# Patient Record
Sex: MALE | Race: WHITE | NOT HISPANIC OR LATINO | Employment: OTHER | URBAN - METROPOLITAN AREA
[De-identification: names, ages, dates, MRNs, and addresses within clinical notes are randomized per-mention and may not be internally consistent; named-entity substitution may affect disease eponyms.]

---

## 2006-06-27 LAB
EXTERNAL HIV CONFIRMATION: NORMAL
EXTERNAL HIV SCREEN: NORMAL

## 2017-01-06 ENCOUNTER — APPOINTMENT (OUTPATIENT)
Dept: OCCUPATIONAL THERAPY | Facility: CLINIC | Age: 57
End: 2017-01-06
Payer: OTHER MISCELLANEOUS

## 2017-01-06 ENCOUNTER — APPOINTMENT (OUTPATIENT)
Dept: OCCUPATIONAL THERAPY | Facility: CLINIC | Age: 57
End: 2017-01-06
Payer: MEDICARE

## 2017-01-06 PROCEDURE — 97110 THERAPEUTIC EXERCISES: CPT

## 2017-01-06 PROCEDURE — 97014 ELECTRIC STIMULATION THERAPY: CPT

## 2017-01-06 PROCEDURE — 97140 MANUAL THERAPY 1/> REGIONS: CPT

## 2017-01-09 ENCOUNTER — APPOINTMENT (OUTPATIENT)
Dept: OCCUPATIONAL THERAPY | Facility: CLINIC | Age: 57
End: 2017-01-09
Payer: OTHER MISCELLANEOUS

## 2017-01-09 PROCEDURE — 97110 THERAPEUTIC EXERCISES: CPT

## 2017-01-09 PROCEDURE — 97014 ELECTRIC STIMULATION THERAPY: CPT

## 2017-01-09 PROCEDURE — 97140 MANUAL THERAPY 1/> REGIONS: CPT

## 2017-01-11 ENCOUNTER — HOSPITAL ENCOUNTER (OUTPATIENT)
Facility: HOSPITAL | Age: 57
Setting detail: OUTPATIENT SURGERY
Discharge: HOME | End: 2017-01-11
Attending: INTERNAL MEDICINE | Admitting: INTERNAL MEDICINE
Payer: COMMERCIAL

## 2017-01-11 ENCOUNTER — ANESTHESIA EVENT (OUTPATIENT)
Dept: GASTROENTEROLOGY | Facility: HOSPITAL | Age: 57
End: 2017-01-11
Payer: COMMERCIAL

## 2017-01-11 ENCOUNTER — ANESTHESIA (OUTPATIENT)
Dept: GASTROENTEROLOGY | Facility: HOSPITAL | Age: 57
End: 2017-01-11
Payer: COMMERCIAL

## 2017-01-11 ENCOUNTER — GENERIC CONVERSION - ENCOUNTER (OUTPATIENT)
Dept: OTHER | Facility: OTHER | Age: 57
End: 2017-01-11

## 2017-01-11 VITALS
SYSTOLIC BLOOD PRESSURE: 124 MMHG | DIASTOLIC BLOOD PRESSURE: 76 MMHG | TEMPERATURE: 98.6 F | HEART RATE: 82 BPM | RESPIRATION RATE: 18 BRPM | OXYGEN SATURATION: 96 %

## 2017-01-11 DIAGNOSIS — K21.9 GASTRO-ESOPHAGEAL REFLUX DISEASE WITHOUT ESOPHAGITIS: ICD-10-CM

## 2017-01-11 PROCEDURE — 88305 TISSUE EXAM BY PATHOLOGIST: CPT | Performed by: INTERNAL MEDICINE

## 2017-01-11 RX ORDER — ALBUTEROL SULFATE 90 UG/1
2 AEROSOL, METERED RESPIRATORY (INHALATION) EVERY 6 HOURS PRN
COMMUNITY
End: 2019-09-18 | Stop reason: SDUPTHER

## 2017-01-11 RX ORDER — LIDOCAINE HYDROCHLORIDE 10 MG/ML
INJECTION, SOLUTION INFILTRATION; PERINEURAL AS NEEDED
Status: DISCONTINUED | OUTPATIENT
Start: 2017-01-11 | End: 2017-01-11 | Stop reason: SURG

## 2017-01-11 RX ORDER — PANTOPRAZOLE SODIUM 40 MG/1
40 TABLET, DELAYED RELEASE ORAL 2 TIMES DAILY
Status: ON HOLD | COMMUNITY
End: 2017-07-13

## 2017-01-11 RX ORDER — PROPOFOL 10 MG/ML
INJECTION, EMULSION INTRAVENOUS AS NEEDED
Status: DISCONTINUED | OUTPATIENT
Start: 2017-01-11 | End: 2017-01-11 | Stop reason: SURG

## 2017-01-11 RX ORDER — SODIUM CHLORIDE 9 MG/ML
50 INJECTION, SOLUTION INTRAVENOUS CONTINUOUS
Status: DISCONTINUED | OUTPATIENT
Start: 2017-01-11 | End: 2017-01-11 | Stop reason: HOSPADM

## 2017-01-11 RX ORDER — TOPIRAMATE 100 MG/1
100 TABLET, FILM COATED ORAL DAILY
Status: ON HOLD | COMMUNITY
End: 2018-11-27 | Stop reason: ALTCHOICE

## 2017-01-11 RX ADMIN — LIDOCAINE HYDROCHLORIDE 100 MG: 10 INJECTION, SOLUTION INFILTRATION; PERINEURAL at 14:36

## 2017-01-11 RX ADMIN — PROPOFOL 25 MG: 10 INJECTION, EMULSION INTRAVENOUS at 14:40

## 2017-01-11 RX ADMIN — SODIUM CHLORIDE 50 ML/HR: 0.9 INJECTION, SOLUTION INTRAVENOUS at 14:03

## 2017-01-11 RX ADMIN — PROPOFOL 25 MG: 10 INJECTION, EMULSION INTRAVENOUS at 14:41

## 2017-01-11 RX ADMIN — PROPOFOL 100 MG: 10 INJECTION, EMULSION INTRAVENOUS at 14:37

## 2017-01-11 RX ADMIN — PROPOFOL 100 MG: 10 INJECTION, EMULSION INTRAVENOUS at 14:36

## 2017-01-11 RX ADMIN — PROPOFOL 25 MG: 10 INJECTION, EMULSION INTRAVENOUS at 14:42

## 2017-01-11 RX ADMIN — PROPOFOL 25 MG: 10 INJECTION, EMULSION INTRAVENOUS at 14:39

## 2017-01-12 ENCOUNTER — APPOINTMENT (OUTPATIENT)
Dept: OCCUPATIONAL THERAPY | Facility: CLINIC | Age: 57
End: 2017-01-12
Payer: OTHER MISCELLANEOUS

## 2017-01-12 PROCEDURE — 97018 PARAFFIN BATH THERAPY: CPT

## 2017-01-12 PROCEDURE — 97110 THERAPEUTIC EXERCISES: CPT

## 2017-01-12 PROCEDURE — 97140 MANUAL THERAPY 1/> REGIONS: CPT

## 2017-01-12 PROCEDURE — 97014 ELECTRIC STIMULATION THERAPY: CPT

## 2017-01-13 ENCOUNTER — APPOINTMENT (OUTPATIENT)
Dept: OCCUPATIONAL THERAPY | Facility: CLINIC | Age: 57
End: 2017-01-13
Payer: OTHER MISCELLANEOUS

## 2017-01-15 ENCOUNTER — GENERIC CONVERSION - ENCOUNTER (OUTPATIENT)
Dept: OTHER | Facility: OTHER | Age: 57
End: 2017-01-15

## 2017-01-16 ENCOUNTER — APPOINTMENT (OUTPATIENT)
Dept: OCCUPATIONAL THERAPY | Facility: CLINIC | Age: 57
End: 2017-01-16
Payer: OTHER MISCELLANEOUS

## 2017-01-16 ENCOUNTER — GENERIC CONVERSION - ENCOUNTER (OUTPATIENT)
Dept: OTHER | Facility: OTHER | Age: 57
End: 2017-01-16

## 2017-01-16 PROCEDURE — 97014 ELECTRIC STIMULATION THERAPY: CPT

## 2017-01-16 PROCEDURE — 97110 THERAPEUTIC EXERCISES: CPT

## 2017-01-16 PROCEDURE — 97140 MANUAL THERAPY 1/> REGIONS: CPT

## 2017-01-16 PROCEDURE — 97018 PARAFFIN BATH THERAPY: CPT

## 2017-01-18 ENCOUNTER — APPOINTMENT (OUTPATIENT)
Dept: OCCUPATIONAL THERAPY | Facility: CLINIC | Age: 57
End: 2017-01-18
Payer: OTHER MISCELLANEOUS

## 2017-01-23 ENCOUNTER — APPOINTMENT (OUTPATIENT)
Dept: OCCUPATIONAL THERAPY | Facility: CLINIC | Age: 57
End: 2017-01-23
Payer: OTHER MISCELLANEOUS

## 2017-01-23 ENCOUNTER — GENERIC CONVERSION - ENCOUNTER (OUTPATIENT)
Dept: OTHER | Facility: OTHER | Age: 57
End: 2017-01-23

## 2017-01-23 PROCEDURE — 97110 THERAPEUTIC EXERCISES: CPT

## 2017-01-23 PROCEDURE — 97140 MANUAL THERAPY 1/> REGIONS: CPT

## 2017-01-23 PROCEDURE — 97035 APP MDLTY 1+ULTRASOUND EA 15: CPT

## 2017-01-23 PROCEDURE — 97014 ELECTRIC STIMULATION THERAPY: CPT

## 2017-01-25 ENCOUNTER — TRANSCRIBE ORDERS (OUTPATIENT)
Dept: LAB | Facility: CLINIC | Age: 57
End: 2017-01-25

## 2017-01-25 ENCOUNTER — APPOINTMENT (OUTPATIENT)
Dept: LAB | Facility: CLINIC | Age: 57
End: 2017-01-25
Payer: COMMERCIAL

## 2017-01-25 ENCOUNTER — APPOINTMENT (OUTPATIENT)
Dept: OCCUPATIONAL THERAPY | Facility: CLINIC | Age: 57
End: 2017-01-25
Payer: OTHER MISCELLANEOUS

## 2017-01-25 DIAGNOSIS — E78.00 PURE HYPERCHOLESTEROLEMIA: ICD-10-CM

## 2017-01-25 LAB
ALBUMIN SERPL BCP-MCNC: 3.8 G/DL (ref 3.5–5)
ALP SERPL-CCNC: 73 U/L (ref 46–116)
ALT SERPL W P-5'-P-CCNC: 28 U/L (ref 12–78)
ANION GAP SERPL CALCULATED.3IONS-SCNC: 6 MMOL/L (ref 4–13)
AST SERPL W P-5'-P-CCNC: 11 U/L (ref 5–45)
BILIRUB SERPL-MCNC: 0.31 MG/DL (ref 0.2–1)
BUN SERPL-MCNC: 17 MG/DL (ref 5–25)
CALCIUM SERPL-MCNC: 8.8 MG/DL (ref 8.3–10.1)
CHLORIDE SERPL-SCNC: 102 MMOL/L (ref 100–108)
CHOLEST SERPL-MCNC: 226 MG/DL (ref 50–200)
CO2 SERPL-SCNC: 29 MMOL/L (ref 21–32)
CREAT SERPL-MCNC: 1 MG/DL (ref 0.6–1.3)
GFR SERPL CREATININE-BSD FRML MDRD: >60 ML/MIN/1.73SQ M
GLUCOSE SERPL-MCNC: 79 MG/DL (ref 65–140)
HDLC SERPL-MCNC: 45 MG/DL (ref 40–60)
LDLC SERPL CALC-MCNC: 165 MG/DL (ref 0–100)
POTASSIUM SERPL-SCNC: 3.8 MMOL/L (ref 3.5–5.3)
PROT SERPL-MCNC: 7.6 G/DL (ref 6.4–8.2)
SODIUM SERPL-SCNC: 137 MMOL/L (ref 136–145)
TRIGL SERPL-MCNC: 80 MG/DL

## 2017-01-25 PROCEDURE — 97110 THERAPEUTIC EXERCISES: CPT

## 2017-01-25 PROCEDURE — 80061 LIPID PANEL: CPT

## 2017-01-25 PROCEDURE — 80053 COMPREHEN METABOLIC PANEL: CPT

## 2017-01-25 PROCEDURE — 97140 MANUAL THERAPY 1/> REGIONS: CPT

## 2017-01-25 PROCEDURE — 97018 PARAFFIN BATH THERAPY: CPT

## 2017-01-25 PROCEDURE — 97014 ELECTRIC STIMULATION THERAPY: CPT

## 2017-01-25 PROCEDURE — 97035 APP MDLTY 1+ULTRASOUND EA 15: CPT

## 2017-01-25 PROCEDURE — 36415 COLL VENOUS BLD VENIPUNCTURE: CPT

## 2017-01-26 ENCOUNTER — ALLSCRIPTS OFFICE VISIT (OUTPATIENT)
Dept: OTHER | Facility: OTHER | Age: 57
End: 2017-01-26

## 2017-02-01 ENCOUNTER — APPOINTMENT (OUTPATIENT)
Dept: OCCUPATIONAL THERAPY | Facility: CLINIC | Age: 57
End: 2017-02-01
Payer: OTHER MISCELLANEOUS

## 2017-02-01 PROCEDURE — G8985 CARRY GOAL STATUS: HCPCS

## 2017-02-01 PROCEDURE — 97014 ELECTRIC STIMULATION THERAPY: CPT

## 2017-02-01 PROCEDURE — G8984 CARRY CURRENT STATUS: HCPCS

## 2017-02-01 PROCEDURE — G8990 OTHER PT/OT CURRENT STATUS: HCPCS

## 2017-02-01 PROCEDURE — 97168 OT RE-EVAL EST PLAN CARE: CPT

## 2017-02-01 PROCEDURE — G8991 OTHER PT/OT GOAL STATUS: HCPCS

## 2017-02-01 PROCEDURE — 97018 PARAFFIN BATH THERAPY: CPT

## 2017-02-01 PROCEDURE — 97140 MANUAL THERAPY 1/> REGIONS: CPT

## 2017-02-01 PROCEDURE — 97110 THERAPEUTIC EXERCISES: CPT

## 2017-02-03 ENCOUNTER — APPOINTMENT (OUTPATIENT)
Dept: OCCUPATIONAL THERAPY | Facility: CLINIC | Age: 57
End: 2017-02-03
Payer: OTHER MISCELLANEOUS

## 2017-02-03 PROCEDURE — 97014 ELECTRIC STIMULATION THERAPY: CPT

## 2017-02-03 PROCEDURE — 97110 THERAPEUTIC EXERCISES: CPT

## 2017-02-03 PROCEDURE — 97018 PARAFFIN BATH THERAPY: CPT

## 2017-02-03 PROCEDURE — 97140 MANUAL THERAPY 1/> REGIONS: CPT

## 2017-02-06 ENCOUNTER — APPOINTMENT (OUTPATIENT)
Dept: OCCUPATIONAL THERAPY | Facility: CLINIC | Age: 57
End: 2017-02-06
Payer: OTHER MISCELLANEOUS

## 2017-02-06 PROCEDURE — 97014 ELECTRIC STIMULATION THERAPY: CPT

## 2017-02-06 PROCEDURE — 97110 THERAPEUTIC EXERCISES: CPT

## 2017-02-06 PROCEDURE — 97140 MANUAL THERAPY 1/> REGIONS: CPT

## 2017-02-06 PROCEDURE — 97018 PARAFFIN BATH THERAPY: CPT

## 2017-02-07 ENCOUNTER — GENERIC CONVERSION - ENCOUNTER (OUTPATIENT)
Dept: OTHER | Facility: OTHER | Age: 57
End: 2017-02-07

## 2017-02-08 ENCOUNTER — ALLSCRIPTS OFFICE VISIT (OUTPATIENT)
Dept: OTHER | Facility: OTHER | Age: 57
End: 2017-02-08

## 2017-02-08 DIAGNOSIS — Z47.89 ENCOUNTER FOR OTHER ORTHOPEDIC AFTERCARE: ICD-10-CM

## 2017-02-08 DIAGNOSIS — K21.9 GASTRO-ESOPHAGEAL REFLUX DISEASE WITHOUT ESOPHAGITIS: ICD-10-CM

## 2017-02-10 ENCOUNTER — APPOINTMENT (OUTPATIENT)
Dept: OCCUPATIONAL THERAPY | Facility: CLINIC | Age: 57
End: 2017-02-10
Payer: OTHER MISCELLANEOUS

## 2017-02-10 PROCEDURE — 97140 MANUAL THERAPY 1/> REGIONS: CPT

## 2017-02-10 PROCEDURE — 97018 PARAFFIN BATH THERAPY: CPT

## 2017-02-10 PROCEDURE — 97014 ELECTRIC STIMULATION THERAPY: CPT

## 2017-02-10 PROCEDURE — 97110 THERAPEUTIC EXERCISES: CPT

## 2017-02-13 ENCOUNTER — GENERIC CONVERSION - ENCOUNTER (OUTPATIENT)
Dept: OTHER | Facility: OTHER | Age: 57
End: 2017-02-13

## 2017-02-13 ENCOUNTER — APPOINTMENT (OUTPATIENT)
Dept: OCCUPATIONAL THERAPY | Facility: CLINIC | Age: 57
End: 2017-02-13
Payer: OTHER MISCELLANEOUS

## 2017-02-13 PROCEDURE — 97014 ELECTRIC STIMULATION THERAPY: CPT

## 2017-02-13 PROCEDURE — 97110 THERAPEUTIC EXERCISES: CPT

## 2017-02-13 PROCEDURE — 97140 MANUAL THERAPY 1/> REGIONS: CPT

## 2017-02-13 PROCEDURE — 97018 PARAFFIN BATH THERAPY: CPT

## 2017-02-15 ENCOUNTER — APPOINTMENT (OUTPATIENT)
Dept: OCCUPATIONAL THERAPY | Facility: CLINIC | Age: 57
End: 2017-02-15
Payer: OTHER MISCELLANEOUS

## 2017-02-15 ENCOUNTER — HOSPITAL ENCOUNTER (OUTPATIENT)
Dept: RADIOLOGY | Facility: HOSPITAL | Age: 57
Discharge: HOME | End: 2017-02-15
Attending: INTERNAL MEDICINE
Payer: COMMERCIAL

## 2017-02-15 DIAGNOSIS — K21.9 GASTRO-ESOPHAGEAL REFLUX DISEASE WITHOUT ESOPHAGITIS: ICD-10-CM

## 2017-02-15 PROCEDURE — 74220 X-RAY XM ESOPHAGUS 1CNTRST: CPT

## 2017-02-22 ENCOUNTER — APPOINTMENT (OUTPATIENT)
Dept: OCCUPATIONAL THERAPY | Facility: CLINIC | Age: 57
End: 2017-02-22
Payer: OTHER MISCELLANEOUS

## 2017-02-22 PROCEDURE — 97014 ELECTRIC STIMULATION THERAPY: CPT

## 2017-02-22 PROCEDURE — 97018 PARAFFIN BATH THERAPY: CPT

## 2017-02-22 PROCEDURE — 97110 THERAPEUTIC EXERCISES: CPT

## 2017-02-22 PROCEDURE — 97140 MANUAL THERAPY 1/> REGIONS: CPT

## 2017-02-24 ENCOUNTER — APPOINTMENT (OUTPATIENT)
Dept: OCCUPATIONAL THERAPY | Facility: CLINIC | Age: 57
End: 2017-02-24
Payer: OTHER MISCELLANEOUS

## 2017-02-24 PROCEDURE — 97110 THERAPEUTIC EXERCISES: CPT

## 2017-02-24 PROCEDURE — 97018 PARAFFIN BATH THERAPY: CPT

## 2017-02-24 PROCEDURE — 97140 MANUAL THERAPY 1/> REGIONS: CPT

## 2017-02-24 PROCEDURE — 97014 ELECTRIC STIMULATION THERAPY: CPT

## 2017-03-01 ENCOUNTER — GENERIC CONVERSION - ENCOUNTER (OUTPATIENT)
Dept: OTHER | Facility: OTHER | Age: 57
End: 2017-03-01

## 2017-03-01 ENCOUNTER — APPOINTMENT (OUTPATIENT)
Dept: OCCUPATIONAL THERAPY | Facility: CLINIC | Age: 57
End: 2017-03-01
Payer: OTHER MISCELLANEOUS

## 2017-03-01 PROCEDURE — 97168 OT RE-EVAL EST PLAN CARE: CPT

## 2017-03-01 PROCEDURE — 97140 MANUAL THERAPY 1/> REGIONS: CPT

## 2017-03-01 PROCEDURE — 97014 ELECTRIC STIMULATION THERAPY: CPT

## 2017-03-01 PROCEDURE — G8984 CARRY CURRENT STATUS: HCPCS

## 2017-03-01 PROCEDURE — 97018 PARAFFIN BATH THERAPY: CPT

## 2017-03-01 PROCEDURE — G8985 CARRY GOAL STATUS: HCPCS

## 2017-03-01 PROCEDURE — 97110 THERAPEUTIC EXERCISES: CPT

## 2017-03-03 ENCOUNTER — ALLSCRIPTS OFFICE VISIT (OUTPATIENT)
Dept: OTHER | Facility: OTHER | Age: 57
End: 2017-03-03

## 2017-03-08 ENCOUNTER — HOSPITAL ENCOUNTER (OUTPATIENT)
Dept: GASTROENTEROLOGY | Facility: HOSPITAL | Age: 57
Discharge: HOME | End: 2017-03-08
Payer: COMMERCIAL

## 2017-03-08 VITALS
DIASTOLIC BLOOD PRESSURE: 83 MMHG | HEIGHT: 69 IN | HEART RATE: 91 BPM | TEMPERATURE: 98.3 F | BODY MASS INDEX: 30.66 KG/M2 | WEIGHT: 207 LBS | SYSTOLIC BLOOD PRESSURE: 181 MMHG | OXYGEN SATURATION: 98 % | RESPIRATION RATE: 18 BRPM

## 2017-03-08 PROCEDURE — 91020 GASTRIC MOTILITY STUDIES: CPT

## 2017-03-08 PROCEDURE — 91038 ESOPH IMPED FUNCT TEST > 1HR: CPT

## 2017-03-15 ENCOUNTER — APPOINTMENT (OUTPATIENT)
Dept: OCCUPATIONAL THERAPY | Facility: CLINIC | Age: 57
End: 2017-03-15
Payer: OTHER MISCELLANEOUS

## 2017-03-17 ENCOUNTER — APPOINTMENT (OUTPATIENT)
Dept: OCCUPATIONAL THERAPY | Facility: CLINIC | Age: 57
End: 2017-03-17
Payer: OTHER MISCELLANEOUS

## 2017-03-17 PROCEDURE — 97140 MANUAL THERAPY 1/> REGIONS: CPT

## 2017-03-17 PROCEDURE — 97014 ELECTRIC STIMULATION THERAPY: CPT

## 2017-03-17 PROCEDURE — 97018 PARAFFIN BATH THERAPY: CPT

## 2017-03-17 PROCEDURE — 97110 THERAPEUTIC EXERCISES: CPT

## 2017-03-20 ENCOUNTER — GENERIC CONVERSION - ENCOUNTER (OUTPATIENT)
Dept: OTHER | Facility: OTHER | Age: 57
End: 2017-03-20

## 2017-03-20 ENCOUNTER — ALLSCRIPTS OFFICE VISIT (OUTPATIENT)
Dept: OTHER | Facility: OTHER | Age: 57
End: 2017-03-20

## 2017-03-22 ENCOUNTER — APPOINTMENT (OUTPATIENT)
Dept: OCCUPATIONAL THERAPY | Facility: CLINIC | Age: 57
End: 2017-03-22
Payer: OTHER MISCELLANEOUS

## 2017-03-22 PROCEDURE — 97014 ELECTRIC STIMULATION THERAPY: CPT

## 2017-03-22 PROCEDURE — 97140 MANUAL THERAPY 1/> REGIONS: CPT

## 2017-03-22 PROCEDURE — 97018 PARAFFIN BATH THERAPY: CPT

## 2017-03-22 PROCEDURE — 97110 THERAPEUTIC EXERCISES: CPT

## 2017-03-23 ENCOUNTER — ALLSCRIPTS OFFICE VISIT (OUTPATIENT)
Dept: OTHER | Facility: OTHER | Age: 57
End: 2017-03-23

## 2017-03-23 LAB
BILIRUB UR QL STRIP: NORMAL
CLARITY UR: NORMAL
COLOR UR: YELLOW
GLUCOSE (HISTORICAL): NORMAL
HGB UR QL STRIP.AUTO: NORMAL
KETONES UR STRIP-MCNC: NORMAL MG/DL
LEUKOCYTE ESTERASE UR QL STRIP: NORMAL
NITRITE UR QL STRIP: NORMAL
PH UR STRIP.AUTO: 6 [PH]
PROT UR STRIP-MCNC: NORMAL MG/DL
SP GR UR STRIP.AUTO: 1.01
UROBILINOGEN UR QL STRIP.AUTO: 0.2

## 2017-03-24 ENCOUNTER — APPOINTMENT (OUTPATIENT)
Dept: OCCUPATIONAL THERAPY | Facility: CLINIC | Age: 57
End: 2017-03-24
Payer: OTHER MISCELLANEOUS

## 2017-03-24 PROCEDURE — 97018 PARAFFIN BATH THERAPY: CPT

## 2017-03-24 PROCEDURE — 97110 THERAPEUTIC EXERCISES: CPT

## 2017-03-24 PROCEDURE — 97014 ELECTRIC STIMULATION THERAPY: CPT

## 2017-03-24 PROCEDURE — 97140 MANUAL THERAPY 1/> REGIONS: CPT

## 2017-03-27 ENCOUNTER — APPOINTMENT (OUTPATIENT)
Dept: OCCUPATIONAL THERAPY | Facility: CLINIC | Age: 57
End: 2017-03-27
Payer: OTHER MISCELLANEOUS

## 2017-03-27 PROCEDURE — 97140 MANUAL THERAPY 1/> REGIONS: CPT

## 2017-03-27 PROCEDURE — 97018 PARAFFIN BATH THERAPY: CPT

## 2017-03-27 PROCEDURE — 97014 ELECTRIC STIMULATION THERAPY: CPT

## 2017-03-27 PROCEDURE — 97110 THERAPEUTIC EXERCISES: CPT

## 2017-03-31 ENCOUNTER — APPOINTMENT (OUTPATIENT)
Dept: OCCUPATIONAL THERAPY | Facility: CLINIC | Age: 57
End: 2017-03-31
Payer: OTHER MISCELLANEOUS

## 2017-03-31 PROCEDURE — 97018 PARAFFIN BATH THERAPY: CPT

## 2017-03-31 PROCEDURE — 97140 MANUAL THERAPY 1/> REGIONS: CPT

## 2017-03-31 PROCEDURE — 97014 ELECTRIC STIMULATION THERAPY: CPT

## 2017-03-31 PROCEDURE — 97110 THERAPEUTIC EXERCISES: CPT

## 2017-04-05 ENCOUNTER — ALLSCRIPTS OFFICE VISIT (OUTPATIENT)
Dept: OTHER | Facility: OTHER | Age: 57
End: 2017-04-05

## 2017-04-05 ENCOUNTER — APPOINTMENT (OUTPATIENT)
Dept: OCCUPATIONAL THERAPY | Facility: CLINIC | Age: 57
End: 2017-04-05
Payer: OTHER MISCELLANEOUS

## 2017-04-05 PROCEDURE — 97140 MANUAL THERAPY 1/> REGIONS: CPT

## 2017-04-05 PROCEDURE — 97018 PARAFFIN BATH THERAPY: CPT

## 2017-04-05 PROCEDURE — 97014 ELECTRIC STIMULATION THERAPY: CPT

## 2017-04-05 PROCEDURE — 97110 THERAPEUTIC EXERCISES: CPT

## 2017-04-07 ENCOUNTER — APPOINTMENT (OUTPATIENT)
Dept: OCCUPATIONAL THERAPY | Facility: CLINIC | Age: 57
End: 2017-04-07
Payer: OTHER MISCELLANEOUS

## 2017-04-12 ENCOUNTER — GENERIC CONVERSION - ENCOUNTER (OUTPATIENT)
Dept: OTHER | Facility: OTHER | Age: 57
End: 2017-04-12

## 2017-04-12 ENCOUNTER — APPOINTMENT (OUTPATIENT)
Dept: OCCUPATIONAL THERAPY | Facility: CLINIC | Age: 57
End: 2017-04-12
Payer: OTHER MISCELLANEOUS

## 2017-04-12 PROCEDURE — 97140 MANUAL THERAPY 1/> REGIONS: CPT

## 2017-04-12 PROCEDURE — 97014 ELECTRIC STIMULATION THERAPY: CPT

## 2017-04-12 PROCEDURE — 97110 THERAPEUTIC EXERCISES: CPT

## 2017-04-12 PROCEDURE — 97018 PARAFFIN BATH THERAPY: CPT

## 2017-04-12 RX ORDER — RIZATRIPTAN BENZOATE 5 MG/1
10 TABLET ORAL ONCE AS NEEDED
COMMUNITY
End: 2018-05-09 | Stop reason: ALTCHOICE

## 2017-04-12 RX ORDER — TIZANIDINE 4 MG/1
4 TABLET ORAL EVERY 6 HOURS PRN
COMMUNITY
End: 2018-02-15 | Stop reason: ALTCHOICE

## 2017-04-12 RX ORDER — NORTRIPTYLINE HYDROCHLORIDE 10 MG/1
10 CAPSULE ORAL
Status: ON HOLD | COMMUNITY
End: 2018-11-27 | Stop reason: ALTCHOICE

## 2017-04-13 ENCOUNTER — ANESTHESIA EVENT (OUTPATIENT)
Dept: PERIOP | Facility: HOSPITAL | Age: 57
End: 2017-04-13
Payer: COMMERCIAL

## 2017-04-14 ENCOUNTER — GENERIC CONVERSION - ENCOUNTER (OUTPATIENT)
Dept: OTHER | Facility: OTHER | Age: 57
End: 2017-04-14

## 2017-04-14 ENCOUNTER — HOSPITAL ENCOUNTER (OUTPATIENT)
Facility: HOSPITAL | Age: 57
Setting detail: OUTPATIENT SURGERY
Discharge: HOME | End: 2017-04-14
Attending: INTERNAL MEDICINE | Admitting: INTERNAL MEDICINE
Payer: COMMERCIAL

## 2017-04-14 ENCOUNTER — ANESTHESIA (OUTPATIENT)
Dept: PERIOP | Facility: HOSPITAL | Age: 57
End: 2017-04-14
Payer: COMMERCIAL

## 2017-04-14 VITALS
OXYGEN SATURATION: 99 % | BODY MASS INDEX: 32.29 KG/M2 | DIASTOLIC BLOOD PRESSURE: 60 MMHG | HEIGHT: 69 IN | SYSTOLIC BLOOD PRESSURE: 142 MMHG | WEIGHT: 218 LBS | TEMPERATURE: 99 F | RESPIRATION RATE: 16 BRPM | HEART RATE: 75 BPM

## 2017-04-14 DIAGNOSIS — Z86.010 HISTORY OF COLONIC POLYPS: ICD-10-CM

## 2017-04-14 PROCEDURE — 88305 TISSUE EXAM BY PATHOLOGIST: CPT | Performed by: INTERNAL MEDICINE

## 2017-04-14 RX ORDER — PROPOFOL 10 MG/ML
INJECTION, EMULSION INTRAVENOUS AS NEEDED
Status: DISCONTINUED | OUTPATIENT
Start: 2017-04-14 | End: 2017-04-14 | Stop reason: SURG

## 2017-04-14 RX ORDER — SODIUM CHLORIDE, SODIUM LACTATE, POTASSIUM CHLORIDE, CALCIUM CHLORIDE 600; 310; 30; 20 MG/100ML; MG/100ML; MG/100ML; MG/100ML
125 INJECTION, SOLUTION INTRAVENOUS CONTINUOUS
Status: DISCONTINUED | OUTPATIENT
Start: 2017-04-14 | End: 2017-04-14 | Stop reason: HOSPADM

## 2017-04-14 RX ORDER — ONDANSETRON 2 MG/ML
4 INJECTION INTRAMUSCULAR; INTRAVENOUS ONCE AS NEEDED
Status: DISCONTINUED | OUTPATIENT
Start: 2017-04-14 | End: 2017-04-14 | Stop reason: HOSPADM

## 2017-04-14 RX ADMIN — PROPOFOL 50 MG: 10 INJECTION, EMULSION INTRAVENOUS at 08:33

## 2017-04-14 RX ADMIN — SODIUM CHLORIDE, SODIUM LACTATE, POTASSIUM CHLORIDE, AND CALCIUM CHLORIDE 125 ML/HR: .6; .31; .03; .02 INJECTION, SOLUTION INTRAVENOUS at 07:28

## 2017-04-14 RX ADMIN — PROPOFOL 50 MG: 10 INJECTION, EMULSION INTRAVENOUS at 08:44

## 2017-04-14 RX ADMIN — PROPOFOL 50 MG: 10 INJECTION, EMULSION INTRAVENOUS at 08:36

## 2017-04-14 RX ADMIN — PROPOFOL 50 MG: 10 INJECTION, EMULSION INTRAVENOUS at 08:42

## 2017-04-14 RX ADMIN — PROPOFOL 50 MG: 10 INJECTION, EMULSION INTRAVENOUS at 08:34

## 2017-04-14 RX ADMIN — PROPOFOL 50 MG: 10 INJECTION, EMULSION INTRAVENOUS at 08:39

## 2017-04-19 ENCOUNTER — APPOINTMENT (OUTPATIENT)
Dept: OCCUPATIONAL THERAPY | Facility: CLINIC | Age: 57
End: 2017-04-19
Payer: OTHER MISCELLANEOUS

## 2017-04-19 PROCEDURE — 97014 ELECTRIC STIMULATION THERAPY: CPT

## 2017-04-19 PROCEDURE — 97168 OT RE-EVAL EST PLAN CARE: CPT

## 2017-04-19 PROCEDURE — G8985 CARRY GOAL STATUS: HCPCS

## 2017-04-19 PROCEDURE — 97110 THERAPEUTIC EXERCISES: CPT

## 2017-04-19 PROCEDURE — 97140 MANUAL THERAPY 1/> REGIONS: CPT

## 2017-04-19 PROCEDURE — G8984 CARRY CURRENT STATUS: HCPCS

## 2017-04-20 ENCOUNTER — ALLSCRIPTS OFFICE VISIT (OUTPATIENT)
Dept: OTHER | Facility: OTHER | Age: 57
End: 2017-04-20

## 2017-04-21 ENCOUNTER — ALLSCRIPTS OFFICE VISIT (OUTPATIENT)
Dept: OTHER | Facility: OTHER | Age: 57
End: 2017-04-21

## 2017-05-01 ENCOUNTER — GENERIC CONVERSION - ENCOUNTER (OUTPATIENT)
Dept: OTHER | Facility: OTHER | Age: 57
End: 2017-05-01

## 2017-05-04 ENCOUNTER — OFFICE VISIT (OUTPATIENT)
Dept: URGENT CARE | Facility: CLINIC | Age: 57
End: 2017-05-04
Payer: COMMERCIAL

## 2017-05-04 PROCEDURE — 99213 OFFICE O/P EST LOW 20 MIN: CPT

## 2017-05-04 PROCEDURE — S9088 SERVICES PROVIDED IN URGENT: HCPCS

## 2017-05-07 ENCOUNTER — GENERIC CONVERSION - ENCOUNTER (OUTPATIENT)
Dept: OTHER | Facility: OTHER | Age: 57
End: 2017-05-07

## 2017-05-11 ENCOUNTER — ALLSCRIPTS OFFICE VISIT (OUTPATIENT)
Dept: OTHER | Facility: OTHER | Age: 57
End: 2017-05-11

## 2017-05-15 ENCOUNTER — TRANSCRIBE ORDERS (OUTPATIENT)
Dept: ADMINISTRATIVE | Facility: HOSPITAL | Age: 57
End: 2017-05-15

## 2017-05-15 ENCOUNTER — GENERIC CONVERSION - ENCOUNTER (OUTPATIENT)
Dept: OTHER | Facility: OTHER | Age: 57
End: 2017-05-15

## 2017-05-15 ENCOUNTER — ALLSCRIPTS OFFICE VISIT (OUTPATIENT)
Dept: OTHER | Facility: OTHER | Age: 57
End: 2017-05-15

## 2017-05-15 DIAGNOSIS — Z01.810 PRE-OPERATIVE CARDIOVASCULAR EXAMINATION: Primary | ICD-10-CM

## 2017-05-15 DIAGNOSIS — Z01.810 ENCOUNTER FOR PREPROCEDURAL CARDIOVASCULAR EXAMINATION: ICD-10-CM

## 2017-05-15 DIAGNOSIS — I45.10 RIGHT BUNDLE BRANCH BLOCK: ICD-10-CM

## 2017-05-15 DIAGNOSIS — I45.10 RIGHT BUNDLE-BRANCH BLOCK: ICD-10-CM

## 2017-05-15 DIAGNOSIS — R91.1 SOLITARY PULMONARY NODULE: ICD-10-CM

## 2017-05-17 ENCOUNTER — GENERIC CONVERSION - ENCOUNTER (OUTPATIENT)
Dept: OTHER | Facility: OTHER | Age: 57
End: 2017-05-17

## 2017-05-18 ENCOUNTER — GENERIC CONVERSION - ENCOUNTER (OUTPATIENT)
Dept: OTHER | Facility: OTHER | Age: 57
End: 2017-05-18

## 2017-05-25 ENCOUNTER — HOSPITAL ENCOUNTER (OUTPATIENT)
Dept: NUCLEAR MEDICINE | Facility: HOSPITAL | Age: 57
Discharge: HOME | End: 2017-05-25
Attending: INTERNAL MEDICINE
Payer: COMMERCIAL

## 2017-05-25 ENCOUNTER — GENERIC CONVERSION - ENCOUNTER (OUTPATIENT)
Dept: OTHER | Facility: OTHER | Age: 57
End: 2017-05-25

## 2017-05-25 ENCOUNTER — HOSPITAL ENCOUNTER (OUTPATIENT)
Dept: NON INVASIVE DIAGNOSTICS | Facility: HOSPITAL | Age: 57
Discharge: HOME | End: 2017-05-25
Attending: INTERNAL MEDICINE
Payer: COMMERCIAL

## 2017-05-25 DIAGNOSIS — I45.10 RIGHT BUNDLE-BRANCH BLOCK: ICD-10-CM

## 2017-05-25 DIAGNOSIS — Z01.810 ENCOUNTER FOR PREPROCEDURAL CARDIOVASCULAR EXAMINATION: ICD-10-CM

## 2017-05-25 DIAGNOSIS — I45.10 RIGHT BUNDLE BRANCH BLOCK: ICD-10-CM

## 2017-05-25 DIAGNOSIS — Z01.810 PRE-OPERATIVE CARDIOVASCULAR EXAMINATION: ICD-10-CM

## 2017-05-25 PROCEDURE — A9502 TC99M TETROFOSMIN: HCPCS

## 2017-05-25 PROCEDURE — 78452 HT MUSCLE IMAGE SPECT MULT: CPT

## 2017-05-25 PROCEDURE — 93306 TTE W/DOPPLER COMPLETE: CPT

## 2017-05-25 PROCEDURE — 93017 CV STRESS TEST TRACING ONLY: CPT

## 2017-05-25 RX ADMIN — REGADENOSON 0.4 MG: 0.08 INJECTION, SOLUTION INTRAVENOUS at 09:39

## 2017-06-06 ENCOUNTER — HOSPITAL ENCOUNTER (OUTPATIENT)
Dept: CT IMAGING | Facility: HOSPITAL | Age: 57
Discharge: HOME | End: 2017-06-06
Attending: THORACIC SURGERY (CARDIOTHORACIC VASCULAR SURGERY)
Payer: COMMERCIAL

## 2017-06-06 DIAGNOSIS — R91.1 SOLITARY PULMONARY NODULE: ICD-10-CM

## 2017-06-06 PROCEDURE — 71250 CT THORAX DX C-: CPT

## 2017-06-12 ENCOUNTER — ALLSCRIPTS OFFICE VISIT (OUTPATIENT)
Dept: OTHER | Facility: OTHER | Age: 57
End: 2017-06-12

## 2017-06-13 ENCOUNTER — GENERIC CONVERSION - ENCOUNTER (OUTPATIENT)
Dept: OTHER | Facility: OTHER | Age: 57
End: 2017-06-13

## 2017-06-16 ENCOUNTER — TRANSCRIBE ORDERS (OUTPATIENT)
Dept: URGENT CARE | Facility: CLINIC | Age: 57
End: 2017-06-16

## 2017-06-16 ENCOUNTER — APPOINTMENT (OUTPATIENT)
Dept: LAB | Facility: CLINIC | Age: 57
End: 2017-06-16
Payer: COMMERCIAL

## 2017-06-16 DIAGNOSIS — K44.9 DIAPHRAGMATIC HERNIA WITHOUT MENTION OF OBSTRUCTION OR GANGRENE: ICD-10-CM

## 2017-06-16 DIAGNOSIS — K21.9 GASTROESOPHAGEAL REFLUX DISEASE WITHOUT ESOPHAGITIS: ICD-10-CM

## 2017-06-16 DIAGNOSIS — K21.9 GASTROESOPHAGEAL REFLUX DISEASE WITHOUT ESOPHAGITIS: Primary | ICD-10-CM

## 2017-06-16 PROCEDURE — 80323 ALKALOIDS NOS: CPT

## 2017-06-19 ENCOUNTER — ALLSCRIPTS OFFICE VISIT (OUTPATIENT)
Dept: OTHER | Facility: OTHER | Age: 57
End: 2017-06-19

## 2017-06-22 LAB
COTININE SERPL-MCNC: NORMAL NG/ML
NICOTINE SERPL-MCNC: NORMAL NG/ML

## 2017-06-23 ENCOUNTER — ALLSCRIPTS OFFICE VISIT (OUTPATIENT)
Dept: OTHER | Facility: OTHER | Age: 57
End: 2017-06-23

## 2017-07-03 ENCOUNTER — ALLSCRIPTS OFFICE VISIT (OUTPATIENT)
Dept: OTHER | Facility: OTHER | Age: 57
End: 2017-07-03

## 2017-07-05 ENCOUNTER — ALLSCRIPTS OFFICE VISIT (OUTPATIENT)
Dept: OTHER | Facility: OTHER | Age: 57
End: 2017-07-05

## 2017-07-12 ENCOUNTER — HOSPITAL ENCOUNTER (INPATIENT)
Facility: HOSPITAL | Age: 57
LOS: 1 days | Discharge: HOME | DRG: 328 | End: 2017-07-13
Attending: SURGERY | Admitting: SURGERY
Payer: COMMERCIAL

## 2017-07-12 ENCOUNTER — ANESTHESIA (OUTPATIENT)
Dept: PERIOP | Facility: HOSPITAL | Age: 57
DRG: 328 | End: 2017-07-12
Payer: COMMERCIAL

## 2017-07-12 ENCOUNTER — ANESTHESIA EVENT (OUTPATIENT)
Dept: PERIOP | Facility: HOSPITAL | Age: 57
DRG: 328 | End: 2017-07-12
Payer: COMMERCIAL

## 2017-07-12 DIAGNOSIS — I10 ESSENTIAL HYPERTENSION: ICD-10-CM

## 2017-07-12 DIAGNOSIS — Z86.711 HISTORY OF PULMONARY EMBOLISM: Primary | ICD-10-CM

## 2017-07-12 DIAGNOSIS — K44.9 DIAPHRAGMATIC HERNIA WITHOUT OBSTRUCTION OR GANGRENE: ICD-10-CM

## 2017-07-12 DIAGNOSIS — K21.9 GASTRO-ESOPHAGEAL REFLUX DISEASE WITHOUT ESOPHAGITIS: ICD-10-CM

## 2017-07-12 DIAGNOSIS — E78.5 HYPERLIPIDEMIA, UNSPECIFIED HYPERLIPIDEMIA TYPE: ICD-10-CM

## 2017-07-12 PROBLEM — E66.9 OBESITY: Status: ACTIVE | Noted: 2017-07-12

## 2017-07-12 PROBLEM — K22.70 BARRETT ESOPHAGUS: Status: ACTIVE | Noted: 2017-07-12

## 2017-07-12 PROBLEM — K21.00 REFLUX ESOPHAGITIS: Status: ACTIVE | Noted: 2017-07-12

## 2017-07-12 LAB — GLUCOSE SERPL-MCNC: 113 MG/DL (ref 65–140)

## 2017-07-12 PROCEDURE — 0BUR4JZ SUPPLEMENT R DIAPHRAGM WITH SYNTH SUB, PERC ENDO APPROACH: ICD-10-PCS | Performed by: SURGERY

## 2017-07-12 PROCEDURE — 88302 TISSUE EXAM BY PATHOLOGIST: CPT | Performed by: SURGERY

## 2017-07-12 PROCEDURE — 82948 REAGENT STRIP/BLOOD GLUCOSE: CPT

## 2017-07-12 PROCEDURE — 0D164ZA BYPASS STOMACH TO JEJUNUM, PERCUTANEOUS ENDOSCOPIC APPROACH: ICD-10-PCS | Performed by: SURGERY

## 2017-07-12 PROCEDURE — C1781 MESH (IMPLANTABLE): HCPCS | Performed by: SURGERY

## 2017-07-12 PROCEDURE — 0BUS4JZ SUPPLEMENT LEFT DIAPHRAGM WITH SYNTH SUB, PERC ENDO APPROACH: ICD-10-PCS | Performed by: SURGERY

## 2017-07-12 DEVICE — MESH BIO-A MESH GORE: Type: IMPLANTABLE DEVICE | Site: ABDOMEN | Status: FUNCTIONAL

## 2017-07-12 RX ORDER — HYDROMORPHONE HCL 110MG/55ML
PATIENT CONTROLLED ANALGESIA SYRINGE INTRAVENOUS
Status: CANCELLED | OUTPATIENT
Start: 2017-07-12

## 2017-07-12 RX ORDER — ACETAMINOPHEN 160 MG/5ML
325 SUSPENSION, ORAL (FINAL DOSE FORM) ORAL EVERY 4 HOURS PRN
Status: DISCONTINUED | OUTPATIENT
Start: 2017-07-12 | End: 2017-07-13 | Stop reason: HOSPADM

## 2017-07-12 RX ORDER — FENTANYL CITRATE/PF 50 MCG/ML
50 SYRINGE (ML) INJECTION
Status: DISCONTINUED | OUTPATIENT
Start: 2017-07-12 | End: 2017-07-12 | Stop reason: HOSPADM

## 2017-07-12 RX ORDER — LABETALOL HYDROCHLORIDE 5 MG/ML
10 INJECTION, SOLUTION INTRAVENOUS ONCE
Status: COMPLETED | OUTPATIENT
Start: 2017-07-12 | End: 2017-07-12

## 2017-07-12 RX ORDER — EPHEDRINE SULFATE 50 MG/ML
INJECTION, SOLUTION INTRAVENOUS AS NEEDED
Status: DISCONTINUED | OUTPATIENT
Start: 2017-07-12 | End: 2017-07-12 | Stop reason: SURG

## 2017-07-12 RX ORDER — METOPROLOL TARTRATE 5 MG/5ML
5 INJECTION INTRAVENOUS EVERY 6 HOURS
Status: DISCONTINUED | OUTPATIENT
Start: 2017-07-12 | End: 2017-07-13 | Stop reason: HOSPADM

## 2017-07-12 RX ORDER — MORPHINE SULFATE 4 MG/ML
4 INJECTION, SOLUTION INTRAMUSCULAR; INTRAVENOUS EVERY 2 HOUR PRN
Status: DISCONTINUED | OUTPATIENT
Start: 2017-07-12 | End: 2017-07-13 | Stop reason: HOSPADM

## 2017-07-12 RX ORDER — SODIUM CHLORIDE, SODIUM LACTATE, POTASSIUM CHLORIDE, CALCIUM CHLORIDE 600; 310; 30; 20 MG/100ML; MG/100ML; MG/100ML; MG/100ML
90 INJECTION, SOLUTION INTRAVENOUS CONTINUOUS
Status: DISCONTINUED | OUTPATIENT
Start: 2017-07-12 | End: 2017-07-12 | Stop reason: HOSPADM

## 2017-07-12 RX ORDER — GLYCOPYRROLATE 0.2 MG/ML
INJECTION INTRAMUSCULAR; INTRAVENOUS AS NEEDED
Status: DISCONTINUED | OUTPATIENT
Start: 2017-07-12 | End: 2017-07-12 | Stop reason: SURG

## 2017-07-12 RX ORDER — OXYCODONE HCL 5 MG/5 ML
10 SOLUTION, ORAL ORAL EVERY 4 HOURS PRN
Status: DISCONTINUED | OUTPATIENT
Start: 2017-07-12 | End: 2017-07-13 | Stop reason: HOSPADM

## 2017-07-12 RX ORDER — LEVOFLOXACIN 25 MG/ML
750 INJECTION INTRAVENOUS ONCE
Status: DISCONTINUED | OUTPATIENT
Start: 2017-07-13 | End: 2017-07-12 | Stop reason: SDUPTHER

## 2017-07-12 RX ORDER — MORPHINE SULFATE 2 MG/ML
2 INJECTION, SOLUTION INTRAMUSCULAR; INTRAVENOUS EVERY 2 HOUR PRN
Status: DISCONTINUED | OUTPATIENT
Start: 2017-07-12 | End: 2017-07-13 | Stop reason: HOSPADM

## 2017-07-12 RX ORDER — ONDANSETRON 2 MG/ML
4 INJECTION INTRAMUSCULAR; INTRAVENOUS EVERY 4 HOURS PRN
Status: DISCONTINUED | OUTPATIENT
Start: 2017-07-12 | End: 2017-07-13 | Stop reason: HOSPADM

## 2017-07-12 RX ORDER — FENTANYL CITRATE 50 UG/ML
INJECTION, SOLUTION INTRAMUSCULAR; INTRAVENOUS AS NEEDED
Status: DISCONTINUED | OUTPATIENT
Start: 2017-07-12 | End: 2017-07-12 | Stop reason: SURG

## 2017-07-12 RX ORDER — CLINDAMYCIN PHOSPHATE 900 MG/50ML
900 INJECTION INTRAVENOUS
Status: DISCONTINUED | OUTPATIENT
Start: 2017-07-12 | End: 2017-07-12 | Stop reason: HOSPADM

## 2017-07-12 RX ORDER — ONDANSETRON 2 MG/ML
INJECTION INTRAMUSCULAR; INTRAVENOUS AS NEEDED
Status: DISCONTINUED | OUTPATIENT
Start: 2017-07-12 | End: 2017-07-12 | Stop reason: SURG

## 2017-07-12 RX ORDER — ACETAMINOPHEN 160 MG/5ML
320 SUSPENSION, ORAL (FINAL DOSE FORM) ORAL EVERY 4 HOURS PRN
Status: DISCONTINUED | OUTPATIENT
Start: 2017-07-12 | End: 2017-07-13 | Stop reason: HOSPADM

## 2017-07-12 RX ORDER — HEPARIN SODIUM 5000 [USP'U]/ML
5000 INJECTION, SOLUTION INTRAVENOUS; SUBCUTANEOUS
Status: DISCONTINUED | OUTPATIENT
Start: 2017-07-12 | End: 2017-07-12 | Stop reason: HOSPADM

## 2017-07-12 RX ORDER — PROPOFOL 10 MG/ML
INJECTION, EMULSION INTRAVENOUS AS NEEDED
Status: DISCONTINUED | OUTPATIENT
Start: 2017-07-12 | End: 2017-07-12 | Stop reason: SURG

## 2017-07-12 RX ORDER — METOCLOPRAMIDE HYDROCHLORIDE 5 MG/ML
10 INJECTION INTRAMUSCULAR; INTRAVENOUS EVERY 6 HOURS SCHEDULED
Status: DISCONTINUED | OUTPATIENT
Start: 2017-07-12 | End: 2017-07-13 | Stop reason: HOSPADM

## 2017-07-12 RX ORDER — SODIUM CHLORIDE, SODIUM LACTATE, POTASSIUM CHLORIDE, CALCIUM CHLORIDE 600; 310; 30; 20 MG/100ML; MG/100ML; MG/100ML; MG/100ML
125 INJECTION, SOLUTION INTRAVENOUS CONTINUOUS
Status: DISCONTINUED | OUTPATIENT
Start: 2017-07-12 | End: 2017-07-13 | Stop reason: HOSPADM

## 2017-07-12 RX ORDER — MIDAZOLAM HYDROCHLORIDE 1 MG/ML
INJECTION INTRAMUSCULAR; INTRAVENOUS AS NEEDED
Status: DISCONTINUED | OUTPATIENT
Start: 2017-07-12 | End: 2017-07-12 | Stop reason: SURG

## 2017-07-12 RX ORDER — BUPIVACAINE HYDROCHLORIDE AND EPINEPHRINE 5; 5 MG/ML; UG/ML
INJECTION, SOLUTION PERINEURAL AS NEEDED
Status: DISCONTINUED | OUTPATIENT
Start: 2017-07-12 | End: 2017-07-12 | Stop reason: HOSPADM

## 2017-07-12 RX ORDER — ROCURONIUM BROMIDE 10 MG/ML
INJECTION, SOLUTION INTRAVENOUS AS NEEDED
Status: DISCONTINUED | OUTPATIENT
Start: 2017-07-12 | End: 2017-07-12 | Stop reason: SURG

## 2017-07-12 RX ORDER — ONDANSETRON 2 MG/ML
4 INJECTION INTRAMUSCULAR; INTRAVENOUS ONCE
Status: DISCONTINUED | OUTPATIENT
Start: 2017-07-12 | End: 2017-07-12 | Stop reason: HOSPADM

## 2017-07-12 RX ORDER — LEVOFLOXACIN 5 MG/ML
750 INJECTION, SOLUTION INTRAVENOUS ONCE
Status: COMPLETED | OUTPATIENT
Start: 2017-07-13 | End: 2017-07-13

## 2017-07-12 RX ORDER — PANTOPRAZOLE SODIUM 40 MG/1
40 INJECTION, POWDER, FOR SOLUTION INTRAVENOUS
Status: DISCONTINUED | OUTPATIENT
Start: 2017-07-13 | End: 2017-07-13 | Stop reason: HOSPADM

## 2017-07-12 RX ORDER — PROMETHAZINE HYDROCHLORIDE 25 MG/ML
25 INJECTION, SOLUTION INTRAMUSCULAR; INTRAVENOUS EVERY 6 HOURS PRN
Status: DISCONTINUED | OUTPATIENT
Start: 2017-07-12 | End: 2017-07-13 | Stop reason: HOSPADM

## 2017-07-12 RX ORDER — OXYCODONE HCL 5 MG/5 ML
5 SOLUTION, ORAL ORAL EVERY 4 HOURS PRN
Status: DISCONTINUED | OUTPATIENT
Start: 2017-07-12 | End: 2017-07-13 | Stop reason: HOSPADM

## 2017-07-12 RX ORDER — SODIUM CHLORIDE 9 MG/ML
125 INJECTION, SOLUTION INTRAVENOUS CONTINUOUS
Status: DISCONTINUED | OUTPATIENT
Start: 2017-07-12 | End: 2017-07-12 | Stop reason: HOSPADM

## 2017-07-12 RX ORDER — DEXAMETHASONE SODIUM PHOSPHATE 4 MG/ML
INJECTION, SOLUTION INTRA-ARTICULAR; INTRALESIONAL; INTRAMUSCULAR; INTRAVENOUS; SOFT TISSUE AS NEEDED
Status: DISCONTINUED | OUTPATIENT
Start: 2017-07-12 | End: 2017-07-12 | Stop reason: SURG

## 2017-07-12 RX ADMIN — METRONIDAZOLE 500 MG: 500 INJECTION, SOLUTION INTRAVENOUS at 15:30

## 2017-07-12 RX ADMIN — GLYCOPYRROLATE 0.6 MG: 0.2 INJECTION, SOLUTION INTRAMUSCULAR; INTRAVENOUS at 17:39

## 2017-07-12 RX ADMIN — FENTANYL CITRATE 100 MCG: 50 INJECTION, SOLUTION INTRAMUSCULAR; INTRAVENOUS at 16:07

## 2017-07-12 RX ADMIN — MIDAZOLAM HYDROCHLORIDE 3 MG: 1 INJECTION, SOLUTION INTRAMUSCULAR; INTRAVENOUS at 15:23

## 2017-07-12 RX ADMIN — FENTANYL CITRATE 100 MCG: 50 INJECTION, SOLUTION INTRAMUSCULAR; INTRAVENOUS at 15:26

## 2017-07-12 RX ADMIN — SODIUM CHLORIDE: 0.9 INJECTION, SOLUTION INTRAVENOUS at 16:00

## 2017-07-12 RX ADMIN — ONDANSETRON HYDROCHLORIDE 4 MG: 2 INJECTION, SOLUTION INTRAVENOUS at 17:27

## 2017-07-12 RX ADMIN — FENTANYL CITRATE 100 MCG: 50 INJECTION, SOLUTION INTRAMUSCULAR; INTRAVENOUS at 15:52

## 2017-07-12 RX ADMIN — SODIUM CHLORIDE, SODIUM LACTATE, POTASSIUM CHLORIDE, AND CALCIUM CHLORIDE 125 ML/HR: .6; .31; .03; .02 INJECTION, SOLUTION INTRAVENOUS at 19:12

## 2017-07-12 RX ADMIN — EPHEDRINE SULFATE 5 MG: 50 INJECTION, SOLUTION INTRAMUSCULAR; INTRAVENOUS; SUBCUTANEOUS at 15:48

## 2017-07-12 RX ADMIN — HEPARIN SODIUM 5000 UNITS: 5000 INJECTION, SOLUTION INTRAVENOUS; SUBCUTANEOUS at 14:00

## 2017-07-12 RX ADMIN — SODIUM CHLORIDE 125 ML/HR: 0.9 INJECTION, SOLUTION INTRAVENOUS at 13:40

## 2017-07-12 RX ADMIN — MIDAZOLAM HYDROCHLORIDE 1 MG: 1 INJECTION, SOLUTION INTRAMUSCULAR; INTRAVENOUS at 15:26

## 2017-07-12 RX ADMIN — FENTANYL CITRATE 100 MCG: 50 INJECTION, SOLUTION INTRAMUSCULAR; INTRAVENOUS at 16:51

## 2017-07-12 RX ADMIN — DEXAMETHASONE SODIUM PHOSPHATE 8 MG: 4 INJECTION, SOLUTION INTRAMUSCULAR; INTRAVENOUS at 16:08

## 2017-07-12 RX ADMIN — METRONIDAZOLE 500 MG: 500 INJECTION, SOLUTION INTRAVENOUS at 23:44

## 2017-07-12 RX ADMIN — ROCURONIUM BROMIDE 5 MG: 10 INJECTION, SOLUTION INTRAVENOUS at 17:06

## 2017-07-12 RX ADMIN — METOPROLOL TARTRATE 5 MG: 5 INJECTION INTRAVENOUS at 20:42

## 2017-07-12 RX ADMIN — METOCLOPRAMIDE 10 MG: 5 INJECTION, SOLUTION INTRAMUSCULAR; INTRAVENOUS at 20:42

## 2017-07-12 RX ADMIN — CLINDAMYCIN PHOSPHATE 900 MG: 18 INJECTION, SOLUTION INTRAMUSCULAR; INTRAVENOUS at 15:21

## 2017-07-12 RX ADMIN — ROCURONIUM BROMIDE 50 MG: 10 INJECTION, SOLUTION INTRAVENOUS at 15:26

## 2017-07-12 RX ADMIN — LIDOCAINE HYDROCHLORIDE 100 MG: 20 INJECTION, SOLUTION INTRAVENOUS at 15:26

## 2017-07-12 RX ADMIN — NEOSTIGMINE METHYLSULFATE 4 MG: 1 INJECTION, SOLUTION INTRAMUSCULAR; INTRAVENOUS; SUBCUTANEOUS at 17:39

## 2017-07-12 RX ADMIN — LABETALOL HYDROCHLORIDE 10 MG: 5 INJECTION, SOLUTION INTRAVENOUS at 18:39

## 2017-07-12 RX ADMIN — SODIUM CHLORIDE: 0.9 INJECTION, SOLUTION INTRAVENOUS at 17:40

## 2017-07-12 RX ADMIN — FENTANYL CITRATE 50 MCG: 50 INJECTION INTRAMUSCULAR; INTRAVENOUS at 19:00

## 2017-07-12 RX ADMIN — EPHEDRINE SULFATE 10 MG: 50 INJECTION, SOLUTION INTRAMUSCULAR; INTRAVENOUS; SUBCUTANEOUS at 15:45

## 2017-07-12 RX ADMIN — ROCURONIUM BROMIDE 15 MG: 10 INJECTION, SOLUTION INTRAVENOUS at 16:12

## 2017-07-12 RX ADMIN — FENTANYL CITRATE 50 MCG: 50 INJECTION INTRAMUSCULAR; INTRAVENOUS at 18:16

## 2017-07-12 RX ADMIN — PROPOFOL 200 MG: 10 INJECTION, EMULSION INTRAVENOUS at 15:26

## 2017-07-13 VITALS
TEMPERATURE: 97.7 F | WEIGHT: 220.1 LBS | BODY MASS INDEX: 33.36 KG/M2 | OXYGEN SATURATION: 93 % | HEART RATE: 86 BPM | RESPIRATION RATE: 20 BRPM | DIASTOLIC BLOOD PRESSURE: 77 MMHG | HEIGHT: 68 IN | SYSTOLIC BLOOD PRESSURE: 152 MMHG

## 2017-07-13 LAB
ANION GAP SERPL CALCULATED.3IONS-SCNC: 7 MMOL/L (ref 4–13)
BUN SERPL-MCNC: 13 MG/DL (ref 5–25)
CALCIUM SERPL-MCNC: 8.1 MG/DL (ref 8.3–10.1)
CHLORIDE SERPL-SCNC: 103 MMOL/L (ref 100–108)
CO2 SERPL-SCNC: 28 MMOL/L (ref 21–32)
CREAT SERPL-MCNC: 1.03 MG/DL (ref 0.6–1.3)
ERYTHROCYTE [DISTWIDTH] IN BLOOD BY AUTOMATED COUNT: 13.5 % (ref 11.6–15.1)
GFR SERPL CREATININE-BSD FRML MDRD: >60 ML/MIN/1.73SQ M
GLUCOSE SERPL-MCNC: 121 MG/DL (ref 65–140)
HCT VFR BLD AUTO: 40 % (ref 36.5–49.3)
HGB BLD-MCNC: 13.6 G/DL (ref 12–17)
MCH RBC QN AUTO: 30.1 PG (ref 26.8–34.3)
MCHC RBC AUTO-ENTMCNC: 34 G/DL (ref 31.4–37.4)
MCV RBC AUTO: 89 FL (ref 82–98)
PLATELET # BLD AUTO: 251 THOUSANDS/UL (ref 149–390)
PMV BLD AUTO: 9.9 FL (ref 8.9–12.7)
POTASSIUM SERPL-SCNC: 4.3 MMOL/L (ref 3.5–5.3)
RBC # BLD AUTO: 4.52 MILLION/UL (ref 3.88–5.62)
SODIUM SERPL-SCNC: 138 MMOL/L (ref 136–145)
WBC # BLD AUTO: 13.25 THOUSAND/UL (ref 4.31–10.16)

## 2017-07-13 PROCEDURE — C9113 INJ PANTOPRAZOLE SODIUM, VIA: HCPCS | Performed by: SURGERY

## 2017-07-13 PROCEDURE — 85027 COMPLETE CBC AUTOMATED: CPT | Performed by: SURGERY

## 2017-07-13 PROCEDURE — 80048 BASIC METABOLIC PNL TOTAL CA: CPT | Performed by: SURGERY

## 2017-07-13 RX ORDER — PANTOPRAZOLE SODIUM 40 MG/1
40 TABLET, DELAYED RELEASE ORAL DAILY
Refills: 0 | Status: ON HOLD
Start: 2017-07-13 | End: 2018-11-27 | Stop reason: ALTCHOICE

## 2017-07-13 RX ORDER — OXYCODONE HYDROCHLORIDE AND ACETAMINOPHEN 5; 325 MG/1; MG/1
1 TABLET ORAL EVERY 4 HOURS PRN
Qty: 30 TABLET | Refills: 0
Start: 2017-07-13 | End: 2018-02-15 | Stop reason: ALTCHOICE

## 2017-07-13 RX ADMIN — METOPROLOL TARTRATE 5 MG: 5 INJECTION INTRAVENOUS at 14:30

## 2017-07-13 RX ADMIN — MORPHINE SULFATE 2 MG: 2 INJECTION, SOLUTION INTRAMUSCULAR; INTRAVENOUS at 03:47

## 2017-07-13 RX ADMIN — METOPROLOL TARTRATE 5 MG: 5 INJECTION INTRAVENOUS at 08:54

## 2017-07-13 RX ADMIN — OXYCODONE HYDROCHLORIDE 10 MG: 5 SOLUTION ORAL at 12:22

## 2017-07-13 RX ADMIN — METOCLOPRAMIDE 10 MG: 5 INJECTION, SOLUTION INTRAMUSCULAR; INTRAVENOUS at 14:30

## 2017-07-13 RX ADMIN — ENOXAPARIN SODIUM 40 MG: 40 INJECTION SUBCUTANEOUS at 08:57

## 2017-07-13 RX ADMIN — MORPHINE SULFATE 2 MG: 2 INJECTION, SOLUTION INTRAMUSCULAR; INTRAVENOUS at 08:53

## 2017-07-13 RX ADMIN — ACETAMINOPHEN 325 MG: 160 SUSPENSION ORAL at 12:22

## 2017-07-13 RX ADMIN — METOCLOPRAMIDE 10 MG: 5 INJECTION, SOLUTION INTRAMUSCULAR; INTRAVENOUS at 08:56

## 2017-07-13 RX ADMIN — METOPROLOL TARTRATE 5 MG: 5 INJECTION INTRAVENOUS at 03:46

## 2017-07-13 RX ADMIN — SODIUM CHLORIDE, SODIUM LACTATE, POTASSIUM CHLORIDE, AND CALCIUM CHLORIDE 125 ML/HR: .6; .31; .03; .02 INJECTION, SOLUTION INTRAVENOUS at 03:49

## 2017-07-13 RX ADMIN — PANTOPRAZOLE SODIUM 40 MG: 40 INJECTION, POWDER, FOR SOLUTION INTRAVENOUS at 08:56

## 2017-07-13 RX ADMIN — METRONIDAZOLE 500 MG: 500 INJECTION, SOLUTION INTRAVENOUS at 07:47

## 2017-07-13 RX ADMIN — LEVOFLOXACIN 750 MG: 5 INJECTION, SOLUTION INTRAVENOUS at 00:56

## 2017-07-13 RX ADMIN — MORPHINE SULFATE 2 MG: 2 INJECTION, SOLUTION INTRAMUSCULAR; INTRAVENOUS at 00:57

## 2017-07-13 RX ADMIN — METOCLOPRAMIDE 10 MG: 5 INJECTION, SOLUTION INTRAMUSCULAR; INTRAVENOUS at 03:47

## 2017-07-14 ENCOUNTER — GENERIC CONVERSION - ENCOUNTER (OUTPATIENT)
Dept: OTHER | Facility: OTHER | Age: 57
End: 2017-07-14

## 2017-07-18 ENCOUNTER — GENERIC CONVERSION - ENCOUNTER (OUTPATIENT)
Dept: OTHER | Facility: OTHER | Age: 57
End: 2017-07-18

## 2017-07-20 ENCOUNTER — ALLSCRIPTS OFFICE VISIT (OUTPATIENT)
Dept: OTHER | Facility: OTHER | Age: 57
End: 2017-07-20

## 2017-08-08 ENCOUNTER — GENERIC CONVERSION - ENCOUNTER (OUTPATIENT)
Dept: OTHER | Facility: OTHER | Age: 57
End: 2017-08-08

## 2017-08-14 ENCOUNTER — TRANSCRIBE ORDERS (OUTPATIENT)
Dept: URGENT CARE | Facility: CLINIC | Age: 57
End: 2017-08-14

## 2017-08-14 ENCOUNTER — APPOINTMENT (OUTPATIENT)
Dept: LAB | Facility: CLINIC | Age: 57
End: 2017-08-14
Payer: COMMERCIAL

## 2017-08-14 DIAGNOSIS — Z00.8 HEALTH EXAMINATION IN POPULATION SURVEY: ICD-10-CM

## 2017-08-14 DIAGNOSIS — Z00.8 HEALTH EXAMINATION IN POPULATION SURVEY: Primary | ICD-10-CM

## 2017-08-14 LAB
CHOLEST SERPL-MCNC: 188 MG/DL (ref 50–200)
EST. AVERAGE GLUCOSE BLD GHB EST-MCNC: 105 MG/DL
HBA1C MFR BLD: 5.3 % (ref 4.2–6.3)
HDLC SERPL-MCNC: 29 MG/DL (ref 40–60)
LDLC SERPL CALC-MCNC: 136 MG/DL (ref 0–100)
TRIGL SERPL-MCNC: 114 MG/DL

## 2017-08-14 PROCEDURE — 83036 HEMOGLOBIN GLYCOSYLATED A1C: CPT

## 2017-08-14 PROCEDURE — 80061 LIPID PANEL: CPT

## 2017-08-14 PROCEDURE — 36415 COLL VENOUS BLD VENIPUNCTURE: CPT

## 2017-08-22 ENCOUNTER — GENERIC CONVERSION - ENCOUNTER (OUTPATIENT)
Dept: OTHER | Facility: OTHER | Age: 57
End: 2017-08-22

## 2017-09-05 ENCOUNTER — ALLSCRIPTS OFFICE VISIT (OUTPATIENT)
Dept: RADIOLOGY | Facility: CLINIC | Age: 57
End: 2017-09-05
Payer: COMMERCIAL

## 2017-09-14 ENCOUNTER — ALLSCRIPTS OFFICE VISIT (OUTPATIENT)
Dept: OTHER | Facility: OTHER | Age: 57
End: 2017-09-14

## 2017-09-14 ENCOUNTER — TRANSCRIBE ORDERS (OUTPATIENT)
Dept: ADMINISTRATIVE | Facility: HOSPITAL | Age: 57
End: 2017-09-14

## 2017-09-14 DIAGNOSIS — M50.10 CERVICAL DISC SYNDROME: Primary | ICD-10-CM

## 2017-09-14 DIAGNOSIS — M48.02 SPINAL STENOSIS IN CERVICAL REGION: ICD-10-CM

## 2017-09-14 DIAGNOSIS — M50.30 DEGENERATION OF CERVICAL INTERVERTEBRAL DISC: ICD-10-CM

## 2017-09-19 ENCOUNTER — GENERIC CONVERSION - ENCOUNTER (OUTPATIENT)
Dept: OTHER | Facility: OTHER | Age: 57
End: 2017-09-19

## 2017-09-20 ENCOUNTER — HOSPITAL ENCOUNTER (OUTPATIENT)
Dept: MRI IMAGING | Facility: HOSPITAL | Age: 57
Discharge: HOME | End: 2017-09-20
Payer: COMMERCIAL

## 2017-09-20 DIAGNOSIS — M50.10 CERVICAL DISC SYNDROME: ICD-10-CM

## 2017-09-20 DIAGNOSIS — M48.02 SPINAL STENOSIS IN CERVICAL REGION: ICD-10-CM

## 2017-09-20 DIAGNOSIS — M50.30 DEGENERATION OF CERVICAL INTERVERTEBRAL DISC: ICD-10-CM

## 2017-09-20 PROCEDURE — 72141 MRI NECK SPINE W/O DYE: CPT

## 2017-09-25 ENCOUNTER — GENERIC CONVERSION - ENCOUNTER (OUTPATIENT)
Dept: OTHER | Facility: OTHER | Age: 57
End: 2017-09-25

## 2017-09-25 DIAGNOSIS — K22.70 BARRETT'S ESOPHAGUS WITHOUT DYSPLASIA: ICD-10-CM

## 2017-09-25 DIAGNOSIS — M50.10 CERVICAL DISC DISORDER WITH RADICULOPATHY OF CERVICAL REGION: ICD-10-CM

## 2017-09-25 DIAGNOSIS — Z98.890 OTHER SPECIFIED POSTPROCEDURAL STATES: ICD-10-CM

## 2017-09-25 DIAGNOSIS — K21.9 GASTRO-ESOPHAGEAL REFLUX DISEASE WITHOUT ESOPHAGITIS: ICD-10-CM

## 2017-09-25 DIAGNOSIS — K91.2 POSTSURGICAL MALABSORPTION, NOT ELSEWHERE CLASSIFIED (CODE): ICD-10-CM

## 2017-10-05 ENCOUNTER — LAB (OUTPATIENT)
Dept: LAB | Facility: HOSPITAL | Age: 57
End: 2017-10-05
Attending: ORTHOPAEDIC SURGERY
Payer: OTHER MISCELLANEOUS

## 2017-10-05 ENCOUNTER — TRANSCRIBE ORDERS (OUTPATIENT)
Dept: RADIOLOGY | Facility: HOSPITAL | Age: 57
End: 2017-10-05

## 2017-10-05 ENCOUNTER — GENERIC CONVERSION - ENCOUNTER (OUTPATIENT)
Dept: OTHER | Facility: OTHER | Age: 57
End: 2017-10-05

## 2017-10-05 ENCOUNTER — HOSPITAL ENCOUNTER (OUTPATIENT)
Dept: RADIOLOGY | Facility: HOSPITAL | Age: 57
Discharge: HOME | End: 2017-10-05
Attending: ORTHOPAEDIC SURGERY
Payer: OTHER MISCELLANEOUS

## 2017-10-05 DIAGNOSIS — M50.10 CERVICAL DISC DISORDER WITH RADICULOPATHY OF CERVICAL REGION: ICD-10-CM

## 2017-10-05 DIAGNOSIS — M50.10 CERVICAL DISC SYNDROME: ICD-10-CM

## 2017-10-05 LAB
ABO GROUP BLD: NORMAL
ALBUMIN SERPL BCP-MCNC: 3.7 G/DL (ref 3.5–5)
ALP SERPL-CCNC: 70 U/L (ref 46–116)
ALT SERPL W P-5'-P-CCNC: 23 U/L (ref 12–78)
ANION GAP SERPL CALCULATED.3IONS-SCNC: 7 MMOL/L (ref 4–13)
APTT PPP: 26 SECONDS (ref 23–35)
AST SERPL W P-5'-P-CCNC: 13 U/L (ref 5–45)
ATRIAL RATE: 78 BPM
BASOPHILS # BLD AUTO: 0.04 THOUSANDS/ΜL (ref 0–0.1)
BASOPHILS NFR BLD AUTO: 1 % (ref 0–1)
BILIRUB SERPL-MCNC: 0.44 MG/DL (ref 0.2–1)
BILIRUB UR QL STRIP: ABNORMAL
BLD GP AB SCN SERPL QL: NEGATIVE
BUN SERPL-MCNC: 14 MG/DL (ref 5–25)
CALCIUM SERPL-MCNC: 8.9 MG/DL (ref 8.3–10.1)
CHLORIDE SERPL-SCNC: 106 MMOL/L (ref 100–108)
CLARITY UR: ABNORMAL
CO2 SERPL-SCNC: 27 MMOL/L (ref 21–32)
COLOR UR: ABNORMAL
CREAT SERPL-MCNC: 0.85 MG/DL (ref 0.6–1.3)
EOSINOPHIL # BLD AUTO: 0.06 THOUSAND/ΜL (ref 0–0.61)
EOSINOPHIL NFR BLD AUTO: 1 % (ref 0–6)
ERYTHROCYTE [DISTWIDTH] IN BLOOD BY AUTOMATED COUNT: 14.6 % (ref 11.6–15.1)
GFR SERPL CREATININE-BSD FRML MDRD: 98 ML/MIN/1.73SQ M
GLUCOSE P FAST SERPL-MCNC: 88 MG/DL (ref 65–99)
GLUCOSE UR STRIP-MCNC: NEGATIVE MG/DL
HCT VFR BLD AUTO: 43.4 % (ref 36.5–49.3)
HGB BLD-MCNC: 14.6 G/DL (ref 12–17)
HGB UR QL STRIP.AUTO: NEGATIVE
INR PPP: 1.05 (ref 0.86–1.16)
KETONES UR STRIP-MCNC: ABNORMAL MG/DL
LEUKOCYTE ESTERASE UR QL STRIP: NEGATIVE
LYMPHOCYTES # BLD AUTO: 3.09 THOUSANDS/ΜL (ref 0.6–4.47)
LYMPHOCYTES NFR BLD AUTO: 35 % (ref 14–44)
MCH RBC QN AUTO: 29.3 PG (ref 26.8–34.3)
MCHC RBC AUTO-ENTMCNC: 33.6 G/DL (ref 31.4–37.4)
MCV RBC AUTO: 87 FL (ref 82–98)
MONOCYTES # BLD AUTO: 0.68 THOUSAND/ΜL (ref 0.17–1.22)
MONOCYTES NFR BLD AUTO: 8 % (ref 4–12)
NEUTROPHILS # BLD AUTO: 4.91 THOUSANDS/ΜL (ref 1.85–7.62)
NEUTS SEG NFR BLD AUTO: 55 % (ref 43–75)
NITRITE UR QL STRIP: NEGATIVE
NRBC BLD AUTO-RTO: 0 /100 WBCS
P AXIS: 78 DEGREES
PH UR STRIP.AUTO: 6 [PH] (ref 4.5–8)
PLATELET # BLD AUTO: 323 THOUSANDS/UL (ref 149–390)
PMV BLD AUTO: 10.2 FL (ref 8.9–12.7)
POTASSIUM SERPL-SCNC: 3.9 MMOL/L (ref 3.5–5.3)
PR INTERVAL: 120 MS
PROT SERPL-MCNC: 7.2 G/DL (ref 6.4–8.2)
PROT UR STRIP-MCNC: NEGATIVE MG/DL
PROTHROMBIN TIME: 13.7 SECONDS (ref 12.1–14.4)
QRS AXIS: 69 DEGREES
QRSD INTERVAL: 116 MS
QT INTERVAL: 412 MS
QTC INTERVAL: 469 MS
RBC # BLD AUTO: 4.98 MILLION/UL (ref 3.88–5.62)
RH BLD: POSITIVE
SODIUM SERPL-SCNC: 140 MMOL/L (ref 136–145)
SP GR UR STRIP.AUTO: 1.03 (ref 1–1.03)
SPECIMEN EXPIRATION DATE: NORMAL
T WAVE AXIS: 37 DEGREES
UROBILINOGEN UR QL STRIP.AUTO: 1 E.U./DL
VENTRICULAR RATE: 78 BPM
WBC # BLD AUTO: 8.8 THOUSAND/UL (ref 4.31–10.16)

## 2017-10-05 PROCEDURE — 36415 COLL VENOUS BLD VENIPUNCTURE: CPT

## 2017-10-05 PROCEDURE — 85025 COMPLETE CBC W/AUTO DIFF WBC: CPT

## 2017-10-05 PROCEDURE — 93005 ELECTROCARDIOGRAM TRACING: CPT

## 2017-10-05 PROCEDURE — 86850 RBC ANTIBODY SCREEN: CPT

## 2017-10-05 PROCEDURE — 81003 URINALYSIS AUTO W/O SCOPE: CPT

## 2017-10-05 PROCEDURE — 86901 BLOOD TYPING SEROLOGIC RH(D): CPT

## 2017-10-05 PROCEDURE — 86900 BLOOD TYPING SEROLOGIC ABO: CPT

## 2017-10-05 PROCEDURE — 71020 HB CHEST X-RAY 2VW FRONTAL&LATL: CPT

## 2017-10-05 PROCEDURE — 80053 COMPREHEN METABOLIC PANEL: CPT

## 2017-10-05 PROCEDURE — 85610 PROTHROMBIN TIME: CPT

## 2017-10-05 PROCEDURE — 85730 THROMBOPLASTIN TIME PARTIAL: CPT

## 2017-10-06 ENCOUNTER — ALLSCRIPTS OFFICE VISIT (OUTPATIENT)
Dept: OTHER | Facility: OTHER | Age: 57
End: 2017-10-06

## 2017-10-16 ENCOUNTER — ANESTHESIA EVENT (OUTPATIENT)
Dept: PERIOP | Facility: HOSPITAL | Age: 57
DRG: 214 | End: 2017-10-16
Payer: OTHER MISCELLANEOUS

## 2017-10-16 RX ORDER — GABAPENTIN 100 MG/1
100 CAPSULE ORAL 3 TIMES DAILY
COMMUNITY
End: 2018-02-15 | Stop reason: DRUGHIGH

## 2017-10-16 NOTE — PRE-PROCEDURE INSTRUCTIONS
Pre-Surgery Instructions:   Medication Instructions    albuterol (PROVENTIL HFA,VENTOLIN HFA) 90 mcg/act inhaler Patient was instructed per "E-Preop"    atorvastatin (LIPITOR) 40 mg tablet Patient was instructed per "E-Preop"    gabapentin (NEURONTIN) 100 mg capsule Patient was instructed per "E-Preop"    magnesium oxide (MAG-OX) 400 mg Patient was instructed per "E-Preop"    nortriptyline (PAMELOR) 10 mg capsule Patient was instructed per "E-Preop"    oxyCODONE-acetaminophen (PERCOCET) 5-325 mg per tablet Patient was instructed per "E-Preop"    pantoprazole (PROTONIX) 40 mg tablet Patient was instructed per "E-Preop"    rizatriptan (MAXALT) 5 MG tablet Patient was instructed per "E-Preop"    tiZANidine (ZANAFLEX) 4 mg tablet Patient was instructed per "E-Preop"    topiramate (TOPAMAX) 100 mg tablet Patient was instructed per "E-Preop"     Medication Instruction (Bronchodilator)    Please continue the following medications up to and including the day of surgery  Please bring this medication with you on the day of surgery  Ventolin  (90 Base) MCG/ACT Inhalation Aerosol Solution           Medication Instruction (Antacids)    Please continue to take this medication on your normal schedule  If this is an oral medication and you take in the morning, you may do so with a sip of water  Pantoprazole Sodium 40 MG Oral Tablet Delayed Release         Medication Instruction (Neurological Medication)    Please continue to take this medication on your normal schedule  If this is an oral medication and you take in the morning, you may do so with a sip of water  topiramate (Topamax), Gabapentin 300 MG TABS         NPO Instructions    Please do not eat or drink anything prior to your surgery as follows: For AM Surgery times = stop at midnight the night before  For PM Surgery times = Midnight to 8AM clear liquids only (Jello, broth, 7up, Sprite, apple juice, black coffee, black tea, Gatorade)   If you are supposed to take any of your medications, do so with a sip of water  Failure to follow these instructions can lead to an increased risk of lung complications and may result in a delay or cancellation of your procedure  If you have any questions, contact your institution for further instructions  Medical Procedure Risk       Medication Instruction (Opioids - Pain Medication)    Please continue the following medications, if needed, up to and including the day of surgery (with a sip of water)  TraMADol HCl - 50 MG Oral Tablet         Smoking Cessation    Smoking before and after surgery can lead to complications  Patients who quit smoking at least eight weeks before surgery have complication rates almost as low as non-smokers  Smokers who can stop smoking 24 or 48 hours before surgery may also benefit from decreased amounts of nicotine and carbon monoxide in the body  For help quitting smoking, speak with your physician or contact the [facility] Keshav Sterling or [employer]  Please visit the following web address for assistance with quitting  [employer] be  [email or web address]-[email or web address]  aspx  Nicotine dependence, Tobacco use       Medication Instruction (Cholesterol Medication)    Please continue to take this medication on your normal schedule  If this is an oral medication and you take in the morning, you may do so with a sip of water  Atorvastatin Calcium 80 MG Oral Tablet       Accept All Complete All   Last signed: Never    Request Signature   Add New Task Show Removed Tasks  Pre procedure instructions given,verbalizes understanding

## 2017-10-16 NOTE — ANESTHESIA PREPROCEDURE EVALUATION
Review of Systems/Medical History  Patient summary reviewed  Chart reviewed  No history of anesthetic complications     Cardiovascular  EKG reviewed, Hyperlipidemia, DVT (2012)  Comment: EKG--SR, inc RBBB, PE (2012-post lobectomy),  Pulmonary  Smoker ex-smoker , COPD , Asthma: , , No pleural effusion: ,   Comment: Left Lung cancer--s/p lobectomy     GI/Hepatic    GERD ,  Hiatal hernia (Paraesophageal hernia),   Comment: Saucedo's esophagus          Endo/Other  Diabetes (bORDERLINE) ,   Comment: Hx mandibular fx   GYN  Negative gynecology ROS          Hematology  Negative hematology ROS      Musculoskeletal  Obesity (BMI 33) ,   Comment: Cervical stenosis/radiculopatparesthesia bilat UE, R>L      Neurology  Negative neurology ROS      Psychology   Negative psychology ROS            Physical Exam    Airway  Comment: nECK EXTENSION BRINGS ON PARESTHESIA  Mallampati score: II  TM Distance: >3 FB  Neck ROM: limited     Dental   Comment: Upper and lower partials,     Cardiovascular  Rhythm: regular, Rate: normal,     Pulmonary  Breath sounds clear to auscultation,     Other Findings        Anesthesia Plan  ASA Score- 3       Anesthesia Type- general with ASA Monitors  Additional Monitors: arterial line  Airway Plan: ETT  Induction- intravenous  Informed Consent- Anesthetic plan and risks discussed with patient  I personally reviewed this patient with the CRNA  Discussed and agreed on the Anesthesia Plan with the CRNA  Stephy Earl

## 2017-10-17 ENCOUNTER — HOSPITAL ENCOUNTER (INPATIENT)
Facility: HOSPITAL | Age: 57
LOS: 1 days | Discharge: HOME | DRG: 214 | End: 2017-10-18
Attending: ORTHOPAEDIC SURGERY | Admitting: ORTHOPAEDIC SURGERY
Payer: OTHER MISCELLANEOUS

## 2017-10-17 ENCOUNTER — APPOINTMENT (OUTPATIENT)
Dept: RADIOLOGY | Facility: HOSPITAL | Age: 57
DRG: 214 | End: 2017-10-17
Payer: OTHER MISCELLANEOUS

## 2017-10-17 ENCOUNTER — ANESTHESIA (OUTPATIENT)
Dept: PERIOP | Facility: HOSPITAL | Age: 57
DRG: 214 | End: 2017-10-17
Payer: OTHER MISCELLANEOUS

## 2017-10-17 DIAGNOSIS — M50.13 CERVICAL DISC DISORDER WITH RADICULOPATHY OF CERVICOTHORACIC REGION: Primary | ICD-10-CM

## 2017-10-17 LAB — GLUCOSE SERPL-MCNC: 92 MG/DL (ref 65–140)

## 2017-10-17 PROCEDURE — 95940 IONM IN OPERATNG ROOM 15 MIN: CPT | Performed by: ORTHOPAEDIC SURGERY

## 2017-10-17 PROCEDURE — 82948 REAGENT STRIP/BLOOD GLUCOSE: CPT

## 2017-10-17 PROCEDURE — 0RG20K0 FUSION OF 2 OR MORE CERVICAL VERTEBRAL JOINTS WITH NONAUTOLOGOUS TISSUE SUBSTITUTE, ANTERIOR APPROACH, ANTERIOR COLUMN, OPEN APPROACH: ICD-10-PCS | Performed by: ORTHOPAEDIC SURGERY

## 2017-10-17 PROCEDURE — C1713 ANCHOR/SCREW BN/BN,TIS/BN: HCPCS | Performed by: ORTHOPAEDIC SURGERY

## 2017-10-17 PROCEDURE — 0RB30ZZ EXCISION OF CERVICAL VERTEBRAL DISC, OPEN APPROACH: ICD-10-PCS | Performed by: ORTHOPAEDIC SURGERY

## 2017-10-17 PROCEDURE — 72040 X-RAY EXAM NECK SPINE 2-3 VW: CPT

## 2017-10-17 DEVICE — GRAFT BIO AVS W/PLUG 8MM X 12MM X 14MM X 4DEG: Type: IMPLANTABLE DEVICE | Site: SPINE CERVICAL | Status: FUNCTIONAL

## 2017-10-17 DEVICE — VARIABLE, SELF-DRILLING SCREW
Type: IMPLANTABLE DEVICE | Site: SPINE CERVICAL | Status: FUNCTIONAL
Brand: REFLEX HYBRID

## 2017-10-17 DEVICE — TWO-LEVEL PLATE
Type: IMPLANTABLE DEVICE | Site: SPINE CERVICAL | Status: FUNCTIONAL
Brand: REFLEX HYBRID

## 2017-10-17 RX ORDER — METOCLOPRAMIDE HYDROCHLORIDE 5 MG/ML
10 INJECTION INTRAMUSCULAR; INTRAVENOUS ONCE AS NEEDED
Status: DISCONTINUED | OUTPATIENT
Start: 2017-10-17 | End: 2017-10-17 | Stop reason: HOSPADM

## 2017-10-17 RX ORDER — ACETAMINOPHEN 325 MG/1
975 TABLET ORAL ONCE
Status: COMPLETED | OUTPATIENT
Start: 2017-10-17 | End: 2017-10-17

## 2017-10-17 RX ORDER — PREGABALIN 75 MG/1
150 CAPSULE ORAL ONCE
Status: COMPLETED | OUTPATIENT
Start: 2017-10-17 | End: 2017-10-17

## 2017-10-17 RX ORDER — PANTOPRAZOLE SODIUM 40 MG/1
40 TABLET, DELAYED RELEASE ORAL DAILY
Status: DISCONTINUED | OUTPATIENT
Start: 2017-10-17 | End: 2017-10-18 | Stop reason: HOSPADM

## 2017-10-17 RX ORDER — ONDANSETRON 2 MG/ML
4 INJECTION INTRAMUSCULAR; INTRAVENOUS EVERY 6 HOURS PRN
Status: DISCONTINUED | OUTPATIENT
Start: 2017-10-17 | End: 2017-10-18 | Stop reason: HOSPADM

## 2017-10-17 RX ORDER — SODIUM CHLORIDE, SODIUM LACTATE, POTASSIUM CHLORIDE, CALCIUM CHLORIDE 600; 310; 30; 20 MG/100ML; MG/100ML; MG/100ML; MG/100ML
125 INJECTION, SOLUTION INTRAVENOUS CONTINUOUS
Status: DISCONTINUED | OUTPATIENT
Start: 2017-10-17 | End: 2017-10-17

## 2017-10-17 RX ORDER — MIDAZOLAM HYDROCHLORIDE 1 MG/ML
INJECTION INTRAMUSCULAR; INTRAVENOUS AS NEEDED
Status: DISCONTINUED | OUTPATIENT
Start: 2017-10-17 | End: 2017-10-17 | Stop reason: SURG

## 2017-10-17 RX ORDER — PROPOFOL 10 MG/ML
INJECTION, EMULSION INTRAVENOUS CONTINUOUS PRN
Status: DISCONTINUED | OUTPATIENT
Start: 2017-10-17 | End: 2017-10-17 | Stop reason: SURG

## 2017-10-17 RX ORDER — TIZANIDINE 4 MG/1
4 TABLET ORAL EVERY 6 HOURS PRN
Status: DISCONTINUED | OUTPATIENT
Start: 2017-10-17 | End: 2017-10-18 | Stop reason: HOSPADM

## 2017-10-17 RX ORDER — TOPIRAMATE 100 MG/1
100 TABLET, FILM COATED ORAL DAILY
Status: DISCONTINUED | OUTPATIENT
Start: 2017-10-17 | End: 2017-10-18 | Stop reason: HOSPADM

## 2017-10-17 RX ORDER — RIZATRIPTAN BENZOATE 5 MG/1
5 TABLET ORAL ONCE AS NEEDED
Status: DISCONTINUED | OUTPATIENT
Start: 2017-10-17 | End: 2017-10-18 | Stop reason: HOSPADM

## 2017-10-17 RX ORDER — GABAPENTIN 100 MG/1
100 CAPSULE ORAL 3 TIMES DAILY
Status: DISCONTINUED | OUTPATIENT
Start: 2017-10-17 | End: 2017-10-18 | Stop reason: HOSPADM

## 2017-10-17 RX ORDER — ONDANSETRON 2 MG/ML
INJECTION INTRAMUSCULAR; INTRAVENOUS AS NEEDED
Status: DISCONTINUED | OUTPATIENT
Start: 2017-10-17 | End: 2017-10-17 | Stop reason: SURG

## 2017-10-17 RX ORDER — ATORVASTATIN CALCIUM 80 MG/1
80 TABLET, FILM COATED ORAL DAILY
Status: DISCONTINUED | OUTPATIENT
Start: 2017-10-17 | End: 2017-10-18 | Stop reason: HOSPADM

## 2017-10-17 RX ORDER — SUCCINYLCHOLINE/SOD CL,ISO/PF 100 MG/5ML
SYRINGE (ML) INTRAVENOUS AS NEEDED
Status: DISCONTINUED | OUTPATIENT
Start: 2017-10-17 | End: 2017-10-17 | Stop reason: SURG

## 2017-10-17 RX ORDER — FENTANYL CITRATE/PF 50 MCG/ML
50 SYRINGE (ML) INJECTION
Status: DISCONTINUED | OUTPATIENT
Start: 2017-10-17 | End: 2017-10-17 | Stop reason: HOSPADM

## 2017-10-17 RX ORDER — MEPERIDINE HYDROCHLORIDE 25 MG/ML
12.5 INJECTION INTRAMUSCULAR; INTRAVENOUS; SUBCUTANEOUS ONCE AS NEEDED
Status: DISCONTINUED | OUTPATIENT
Start: 2017-10-17 | End: 2017-10-17 | Stop reason: HOSPADM

## 2017-10-17 RX ORDER — FENTANYL CITRATE 50 UG/ML
INJECTION, SOLUTION INTRAMUSCULAR; INTRAVENOUS AS NEEDED
Status: DISCONTINUED | OUTPATIENT
Start: 2017-10-17 | End: 2017-10-17 | Stop reason: SURG

## 2017-10-17 RX ORDER — SODIUM CHLORIDE 9 MG/ML
125 INJECTION, SOLUTION INTRAVENOUS CONTINUOUS
Status: DISCONTINUED | OUTPATIENT
Start: 2017-10-17 | End: 2017-10-18

## 2017-10-17 RX ORDER — OXYCODONE HYDROCHLORIDE 5 MG/1
5 TABLET ORAL EVERY 4 HOURS PRN
Qty: 30 TABLET | Refills: 0 | Status: SHIPPED | OUTPATIENT
Start: 2017-10-17 | End: 2017-10-27

## 2017-10-17 RX ORDER — SODIUM CHLORIDE 9 MG/ML
INJECTION, SOLUTION INTRAVENOUS CONTINUOUS PRN
Status: DISCONTINUED | OUTPATIENT
Start: 2017-10-17 | End: 2017-10-17 | Stop reason: SURG

## 2017-10-17 RX ORDER — ALBUTEROL SULFATE 90 UG/1
AEROSOL, METERED RESPIRATORY (INHALATION) AS NEEDED
Status: DISCONTINUED | OUTPATIENT
Start: 2017-10-17 | End: 2017-10-17 | Stop reason: SURG

## 2017-10-17 RX ORDER — PROPOFOL 10 MG/ML
INJECTION, EMULSION INTRAVENOUS AS NEEDED
Status: DISCONTINUED | OUTPATIENT
Start: 2017-10-17 | End: 2017-10-17 | Stop reason: SURG

## 2017-10-17 RX ORDER — DOCUSATE SODIUM 100 MG/1
100 CAPSULE, LIQUID FILLED ORAL 2 TIMES DAILY
Status: DISCONTINUED | OUTPATIENT
Start: 2017-10-17 | End: 2017-10-18 | Stop reason: HOSPADM

## 2017-10-17 RX ORDER — SULFAMETHOXAZOLE AND TRIMETHOPRIM 800; 160 MG/1; MG/1
1 TABLET ORAL 2 TIMES DAILY
Qty: 10 TABLET | Refills: 0 | Status: SHIPPED | OUTPATIENT
Start: 2017-10-17 | End: 2017-10-22

## 2017-10-17 RX ORDER — NORTRIPTYLINE HYDROCHLORIDE 10 MG/1
10 CAPSULE ORAL
Status: DISCONTINUED | OUTPATIENT
Start: 2017-10-17 | End: 2017-10-18 | Stop reason: HOSPADM

## 2017-10-17 RX ORDER — ONDANSETRON 2 MG/ML
4 INJECTION INTRAMUSCULAR; INTRAVENOUS ONCE AS NEEDED
Status: DISCONTINUED | OUTPATIENT
Start: 2017-10-17 | End: 2017-10-17 | Stop reason: HOSPADM

## 2017-10-17 RX ORDER — SODIUM CHLORIDE, SODIUM LACTATE, POTASSIUM CHLORIDE, CALCIUM CHLORIDE 600; 310; 30; 20 MG/100ML; MG/100ML; MG/100ML; MG/100ML
50 INJECTION, SOLUTION INTRAVENOUS CONTINUOUS
Status: DISCONTINUED | OUTPATIENT
Start: 2017-10-17 | End: 2017-10-18

## 2017-10-17 RX ORDER — CHLORHEXIDINE GLUCONATE 0.12 MG/ML
15 RINSE ORAL ONCE
Status: COMPLETED | OUTPATIENT
Start: 2017-10-17 | End: 2017-10-17

## 2017-10-17 RX ORDER — DEXAMETHASONE SODIUM PHOSPHATE 10 MG/ML
10 INJECTION, SOLUTION INTRAMUSCULAR; INTRAVENOUS EVERY 8 HOURS SCHEDULED
Status: COMPLETED | OUTPATIENT
Start: 2017-10-17 | End: 2017-10-18

## 2017-10-17 RX ORDER — ALBUTEROL SULFATE 90 UG/1
2 AEROSOL, METERED RESPIRATORY (INHALATION) EVERY 6 HOURS PRN
Status: DISCONTINUED | OUTPATIENT
Start: 2017-10-17 | End: 2017-10-18 | Stop reason: HOSPADM

## 2017-10-17 RX ADMIN — MIDAZOLAM HYDROCHLORIDE 2 MG: 1 INJECTION, SOLUTION INTRAMUSCULAR; INTRAVENOUS at 07:14

## 2017-10-17 RX ADMIN — FENTANYL CITRATE 50 MCG: 50 INJECTION, SOLUTION INTRAMUSCULAR; INTRAVENOUS at 07:26

## 2017-10-17 RX ADMIN — ALBUTEROL SULFATE 10 PUFF: 90 AEROSOL, METERED RESPIRATORY (INHALATION) at 07:26

## 2017-10-17 RX ADMIN — GABAPENTIN 100 MG: 100 CAPSULE ORAL at 15:18

## 2017-10-17 RX ADMIN — FENTANYL CITRATE 50 MCG: 50 INJECTION, SOLUTION INTRAMUSCULAR; INTRAVENOUS at 07:22

## 2017-10-17 RX ADMIN — DEXAMETHASONE SODIUM PHOSPHATE 10 MG: 10 INJECTION, SOLUTION INTRAMUSCULAR; INTRAVENOUS at 17:46

## 2017-10-17 RX ADMIN — ACETAMINOPHEN 975 MG: 325 TABLET, FILM COATED ORAL at 06:25

## 2017-10-17 RX ADMIN — DEXAMETHASONE SODIUM PHOSPHATE 10 MG: 10 INJECTION INTRAMUSCULAR; INTRAVENOUS at 07:22

## 2017-10-17 RX ADMIN — DOCUSATE SODIUM 100 MG: 100 CAPSULE, LIQUID FILLED ORAL at 17:51

## 2017-10-17 RX ADMIN — SODIUM CHLORIDE: 0.9 INJECTION, SOLUTION INTRAVENOUS at 07:35

## 2017-10-17 RX ADMIN — GABAPENTIN 100 MG: 100 CAPSULE ORAL at 21:14

## 2017-10-17 RX ADMIN — REMIFENTANIL HYDROCHLORIDE 0.4 MCG/KG/MIN: 1 INJECTION, POWDER, LYOPHILIZED, FOR SOLUTION INTRAVENOUS at 07:27

## 2017-10-17 RX ADMIN — SODIUM CHLORIDE, SODIUM LACTATE, POTASSIUM CHLORIDE, AND CALCIUM CHLORIDE: .6; .31; .03; .02 INJECTION, SOLUTION INTRAVENOUS at 10:43

## 2017-10-17 RX ADMIN — VANCOMYCIN HYDROCHLORIDE 1500 MG: 1 INJECTION, POWDER, LYOPHILIZED, FOR SOLUTION INTRAVENOUS at 07:30

## 2017-10-17 RX ADMIN — PROPOFOL 200 MG: 10 INJECTION, EMULSION INTRAVENOUS at 07:22

## 2017-10-17 RX ADMIN — VANCOMYCIN HYDROCHLORIDE 1500 MG: 10 INJECTION, POWDER, LYOPHILIZED, FOR SOLUTION INTRAVENOUS at 17:57

## 2017-10-17 RX ADMIN — DEXMEDETOMIDINE HYDROCHLORIDE 0.2 MCG/KG/HR: 100 INJECTION, SOLUTION INTRAVENOUS at 07:26

## 2017-10-17 RX ADMIN — SODIUM CHLORIDE 125 ML/HR: 0.9 INJECTION, SOLUTION INTRAVENOUS at 13:30

## 2017-10-17 RX ADMIN — ALBUTEROL SULFATE 10 PUFF: 90 AEROSOL, METERED RESPIRATORY (INHALATION) at 10:36

## 2017-10-17 RX ADMIN — CHLORHEXIDINE GLUCONATE 15 ML: 1.2 RINSE ORAL at 06:45

## 2017-10-17 RX ADMIN — SODIUM CHLORIDE, SODIUM LACTATE, POTASSIUM CHLORIDE, AND CALCIUM CHLORIDE 125 ML/HR: .6; .31; .03; .02 INJECTION, SOLUTION INTRAVENOUS at 06:27

## 2017-10-17 RX ADMIN — PHENYLEPHRINE HYDROCHLORIDE 20 MCG/MIN: 10 INJECTION INTRAVENOUS at 08:07

## 2017-10-17 RX ADMIN — PANTOPRAZOLE SODIUM 40 MG: 40 TABLET, DELAYED RELEASE ORAL at 15:18

## 2017-10-17 RX ADMIN — KETAMINE HYDROCHLORIDE 0.2 MG/KG/HR: 50 INJECTION, SOLUTION INTRAMUSCULAR; INTRAVENOUS at 07:26

## 2017-10-17 RX ADMIN — PREGABALIN 150 MG: 75 CAPSULE ORAL at 06:25

## 2017-10-17 RX ADMIN — ONDANSETRON 4 MG: 2 INJECTION INTRAMUSCULAR; INTRAVENOUS at 10:18

## 2017-10-17 RX ADMIN — HYDROMORPHONE HYDROCHLORIDE 1 MG: 1 INJECTION, SOLUTION INTRAMUSCULAR; INTRAVENOUS; SUBCUTANEOUS at 10:23

## 2017-10-17 RX ADMIN — Medication 400 MG: at 15:18

## 2017-10-17 RX ADMIN — PROPOFOL 140 MCG/KG/MIN: 10 INJECTION, EMULSION INTRAVENOUS at 07:26

## 2017-10-17 RX ADMIN — SODIUM CHLORIDE 125 ML/HR: 0.9 INJECTION, SOLUTION INTRAVENOUS at 22:39

## 2017-10-17 RX ADMIN — Medication 100 MG: at 07:22

## 2017-10-17 RX ADMIN — ATORVASTATIN CALCIUM 80 MG: 80 TABLET, FILM COATED ORAL at 15:18

## 2017-10-17 RX ADMIN — TOPIRAMATE 100 MG: 100 TABLET, FILM COATED ORAL at 17:53

## 2017-10-17 NOTE — ANESTHESIA POSTPROCEDURE EVALUATION
Post-Op Assessment Note      CV Status:  Stable    Mental Status:  Awake and lethargic    Hydration Status:  Stable    PONV Controlled:  None    Airway Patency:  Patent    Post Op Vitals Reviewed: Yes          Staff: Anesthesiologist, CRNA           BP      Temp (P) 97 8 °F (36 6 °C) (10/17/17 1052)    Pulse     Resp (P) 20 (10/17/17 1052)    SpO2

## 2017-10-17 NOTE — ORTHOTIC NOTE
Orthotic Note            Date: 10/17/2017      Patient Name: Rashid Johnson        Time: 13:18pm-13:44pm 25mins    Reason for Consult:  Patient Active Problem List   Diagnosis    Right scapholunate ligament tear    History of pulmonary embolism    Reflux esophagitis    Paraesophageal hernia    Saucedo esophagus    Obesity    Cervical disc disorder with radiculopathy of cervicothoracic region    Cervical radiculopathy   [employer]/Gisela Shower Collar   Per Orthopedics    I was in to follow up with patient regarding Vista collar  Vista collar not fitting correctly  I re-fit collar and adjusted chin plated to level 4  Patient tolerated well and instructions/adjustments review at this time  I will continue daily follow up  Instructions, Vista replacement pads, my contact information, and gisela shower collar at bedside  Recommendations:  Please call Mobility Coordinator at ext  [phone] in regards to bracing instruction and/or adjustment  Yovana Innocent Mobility Coordinator LCFo, LCOF, ASOP R  O T, O B T

## 2017-10-17 NOTE — OP NOTE
OPERATIVE REPORT  PATIENT NAME: Cleve Puentes    :  1960  MRN: [id number]  Pt Location: [facility] OR ROOM 18    SURGERY DATE: 10/17/2017    Surgeon(s) and Role:     * Shree Acosta MD - Primary     * Sharmila Gaston MD - [address], FLORIAN - Assisting    Preop Diagnosis:  Cervical spinal stenosis [M48 02]  DDD (degenerative disc disease), cervical [M50 30]  Cervical radiculopathy [M54 12]    Post-Op Diagnosis Codes:     * Cervical spinal stenosis [M48 02]     * DDD (degenerative disc disease), cervical [M50 30]     * Cervical radiculopathy [M54 12]    Procedure(s) (LRB):  C5-6, C6-7 ACDF WITH ALLOGRAFT (NEURO MONITORING) (N/A)    Specimen(s):  * No specimens in log *    Estimated Blood Loss:   50 mL    Drains:       Anesthesia Type:   General    Operative Indications:  Cervical spinal stenosis [M48 02]  DDD (degenerative disc disease), cervical [M50 30]  Cervical radiculopathy [M54 12]      Operative Findings:  Cervical DDD and stenosis S8-5, K5-1    Complications:   None    Procedure and Technique:  Anterior cervical diskectomy and fusion C5-6, C6-7 with allograft and neural monitoring  Patient was correctly identified by both the anesthesia department and myself  His neck was marked  The indication was for foraminal stenosis at C5-6 and C6-7 on the right causing right upper extremity pain numbness and tingling with subjective weakness  He had failed conservative management  He was taken to the operating room with general anesthesia was induced in the supine position  Nunez catheter was placed  The neural monitoring leads were placed  SCDs were attached was legs  1 5 g of vancomycin was administered for preoperative antibiotics  He was positioned supine with his neck in gentle extension  His shoulders were taped down to facilitate exposure  It was made sure that all bony prominences and neurovascular structures were adequately padded and protected    AP and lateral imaging confirmed with the incision would be made  The neck was then prepped and draped in the usual sterile fashion  A time-out was performed  A left anterior Mirza-Iqbal approach was utilized  A 15 blade was used to create a 1 5 cm curvilinear incision  Dissection was carried past the level of the platysma with the Bovie electric cautery  Flaps were raised  The anterior border of the sternocleidomastoid muscle was identified  The fascia between this and the medial strap muscles was developed and divided  The omohyoid muscle was identified and divided within its mid belly  While protecting the carotid sheath laterally and the esophagus medially blunt dissection was used to expose the anterior bony spine  Shin Annette retractors were used to tease the fascia off the anterior bony spine  Disc space was delineated a marker was placed lateral image confirmed the appropriate level of C5-6  With the [address] retractors in place protecting the neurovascular structures the longus colli muscles were elevated in a subperiosteal fashion with the Bovie electric cautery exposing the vertebral bodies at C5-C6 and C7 respectively  The disc spaces were clearly delineated  Self-retaining [place] retractors were placed underneath the longus colli muscles to facilitate exposure  A 15 blade was used to create an annulotomy at the C6-7 level  Pituitary rongeur was used to remove the disc  A high-speed bur was used to prepare the endplates and to remove the anterior bony osteophytes  Morton pin retractors used to facilitate posterior annulus exposure  The posterior longitudinal ligament identified  A micro hook was used to divide this  Central decompression and bilateral foraminotomies were performed with 2  And 3  [place] rongeur was out to the uncinates  The same sequence of steps was taken for the C5-6 level    Following adequate decompression of the thecal sac and bilateral foramina copious irrigation was performed to the entire wound  Trial sizing ensued  The trial size up to an 8  [place] corticocancellous bone plugs measuring 8 were selected and malleted into place under direct visualization at C6-7 and C5-6  No significant neuro monitoring events were noted  A [place] reflex hybrid plate measuring 34 mm was selected  This was secured in place with six 14 mm screws  AP and lateral imaging confirmed appropriate instrumentation  The construct was final tightened  The wound was copiously irrigated and found to be dry with minimal bleeding  The decision was made to not put a deep drain  Final AP and lateral imaging confirmed appropriate instrumentation and alignment  The sub cutaneous fascial layers were reapproximated using 3 O Vicryl suture  The superficial subcu layer with the platysma were reapproximated using 2 Vicryl suture  The skin was closed using 3 O Monocryl suture in a running subcuticular technique  Sterile dressings were applied to the wound  A rigid cervical collar was applied  Anesthesia was reversed and patient was transferred out of the operating room in stable condition          I was present for the entire procedure    Patient Disposition:  PACU      Implants: Alice 34mm Reflex hybrid Plate, Corticocancellous bone plugs 8mm X2    SIGNATURE: Pat Calzada MD  DATE: October 17, 2017  TIME: 10:27 AM

## 2017-10-17 NOTE — PERIOPERATIVE NURSING NOTE
Patient responds appropriately to commands and is alert and oriented x4  Pt  Drifts off to sleep mid-conversation  Will continue to monitor

## 2017-10-17 NOTE — PROGRESS NOTES
Dr Sylvia Bruce at bedside patient arouseable, moves all 4 extremities  Hand  weak     Patient falls quickly back to sleep

## 2017-10-18 VITALS
BODY MASS INDEX: 25.79 KG/M2 | SYSTOLIC BLOOD PRESSURE: 164 MMHG | HEIGHT: 68 IN | OXYGEN SATURATION: 98 % | HEART RATE: 116 BPM | WEIGHT: 170.19 LBS | DIASTOLIC BLOOD PRESSURE: 77 MMHG | RESPIRATION RATE: 20 BRPM | TEMPERATURE: 97.9 F

## 2017-10-18 LAB
ANION GAP SERPL CALCULATED.3IONS-SCNC: 7 MMOL/L (ref 4–13)
BASOPHILS # BLD MANUAL: 0 THOUSAND/UL (ref 0–0.1)
BASOPHILS NFR MAR MANUAL: 0 % (ref 0–1)
BUN SERPL-MCNC: 6 MG/DL (ref 5–25)
CALCIUM SERPL-MCNC: 8.2 MG/DL (ref 8.3–10.1)
CHLORIDE SERPL-SCNC: 112 MMOL/L (ref 100–108)
CO2 SERPL-SCNC: 22 MMOL/L (ref 21–32)
CREAT SERPL-MCNC: 0.61 MG/DL (ref 0.6–1.3)
EOSINOPHIL # BLD MANUAL: 0 THOUSAND/UL (ref 0–0.4)
EOSINOPHIL NFR BLD MANUAL: 0 % (ref 0–6)
ERYTHROCYTE [DISTWIDTH] IN BLOOD BY AUTOMATED COUNT: 14.5 % (ref 11.6–15.1)
GFR SERPL CREATININE-BSD FRML MDRD: 112 ML/MIN/1.73SQ M
GLUCOSE SERPL-MCNC: 134 MG/DL (ref 65–140)
HCT VFR BLD AUTO: 39.8 % (ref 36.5–49.3)
HGB BLD-MCNC: 13.2 G/DL (ref 12–17)
LYMPHOCYTES # BLD AUTO: 0.5 THOUSAND/UL (ref 0.6–4.47)
LYMPHOCYTES # BLD AUTO: 4 % (ref 14–44)
MCH RBC QN AUTO: 29.1 PG (ref 26.8–34.3)
MCHC RBC AUTO-ENTMCNC: 33.2 G/DL (ref 31.4–37.4)
MCV RBC AUTO: 88 FL (ref 82–98)
MONOCYTES # BLD AUTO: 0 THOUSAND/UL (ref 0–1.22)
MONOCYTES NFR BLD: 0 % (ref 4–12)
NEUTROPHILS # BLD MANUAL: 12.07 THOUSAND/UL (ref 1.85–7.62)
NEUTS SEG NFR BLD AUTO: 96 % (ref 43–75)
NRBC BLD AUTO-RTO: 0 /100 WBCS
PLATELET # BLD AUTO: 230 THOUSANDS/UL (ref 149–390)
PLATELET BLD QL SMEAR: ADEQUATE
PMV BLD AUTO: 10.4 FL (ref 8.9–12.7)
POIKILOCYTOSIS BLD QL SMEAR: PRESENT
POTASSIUM SERPL-SCNC: 3.8 MMOL/L (ref 3.5–5.3)
RBC # BLD AUTO: 4.54 MILLION/UL (ref 3.88–5.62)
RBC MORPH BLD: PRESENT
SODIUM SERPL-SCNC: 141 MMOL/L (ref 136–145)
WBC # BLD AUTO: 12.57 THOUSAND/UL (ref 4.31–10.16)

## 2017-10-18 PROCEDURE — 90686 IIV4 VACC NO PRSV 0.5 ML IM: CPT | Performed by: ORTHOPAEDIC SURGERY

## 2017-10-18 PROCEDURE — G8979 MOBILITY GOAL STATUS: HCPCS

## 2017-10-18 PROCEDURE — G8980 MOBILITY D/C STATUS: HCPCS

## 2017-10-18 PROCEDURE — 97162 PT EVAL MOD COMPLEX 30 MIN: CPT

## 2017-10-18 PROCEDURE — G8978 MOBILITY CURRENT STATUS: HCPCS

## 2017-10-18 PROCEDURE — 85007 BL SMEAR W/DIFF WBC COUNT: CPT | Performed by: PHYSICIAN ASSISTANT

## 2017-10-18 PROCEDURE — 85027 COMPLETE CBC AUTOMATED: CPT | Performed by: PHYSICIAN ASSISTANT

## 2017-10-18 PROCEDURE — 80048 BASIC METABOLIC PNL TOTAL CA: CPT | Performed by: PHYSICIAN ASSISTANT

## 2017-10-18 RX ORDER — DOCUSATE SODIUM 100 MG/1
100 CAPSULE, LIQUID FILLED ORAL 2 TIMES DAILY
Qty: 10 CAPSULE | Refills: 0 | Status: SHIPPED | OUTPATIENT
Start: 2017-10-18 | End: 2018-02-15 | Stop reason: ALTCHOICE

## 2017-10-18 RX ORDER — OXYCODONE HYDROCHLORIDE 10 MG/1
10 TABLET ORAL EVERY 4 HOURS PRN
Status: DISCONTINUED | OUTPATIENT
Start: 2017-10-18 | End: 2017-10-18 | Stop reason: HOSPADM

## 2017-10-18 RX ORDER — MORPHINE SULFATE 2 MG/ML
2 INJECTION, SOLUTION INTRAMUSCULAR; INTRAVENOUS EVERY 2 HOUR PRN
Status: DISCONTINUED | OUTPATIENT
Start: 2017-10-18 | End: 2017-10-18 | Stop reason: HOSPADM

## 2017-10-18 RX ORDER — OXYCODONE HYDROCHLORIDE 5 MG/1
5 TABLET ORAL EVERY 4 HOURS PRN
Status: DISCONTINUED | OUTPATIENT
Start: 2017-10-18 | End: 2017-10-18 | Stop reason: HOSPADM

## 2017-10-18 RX ADMIN — DEXAMETHASONE SODIUM PHOSPHATE 10 MG: 10 INJECTION, SOLUTION INTRAMUSCULAR; INTRAVENOUS at 09:37

## 2017-10-18 RX ADMIN — GABAPENTIN 100 MG: 100 CAPSULE ORAL at 08:12

## 2017-10-18 RX ADMIN — PANTOPRAZOLE SODIUM 40 MG: 40 TABLET, DELAYED RELEASE ORAL at 08:12

## 2017-10-18 RX ADMIN — Medication 400 MG: at 08:12

## 2017-10-18 RX ADMIN — DEXAMETHASONE SODIUM PHOSPHATE 10 MG: 10 INJECTION, SOLUTION INTRAMUSCULAR; INTRAVENOUS at 00:20

## 2017-10-18 RX ADMIN — NORTRIPTYLINE HYDROCHLORIDE 10 MG: 10 CAPSULE ORAL at 00:20

## 2017-10-18 RX ADMIN — DOCUSATE SODIUM 100 MG: 100 CAPSULE, LIQUID FILLED ORAL at 08:12

## 2017-10-18 RX ADMIN — OXYCODONE HYDROCHLORIDE 10 MG: 10 TABLET ORAL at 12:47

## 2017-10-18 RX ADMIN — TOPIRAMATE 100 MG: 100 TABLET, FILM COATED ORAL at 09:37

## 2017-10-18 RX ADMIN — ATORVASTATIN CALCIUM 80 MG: 80 TABLET, FILM COATED ORAL at 08:12

## 2017-10-18 RX ADMIN — SODIUM CHLORIDE, SODIUM LACTATE, POTASSIUM CHLORIDE, AND CALCIUM CHLORIDE 50 ML/HR: .6; .31; .03; .02 INJECTION, SOLUTION INTRAVENOUS at 07:10

## 2017-10-18 RX ADMIN — INFLUENZA VIRUS VACCINE 0.5 ML: 15; 15; 15; 15 SUSPENSION INTRAMUSCULAR at 12:40

## 2017-10-18 NOTE — DISCHARGE SUMMARY
ORTHOPEDICS DISCHARGE SUMMARY  Jazmin Kaiser 55 y o  male MRN: [id number]  Unit/Bed#: -01    Attending Physician: Marylene Jacks    Admitting diagnosis: Cervical spinal stenosis [M48 02]  DDD (degenerative disc disease), cervical [M50 30]  Cervical radiculopathy [M54 12]    Discharge diagnosis: Cervical spinal stenosis [M48 02]  DDD (degenerative disc disease), cervical [M50 30]  Cervical radiculopathy [M54 12]    Date of admission: 10/17/2017    Date of discharge: 10/18/17    Procedure: ACDF C5-C7    HPI:  This is a 55y o  year old male that presented to the office with signs and symptoms of radiculopathy   They tried and failed conservative treatment measures and wished to proceed with surgical intervention  The risks, benefits, and complications of the procedure were discussed with the patient and informed consent was obtained  Hospital Course: The patient was admitted to the hospital on 10/17/2017 and underwent an uncomplicated ACDF C4-O2  They were transferred to the floor after a brief stay in the post-anesthesia care unit  Their pain was well managed with IV and oral pain medications  They began therapy on post operative day #1  Daily discussion was had with the patient, nursing staff, orthopaedic team, and family members if present  All questions were answered to the patients satisifaction  0  Lab Value Date/Time   HGB 13 2 10/18/2017 0505   HGB 14 6 10/05/2017 0856   HGB 13 6 07/13/2017 0447   HGB 15 4 04/13/2016 0947   HGB 15 7 01/04/2016 0824   HGB 14 8 01/23/2014 0850        Discharge Instructions: The patient was discharged weight bearing as tolerated to all extremities  Take pain medications as instructed  Patient must keep Cervical collar brace on when out of bed  Discharge Medications: For the complete list of discharge medications, please refer to the patient's medication reconciliation

## 2017-10-18 NOTE — PHYSICAL THERAPY NOTE
Physical Therapy Eval    Patient Name: Juan Antonio Craig    RXDED'Z Date: 10/18/2017     Problem List  Patient Active Problem List   Diagnosis    Right scapholunate ligament tear    History of pulmonary embolism    Reflux esophagitis    Paraesophageal hernia    Saucedo esophagus    Obesity    Cervical disc disorder with radiculopathy of cervicothoracic region    Cervical radiculopathy        Past Medical History  Past Medical History:   Diagnosis Date    Anesthesia     Pt wakes up during "almost every surgery"    Arthritis     Asthma     Cervical radiculopathy     Chemotherapy follow-up examination     Chronic pain     [facility]ch[facility]     Diabetes mellitus [facility]     borderline "years ago"    Food allergy     clams    GERD (gastroesophageal reflux disease)     Heart murmur     Hiatal hernia     History of pulmonary embolism     Hyperlipidemia     Lung cancer [facility]     left lung    Migraine     Right scapholunate ligament tear     Skipped heart beats     Wears partial dentures     upper and lower        Past Surgical History  Past Surgical History:   Procedure Laterality Date    BRONCHOSCOPY      COLONOSCOPY      FRACTURE SURGERY      femur right 1985    GASTRIC BYPASS LAPAROSCOPIC N/A 7/12/2017    Procedure: GASTRIC DIVERSION WITH [place]-EN-Y RECONSTRUCTION WITH  SHORT 60 cm LIMB;  Surgeon: Raman Ling MD;  [facility];  Service: Bariatrics    KNEE ARTHROSCOPY Right     right shoulder    LUNG LOBECTOMY Left     LYMPHADENECTOMY      MANDIBLE FRACTURE SURGERY      MEDIASTINOSCOPY      IL COLONOSCOPY FLX DX W/COLLJ SPEC WHEN PFRMD N/A 4/14/2017    Procedure: COLONOSCOPY;  Surgeon: Mitali King MD;  [facility]: [facility];  Service: Gastroenterology    IL ESOPHAGOGASTRODUODENOSCOPY TRANSORAL DIAGNOSTIC N/A 1/11/2017    Procedure: ESOPHAGOGASTRODUODENOSCOPY (EGD);   Surgeon: Mitali King MD; Location: [facility];   Service: Gastroenterology    CA LAP, REPAIR PARAESOPHAGEAL HERNIA, INCL FUNDOPLASTY W/ MESH N/A 7/12/2017    Procedure: REPAIR HERNIA PARAESOPHAGEAL  LAPAROSCOPIC;  Surgeon: Daksha Zarate MD;  [facility];  Service: Felisa Connell CA WRIST Daniela Credit LIG Right 6/9/2016    Procedure: RELEASE CARPAL TUNNEL ENDOSCOPIC;  Surgeon: Chema Allan MD;  [facility];  Service: Orthopedics    RECONSTRUCTION DISTAL RADIUS/ULNA JOINT (DRUJ) Right 9/6/2016    Procedure: WRIST SCAPHOLUNATE LIGAMENT RECONSTRUCTION WITH ECRB TENDON AUTOGRAPH , SPLINT APPLICATION ;  Surgeon: Chema Allan MD;  [facility];  Service:     SHOULDER SURGERY      TONSILLECTOMY             10/18/17 0905   Note Type   Note type Eval only   Pain Assessment   Pain Assessment No/denies pain   Pain Score No Pain   Home Living   Type of [address] Multi-level;Stairs to enter with rails   Additional Comments Pt primarily stays on top floor during the day    Prior Function   Level of [place] Independent with ADLs and functional mobility   Lives With Spouse   ADL Assistance Independent   IADLs Independent   Falls in the last 6 months 0   Restrictions/Precautions   Braces or Orthoses C/S Collar   General   Family/Caregiver Present No   Cognition   Overall Cognitive Status WFL   Orientation Level Oriented X4   RUE Assessment   RUE Assessment WFL   RUE Strength   RUE Overall Strength Within Functional Limits - able to perform ADL tasks with strength   LUE Assessment   LUE Assessment WFL   RLE Assessment   RLE Assessment WFL   LLE Assessment   LLE Assessment WFL   Coordination   Movements are Fluid and Coordinated 1   Light Touch   RLE Light Touch Grossly intact   LLE Light Touch Grossly intact   Proprioception   RLE Proprioception Grossly intact   LLE Proprioception Grossly Intact   Bed Mobility   Rolling R 7  Independent   Rolling L 7  Independent   Supine to Sit 7 Independent   Sit to Supine 7  Independent   Transfers   Sit to Stand 7  Independent   Stand to Sit 7  Independent   Stand pivot 7  Independent   Toilet transfer 7  Independent   Ambulation/Elevation   Gait pattern Wide CHANCE   Gait Assistance 7  Independent   Assistive Device None   Distance 600   Stair Management Assistance 6  Modified independent   Stair Management Technique One rail R;Alternating pattern   Number of Stairs 16   Balance   Static Sitting Good   Dynamic Sitting Good   Static Standing Good   Dynamic Standing Good   Ambulatory Good   Activity Tolerance   Activity Tolerance Patient tolerated treatment well   Assessment   Prognosis Excellent   Problem List Orthopedic restrictions   Assessment CS collar   Barriers to Discharge None   Goals   Patient Goals Pt DC from PT services- recommend DC home when medically cleared   Recommendation   Recommendation Outpatient PT;D/C PT   PT - OK to Discharge Yes   Additional Comments Pt eval complete today  Pt presents with great  functional mobiltiy being jacob to complete all transfers, ambulation and stairs at independent level  Pt with no loss of balance noted and demonstrated good self awareness with observed mobility  Recommend that pt be DC home with support from his wife when medically stable and follow up with outpatient PT services  Pt denied having concerns about his DC home and states that he will have his wife present througout the week to assist with his transition      Barthel Index   Feeding 10   Bathing 5   Grooming Score 5   Dressing Score 5  (assist with collar)   Bladder Score 10   Bowels Score 10   Toilet Use Score 10   Transfers (Bed/Chair) Score 15   Mobility (Level Surface) Score 15   Stairs Score 10   Barthel Index Score 95

## 2017-10-18 NOTE — DISCHARGE INSTRUCTIONS
Discharge Instructions - 18 Min Pandya 55 y o  male MRN: [id number]  Unit/Bed#: Operating Room    Weight Bearing Status:                                           Full weight bearing to all extremities    DVT prophylaxis:  DO NOT take blood thinners until cleared by surgeon    Pain:  Continue analgesics as directed    Showering Instructions:   Do not shower until 5 days after the procedure    Dressing Instructions:   Keep surgical incision clean and dry at all times  No soaking or scrubbing of wound at any time  May change dressing in 5 days, apply dry bandage  Driving Instructions:  No driving until cleared by Orthopaedic Surgery  PT/OT:  No PT/OT required until directed by surgeon at followup appointment    Appt Instructions: If you do not have your appointment, please call the clinic at [phone]  Otherwise followup as scheduled below: Follow-up with Dr Lauri Banerjee in 2 weeks     Contact the office sooner if you experience any increased numbness/tingling in the extremities  Miscellaneous:  No lifting greater than 5 lbs  No strenuous exercise    Maintain a soft diet (i e  Pudding, jello, soup, etc )

## 2017-10-18 NOTE — SOCIAL WORK
CM met with the Pt at bedside, Pt is independently ambulating in the room  Pt's family member at bedside able to provide transportation home  Pt requesting CM send prescriptions to [facility] to have filled and delivered to bedside prior to D/C as [facility] is Pt's primary pharmacy  Pt's family member questioning why D/C paperwork/order is not ready at this time as they "were told he could go home today at 6:30 this morning " CM requesting medications as soon as possible from [facility]  CM provided Pt with OutPt PT prescription  No further needs identified at this time by Patient

## 2017-10-18 NOTE — PROGRESS NOTES
Orthopedics  Eduardo Anand 55 y o  male MRN: [id number]  Unit/Bed#: -01    Subjective:   55 y o  male post operative day 1 Status Post ACDF of C5 to C7 ACDF Pt doing well  Pain controlled      Labs:    0  Lab Value Date/Time   HCT 43 4 10/05/2017 0856   HCT 40 0 07/13/2017 0447   HCT 43 5 04/13/2016 0947   HCT 46 0 01/04/2016 0824   HCT 43 4 01/23/2014 0850   HGB 14 6 10/05/2017 0856   HGB 13 6 07/13/2017 0447   HGB 15 4 04/13/2016 0947   HGB 15 7 01/04/2016 0824   HGB 14 8 01/23/2014 0850   INR 1 05 10/05/2017 0856   INR 1 55 (H) 02/21/2014 0806   WBC 8 80 10/05/2017 0856   WBC 13 25 (H) 07/13/2017 0447   WBC 8 25 04/13/2016 0947   WBC 6 48 01/04/2016 0824   WBC 8 23 01/23/2014 0850       Meds:    Current Facility-Administered Medications:     albuterol (PROVENTIL HFA,VENTOLIN HFA) inhaler 2 puff, 2 puff, Inhalation, Q6H PRN, Benoit Pardo PA-C    atorvastatin (LIPITOR) tablet 80 mg, 80 mg, Oral, Daily, Benoit Pardo PA-C, 80 mg at 10/17/17 1518    dexamethasone (PF) (DECADRON) injection 10 mg, 10 mg, Intravenous, Q8H [facility], ROSAURA Cruz-C, 10 mg at 10/18/17 0020    docusate sodium (COLACE) capsule 100 mg, 100 mg, Oral, BID, Benoit Pardo PA-C, 100 mg at 10/17/17 1751    gabapentin (NEURONTIN) capsule 100 mg, 100 mg, Oral, TID, Benoit Pardo PA-C, 100 mg at 10/17/17 2114    lactated ringers infusion, 50 mL/hr, Intravenous, Continuous, Andry Wilson MD    magnesium oxide (MAG-OX) tablet 400 mg, 400 mg, Oral, Daily, ROSAURA Cruz-C, 400 mg at 10/17/17 1518    Menthol lozenge 5 4 mg, 1 lozenge, Mouth/Throat, Q2H PRN, Benoit Pardo PA-C    nortriptyline (PAMELOR) capsule 10 mg, 10 mg, Oral, HS, ROSAURA Cruz-MARIA ELENA, 10 mg at 10/18/17 0020    ondansetron [facility]) injection 4 mg, 4 mg, Intravenous, Q6H PRN, Benoit Pardo PA-C    pantoprazole (PROTONIX) EC tablet 40 mg, 40 mg, Oral, Daily, Benoit Pardo PA-C, 40 mg at 10/17/17 1518    rizatriptan (MAXALT) tablet 5 mg, 5 mg, Oral, Once PRN, Matthew Segovia PA-C    sodium chloride 0 9 % infusion, 125 mL/hr, Intravenous, Continuous, Segun Langley, Last Rate: 125 mL/hr at 10/17/17 2239, 125 mL/hr at 10/17/17 2239    tiZANidine (ZANAFLEX) tablet 4 mg, 4 mg, Oral, Q6H PRN, Matthew Segovia PA-C    topiramate (TOPAMAX) tablet 100 mg, 100 mg, Oral, Daily, Matthew Segovia PA-C, 100 mg at 10/17/17 1753    Blood Culture:   No results found for: BLOODCX    Wound Culture:   No results found for: WOUNDCULT    Ins and Outs:  I/O last 24 hours: In: 2539 6 [I V :2539 6]  Out: 3617 [Urine:2725; Blood:50]          Physical Exam:  Vitals:    10/18/17 0406   BP: 107/67   Pulse: 71   Resp: 20   Temp: 98 4 °F (36 9 °C)   SpO2: 100%     Cervical spine:  · Normal voice quality  · C colar in place  · No swallowing issues  · Dressings C/D/I  · Neurovascularly intact C2-T1  · + motor to Musculocutaneous/Axillary/Radial/Ulna/Median nerves    _*_*_*_*_*_*_*_*_*_*_*_*_*_*_*_*_*_*_*_*_*_*_*_*_*_*_*_*_*_*_*_*_*_*_*_*_*_*_*_*_*    Assessment/:  POD 1 S/P ACDF C5 to  C7   Doing well    Plan:  · Weight bearing as tolerated all extremities  · Up and out of bed  · DVT pprophylaxis- mechanical  · pain control  · PT/OT  · Discharge planning in progress      Beatriz Barton MD

## 2017-10-20 ENCOUNTER — GENERIC CONVERSION - ENCOUNTER (OUTPATIENT)
Dept: OTHER | Facility: OTHER | Age: 57
End: 2017-10-20

## 2017-10-24 ENCOUNTER — ALLSCRIPTS OFFICE VISIT (OUTPATIENT)
Dept: OTHER | Facility: OTHER | Age: 57
End: 2017-10-24

## 2017-10-25 NOTE — PROGRESS NOTES
Assessment  1  Status post gastric surgery (V45 89) (Z98 890)   2  Postgastrectomy malabsorption (579 3) (K91 2,Z90 3)   3  Chronic GERD (530 81) (K21 9)   4  Saucedo's esophagus without dysplasia (530 85) (K22 70)   5  Paraesophageal hernia (553 3) (K44 9)   6  Dyslipidemia (272 4) (E78 5)    Plan  Saucedo's esophagus without dysplasia, Chronic GERD, Postgastrectomy malabsorption,  Status post gastric surgery    · (1) CBC/ PLT (NO DIFF); Status:Active; Requested NPA:[id number];    Perform:[facility]; UBJ:[id number]; Ordered; For:Saucedo's esophagus without dysplasia, Chronic GERD, Postgastrectomy malabsorption, Status post gastric surgery; Ordered By:Nicolasa Adame;   · (1) COMPREHENSIVE METABOLIC PANEL; Status:Active; Requested JTW:[id number];    Perform:[facility]; ZLV:[id number]; Ordered; For:Saucedo's esophagus without dysplasia, Chronic GERD, Postgastrectomy malabsorption, Status post gastric surgery; Ordered By:Nicolasa Adame;   · (1) FERRITIN; Status:Active; Requested DESTIN:[id number];    Perform:[facility]; RSI:[id number]; Ordered; For:Saucedo's esophagus without dysplasia, Chronic GERD, Postgastrectomy malabsorption, Status post gastric surgery; Ordered By:Nicolasa Adame;   · (1) FOLATE; Status:Active; Requested PTR:[id number];    Perform:[facility]; JAR:[id number]; Ordered; For:Saucedo's esophagus without dysplasia, Chronic GERD, Postgastrectomy malabsorption, Status post gastric surgery; Ordered By:Shanta Adame;   · (1) PTH N-TERMINAL (INTACT); Status:Active; Requested MICHEL:[id number];    Perform:[facility]; DEJUAN:[id number]; Ordered; For:Saucedo's esophagus without dysplasia, Chronic GERD, Postgastrectomy malabsorption, Status post gastric surgery; Ordered By:Nicolasa Adame;   · (1) VITAMIN A; Status:Active; Requested AFL:[id number];    Perform:[facility]; EYX:[id number]; Ordered; For:Saucedo's esophagus without dysplasia, Chronic GERD, Postgastrectomy malabsorption, Status post gastric surgery; Ordered By:Shanta Adame;   · (1) VITAMIN B1, WHOLE BLOOD; Status:Active; Requested JCX:[id number];    Perform:[facility]; SHERRI:[id number]; Ordered; For:Saucedo's esophagus without dysplasia, Chronic GERD, Postgastrectomy malabsorption, Status post gastric surgery; Ordered By:Alessandra Adame;   · (1) VITAMIN B12; Status:Active; Requested SHIMA:[id number];    Perform:[facility]; FTO:[id number]; Ordered; For:Saucedo's esophagus without dysplasia, Chronic GERD, Postgastrectomy malabsorption, Status post gastric surgery; Ordered By:Alessandra Adame;   · (1) VITAMIN D 25-HYDROXY; Status:Active; Requested LOO:[id number];    Perform:[facility]; XWN:[id number]; Ordered; For:Saucedo's esophagus without dysplasia, Chronic GERD, Postgastrectomy malabsorption, Status post gastric surgery; Ordered By:Alessandra Adame;    Discussion/Summary    Follow up in 3 months  Follow diet as discussed  Get lab work done prior to your next office visit  Follow vitamin/mineral recommendations as reviewed with you  Exercise as tolerated  our office if you have any problems with abdominal pain especially if associated with fever, chills, nausea, vomiting, or any other concerns  soft to puree diet as tolerated  Chew foods thoroughly and put utensil down between bites  pantoprazole but take this twice a day for now  paraesophageal hernia repair with gastric diversion with julio-reconstruction with short 61cm limb  year old male status post laparoscopic paraesophageal hernia repair with gastric diversion with julio reconstruction with 60 cm (short limb)  just had cervical neck surgery last week-discectomy C5-C6 and C6-C7 with allograft and neural monitoring by Dr Lindsey Bailey (10/27/17)  is here for routine follow-up  He notes occasional vomiting when he takes too big a bite and then feels stomach is irritated and he goes back to a liquid diet for a couple of days   He is currently taking his ppi once a day  denies food intolerance if he watches his volume and chews well  recent neck surgery- i have advised him to stay on puree to soft diet and to focus on chewing each bite thoroughly  I reviewed symptoms of fullness and to avoid pushing the volume   him to take his ppi twice a day for at least one month and if he is tolerating diet well then to decrease back to once a day-unless GI advises otherwise  recommended he let us know between visits if he continues to have oral diet intolerances  This does appear related to dietary indiscretion at this point  he did not want to make an earlier follow-up so I will see him back in 3 months but he can call with any concerns between visits  has been eating a regular dieteating at least 70 grams of protein per day30/60 minute rule with liquidsat least 64 ounces of fluid per day  advised him if he can tolerate he can try 30/30 minute rule with liquids to allow for an additional healthy snack at night time to avoid weight loss  Malabsorption- pateint is at risk for malabsorption of vitamins/minerals secondary to malabsorption from her procedure and restriction of intakescurrent supplements and advised on sameis taking 4 bariatric fusion and one calcium per day-appropriate supplements  Chronic GERD and Saucedo's -followed by gastroenterolgist-likely to improve  Patient denies heart burn symptoms  dyslpidemia- lipids checked by PCP -low HDL and mildly high LDL/on statin/followed by PCP-will not recheck now since it was just checked in August    The patient has the current Goals: Maintain +/- few pound loss  with good nutrition intakesvitamin/mineral levels  The patent has the current Barriers: None identified  Patient is able to Self-Care  Educational resources provided: N/a  Possible side effects of new medications were reviewed with the patient/guardian today  The treatment plan was reviewed with the patient/guardian   The patient/guardian understands and agrees with the treatment plan   He was advised to follow up due to malabsorption risks  Self Referrals: No      Chief Complaint  Patient said he tolerates diet well, exercises regularly, and takes vitamins daily  Post-Op  Post-Op Bariatric Surgery:   Luis Singh is status post performed on 7/12/2017--   by Dr Jose Myrick- see discussion  HPI: today's weight is 169 lb pounds,-- today's BMI is 25 7-- and-- his total weight loss is 92% excess body weight tloss pounds  The patient reports vomiting, but-- no nausea,-- no constipation,-- no diarrhea,-- no chest pain-- and-- no abdominal pain  Diet and Exercise: Diet history reviewed and discussed with the patient  Weight loss/gains to date discussed with the patient  PE:   Assessment:   Plan:   Instructions / Recommendations:      Review of Systems    Constitutional: + weight loss, but-- not feeling poorly  Cardiovascular: no chest pain-- and-- no palpitations  Respiratory: no shortness of breath-- and-- no wheezing  Gastrointestinal: vomiting-- and-- see discussion, but-- no abdominal pain,-- no nausea,-- no constipation-- and-- no diarrhea  Psychiatric: no anxiety-- and-- no depression  Active Problems  1  Acute bronchitis (466 0) (J20 9)   2  Acute upper respiratory infection (465 9) (J06 9)   3  Adenocarcinoma of lung, unspecified laterality (162 9) (C34 90)   4  Aftercare following surgery of the musculoskeletal system (V58 78) (Z47 89)   5  Saucedo's esophagus without dysplasia (530 85) (K22 70)   6  Bicipital tendinitis of right shoulder (726 12) (M75 21)   7  Carpal tunnel syndrome on right (354 0) (G56 01)   8  Cervical disc disorder with radiculopathy (723 4) (M50 10)   9  Cervical spinal stenosis (723 0) (M48 02)   10  Chest congestion (786 9) (R09 89)   11  Chronic GERD (530 81) (K21 9)   12  Chronic neck pain (723 1,338 29) (M54 2,G89 29)   13   Complete tear of rotator cuff, unspecified laterality (727 61) (M75 120)   14  Cough (786 2) (R05)   15  DDD (degenerative disc disease), cervical (722 4) (M50 30)   16  Depression screening (V79 0) (Z13 89)   17  Dyslipidemia (272 4) (E78 5)   18  Encounter for prostate cancer screening (V76 44) (Z12 5)   19  Glenoid labrum tear, right, initial encounter (840 8) (S43 431A)   20  Hernia, hiatal (553 3) (K44 9)   21  History of asthma (V12 69) (Z87 09)   22  Hypercholesterolemia (272 0) (E78 00)   23  Hyperglycemia (790 29) (R73 9)   24  Joint pain (719 40) (M25 50)   25  Lymphadenopathy (785 6) (R59 1)   26  Microscopic hematuria (599 72) (R31 29)   27  Morbid obesity (278 01) (E66 01)   28  Need for influenza vaccination (V04 81) (Z23)   29  Nicotine dependence (305 1) (F17 200)   30  Pain, wrist (719 43) (M25 539)   31  Paraesophageal hernia (553 3) (K44 9)   32  Paresthesias (782 0) (R20 2)   33  Patellofemoral arthralgia of right knee (719 46) (M25 561)   34  Personal history of colonic polyps (V12 72) (Z86 010)   35  Pleural Effusion (511 9)   36  Post Pulmonary Resection (V45 89)   37  Pre-op examination (V72 84) (Z01 818)   38  Preoperative cardiovascular examination (V72 81) (Z01 810)   39  Primary localized osteoarthritis of right knee (715 16) (M17 11)   40  Pulmonary embolism (415 19) (I26 99)   41  Pulmonary nodule (793 11) (R91 1)   42  Right bundle-branch block (426 4) (I45 10)   43  Rupture Of The Distal Bicipital Tendon Of The Left Arm (727 62)   44  Screening for colon cancer (V76 51) (Z12 11)   45  Sinus congestion (478 19) (R09 81)   46  Sprained Left Elbow (841 9)   47  Tear of right scapholunate ligament, subsequent encounter (V58 89,842 19) (S63 8X1D)   48  Tendinitis of right rotator cuff (726 10) (M75 81)   49   History of Tobacco use (305 1) (Z72 0)    Social History   · Being A Social Drinker   · Denied: History of Current some day smoker   · 3 cigarettes a day Started 2014   · Denied: History of Drug Use   · Former smoker (V15 82) (P38 103)   · History of Tobacco use (305 1) (Z72 0)    Current Meds   1  Atorvastatin Calcium 80 MG Oral Tablet; TAKE 1 TABLET DAILY  Requested for:   88TNK0775; Last Rx:[id number] Ordered   2  Calcium CHEW;   Therapy: (Recorded:70Euh7115) to Recorded   3  Gabapentin 300 MG TABS; TAKE 1 TABLET 3 TIMES DAILY; Therapy: (Recorded:11Qda3069) to Recorded   4  Magnesium 400 MG Oral Tablet; Take 1 tablet daily; Therapy: 61MFS6586 to Recorded   5  Multivitamins CHEW;   Therapy: (EBFUCDVS:97DWV0264) to Recorded   6  Nortriptyline HCl - 10 MG Oral Capsule; Therapy: (Courtney Caprice) to Recorded   7  Pantoprazole Sodium 40 MG Oral Tablet Delayed Release; TAKE 1 TABLET TWICE DAILY   30 MINUTES BEFORE BREAKFAST AND DINNER; Therapy: 78Hol6313 to (Evaluate:98Lsh0014)  Requested for: 71IHZ6413; Last   Rx:29Koh0702 Ordered   8  Protein POWD;   Therapy: (SCFGOSES:23UFB6806) to Recorded   9  Rizatriptan Benzoate 5 MG Oral Tablet; Therapy: (Courtney Caprice) to Recorded   10  TraMADol HCl - 50 MG Oral Tablet; Therapy: 52EYF2854 to Recorded   11  Ventolin  (90 Base) MCG/ACT Inhalation Aerosol Solution; INHALE 1 PUFF    EVERY 4 HOURS AS NEEDED; Therapy: 33TQH0618 to (Last CZ:72PML7536)  Requested for: 73XCK7609 Ordered   12  Voltaren 1 % Transdermal Gel; APPLY TO UPPER EXTREMITIES, 2 GM OF GEL TO    AFFECTED AREA 4 TIMES DAILY  DO NOT APPLY MORE THAN 8 GM DAILY TO ANY    ONE AFFECTED JOINT; Therapy: 36SMF0588 to (Last Rx:12Bgn3721) Ordered    Allergies  1  Penicillins   2  Codeine Derivatives   3  Hydrocodone-Acetaminophen TABS    Vitals   Recorded: 24Oct2017 11:06AM   Temperature 98 7 F   Heart Rate 111   Respiration 14   Systolic 998   Diastolic 86   Height 5 ft 8 in   Weight 169 lb    BMI Calculated 25 7   BSA Calculated 1 9     Physical Exam    Constitutional   General appearance: No acute distress, well appearing and well nourished  -- has cervical collar in place     Ears, Nose, Mouth, and Throat mucous membranes moist  Pulmonary   Respiratory effort: No increased work of breathing or signs of respiratory distress  Auscultation of lungs: Clear to auscultation, equal breath sounds bilaterally, no wheezes, no rales, no rhonci  Cardiovascular   Auscultation of heart: Normal rate and rhythm, normal S1 and S2, without murmurs  Abdomen soft, no incisional hernias appreciated  Musculoskeletal   Gait and station: Normal     Psychiatric   Orientation to person, place and time: Normal     Mood and affect: Normal          Future Appointments    Date/Time Provider Specialty Site   01/26/2018 09:00 AM Joanie Josep, [address] [place] [address]   01/24/2018 09:30 AM [facility]   10/30/2017 11:10 AM [facility]   Orthopedic [address] [place] [address]     Signatures   Electronically signed by : Parisa Roberts, [facility]; Oct 24 2017 12:03PM EST                       (Author)    Electronically signed by : SAM Lombardo ; Oct 24 2017  4:04PM EST                       (Co-author)

## 2017-10-30 ENCOUNTER — HOSPITAL ENCOUNTER (OUTPATIENT)
Dept: RADIOLOGY | Facility: HOSPITAL | Age: 57
Discharge: HOME | End: 2017-10-30
Attending: ORTHOPAEDIC SURGERY
Payer: COMMERCIAL

## 2017-10-30 ENCOUNTER — ALLSCRIPTS OFFICE VISIT (OUTPATIENT)
Dept: OTHER | Facility: OTHER | Age: 57
End: 2017-10-30

## 2017-10-30 DIAGNOSIS — Z47.89 ENCOUNTER FOR OTHER ORTHOPEDIC AFTERCARE (CODE): ICD-10-CM

## 2017-10-30 DIAGNOSIS — M50.10 CERVICAL DISC DISORDER WITH RADICULOPATHY OF CERVICAL REGION: ICD-10-CM

## 2017-10-30 DIAGNOSIS — M48.02 SPINAL STENOSIS OF CERVICAL REGION: ICD-10-CM

## 2017-10-30 PROCEDURE — 72040 X-RAY EXAM NECK SPINE 2-3 VW: CPT

## 2017-10-31 NOTE — PROGRESS NOTES
Assessment  1  Cervical spinal stenosis (723 0) (M48 02)   2  Cervical disc disorder with radiculopathy (723 4) (M50 10)   3  Aftercare following surgery (V58 89) (Z48 89)    Plan  Aftercare following surgery    · Follow-up Visit in 4 Weeks Evaluation and Treatment  Follow-up  Status: Hold For -  Scheduling  Requested for: 62VQZ6251   Ordered; For: Aftercare following surgery; Ordered By: Dustin Manning Performed:  Due: 81MJO2240  Cervical disc disorder with radiculopathy    · * XR SPINE CERVICAL 2 OR 3 VW INJURY; Status:Active - Retrospective By Protocol  Authorization; Requested HNN:[id number];    Perform:[facility] Radiology; Order Comments:rm 3; XBY:34INS7620; Last Updated By:Oleg Murray; 10/30/2017 11:09:17 AM;Ordered; For:Cervical disc disorder with radiculopathy; Ordered By:Lorena Singh; Discussion/Summary    Patient is about 2 weeks s/p ACDF C5-7  X-rays in office appear stable  He reports about 90% improvement in pre-op symptoms  Incision is c/d/i, no signs of infection  Continue cervical collar  Follow-up in 4 weeks for re-evaluation and new x-rays  Chief Complaint  1  Neck Pain  Post-op visit      Post-Op  HPI: Patient is a 55year old male who presents for a post-op visit  He is about 2 weeks s/p ACDF C5-7 for DDD and stenosis  He states he feels about 90% improvement in symptoms  Numbness and tingling in the UE is basically gone except for the right thumb  No weakness  No fever/chills, or drainage from the anterior incision  Minimal neck pain     Neck Pain: Robet Gowers presents with complaints of neck pain  Associated symptoms include neck stiffness-- and-- upper extremity paresthesias, but-- no upper extremity weakness  Review of Systems    Constitutional: as noted in HPI,-- no fever-- and-- no chills  Cardiovascular: No complaints of chest pain, no palpitations, no leg claudication or lower extremity edema     Respiratory: No complaints of shortness of breath, no wheezing, no cough  Gastrointestinal: No complaints of abdominal pain, no constipation, no nausea or vomiting, no diarrhea or bloody stools  Genitourinary: no incontinence  Musculoskeletal: arthralgias, but-- as noted in HPI-- and-- no limb pain  Integumentary: No complaints of skin rash or lesion, no itching or dry skin, no skin wounds  Neurological: numbness-- and-- tingling, but-- as noted in HPI  ROS reviewed  Active Problems  1  Acute bronchitis (466 0) (J20 9)   2  Acute upper respiratory infection (465 9) (J06 9)   3  Adenocarcinoma of lung, unspecified laterality (162 9) (C34 90)   4  Aftercare following surgery of the musculoskeletal system (V58 78) (Z47 89)   5  Saucedo's esophagus without dysplasia (530 85) (K22 70)   6  Bicipital tendinitis of right shoulder (726 12) (M75 21)   7  Carpal tunnel syndrome on right (354 0) (G56 01)   8  Cervical disc disorder with radiculopathy (723 4) (M50 10)   9  Cervical spinal stenosis (723 0) (M48 02)   10  Chest congestion (786 9) (R09 89)   11  Chronic GERD (530 81) (K21 9)   12  Chronic neck pain (723 1,338 29) (M54 2,G89 29)   13  Complete tear of rotator cuff, unspecified laterality (727 61) (M75 120)   14  Cough (786 2) (R05)   15  DDD (degenerative disc disease), cervical (722 4) (M50 30)   16  Depression screening (V79 0) (Z13 89)   17  Dyslipidemia (272 4) (E78 5)   18  Encounter for prostate cancer screening (V76 44) (Z12 5)   19  Glenoid labrum tear, right, initial encounter (840 8) (S43 431A)   20  Hernia, hiatal (553 3) (K44 9)   21  History of asthma (V12 69) (Z87 09)   22  Hypercholesterolemia (272 0) (E78 00)   23  Hyperglycemia (790 29) (R73 9)   24  Joint pain (719 40) (M25 50)   25  Lymphadenopathy (785 6) (R59 1)   26  Microscopic hematuria (599 72) (R31 29)   27  Morbid obesity (278 01) (E66 01)   28  Need for influenza vaccination (V04 81) (Z23)   29  Nicotine dependence (305 1) (F17 200)   30  Pain, wrist (719 43) (L04 959)   31  Paraesophageal hernia (553 3) (K44 9)   32  Paresthesias (782 0) (R20 2)   33  Patellofemoral arthralgia of right knee (719 46) (M25 561)   34  Personal history of colonic polyps (V12 72) (Z86 010)   35  Pleural Effusion (511 9)   36  Post Pulmonary Resection (V45 89)   37  Postgastrectomy malabsorption (579 3) (K91 2,Z90 3)   38  Pre-op examination (V72 84) (Z01 818)   39  Preoperative cardiovascular examination (V72 81) (Z01 810)   40  Primary localized osteoarthritis of right knee (715 16) (M17 11)   41  Pulmonary embolism (415 19) (I26 99)   42  Pulmonary nodule (793 11) (R91 1)   43  Right bundle-branch block (426 4) (I45 10)   44  Rupture Of The Distal Bicipital Tendon Of The Left Arm (727 62)   45  Screening for colon cancer (V76 51) (Z12 11)   46  Sinus congestion (478 19) (R09 81)   47  Sprained Left Elbow (841 9)   48  Status post gastric surgery (V45 89) (Z98 890)   49  Tear of right scapholunate ligament, subsequent encounter (V58 89,842 19) (S63 8X1D)   50  Tendinitis of right rotator cuff (726 10) (M75 81)   51  History of Tobacco use (305 1) (Z72 0)    Social History  The social history was reviewed and updated today  Current Meds   1  Atorvastatin Calcium 80 MG Oral Tablet; TAKE 1 TABLET DAILY  Requested for:   59HYY8415; Last Rx:20Oct2016 Ordered   2  Calcium CHEW;   Therapy: (Recorded:24Oct2017) to Recorded   3  Gabapentin 300 MG TABS; TAKE 1 TABLET 3 TIMES DAILY; Therapy: (Recorded:88Yic1626) to Recorded   4  Magnesium 400 MG Oral Tablet; Take 1 tablet daily; Therapy: 50MHL9897 to Recorded   5  Multivitamins CHEW;   Therapy: (SSLMGHFF:30UFD1236) to Recorded   6  Nortriptyline HCl - 10 MG Oral Capsule; Therapy: (Daniel Juares) to Recorded   7  Pantoprazole Sodium 40 MG Oral Tablet Delayed Release; TAKE 1 TABLET TWICE   DAILY 30 MINUTES BEFORE BREAKFAST AND DINNER; Therapy: 00Phi8039 to (Evaluate:55Pup4657)  Requested for: 24YUC8273; Last   Rx:15Ljw0339 Ordered   8   Protein POWD; Therapy: (SJINZBXM:24ZQC2893) to Recorded   9  Rizatriptan Benzoate 5 MG Oral Tablet; Therapy: (77 385 106) to Recorded   10  TraMADol HCl - 50 MG Oral Tablet; Therapy: 03PON9425 to Recorded   11  Ventolin  (90 Base) MCG/ACT Inhalation Aerosol Solution; INHALE 1 PUFF    EVERY 4 HOURS AS NEEDED; Therapy: 75GDK1243 to (Last UN:69KQM3598)  Requested for: 72YBH2509 Ordered   12  Voltaren 1 % Transdermal Gel; APPLY TO UPPER EXTREMITIES, 2 GM OF GEL TO    AFFECTED AREA 4 TIMES DAILY  DO NOT APPLY MORE THAN 8 GM DAILY TO ANY    ONE AFFECTED JOINT; Therapy: 10XZC3957 to (Last Rx:21Oct2016) Ordered    The medication list was reviewed and updated today  Allergies  1  Penicillins   2  Codeine Derivatives   3  Hydrocodone-Acetaminophen TABS    Vitals  Signs   Heart Rate: 97  Systolic: 838  Diastolic: 71  Weight: 596 lb   BMI Calculated: 25 7  BSA Calculated: 1 9    Physical Exam    Constitutional - General appearance: Normal    Musculoskeletal - Gait and station: Normal -- Inspection/palpation of joints, bones, and muscles: Normal -- Muscle strength/tone: Normal    Cardiovascular - Pulses: Normal    Respiratory - Lungs - Clear to auscultation bilaterally, no rales, no rhonci, no wheezes  Abdomen - Abdomen - Soft, nontender, nondistended  Skin - Skin and subcutaneous tissue: Normal    Neurologic - Upper extremity peripheral neuro exam: Abnormal    Free Text - Overall NAD  Gait normal  He is wearing a cervical collar  Incision is c/d/i, no erythema or drainage  No signs of infection  Negative Coleman's sign bilaterally  Strength is 5/5 BL UE, C5-T1  Sensation diminished right thumb, otherwise equal and intact  Attending Note  Attending Note: Level of Participation: I was present in clinic and examined the patient  Patient's History: History of a significant improvement in radicular symptoms status post ACDF He is happy with his progress thus far        Future Appointments    Date/Time Provider Specialty Site   01/26/2018 09:00 AM Ileana Allen, [address] [place] [address]   01/24/2018 09:30 AM Penny Turk [facility] General Surgery [facility]   11/27/2017 12:20 PM Byron Kawasaki, M D   Orthopedic Surgery [address]     Signatures   Electronically signed by : Demetrio Roy [facility]; Oct 30 2017 11:32AM EST                       (Author)    Electronically signed by : SAM Paul ; Oct 30 2017 11:46AM EST                       (Author)

## 2017-11-27 ENCOUNTER — GENERIC CONVERSION - ENCOUNTER (OUTPATIENT)
Dept: OTHER | Facility: OTHER | Age: 57
End: 2017-11-27

## 2017-11-27 ENCOUNTER — HOSPITAL ENCOUNTER (OUTPATIENT)
Dept: RADIOLOGY | Facility: HOSPITAL | Age: 57
Discharge: HOME | End: 2017-11-27
Attending: ORTHOPAEDIC SURGERY
Payer: COMMERCIAL

## 2017-11-27 DIAGNOSIS — M48.02 SPINAL STENOSIS OF CERVICAL REGION: ICD-10-CM

## 2017-11-27 PROCEDURE — 72040 X-RAY EXAM NECK SPINE 2-3 VW: CPT

## 2017-11-29 ENCOUNTER — APPOINTMENT (OUTPATIENT)
Dept: PHYSICAL THERAPY | Facility: CLINIC | Age: 57
End: 2017-11-29
Payer: OTHER MISCELLANEOUS

## 2017-11-29 ENCOUNTER — GENERIC CONVERSION - ENCOUNTER (OUTPATIENT)
Dept: OTHER | Facility: OTHER | Age: 57
End: 2017-11-29

## 2017-11-29 ENCOUNTER — GENERIC CONVERSION - ENCOUNTER (OUTPATIENT)
Dept: OBGYN CLINIC | Facility: OTHER | Age: 57
End: 2017-11-29

## 2017-11-29 PROCEDURE — G8984 CARRY CURRENT STATUS: HCPCS

## 2017-11-29 PROCEDURE — 97162 PT EVAL MOD COMPLEX 30 MIN: CPT

## 2017-11-29 PROCEDURE — 97110 THERAPEUTIC EXERCISES: CPT

## 2017-11-29 PROCEDURE — G8985 CARRY GOAL STATUS: HCPCS

## 2017-11-30 ENCOUNTER — APPOINTMENT (OUTPATIENT)
Dept: PHYSICAL THERAPY | Facility: CLINIC | Age: 57
End: 2017-11-30
Payer: OTHER MISCELLANEOUS

## 2017-11-30 PROCEDURE — 97110 THERAPEUTIC EXERCISES: CPT

## 2017-11-30 PROCEDURE — 97140 MANUAL THERAPY 1/> REGIONS: CPT

## 2017-12-04 ENCOUNTER — APPOINTMENT (OUTPATIENT)
Dept: PHYSICAL THERAPY | Facility: CLINIC | Age: 57
End: 2017-12-04
Payer: OTHER MISCELLANEOUS

## 2017-12-04 PROCEDURE — 97140 MANUAL THERAPY 1/> REGIONS: CPT

## 2017-12-04 PROCEDURE — 97110 THERAPEUTIC EXERCISES: CPT

## 2017-12-05 ENCOUNTER — APPOINTMENT (OUTPATIENT)
Dept: PHYSICAL THERAPY | Facility: CLINIC | Age: 57
End: 2017-12-05
Payer: OTHER MISCELLANEOUS

## 2017-12-05 PROCEDURE — 97140 MANUAL THERAPY 1/> REGIONS: CPT

## 2017-12-05 PROCEDURE — 97110 THERAPEUTIC EXERCISES: CPT

## 2017-12-07 ENCOUNTER — APPOINTMENT (OUTPATIENT)
Dept: PHYSICAL THERAPY | Facility: CLINIC | Age: 57
End: 2017-12-07
Payer: OTHER MISCELLANEOUS

## 2017-12-07 PROCEDURE — 97140 MANUAL THERAPY 1/> REGIONS: CPT

## 2017-12-07 PROCEDURE — 97110 THERAPEUTIC EXERCISES: CPT

## 2017-12-11 ENCOUNTER — APPOINTMENT (OUTPATIENT)
Dept: PHYSICAL THERAPY | Facility: CLINIC | Age: 57
End: 2017-12-11
Payer: OTHER MISCELLANEOUS

## 2017-12-11 PROCEDURE — 97140 MANUAL THERAPY 1/> REGIONS: CPT

## 2017-12-11 PROCEDURE — 97110 THERAPEUTIC EXERCISES: CPT

## 2017-12-12 ENCOUNTER — APPOINTMENT (OUTPATIENT)
Dept: PHYSICAL THERAPY | Facility: CLINIC | Age: 57
End: 2017-12-12
Payer: OTHER MISCELLANEOUS

## 2017-12-12 PROCEDURE — 97110 THERAPEUTIC EXERCISES: CPT

## 2017-12-12 PROCEDURE — 97140 MANUAL THERAPY 1/> REGIONS: CPT

## 2017-12-14 ENCOUNTER — APPOINTMENT (OUTPATIENT)
Dept: PHYSICAL THERAPY | Facility: CLINIC | Age: 57
End: 2017-12-14
Payer: OTHER MISCELLANEOUS

## 2017-12-14 ENCOUNTER — GENERIC CONVERSION - ENCOUNTER (OUTPATIENT)
Dept: OTHER | Facility: OTHER | Age: 57
End: 2017-12-14

## 2017-12-14 PROCEDURE — 97110 THERAPEUTIC EXERCISES: CPT

## 2017-12-14 PROCEDURE — 97140 MANUAL THERAPY 1/> REGIONS: CPT

## 2017-12-18 ENCOUNTER — APPOINTMENT (OUTPATIENT)
Dept: PHYSICAL THERAPY | Facility: CLINIC | Age: 57
End: 2017-12-18
Payer: OTHER MISCELLANEOUS

## 2017-12-21 ENCOUNTER — APPOINTMENT (OUTPATIENT)
Dept: PHYSICAL THERAPY | Facility: CLINIC | Age: 57
End: 2017-12-21
Payer: OTHER MISCELLANEOUS

## 2017-12-21 PROCEDURE — 97110 THERAPEUTIC EXERCISES: CPT

## 2017-12-21 PROCEDURE — 97140 MANUAL THERAPY 1/> REGIONS: CPT

## 2017-12-26 ENCOUNTER — APPOINTMENT (OUTPATIENT)
Dept: PHYSICAL THERAPY | Facility: CLINIC | Age: 57
End: 2017-12-26
Payer: OTHER MISCELLANEOUS

## 2017-12-26 PROCEDURE — 97140 MANUAL THERAPY 1/> REGIONS: CPT

## 2017-12-26 PROCEDURE — G8991 OTHER PT/OT GOAL STATUS: HCPCS

## 2017-12-26 PROCEDURE — 97110 THERAPEUTIC EXERCISES: CPT

## 2017-12-26 PROCEDURE — 97164 PT RE-EVAL EST PLAN CARE: CPT

## 2017-12-26 PROCEDURE — G8990 OTHER PT/OT CURRENT STATUS: HCPCS

## 2017-12-27 ENCOUNTER — APPOINTMENT (OUTPATIENT)
Dept: PHYSICAL THERAPY | Facility: CLINIC | Age: 57
End: 2017-12-27
Payer: OTHER MISCELLANEOUS

## 2017-12-27 PROCEDURE — 97110 THERAPEUTIC EXERCISES: CPT

## 2017-12-27 PROCEDURE — 97140 MANUAL THERAPY 1/> REGIONS: CPT

## 2017-12-29 ENCOUNTER — APPOINTMENT (OUTPATIENT)
Dept: PHYSICAL THERAPY | Facility: CLINIC | Age: 57
End: 2017-12-29
Payer: OTHER MISCELLANEOUS

## 2017-12-29 PROCEDURE — 97110 THERAPEUTIC EXERCISES: CPT

## 2017-12-29 PROCEDURE — 97140 MANUAL THERAPY 1/> REGIONS: CPT

## 2018-01-03 ENCOUNTER — APPOINTMENT (OUTPATIENT)
Dept: PHYSICAL THERAPY | Facility: CLINIC | Age: 58
End: 2018-01-03
Payer: OTHER MISCELLANEOUS

## 2018-01-03 PROCEDURE — 97110 THERAPEUTIC EXERCISES: CPT

## 2018-01-03 PROCEDURE — 97140 MANUAL THERAPY 1/> REGIONS: CPT

## 2018-01-08 ENCOUNTER — HOSPITAL ENCOUNTER (OUTPATIENT)
Dept: RADIOLOGY | Facility: HOSPITAL | Age: 58
Discharge: HOME | End: 2018-01-08
Attending: ORTHOPAEDIC SURGERY
Payer: COMMERCIAL

## 2018-01-08 ENCOUNTER — ALLSCRIPTS OFFICE VISIT (OUTPATIENT)
Dept: OTHER | Facility: OTHER | Age: 58
End: 2018-01-08

## 2018-01-08 DIAGNOSIS — M48.02 SPINAL STENOSIS OF CERVICAL REGION: ICD-10-CM

## 2018-01-08 PROCEDURE — 72040 X-RAY EXAM NECK SPINE 2-3 VW: CPT

## 2018-01-09 ENCOUNTER — APPOINTMENT (OUTPATIENT)
Dept: PHYSICAL THERAPY | Facility: CLINIC | Age: 58
End: 2018-01-09
Payer: OTHER MISCELLANEOUS

## 2018-01-09 NOTE — PROGRESS NOTES
Assessment   1  Aftercare following surgery (V58 89) (Z48 89)   2  Cervical spinal stenosis (723 0) (M48 02)      Overall doing well status post ACDF C5-7  Patient is happy with his progress thus far  Plan   Cervical spinal stenosis    · * XR SPINE CERVICAL 2 OR 3 VW INJURY; Status:Active - Retrospective By Protocol    Authorization; Requested WKZ:[id number]; Perform:[facility] Radiology; Order Comments:rm 2; QFH:76IOP9831; Last Updated By:Ari Murray; 1/8/2018 11:13:24 AM;Ordered; For:Cervical spinal stenosis; Ordered By:Lorena Singh;   · Follow-up visit in 3 months Evaluation and Treatment  Follow-up  Status: Hold For -    Scheduling  Requested for: 21BNE8876   Ordered; For: Cervical spinal stenosis; Ordered By: Lendel Carrel Performed:  Due: 23BKI2122      Continue with therapy home exercise program   Follow-up in 3 months for re-evaluation and new x-rays       Discussion/Summary   Doing well status post ACDF C5-7  Chief Complaint   Postop visit      Post-Op   HPI: Patient is roughly 2-1/2 months status post ACDF C5-7  He reports minimal discomfort to his neck  Overall he no significant improvement in bilateral upper extremity symptoms with mild residual numbness over the right thumb  He is happy with his progress thus far  Review of Systems        Constitutional: No fever or chills, feels well, no tiredness, no recent weight loss or weight gain  Eyes: No complaints of red eyes, no eyesight problems  ENT: no complaints of loss of hearing, no nosebleeds, no sore throat  Cardiovascular: No complaints of chest pain, no palpitations, no leg claudication or lower extremity edema  Respiratory: No complaints of shortness of breath, no wheezing, no cough  Gastrointestinal: No complaints of abdominal pain, no constipation, no nausea or vomiting, no diarrhea or bloody stools  Genitourinary: No complaints of dysuria or incontinence, no hesitancy, no nocturia  Musculoskeletal: No complaints of arthralgia, no myalgia, no joint swelling or stiffness, no limb pain or swelling  Neurological: numbness, but-- as noted in HPI  Psychiatric: No suicidal thoughts, no anxiety, no depression  Endocrine: No muscle weakness, no frequent urination, no excessive thirst, no feelings of weakness  Active Problems   1  Acute bronchitis (466 0) (J20 9)   2  Acute upper respiratory infection (465 9) (J06 9)   3  Adenocarcinoma of lung, unspecified laterality (162 9) (C34 90)   4  Aftercare following surgery (V58 89) (Z48 89)   5  Aftercare following surgery of the musculoskeletal system (V58 78) (Z47 89)   6  Saucedo's esophagus without dysplasia (530 85) (K22 70)   7  Bicipital tendinitis of right shoulder (726 12) (M75 21)   8  Carpal tunnel syndrome on right (354 0) (G56 01)   9  Cervical disc disorder with radiculopathy (723 4) (M50 10)   10  Cervical spinal stenosis (723 0) (M48 02)   11  Chest congestion (786 9) (R09 89)   12  Chronic GERD (530 81) (K21 9)   13  Chronic neck pain (723 1,338 29) (M54 2,G89 29)   14  Complete tear of rotator cuff, unspecified laterality (727 61) (M75 120)   15  Cough (786 2) (R05)   16  DDD (degenerative disc disease), cervical (722 4) (M50 30)   17  Depression screening (V79 0) (Z13 89)   18  Dyslipidemia (272 4) (E78 5)   19  Encounter for prostate cancer screening (V76 44) (Z12 5)   20  Glenoid labrum tear, right, initial encounter (840 8) (S43 431A)   21  Hernia, hiatal (553 3) (K44 9)   22  History of asthma (V12 69) (Z87 09)   23  Hypercholesterolemia (272 0) (E78 00)   24  Hyperglycemia (790 29) (R73 9)   25  Joint pain (719 40) (M25 50)   26  Lymphadenopathy (785 6) (R59 1)   27  Microscopic hematuria (599 72) (R31 29)   28  Morbid obesity (278 01) (E66 01)   29  Need for influenza vaccination (V04 81) (Z23)   30  Nicotine dependence (305 1) (F17 200)   31  Pain, wrist (719 43) (M25 539)   32   Paraesophageal hernia (553 3) (K44 9)   33  Paresthesias (782 0) (R20 2)   34  Patellofemoral arthralgia of right knee (719 46) (M25 561)   35  Personal history of colonic polyps (V12 72) (Z86 010)   36  Pleural Effusion (511 9)   37  Post Pulmonary Resection (V45 89)   38  Postgastrectomy malabsorption (579 3) (K91 2,Z90 3)   39  Pre-op examination (V72 84) (Z01 818)   40  Preoperative cardiovascular examination (V72 81) (Z01 810)   41  Primary localized osteoarthritis of right knee (715 16) (M17 11)   42  Pulmonary embolism (415 19) (I26 99)   43  Pulmonary nodule (793 11) (R91 1)   44  Right bundle-branch block (426 4) (I45 10)   45  Rupture Of The Distal Bicipital Tendon Of The Left Arm (727 62)   46  Screening for colon cancer (V76 51) (Z12 11)   47  Sinus congestion (478 19) (R09 81)   48  Sprained Left Elbow (841 9)   49  Status post gastric surgery (V45 89) (Z98 890)   50  Tear of right scapholunate ligament, subsequent encounter (V58 89,842 19) (S63 8X1D)   51  Tendinitis of right rotator cuff (726 10) (M75 81)   52  History of Tobacco use (305 1) (Z72 0)    Current Meds    1  Atorvastatin Calcium 80 MG Oral Tablet; TAKE 1 TABLET DAILY  Requested for:     17PMA9651; Last Rx:11Zbc4053 Ordered   2  Calcium CHEW;     Therapy: (Recorded:41Dss3654) to Recorded   3  Gabapentin 300 MG TABS; TAKE 1 TABLET 3 TIMES DAILY; Therapy: (Recorded:90Htf9196) to Recorded   4  Magnesium 400 MG Oral Tablet; Take 1 tablet daily; Therapy: 93PRD3915 to Recorded   5  Multivitamins CHEW;     Therapy: (ZXSEEEBX:66RIR3649) to Recorded   6  Nortriptyline HCl - 10 MG Oral Capsule; Therapy: (77 385 106) to Recorded   7  Pantoprazole Sodium 40 MG Oral Tablet Delayed Release (Protonix); TAKE 1 TABLET     TWICE DAILY 30 MINUTES BEFORE BREAKFAST AND DINNER; Therapy: 09Own5078 to (Evaluate:35Xbh5763)  Requested for: 72NFB1531; Last     Rx:04Jct4712 Ordered   8  Protein POWD;     Therapy: ([facility]65ROF9628) to Recorded   9   Rizatriptan Benzoate 5 MG Oral Tablet; Therapy: (Crystal Young) to Recorded   10  TraMADol HCl - 50 MG Oral Tablet; Therapy: 98LFN8881 to Recorded   11  Ventolin  (90 Base) MCG/ACT Inhalation Aerosol Solution; INHALE 1 PUFF      EVERY 4 HOURS AS NEEDED; Therapy: 04EAV1038 to (Last OB:71NHC6450)  Requested for: 55PGT2487 Ordered   12  Voltaren 1 % Transdermal Gel (Diclofenac Sodium); APPLY TO UPPER EXTREMITIES, 2      GM OF GEL TO AFFECTED AREA 4 TIMES DAILY  DO NOT APPLY MORE      THAN 8 GM DAILY TO ANY ONE AFFECTED JOINT; Therapy: 64EGZ0806 to (Last Rx:21Oct2016) Ordered    Allergies   1  Penicillins   2  Codeine Derivatives   3  Hydrocodone-Acetaminophen TABS    Vitals   Signs   Heart Rate: 83  Systolic: 570  Diastolic: 79  Weight: 799 lb   BMI Calculated: 25 7  BSA Calculated: 1 9    Physical Exam   Anterior cervical incision is well healed  Gait is normal  5/5 strength C5 through T1 bilaterally  Results/Data   I personally reviewed the films/images/results in the office today  My interpretation follows  X-ray Review Cervical spine x-ray demonstrates stable instrumentation C5-7        Future Appointments      Date/Time Provider Specialty Site   01/26/2018 01:00 PM Michel Roth, [address] [place] [address]   02/08/2018 11:00 AM Poncho Adame, [facility] General Surgery [place] [address] [facility]     Signatures    Electronically signed by : SAM Enamorado ; Jan 8 2018 11:42AM EST                       (Author)

## 2018-01-10 NOTE — MISCELLANEOUS
Chief Complaint  Chief Complaint Free Text Note Form: Pt was admitted due to scheduled surgery  Will be following up with Surgeon      History of Present Illness  TCM Communication [facility] [place]: He was hospitalized at [facility]  The date of admission: 10/17/2017, date of discharge: 10/18/2017  Diagnosis: cervical spinal stenosis, DDD, cervical radiculopathy, ACDF C5-C7  He was discharged to home  He did not schedule a follow up appointment  Communication performed and completed by      Active Problems     1  Acute bronchitis (466 0) (J20 9)   2  Acute upper respiratory infection (465 9) (J06 9)   3  Adenocarcinoma of lung, unspecified laterality (162 9) (C34 90)   4  Aftercare following surgery of the musculoskeletal system (V58 78) (Z47 89)   5  Bicipital tendinitis of right shoulder (726 12) (M75 21)   6  Carpal tunnel syndrome on right (354 0) (G56 01)   7  Chest congestion (786 9) (R09 89)   8  Chronic neck pain (723 1,338 29) (M54 2,G89 29)   9  Complete tear of rotator cuff, unspecified laterality (727 61) (M75 120)   10  Cough (786 2) (R05)   11  DDD (degenerative disc disease), cervical (722 4) (M50 30)   12  Depression screening (V79 0) (Z13 89)   13  Encounter for prostate cancer screening (V76 44) (Z12 5)   14  Glenoid labrum tear, right, initial encounter (840 8) (S43 431A)   15  Hernia, hiatal (553 3) (K44 9)   16  History of asthma (V12 69) (Z87 09)   17  Hypercholesterolemia (272 0) (E78 00)   18  Hyperglycemia (790 29) (R73 9)   19  Joint pain (719 40) (M25 50)   20  Lymphadenopathy (785 6) (R59 1)   21  Microscopic hematuria (599 72) (R31 29)   22  Morbid obesity (278 01) (E66 01)   23  Need for influenza vaccination (V04 81) (Z23)   24  Nicotine dependence (305 1) (F17 200)   25  Pain, wrist (719 43) (M25 539)   26  Paresthesias (782 0) (R20 2)   27  Patellofemoral arthralgia of right knee (719 46) (M25 561)   28  Personal history of colonic polyps (V12 72) (H33 330)   29   Pleural Effusion (511 9)   30  Post Pulmonary Resection (V45 89)   31  Pre-op examination (V72 84) (Z01 818)   32  Preoperative cardiovascular examination (V72 81) (Z01 810)   33  Primary localized osteoarthritis of right knee (715 16) (M17 11)   34  Pulmonary embolism (415 19) (I26 99)   35  Pulmonary nodule (793 11) (R91 1)   36  Right bundle-branch block (426 4) (I45 10)   37  Rupture Of The Distal Bicipital Tendon Of The Left Arm (727 62)   38  Screening for colon cancer (V76 51) (Z12 11)   39  Sinus congestion (478 19) (R09 81)   40  Sprained Left Elbow (841 9)   41  Tear of right scapholunate ligament, subsequent encounter (V58 89,842 19) (S63 8X1D)   42  Tendinitis of right rotator cuff (726 10) (M75 81)    Dyslipidemia (272 4) (E78 4)       Tobacco use (305 1) (Z72 0)       Saucedo's esophagus without dysplasia (530 85) (K22 70)       Paraesophageal hernia (553 3) (K44 9)       Cervical disc disorder with radiculopathy (723 4) (M50 10)       Cervical spinal stenosis (723 0) (M48 02)       Chronic GERD (530 81) (K21 9)          Past Medical History    1  History of Borderline glaucoma (365 00) (H40 009)   2  History of asthma (V12 69) (Z87 09)   3  History of gastroesophageal reflux (GERD) (V12 79) (Z87 19)   4  History of hyperlipidemia (V12 29) (Z86 39)   5  History of malignant neoplasm (V10 90) (Z85 9)   6  History of ulceration (V13 89) (Z87 898)   7  Pneumonia (486) (J18 9)   8  History of Pulmonary Embolism (V12 55)    Surgical History    1  History of Femur Repair   2  History of Flexible Bronchoscopy (Diagnostic)   3  History of Hernia Repair   4  History of Jaw Surgery   5  History of Knee Arthroscopy (Therapeutic)   6  History of Lymphadenectomy   7  History of Mediastinoscopy   8  History of Shoulder Repair   9  History of Thoracoscopy (Therapeutic) W/ Lobectomy   10  History of Tonsillectomy   11  History of Wrist Surgery    Family History  Mother    1  Family history of Back disorder   2   Family history of Diabetes Mellitus (V18 0)   3  Family history of hypertension (V17 49) (Z82 49)  Father    4  Family history of cardiac disorder (V17 49) (Z82 49)   5  Family history of hypertension (V17 49) (Z82 49)  Sister    10  Family history of Breast Cancer (V16 3)   7  Family history of Skin Cancer (V16 8)  Paternal Grandmother    6  Family history of Breast Cancer (V16 3)   9  Family history of Skin Cancer (V16 8)    Social History     · Being A Social Drinker   · Denied: History of Current some day smoker   · Denied: History of Drug Use   · Former smoker (V15 82) (P42 788)    Tobacco use (305 1) (Z72 0)          Current Meds   1  Aspirin Low Dose 81 MG TABS; TAKE 1 TABLET DAILY; Therapy: (Recorded:01Mim4777) to Recorded   2  Atorvastatin Calcium 80 MG Oral Tablet; TAKE 1 TABLET DAILY  Requested for:   36LAO2028; Last Rx:74Lkl1555 Ordered   3  Gabapentin 300 MG TABS; TAKE 1 TABLET 3 TIMES DAILY; Therapy: (Recorded:19Zny3237) to Recorded   4  Magnesium 400 MG Oral Tablet; Take 1 tablet daily; Therapy: 30ETQ2217 to Recorded   5  Nortriptyline HCl - 10 MG Oral Capsule; Therapy: (Shelbie Glow) to Recorded   6  Pantoprazole Sodium 40 MG Oral Tablet Delayed Release; TAKE 1 TABLET TWICE DAILY   30 MINUTES BEFORE BREAKFAST AND DINNER; Therapy: 82Nbz0457 to (Evaluate:29Zuk6806)  Requested for: 31SOP3702; Last   Rx:46Rxs9898 Ordered   7  [address] [facility]; Therapy: (Recorded:12Jun2017) to Recorded   8  RaNITidine HCl - 150 MG Oral Tablet; TAKE 1 TABLET AT BEDTIME; Therapy: 80FJZ0710 to (Last Rx:19Oxl2234)  Requested for: 10IDA8689 Ordered   9  Rizatriptan Benzoate 5 MG Oral Tablet; Therapy: (Shelbie Glow) to Recorded   10  TraMADol HCl - 50 MG Oral Tablet; Therapy: 42LCA6108 to Recorded   11  Ventolin  (90 Base) MCG/ACT Inhalation Aerosol Solution; INHALE 1 PUFF    EVERY 4 HOURS AS NEEDED; Therapy: 39YAT5125 to (Last EX:66HYX9565)  Requested for: 30LLO5644 Ordered   12   Voltaren 1 % Transdermal Gel; APPLY TO UPPER EXTREMITIES, 2 GM OF GEL TO    AFFECTED AREA 4 TIMES DAILY  DO NOT APPLY MORE THAN 8 GM DAILY TO ANY    ONE AFFECTED JOINT; Therapy: 56WSZ8490 to (Last Rx:21Oct2016) Ordered    Allergies    1  Penicillins   2  Codeine Derivatives   3  Hydrocodone-Acetaminophen TABS    Health Management  History of gastroesophageal reflux (GERD)   EGD; every 3 years; Last 43UIW5044; Next Due: 19ZDG1893; Active  Screening for colon cancer   COLONOSCOPY; every 5 years; Last 58Cxo2892; Next Due: 79Pud0647; Active    Message   Recorded as Task   Date: 10/18/2017 01:12 PM, Created By: System   Task Name: [facility]   Assigned To: Johan Dixon   Regarding Patient: Chip Hampton, Status: In Progress   Comment:    System - 18 Oct 2017 1:12 PM     Patient discharged from hospital   Patient Name: Julio Puentes  Patient YOB: 1960  Discharge Date: 10/18/2017  Facility: [facility] 20 Oct 2017 9:20 AM     TASK IN PROGRESS     Future Appointments    Date/Time Provider Specialty Site   01/26/2018 09:00 AM Timoteo Llanes, [facility]   01/24/2018 09:30 AM Calin Mclean, [facility] Surgery [facility]   11/27/2017 12:20 PM SAM Rice   Orthopedic Surgery [address]     Signatures   Electronically signed by : Dudley Neil, ; Oct 20 2017  9:29AM EST                       (Author)    Electronically signed by : Buffy Freitas DO; Nov 14 2017  1:02PM EST                       (Author)

## 2018-01-11 ENCOUNTER — APPOINTMENT (OUTPATIENT)
Dept: PHYSICAL THERAPY | Facility: CLINIC | Age: 58
End: 2018-01-11
Payer: OTHER MISCELLANEOUS

## 2018-01-11 NOTE — RESULT NOTES
Verified Results  (1) TISSUE EXAM 14Apr2017 08:44AM Nirmala Willingham     Test Name Result Flag Reference   LAB AP CASE REPORT (Report)     Surgical Pathology Report             Case: [id number]                   Authorizing Provider: Linda England MD      Collected:      04/14/2017 0844        Ordering Location:   [facility] Received:      04/14/2017 1100                    Operating Room                                 Pathologist:      Caleb Block MD                                 Specimens:  A) - Large Intestine, Sigmoid Colon, Sigmoid polyp, retrieved with cold snare             B) - Rectum, Rectal polyp, retrieved with cold snare   LAB AP FINAL DIAGNOSIS (Report)     A  Sigmoid colon polyp (polypectomy):  - Tubular adenoma  - No high grade dysplasia    B  Rectal polyp (polypectomy):  - Hyperplastic polyp  - Negative for dysplasia     Interpretation performed at , [address]      Electronically signed by Caleb Block MD on 4/17/2017 at 1:58 PM   LAB AP SURGICAL ADDITIONAL INFORMATION (Report)     These tests were developed and their performance characteristics   determined by [facility] or [facility]  They may not be cleared or approved by the [place]  Food and   Drug Administration  The FDA has determined that such clearance or   approval is not necessary  These tests are used for clinical purposes  They should not be regarded as investigational or for research  This   laboratory has been approved by CLIA 88, designated as a high-complexity   laboratory and is qualified to perform these tests  LAB AP GROSS DESCRIPTION (Report)     A  The specimen is received in formalin, labeled with the patient's name   and medical record number, and is designated sigmoid polyp  The   specimen consists of a tan soft tissue fragment measuring 0 5 cm in   greatest dimension  Entirely submitted  One cassette  B   The specimen is received in formalin, labelAed with the patient's name   and medical record number, and is designated Rectal polyp  The specimen   consists of a tan soft tissue fragment measuring 0 3 cm in greatest   dimension  Entirely submitted  One cassette  Note: The estimated total formalin fixation time based upon information   provided by the submitting clinician and the standard processing schedule   is 17 5 hours      [address]

## 2018-01-12 ENCOUNTER — APPOINTMENT (OUTPATIENT)
Dept: PHYSICAL THERAPY | Facility: CLINIC | Age: 58
End: 2018-01-12
Payer: OTHER MISCELLANEOUS

## 2018-01-12 VITALS
WEIGHT: 213 LBS | RESPIRATION RATE: 18 BRPM | HEIGHT: 68 IN | HEART RATE: 84 BPM | BODY MASS INDEX: 32.28 KG/M2 | TEMPERATURE: 98.8 F | SYSTOLIC BLOOD PRESSURE: 142 MMHG | DIASTOLIC BLOOD PRESSURE: 80 MMHG

## 2018-01-12 VITALS
BODY MASS INDEX: 25.61 KG/M2 | DIASTOLIC BLOOD PRESSURE: 86 MMHG | WEIGHT: 169 LBS | HEIGHT: 68 IN | SYSTOLIC BLOOD PRESSURE: 110 MMHG | TEMPERATURE: 98.7 F | HEART RATE: 111 BPM | RESPIRATION RATE: 14 BRPM

## 2018-01-12 VITALS
SYSTOLIC BLOOD PRESSURE: 137 MMHG | BODY MASS INDEX: 27.67 KG/M2 | DIASTOLIC BLOOD PRESSURE: 83 MMHG | HEART RATE: 92 BPM | WEIGHT: 182 LBS

## 2018-01-12 VITALS
SYSTOLIC BLOOD PRESSURE: 130 MMHG | HEART RATE: 88 BPM | DIASTOLIC BLOOD PRESSURE: 72 MMHG | TEMPERATURE: 98.3 F | HEIGHT: 68 IN | BODY MASS INDEX: 34.1 KG/M2 | WEIGHT: 225 LBS

## 2018-01-12 VITALS
HEIGHT: 69 IN | HEART RATE: 94 BPM | DIASTOLIC BLOOD PRESSURE: 87 MMHG | WEIGHT: 218 LBS | BODY MASS INDEX: 32.29 KG/M2 | SYSTOLIC BLOOD PRESSURE: 132 MMHG

## 2018-01-12 VITALS
HEIGHT: 69 IN | DIASTOLIC BLOOD PRESSURE: 89 MMHG | OXYGEN SATURATION: 96 % | HEART RATE: 85 BPM | BODY MASS INDEX: 33.1 KG/M2 | WEIGHT: 223.5 LBS | TEMPERATURE: 97.8 F | SYSTOLIC BLOOD PRESSURE: 151 MMHG

## 2018-01-12 VITALS
DIASTOLIC BLOOD PRESSURE: 85 MMHG | WEIGHT: 218 LBS | BODY MASS INDEX: 32.29 KG/M2 | HEART RATE: 88 BPM | TEMPERATURE: 98 F | SYSTOLIC BLOOD PRESSURE: 158 MMHG | OXYGEN SATURATION: 94 % | HEIGHT: 69 IN

## 2018-01-12 VITALS — HEIGHT: 68 IN | WEIGHT: 194 LBS | BODY MASS INDEX: 29.4 KG/M2

## 2018-01-13 VITALS
HEIGHT: 68 IN | RESPIRATION RATE: 20 BRPM | SYSTOLIC BLOOD PRESSURE: 142 MMHG | TEMPERATURE: 98.4 F | DIASTOLIC BLOOD PRESSURE: 84 MMHG | WEIGHT: 217.38 LBS | BODY MASS INDEX: 32.81 KG/M2 | SYSTOLIC BLOOD PRESSURE: 156 MMHG | HEIGHT: 69 IN | WEIGHT: 216.5 LBS | DIASTOLIC BLOOD PRESSURE: 88 MMHG | HEART RATE: 86 BPM | BODY MASS INDEX: 32.2 KG/M2 | TEMPERATURE: 97.6 F

## 2018-01-13 VITALS
RESPIRATION RATE: 22 BRPM | SYSTOLIC BLOOD PRESSURE: 132 MMHG | DIASTOLIC BLOOD PRESSURE: 80 MMHG | BODY MASS INDEX: 34.4 KG/M2 | HEART RATE: 88 BPM | WEIGHT: 227 LBS | HEIGHT: 68 IN | TEMPERATURE: 97.2 F

## 2018-01-13 VITALS
OXYGEN SATURATION: 95 % | WEIGHT: 227 LBS | TEMPERATURE: 97.2 F | SYSTOLIC BLOOD PRESSURE: 151 MMHG | BODY MASS INDEX: 34.51 KG/M2 | HEART RATE: 86 BPM | DIASTOLIC BLOOD PRESSURE: 78 MMHG

## 2018-01-13 VITALS
HEART RATE: 80 BPM | WEIGHT: 218 LBS | BODY MASS INDEX: 32.19 KG/M2 | OXYGEN SATURATION: 95 % | TEMPERATURE: 98.3 F | DIASTOLIC BLOOD PRESSURE: 84 MMHG | SYSTOLIC BLOOD PRESSURE: 149 MMHG

## 2018-01-13 VITALS
WEIGHT: 220 LBS | SYSTOLIC BLOOD PRESSURE: 122 MMHG | BODY MASS INDEX: 33.34 KG/M2 | DIASTOLIC BLOOD PRESSURE: 88 MMHG | HEIGHT: 68 IN | HEART RATE: 99 BPM

## 2018-01-13 NOTE — RESULT NOTES
Verified Results  (1) TISSUE EXAM [id number] 02:43PM Paitto Mccray     Test Name Result Flag Reference   LAB AP CASE REPORT (Report)     Surgical Pathology Report             Case: [id number]                   Authorizing Provider: Fátima Faye MD      Collected:      01/11/2017 1443        Ordering Location:   [address] [place] [address]   Received:      01/11/2017 [address] [facility]                               Pathologist:      Christy Pearson MD                                Specimen:  Esophagus, R/O Saucedo's esophagus   LAB AP FINAL DIAGNOSIS (Report)     A  Esophagus, R/O Saucedo's esophagus biopsy:  -Benign squamous and cardiac type mucosa with many Goblet cell metaplasia   consistent with Saucedo???s esophagus identified   -Mild chronic inactive gastritis with reactive epithelial changes   -No evidence of glandular dysplasia or malignancy  Electronically signed by Christy Pearson MD on 1/13/2017 at 11:53 AM   LAB AP NOTE      Interpretation performed at [facility], [address] [place] [address], [facility]   LAB AP SURGICAL ADDITIONAL INFORMATION (Report)     These tests were developed and their performance characteristics   determined by [facility] or [facility]  They may not be cleared or approved by the [place]  Food and   Drug Administration  The FDA has determined that such clearance or   approval is not necessary  These tests are used for clinical purposes  They should not be regarded as investigational or for research  This   laboratory has been approved by CLIA 88, designated as a high-complexity   laboratory and is qualified to perform these tests  LAB AP GROSS DESCRIPTION (Report)     A  The specimen is received in formalin, labeled with the patient's name   and hospital number, and is designated esophagus rule out Saucedo's   esophagus   The specimen consists of multiple tan-pink red soft tissue   fragments measuring in aggregate 0 5 x 0 3 x 0 1 cm  Entirely submitted  One cassette  Note: The estimated total formalin fixation time based upon information   provided by the submitting clinician and the standard processing schedule   is 13 75 hours      [facility]

## 2018-01-13 NOTE — MISCELLANEOUS
Chief Complaint  Chief Complaint Free Text Note Form: PT HAD BY-PASS SURGERY, PLANNED      History of Present Illness  TCM Communication [facility]: The patient is being contacted for follow-up after hospitalization  Hospital records were not available  He was hospitalized at [facility]  The date of discharge: 7/14/2017  He was discharged to home  Medications were not reviewed today  He did not schedule a follow up appointment  The patient is currently asymptomatic  Counseling was provided to the patient  Communication performed and completed by      Active Problems    1  Acute bronchitis (466 0) (J20 9)   2  Acute upper respiratory infection (465 9) (J06 9)   3  Adenocarcinoma of lung, unspecified laterality (162 9) (C34 90)   4  Aftercare following surgery of the musculoskeletal system (V58 78) (Z47 89)   5  Saucedo's esophagus without dysplasia (530 85) (K22 70)   6  Bicipital tendinitis of right shoulder (726 12) (M75 21)   7  Carpal tunnel syndrome on right (354 0) (G56 01)   8  Cervical disc disorder with radiculopathy (723 4) (M50 10)   9  Cervical spinal stenosis (723 0) (M48 02)   10  Chest congestion (786 9) (R09 89)   11  Chronic GERD (530 81) (K21 9)   12  Complete tear of rotator cuff, unspecified laterality (727 61) (M75 120)   13  Cough (786 2) (R05)   14  DDD (degenerative disc disease), cervical (722 4) (M50 30)   15  Depression screening (V79 0) (Z13 89)   16  Dyslipidemia (272 4) (E78 5)   17  Encounter for prostate cancer screening (V76 44) (Z12 5)   18  Glenoid labrum tear, right, initial encounter (840 8) (S43 431A)   19  Hernia, hiatal (553 3) (K44 9)   20  History of asthma (V12 69) (Z87 09)   21  Hypercholesterolemia (272 0) (E78 00)   22  Hyperglycemia (790 29) (R73 9)   23  Joint pain (719 40) (M25 50)   24  Lymphadenopathy (785 6) (R59 1)   25  Microscopic hematuria (599 72) (R31 29)   26  Morbid obesity (278 01) (E66 01)   27  Need for influenza vaccination (V04 81) (Z23)   28   Nicotine dependence (305 1) (F17 200)   29  Pain, wrist (719 43) (M25 539)   30  Paraesophageal hernia (553 3) (K44 9)   31  Paresthesias (782 0) (R20 2)   32  Patellofemoral arthralgia of right knee (719 46) (M25 561)   33  Personal history of colonic polyps (V12 72) (Z86 010)   34  Pleural Effusion (511 9)   35  Post Pulmonary Resection (V45 89)   36  Pre-op examination (V72 84) (Z01 818)   37  Preoperative cardiovascular examination (V72 81) (Z01 810)   38  Primary localized osteoarthritis of right knee (715 16) (M17 11)   39  Pulmonary embolism (415 19) (I26 99)   40  Pulmonary nodule (793 11) (R91 1)   41  Right bundle-branch block (426 4) (I45 10)   42  Rupture Of The Distal Bicipital Tendon Of The Left Arm (727 62)   43  Screening for colon cancer (V76 51) (Z12 11)   44  Sinus congestion (478 19) (R09 81)   45  Sprained Left Elbow (841 9)   46  Tear of right scapholunate ligament, subsequent encounter (V58 89,842 19) (S63 8X1D)   47  Tendinitis of right rotator cuff (726 10) (M75 81)   48  Tobacco use (305 1) (Z72 0)    Past Medical History    1  History of Borderline glaucoma (365 00) (H40 009)   2  History of asthma (V12 69) (Z87 09)   3  History of gastroesophageal reflux (GERD) (V12 79) (Z87 19)   4  History of hyperlipidemia (V12 29) (Z86 39)   5  History of malignant neoplasm (V10 90) (Z85 9)   6  History of ulceration (V13 89) (Z87 898)   7  Pneumonia (486) (J18 9)   8  History of Pulmonary Embolism (V12 55)    Surgical History    1  History of Femur Repair   2  History of Flexible Bronchoscopy (Diagnostic)   3  History of Hernia Repair   4  History of Jaw Surgery   5  History of Knee Arthroscopy (Therapeutic)   6  History of Lymphadenectomy   7  History of Mediastinoscopy   8  History of Shoulder Repair   9  History of Thoracoscopy (Therapeutic) W/ Lobectomy   10  History of Tonsillectomy   11  History of Wrist Surgery    Family History  Mother    1  Family history of Back disorder   2   Family history of Diabetes Mellitus (V18 0)   3  Family history of hypertension (V17 49) (Z82 49)  Father    4  Family history of cardiac disorder (V17 49) (Z82 49)   5  Family history of hypertension (V17 49) (Z82 49)  Sister    10  Family history of Breast Cancer (V16 3)   7  Family history of Skin Cancer (V16 8)  Paternal Grandmother    6  Family history of Breast Cancer (V16 3)   9  Family history of Skin Cancer (V16 8)    Social History    · Being A Social Drinker   · History of Current some day smoker (305 1) (F17 200)   · Denied: History of Drug Use   · Tobacco use (305 1) (Z72 0)    Current Meds   1  Aspirin Low Dose 81 MG TABS; TAKE 1 TABLET DAILY; Therapy: (Recorded:65Rwv7007) to Recorded   2  Atorvastatin Calcium 80 MG Oral Tablet; TAKE 1 TABLET DAILY  Requested for:   26PGF9824; Last Rx:20Jbi5230 Ordered   3  Gabapentin 300 MG TABS; TAKE 1 TABLET 3 TIMES DAILY; Therapy: (Recorded:51Mki6364) to Recorded   4  Ibuprofen 600 MG Oral Tablet Recorded   5  Magnesium 400 MG Oral Tablet; Take 1 tablet daily; Therapy: 42PKU3404 to Recorded   6  Nortriptyline HCl - 10 MG Oral Capsule; Therapy: (Lorri Earl) to Recorded   7  Pantoprazole Sodium 40 MG Oral Tablet Delayed Release (Protonix); TAKE 1 TABLET   TWICE DAILY 30 MINUTES BEFORE BREAKFAST AND DINNER; Therapy: 33Qhp9054 to (Evaluate:12Oin3181)  Requested for: 69VYO2431; Last   Rx:22Txl7961 Ordered   8  PriLOSEC CPDR (Omeprazole); Therapy: (Recorded:12Jun2017) to Recorded   9  RaNITidine HCl - 150 MG Oral Tablet (Zantac); TAKE 1 TABLET AT BEDTIME; Therapy: 91LQC4672 to (Last Rx:38Nhv7026)  Requested for: 18SNW2525 Ordered   10  Rizatriptan Benzoate 5 MG Oral Tablet; Therapy: (Lorri Earl) to Recorded   11  TiZANidine HCl - 4 MG Oral Capsule; Therapy: (Lorri Earl) to Recorded   12  Topiramate 25 MG Oral Capsule Sprinkle; Therapy: (Lorri Earl) to Recorded   13  TraMADol HCl - 50 MG Oral Tablet;     Therapy: 46XIK1778 to Recorded 14  Ventolin  (90 Base) MCG/ACT Inhalation Aerosol Solution; INHALE 1 PUFF    EVERY 4 HOURS AS NEEDED; Therapy: 55ERE1722 to (Last EU:82ZDV1846)  Requested for: 42KWQ6268 Ordered   15  Voltaren 1 % Transdermal Gel (Diclofenac Sodium); APPLY TO UPPER EXTREMITIES, 2    GM OF GEL TO AFFECTED AREA 4 TIMES DAILY  DO NOT APPLY MORE    THAN 8 GM DAILY TO ANY ONE AFFECTED JOINT; Therapy: 91ATU9031 to (Last Rx:21Oct2016) Ordered    Allergies    1  Penicillins   2  Codeine Derivatives   3  Hydrocodone-Acetaminophen TABS    Health Management  History of gastroesophageal reflux (GERD)   EGD; every 3 years; Last 24BPC2165; Next Due: 19UGP2547; Active  Screening for colon cancer   COLONOSCOPY; every 5 years; Last 04Gxm7170; Next Due: 01Vsd9969; Active    Message   Recorded as Task   Date: 07/13/2017 05:16 PM, Created By: System   Task Name: [facility]   Assigned To: Kristy Benjamin   Regarding Patient: Eugenia Le, Status: Active   Comment:    System - 13 Jul 2017 5:16 PM     Patient discharged from hospital   Patient Name: Aristides Kidney  Patient YOB: 1960  Discharge Date: 7/13/2017  [facility] - 14 Jul 2017 2:54 PM     TASK EDITED  L/M TO Darryle Mako TO SCHEDULE   Cecy Neff - 17 Jul 2017 2:11 PM     TASK EDITED  l/m to call to schedule     Future Appointments    Date/Time Provider Specialty Site   07/20/2017 10:15 AM SAM Paul  General Surgery [facility]   01/26/2018 09:00 AM Kristy Benjamin, [address] [place] [address]   08/07/2017 03:10 PM SAM Ferraro   Orthopedic Surgery [address]     Signatures   Electronically signed by : Michael Cool, ; Jul 18 2017  2:02PM EST                       (Author)    Electronically signed by : Alejandra Weems DO; Jul 24 2017  2:43PM EST

## 2018-01-13 NOTE — MISCELLANEOUS
Message  7/14/2017 @ 1130 - post op follow up phone call completed  Pt is sipping liquids, using IS as instructed, reinforced importance of using IS to help prevent pneumonia  Ambulating about home without difficulty  Pain controlled with analgesia  Reaffirmed examples of liquid diet over the next week  Pt stated understanding about discharge instructions and medication adjustments  Tolerating self administration of Lovenox injections without difficulty  [place] educated on [address]  Follow up appt with surgeon scheduled for next week  Instructed to call with any additional questions or concerns  Active Problems    1  Acute bronchitis (466 0) (J20 9)   2  Acute upper respiratory infection (465 9) (J06 9)   3  Adenocarcinoma of lung, unspecified laterality (162 9) (C34 90)   4  Aftercare following surgery of the musculoskeletal system (V58 78) (Z47 89)   5  Saucedo's esophagus without dysplasia (530 85) (K22 70)   6  Bicipital tendinitis of right shoulder (726 12) (M75 21)   7  Carpal tunnel syndrome on right (354 0) (G56 01)   8  Cervical disc disorder with radiculopathy (723 4) (M50 10)   9  Cervical spinal stenosis (723 0) (M48 02)   10  Chest congestion (786 9) (R09 89)   11  Chronic GERD (530 81) (K21 9)   12  Complete tear of rotator cuff, unspecified laterality (727 61) (M75 120)   13  Cough (786 2) (R05)   14  DDD (degenerative disc disease), cervical (722 4) (M50 30)   15  Depression screening (V79 0) (Z13 89)   16  Dyslipidemia (272 4) (E78 5)   17  Encounter for prostate cancer screening (V76 44) (Z12 5)   18  Glenoid labrum tear, right, initial encounter (840 8) (S43 431A)   19  Hernia, hiatal (553 3) (K44 9)   20  History of asthma (V12 69) (Z87 09)   21  Hypercholesterolemia (272 0) (E78 00)   22  Hyperglycemia (790 29) (R73 9)   23  Joint pain (719 40) (M25 50)   24  Lymphadenopathy (785 6) (R59 1)   25  Microscopic hematuria (599 72) (R31 29)   26   Morbid obesity (278 01) (E66 01) 27  Need for influenza vaccination (V04 81) (Z23)   28  Nicotine dependence (305 1) (F17 200)   29  Pain, wrist (719 43) (M25 539)   30  Paraesophageal hernia (553 3) (K44 9)   31  Paresthesias (782 0) (R20 2)   32  Patellofemoral arthralgia of right knee (719 46) (M25 561)   33  Personal history of colonic polyps (V12 72) (Z86 010)   34  Pleural Effusion (511 9)   35  Post Pulmonary Resection (V45 89)   36  Pre-op examination (V72 84) (Z01 818)   37  Preoperative cardiovascular examination (V72 81) (Z01 810)   38  Primary localized osteoarthritis of right knee (715 16) (M17 11)   39  Pulmonary embolism (415 19) (I26 99)   40  Pulmonary nodule (793 11) (R91 1)   41  Right bundle-branch block (426 4) (I45 10)   42  Rupture Of The Distal Bicipital Tendon Of The Left Arm (727 62)   43  Screening for colon cancer (V76 51) (Z12 11)   44  Sinus congestion (478 19) (R09 81)   45  Sprained Left Elbow (841 9)   46  Tear of right scapholunate ligament, subsequent encounter (V58 89,842 19) (S63 8X1D)   47  Tendinitis of right rotator cuff (726 10) (M75 81)   48  Tobacco use (305 1) (Z72 0)    Current Meds   1  Aspirin Low Dose 81 MG TABS; TAKE 1 TABLET DAILY; Therapy: (Recorded:69Xip6319) to Recorded   2  Atorvastatin Calcium 80 MG Oral Tablet; TAKE 1 TABLET DAILY  Requested for:   67DPZ4462; Last Rx:20Oct2016 Ordered   3  Gabapentin 300 MG TABS; TAKE 1 TABLET 3 TIMES DAILY; Therapy: (Recorded:80Hgw7008) to Recorded   4  Ibuprofen 600 MG Oral Tablet Recorded   5  Magnesium 400 MG Oral Tablet; Take 1 tablet daily; Therapy: 52VYK9021 to Recorded   6  Nortriptyline HCl - 10 MG Oral Capsule; Therapy: (Nitin Ying) to Recorded   7  Pantoprazole Sodium 40 MG Oral Tablet Delayed Release (Protonix); TAKE 1 TABLET   TWICE DAILY 30 MINUTES BEFORE BREAKFAST AND DINNER; Therapy: 32Woc5800 to (Evaluate:68Kqs5865)  Requested for: 54WBK9275; Last   Rx:21Mah7199 Ordered   8  PriLOSEC CPDR (Omeprazole);    Therapy: (Recorded:12Jun2017) to Recorded   9  RaNITidine HCl - 150 MG Oral Tablet (Zantac); TAKE 1 TABLET AT BEDTIME; Therapy: 22QBW9543 to (Last Rx:82Hqc0398)  Requested for: 52INV4939 Ordered   10  Rizatriptan Benzoate 5 MG Oral Tablet; Therapy: (Fayrene Merritts) to Recorded   11  TiZANidine HCl - 4 MG Oral Capsule; Therapy: (Fayrene Merritts) to Recorded   12  Topiramate 25 MG Oral Capsule Sprinkle; Therapy: (Fayrene Merritts) to Recorded   13  TraMADol HCl - 50 MG Oral Tablet; Therapy: 25RJL6894 to Recorded   14  Ventolin  (90 Base) MCG/ACT Inhalation Aerosol Solution; INHALE 1 PUFF    EVERY 4 HOURS AS NEEDED; Therapy: 74DRI5931 to (Last HQ:47NHW1012)  Requested for: 06VHE6387 Ordered   15  Voltaren 1 % Transdermal Gel (Diclofenac Sodium); APPLY TO UPPER EXTREMITIES, 2    GM OF GEL TO AFFECTED AREA 4 TIMES DAILY  DO NOT APPLY MORE    THAN 8 GM DAILY TO ANY ONE AFFECTED JOINT; Therapy: 94ITS7462 to (Last Rx:21Oct2016) Ordered    Allergies    1  Penicillins   2  Codeine Derivatives   3   Hydrocodone-Acetaminophen TABS    Signatures   Electronically signed by : Ruthann Syed, ; Jul 14 2017 11:39AM EST                       (Author)

## 2018-01-13 NOTE — RESULT NOTES
Verified Results  [facility] SWALLOW [id number] 07:36AM Vin Mcgrath Order Number: [id number]    - Patient Instructions: To schedule this appointment, please contact Central Scheduling at [phone]  Test Name Result Flag Reference   FL ESOPHAGRAM COMPLETE (Report)     BARIUM SWALLOW-ESOPHAGRAM     INDICATION: Dysphagia, GERD     COMPARISON: None     IMAGES: 26     FLUOROSCOPY TIME:  0 5 minutes      TECHNIQUE:   The patient was given liquid barium and a barium tablet with water by mouth and images of the esophagus were obtained  FINDINGS:     The esophagus is normal in caliber  Thickened mucosal folds in the distal esophagus suggested without ulceration  Transient arrest of the barium tablet is noted in the distal esophagus  Gastroesophageal reflux was not observed  There is no hiatal hernia  IMPRESSION:     Distal esophageal mucosal pattern reflecting esophagitis  Negative for ulceration or stricture  Negative for hiatal hernia or gastroesophageal reflux during the study  Workstation performed: [id number]     Signed by:   Stefanie Gordon MD   2/15/17

## 2018-01-14 VITALS
BODY MASS INDEX: 32.97 KG/M2 | WEIGHT: 222.6 LBS | HEIGHT: 69 IN | SYSTOLIC BLOOD PRESSURE: 126 MMHG | DIASTOLIC BLOOD PRESSURE: 80 MMHG

## 2018-01-14 VITALS
SYSTOLIC BLOOD PRESSURE: 146 MMHG | HEART RATE: 96 BPM | WEIGHT: 225 LBS | BODY MASS INDEX: 34.21 KG/M2 | DIASTOLIC BLOOD PRESSURE: 85 MMHG

## 2018-01-14 VITALS
WEIGHT: 169 LBS | SYSTOLIC BLOOD PRESSURE: 112 MMHG | BODY MASS INDEX: 25.7 KG/M2 | DIASTOLIC BLOOD PRESSURE: 71 MMHG | HEART RATE: 97 BPM

## 2018-01-14 VITALS
TEMPERATURE: 98 F | HEART RATE: 74 BPM | BODY MASS INDEX: 33.19 KG/M2 | DIASTOLIC BLOOD PRESSURE: 84 MMHG | RESPIRATION RATE: 22 BRPM | HEIGHT: 68 IN | WEIGHT: 219 LBS | SYSTOLIC BLOOD PRESSURE: 140 MMHG

## 2018-01-14 VITALS
BODY MASS INDEX: 33.65 KG/M2 | HEIGHT: 68 IN | WEIGHT: 222 LBS | SYSTOLIC BLOOD PRESSURE: 164 MMHG | DIASTOLIC BLOOD PRESSURE: 85 MMHG | HEART RATE: 81 BPM

## 2018-01-14 VITALS
HEIGHT: 68 IN | HEART RATE: 85 BPM | BODY MASS INDEX: 34.25 KG/M2 | DIASTOLIC BLOOD PRESSURE: 82 MMHG | SYSTOLIC BLOOD PRESSURE: 124 MMHG | WEIGHT: 226 LBS | OXYGEN SATURATION: 97 %

## 2018-01-14 VITALS
HEIGHT: 68 IN | HEART RATE: 99 BPM | WEIGHT: 216.5 LBS | SYSTOLIC BLOOD PRESSURE: 130 MMHG | BODY MASS INDEX: 32.81 KG/M2 | DIASTOLIC BLOOD PRESSURE: 83 MMHG

## 2018-01-14 NOTE — MISCELLANEOUS
Message  GI Reminder Recall [address] [place] [address] [address]:   Date: 08/08/2017   Dear Renée Cornelius:     Review of our records shows you are due for the following: Follow Up Visit  Please call the following office to schedule your appointment:   [facility], [address] [phone]  We look forward to hearing from you! Sincerely,   [address] [facility]         Signatures   Electronically signed by :  Son Sheehan, ; Aug  8 2017  9:46AM EST                       (Author)

## 2018-01-15 NOTE — MISCELLANEOUS
Message  Esophageal Manometry: 1 9 cm HH, Esophageal dysmotility with a low DCI     Impedance: Poor esophageal clearance   24 pH study: Positive study as more than 78 episodes of acid and non- acid reflux (98 total with 42 acid)  Good correlation with nausea     Rec: Optimize reflux symptoms to see esophageal motility improves        Signatures   Electronically signed by : Jasper Mack MD; Mar 20 2017  9:59AM EST                       (Author)

## 2018-01-15 NOTE — MISCELLANEOUS
Message  lm per Dr Antonio Espitia to schedule appt  Active Problems    1  Acute upper respiratory infection (465 9) (J06 9)   2  Adenocarcinoma of lung, unspecified laterality (162 9) (C34 90)   3  Aftercare following surgery of the musculoskeletal system (V58 78) (Z47 89)   4  Asthma (493 90) (J45 909)   5  Saucedo's esophagus without dysplasia (530 85) (K22 70)   6  Bicipital tendinitis of right shoulder (726 12) (M75 21)   7  Carpal tunnel syndrome on right (354 0) (G56 01)   8  Chest congestion (786 9) (R09 89)   9  Complete tear of rotator cuff, unspecified laterality (727 61) (M75 120)   10  Cough (786 2) (R05)   11  Depression screening (V79 0) (Z13 89)   12  Dyslipidemia (272 4) (E78 5)   13  Encounter for prostate cancer screening (V76 44) (Z12 5)   14  GERD (gastroesophageal reflux disease) (530 81) (K21 9)   15  Glenoid labrum tear, right, initial encounter (840 8) (S43 431A)   16  Hypercholesterolemia (272 0) (E78 00)   17  Hyperglycemia (790 29) (R73 9)   18  Joint pain (719 40) (M25 50)   19  Lymphadenopathy (785 6) (R59 1)   20  Microscopic hematuria (599 72) (R31 29)   21  Morbid obesity (278 01) (E66 01)   22  Need for influenza vaccination (V04 81) (Z23)   23  Paresthesias (782 0) (R20 2)   24  Patellofemoral arthralgia of right knee (719 46) (M25 561)   25  Personal history of colonic polyps (V12 72) (Z86 010)   26  Pleural Effusion (511 9)   27  Post Pulmonary Resection (V45 89)   28  Primary localized osteoarthritis of right knee (715 16) (M17 11)   29  Pulmonary embolism (415 19) (I26 99)   30  Pulmonary nodule (793 11) (R91 1)   31  Rupture Of The Distal Bicipital Tendon Of The Left Arm (727 62)   32  Screening for colon cancer (V76 51) (Z12 11)   33  Sinus congestion (478 19) (R09 81)   34  Sprained Left Elbow (841 9)   35  Tear of right scapholunate ligament, subsequent encounter (V58 89,842 19) (S63 8X1D)   36  Tendinitis of right rotator cuff (726 10) (M75 81)   37   Tobacco use (305 1) (Z72 0)   38  Wrist pain (719 43) (M29 539)    Current Meds   1  Aspirin Low Dose 81 MG TABS; TAKE 1 TABLET DAILY; Therapy: (Recorded:72Lmi4365) to Recorded   2  Atorvastatin Calcium 80 MG Oral Tablet; TAKE 1 TABLET DAILY  Requested for:   90FHF7154; Last Rx:55Olx9076 Ordered   3  Gabapentin 300 MG Oral Capsule; TAKE 1 CAPSULE 3 times daily; Therapy: (Recorded:62Hcx5683) to Recorded   4  Ibuprofen 600 MG Oral Tablet Recorded   5  Magnesium 400 MG Oral Tablet; Take 1 tablet daily; Therapy: 59ETT1561 to Recorded   6  Nortriptyline HCl - 10 MG Oral Capsule; Therapy: (Anila Hippo) to Recorded   7  OxyCODONE HCl - 5 MG Oral Tablet; TAKE 1 TO 2 TABLETS EVERY 4 TO 6 HOURS AS   NEEDED FOR PAIN Recorded   8  Pantoprazole Sodium 40 MG Oral Tablet Delayed Release (Protonix); TAKE 1 TABLET   TWICE DAILY 30 MINUTES BEFORE BREAKFAST AND DINNER; Therapy: 08Rqy3605 to (Evaluate:69Ckj2837)  Requested for: 70SJB5702; Last   Rx:25Azq0342; Status: ACTIVE - Transmit to Pharmacy - Awaiting Verification Ordered   9  PriLOSEC OTC 20 MG Oral Tablet Delayed Release Recorded   10  ProAir  (90 Base) MCG/ACT Inhalation Aerosol Solution; Inhale two puffs every    4-6 hours as needed; Therapy: 08Yeg1724 to (Last Rx:94Qxo0419)  Requested for: 16Fml0143 Ordered   11  RaNITidine HCl - 150 MG Oral Tablet (Zantac); TAKE 1 TABLET AT BEDTIME; Therapy: 39HCX6735 to (Evaluate:01Rtv3761)  Requested for: 27YEK9887; Last    Rx:19Qxf8001; Status: ACTIVE - Transmit to Pharmacy - Awaiting Verification    Ordered   12  Rizatriptan Benzoate 5 MG Oral Tablet; Therapy: (Anila Hippo) to Recorded   13  TiZANidine HCl - 4 MG Oral Capsule; Therapy: (Anila Hippo) to Recorded   14  Topiramate 25 MG Oral Capsule Sprinkle; Therapy: (Anila Hippo) to Recorded   15  TraMADol HCl - 50 MG Oral Tablet; Therapy: 54IRK5154 to Recorded   16   Voltaren 1 % Transdermal Gel (Diclofenac Sodium); APPLY TO UPPER EXTREMITIES, 2    GM OF GEL TO AFFECTED AREA 4 TIMES DAILY  DO NOT APPLY MORE    THAN 8 GM DAILY TO ANY ONE AFFECTED JOINT; Therapy: 57VEO3697 to (Last Rx:21Oct2016) Ordered    Allergies    1  Penicillins   2  Codeine Derivatives   3   Hydrocodone-Acetaminophen TABS    Signatures   Electronically signed by : Zander Caceres, ; Apr 12 2017 11:37AM EST                       (Author)

## 2018-01-15 NOTE — MISCELLANEOUS
Provider Comments  Provider Comments:   Patient was a no show for his appointment today        Signatures   Electronically signed by : Colin Hunt DO; Oct  6 2017  5:20PM EST

## 2018-01-15 NOTE — CONSULTS
Chief Complaint  pre op clearance  Surgery Schedule Form [facility] Standard:   Location: [place]   Confirmation Number:   PROCEDURE DETAILS   Procedure Date:   Requested Time:   Surgeon: Marizol Chacon   Co-Surgeon:   [facility] Required:   Procedure: ACDF C5-6, C6-7 with allograf and monitroing   Bed:   Laterality/Level:   Case Length: 3hr2 5 hrs  Anticipated frozen section:   Anesthesia: general     Procedure Codes: [id number], [id number], 04756, 03207, 80671, 70685   Pre-op diagnosis: Cervical spinal stenosis, cervical DDD, cervical radiculopathy   Diagnosis Code(s): M50 10, M48 02   Equipment: C-Arm/Xray and Microscope   Equipment Needs: Neuro monitoring   Implants: Wren      History of Present Illness  Pre-Op Visit (Brief): The patient is being seen for a preoperative visit and preop eval for anterior cervical fusion C5-C6, C6-C7 to be performed by Dr Marizol Chacon  Surgical Risk Assessment:   Prior Anesthesia: He had prior anesthesia, no prior adverse reaction to edidural anesthesia, no prior adverse reaction to spinal anesthesia and no prior adverse reaction to general anesthesia  Exercise Capacity: able to walk four blocks without symptoms and able to walk two flights of stairs without symptoms  Lifestyle Factors: denies tobacco use and denies illegal drug use  Symptoms: no easy bleeding, no easy bruising, no frequent nosebleeds, no chest pain, no cough, no dyspnea, no edema, no palpitations and no wheezing  Review of Systems    Constitutional: no fever and no chills  Eyes: no eyesight problems  ENT: no hearing loss  Cardiovascular: no chest pain  Respiratory: no shortness of breath and no wheezing  Gastrointestinal: no abdominal pain, no nausea, no vomiting, no constipation, no diarrhea and no blood in stools  Genitourinary: no dysuria  Musculoskeletal: no arthralgias and no myalgias  Integumentary: no rashes and no skin lesions  Neurological: negative for seizures, but no fainting  Psychiatric: no anxiety and no depression  Hematologic/Lymphatic: no swollen glands and no swollen glands in the neck  Active Problems    1  Acute bronchitis (466 0) (J20 9)   2  Acute upper respiratory infection (465 9) (J06 9)   3  Adenocarcinoma of lung, unspecified laterality (162 9) (C34 90)   4  Aftercare following surgery of the musculoskeletal system (V58 78) (Z47 89)   5  Saucedo's esophagus without dysplasia (530 85) (K22 70)   6  Bicipital tendinitis of right shoulder (726 12) (M75 21)   7  Carpal tunnel syndrome on right (354 0) (G56 01)   8  Cervical disc disorder with radiculopathy (723 4) (M50 10)   9  Cervical spinal stenosis (723 0) (M48 02)   10  Chest congestion (786 9) (R09 89)   11  Chronic GERD (530 81) (K21 9)   12  Complete tear of rotator cuff, unspecified laterality (727 61) (M75 120)   13  Cough (786 2) (R05)   14  DDD (degenerative disc disease), cervical (722 4) (M50 30)   15  Depression screening (V79 0) (Z13 89)   16  Dyslipidemia (272 4) (E78 5)   17  Encounter for prostate cancer screening (V76 44) (Z12 5)   18  Glenoid labrum tear, right, initial encounter (840 8) (S43 431A)   19  Hernia, hiatal (553 3) (K44 9)   20  History of asthma (V12 69) (Z87 09)   21  Hypercholesterolemia (272 0) (E78 00)   22  Hyperglycemia (790 29) (R73 9)   23  Joint pain (719 40) (M25 50)   24  Lymphadenopathy (785 6) (R59 1)   25  Microscopic hematuria (599 72) (R31 29)   26  Morbid obesity (278 01) (E66 01)   27  Need for influenza vaccination (V04 81) (Z23)   28  Nicotine dependence (305 1) (F17 200)   29  Pain, wrist (719 43) (M25 539)   30  Paraesophageal hernia (553 3) (K44 9)   31  Paresthesias (782 0) (R20 2)   32  Patellofemoral arthralgia of right knee (719 46) (M25 561)   33  Personal history of colonic polyps (V12 72) (Z86 010)   34  Pleural Effusion (511 9)   35  Post Pulmonary Resection (V45 89)   36  Pre-op examination (V72 84) (Z01 818)   37   Preoperative cardiovascular examination (V72 81) (Z01 810)   38  Primary localized osteoarthritis of right knee (715 16) (M17 11)   39  Pulmonary embolism (415 19) (I26 99)   40  Pulmonary nodule (793 11) (R91 1)   41  Right bundle-branch block (426 4) (I45 10)   42  Rupture Of The Distal Bicipital Tendon Of The Left Arm (727 62)   43  Screening for colon cancer (V76 51) (Z12 11)   44  Sinus congestion (478 19) (R09 81)   45  Sprained Left Elbow (841 9)   46  Tear of right scapholunate ligament, subsequent encounter (V58 89,842 19) (S63 8X1D)   47  Tendinitis of right rotator cuff (726 10) (M75 81)   48  Tobacco use (305 1) (Z72 0)    Past Medical History    · History of Borderline glaucoma (365 00) (H40 009)   · History of asthma (V12 69) (Z87 09)   · History of gastroesophageal reflux (GERD) (V12 79) (Z87 19)   · History of hyperlipidemia (V12 29) (Z86 39)   · History of malignant neoplasm (V10 90) (Z85 9)   · History of ulceration (V13 89) (Z87 898)   · Pneumonia (486) (J18 9)   · History of Pulmonary Embolism (V12 55)    The active problems and past medical history were reviewed and updated today  Surgical History    · History of Femur Repair   · History of Flexible Bronchoscopy (Diagnostic)   · History of Hernia Repair   · History of Jaw Surgery   · History of Knee Arthroscopy (Therapeutic)   · History of Lymphadenectomy   · History of Mediastinoscopy   · History of Shoulder Repair   · History of Thoracoscopy (Therapeutic) W/ Lobectomy   · History of Tonsillectomy   · History of Wrist Surgery    The surgical history was reviewed and updated today         Family History    · Family history of Back disorder   · Family history of Diabetes Mellitus (V18 0)   · Family history of hypertension (V17 49) (Z82 49)    · Family history of cardiac disorder (V17 49) (Z82 49)   · Family history of hypertension (V17 49) (Z82 49)    · Family history of Breast Cancer (V16 3)   · Family history of Skin Cancer (V16 8)    · Family history of Breast Cancer (V16 3)   · Family history of Skin Cancer (V16 8)    The family history was reviewed and updated today  Social History    · Being A Social Drinker   · Denied: History of Current some day smoker   · 3 cigarettes a day Started 2014   · Denied: History of Drug Use   · Former smoker (V15 82) (C43 871)   · Tobacco use (305 1) (Z72 0)  The social history was reviewed and is unchanged  Current Meds   1  Aspirin Low Dose 81 MG TABS; TAKE 1 TABLET DAILY; Therapy: (Recorded:74Wtg9394) to Recorded   2  Atorvastatin Calcium 80 MG Oral Tablet; TAKE 1 TABLET DAILY  Requested for:   17YVA5596; Last Rx:20Oct2016 Ordered   3  Gabapentin 300 MG TABS; TAKE 1 TABLET 3 TIMES DAILY; Therapy: (Recorded:26Gsi7916) to Recorded   4  Magnesium 400 MG Oral Tablet; Take 1 tablet daily; Therapy: 28FUT5549 to Recorded   5  Nortriptyline HCl - 10 MG Oral Capsule; Therapy: ([place]) to Recorded   6  Pantoprazole Sodium 40 MG Oral Tablet Delayed Release; TAKE 1 TABLET TWICE DAILY   30 MINUTES BEFORE BREAKFAST AND DINNER; Therapy: 64Lca0698 to (Evaluate:89Tte8369)  Requested for: 01NTZ8128; Last   Rx:96Ich2982 Ordered   7  [address] [facility]; Therapy: (Recorded:12Jun2017) to Recorded   8  RaNITidine HCl - 150 MG Oral Tablet; TAKE 1 TABLET AT BEDTIME; Therapy: 74NVR7413 to (Last Rx:01Pgo5837)  Requested for: 27FHH0340 Ordered   9  Rizatriptan Benzoate 5 MG Oral Tablet; Therapy: (Delmy Males) to Recorded   10  TraMADol HCl - 50 MG Oral Tablet; Therapy: 38MHB9392 to Recorded   11  Ventolin  (90 Base) MCG/ACT Inhalation Aerosol Solution; INHALE 1 PUFF    EVERY 4 HOURS AS NEEDED; Therapy: 18ORD7938 to (Last RJ:79MWR8471)  Requested for: 86XCO8961 Ordered   12  Voltaren 1 % Transdermal Gel; APPLY TO UPPER EXTREMITIES, 2 GM OF GEL TO    AFFECTED AREA 4 TIMES DAILY  DO NOT APPLY MORE THAN 8 GM DAILY TO ANY    ONE AFFECTED JOINT;     Therapy: 71ZZZ7522 to (Last Rx:21Oct2016) Ordered    The medication list was reviewed and updated today  Allergies    1  Penicillins   2  Codeine Derivatives   3  Hydrocodone-Acetaminophen TABS    Vitals  Signs    Temperature: 98 1 F  Heart Rate: 87  Systolic: 533  Diastolic: 88  Height: 5 ft 8 in  Weight: 178 lb   BMI Calculated: 27 07  BSA Calculated: 1 95    Physical Exam    Constitutional   General appearance: No acute distress, well appearing and well nourished  well developed, appears healthy, well nourished and well hydrated  Eyes   Conjunctiva and lids: No erythema, swelling or discharge  Pupils and irises: Equal, round, reactive to light  Ears, Nose, Mouth, and Throat   External inspection of ears and nose: Normal     Otoscopic examination: Tympanic membranes translucent with normal light reflex  Canals patent without erythema  Nasal mucosa, septum, and turbinates: Normal without edema or erythema  Lips, teeth, and gums: Normal, good dentition  Oropharynx: Normal with no erythema, edema, exudate or lesions  Neck   Neck: Supple, symmetric, trachea midline, no masses  Pulmonary   Respiratory effort: No increased work of breathing or signs of respiratory distress  Respiratory rate: normal  Assessment of respiratory effort revealed normal rhythm and effort  Auscultation of lungs: Clear to auscultation  no rales or crackles were heard bilaterally  no rhonchi  no friction rub  no wheezing  Cardiovascular   Auscultation of heart: Normal rate and rhythm, normal S1 and S2, no murmurs  The heart rate was normal  The rhythm was regular  Heart sounds: normal S1 and normal S2  no murmurs were heard  Examination of extremities for edema and/or varicosities: Normal     Abdomen   Abdomen: Non-tender, no masses  Liver and spleen: No hepatomegaly or splenomegaly  Lymphatic   Palpation of lymph nodes in neck: No lymphadenopathy      Musculoskeletal   Gait and station: Normal     Inspection/palpation of digits and nails: Normal without clubbing or cyanosis  Skin   Skin and subcutaneous tissue: Normal without rashes or lesions  Psychiatric   Mood and affect: Normal        Assessment    1  Pre-op examination (V72 84) (Z01 818)   2  Chronic neck pain (723 1,338 29) (M54 2,G89 29)   3  Cervical disc disorder with radiculopathy (723 4) (M50 10)    Discussion/Summary  Surgical Clearance: He is at a LOW risk from a cardiovascular standpoint at this time without any additional cardiac testing  Reevaluation needed, if he should present with symptoms prior to surgery/procedure  The patient was counseled regarding diagnostic results, instructions for management, risk factor reductions, prognosis, patient and family education, impressions, risks and benefits of treatment options, importance of compliance with treatment  The treatment plan was reviewed with the patient/guardian  The patient/guardian understands and agrees with the treatment plan      End of Encounter Meds    1  Ventolin  (90 Base) MCG/ACT Inhalation Aerosol Solution; INHALE 1 PUFF   EVERY 4 HOURS AS NEEDED; Therapy: 14ZUV0252 to (Last XV:75JNJ2782)  Requested for: 75XMR1367 Ordered    2  Voltaren 1 % Transdermal Gel (Diclofenac Sodium); APPLY TO UPPER EXTREMITIES, 2   GM OF GEL TO AFFECTED AREA 4 TIMES DAILY  DO NOT APPLY MORE   THAN 8 GM DAILY TO ANY ONE AFFECTED JOINT; Therapy: 78XAJ9377 to (Last Rx:21Oct2016) Ordered    3  Atorvastatin Calcium 80 MG Oral Tablet; TAKE 1 TABLET DAILY  Requested for:   60KJP8432; Last Rx:20Oct2016 Ordered    4  Aspirin Low Dose 81 MG TABS; TAKE 1 TABLET DAILY; Therapy: (Recorded:14Xxx0846) to Recorded    5  Pantoprazole Sodium 40 MG Oral Tablet Delayed Release (Protonix); TAKE 1 TABLET   TWICE DAILY 30 MINUTES BEFORE BREAKFAST AND DINNER; Therapy: 10Fuf7067 to (Evaluate:85Yhq9581)  Requested for: 34OEW2643; Last   Rx:88Wyq2809 Ordered   6  RaNITidine HCl - 150 MG Oral Tablet (Zantac); TAKE 1 TABLET AT BEDTIME;    Therapy: 91ASB7890 to (Last OH:10DCM7166)  Requested for: 29FMA5990 Ordered    7  Gabapentin 300 MG TABS; TAKE 1 TABLET 3 TIMES DAILY; Therapy: (Recorded:83Nli1158) to Recorded   8  Magnesium 400 MG Oral Tablet; Take 1 tablet daily; Therapy: 10YKP9996 to Recorded   9  Nortriptyline HCl - 10 MG Oral Capsule; Therapy: ([facility]) to Recorded   10  PriLOSEC CPDR (Omeprazole); Therapy: (Recorded:50Agl7088) to Recorded   11  Rizatriptan Benzoate 5 MG Oral Tablet; Therapy: ([place] Cook) to Recorded   12  TraMADol HCl - 50 MG Oral Tablet;     Therapy: 20MOC1665 to Recorded    Signatures   Electronically signed by : Christin Pierre DO; Oct  6 2017  5:03PM EST                       (Author)

## 2018-01-15 NOTE — RESULT NOTES
Message   Recorded as Task   Date: 09/13/2017 11:14 AM, Created By: Madiha Odonnell   Task Name: Follow Up   Assigned To: [facility] es procedure,Team   Regarding Patient: Britni Sterling, Status: Active   CommentMatttanna Mcdonough - 13 Sep 2017 11:14 AM     TASK CREATED  s/p ELLEN 9/5  States pain improved and reports no complications post procedure  No f/u  Schedule 8 week f/u?    Madiha Odonnell - 13 Sep 2017 11:14 AM     TASK REASSIGNED: Previously Assigned To SPA es Mirella Prakash - 13 Sep 2017 12:56 PM     TASK REPLIED TO: Previously Assigned To Ray Nicholas - 13 Sep 2017 2:03 PM     TASK REASSIGNED: Previously Assigned To [facility] procedure,Team  Schedule f/u 8 week   Taylor Luz - 19 Sep 2017 10:01 AM     TASK REPLIED TO: Previously Assigned To Dakota Hyde f-up scheduled for 10/18/17        Signatures   Electronically signed by : Jorge Luis Bruner, ; Sep 19 2017 11:45AM EST                       (Author)
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
## 2018-01-15 NOTE — MISCELLANEOUS
Message  GI Reminder Recall [facility]:   Date: 05/18/2017   Dear Cate Azevedo:     Review of our records shows you are due for the following: Follow Up Visit  Please call the following office to schedule your appointment:   [address], [place] [address] [phone]  We look forward to hearing from you!      Sincerely,     Nelle Fleischer [facility]      Signatures   Electronically signed by : Randall Jarvis, ; May 18 2017 10:40AM EST                       (Author)

## 2018-01-15 NOTE — MISCELLANEOUS
Message   Recorded as Task   Date: 01/17/2017 02:08 PM, Created By: Rodri Hanley   Task Name: Care Coordination   Assigned To: Navi Recinos   Regarding Patient: Raciel Smith, Status: Active   CommentRoetta Vilmas - 17 Jan 2017 2:08 PM     TASK CREATED  After giving him results of EGD  He stated that protonix 40 mg daily was still not relieving his symptoms  Please advise   I called him and left a voice mail to call me back to discuss further  He has follow-up appointment on February 8  We'll discuss further management at that time        Signatures   Electronically signed by : Whitney Sierra MD; Jan 23 2017  9:42AM EST                       (Author)

## 2018-01-16 ENCOUNTER — GENERIC CONVERSION - ENCOUNTER (OUTPATIENT)
Dept: OBGYN CLINIC | Facility: HOSPITAL | Age: 58
End: 2018-01-16

## 2018-01-16 NOTE — PROGRESS NOTES
Assessment    1  Bicipital tendinitis of right shoulder (726 12) (M75 21)   2  Glenoid labrum tear, right, initial encounter (840 8) (S43 431A)   3  Tendinitis of right rotator cuff (726 10) (M75 81)     While a posterior labral tear was noted on the MRI report the majority of his symptoms are related to impingement of the rotator cuff and do not localize to the posterior labrum     Plan  Bicipital tendinitis of right shoulder, Glenoid labrum tear, right, initial encounter, Tendinitis  of right rotator cuff    · Follow-up as previously scheduled Evaluation and Treatment  Follow-up  Status:  Complete  Done: 21PXL1802 11:58AM      A lengthy discussion was had with Bhaskar Valverde today about his MRI findings, symptoms and treatment plan  His symptoms are more consistent with impingement rather than posterior labral pathology (anterolateral shoulder pain that is worse with lifting)  Additionally he had a glenohumeral corticosteroid injection at the previous visit 3 weeks ago that did not help with his symptoms  I again discussed treatment recommendations  I recommend initial conservative management  He has already had a corticosteroid injection recently and would not recommend a repeat one at this time  I again discussed physical therapy, however he is limited in his ability to perform an appropriate therapy program until he has recovered from his wrist surgery  Additionally I would not recommend surgery at this time point for the same reasons - inability to participate appropriately in a postoperative rehab protocol until he is completely recovered from wrist surgery with no restrictions on that wrist/hand  This was also discussed at his last visit 3 weeks prior  I attempted to call his workers comp  that is listed on file to discuss this as well  A message was left requesting a call back  I have also encouraged him to bring his workers comp  to all subsequent appointments    He is scheduled to follow up with hand surgery in 1 week and I recommend he follow up here at the previously scheduled appointment in 3 weeks  If he is able to perform some activities with that hand then we can consider starting a gentle PT program for his shoulder within the postop restrictions in place per Dr Barrera Arrington  Chief Complaint    1  Shoulder Pain    History of Present Illness  X-rays here for early followup of right shoulder pain  At his last visit 3 weeks ago he received an intra-articular corticosteroid injection which he states did not provide any improvement in his symptoms whatsoever  Today he continues to complain of pain in the anterolateral aspect of the shoulder  The pain is worse with raising the arm  He is still in a cast after his right wrist surgery with restrictions on the right upper extremity  He is scheduled to followup with hand surgery in 1 week  He also notes that an outside physician ordered a MRI of his shoulder and per the patient this physician told him he needs a surgery and he is here to discus this  Review of Systems    Constitutional: No fever or chills, feels well, no tiredness, no recent weight loss or weight gain  Eyes: No complaints of red eyes, no eyesight problems  ENT: no complaints of loss of hearing, no nosebleeds, no sore throat  Cardiovascular: No complaints of chest pain, no palpitations, no leg claudication or lower extremity edema  Respiratory: No complaints of shortness of breath, no wheezing, no cough  Gastrointestinal: No complaints of abdominal pain, no constipation, no nausea or vomiting, no diarrhea or bloody stools  Genitourinary: No complaints of dysuria or incontinence, no hesitancy, no nocturia  Musculoskeletal: as noted in HPI  Integumentary: No complaints of skin rash or lesion, no itching or dry skin, no skin wounds  Neurological: No complaints of headache, no confusion, no numbness or tingling, no dizziness     Psychiatric: No suicidal thoughts, no anxiety, no depression  Endocrine: No muscle weakness, no frequent urination, no excessive thirst, no feelings of weakness  Active Problems    1  Bicipital tendinitis of right shoulder (726 12) (M75 21)   2  Glenoid labrum tear, right, initial encounter (840 8) (S43 431A)   3  Tendinitis of right rotator cuff (726 10) (M75 81)    Past Medical History    · History of Borderline glaucoma (365 00) (H40 009)   · History of hyperlipidemia (V12 29) (Z86 39)   · History of nicotine dependence (V15 82) (P41 577)   · History of Pulmonary Embolism (V12 55)    The active problems and past medical history were reviewed and updated today  Surgical History    · History of Femur Repair   · History of Flexible Bronchoscopy (Diagnostic)   · History of Jaw Surgery   · History of Knee Arthroscopy   · History of Lymphadenectomy   · History of Mediastinoscopy   · History of Thoracoscopy (Therapeutic) W/ Lobectomy   · History of Tonsillectomy    The surgical history was reviewed and updated today  Family History    · Family history of Diabetes Mellitus (V18 0)    · Family history of Heart Disease (V17 49)    · Family history of Breast Cancer (V16 3)   · Family history of Skin Cancer (V16 8)    · Family history of Breast Cancer (V16 3)   · Family history of Skin Cancer (V16 8)    The family history was reviewed and updated today  Social History    · Being A Social Drinker   · Current some day smoker (305 1) (F17 210)   · Denied: History of Drug Use   · Tobacco use (305 1) (Z72 0)  The social history was reviewed and updated today  The social history was reviewed and is unchanged  Current Meds   1  Aspirin Low Dose 81 MG Oral Tablet; TAKE 1 TABLET DAILY; Therapy: (Recorded:99Mes5891) to Recorded   2  Ibuprofen 600 MG Oral Tablet Recorded   3  Lipitor 20 MG Oral Tablet; Take 1 tablet daily; Therapy: (Recorded:98Heq1300) to Recorded   4   Meloxicam 7 5 MG Oral Tablet; TAKE 1 TABLET DAILY WITH FOOD;   Therapy: 17IWK1662 to (Evaluate:27Jan2016)  Requested for: 29Oct2015; Last   Rx:29Oct2015 Ordered    The medication list was reviewed and updated today  Allergies    1  Penicillins   2  Codeine Derivatives   3  Hydrocodone-Acetaminophen TABS    Vitals   Recorded: 31Gvi4732 09:43AM   Heart Rate 89   Systolic 255   Diastolic 856   Height 5 ft 9 in   Weight 205 lb 6 08 oz   BMI Calculated 30 33   BSA Calculated 2 09     Physical Exam    Constitutional - General appearance: Normal      Right upper extremity: There is a short arm cast in place  There is mild tenderness over the anterior shoulder, acromioclavicular joint, greater tuberosity, and posterior shoulder  The pain seems to be slightly more prominent at the anterior shoulder  Active forward elevation is 150 degrees , passive 165 degrees , external rotation 65 degrees , internal rotation to T12 with mild pain  Strength is 5 out of 5 in elevation, supraspinatus, external rotation  There are mildly positive Neer impingement and speeds tests  There is a negative active compression test  There is a negative cross body abduction  There is anterior shoulder pain with posterior load and shift  Sensation is intact to light touch in all nerve distributions  Radial pulse is not palpable do to the presence of the cast       Results/Data  I personally reviewed the films/images/results in the office today  My interpretation follows  MRI Review MRI of the right shoulder from 1/21/16 was available for review - there is a subacromial spur present, there is irregularity of the posterior labrum with a tiny paralabral cyst present  Future Appointments    Date/Time Provider Specialty Site   02/29/2016 02:00 PM Waymond Simmonds, M D  Orthopedic Surgery [facility]   02/10/2016 02:40 PM SAM Montalvo   Orthopedic Surgery [facility]   06/07/2016 10:00 AM Tess Loredo MD Thoracic Surgery CT SURGERY [facility] OFFICE Signatures   Electronically signed by : SAM Powell ; Feb 2 2016 11:59AM EST                       (Author)

## 2018-01-16 NOTE — PROGRESS NOTES
Active Problems    1  Acute bronchitis (466 0) (J20 9)   2  Acute upper respiratory infection (465 9) (J06 9)   3  Adenocarcinoma of lung, unspecified laterality (162 9) (C34 90)   4  Aftercare following surgery of the musculoskeletal system (V58 78) (Z47 89)   5  Saucedo's esophagus without dysplasia (530 85) (K22 70)   6  Bicipital tendinitis of right shoulder (726 12) (M75 21)   7  Carpal tunnel syndrome on right (354 0) (G56 01)   8  Cervical disc disorder with radiculopathy (723 4) (M50 10)   9  Cervical spinal stenosis (723 0) (M48 02)   10  Chest congestion (786 9) (R09 89)   11  Chronic GERD (530 81) (K21 9)   12  Complete tear of rotator cuff, unspecified laterality (727 61) (M75 120)   13  Cough (786 2) (R05)   14  DDD (degenerative disc disease), cervical (722 4) (M50 30)   15  Depression screening (V79 0) (Z13 89)   16  Dyslipidemia (272 4) (E78 5)   17  Encounter for prostate cancer screening (V76 44) (Z12 5)   18  Glenoid labrum tear, right, initial encounter (840 8) (S43 431A)   19  Hernia, hiatal (553 3) (K44 9)   20  History of asthma (V12 69) (Z87 09)   21  Hypercholesterolemia (272 0) (E78 00)   22  Hyperglycemia (790 29) (R73 9)   23  Joint pain (719 40) (M25 50)   24  Lymphadenopathy (785 6) (R59 1)   25  Microscopic hematuria (599 72) (R31 29)   26  Morbid obesity (278 01) (E66 01)   27  Need for influenza vaccination (V04 81) (Z23)   28  Nicotine dependence (305 1) (F17 200)   29  Pain, wrist (719 43) (M25 539)   30  Paraesophageal hernia (553 3) (K44 9)   31  Paresthesias (782 0) (R20 2)   32  Patellofemoral arthralgia of right knee (719 46) (M25 561)   33  Personal history of colonic polyps (V12 72) (Z86 010)   34  Pleural Effusion (511 9)   35  Post Pulmonary Resection (V45 89)   36  Pre-op examination (V72 84) (Z01 818)   37  Preoperative cardiovascular examination (V72 81) (Z01 810)   38  Primary localized osteoarthritis of right knee (715 16) (M17 11)   39   Pulmonary embolism (415 19) (I26 99)   40  Pulmonary nodule (793 11) (R91 1)   41  Right bundle-branch block (426 4) (I45 10)   42  Rupture Of The Distal Bicipital Tendon Of The Left Arm (727 62)   43  Screening for colon cancer (V76 51) (Z12 11)   44  Sinus congestion (478 19) (R09 81)   45  Sprained Left Elbow (841 9)   46  Tear of right scapholunate ligament, subsequent encounter (V58 89,842 19) (S63 8X1D)   47  Tendinitis of right rotator cuff (726 10) (M75 81)   48  Tobacco use (305 1) (Z72 0)    Current Meds   1  Aspirin Low Dose 81 MG TABS; TAKE 1 TABLET DAILY; Therapy: (Recorded:11Dqr1550) to Recorded   2  Atorvastatin Calcium 80 MG Oral Tablet; TAKE 1 TABLET DAILY  Requested for:   44WKU9696; Last Rx:56Snc8178 Ordered   3  Gabapentin 300 MG TABS; TAKE 1 TABLET 3 TIMES DAILY; Therapy: (Recorded:35Ahx3924) to Recorded   4  Magnesium 400 MG Oral Tablet; Take 1 tablet daily; Therapy: 98XIE5026 to Recorded   5  Nortriptyline HCl - 10 MG Oral Capsule; Therapy: (Adrian Rm) to Recorded   6  Pantoprazole Sodium 40 MG Oral Tablet Delayed Release (Protonix); TAKE 1 TABLET   TWICE DAILY 30 MINUTES BEFORE BREAKFAST AND DINNER; Therapy: 93Amm9044 to (Evaluate:13Oqe3842)  Requested for: 67BBW5523; Last   Rx:54Oyz4078 Ordered   7  PriLOSEC CPDR (Omeprazole); Therapy: (Recorded:12Jun2017) to Recorded   8  RaNITidine HCl - 150 MG Oral Tablet (Zantac); TAKE 1 TABLET AT BEDTIME; Therapy: 27OOM7143 to (Last Rx:17Vwg6040)  Requested for: 64WEV9561 Ordered   9  Rizatriptan Benzoate 5 MG Oral Tablet; Therapy: (Adrian Rm) to Recorded   10  TraMADol HCl - 50 MG Oral Tablet; Therapy: 74LYZ1567 to Recorded   11  Ventolin  (90 Base) MCG/ACT Inhalation Aerosol Solution; INHALE 1 PUFF    EVERY 4 HOURS AS NEEDED; Therapy: 39TGK0679 to (Last MR:98RMR1728)  Requested for: 30ISC0483 Ordered   12  Voltaren 1 % Transdermal Gel (Diclofenac Sodium); APPLY TO UPPER EXTREMITIES, 2    GM OF GEL TO AFFECTED AREA 4 TIMES DAILY    DO NOT APPLY MORE    THAN 8 GM DAILY TO ANY ONE AFFECTED JOINT; Therapy: 56HTJ1290 to (Last Rx:21Oct2016) Ordered    Allergies    1  Penicillins   2  Codeine Derivatives   3  Hydrocodone-Acetaminophen TABS    Discussion/Summary    Patient attended 5 week post op class  Reviewed diet progression, vitamin and mineral recommendations, 30/60 rules, volume of foods, protein supplements, hydration needs, antacid guidelines, recommendation to f/u with pcp concerning med adjustments, exercise guidelines, hair shedding, contraception guidelines, constipation prevention and treatment, alcohol avoidance and recommendations of when to call the office  Patient was also weighed and filled out form for program   Time was left for discussion and to answer questions        Signatures   Electronically signed by : MIA Cuellar; Aug 22 2017  5:16PM EST                       (Author)

## 2018-01-17 NOTE — MISCELLANEOUS
Message  Return to work or school:   Surekha Randhawa is under my professional care  He was seen in my office on May 13, 2016       Continue current restrictions with right arm  Patient is undergoing additional imaging  We'll re-advise her next appointment  SAM Almanzar        Signatures   Electronically signed by : SAM Perez ; May 13 2016 12:01PM EST                       (Author)

## 2018-01-17 NOTE — MISCELLANEOUS
Message  Return to work or school:   Irma Washburn is under my professional care  He was seen in my office on 4/21/17     He can perform work without limitations  Activities as tolerated regarding the right hand  JOSHUA Page, PA-C        Signatures   Electronically signed by : Prisca Marquez, [facility]; May 17 2017  3:06PM EST                       (Author)

## 2018-01-19 ENCOUNTER — GENERIC CONVERSION - ENCOUNTER (OUTPATIENT)
Dept: OBGYN CLINIC | Facility: HOSPITAL | Age: 58
End: 2018-01-19

## 2018-01-22 VITALS
SYSTOLIC BLOOD PRESSURE: 112 MMHG | DIASTOLIC BLOOD PRESSURE: 76 MMHG | WEIGHT: 169 LBS | HEART RATE: 96 BPM | BODY MASS INDEX: 25.7 KG/M2

## 2018-01-22 VITALS
HEART RATE: 87 BPM | HEIGHT: 68 IN | WEIGHT: 178 LBS | SYSTOLIC BLOOD PRESSURE: 135 MMHG | BODY MASS INDEX: 26.98 KG/M2 | DIASTOLIC BLOOD PRESSURE: 88 MMHG | TEMPERATURE: 98.1 F

## 2018-01-22 VITALS
SYSTOLIC BLOOD PRESSURE: 148 MMHG | WEIGHT: 182 LBS | BODY MASS INDEX: 27.67 KG/M2 | HEART RATE: 99 BPM | DIASTOLIC BLOOD PRESSURE: 90 MMHG

## 2018-01-23 VITALS
WEIGHT: 169 LBS | DIASTOLIC BLOOD PRESSURE: 79 MMHG | SYSTOLIC BLOOD PRESSURE: 118 MMHG | HEART RATE: 83 BPM | BODY MASS INDEX: 25.7 KG/M2

## 2018-01-23 NOTE — MISCELLANEOUS
Message  GI Reminder Recall Lamont Rodriguez:   Date: 12/15/2017   Dear Dianne Jiménez:     Review of our records shows you are due for the following: Follow Up Visit  Please call the following office to schedule your appointment:   [address] [place] [address], Matthew Trujillo 35 [phone]  We look forward to hearing from you!      Sincerely,     [facility]      Signatures   Electronically signed by : Korina Alarcon, ; Dec 15 2017  9:39AM EST                       (Author)

## 2018-01-26 DIAGNOSIS — E78.5 HYPERLIPIDEMIA: ICD-10-CM

## 2018-01-26 DIAGNOSIS — Z12.5 ENCOUNTER FOR SCREENING FOR MALIGNANT NEOPLASM OF PROSTATE: ICD-10-CM

## 2018-01-26 DIAGNOSIS — E78.00 PURE HYPERCHOLESTEROLEMIA: ICD-10-CM

## 2018-02-05 ENCOUNTER — TELEPHONE (OUTPATIENT)
Dept: OBGYN CLINIC | Facility: HOSPITAL | Age: 58
End: 2018-02-05

## 2018-02-05 DIAGNOSIS — Z48.89 AFTERCARE FOLLOWING SURGERY: Primary | ICD-10-CM

## 2018-02-07 PROBLEM — Z87.19 S/P REPAIR OF PARAESOPHAGEAL HERNIA: Status: ACTIVE | Noted: 2018-02-07

## 2018-02-07 PROBLEM — K91.2 POSTSURGICAL MALABSORPTION: Status: ACTIVE | Noted: 2018-02-07

## 2018-02-07 PROBLEM — Z98.84 BARIATRIC SURGERY STATUS: Status: ACTIVE | Noted: 2018-02-07

## 2018-02-07 PROBLEM — K21.9 CHRONIC GERD: Status: ACTIVE | Noted: 2017-07-11

## 2018-02-07 PROBLEM — E66.9 OBESITY: Status: RESOLVED | Noted: 2017-07-12 | Resolved: 2018-02-07

## 2018-02-07 PROBLEM — Z98.890 S/P REPAIR OF PARAESOPHAGEAL HERNIA: Status: ACTIVE | Noted: 2018-02-07

## 2018-02-07 RX ORDER — DICLOFENAC POTASSIUM 50 MG/1
50 TABLET, FILM COATED ORAL 2 TIMES DAILY
Qty: 42 TABLET | Refills: 0 | Status: SHIPPED | OUTPATIENT
Start: 2018-02-07 | End: 2018-02-15 | Stop reason: ALTCHOICE

## 2018-02-13 ENCOUNTER — TRANSCRIBE ORDERS (OUTPATIENT)
Dept: URGENT CARE | Facility: CLINIC | Age: 58
End: 2018-02-13

## 2018-02-13 ENCOUNTER — APPOINTMENT (OUTPATIENT)
Dept: LAB | Facility: CLINIC | Age: 58
End: 2018-02-13
Payer: COMMERCIAL

## 2018-02-13 DIAGNOSIS — Z98.890 OTHER SPECIFIED POSTPROCEDURAL STATES: ICD-10-CM

## 2018-02-13 DIAGNOSIS — K22.70 BARRETT'S ESOPHAGUS WITHOUT DYSPLASIA: ICD-10-CM

## 2018-02-13 DIAGNOSIS — K21.9 GASTRO-ESOPHAGEAL REFLUX DISEASE WITHOUT ESOPHAGITIS: ICD-10-CM

## 2018-02-13 DIAGNOSIS — K91.2 POSTSURGICAL MALABSORPTION, NOT ELSEWHERE CLASSIFIED (CODE): ICD-10-CM

## 2018-02-13 LAB
25(OH)D3 SERPL-MCNC: 14.3 NG/ML (ref 30–100)
ALBUMIN SERPL BCP-MCNC: 4 G/DL (ref 3.5–5)
ALP SERPL-CCNC: 86 U/L (ref 46–116)
ALT SERPL W P-5'-P-CCNC: 19 U/L (ref 12–78)
ANION GAP SERPL CALCULATED.3IONS-SCNC: 7 MMOL/L (ref 4–13)
AST SERPL W P-5'-P-CCNC: 12 U/L (ref 5–45)
BILIRUB SERPL-MCNC: 0.52 MG/DL (ref 0.2–1)
BUN SERPL-MCNC: 11 MG/DL (ref 5–25)
CALCIUM SERPL-MCNC: 9 MG/DL (ref 8.3–10.1)
CHLORIDE SERPL-SCNC: 107 MMOL/L (ref 100–108)
CO2 SERPL-SCNC: 28 MMOL/L (ref 21–32)
CREAT SERPL-MCNC: 0.85 MG/DL (ref 0.6–1.3)
ERYTHROCYTE [DISTWIDTH] IN BLOOD BY AUTOMATED COUNT: 13.6 % (ref 11.6–15.1)
FERRITIN SERPL-MCNC: 26 NG/ML (ref 8–388)
FOLATE SERPL-MCNC: 11.6 NG/ML (ref 3.1–17.5)
GFR SERPL CREATININE-BSD FRML MDRD: 97 ML/MIN/1.73SQ M
GLUCOSE P FAST SERPL-MCNC: 86 MG/DL (ref 65–99)
HCT VFR BLD AUTO: 44.8 % (ref 36.5–49.3)
HGB BLD-MCNC: 15.3 G/DL (ref 12–17)
MCH RBC QN AUTO: 29.7 PG (ref 26.8–34.3)
MCHC RBC AUTO-ENTMCNC: 34.2 G/DL (ref 31.4–37.4)
MCV RBC AUTO: 87 FL (ref 82–98)
PLATELET # BLD AUTO: 308 THOUSANDS/UL (ref 149–390)
PMV BLD AUTO: 10.6 FL (ref 8.9–12.7)
POTASSIUM SERPL-SCNC: 4.4 MMOL/L (ref 3.5–5.3)
PROT SERPL-MCNC: 7.5 G/DL (ref 6.4–8.2)
PTH-INTACT SERPL-MCNC: 50.4 PG/ML (ref 14–72)
RBC # BLD AUTO: 5.15 MILLION/UL (ref 3.88–5.62)
SODIUM SERPL-SCNC: 142 MMOL/L (ref 136–145)
VIT B12 SERPL-MCNC: 390 PG/ML (ref 100–900)
WBC # BLD AUTO: 6.46 THOUSAND/UL (ref 4.31–10.16)

## 2018-02-13 PROCEDURE — 83970 ASSAY OF PARATHORMONE: CPT

## 2018-02-13 PROCEDURE — 82746 ASSAY OF FOLIC ACID SERUM: CPT

## 2018-02-13 PROCEDURE — 85027 COMPLETE CBC AUTOMATED: CPT

## 2018-02-13 PROCEDURE — 82728 ASSAY OF FERRITIN: CPT

## 2018-02-13 PROCEDURE — 82306 VITAMIN D 25 HYDROXY: CPT

## 2018-02-13 PROCEDURE — 82607 VITAMIN B-12: CPT

## 2018-02-13 PROCEDURE — 84590 ASSAY OF VITAMIN A: CPT

## 2018-02-13 PROCEDURE — 80053 COMPREHEN METABOLIC PANEL: CPT

## 2018-02-13 PROCEDURE — 36415 COLL VENOUS BLD VENIPUNCTURE: CPT

## 2018-02-15 ENCOUNTER — OFFICE VISIT (OUTPATIENT)
Dept: FAMILY MEDICINE CLINIC | Facility: CLINIC | Age: 58
End: 2018-02-15
Payer: COMMERCIAL

## 2018-02-15 VITALS
SYSTOLIC BLOOD PRESSURE: 120 MMHG | OXYGEN SATURATION: 98 % | BODY MASS INDEX: 23.08 KG/M2 | TEMPERATURE: 98 F | WEIGHT: 155.8 LBS | HEIGHT: 69 IN | HEART RATE: 82 BPM | DIASTOLIC BLOOD PRESSURE: 70 MMHG

## 2018-02-15 DIAGNOSIS — K21.9 GASTROESOPHAGEAL REFLUX DISEASE WITHOUT ESOPHAGITIS: Primary | ICD-10-CM

## 2018-02-15 DIAGNOSIS — E78.00 HYPERCHOLESTEROLEMIA: ICD-10-CM

## 2018-02-15 DIAGNOSIS — Z12.5 SCREENING FOR PROSTATE CANCER: ICD-10-CM

## 2018-02-15 PROCEDURE — 99214 OFFICE O/P EST MOD 30 MIN: CPT | Performed by: FAMILY MEDICINE

## 2018-02-15 RX ORDER — LISINOPRIL 5 MG/1
5 TABLET ORAL DAILY
COMMUNITY
Start: 2009-09-28 | End: 2018-02-15 | Stop reason: ALTCHOICE

## 2018-02-15 NOTE — PROGRESS NOTES
Assessment/Plan:    No problem-specific Assessment & Plan notes found for this encounter  Diagnoses and all orders for this visit:    Gastroesophageal reflux disease without esophagitis  -     CBC and differential; Future    Hypercholesterolemia  -     Comprehensive metabolic panel; Future  -     Lipid panel; Future  -     TSH, 3rd generation with T4 reflex; Future    Screening for prostate cancer  -     PSA; Future    Other orders  -     Discontinue: lisinopril (ZESTRIL) 5 mg tablet; Take 5 mg by mouth daily          Subjective:      Patient ID: Gallo Gamez is a 65 y o  male  Follow up for GERD pt had stomach surgery and now has no GERD, pt had a portion of his stomach removed, he also had hernia repair, pt has had significant weight loss following this surgery, pt is doing well and feels well today, pt no longer has headaches since his neck surgery, pt does get hypoglycemic episodes since the bariatric surgery        The following portions of the patient's history were reviewed and updated as appropriate: allergies, current medications, past family history, past medical history, past social history, past surgical history and problem list     Review of Systems   Constitutional: Negative for chills and fever  Respiratory: Negative for shortness of breath and wheezing  Gastrointestinal: Negative for abdominal pain, nausea and vomiting  Objective:    Vitals:    02/15/18 1253   BP: 120/70   Pulse: 82   Temp: 98 °F (36 7 °C)   SpO2: 98%        Physical Exam   Constitutional: He is oriented to person, place, and time  He appears well-developed and well-nourished  No distress  HENT:   Head: Normocephalic and atraumatic  Neck: Normal range of motion  Neck supple  Cardiovascular: Normal rate, regular rhythm and normal heart sounds  No murmur heard  Pulmonary/Chest: Effort normal and breath sounds normal  No stridor  No respiratory distress  He has no wheezes  He has no rales     Abdominal: Soft  Bowel sounds are normal  He exhibits no distension and no mass  There is no tenderness  There is no rebound and no guarding  Musculoskeletal: Normal range of motion  Lymphadenopathy:     He has no cervical adenopathy  Neurological: He is alert and oriented to person, place, and time  Skin: Skin is warm and dry  No rash noted  He is not diaphoretic  No erythema  No pallor  Psychiatric: He has a normal mood and affect  His behavior is normal  Judgment and thought content normal    Nursing note and vitals reviewed

## 2018-02-16 LAB — VIT A SERPL-MCNC: 41 UG/DL (ref 24–85)

## 2018-03-01 ENCOUNTER — OFFICE VISIT (OUTPATIENT)
Dept: BARIATRICS | Facility: CLINIC | Age: 58
End: 2018-03-01

## 2018-03-01 ENCOUNTER — OFFICE VISIT (OUTPATIENT)
Dept: BARIATRICS | Facility: CLINIC | Age: 58
End: 2018-03-01
Payer: COMMERCIAL

## 2018-03-01 VITALS
TEMPERATURE: 98.3 F | HEIGHT: 68 IN | WEIGHT: 155.5 LBS | HEART RATE: 80 BPM | DIASTOLIC BLOOD PRESSURE: 64 MMHG | BODY MASS INDEX: 23.57 KG/M2 | RESPIRATION RATE: 18 BRPM | SYSTOLIC BLOOD PRESSURE: 102 MMHG

## 2018-03-01 DIAGNOSIS — E53.8 LOW VITAMIN B12 LEVEL: Primary | ICD-10-CM

## 2018-03-01 DIAGNOSIS — E55.9 VITAMIN D DEFICIENCY: ICD-10-CM

## 2018-03-01 DIAGNOSIS — Z98.84 BARIATRIC SURGERY STATUS: Primary | ICD-10-CM

## 2018-03-01 DIAGNOSIS — E16.1 REACTIVE HYPOGLYCEMIA: ICD-10-CM

## 2018-03-01 DIAGNOSIS — K91.2 POSTSURGICAL MALABSORPTION: ICD-10-CM

## 2018-03-01 DIAGNOSIS — Z98.84 BARIATRIC SURGERY STATUS: ICD-10-CM

## 2018-03-01 PROBLEM — R79.89 LOW VITAMIN B12 LEVEL: Status: ACTIVE | Noted: 2018-03-01

## 2018-03-01 PROCEDURE — 99214 OFFICE O/P EST MOD 30 MIN: CPT | Performed by: PHYSICIAN ASSISTANT

## 2018-03-01 PROCEDURE — RECHECK: Performed by: DIETITIAN, REGISTERED

## 2018-03-01 RX ORDER — DULOXETIN HYDROCHLORIDE 60 MG/1
CAPSULE, DELAYED RELEASE ORAL
Status: ON HOLD | COMMUNITY
Start: 2018-02-20 | End: 2019-01-09 | Stop reason: ALTCHOICE

## 2018-03-01 NOTE — ASSESSMENT & PLAN NOTE
Notes he has had intermittent issues of symptoms consistent with low blood sugars-up to 2-3 times per week for the past 3-4 months  He notes he feels shaky and sweaty and this is improved with eating  Greater than 50% of the 25 minute visit was spent on monitoring, diet and treatment of low blood sugars  Advised him to invest in glucometer, strips and to carry glucose tablets with him  Advised on eating 6 small meals and snacks with protein and complex carbohydrate to avoid low blood sugars-written instructions provided      I am referring him to our RD for further diet education and advised if symptoms are not controlled with diet changes he needs to see an endocrinologist  I can refer him if he needs this referral

## 2018-03-01 NOTE — ASSESSMENT & PLAN NOTE
He had vitamin D deficiency on most recent labs-will prescribe high dose 50,000 IU vitamin D2 weekly  Per protocol  Advised that once he completes the high dose he needs to add an EXTRA 2000 IU vitamin D3 daily which is found over-the-counter-this will be rechecked with his routine labs

## 2018-03-01 NOTE — ASSESSMENT & PLAN NOTE
Followed by PCP so I will not repeat labs for his next routine lab panel in a few months-he is on statin

## 2018-03-01 NOTE — PROGRESS NOTES
Assessment/Plan:    Bariatric surgery status  Status post laparoscopic repair of paraesophageal hernia with gastric diversion with Britney-en-y gastric bypass surgery reconstruction with short 60 cm limb by Dr Crystal Cha  Overall doing Terris Meline weight loss but he is having some sweating and shaking between meals and eats a candy bar and felt better  Initial: 216 5 lb  Current: 155 5 lb  EWL: 118%   Sohan: Current  Current BMI is Body mass index is 23 64 kg/m²  Tolerating a regular diet-yes-with exception of symptoms of low blood sugar-see assessment on reactive hypoglcyemia  Eating at least 70 grams of protein per day-yes  Following 30/60 minute rule with liquids-yes  Drinking at least 64 ounces of fluid per day-yes  Drinking carbonated beverages-yes  Sufficient exercise-no  Using NSAIDs regularly-yes  Using nicotine-no  Using alcohol- had one beer since surgery-advised on importance of avoidance and effect after surgery and dangerous with low blood sugars  Advised on avoidance of carbonation and advised he can try 30/30 with liquids to allow for between meal snacks  Reactive hypoglycemia  Notes he has had intermittent issues of symptoms consistent with low blood sugars-up to 2-3 times per week for the past 3-4 months  He notes he feels shaky and sweaty and this is improved with eating  Greater than 50% of the 25 minute visit was spent on monitoring, diet and treatment of low blood sugars  Advised him to invest in glucometer, strips and to carry glucose tablets with him  Advised on eating 6 small meals and snacks with protein and complex carbohydrate to avoid low blood sugars-written instructions provided      I am referring him to our RD for further diet education and advised if symptoms are not controlled with diet changes he needs to see an endocrinologist  I can refer him if he needs this referral     Postsurgical malabsorption  -At risk for malabsorption of vitamins/minerals secondary to malabsorption and restriction of intake from their procedure  -Currently taking adequate postop bariatric surgery vitamin supplementation-yes  -Last set of bariatric labs completed on February 2018 and are within acceptable limits   -Next set of bariatric labs ordered for approximately 6 months     Will also check copper and zinc levels with his routine annual labs  Chronic GERD  Controlled heart burn symptoms "since surgery"  He has cook's esophagus and is being followed by GI-he notes he needs to schedule follow-up with Dr Johanna Shirley now and I encouraged him to do so for regular surveillance  Advised to continue ppi unless otherwise advised by him  Dyslipidemia  Followed by PCP so I will not repeat labs for his next routine lab panel in a few months-he is on statin  Vitamin D deficiency  He had vitamin D deficiency on most recent labs-will prescribe high dose 50,000 IU vitamin D2 weekly  Per protocol  Advised that once he completes the high dose he needs to add an EXTRA 2000 IU vitamin D3 daily which is found over-the-counter-this will be rechecked with his routine labs  Low vitamin B12 level  Vitamin B12 level is low for post-op bariatric surgery patient  Advised to add an EXTRA 500 mcg sublingual vitamin B12 daily which is found over-the-counter  Want level 400 for greater after gastric bypass surgery  Diagnoses and all orders for this visit:    Low vitamin B12 level    Reactive hypoglycemia  -     Comprehensive metabolic panel; Future  -     CBC and differential; Future  -     HEMOGLOBIN A1C W/ EAG ESTIMATION; Future  -     Vitamin B12; Future  -     Vitamin A; Future  -     PTH, intact; Future  -     Vitamin B1, whole blood; Future  -     Vitamin D 25 hydroxy; Future  -     Folate; Future  -     Ferritin; Future  -     Iron Saturation %; Future  -     Copper Level; Future  -     Zinc; Future    Postsurgical malabsorption  -     Comprehensive metabolic panel;  Future  -     CBC and differential; Future  -     HEMOGLOBIN A1C W/ EAG ESTIMATION; Future  -     Vitamin B12; Future  -     Vitamin A; Future  -     PTH, intact; Future  -     Vitamin B1, whole blood; Future  -     Vitamin D 25 hydroxy; Future  -     Folate; Future  -     Ferritin; Future  -     Iron Saturation %; Future  -     Copper Level; Future  -     Zinc; Future    Bariatric surgery status  -     Comprehensive metabolic panel; Future  -     CBC and differential; Future  -     HEMOGLOBIN A1C W/ EAG ESTIMATION; Future  -     Vitamin B12; Future  -     Vitamin A; Future  -     PTH, intact; Future  -     Vitamin B1, whole blood; Future  -     Vitamin D 25 hydroxy; Future  -     Folate; Future  -     Ferritin; Future  -     Iron Saturation %; Future  -     Copper Level; Future  -     Zinc; Future    Vitamin D deficiency    Other orders  -     DULoxetine (CYMBALTA) 60 mg delayed release capsule;           Subjective:      Patient ID: Arabella Briscoe is a 65 y o  male  He is here in routine follow-up  C/o intermittently feeling shaky and sweaty which is improved with eating  He is taking bariatric supplements and regularly exercising  The following portions of the patient's history were reviewed and updated as appropriate: allergies, current medications, past family history, past medical history, past social history, past surgical history and problem list     Review of Systems   Constitutional: Negative for chills and fever  Unexpected weight change: planned weight loss  Respiratory: Negative for shortness of breath and wheezing  Cardiovascular: Negative for chest pain and palpitations  Gastrointestinal: Negative for abdominal pain, constipation, diarrhea, nausea and vomiting  Psychiatric/Behavioral: Suicidal ideas: no complait of anxiety or depression           Objective:      /64   Pulse 80   Temp 98 3 °F (36 8 °C)   Resp 18   Ht 5' 8" (1 727 m)   Wt 70 5 kg (155 lb 8 oz)   BMI 23 64 kg/m²          Physical Exam   Constitutional: He is oriented to person, place, and time  He appears well-developed and well-nourished  HENT:   Mouth/Throat: Oropharynx is clear and moist    Eyes: Conjunctivae are normal  No scleral icterus  Cardiovascular: Normal rate, regular rhythm and normal heart sounds  Abdominal: Soft  Bowel sounds are normal  There is no tenderness  Neurological: He is alert and oriented to person, place, and time  Psychiatric: He has a normal mood and affect  Nursing note and vitals reviewed  GOALS: Maintain  weight loss with good nutrition intakes  Normal vitamin and mineral levels  Exercise as tolerated  Control blood sugars      BARRIERS: none identified

## 2018-03-01 NOTE — ASSESSMENT & PLAN NOTE
Controlled heart burn symptoms "since surgery"  He has cook's esophagus and is being followed by GI-he notes he needs to schedule follow-up with Dr Johnie Monsivais now and I encouraged him to do so for regular surveillance  Advised to continue ppi unless otherwise advised by him

## 2018-03-01 NOTE — ASSESSMENT & PLAN NOTE
Vitamin B12 level is low for post-op bariatric surgery patient  Advised to add an EXTRA 500 mcg sublingual vitamin B12 daily which is found over-the-counter  Want level 400 for greater after gastric bypass surgery

## 2018-03-01 NOTE — ASSESSMENT & PLAN NOTE
Status post laparoscopic repair of paraesophageal hernia with gastric diversion with Britney-en-y gastric bypass surgery reconstruction with short 60 cm limb by Dr Fabrizio Ellis  Overall doing Fern Moellers weight loss but he is having some sweating and shaking between meals and eats a candy bar and felt better  Initial: 216 5 lb  Current: 155 5 lb  EWL: 118%   Sohan: Current  Current BMI is Body mass index is 23 64 kg/m²  Tolerating a regular diet-yes-with exception of symptoms of low blood sugar-see assessment on reactive hypoglcyemia  Eating at least 70 grams of protein per day-yes  Following 30/60 minute rule with liquids-yes  Drinking at least 64 ounces of fluid per day-yes  Drinking carbonated beverages-yes  Sufficient exercise-no  Using NSAIDs regularly-yes  Using nicotine-no  Using alcohol- had one beer since surgery-advised on importance of avoidance and effect after surgery and dangerous with low blood sugars  Advised on avoidance of carbonation and advised he can try 30/30 with liquids to allow for between meal snacks

## 2018-03-01 NOTE — ASSESSMENT & PLAN NOTE
-At risk for malabsorption of vitamins/minerals secondary to malabsorption and restriction of intake from their procedure  -Currently taking adequate postop bariatric surgery vitamin supplementation-yes  -Last set of bariatric labs completed on February 2018 and are within acceptable limits   -Next set of bariatric labs ordered for approximately 6 months     Will also check copper and zinc levels with his routine annual labs

## 2018-03-01 NOTE — PATIENT INSTRUCTIONS
Meet with dietitian to help with diet for hypoglycemia-low blood sugars  If diet does not help, you need to see an endocrinologist  If you need a referral for this, please let me know  Take prescription vitamin D-sent to your pharmacy  IMPORTANT-after completing the prescription then add an EXTRA 2000 IU vitamin D3 daily to your current supplements  Vitamin D is important for bone health  Take an EXTRA vitamin b12-500 mcg sublingual= under the tongue vitamin [id number] is important to prevent anemia and fatigue and is also important for bone health  Follow-up in 6 months  Follow diet as discussed  Get lab work done prior to next office visit  It is recommended to check with your insurance BEFORE getting labs done to make sure they are covered by your policy  Make sure to HOLD any multivitamins that may contain biotin and any biotin supplements FOR 5 DAYS before any labs since it can affect the results  Follow vitamin  and mineral recommendations as reviewed with you  Exercise as tolerated    Call our office if you have any problems with abdominal pain especially associated with fever, chills, nausea, vomiting or any other concerns  All  Post-bariatric surgery patients should be aware that very small quantities of any alcohol  can cause impairment and it is very possible not to feel the effect  The effect can be in the system for several hours  It is also a stomach irritant  It is advised to AVOID alcohol, Nonsteroidal antiinflammatory drugs (NSAIDS) and nicotine of all forms   Any of these can cause stomach irritation/pain

## 2018-03-01 NOTE — PROGRESS NOTES
Bariatric Follow Up Nutrition Note  Type of surgery  Gastric bypass: laparoscopic  Surgery Date: 7/12/2018  6 months  post-op  Surgeon: Dr Donna Silver  65 y o   male  There were no vitals taken for this visit  Weight on Day of Weight Loss Surgery: 220 lbs   Weight in (lb) to have BMI = 25: 164 9  Pre-Op Excess Wt:   Post-Op Wt Loss: 61 1#/ 118% EBWL in 6 month(s)    Review of History and Medications   Past Medical History:   Diagnosis Date    Anesthesia     Pt wakes up during "almost every surgery"    Arthritis     Asthma     Cervical radiculopathy     Chemotherapy follow-up examination     Chronic pain     [facility]ch[facility] [address] [facility]     Diabetes mellitus ([facility] [address] )     borderline "years ago"    Food allergy     clams    GERD (gastroesophageal reflux disease)     Heart murmur     Hiatal hernia     History of malignant neoplasm     History of pneumonia 04/12/2016    History of pulmonary embolism     History of ulceration     Hyperlipidemia     Lung cancer (HCC)     left lung    Migraine     Right scapholunate ligament tear     Skipped heart beats     Wears partial dentures     upper and lower     Past Surgical History:   Procedure Laterality Date    BRONCHOSCOPY      COLONOSCOPY      FRACTURE SURGERY      femur right 1985    GASTRIC BYPASS LAPAROSCOPIC N/A 7/12/2017    Procedure: GASTRIC DIVERSION WITH [place]OOKLY[place]-EN-Y RECONSTRUCTION WITH  SHORT 60 cm LIMB;  Surgeon: Linda Amador MD;  Location: [facility];  Service: Bariatrics    KNEE ARTHROSCOPY Right     right shoulder    LUNG LOBECTOMY Left     LYMPHADENECTOMY  05/22/2012    Dr Evelio Wolfe ANT INTERBODY MIN DISCECTOMY, CERVICAL BELOW C2 N/A 10/17/2017    Procedure: C5-6, C6-7 ACDF WITH ALLOGRAFT (NEURO MONITORING);   Surgeon: Tori Bruce MD;  [facility];  Service: Orthopedics    IL COLONOSCOPY FLX DX W/COLLJ SPEC WHEN PFRMD N/A 4/14/2017    Procedure: COLONOSCOPY;  Surgeon: Luciano Yanez MD;  Location: [facility];  Service: Gastroenterology    IL ESOPHAGOGASTRODUODENOSCOPY TRANSORAL DIAGNOSTIC N/A 1/11/2017    Procedure: ESOPHAGOGASTRODUODENOSCOPY (EGD); Surgeon: Luciano Yanez MD;  [facility]: [facility];   Service: Gastroenterology    IL LAP, REPAIR PARAESOPHAGEAL HERNIA, INCL FUNDOPLASTY W/ MESH N/A 7/12/2017    Procedure: REPAIR HERNIA PARAESOPHAGEAL  LAPAROSCOPIC;  Surgeon: Graham Stevenson MD;  Location: [facility];  Service: Connye Cuna IL WRIST Gita Brownie LIG Right 6/9/2016    Procedure: RELEASE CARPAL TUNNEL ENDOSCOPIC;  Surgeon: Fidel Wesley MD;  [facility];  Service: Orthopedics    RECONSTRUCTION DISTAL RADIUS/ULNA JOINT (DRUJ) Right 9/6/2016    Procedure: WRIST SCAPHOLUNATE LIGAMENT RECONSTRUCTION WITH ECRB TENDON AUTOGRAPH , SPLINT APPLICATION ;  Surgeon: Fidel Wesley MD;  [facility];  Service:    Vidal Carrington SHOULDER SURGERY      TONSILLECTOMY       Social History     Social History    Marital status: /Civil Union     Spouse name: N/A    Number of children: N/A    Years of education: N/A     Social History Main Topics    Smoking status: Former Smoker     Packs/day: 0 50     Years: 48 00     Quit date: 5/1/2017    Smokeless tobacco: Former User      Comment:  quit 5/2107    Alcohol use No      Comment: occasionally    Drug use: No    Sexual activity: Not on file     Other Topics Concern    Not on file     Social History Narrative    No narrative on file       Current Outpatient Prescriptions:     albuterol (PROVENTIL HFA,VENTOLIN HFA) 90 mcg/act inhaler, Inhale 2 puffs every 6 (six) hours as needed for wheezing, Disp: , Rfl:     atorvastatin (LIPITOR) 40 mg tablet, Take 80 mg by mouth daily  , Disp: , Rfl:     diclofenac sodium (VOLTAREN) 1 %, Place on the skin, Disp: , Rfl:     DULoxetine (CYMBALTA) 60 mg delayed release capsule, , Disp: , Rfl:     gabapentin, once-daily, (GRALISE) 300 MG tablet, Take 1 tablet by mouth 3 (three) times a day, Disp: , Rfl:     Magnesium 400 MG CAPS, Take 1 tablet by mouth daily, Disp: , Rfl:     nortriptyline (PAMELOR) 10 mg capsule, Take 10 mg by mouth daily at bedtime 3 tabs=30mg at bedtime , Disp: , Rfl:     pantoprazole (PROTONIX) 40 mg tablet, Take 1 tablet by mouth daily, Disp: , Rfl: 0    Protein POWD, Take by mouth, Disp: , Rfl:     rizatriptan (MAXALT) 5 MG tablet, Take 10 mg by mouth once as needed for migraine May repeat in 2 hours if needed  , Disp: , Rfl:     topiramate (TOPAMAX) 100 mg tablet, Take 100 mg by mouth daily Pt reports currently takes 75mg AM and 75mg PM , Disp: , Rfl:     traMADol (ULTRAM) 50 mg tablet, Take by mouth, Disp: , Rfl:     Food Intake and Lifestyle Assessment   Food Intake Assessment completed via 24 hour recall  Coffee (2-4 am)  7-9a Breakfast: bowl of cereal (fruit carlo or cherrios- 3/4 cup cereal, with 2% milk) or eggs (2) with toast (2 slices, dry)  Snack: none  12p Lunch: leftover pasta (1/3 cup) with veal latrice (2 oz), diet iced tea with lunch  Snack: usually does not eat anything bc he is full  6p Dinner: tacos (meat, lettuce, sour cream  Cheese, small tortilla shell) and refined beans (1/3 cup)  Drank 30 minutes after meal- diet iced tea  9pSnack: 3 nights per week waffles (1 eggo) and iced cream (1 scoop)  Beverage intake: water (48 oz), juice (cranberry juice 40 oz) and coffee/tea (80 oz diet iced tea), diet soda   - states if he uses a straw he tolerates diet soda  Diet texture/stage: regular  Protein supplement: 3 times a week before dinner ~5:30p (JBN- mixed with 2% milk)  Estimated protein intake per day: at least 60 grams  Estimated fluid intake per day: at elast 64 oz    Meals eaten away from home: n/a  Typical meal pattern: 3 meals per day and 1 snacks per day  Eating Behaviors: consumption of foods high in sugar  - getting heat flashes, gets the "shakes several hours after eating  Food allergies or intolerances: n/a  Cultural or Cheondoism considerations: n/a    Physical Assessment  Nutrition Related Findings  Headache, Dizziness and shakiness    Physical Activity  Types of exercise: Not assessed this visit  Current physical limitations: n/a    Psychosocial Assessment   Support systems: not assessed  Socioeconomic factors: n/a    Nutrition Diagnosis  Diagnosis: Undesirable food choices (NB-1 7)  Related to: Altered GI function  As Evidenced by: reports symptoms consistent with reactive hypoglycemia following gastric bypass     Interventions and Teaching   Patient educated on post-op nutrition guidelines  Patient educated and handouts provided    Appropriate carbohydrate, protein, and fat intake, and food/fluid choices to maximize safe weight loss, nutrient intake, and tolerance   Dietary and lifestyle changes  Possible problems with poor eating habits    Education provided to: patient    Barriers to learning: No barriers identified    Readiness to change: action    Comprehension: verbalizes understanding     Expected Compliance: good    Goals  Eliminate sugar sweetened beverages and consume 5-6 small snack per day     Time Spent:   15 Minutes

## 2018-03-09 ENCOUNTER — OFFICE VISIT (OUTPATIENT)
Dept: OBGYN CLINIC | Facility: HOSPITAL | Age: 58
End: 2018-03-09
Payer: OTHER MISCELLANEOUS

## 2018-03-09 VITALS
BODY MASS INDEX: 23.02 KG/M2 | WEIGHT: 155.4 LBS | SYSTOLIC BLOOD PRESSURE: 105 MMHG | HEART RATE: 73 BPM | DIASTOLIC BLOOD PRESSURE: 69 MMHG | HEIGHT: 69 IN

## 2018-03-09 DIAGNOSIS — M65.4 DE QUERVAIN'S TENOSYNOVITIS, RIGHT: Primary | ICD-10-CM

## 2018-03-09 PROCEDURE — 99214 OFFICE O/P EST MOD 30 MIN: CPT | Performed by: ORTHOPAEDIC SURGERY

## 2018-03-09 PROCEDURE — 20550 NJX 1 TENDON SHEATH/LIGAMENT: CPT | Performed by: ORTHOPAEDIC SURGERY

## 2018-03-09 RX ORDER — BETAMETHASONE SODIUM PHOSPHATE AND BETAMETHASONE ACETATE 3; 3 MG/ML; MG/ML
6 INJECTION, SUSPENSION INTRA-ARTICULAR; INTRALESIONAL; INTRAMUSCULAR; SOFT TISSUE
Status: COMPLETED | OUTPATIENT
Start: 2018-03-09 | End: 2018-03-09

## 2018-03-09 RX ORDER — LIDOCAINE HYDROCHLORIDE 10 MG/ML
0.5 INJECTION, SOLUTION INFILTRATION; PERINEURAL
Status: COMPLETED | OUTPATIENT
Start: 2018-03-09 | End: 2018-03-09

## 2018-03-09 RX ADMIN — LIDOCAINE HYDROCHLORIDE 0.5 ML: 10 INJECTION, SOLUTION INFILTRATION; PERINEURAL at 10:10

## 2018-03-09 RX ADMIN — BETAMETHASONE SODIUM PHOSPHATE AND BETAMETHASONE ACETATE 6 MG: 3; 3 INJECTION, SUSPENSION INTRA-ARTICULAR; INTRALESIONAL; INTRAMUSCULAR; SOFT TISSUE at 10:10

## 2018-03-09 NOTE — PROGRESS NOTES
ASSESSMENT/PLAN:    Diagnoses and all orders for this visit:    De Quervain's tenosynovitis, right    Other orders  -     Hand/upper extremity injection        Assessment:   de Quervain stenosis tenosynovitis  right    Plan:   steroid injections for his Enriqueta Nabeel right  It was discussed with the patient that his residual numbness is from his ACDF C5-7    Follow Up:  2  month(s)    To Do Next Visit:       General Discussions:     Enriqueta Ernestinaas Tenosynovitis: The anatomy and physiology of de Quervain's tenosynovitis was discussed with the patient today in the office  Edema and increased contact pressure within the first dorsal extensor compartment at the radial styloid can cause pain, crepitation, and limitation of function  Treatment options include resting thumb spica splints to decrease edema, oral anti-inflammatory medications, home or formal therapy exercises, up to 2 steroid injections within the first dorsal extensor compartment, or surgical release  While majority of patients do respond to conservative treatment, up to 20% may require surgical release  Operative Discussions:         _____________________________________________________  CHIEF COMPLAINT:  Chief Complaint   Patient presents with    Right Wrist - Follow-up         SUBJECTIVE:  King Conley is a 65y o  year old male who presents for follow up regarding SL ligament revision reconstruction on 9/6/16 and ECTR in 2016   Since last visit, King Conley has tried Resume activities as tolerated without relief  Today there is Pain  Severe  Constant  Sharp, Burning and Aching to the right wrist     Radiation: Yes to the  index finger, long finger and thumb  Associated symptoms: patient describes the pain in his hand and wrist to being hit with a hammer  Pt also has an inability to flex his R index finger  Pt also has been seeing spine and has a history of a ACDF C5-7 at the end of 2017      PAST MEDICAL HISTORY:  Past Medical History:   Diagnosis Date    Anesthesia     Pt wakes up during "almost every surgery"    Arthritis     Asthma     Cervical radiculopathy     Chemotherapy follow-up examination     Chronic pain     [facility] [address] [facility]     Diabetes mellitus ([facility] [address] )     borderline "years ago"    Food allergy     clams    GERD (gastroesophageal reflux disease)     Heart murmur     Hiatal hernia     History of malignant neoplasm     History of pneumonia 04/12/2016    History of pulmonary embolism     History of ulceration     Hyperlipidemia     Lung cancer (HCC)     left lung    Migraine     Right scapholunate ligament tear     Skipped heart beats     Wears partial dentures     upper and lower       PAST SURGICAL HISTORY:  Past Surgical History:   Procedure Laterality Date    BRONCHOSCOPY      COLONOSCOPY      FRACTURE SURGERY      femur right 1985    GASTRIC BYPASS LAPAROSCOPIC N/A 7/12/2017    Procedure: GASTRIC DIVERSION WITH [place]-EN-Y RECONSTRUCTION WITH  SHORT 60 cm LIMB;  Surgeon: Diony Clark MD;  [facility];  Service: Bariatrics    KNEE ARTHROSCOPY Right     right shoulder    LUNG LOBECTOMY Left     LYMPHADENECTOMY  05/22/2012    Dr Priscilla Barnard ANT INTERBODY MIN DISCECTOMY, CERVICAL BELOW C2 N/A 10/17/2017    Procedure: C5-6, C6-7 ACDF WITH ALLOGRAFT (NEURO MONITORING); Surgeon: Maria Lipscomb MD;  [facility];  Service: Orthopedics    RI COLONOSCOPY FLX DX W/COLLJ SPEC WHEN PFRMD N/A 4/14/2017    Procedure: COLONOSCOPY;  Surgeon: Sb Loving MD;  [facility];  Service: Gastroenterology    RI ESOPHAGOGASTRODUODENOSCOPY TRANSORAL DIAGNOSTIC N/A 1/11/2017    Procedure: ESOPHAGOGASTRODUODENOSCOPY (EGD); Surgeon: Sb Loving MD;  [facility];   Service: Gastroenterology    RI LAP, REPAIR PARAESOPHAGEAL HERNIA, INCL FUNDOPLASTY W/ MESH N/A 7/12/2017    Procedure: REPAIR HERNIA PARAESOPHAGEAL  LAPAROSCOPIC;  Surgeon: Chrissy Jiménez MD;  [facility];  Service: Haroldo [place] LIG Right 6/9/2016    Procedure: RELEASE CARPAL TUNNEL ENDOSCOPIC;  Surgeon: Amara Renee MD;  [facility];  Service: Orthopedics    RECONSTRUCTION DISTAL RADIUS/ULNA JOINT (DRUJ) Right 9/6/2016    Procedure: WRIST SCAPHOLUNATE LIGAMENT RECONSTRUCTION WITH ECRB TENDON AUTOGRAPH , SPLINT APPLICATION ;  Surgeon: Amara Renee MD;  [facility];  Service:    Mali Cochran SHOULDER SURGERY      TONSILLECTOMY         FAMILY HISTORY:  Family History   Problem Relation Age of Onset    Other Mother      Back disorder    Diabetes Mother      Diabetes Mellitus    Hypertension Mother     Heart disease Father     Hypertension Father     Breast cancer Sister     Skin cancer Sister     Breast cancer Paternal Grandmother     Skin cancer Paternal Grandmother        SOCIAL HISTORY:  Social History   Substance Use Topics    Smoking status: Former Smoker     Packs/day: 0 50     Years: 48 00     Quit date: 5/1/2017    Smokeless tobacco: Former User      Comment:  quit 5/2107    Alcohol use 1 8 oz/week     3 Cans of beer per week      Comment: occasionally       MEDICATIONS:    Current Outpatient Prescriptions:     albuterol (PROVENTIL HFA,VENTOLIN HFA) 90 mcg/act inhaler, Inhale 2 puffs every 6 (six) hours as needed for wheezing, Disp: , Rfl:     atorvastatin (LIPITOR) 40 mg tablet, Take 80 mg by mouth daily  , Disp: , Rfl:     diclofenac sodium (VOLTAREN) 1 %, Place on the skin, Disp: , Rfl:     DULoxetine (CYMBALTA) 60 mg delayed release capsule, , Disp: , Rfl:     gabapentin, once-daily, (GRALISE) 300 MG tablet, Take 1 tablet by mouth 3 (three) times a day, Disp: , Rfl:     Magnesium 400 MG CAPS, Take 1 tablet by mouth daily, Disp: , Rfl:     nortriptyline (PAMELOR) 10 mg capsule, Take 10 mg by mouth daily at bedtime 3 tabs=30mg at bedtime , Disp: , Rfl:   pantoprazole (PROTONIX) 40 mg tablet, Take 1 tablet by mouth daily, Disp: , Rfl: 0    Protein POWD, Take by mouth, Disp: , Rfl:     rizatriptan (MAXALT) 5 MG tablet, Take 10 mg by mouth once as needed for migraine May repeat in 2 hours if needed  , Disp: , Rfl:     topiramate (TOPAMAX) 100 mg tablet, Take 100 mg by mouth daily Pt reports currently takes 75mg AM and 75mg PM , Disp: , Rfl:     traMADol (ULTRAM) 50 mg tablet, Take by mouth, Disp: , Rfl:     ALLERGIES:  Allergies   Allergen Reactions    Codeine Hives, GI Intolerance and Itching     Other reaction(s): Nausea and/or vomiting    Hydrocodone Hives and GI Intolerance    Penicillins Anaphylaxis and Throat Swelling    Shellfish-Derived Products      Other reaction(s): Nausea and/or vomiting  Clams only - nausea & vomiting       REVIEW OF SYSTEMS:  Pertinent items are noted in HPI  LABS:  HgA1c:   Lab Results   Component Value Date    HGBA1C 5 3 08/14/2017     BMP:   Lab Results   Component Value Date    GLUCOSE 134 10/18/2017    CALCIUM 9 0 02/13/2018     02/13/2018    K 4 4 02/13/2018    CO2 28 02/13/2018     02/13/2018    BUN 11 02/13/2018    CREATININE 0 85 02/13/2018           _____________________________________________________  PHYSICAL EXAMINATION:  General: well developed and well nourished, alert, oriented times 3 and appears uncomfortable  Psychiatric: Normal  HEENT: Trachea Midline, No torticollis  Cardiovascular: No discernable arrhythmia  Pulmonary: No wheezing or stridor  Skin: No masses, erthema, lacerations, fluctation, ulcerations  Neurovascular: Decreased Sensation to  the Median Nerve, Motor Intact to the Median, Ulnar, Radial Nerve and Pulses Intact    MUSCULOSKELETAL EXAMINATION:  RIGHT SIDE:  Litzy Mock:  Tenderness Radial Styloid, Positive Finkelstein's Test and Positive Crepitation Radial Styloid and Wrist:  inability to flex index finger  no instability  _____________________________________________________  STUDIES REVIEWED:  No Studies to review      PROCEDURES PERFORMED:  Hand/upper extremity injection  Date/Time: 3/9/2018 10:10 AM  Consent given by: patient  Site marked: site marked  Supporting Documentation  Indications: tendon swelling   Procedure Details  Condition:de Quervain's tenosynovitis Site: R extensor compartment 1   Ultrasound guidance: no  Medications administered: 0 5 mL lidocaine 1 %; 6 mg betamethasone acetate-betamethasone sodium phosphate 6 (3-3) mg/mL  Patient tolerance: patient tolerated the procedure well with no immediate complications  Dressing:  Sterile dressing applied

## 2018-03-09 NOTE — PATIENT INSTRUCTIONS
[facility] [place] [facility]   WHAT YOU NEED TO KNOW:   What is de Quervain's disease? De Quervain's disease is inflammation of the tendons on the thumb side of your wrist  Tendons are thick strands of tissue that connect muscles to bones  What causes de Quervain's disease? [place] disease is usually caused by frequent, repeated movements of your thumb or wrist  For example, lifting a small child, sewing, typing, or playing the piano can cause inflammation  A direct blow to the thumb may also damage the tendon and form scar tissue  This scar tissue can keep the tendon from working properly  What are the signs and symptoms of de Quervain's disease? · Pain and swelling near the base of your thumb are the most common symptoms  This usually occurs when you move your wrist up and down, grasp an object, or make a fist     · You will hear a grating noise when you move or rub your thumb or wrist     · Your thumb and wrist may be weak and you may have limited movement  How is de Quervain's disease diagnosed? Your healthcare provider will ask about your medical history and examine you  He will ask you to make a fist with your thumb touching the palm of your hand  Then he will ask you to move your hand and wrist in certain directions  He will check to see if you have pain, weakness, or problems with movement  Both hands may need to be checked  You may also need any of the following:  · X-rays: You may need pictures of your wrist and hand to check for a fracture  X-rays of both hands and wrists may be done  · MRI:  This scan uses powerful magnets and a computer to take pictures of your wrist and hand  An MRI may show if you have de Quervain's disease  You may be given dye to help the pictures show up better  Tell healthcare providers if you are allergic to iodine or seafood  You may also be allergic to the dye  Do not enter the MRI room with anything metal  Metal can cause serious injury   Tell healthcare providers if you have any metal in or on your body  How is de Quervain's disease treated? · Medicines:      ¨ NSAIDs:  These medicines decrease swelling, pain, and fever  They are available without a doctor's order  Ask which medicine is right for you, and how much to take  Take as directed  NSAIDs can cause stomach bleeding or kidney problems if not taken correctly  ¨ Steroid injection: This decreases long-term pain and swelling  It is usually injected into your wrist or hand  · Surgery: This may be done if other treatments do not work or your pain interferes with your daily activities  During surgery, an incision is made in the tissue that covers the tendon  This helps release pressure and reduce pain so your tendon can move freely  How can I manage my symptoms? · Splint or brace: These devices will help decrease pain, limit movement, and protect your wrist so that it can heal  Make sure your device is comfortable  If it is too tight, your fingers may feel numb or tingly  Do not push or lean on your device because it can break  · Physical or occupational therapy:  You may need to see a physical or occupational therapist to teach you special exercises  These exercises help improve movement and decrease pain  They also help improve strength and decrease your risk for loss of function  Therapists will help you make changes to your daily activities to decrease stress and pressure on the tendons  · Rest:  Rest your injured thumb or wrist  Avoid twisting, grasping, or gripping movements  Ask when you can return to your normal activities  What are the risks of de Quervain's disease? · Your thumb or wrist may not move the way it did before, even after treatment  It may take time and commitment to therapy to regain strength and normal movement of your thumb and wrist      · Without treatment, you may have increased pain and swelling  Over time, your movement and function will decrease   Eventually, you may not be able to move or use your thumb or wrist   When should I contact my healthcare provider? · Your splint or brace is too tight and you cannot loosen it  · You have a fever  · Your pain and swelling get worse or do not go away  · Your cannot grasp objects because of the pain and swelling  · You have questions or concerns about your condition or care  When should I seek immediate care? · You cannot move your thumb or wrist     · Your fingers feel numb, tingly, cool to the touch, or look blue or pale  CARE AGREEMENT:   You have the right to help plan your care  Learn about your health condition and how it may be treated  Discuss treatment options with your caregivers to decide what care you want to receive  You always have the right to refuse treatment  The above information is an  only  It is not intended as medical advice for individual conditions or treatments  Talk to your doctor, nurse or pharmacist before following any medical regimen to see if it is safe and effective for you  © 2017 [address] [employer] Information is for End User's use only and may not be sold, redistributed or otherwise used for commercial purposes  All illustrations and images included in CareNotes® are the copyrighted property of [employer] M [employer]  or Nicolás Peters

## 2018-04-09 ENCOUNTER — HOSPITAL ENCOUNTER (OUTPATIENT)
Dept: RADIOLOGY | Facility: HOSPITAL | Age: 58
Discharge: HOME | End: 2018-04-09
Attending: ORTHOPAEDIC SURGERY
Payer: COMMERCIAL

## 2018-04-09 ENCOUNTER — OFFICE VISIT (OUTPATIENT)
Dept: OBGYN CLINIC | Facility: HOSPITAL | Age: 58
End: 2018-04-09
Payer: OTHER MISCELLANEOUS

## 2018-04-09 VITALS
HEART RATE: 83 BPM | WEIGHT: 148 LBS | HEIGHT: 68 IN | SYSTOLIC BLOOD PRESSURE: 114 MMHG | DIASTOLIC BLOOD PRESSURE: 72 MMHG | BODY MASS INDEX: 22.43 KG/M2

## 2018-04-09 DIAGNOSIS — M54.2 NECK PAIN: Primary | ICD-10-CM

## 2018-04-09 DIAGNOSIS — M54.2 NECK PAIN: ICD-10-CM

## 2018-04-09 PROCEDURE — 99213 OFFICE O/P EST LOW 20 MIN: CPT | Performed by: ORTHOPAEDIC SURGERY

## 2018-04-09 PROCEDURE — 72040 X-RAY EXAM NECK SPINE 2-3 VW: CPT

## 2018-04-09 NOTE — PROGRESS NOTES
65 y o male status post C5-7 ACDF on 10/17/2017 for cervical myelopathy and cervical spinal stenosis  He has a previous past surgical history of a right carpal tunnel release, wrist surgery and shoulder surgery  He has improvement is preop persistent but still continues to complain of posterior neck pain and forearm numbness  He says this is improved from his preoperative state but continues to be bothersome  He does report resolution of his left-sided symptoms      Review of Systems  Review of systems negative unless otherwise specified in HPI    Past Medical History  Past Medical History:   Diagnosis Date    Anesthesia     Pt wakes up during "almost every surgery"    Arthritis     Asthma     Cervical radiculopathy     Chemotherapy follow-up examination     Chronic pain     COPD (chronic obstructive pulmonary disease) ([address] )     Diabetes mellitus ([facility] [address] )     borderline "years ago"    Food allergy     clams    GERD (gastroesophageal reflux disease)     Heart murmur     Hiatal hernia     History of malignant neoplasm     History of pneumonia 04/12/2016    History of pulmonary embolism     History of ulceration     Hyperlipidemia     Lung cancer (HCC)     left lung    Migraine     Right scapholunate ligament tear     Skipped heart beats     Wears partial dentures     upper and lower       Past Surgical History  Past Surgical History:   Procedure Laterality Date    BRONCHOSCOPY      COLONOSCOPY      FRACTURE SURGERY      femur right 1985    GASTRIC BYPASS LAPAROSCOPIC N/A 7/12/2017    Procedure: GASTRIC DIVERSION WITH [place]-EN-Y RECONSTRUCTION WITH  SHORT 60 cm LIMB;  Surgeon: Luis Ward MD;  [facility];  Service: Bariatrics    KNEE ARTHROSCOPY Right     right shoulder    LUNG LOBECTOMY Left     LYMPHADENECTOMY  05/22/2012    Dr Khai Hester ANT INTERBODY MIN DISCECTOMY, CERVICAL BELOW C2 N/A 10/17/2017 Procedure: C5-6, C6-7 ACDF WITH ALLOGRAFT (NEURO MONITORING); Surgeon: Nydia Smith MD;  [facility];  Service: Orthopedics    IN COLONOSCOPY FLX DX W/COLLJ SPEC WHEN PFRMD N/A 4/14/2017    Procedure: COLONOSCOPY;  Surgeon: Nader Ashraf MD;  [facility];  Service: Gastroenterology    IN ESOPHAGOGASTRODUODENOSCOPY TRANSORAL DIAGNOSTIC N/A 1/11/2017    Procedure: ESOPHAGOGASTRODUODENOSCOPY (EGD); Surgeon: Nader Ashraf MD;  [facility];   Service: Gastroenterology    IN LAP, REPAIR PARAESOPHAGEAL HERNIA, INCL FUNDOPLASTY W/ MESH N/A 7/12/2017    Procedure: REPAIR HERNIA PARAESOPHAGEAL  LAPAROSCOPIC;  Surgeon: Stephanie Mena MD;  [facility];  Service: John Saliva IN WRIST Ladean Rosalind LIG Right 6/9/2016    Procedure: RELEASE CARPAL TUNNEL ENDOSCOPIC;  Surgeon: Daina Quiñonez MD;  [facility];  Service: Orthopedics    RECONSTRUCTION DISTAL RADIUS/ULNA JOINT (DRUJ) Right 9/6/2016    Procedure: WRIST SCAPHOLUNATE LIGAMENT RECONSTRUCTION WITH ECRB TENDON AUTOGRAPH , SPLINT APPLICATION ;  Surgeon: Daina Quiñonez MD;  [facility];  Service:    Bobbi Deleon SHOULDER SURGERY      TONSILLECTOMY         Current Medications  Current Outpatient Prescriptions on File Prior to Visit   Medication Sig Dispense Refill    albuterol (PROVENTIL HFA,VENTOLIN HFA) 90 mcg/act inhaler Inhale 2 puffs every 6 (six) hours as needed for wheezing      atorvastatin (LIPITOR) 40 mg tablet Take 80 mg by mouth daily        diclofenac sodium (VOLTAREN) 1 % Place on the skin      DULoxetine (CYMBALTA) 60 mg delayed release capsule       gabapentin, once-daily, (GRALISE) 300 MG tablet Take 1 tablet by mouth 3 (three) times a day      Magnesium 400 MG CAPS Take 1 tablet by mouth daily      nortriptyline (PAMELOR) 10 mg capsule Take 10 mg by mouth daily at bedtime 3 tabs=30mg at bedtime       pantoprazole (PROTONIX) 40 mg tablet Take 1 tablet by mouth daily  0    Protein POWD Take by mouth      rizatriptan (MAXALT) 5 MG tablet Take 10 mg by mouth once as needed for migraine May repeat in 2 hours if needed        topiramate (TOPAMAX) 100 mg tablet Take 100 mg by mouth daily Pt reports currently takes 75mg AM and 75mg PM       traMADol (ULTRAM) 50 mg tablet Take by mouth       No current facility-administered medications on file prior to visit  Recent Labs [facility] [address])    0  Lab Value Date/Time   HCT 44 8 02/13/2018 0737   HCT 46 0 01/04/2016 0824   HGB 15 3 02/13/2018 0737   HGB 15 7 01/04/2016 0824   WBC 6 46 02/13/2018 0737   WBC 6 48 01/04/2016 0824   INR 1 05 10/05/2017 0856   INR 1 55 (H) 02/21/2014 0806   GLUCOSE 134 10/18/2017 0505   GLUCOSE 90 01/04/2016 0824   HGBA1C 5 3 08/14/2017 0842   HGBA1C 5 1 09/08/2015 0714         Physical exam  · General: Awake, Alert, Oriented  · Eyes: Pupils equal, round and reactive to light  · Heart: regular rate and rhythm  · Lungs: No audible wheezing  · Abdomen: soft  Neck exam  · Francis incision without erythema induration  Painful neck range of motion  5/5 strength with in C5, C6 and T1, 4/5 strength with triceps  Diminished sensation over the entire forearm in a nondermatomal distribution      Imaging  X-rays of the cervical spine show stable alignment of hardware with signs of fusion    Assessment/Plan:   57 y o male tatus post C5-7 ACDF on 10/17/2017 for cervical myelopathy and cervical spinal stenosis      · Weight-bearing as started all extremities  · Activity as tolerated  · Patient educated counseled on the other causes of his axial neck pain and residual arm numbness  · Follow-up in 3 months

## 2018-04-16 DIAGNOSIS — C34.90 MALIGNANT NEOPLASM OF BRONCHUS AND LUNG (HCC): Primary | ICD-10-CM

## 2018-05-07 ENCOUNTER — OFFICE VISIT (OUTPATIENT)
Dept: DIABETES SERVICES | Facility: CLINIC | Age: 58
End: 2018-05-07
Payer: COMMERCIAL

## 2018-05-07 ENCOUNTER — OFFICE VISIT (OUTPATIENT)
Dept: ENDOCRINOLOGY | Facility: CLINIC | Age: 58
End: 2018-05-07
Payer: COMMERCIAL

## 2018-05-07 VITALS
BODY MASS INDEX: 23.01 KG/M2 | WEIGHT: 151.8 LBS | HEART RATE: 76 BPM | SYSTOLIC BLOOD PRESSURE: 112 MMHG | DIASTOLIC BLOOD PRESSURE: 72 MMHG | HEIGHT: 68 IN

## 2018-05-07 DIAGNOSIS — E16.1 REACTIVE HYPOGLYCEMIA: Primary | ICD-10-CM

## 2018-05-07 PROCEDURE — 99244 OFF/OP CNSLTJ NEW/EST MOD 40: CPT | Performed by: INTERNAL MEDICINE

## 2018-05-07 PROCEDURE — G0108 DIAB MANAGE TRN  PER INDIV: HCPCS

## 2018-05-07 NOTE — PROGRESS NOTES
Assessment/Plan:    Reactive hypoglycemia  His from my discussion with him, he is having hypoglycemia  In order to objectively showed this, I have recommended a continuous glucose monitor which he is agreeable to  In addition, I did give him a glucometer to check his blood sugar when he has these symptoms  He will see one of our educators to learn how to use the glucometer  For the numbness and tingling, I have asked him to follow-up with Dr Chica Eaton as this may be related to nutrient deficiencies  Diagnoses and all orders for this visit:    Reactive hypoglycemia  -     T4, free Lab Collect; Future  -     TSH, 3rd generation Lab Collect; Future  -     Continous glucose monitoring dexcom placement; Future  -     Continous glucose monitoring dexcom intrepretation; Future  -     glucose blood (ONETOUCH VERIO) test strip; Use as instructed  -     ONETOUCH DELICA LANCETS FINE MISC; Use 2-3 times per day as needed  -     Ambulatory referral to Diabetic Education; Future          Subjective:      Patient ID: Carlos Kaiser is a 65 y o  male  60-year-old male who underwent Britney-en-Y gastric bypass and gastrectomy for severe acid reflux in July 2017 who presents for evaluation of hypoglycemic episodes  He states that these occur 2 to 3 times per week and started in October of 2017  He has not noticed any association with food  He states that these do wake him up in the middle of the night  There is no family history of MEN one  Eating something does make him feel better  His symptoms during these episodes include shaking and dizziness  He also complains of numbness and tingling in the feet  The following portions of the patient's history were reviewed and updated as appropriate: allergies, current medications, past family history, past medical history, past social history, past surgical history and problem list     Review of Systems   Constitutional: Negative for chills and fever     Eyes: Positive for visual disturbance  Respiratory: Negative for shortness of breath  Cardiovascular: Negative for chest pain  Gastrointestinal: Negative for constipation, diarrhea, nausea and vomiting  Neurological: Positive for dizziness and numbness  All other systems reviewed and are negative  Objective:      /72   Pulse 76   Ht 5' 8" (1 727 m)   Wt 68 9 kg (151 lb 12 8 oz)   BMI 23 08 kg/m²          Physical Exam   Constitutional: He is oriented to person, place, and time  He appears well-developed and well-nourished  No distress  HENT:   Head: Normocephalic and atraumatic  Mouth/Throat: Oropharynx is clear and moist and mucous membranes are normal  No oropharyngeal exudate  Eyes: Conjunctivae, EOM and lids are normal  Right eye exhibits no discharge  Left eye exhibits no discharge  No scleral icterus  Neck: Neck supple  No thyromegaly present  Cardiovascular: Normal rate, regular rhythm and normal heart sounds  Exam reveals no gallop and no friction rub  No murmur heard  Pulmonary/Chest: Effort normal and breath sounds normal  No respiratory distress  He has no wheezes  Abdominal: Soft  Bowel sounds are normal  He exhibits no distension  There is no tenderness  Musculoskeletal: Normal range of motion  He exhibits no edema, tenderness or deformity  Lymphadenopathy:        Head (right side): No occipital adenopathy present  Head (left side): No occipital adenopathy present  Right: No supraclavicular adenopathy present  Left: No supraclavicular adenopathy present  Neurological: He is alert and oriented to person, place, and time  No cranial nerve deficit  Coordination normal    Skin: Skin is warm and intact  No rash noted  He is not diaphoretic  No erythema  Psychiatric: He has a normal mood and affect  Vitals reviewed

## 2018-05-07 NOTE — PATIENT INSTRUCTIONS
Test blood sugar when symptoms of low blood sugar occur  Treat blood sugar less than 70 with15 grams of carbohydrate like juice  Recheck sugar in 15 minutes  Record readings for Dr campbell

## 2018-05-07 NOTE — PATIENT INSTRUCTIONS
Non-diabetic Hypoglycemia   WHAT YOU NEED TO KNOW:   What is non-diabetic hypoglycemia? Non-diabetic hypoglycemia is a condition that causes the sugar (glucose) in your blood to drop too low  This can happen in people who do not have diabetes  The 2 types of non-diabetic hypoglycemia are fasting hypoglycemia and reactive hypoglycemia  Fasting hypoglycemia often happens after the person goes without food for 8 hours or longer  Reactive hypoglycemia usually happens about 2 to 4 hours after a meal  When your blood sugar level is low, your muscles and brain cells do not have enough energy to work well  What causes non-diabetic hypoglycemia? · Fasting hypoglycemia:      ¨ Certain medicines or herbal supplements such as fenugreek, ginseng, or cinnamon    ¨ Alcohol     ¨ Exercise    ¨ Medical conditions such as liver disease, hypothyroidism, and tumors    ¨ Eating disorders or malnutrition    ¨ Stomach surgery or hemodialysis    · Reactive hypoglycemia:  The causes of reactive hypoglycemia may be unknown  ¨ Hyperinsulinism    ¨ Meals high in refined carbohydrates such as white bread or foods high in sugar    ¨ Prediabetes    ¨ Any surgery of the digestive system  What are the signs and symptoms of non-diabetic hypoglycemia? · Blurred vision or changes in vision    · Dizziness, lightheadedness, or shakiness    · Fatigue and weakness    · Fast or pounding heartbeat    · Sweating more than usual    · Headache     · Nausea or hunger    · Anxiety, Irritability, or confusion  How is non-diabetic hypoglycemia diagnosed? · Blood tests  are done to measure your blood sugar levels  These tests may also be done to find the cause of your hypoglycemia  · Fasting tests  may be done  You may have an overnight fasting test or a 72-hour fasting test  After you have fasted overnight, your blood sugar levels will be tested 2 times  For a 72-hour fasting test, you will not be given food for a period of up to 72 hours   During this time, healthcare providers will check to see if your blood sugar drops to a certain level  · An oral glucose tolerance test  may be done  After you have fasted for 8 hours, your blood sugar level is tested  You are then given a glucose drink  Your blood sugar level is checked after 1 hour and again after 2 hours  Healthcare providers look at how much your blood sugar level increases from the first check  How is non-diabetic hypoglycemia treated? · Keep food or drinks that contain carbohydrates on hand  Carbohydrates will raise your blood sugar level when you have hypoglycemia  Carbohydrates are found in bread, rice, cereal, fruits, juice, and milk  If you cannot eat or drink, your healthcare provider will give you glucose through an IV  An IV is a small tube placed in your vein  You may also receive a hormone called glucagon that helps raise your blood sugar level  · Treatment will depend on the cause of the hypoglycemia  For example, if a medicine you take is causing hypoglycemia, healthcare providers may change or stop giving you the medicine  If hypoglycemia is caused by low hormone levels, you may need to take hormones  How can I prevent hypoglycemia? You may need to change what and when you eat to prevent low blood sugar levels  Follow the meal plan that you and the dietitian have planned  The following guidelines may help you keep your blood sugar levels under control  · Eat 5 to 6 small meals each day instead of 3 large meals  Eat the same amount of carbohydrate at meals and snacks each day  Most people need about 3 to 4 servings of carbohydrate at meals and 1 to 2 servings for snacks  Do not skip meals  Carbohydrate counting can be used plan your meals  Ask your healthcare provider or dietitian for information about carbohydrate counting  · Limit refined carbohydrates  Examples are white bread, pastries (pies and cakes), regular sodas, syrups, and candy      · Do not have drinks or foods that contain caffeine  Examples are coffee, tea, and certain types of sodas  Caffeine may cause you to have the same symptoms as hypoglycemia, and may cause you to feel worse  · Limit or do not drink alcohol  Women should limit alcohol to 1 drink a day  Men should limit alcohol to 2 drinks a day  A drink of alcohol is 12 ounces of beer, 5 ounces of wine, or 1½ ounces of liquor  Do not drink alcohol on an empty stomach  Drink alcohol with meals to avoid hypoglycemia  · Include protein foods and vegetables in your meals  Some foods that are high in protein include beef, pork, fish, poultry (chicken and turkey), beans, and nuts  Eat a variety of vegetables with your meals  When should I contact my healthcare provider? · You have blurred vision or vision changes  · You feel very tired and weak  · You are sweating more than usual for you  · You have a fast heartbeat  · You feel dizzy, lightheaded, and shaky  · You have questions about your condition or care  When should I seek immediate care or call 911? · You have symptoms of hypoglycemia and cannot eat  · You have trouble thinking clearly  · You have a seizure or faint  CARE AGREEMENT:   You have the right to help plan your care  Learn about your health condition and how it may be treated  Discuss treatment options with your caregivers to decide what care you want to receive  You always have the right to refuse treatment  The above information is an  only  It is not intended as medical advice for individual conditions or treatments  Talk to your doctor, nurse or pharmacist before following any medical regimen to see if it is safe and effective for you  © 2017 [employer]  Information is for End User's use only and may not be sold, redistributed or otherwise used for commercial purposes   All illustrations and images included in CareNotes® are the copyrighted property of [employer]  or [employer]

## 2018-05-07 NOTE — ASSESSMENT & PLAN NOTE
His from my discussion with him, he is having hypoglycemia  In order to objectively showed this, I have recommended a continuous glucose monitor which he is agreeable to  In addition, I did give him a glucometer to check his blood sugar when he has these symptoms  He will see one of our educators to learn how to use the glucometer

## 2018-05-07 NOTE — LETTER
May 7, 2018     Indutl Briscoe, [address] [place] [facility] [address]    Patient: Tessa Nielson   YOB: 1960   Date of Visit: 5/7/2018       Dear Dr Leola Alfaro:    Thank you for referring Tessa Nielson to me for evaluation  Below are my notes for this consultation  If you have questions, please do not hesitate to call me  I look forward to following your patient along with you  Sincerely,        Tee Sun MD        CC: MD Seble Santillan MD Lem Ely, MD  5/7/2018  9:42 AM  Sign at close encounter  Assessment/Plan:    Reactive hypoglycemia  His from my discussion with him, he is having hypoglycemia  In order to objectively showed this, I have recommended a continuous glucose monitor which he is agreeable to  In addition, I did give him a glucometer to check his blood sugar when he has these symptoms  He will see one of our educators to learn how to use the glucometer  For the numbness and tingling, I have asked him to follow-up with Dr Remington Baptiste as this may be related to nutrient deficiencies  Diagnoses and all orders for this visit:    Reactive hypoglycemia  -     T4, free Lab Collect; Future  -     TSH, 3rd generation Lab Collect; Future  -     Continous glucose monitoring dexcom placement; Future  -     Continous glucose monitoring dexcom intrepretation; Future  -     glucose blood (ONETOUCH VERIO) test strip; Use as instructed  -     ONETOUCH DELICA LANCETS FINE MISC; Use 2-3 times per day as needed  -     Ambulatory referral to Diabetic Education; Future          Subjective:      Patient ID: Tessa Nielson is a 65 y o  male  27-year-old male who underwent Britney-en-Y gastric bypass and gastrectomy for severe acid reflux in July 2017 who presents for evaluation of hypoglycemic episodes  He states that these occur 2 to 3 times per week and started in October of 2017  He has not noticed any association with food    He states that these do wake him up in the middle of the night  There is no family history of MEN one  Eating something does make him feel better  His symptoms during these episodes include shaking and dizziness  He also complains of numbness and tingling in the feet  The following portions of the patient's history were reviewed and updated as appropriate: allergies, current medications, past family history, past medical history, past social history, past surgical history and problem list     Review of Systems   Constitutional: Negative for chills and fever  Eyes: Positive for visual disturbance  Respiratory: Negative for shortness of breath  Cardiovascular: Negative for chest pain  Gastrointestinal: Negative for constipation, diarrhea, nausea and vomiting  Neurological: Positive for dizziness and numbness  All other systems reviewed and are negative  Objective:      /72   Pulse 76   Ht 5' 8" (1 727 m)   Wt 68 9 kg (151 lb 12 8 oz)   BMI 23 08 kg/m²           Physical Exam   Constitutional: He is oriented to person, place, and time  He appears well-developed and well-nourished  No distress  HENT:   Head: Normocephalic and atraumatic  Mouth/Throat: Oropharynx is clear and moist and mucous membranes are normal  No oropharyngeal exudate  Eyes: Conjunctivae, EOM and lids are normal  Right eye exhibits no discharge  Left eye exhibits no discharge  No scleral icterus  Neck: Neck supple  No thyromegaly present  Cardiovascular: Normal rate, regular rhythm and normal heart sounds  Exam reveals no gallop and no friction rub  No murmur heard  Pulmonary/Chest: Effort normal and breath sounds normal  No respiratory distress  He has no wheezes  Abdominal: Soft  Bowel sounds are normal  He exhibits no distension  There is no tenderness  Musculoskeletal: Normal range of motion  He exhibits no edema, tenderness or deformity  Lymphadenopathy:        Head (right side): No occipital adenopathy present  Head (left side): No occipital adenopathy present  Right: No supraclavicular adenopathy present  Left: No supraclavicular adenopathy present  Neurological: He is alert and oriented to person, place, and time  No cranial nerve deficit  Coordination normal    Skin: Skin is warm and intact  No rash noted  He is not diaphoretic  No erythema  Psychiatric: He has a normal mood and affect  Vitals reviewed

## 2018-05-07 NOTE — PROGRESS NOTES
Monitoring Blood Sugars    Instructions for Meter Teaching- Patient instructed in the following:  Site selection and skin preparation, Loading strips and lancet device, meter activation, obtaining blood sample, test strip and lancet disposal and recording log book entries  Patient has good understanding of material covered and was able to test their own blood sugar in office today  Comments: Instructed on OneTouch Verio Flex meter  After demonstration and instruction, the patient performed an acceptable test;  blood sugar 81 mg/dl  at 10:45AM, fasting  Lot #: [id number]  Exp Date: 12/31/18    Testing frequency: When symptoms of hypoglycemia occur  Record results  Logbook given  Hypoglycemia: Treat blood sugars <70 with 15 grams of carbohydrate like 4 oz juice  Wash hands and recheck BG in 15 minutes  "15/15 Rule" handout given and reviewed

## 2018-05-09 ENCOUNTER — OFFICE VISIT (OUTPATIENT)
Dept: OBGYN CLINIC | Facility: HOSPITAL | Age: 58
End: 2018-05-09
Payer: OTHER MISCELLANEOUS

## 2018-05-09 VITALS
WEIGHT: 148.8 LBS | HEIGHT: 69 IN | SYSTOLIC BLOOD PRESSURE: 100 MMHG | BODY MASS INDEX: 22.04 KG/M2 | DIASTOLIC BLOOD PRESSURE: 67 MMHG | HEART RATE: 91 BPM

## 2018-05-09 DIAGNOSIS — M65.4 DE QUERVAIN'S TENOSYNOVITIS, RIGHT: Primary | ICD-10-CM

## 2018-05-09 DIAGNOSIS — M18.11 PRIMARY OSTEOARTHRITIS OF FIRST CARPOMETACARPAL JOINT OF RIGHT HAND: ICD-10-CM

## 2018-05-09 PROCEDURE — 99213 OFFICE O/P EST LOW 20 MIN: CPT | Performed by: ORTHOPAEDIC SURGERY

## 2018-05-09 NOTE — PROGRESS NOTES
ASSESSMENT/PLAN:    Diagnoses and all orders for this visit:    Nobie Bloch tenosynovitis, right  -     Thumb Cude comf/Cool    Primary osteoarthritis of first carpometacarpal joint of right hand  -     Thumb Cude comf/Cool        Assessment:   de Quervain stenosis tenosynovitis  right and Thumb CMC Arthritis  right  With residual numbness due to ACDF C5-7    Plan:   Patient will be given a comfort cool brace to wear prn     Follow Up:  2  month(s)    To Do Next Visit:       General Discussions:  CMC Arthritis: The anatomy and physiology of carpometacarpal joint arthritis was discussed with the patient today in the office  Deterioration of the articular cartilage eventually leads to hypermobility at the thumb [facility] joint, resulting in joint subluxation, osteophyte formation, cystic changes within the trapezium and base of the first metacarpal, as well as subchondral sclerosis  Eventually, pain, limited mobility, and compensatory hyperextension at the metacarpophalangeal joint may develop  While normal activity and usage of the thumb joint may provide a painful experience to the patient, this typically does not result in damage to the thumb or hand  Treatment options include resting thumb spica splints to decreased joint edema, pain, and inflammation  Therapy exercises to strengthen the thenar musculature may relieve pain, but do not alter the overall continued development of osteoarthritis  Oral medications, topical medications, corticosteroid injections may decrease pain and increase overall function  Eventually, approximately 5% of patients may require surgical intervention  Chema Walker Tenosynovitis: The anatomy and physiology of de Quervain's tenosynovitis was discussed with the patient today in the office  Edema and increased contact pressure within the first dorsal extensor compartment at the radial styloid can cause pain, crepitation, and limitation of function    Treatment options include resting thumb spica splints to decrease edema, oral anti-inflammatory medications, home or formal therapy exercises, up to 2 steroid injections within the first dorsal extensor compartment, or surgical release  While majority of patients do respond to conservative treatment, up to 20% may require surgical release  Operative Discussions:         _____________________________________________________  CHIEF COMPLAINT:  Chief Complaint   Patient presents with    Right Wrist - Follow-up         SUBJECTIVE:  Enma Anders is a 65y o  year old male who presents for follow up regarding de Quervain stenosis tenosynovitis  right  Since last visit, Enma Anders has tried steroid injections without relief    Today there is Pain  Moderate  Intermittant  Sharp and Aching to the right wrist     Radiation: Yes to the  hand  Associated symptoms: No Complaints    PAST MEDICAL HISTORY:  Past Medical History:   Diagnosis Date    Anesthesia     Pt wakes up during "almost every surgery"    Arthritis     Asthma     Cervical radiculopathy     Chemotherapy follow-up examination     Chronic pain     COPD (chronic obstructive pulmonary disease) ([address]     Diabetes mellitus ([facility] [address] )     borderline "years ago"    Food allergy     clams    GERD (gastroesophageal reflux disease)     Heart murmur     Hiatal hernia     History of malignant neoplasm     History of pneumonia 04/12/2016    History of pulmonary embolism     History of ulceration     Hyperlipidemia     Lung cancer (HCC)     left lung    Migraine     Right scapholunate ligament tear     Skipped heart beats     Wears partial dentures     upper and lower       PAST SURGICAL HISTORY:  Past Surgical History:   Procedure Laterality Date    BRONCHOSCOPY      COLONOSCOPY      FRACTURE SURGERY      femur right 1985    GASTRIC BYPASS LAPAROSCOPIC N/A 7/12/2017    Procedure: GASTRIC DIVERSION WITH [place]-EN-Y RECONSTRUCTION WITH  SHORT 60 cm LIMB;  Surgeon: Victoriano Dempsey MD; Location: [facility];  Service: Bariatrics    KNEE ARTHROSCOPY Right     right shoulder    LUNG LOBECTOMY Left     LYMPHADENECTOMY  05/22/2012    Dr Marii Velazquez ANT INTERBODY MIN DISCECTOMY, CERVICAL BELOW C2 N/A 10/17/2017    Procedure: C5-6, C6-7 ACDF WITH ALLOGRAFT (NEURO MONITORING); Surgeon: Yohannes Triplett MD;  [facility];  Service: Orthopedics    RI COLONOSCOPY FLX DX W/COLLJ SPEC WHEN PFRMD N/A 4/14/2017    Procedure: COLONOSCOPY;  Surgeon: Carlene High MD;  Location: [facility];  Service: Gastroenterology    RI ESOPHAGOGASTRODUODENOSCOPY TRANSORAL DIAGNOSTIC N/A 1/11/2017    Procedure: ESOPHAGOGASTRODUODENOSCOPY (EGD); Surgeon: Carlene High MD;  [facility];   Service: Gastroenterology    RI LAP, REPAIR PARAESOPHAGEAL HERNIA, INCL FUNDOPLASTY W/ MESH N/A 7/12/2017    Procedure: REPAIR HERNIA PARAESOPHAGEAL  LAPAROSCOPIC;  Surgeon: Sharad Givens MD;  [facility];  Service: Chapis Balderas WRIST Chalice Pastel LIG Right 6/9/2016    Procedure: RELEASE CARPAL TUNNEL ENDOSCOPIC;  Surgeon: Becky Sanchez MD;  [facility];  Service: Orthopedics    RECONSTRUCTION DISTAL RADIUS/ULNA JOINT (DRUJ) Right 9/6/2016    Procedure: WRIST SCAPHOLUNATE LIGAMENT RECONSTRUCTION WITH ECRB TENDON AUTOGRAPH , SPLINT APPLICATION ;  Surgeon: Becky Sanchez MD;  [facility];  Service:    Afia Silence SHOULDER SURGERY      TONSILLECTOMY         FAMILY HISTORY:  Family History   Problem Relation Age of Onset    Other Mother      Back disorder    Diabetes Mother      Diabetes Mellitus    Hypertension Mother     Heart disease Father     Hypertension Father     Breast cancer Sister     Skin cancer Sister     Breast cancer Paternal Grandmother     Skin cancer Paternal Grandmother        SOCIAL HISTORY:  Social History   Substance Use Topics    Smoking status: Former Smoker     Packs/day: 0 50 Years: 48 00     Types: Cigarettes     Quit date: 5/1/2017    Smokeless tobacco: Former User      Comment:  quit 5/2107    Alcohol use 1 8 oz/week     3 Cans of beer per week      Comment: occasionally       MEDICATIONS:    Current Outpatient Prescriptions:     albuterol (PROVENTIL HFA,VENTOLIN HFA) 90 mcg/act inhaler, Inhale 2 puffs every 6 (six) hours as needed for wheezing, Disp: , Rfl:     atorvastatin (LIPITOR) 40 mg tablet, Take 80 mg by mouth daily  , Disp: , Rfl:     diclofenac sodium (VOLTAREN) 1 %, Place on the skin, Disp: , Rfl:     DULoxetine (CYMBALTA) 60 mg delayed release capsule, , Disp: , Rfl:     gabapentin, once-daily, (GRALISE) 300 MG tablet, Take 1 tablet by mouth 3 (three) times a day, Disp: , Rfl:     glucose blood (ONETOUCH VERIO) test strip, Use as instructed, Disp: 100 each, Rfl: 0    Magnesium 400 MG CAPS, Take 1 tablet by mouth daily, Disp: , Rfl:     nortriptyline (PAMELOR) 10 mg capsule, Take 10 mg by mouth daily at bedtime 3 tabs=30mg at bedtime , Disp: , Rfl:     ONETOUCH DELICA LANCETS FINE MISC, Use 2-3 times per day as needed, Disp: 100 each, Rfl: 2    pantoprazole (PROTONIX) 40 mg tablet, Take 1 tablet by mouth daily, Disp: , Rfl: 0    Protein POWD, Take by mouth, Disp: , Rfl:     topiramate (TOPAMAX) 100 mg tablet, Take 100 mg by mouth daily Pt reports currently takes 75mg AM and 75mg PM , Disp: , Rfl:     traMADol (ULTRAM) 50 mg tablet, Take by mouth, Disp: , Rfl:     ALLERGIES:  Allergies   Allergen Reactions    Codeine Hives, GI Intolerance and Itching     Other reaction(s): Nausea and/or vomiting    Hydrocodone Hives and GI Intolerance    Penicillins Anaphylaxis and Throat Swelling    Shellfish-Derived Products      Other reaction(s): Nausea and/or vomiting  Clams only - nausea & vomiting       REVIEW OF SYSTEMS:  Pertinent items are noted in HPI      LABS:  HgA1c:   Lab Results   Component Value Date    HGBA1C 5 3 08/14/2017     BMP:   Lab Results Component Value Date    GLUCOSE 134 10/18/2017    CALCIUM 9 0 02/13/2018     02/13/2018    K 4 4 02/13/2018    CO2 28 02/13/2018     02/13/2018    BUN 11 02/13/2018    CREATININE 0 85 02/13/2018           _____________________________________________________  PHYSICAL EXAMINATION:  General: well developed and well nourished, alert, oriented times 3 and appears comfortable  Psychiatric: Normal  HEENT: Trachea Midline, No torticollis  Cardiovascular: No discernable arrhythmia  Pulmonary: No wheezing or stridor  Skin: No masses, erthema, lacerations, fluctation, ulcerations  Neurovascular: Sensation Intact to the Median, Ulnar, Radial Nerve, Motor Intact to the Median, Ulnar, Radial Nerve and Pulses Intact    MUSCULOSKELETAL EXAMINATION:  RIGHT SIDE:  CMC: Positive tendnerness CMC and negative circumduction and De Quervain:  Tenderness Radial Styloid, Positive Finkelstein's Test and Positive Crepitation Radial Styloid    _____________________________________________________  STUDIES REVIEWED:  No Studies to review      PROCEDURES PERFORMED:  Procedures  No Procedures performed today   Scribe Attestation    I,:   Rafat Walker am acting as a scribe while in the presence of the attending physician :        I,:   Elva Vieira MD personally performed the services described in this documentation    as scribed in my presence :

## 2018-05-15 DIAGNOSIS — E16.1 REACTIVE HYPOGLYCEMIA: Primary | ICD-10-CM

## 2018-05-16 RX ORDER — BLOOD SUGAR DIAGNOSTIC
STRIP MISCELLANEOUS
Qty: 100 EACH | Refills: 3 | Status: SHIPPED | OUTPATIENT
Start: 2018-05-16 | End: 2019-09-18 | Stop reason: ALTCHOICE

## 2018-06-11 ENCOUNTER — HOSPITAL ENCOUNTER (OUTPATIENT)
Dept: CT IMAGING | Facility: HOSPITAL | Age: 58
Discharge: HOME | End: 2018-06-11
Attending: THORACIC SURGERY (CARDIOTHORACIC VASCULAR SURGERY)
Payer: COMMERCIAL

## 2018-06-11 DIAGNOSIS — C34.90 MALIGNANT NEOPLASM OF BRONCHUS AND LUNG (HCC): ICD-10-CM

## 2018-06-11 PROCEDURE — 71250 CT THORAX DX C-: CPT

## 2018-06-18 ENCOUNTER — TELEPHONE (OUTPATIENT)
Dept: CARDIAC SURGERY | Facility: CLINIC | Age: 58
End: 2018-06-18

## 2018-06-18 ENCOUNTER — OFFICE VISIT (OUTPATIENT)
Dept: CARDIAC SURGERY | Facility: CLINIC | Age: 58
End: 2018-06-18
Payer: COMMERCIAL

## 2018-06-18 VITALS
HEART RATE: 77 BPM | HEIGHT: 68 IN | TEMPERATURE: 98 F | DIASTOLIC BLOOD PRESSURE: 63 MMHG | OXYGEN SATURATION: 98 % | WEIGHT: 144.6 LBS | SYSTOLIC BLOOD PRESSURE: 119 MMHG | BODY MASS INDEX: 21.92 KG/M2

## 2018-06-18 DIAGNOSIS — C34.92 ADENOCARCINOMA OF LEFT LUNG (HCC): Primary | ICD-10-CM

## 2018-06-18 PROCEDURE — 99213 OFFICE O/P EST LOW 20 MIN: CPT | Performed by: PHYSICIAN ASSISTANT
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
## 2018-06-18 NOTE — ASSESSMENT & PLAN NOTE
Mr Kevin Ko is currently 6 years out from resection and is stable from a thoracic surgery and lung cancer standpoint  His most recent CT scan did reveal any evidence of recurrent or metastatic disease  We will therefore continue to follow him on a yearly basis and he will return to our office then  He is in agreement with the above plan

## 2018-06-18 NOTE — TELEPHONE ENCOUNTER
We were unable to collect patient's copay at time of visit  Card declined numerous times, both swiping the card and typing it in manually  I asked the pt if he had another way to pay his copay, that his card declined  He stated he only had his card that was given  I discussed with the pt that copays are expected to be paid at time of service, however, as a one time courtesy we will see him today for his visit  We asked that he brings another form of payment to his next appt

## 2018-06-21 ENCOUNTER — LAB (OUTPATIENT)
Dept: LAB | Facility: CLINIC | Age: 58
End: 2018-06-21
Payer: MEDICARE

## 2018-06-21 DIAGNOSIS — E16.1 REACTIVE HYPOGLYCEMIA: ICD-10-CM

## 2018-06-21 LAB
T4 FREE SERPL-MCNC: 1.07 NG/DL (ref 0.76–1.46)
TSH SERPL DL<=0.05 MIU/L-ACNC: 1.4 UIU/ML (ref 0.36–3.74)

## 2018-06-21 PROCEDURE — 84443 ASSAY THYROID STIM HORMONE: CPT

## 2018-06-21 PROCEDURE — 84439 ASSAY OF FREE THYROXINE: CPT

## 2018-06-21 PROCEDURE — 36415 COLL VENOUS BLD VENIPUNCTURE: CPT

## 2018-06-22 ENCOUNTER — TELEPHONE (OUTPATIENT)
Dept: PAIN MEDICINE | Facility: MEDICAL CENTER | Age: 58
End: 2018-06-22

## 2018-06-22 NOTE — TELEPHONE ENCOUNTER
Sesar from [facility] left a message in voicemail at 9:39 this morning  Stated that he needs to speak with you regarding a medical records request that was sent for this patient  He left a call back number of [phone]

## 2018-06-25 ENCOUNTER — OFFICE VISIT (OUTPATIENT)
Dept: FAMILY MEDICINE CLINIC | Facility: CLINIC | Age: 58
End: 2018-06-25
Payer: COMMERCIAL

## 2018-06-25 ENCOUNTER — TELEPHONE (OUTPATIENT)
Dept: ENDOCRINOLOGY | Facility: CLINIC | Age: 58
End: 2018-06-25

## 2018-06-25 VITALS
SYSTOLIC BLOOD PRESSURE: 121 MMHG | BODY MASS INDEX: 21.52 KG/M2 | TEMPERATURE: 98.4 F | HEIGHT: 68 IN | HEART RATE: 92 BPM | DIASTOLIC BLOOD PRESSURE: 71 MMHG | WEIGHT: 142 LBS

## 2018-06-25 DIAGNOSIS — G89.29 CHRONIC BILATERAL THORACIC BACK PAIN: Primary | ICD-10-CM

## 2018-06-25 DIAGNOSIS — M54.6 CHRONIC BILATERAL THORACIC BACK PAIN: Primary | ICD-10-CM

## 2018-06-25 PROCEDURE — 99213 OFFICE O/P EST LOW 20 MIN: CPT | Performed by: FAMILY MEDICINE

## 2018-06-25 RX ORDER — PREDNISONE 10 MG/1
TABLET ORAL
Qty: 30 TABLET | Refills: 0 | Status: ON HOLD | OUTPATIENT
Start: 2018-06-25 | End: 2018-11-27 | Stop reason: ALTCHOICE

## 2018-06-25 RX ORDER — METHOCARBAMOL 500 MG/1
500 TABLET, FILM COATED ORAL 3 TIMES DAILY
Qty: 30 TABLET | Refills: 2 | Status: ON HOLD | OUTPATIENT
Start: 2018-06-25 | End: 2018-11-27 | Stop reason: ALTCHOICE

## 2018-06-25 NOTE — TELEPHONE ENCOUNTER
----- Message from Tee Sun MD sent at 6/24/2018 10:42 PM EDT -----  The laboratory testing results are normal

## 2018-06-25 NOTE — TELEPHONE ENCOUNTER
Request was faxed to [facility] on 06/15

## 2018-06-25 NOTE — PROGRESS NOTES
Assessment/Plan:    No problem-specific Assessment & Plan notes found for this encounter  There are no diagnoses linked to this encounter  Subjective:      Patient ID: Sherren Cue is a 65 y o  male  Back pain worse in the last 2 weeks      Back Pain   This is a chronic problem  The current episode started more than 1 month ago  The problem has been rapidly worsening since onset  The pain is present in the thoracic spine  The quality of the pain is described as aching and burning  The pain radiates to the left thigh and right thigh  The pain is at a severity of 10/10  The pain is severe  The symptoms are aggravated by sitting  He has tried ice (biofreeze) for the symptoms  The treatment provided mild relief  The following portions of the patient's history were reviewed and updated as appropriate: allergies, current medications, past family history, past medical history, past social history, past surgical history and problem list     Review of Systems   Gastrointestinal:        Negative for stool incontinence   Genitourinary:        Negative for urinary incontinence   Musculoskeletal: Positive for back pain  Objective:      /71   Pulse 92   Temp 98 4 °F (36 9 °C)   Ht 5' 8" (1 727 m)   Wt 64 4 kg (142 lb)   BMI 21 59 kg/m²          Physical Exam   Constitutional: He is oriented to person, place, and time  He appears well-developed and well-nourished  No distress  HENT:   Head: Normocephalic and atraumatic  Eyes: Conjunctivae and EOM are normal  Pupils are equal, round, and reactive to light  No scleral icterus  Neck: Normal range of motion  Neck supple  Cardiovascular: Normal rate, regular rhythm and normal heart sounds  No murmur heard  Pulmonary/Chest: Effort normal and breath sounds normal  No respiratory distress  He has no wheezes  He has no rales  Musculoskeletal: He exhibits no edema  Lymphadenopathy:     He has no cervical adenopathy     Neurological: He is alert and oriented to person, place, and time  Skin: Skin is warm and dry  He is not diaphoretic  Psychiatric: He has a normal mood and affect  His behavior is normal  Judgment and thought content normal    Nursing note and vitals reviewed  Back Exam     Tenderness   The patient is experiencing tenderness in the thoracic      Range of Motion   Extension: abnormal   Flexion: abnormal   Lateral Bend Right: abnormal   Lateral Bend Left: abnormal   Rotation Right: abnormal   Rotation Left: abnormal

## 2018-07-11 ENCOUNTER — OFFICE VISIT (OUTPATIENT)
Dept: OBGYN CLINIC | Facility: HOSPITAL | Age: 58
End: 2018-07-11
Payer: OTHER MISCELLANEOUS

## 2018-07-11 ENCOUNTER — OFFICE VISIT (OUTPATIENT)
Dept: FAMILY MEDICINE CLINIC | Facility: CLINIC | Age: 58
End: 2018-07-11
Payer: COMMERCIAL

## 2018-07-11 VITALS
BODY MASS INDEX: 19.85 KG/M2 | WEIGHT: 134 LBS | SYSTOLIC BLOOD PRESSURE: 120 MMHG | HEIGHT: 69 IN | TEMPERATURE: 99 F | DIASTOLIC BLOOD PRESSURE: 70 MMHG

## 2018-07-11 VITALS
WEIGHT: 134.8 LBS | BODY MASS INDEX: 19.96 KG/M2 | HEIGHT: 69 IN | DIASTOLIC BLOOD PRESSURE: 73 MMHG | SYSTOLIC BLOOD PRESSURE: 115 MMHG | HEART RATE: 76 BPM

## 2018-07-11 DIAGNOSIS — M79.601 CHRONIC PAIN OF RIGHT UPPER EXTREMITY: Primary | ICD-10-CM

## 2018-07-11 DIAGNOSIS — G89.29 CHRONIC PAIN OF RIGHT UPPER EXTREMITY: Primary | ICD-10-CM

## 2018-07-11 DIAGNOSIS — M54.41 ACUTE BILATERAL LOW BACK PAIN WITH BILATERAL SCIATICA: Primary | ICD-10-CM

## 2018-07-11 DIAGNOSIS — M54.16 LUMBAR RADICULOPATHY: ICD-10-CM

## 2018-07-11 DIAGNOSIS — M54.42 ACUTE BILATERAL LOW BACK PAIN WITH BILATERAL SCIATICA: Primary | ICD-10-CM

## 2018-07-11 PROCEDURE — 99213 OFFICE O/P EST LOW 20 MIN: CPT | Performed by: FAMILY MEDICINE

## 2018-07-11 PROCEDURE — 20526 THER INJECTION CARP TUNNEL: CPT | Performed by: ORTHOPAEDIC SURGERY

## 2018-07-11 PROCEDURE — 99213 OFFICE O/P EST LOW 20 MIN: CPT | Performed by: ORTHOPAEDIC SURGERY

## 2018-07-11 RX ORDER — LIDOCAINE HYDROCHLORIDE 10 MG/ML
1 INJECTION, SOLUTION INFILTRATION; PERINEURAL
Status: COMPLETED | OUTPATIENT
Start: 2018-07-11 | End: 2018-07-11

## 2018-07-11 RX ORDER — BETAMETHASONE SODIUM PHOSPHATE AND BETAMETHASONE ACETATE 3; 3 MG/ML; MG/ML
6 INJECTION, SUSPENSION INTRA-ARTICULAR; INTRALESIONAL; INTRAMUSCULAR; SOFT TISSUE
Status: COMPLETED | OUTPATIENT
Start: 2018-07-11 | End: 2018-07-11

## 2018-07-11 RX ORDER — OXYCODONE HYDROCHLORIDE AND ACETAMINOPHEN 5; 325 MG/1; MG/1
1 TABLET ORAL EVERY 4 HOURS PRN
Qty: 30 TABLET | Refills: 0 | Status: ON HOLD | OUTPATIENT
Start: 2018-07-11 | End: 2018-11-27 | Stop reason: ALTCHOICE

## 2018-07-11 RX ADMIN — BETAMETHASONE SODIUM PHOSPHATE AND BETAMETHASONE ACETATE 6 MG: 3; 3 INJECTION, SUSPENSION INTRA-ARTICULAR; INTRALESIONAL; INTRAMUSCULAR; SOFT TISSUE at 11:14

## 2018-07-11 RX ADMIN — LIDOCAINE HYDROCHLORIDE 1 ML: 10 INJECTION, SOLUTION INFILTRATION; PERINEURAL at 11:14

## 2018-07-11 NOTE — PROGRESS NOTES
ASSESSMENT/PLAN:    Assessment:   Carpal Tunnel Syndrome  right    Plan:   - f/u vitamin B12 levels  - carpal  Tunnel injection right side   - follow-up in 2 months for re-evaluation,  Check if carpal tunnel injection helped, consider injection of right ring trigger finger  We are truly concerned about his loss of weight as he has lost multiple lb over the last week  We would like him to be further of value waited with by his GI doctor which he has an appointment with on Friday  He will follow up with us in approximately 6 weeks for repeat evaluation  If he does not get dressed improvement from the carpal tunnel injection, this is likely coming either from vitamin deficiency, which we will check his B6, or cervical pathology in nature  We are not introducing surgical intervention at this point as we are concerned about the potential for healing given his nutritional status  Call with questions  Follow Up:  4-5wks    To Do Next Visit:           _____________________________________________________  CHIEF COMPLAINT:  Chief Complaint   Patient presents with    Right Wrist - Follow-up         SUBJECTIVE:  Selene Arana is a 65y o  year old male who presents for follow up regarding Carpal Tunnel Syndrome  right  Since last visit, Selene Arana has tried steroid injections and activity modification with only partial relief  Today there is Pain  Mild  Intermittant  Electric and Burning, Numbness and Catching to the right index finger, long finger and thumb  Also notes some catching and locking of his right ring finger  Loss of weight 5-10 lbs over last week    Radiation: None  Associated symptoms: Numbness, Catching and Locking    PAST MEDICAL HISTORY:  Past Medical History:   Diagnosis Date    Anesthesia     Pt wakes up during "almost every surgery"    Arthritis     Asthma     Cervical radiculopathy     Chemotherapy follow-up examination     Chronic pain     [facility]ch[facility]     Diabetes mellitus ([facility] )     borderline "years ago"    Food allergy     clams    GERD (gastroesophageal reflux disease)     Heart murmur     Hiatal hernia     History of malignant neoplasm     History of pneumonia 04/12/2016    History of pulmonary embolism     History of ulceration     Hyperlipidemia     Lung cancer (HCC)     left lung    Migraine     Right scapholunate ligament tear     Skipped heart beats     Wears partial dentures     upper and lower       PAST SURGICAL HISTORY:  Past Surgical History:   Procedure Laterality Date    BRONCHOSCOPY      COLONOSCOPY      FRACTURE SURGERY      femur right 1985    GASTRIC BYPASS LAPAROSCOPIC N/A 7/12/2017    Procedure: GASTRIC DIVERSION WITH [place]-EN-Y RECONSTRUCTION WITH  SHORT 60 cm LIMB;  Surgeon: Maurisio Lewis MD;  Location: [facility];  Service: Bariatrics    KNEE ARTHROSCOPY Right     right shoulder    LUNG LOBECTOMY Left     LYMPHADENECTOMY  05/22/2012    Dr Isaias Barros ANT INTERBODY MIN DISCECTOMY, CERVICAL BELOW C2 N/A 10/17/2017    Procedure: C5-6, C6-7 ACDF WITH ALLOGRAFT (NEURO MONITORING); Surgeon: Shereen De Los Santos MD;  [facility];  Service: Orthopedics    UT COLONOSCOPY FLX DX W/COLLJ SPEC WHEN PFRMD N/A 4/14/2017    Procedure: COLONOSCOPY;  Surgeon: Tania Long MD;  Location: [facility];  Service: Gastroenterology    UT ESOPHAGOGASTRODUODENOSCOPY TRANSORAL DIAGNOSTIC N/A 1/11/2017    Procedure: ESOPHAGOGASTRODUODENOSCOPY (EGD); Surgeon: Tania Long MD;  Location: [facility];   Service: Gastroenterology    UT LAP, REPAIR PARAESOPHAGEAL HERNIA, INCL FUNDOPLASTY W/ MESH N/A 7/12/2017    Procedure: REPAIR HERNIA PARAESOPHAGEAL  LAPAROSCOPIC;  Surgeon: Maurisio Lewis MD;  [facility]: [facility];  Service: Bariatrics    UT WRIST Deborah Ball LIG Right 6/9/2016    Procedure: RELEASE CARPAL TUNNEL ENDOSCOPIC;  Surgeon: Sarita Castelan MD;  [facility];  Service: Orthopedics    RECONSTRUCTION DISTAL RADIUS/ULNA JOINT (DRUJ) Right 9/6/2016    Procedure: WRIST SCAPHOLUNATE LIGAMENT RECONSTRUCTION WITH ECRB TENDON AUTOGRAPH , SPLINT APPLICATION ;  Surgeon: Sarita Castelan MD;  [facility];  Service:    [facility] SHOULDER SURGERY      TONSILLECTOMY         FAMILY HISTORY:  Family History   Problem Relation Age of Onset    Other Mother         Back disorder    Diabetes Mother         Diabetes Mellitus    Hypertension Mother     Heart disease Father     Hypertension Father     Breast cancer Sister     Skin cancer Sister     Breast cancer Paternal Grandmother     Skin cancer Paternal Grandmother        SOCIAL HISTORY:  Social History   Substance Use Topics    Smoking status: Former Smoker     Packs/day: 0 50     Years: 48 00     Types: Cigarettes     Quit date: 5/1/2017    Smokeless tobacco: Never Used      Comment:  quit 5/2107    Alcohol use 1 8 oz/week     3 Cans of beer per week      Comment: occasionally       MEDICATIONS:    Current Outpatient Prescriptions:     albuterol (PROVENTIL HFA,VENTOLIN HFA) 90 mcg/act inhaler, Inhale 2 puffs every 6 (six) hours as needed for wheezing, Disp: , Rfl:     atorvastatin (LIPITOR) 40 mg tablet, Take 80 mg by mouth daily  , Disp: , Rfl:     diclofenac sodium (VOLTAREN) 1 %, Place on the skin, Disp: , Rfl:     DULoxetine (CYMBALTA) 60 mg delayed release capsule, , Disp: , Rfl:     gabapentin, once-daily, (GRALISE) 300 MG tablet, Take 1 tablet by mouth 3 (three) times a day, Disp: , Rfl:     Magnesium 400 MG CAPS, Take 1 tablet by mouth daily, Disp: , Rfl:     methocarbamol (ROBAXIN) 500 mg tablet, Take 1 tablet (500 mg total) by mouth 3 (three) times a day, Disp: 30 tablet, Rfl: 2    nortriptyline (PAMELOR) 10 mg capsule, Take 10 mg by mouth daily at bedtime 3 tabs=30mg at bedtime , Disp: , Rfl:     ONETOUCH DELICA LANCETS FINE MISC, Use 2-3 times per day as needed, Disp: 100 each, Rfl: 2    ONETOUCH VERIO test strip, Test 2-3 times daily as instructed, Disp: 100 each, Rfl: 3    pantoprazole (PROTONIX) 40 mg tablet, Take 1 tablet by mouth daily, Disp: , Rfl: 0    Protein POWD, Take by mouth, Disp: , Rfl:     topiramate (TOPAMAX) 100 mg tablet, Take 100 mg by mouth daily Pt reports currently takes 75mg AM and 75mg PM , Disp: , Rfl:     traMADol (ULTRAM) 50 mg tablet, Take by mouth, Disp: , Rfl:     predniSONE 10 mg tablet, 4 pills for 3 days, then 3 pills for 3 days, then 2 pills for 3 days, then 1 pills for 3 days, then stop, Disp: 30 tablet, Rfl: 0    ALLERGIES:  Allergies   Allergen Reactions    Codeine Hives, GI Intolerance and Itching     Other reaction(s): Nausea and/or vomiting    Hydrocodone Hives and GI Intolerance    Penicillins Anaphylaxis and Throat Swelling    Shellfish-Derived Products      Other reaction(s): Nausea and/or vomiting  Clams only - nausea & vomiting     Pertinent items are noted in HPI  A comprehensive review of systems was negative    REVIEW OF SYSTEMS:      LABS:  HgA1c:   Lab Results   Component Value Date    HGBA1C 5 3 08/14/2017     BMP:   Lab Results   Component Value Date    GLUCOSE 134 10/18/2017    CALCIUM 9 0 02/13/2018     02/13/2018    K 4 4 02/13/2018    CO2 28 02/13/2018     02/13/2018    BUN 11 02/13/2018    CREATININE 0 85 02/13/2018           _____________________________________________________  PHYSICAL EXAMINATION:  General: well developed and well nourished, alert, oriented times 3 and appears comfortable  Psychiatric: Normal  HEENT: Trachea Midline, No torticollis  Cardiovascular: No discernable arrhythmia  Pulmonary: No wheezing or stridor  Skin: No masses, erthema, lacerations, fluctation, ulcerations  Neurovascular: Sensation Intact to the Median, Ulnar, Radial Nerve, Motor Intact to the Median, Ulnar, Radial Nerve and Pulses Intact    MUSCULOSKELETAL EXAMINATION:  RIGHT SIDE:  Carpal tunnel: Postive Tinel's and Positive Phalan's Test      right ring finger:  Tender volar MCP region, palpable nodule    _____________________________________________________  STUDIES REVIEWED:  EMG 2018:  Mild moderate median neuropathy at the wrist      PROCEDURES PERFORMED:  Hand/upper extremity injection  Date/Time: 7/11/2018 11:14 AM  Consent given by: patient  Timeout: Immediately prior to procedure a time out was called to verify the correct patient, procedure, equipment, support staff and site/side marked as required   Supporting Documentation  Indications: diagnostic, pain and therapeutic   Procedure Details  Condition:carpal tunnel syndrome Site: R carpal tunnel   Needle size: 25 G  Ultrasound guidance: no  Approach: volar  Medications administered: 1 mL lidocaine 1 %; 6 mg betamethasone acetate-betamethasone sodium phosphate 6 (3-3) mg/mL  Patient tolerance: patient tolerated the procedure well with no immediate complications  Dressing:  Sterile dressing applied           I interviewed, took the history and examined the patient  I discuss the case with the resident and reviewed the resident's note  I supervised the resident and I agree with the resident management plan as it was presented to me  I was present in the clinic and examined the patient

## 2018-07-11 NOTE — PROGRESS NOTES
Assessment/Plan:    No problem-specific Assessment & Plan notes found for this encounter  Diagnoses and all orders for this visit:    Acute bilateral low back pain with bilateral sciatica  Comments:  pt has taken percoet in the past without adverse reaction  Orders:  -     XR spine lumbar 2 or 3 views injury; Future  -     XR spine thoracic 3 vw; Future  -     oxyCODONE-acetaminophen (PERCOCET) 5-325 mg per tablet; Take 1 tablet by mouth every 4 (four) hours as needed for moderate pain Max Daily Amount: 6 tablets    Lumbar radiculopathy  -     XR spine lumbar 2 or 3 views injury; Future  -     XR spine thoracic 3 vw; Future  -     oxyCODONE-acetaminophen (PERCOCET) 5-325 mg per tablet; Take 1 tablet by mouth every 4 (four) hours as needed for moderate pain Max Daily Amount: 6 tablets          Subjective:      Patient ID: Ana Paula Conn is a 65 y o  male  Pt was knocked down by his dog, the pain is in the center lower back radiating to the abdomen on both sides, pt tired prednisone and muscle relaxers which did not help, pt also complains of trouble tolerating food when he eats so he has refrained and made an appointment with his gastric bypass surgeon tomorrow      Back Pain   This is a new problem  The current episode started more than 1 month ago  The problem occurs constantly  Quality: sharp  The pain radiates to the left thigh and right thigh  The pain is severe  Pertinent negatives include no chest pain or headaches  The following portions of the patient's history were reviewed and updated as appropriate: allergies, current medications, past family history, past medical history, past social history, past surgical history and problem list     Review of Systems   Eyes: Negative for visual disturbance  Cardiovascular: Negative for chest pain and palpitations  Musculoskeletal: Positive for back pain  Neurological: Negative for headaches           Objective:      /70   Temp 99 °F (37 2 °C)  5' 9" (1 753 m)   Wt 60 8 kg (134 lb)   BMI 19 79 kg/m²          Physical Exam   Constitutional: He is oriented to person, place, and time  He appears well-developed and well-nourished  No distress  Cardiovascular: Normal rate, regular rhythm and normal heart sounds  No murmur heard  Pulmonary/Chest: Effort normal and breath sounds normal  No respiratory distress  He has no wheezes  He has no rales  Neurological: He is alert and oriented to person, place, and time  Skin: Skin is warm and dry  No rash noted  He is not diaphoretic  No erythema  No pallor  Psychiatric: He has a normal mood and affect  His behavior is normal  Judgment and thought content normal    Nursing note and vitals reviewed  Back Exam     Tenderness   The patient is experiencing tenderness in the lumbar      Range of Motion   Extension: abnormal   Flexion: abnormal   Lateral Bend Right: abnormal   Lateral Bend Left: abnormal   Rotation Right: abnormal   Rotation Left: abnormal     Other   Erythema: no back redness

## 2018-07-12 ENCOUNTER — OFFICE VISIT (OUTPATIENT)
Dept: BARIATRICS | Facility: CLINIC | Age: 58
End: 2018-07-12
Payer: COMMERCIAL

## 2018-07-12 VITALS
RESPIRATION RATE: 20 BRPM | HEIGHT: 68 IN | TEMPERATURE: 98.5 F | DIASTOLIC BLOOD PRESSURE: 70 MMHG | WEIGHT: 136 LBS | SYSTOLIC BLOOD PRESSURE: 132 MMHG | BODY MASS INDEX: 20.61 KG/M2 | HEART RATE: 72 BPM

## 2018-07-12 DIAGNOSIS — Z87.19 S/P REPAIR OF PARAESOPHAGEAL HERNIA: ICD-10-CM

## 2018-07-12 DIAGNOSIS — K91.2 POSTSURGICAL MALABSORPTION: ICD-10-CM

## 2018-07-12 DIAGNOSIS — K22.70 BARRETT'S ESOPHAGUS WITHOUT DYSPLASIA: Primary | ICD-10-CM

## 2018-07-12 DIAGNOSIS — Z98.890 S/P REPAIR OF PARAESOPHAGEAL HERNIA: ICD-10-CM

## 2018-07-12 DIAGNOSIS — Z98.84 BARIATRIC SURGERY STATUS: ICD-10-CM

## 2018-07-12 DIAGNOSIS — R11.0 NAUSEA: ICD-10-CM

## 2018-07-12 DIAGNOSIS — E16.1 REACTIVE HYPOGLYCEMIA: ICD-10-CM

## 2018-07-12 DIAGNOSIS — R10.84 GENERALIZED ABDOMINAL PAIN: ICD-10-CM

## 2018-07-12 PROBLEM — K21.9 CHRONIC GERD: Status: RESOLVED | Noted: 2017-07-11 | Resolved: 2018-07-12

## 2018-07-12 PROBLEM — K21.00 REFLUX ESOPHAGITIS: Status: RESOLVED | Noted: 2017-07-12 | Resolved: 2018-07-12

## 2018-07-12 PROBLEM — K44.9 PARAESOPHAGEAL HERNIA: Status: RESOLVED | Noted: 2017-07-12 | Resolved: 2018-07-12

## 2018-07-12 PROCEDURE — 99212 OFFICE O/P EST SF 10 MIN: CPT | Performed by: SURGERY

## 2018-07-12 NOTE — PROGRESS NOTES
Assessment/Plan: patient is almost 1 year following an uncomplicated laparoscopic Britney-en-Y gastric bypass with a short Britney limb for severe chronic reflux and Saucedo's esophagitis  He has had a precipitous weight loss since that time but states that he is able to continue with his vitamin and nutrition regimens  For the last month, following a fall which may be incidental, the patient reports generalized abdominal pain worse with palpation  On exam, his voluntary guarding in the setting of otherwise stability tense at constipation as an etiology  However, given his malnutrition and recent fall, I feel the best evaluation of this pain is to obtain his cereal bariatric labs as well as obtain a CT of the abdomen and pelvis with contrast   He will follow up in clinic thereafter to review results  In the meantime I counseled that the patient take regular Tylenol and stay maximally hydrated  Diagnoses and all orders for this visit:    Saucedo's esophagus without dysplasia    Postsurgical malabsorption  -     CT abdomen pelvis w contrast; Future  -     Prealbumin; Future    Reactive hypoglycemia    S/P repair of paraesophageal hernia  -     CT abdomen pelvis w contrast; Future    Bariatric surgery status  -     CT abdomen pelvis w contrast; Future    Nausea    Generalized abdominal pain  -     CT abdomen pelvis w contrast; Future        Subjective: reports regular abdominal pain since being knocked down by his dog 1 month ago  Also states alternating diarrhea and constipation as well as intermittent postprandial nausea or pain that is self-limited over the past month  Patient ID: Graham Gaston is a 65 y o  male      HPI s/p RYGB with 60-cm britney limb in August, 2017 for chronic, severe GERD    Review of Systems    Denies fever/chills  + fatigue  Denies lightheadedness  Denies visual changes  Denies dyspnea, cough  Denies chest pain  Denies nausea/vomiting  Denies change in urinary/bowel habits  Denies masses/hernias  Denies parasthesia  Denies peripheral edema    Objective:     Physical Exam    Vitals:    07/12/18 1306   BP: 132/70   Pulse: 72   Resp: 20   Temp: 98 5 °F (36 9 °C)     NAD, alert  No pallor  MMM  No JVD  RRR  Normal inspiratory effort  Abdomen soft, ND  + generalized tenderness with voluntary guarding, no peritoneal signs  Incisions c/d/i, no masses or hernias  No peripheral edema  Normal affect, no agitation

## 2018-07-13 ENCOUNTER — APPOINTMENT (OUTPATIENT)
Dept: LAB | Facility: CLINIC | Age: 58
End: 2018-07-13
Payer: COMMERCIAL

## 2018-07-13 ENCOUNTER — APPOINTMENT (OUTPATIENT)
Dept: RADIOLOGY | Facility: CLINIC | Age: 58
End: 2018-07-13
Payer: COMMERCIAL

## 2018-07-13 DIAGNOSIS — G89.29 CHRONIC PAIN OF RIGHT UPPER EXTREMITY: ICD-10-CM

## 2018-07-13 DIAGNOSIS — M54.16 LUMBAR RADICULOPATHY: ICD-10-CM

## 2018-07-13 DIAGNOSIS — M54.41 ACUTE BILATERAL LOW BACK PAIN WITH BILATERAL SCIATICA: ICD-10-CM

## 2018-07-13 DIAGNOSIS — M54.42 ACUTE BILATERAL LOW BACK PAIN WITH BILATERAL SCIATICA: ICD-10-CM

## 2018-07-13 DIAGNOSIS — K91.2 POSTSURGICAL MALABSORPTION: ICD-10-CM

## 2018-07-13 DIAGNOSIS — E78.00 HYPERCHOLESTEROLEMIA: ICD-10-CM

## 2018-07-13 DIAGNOSIS — Z98.84 BARIATRIC SURGERY STATUS: ICD-10-CM

## 2018-07-13 DIAGNOSIS — Z12.5 SCREENING FOR PROSTATE CANCER: ICD-10-CM

## 2018-07-13 DIAGNOSIS — M79.601 CHRONIC PAIN OF RIGHT UPPER EXTREMITY: ICD-10-CM

## 2018-07-13 DIAGNOSIS — E16.1 REACTIVE HYPOGLYCEMIA: ICD-10-CM

## 2018-07-13 LAB
25(OH)D3 SERPL-MCNC: 15.6 NG/ML (ref 30–100)
ALBUMIN SERPL BCP-MCNC: 3.6 G/DL (ref 3.5–5)
ALP SERPL-CCNC: 80 U/L (ref 46–116)
ALT SERPL W P-5'-P-CCNC: 25 U/L (ref 12–78)
ANION GAP SERPL CALCULATED.3IONS-SCNC: 6 MMOL/L (ref 4–13)
AST SERPL W P-5'-P-CCNC: 10 U/L (ref 5–45)
BASOPHILS # BLD AUTO: 0.05 THOUSANDS/ΜL (ref 0–0.1)
BASOPHILS NFR BLD AUTO: 0 % (ref 0–1)
BILIRUB SERPL-MCNC: 0.6 MG/DL (ref 0.2–1)
BUN SERPL-MCNC: 14 MG/DL (ref 5–25)
CALCIUM SERPL-MCNC: 9.1 MG/DL (ref 8.3–10.1)
CHLORIDE SERPL-SCNC: 107 MMOL/L (ref 100–108)
CHOLEST SERPL-MCNC: 175 MG/DL (ref 50–200)
CO2 SERPL-SCNC: 26 MMOL/L (ref 21–32)
CREAT SERPL-MCNC: 0.88 MG/DL (ref 0.6–1.3)
EOSINOPHIL # BLD AUTO: 0.11 THOUSAND/ΜL (ref 0–0.61)
EOSINOPHIL NFR BLD AUTO: 1 % (ref 0–6)
ERYTHROCYTE [DISTWIDTH] IN BLOOD BY AUTOMATED COUNT: 13.6 % (ref 11.6–15.1)
EST. AVERAGE GLUCOSE BLD GHB EST-MCNC: 100 MG/DL
FERRITIN SERPL-MCNC: 76 NG/ML (ref 8–388)
FOLATE SERPL-MCNC: 11.4 NG/ML (ref 3.1–17.5)
GFR SERPL CREATININE-BSD FRML MDRD: 96 ML/MIN/1.73SQ M
GLUCOSE P FAST SERPL-MCNC: 82 MG/DL (ref 65–99)
HBA1C MFR BLD: 5.1 % (ref 4.2–6.3)
HCT VFR BLD AUTO: 46 % (ref 36.5–49.3)
HDLC SERPL-MCNC: 36 MG/DL (ref 40–60)
HGB BLD-MCNC: 14.5 G/DL (ref 12–17)
IMM GRANULOCYTES # BLD AUTO: 0.03 THOUSAND/UL (ref 0–0.2)
IMM GRANULOCYTES NFR BLD AUTO: 0 % (ref 0–2)
IRON SATN MFR SERPL: 20 %
IRON SERPL-MCNC: 59 UG/DL (ref 65–175)
LDLC SERPL CALC-MCNC: 117 MG/DL (ref 0–100)
LYMPHOCYTES # BLD AUTO: 2.45 THOUSANDS/ΜL (ref 0.6–4.47)
LYMPHOCYTES NFR BLD AUTO: 21 % (ref 14–44)
MCH RBC QN AUTO: 29.4 PG (ref 26.8–34.3)
MCHC RBC AUTO-ENTMCNC: 31.5 G/DL (ref 31.4–37.4)
MCV RBC AUTO: 93 FL (ref 82–98)
MONOCYTES # BLD AUTO: 0.81 THOUSAND/ΜL (ref 0.17–1.22)
MONOCYTES NFR BLD AUTO: 7 % (ref 4–12)
NEUTROPHILS # BLD AUTO: 8.04 THOUSANDS/ΜL (ref 1.85–7.62)
NEUTS SEG NFR BLD AUTO: 71 % (ref 43–75)
NONHDLC SERPL-MCNC: 139 MG/DL
NRBC BLD AUTO-RTO: 0 /100 WBCS
PLATELET # BLD AUTO: 376 THOUSANDS/UL (ref 149–390)
PMV BLD AUTO: 9.5 FL (ref 8.9–12.7)
POTASSIUM SERPL-SCNC: 4.4 MMOL/L (ref 3.5–5.3)
PROT SERPL-MCNC: 7.4 G/DL (ref 6.4–8.2)
PSA SERPL-MCNC: 0.9 NG/ML (ref 0–4)
PTH-INTACT SERPL-MCNC: 44.8 PG/ML (ref 18.4–80.1)
RBC # BLD AUTO: 4.94 MILLION/UL (ref 3.88–5.62)
SODIUM SERPL-SCNC: 139 MMOL/L (ref 136–145)
TIBC SERPL-MCNC: 288 UG/DL (ref 250–450)
TRIGL SERPL-MCNC: 108 MG/DL
TSH SERPL DL<=0.05 MIU/L-ACNC: 1.46 UIU/ML (ref 0.36–3.74)
VIT B12 SERPL-MCNC: 305 PG/ML (ref 100–900)
WBC # BLD AUTO: 11.49 THOUSAND/UL (ref 4.31–10.16)

## 2018-07-13 PROCEDURE — 80061 LIPID PANEL: CPT

## 2018-07-13 PROCEDURE — 82728 ASSAY OF FERRITIN: CPT

## 2018-07-13 PROCEDURE — 83970 ASSAY OF PARATHORMONE: CPT

## 2018-07-13 PROCEDURE — 82607 VITAMIN B-12: CPT

## 2018-07-13 PROCEDURE — 82746 ASSAY OF FOLIC ACID SERUM: CPT

## 2018-07-13 PROCEDURE — 84590 ASSAY OF VITAMIN A: CPT

## 2018-07-13 PROCEDURE — 83550 IRON BINDING TEST: CPT

## 2018-07-13 PROCEDURE — 84425 ASSAY OF VITAMIN B-1: CPT

## 2018-07-13 PROCEDURE — 72072 X-RAY EXAM THORAC SPINE 3VWS: CPT

## 2018-07-13 PROCEDURE — 82306 VITAMIN D 25 HYDROXY: CPT

## 2018-07-13 PROCEDURE — 82525 ASSAY OF COPPER: CPT

## 2018-07-13 PROCEDURE — 84443 ASSAY THYROID STIM HORMONE: CPT

## 2018-07-13 PROCEDURE — 84630 ASSAY OF ZINC: CPT

## 2018-07-13 PROCEDURE — 83540 ASSAY OF IRON: CPT

## 2018-07-13 PROCEDURE — 83036 HEMOGLOBIN GLYCOSYLATED A1C: CPT

## 2018-07-13 PROCEDURE — 36415 COLL VENOUS BLD VENIPUNCTURE: CPT

## 2018-07-13 PROCEDURE — 80053 COMPREHEN METABOLIC PANEL: CPT

## 2018-07-13 PROCEDURE — 85025 COMPLETE CBC W/AUTO DIFF WBC: CPT

## 2018-07-13 PROCEDURE — G0103 PSA SCREENING: HCPCS

## 2018-07-13 PROCEDURE — 72100 X-RAY EXAM L-S SPINE 2/3 VWS: CPT

## 2018-07-16 ENCOUNTER — HOSPITAL ENCOUNTER (OUTPATIENT)
Dept: CT IMAGING | Facility: HOSPITAL | Age: 58
Discharge: HOME | End: 2018-07-16
Attending: SURGERY
Payer: COMMERCIAL

## 2018-07-16 DIAGNOSIS — K91.2 POSTSURGICAL MALABSORPTION: ICD-10-CM

## 2018-07-16 DIAGNOSIS — Z87.19 S/P REPAIR OF PARAESOPHAGEAL HERNIA: ICD-10-CM

## 2018-07-16 DIAGNOSIS — Z98.84 BARIATRIC SURGERY STATUS: ICD-10-CM

## 2018-07-16 DIAGNOSIS — Z98.890 S/P REPAIR OF PARAESOPHAGEAL HERNIA: ICD-10-CM

## 2018-07-16 DIAGNOSIS — R10.84 GENERALIZED ABDOMINAL PAIN: ICD-10-CM

## 2018-07-16 PROCEDURE — 74177 CT ABD & PELVIS W/CONTRAST: CPT

## 2018-07-16 RX ADMIN — IOHEXOL 100 ML: 350 INJECTION, SOLUTION INTRAVENOUS at 16:25

## 2018-07-16 RX ADMIN — IOHEXOL 50 ML: 240 INJECTION, SOLUTION INTRATHECAL; INTRAVASCULAR; INTRAVENOUS; ORAL at 16:25

## 2018-07-18 LAB
COPPER SERPL-MCNC: 130 UG/DL (ref 72–166)
ZINC SERPL-MCNC: 127 UG/DL (ref 56–134)

## 2018-07-19 ENCOUNTER — OFFICE VISIT (OUTPATIENT)
Dept: FAMILY MEDICINE CLINIC | Facility: CLINIC | Age: 58
End: 2018-07-19
Payer: COMMERCIAL

## 2018-07-19 ENCOUNTER — OFFICE VISIT (OUTPATIENT)
Dept: BARIATRICS | Facility: CLINIC | Age: 58
End: 2018-07-19
Payer: COMMERCIAL

## 2018-07-19 VITALS
HEART RATE: 71 BPM | WEIGHT: 136.5 LBS | HEIGHT: 68 IN | TEMPERATURE: 97.9 F | RESPIRATION RATE: 18 BRPM | BODY MASS INDEX: 20.69 KG/M2 | DIASTOLIC BLOOD PRESSURE: 62 MMHG | SYSTOLIC BLOOD PRESSURE: 112 MMHG

## 2018-07-19 VITALS
OXYGEN SATURATION: 99 % | BODY MASS INDEX: 20.76 KG/M2 | WEIGHT: 137 LBS | TEMPERATURE: 98.3 F | HEIGHT: 68 IN | SYSTOLIC BLOOD PRESSURE: 110 MMHG | HEART RATE: 87 BPM | DIASTOLIC BLOOD PRESSURE: 62 MMHG

## 2018-07-19 DIAGNOSIS — K59.00 CONSTIPATION, UNSPECIFIED CONSTIPATION TYPE: ICD-10-CM

## 2018-07-19 DIAGNOSIS — R10.84 GENERALIZED ABDOMINAL PAIN: Primary | ICD-10-CM

## 2018-07-19 DIAGNOSIS — M62.830 BACK MUSCLE SPASM: Primary | ICD-10-CM

## 2018-07-19 DIAGNOSIS — M54.41 ACUTE BILATERAL LOW BACK PAIN WITH BILATERAL SCIATICA: ICD-10-CM

## 2018-07-19 DIAGNOSIS — M54.16 LUMBAR RADICULOPATHY: ICD-10-CM

## 2018-07-19 DIAGNOSIS — M54.42 ACUTE BILATERAL LOW BACK PAIN WITH BILATERAL SCIATICA: ICD-10-CM

## 2018-07-19 DIAGNOSIS — Z98.84 BARIATRIC SURGERY STATUS: ICD-10-CM

## 2018-07-19 PROBLEM — R11.0 NAUSEA: Status: RESOLVED | Noted: 2018-07-12 | Resolved: 2018-07-19

## 2018-07-19 PROCEDURE — 99212 OFFICE O/P EST SF 10 MIN: CPT | Performed by: SURGERY

## 2018-07-19 PROCEDURE — 99213 OFFICE O/P EST LOW 20 MIN: CPT | Performed by: FAMILY MEDICINE

## 2018-07-19 NOTE — PROGRESS NOTES
Assessment/Plan: Patient's pain is mildly improved just with rest  Labs show low Vit D but otherwise generally okay  CT scan shows constipation but no abnormality of his bariatric anatomy or traumatic injury; official read pending  I am confident that if the patient increases his oral hydration and incorporates a bowel regimen, his symptoms will continue to improve  He will follow-up as needed in 1-2 months if his pain persists despite resolving his constipation  Diagnoses and all orders for this visit:    Generalized abdominal pain    Bariatric surgery status    Constipation, unspecified constipation type          Subjective: pain mildly improved     Patient ID: Anson Sutherland is a 65 y o  male  HPI s/p RYGB with 60-cm julio limb in August, 2017 for chronic, severe GERD  Large weight loss but remains generally healthy   Being seen presently for generalized abdominal pain    Review of Systems    Denies fever/chills  Denies lightheadedness  Denies visual changes  Denies dyspnea, cough  Denies chest pain  Denies nausea/vomiting  Denies change in urinary/bowel habits  Denies masses/hernias  Denies parasthesia  Denies peripheral edema    Objective:     Physical Exam    Vitals:    07/19/18 0943   BP: 112/62   Pulse: 71   Resp: 18   Temp: 97 9 °F (36 6 °C)     NAD, alert  No pallor  MMM  No JVD  RRR  Normal inspiratory effort  Abdomen soft, NT/ND  Incisions c/d/i, no masses or hernias  No peripheral edema  Normal affect, no agitation

## 2018-07-19 NOTE — PROGRESS NOTES
Assessment/Plan:    No problem-specific Assessment & Plan notes found for this encounter  Diagnoses and all orders for this visit:    Back muscle spasm  -     Ambulatory referral to Physical Therapy; Future    Acute bilateral low back pain with bilateral sciatica  -     Ambulatory referral to Physical Therapy; Future    Lumbar radiculopathy  -     Ambulatory referral to Physical Therapy; Future          Subjective:      Patient ID: Harinder Young is a 65 y o  male  Follow up for in his whole back upper, mid, and lower, it started 1 month ago, pt had recent x-rays and has been taking percocet which does help as well as a heating pad, the pain radiates to both knees bilaterally, pt was knocked down by his dog which seems to have started this        The following portions of the patient's history were reviewed and updated as appropriate: allergies, current medications, past family history, past medical history, past social history, past surgical history and problem list     Review of Systems   Gastrointestinal:        Negative for stool incontinence   Genitourinary:        Negative for urinary incontinence         Objective:      /62 (BP Location: Left arm, Patient Position: Sitting, Cuff Size: Adult)   Pulse 87   Temp 98 3 °F (36 8 °C) (Tympanic)   Ht 5' 8" (1 727 m)   Wt 62 1 kg (137 lb)   SpO2 99%   BMI 20 83 kg/m²          Physical Exam   Constitutional: He is oriented to person, place, and time  He appears well-developed and well-nourished  No distress  HENT:   Head: Normocephalic and atraumatic  Eyes: Conjunctivae and EOM are normal  Pupils are equal, round, and reactive to light  No scleral icterus  Neck: Normal range of motion  Neck supple  Cardiovascular: Normal rate, regular rhythm and normal heart sounds  No murmur heard  Pulmonary/Chest: Effort normal and breath sounds normal  No respiratory distress  He has no wheezes  He has no rales  Musculoskeletal: He exhibits no edema  Lymphadenopathy:     He has no cervical adenopathy  Neurological: He is alert and oriented to person, place, and time  He exhibits normal muscle tone  Skin: Skin is warm and dry  No rash noted  He is not diaphoretic  No erythema  No pallor  Psychiatric: He has a normal mood and affect  His behavior is normal  Judgment and thought content normal    Nursing note and vitals reviewed  Back Exam     Tenderness   The patient is experiencing tenderness in the lumbar      Range of Motion   Extension: abnormal   Flexion: abnormal   Lateral Bend Right: abnormal   Lateral Bend Left: abnormal   Rotation Right: abnormal   Rotation Left: abnormal

## 2018-07-20 LAB — VIT B1 BLD-SCNC: 150.5 NMOL/L (ref 66.5–200)

## 2018-07-21 LAB — VIT A SERPL-MCNC: 36.9 UG/DL (ref 33.1–100)

## 2018-08-01 ENCOUNTER — OFFICE VISIT (OUTPATIENT)
Dept: FAMILY MEDICINE CLINIC | Facility: CLINIC | Age: 58
End: 2018-08-01
Payer: COMMERCIAL

## 2018-08-01 VITALS
BODY MASS INDEX: 19.85 KG/M2 | WEIGHT: 131 LBS | DIASTOLIC BLOOD PRESSURE: 74 MMHG | TEMPERATURE: 98.3 F | SYSTOLIC BLOOD PRESSURE: 130 MMHG | HEIGHT: 68 IN | HEART RATE: 89 BPM | OXYGEN SATURATION: 98 %

## 2018-08-01 DIAGNOSIS — N39.0 URINARY TRACT INFECTION WITHOUT HEMATURIA, SITE UNSPECIFIED: Primary | ICD-10-CM

## 2018-08-01 DIAGNOSIS — R63.4 ABNORMAL WEIGHT LOSS: ICD-10-CM

## 2018-08-01 LAB
BACTERIA UR QL AUTO: ABNORMAL /HPF
NON-SQ EPI CELLS URNS QL MICRO: ABNORMAL /HPF
OTHER STN SPEC: ABNORMAL
RBC #/AREA URNS AUTO: ABNORMAL /HPF
SL AMB  POCT GLUCOSE, UA: ABNORMAL
SL AMB LEUKOCYTE ESTERASE,UA: ABNORMAL
SL AMB POCT BILIRUBIN,UA: ABNORMAL
SL AMB POCT BLOOD,UA: ABNORMAL
SL AMB POCT CLARITY,UA: ABNORMAL
SL AMB POCT COLOR,UA: ABNORMAL
SL AMB POCT KETONES,UA: ABNORMAL
SL AMB POCT NITRITE,UA: ABNORMAL
SL AMB POCT PH,UA: 5
SL AMB POCT SPECIFIC GRAVITY,UA: 1.02
SL AMB POCT URINE PROTEIN: ABNORMAL
SL AMB POCT UROBILINOGEN: 0.2
URINE COMMENT: ABNORMAL
WBC #/AREA URNS AUTO: ABNORMAL /HPF

## 2018-08-01 PROCEDURE — 99213 OFFICE O/P EST LOW 20 MIN: CPT | Performed by: FAMILY MEDICINE

## 2018-08-01 PROCEDURE — 87186 SC STD MICRODIL/AGAR DIL: CPT | Performed by: FAMILY MEDICINE

## 2018-08-01 PROCEDURE — 81002 URINALYSIS NONAUTO W/O SCOPE: CPT | Performed by: FAMILY MEDICINE

## 2018-08-01 PROCEDURE — 87086 URINE CULTURE/COLONY COUNT: CPT | Performed by: FAMILY MEDICINE

## 2018-08-01 PROCEDURE — 87077 CULTURE AEROBIC IDENTIFY: CPT | Performed by: FAMILY MEDICINE

## 2018-08-01 PROCEDURE — 81001 URINALYSIS AUTO W/SCOPE: CPT | Performed by: FAMILY MEDICINE

## 2018-08-01 RX ORDER — MEGESTROL ACETATE 40 MG/ML
200 SUSPENSION ORAL DAILY
Qty: 240 ML | Refills: 0 | Status: SHIPPED | OUTPATIENT
Start: 2018-08-01 | End: 2018-08-30 | Stop reason: SDUPTHER

## 2018-08-01 RX ORDER — SULFAMETHOXAZOLE AND TRIMETHOPRIM 800; 160 MG/1; MG/1
1 TABLET ORAL EVERY 12 HOURS SCHEDULED
Qty: 10 TABLET | Refills: 0 | Status: SHIPPED | OUTPATIENT
Start: 2018-08-01 | End: 2018-08-06

## 2018-08-01 NOTE — PROGRESS NOTES
Assessment/Plan:    No problem-specific Assessment & Plan notes found for this encounter  Diagnoses and all orders for this visit:    Urinary tract infection without hematuria, site unspecified  -     sulfamethoxazole-trimethoprim (BACTRIM DS) 800-160 mg per tablet; Take 1 tablet by mouth every 12 (twelve) hours for 5 days    Abnormal weight loss  -     megestrol (MEGACE) 40 MG/ML suspension; Take 5 mL (200 mg total) by mouth daily          Subjective:      Patient ID: Ameya Cobos is a 65 y o  male  Pt complains of UTI that started 3 days ago, pt had some nausea and fever, he tried cranberry juice which did not help, pt is also concerned over rapid weight loss, he claims to be eating well with no lack of appetite, pt has had recent follow up with his oncologist who told him he is cancer free, he had recent follow up with gastro who is happy with everything and does not know why he is losing weight        The following portions of the patient's history were reviewed and updated as appropriate: allergies, current medications, past family history, past medical history, past social history, past surgical history and problem list     Review of Systems   Constitutional: Positive for chills and fever  Gastrointestinal: Positive for nausea and vomiting  Negative for abdominal pain  Objective:      /74   Pulse 89   Temp 98 3 °F (36 8 °C)   Ht 5' 8" (1 727 m)   Wt 59 4 kg (131 lb)   SpO2 98%   BMI 19 92 kg/m²          Physical Exam   Constitutional: He is oriented to person, place, and time  He appears well-developed and well-nourished  No distress  HENT:   Head: Normocephalic and atraumatic  Eyes: Conjunctivae and EOM are normal  Pupils are equal, round, and reactive to light  No scleral icterus  Neck: Normal range of motion  Neck supple  Cardiovascular: Normal rate, regular rhythm and normal heart sounds  No murmur heard    Pulmonary/Chest: Effort normal and breath sounds normal  No respiratory distress  He has no wheezes  He has no rales  Abdominal: Soft  Bowel sounds are normal  He exhibits no distension and no mass  There is no tenderness  There is no rebound and no guarding  Musculoskeletal: He exhibits no edema  Lymphadenopathy:     He has no cervical adenopathy  Neurological: He is alert and oriented to person, place, and time  He exhibits normal muscle tone  Skin: Skin is warm and dry  No rash noted  He is not diaphoretic  No erythema  No pallor  Psychiatric: He has a normal mood and affect  His behavior is normal  Judgment and thought content normal    Nursing note and vitals reviewed

## 2018-08-02 DIAGNOSIS — N39.0 URINARY TRACT INFECTION WITHOUT HEMATURIA, SITE UNSPECIFIED: Primary | ICD-10-CM

## 2018-08-02 LAB
BILIRUB UR QL STRIP: NEGATIVE
CLARITY UR: ABNORMAL
COLOR UR: YELLOW
GLUCOSE UR STRIP-MCNC: NEGATIVE MG/DL
HGB UR QL STRIP.AUTO: NEGATIVE
KETONES UR STRIP-MCNC: ABNORMAL MG/DL
LEUKOCYTE ESTERASE UR QL STRIP: ABNORMAL
NITRITE UR QL STRIP: POSITIVE
PH UR STRIP.AUTO: 5.5 [PH] (ref 4.5–8)
PROT UR STRIP-MCNC: NEGATIVE MG/DL
SP GR UR STRIP.AUTO: 1.02 (ref 1–1.03)
UROBILINOGEN UR QL STRIP.AUTO: 0.2 E.U./DL

## 2018-08-03 ENCOUNTER — EVALUATION (OUTPATIENT)
Dept: PHYSICAL THERAPY | Facility: CLINIC | Age: 58
End: 2018-08-03
Payer: MEDICARE

## 2018-08-03 VITALS — HEART RATE: 79 BPM | SYSTOLIC BLOOD PRESSURE: 106 MMHG | DIASTOLIC BLOOD PRESSURE: 76 MMHG

## 2018-08-03 DIAGNOSIS — G89.29 CHRONIC BILATERAL LOW BACK PAIN WITHOUT SCIATICA: Primary | ICD-10-CM

## 2018-08-03 DIAGNOSIS — M54.50 CHRONIC BILATERAL LOW BACK PAIN WITHOUT SCIATICA: Primary | ICD-10-CM

## 2018-08-03 PROCEDURE — G8991 OTHER PT/OT GOAL STATUS: HCPCS | Performed by: PHYSICAL THERAPIST

## 2018-08-03 PROCEDURE — 97535 SELF CARE MNGMENT TRAINING: CPT | Performed by: PHYSICAL THERAPIST

## 2018-08-03 PROCEDURE — G8990 OTHER PT/OT CURRENT STATUS: HCPCS | Performed by: PHYSICAL THERAPIST

## 2018-08-03 PROCEDURE — 97162 PT EVAL MOD COMPLEX 30 MIN: CPT | Performed by: PHYSICAL THERAPIST

## 2018-08-03 NOTE — LETTER
August 3, 2018    Lauri Holland, [address] [place] [address] [facility] [id number]    Patient: Lino Sandhoff   YOB: 1960   Date of Visit: 8/3/2018     Encounter Diagnosis     ICD-10-CM    1  Chronic bilateral low back pain without sciatica M54 5     G89 29        Dear Dr Lilliana Garcia:    Please review the attached Plan of Care from [facility] recent visit  Please verify that you agree therapy should continue by signing the attached document and sending it back to our office  If you have any questions or concerns, please don't hesitate to call  Sincerely,    Maximus Ames PT      Referring Provider:      I certify that I have read the below Plan of Care and certify the need for these services furnished under this plan of treatment while under my care  Lauri Holland DO  [address] [address]  [facility] [place]          PT Evaluation     Today's date: 8/3/2018  Patient name: Lino Sandhoff  : 1960  MRN: [id number]  Referring provider: Moira Benitez DO  Dx:   Encounter Diagnosis     ICD-10-CM    1  Chronic bilateral low back pain without sciatica M54 5     G89 29                   Assessment  Impairments: abnormal or restricted ROM, activity intolerance, impaired physical strength and pain with function    Assessment details: Lino Sandhoff is a 65 y o  male who presents to outpatient PT with a  Chronic bilateral low back pain without sciatica  (primary encounter diagnosis)  No further referral appears necessary at this time based upon examination results  Pt presents with decreased strength, ROM, balance, functional activity tolerance, and pain with movement in his thoracic, and lumbar spine,  which is  limiting his ability to perform the aforementioned functional activities  Pt does not have a directional preference for lumbar extension, or flexion, and exhibits significant muscle spasms, in his lumbar spinal extensors   Pt presents with in increased thoracic kyphosis during static standing  Limited use of right UE due to a previous accident  Etiologic factors include repetitive poor body mechanics  Prognosis is good given HEP compliance and PT 2-3 /wk  Please contact me if you have any questions or recommendations  Thank you for the opportunity to share in  [facility]  Understanding of Dx/Px/POC: good   Prognosis: good    Goals  1  In 4-6 weeks, patient will demonstrate a decrease in pain to 2/10 during functional activities  2  In 4-6 weeks, patient will demonstrate an increase in range of motion by 5 degrees  3  In 4-6 weeks, patient will demonstrate an increase in strength by 1/2 grade on MMT    1  In 6-8 weeks, patient will be independent with their home exercise program  2  In 6-8 weeks, patient will have zero limitations with strength  3  In 6-8 weeks, patient will have zero limitations with ROM  4  In 6-8 weeks, patient will have zero limitations with ambulation  5  In 6-8 weeks, patient will have zero limitations with stair negotiation    Plan  Patient would benefit from: skilled physical therapy  Planned therapy interventions: therapeutic exercise, manual therapy and home exercise program  Frequency: 3x week  Duration in weeks: 4  Treatment plan discussed with: patient  Plan details: Patient was provided a home exercise program and demonstrated an understanding of exercises  Patient was advised to stop performing home exercise program if symptoms increase or new complaints developed  Verbal understanding demonstrated regarding home exercise program instructions  Subjective Evaluation    History of Present Illness  Date of onset: 5/3/2018  Mechanism of injury: The patient reports that his back started to bother him about three months ago  He reports that he was squirting is 118 pound pig with a hose   He reports that the pig got excited, and ended up knocking him to the ground, he reports falling on an outstretched hand, and he reports that is when he hurt his back  Pain  Current pain ratin  At best pain ratin  At worst pain rating: 10  Quality: sharp and throbbing  Relieving factors: heat  Aggravating factors: sitting, walking and lifting  Progression: no change    Social Support    Employment status: not working    Diagnostic Tests  X-ray: abnormal (Athritis )  Treatments  Previous treatment: medication  Current treatment: physical therapy  Patient Goals  Patient goals for therapy: decreased pain          Objective     Active Range of Motion     Lumbar   Flexion: 30 degrees   Extension: 10 degrees   Left lateral flexion: 70 degrees   Right lateral flexion: 55 degrees     Strength/Myotome Testing     Left Hip   Planes of Motion   Flexion: 4-  Abduction: 4-  Adduction: 4-    Right Hip   Planes of Motion   Flexion: 3+  Abduction: 3+  Adduction: 3+    Additional Strength Details  Sensation Intact Bilaterally    Reflexes - 2+ Bilaterally     Seated SLR - Negative Bilaterally    Straight Leg Raise - Positive on right side    Supine to long Sit Test - Negative     ZOILA - Positive Bilaterally     Repeated Flexion - No Change in Sx     Repeated Lumbar Extension - No Change in Sx      Pain and tenderness to Upper Thoracic spine, during palpation  Significant Thoracic kyphosis during static standing                 Precautions:     Daily Treatment Diary     Manual              B Hamstrings                                                                     Exercise Diary              PPT             Hip Add with Ball             Clamshells              SLR             Supine Marching              Pball ABS              Step Ups              Mini Squat              SLS              HR/TR             Nu Step              MTP/LTP with TB                                                                                                                         Modalities

## 2018-08-03 NOTE — PROGRESS NOTES
PT Evaluation     Today's date: 8/3/2018  Patient name: Dee Najjar  : 1960  MRN: [id number]  Referring provider: Bren Dubois DO  Dx:   Encounter Diagnosis     ICD-10-CM    1  Chronic bilateral low back pain without sciatica M54 5     G89 29                   Assessment  Impairments: abnormal or restricted ROM, activity intolerance, impaired physical strength and pain with function    Assessment details: Dee Najjar is a 65 y o  male who presents to outpatient PT with a  Chronic bilateral low back pain without sciatica  (primary encounter diagnosis)  No further referral appears necessary at this time based upon examination results  Pt presents with decreased strength, ROM, balance, functional activity tolerance, and pain with movement in his thoracic, and lumbar spine,  which is  limiting his ability to perform the aforementioned functional activities  Pt does not have a directional preference for lumbar extension, or flexion, and exhibits significant muscle spasms, in his lumbar spinal extensors  Pt presents with in increased thoracic kyphosis during static standing  Limited use of right UE due to a previous accident  Etiologic factors include repetitive poor body mechanics  Prognosis is good given HEP compliance and PT 2-3 /wk  Please contact me if you have any questions or recommendations  Thank you for the opportunity to share in  [facility]  Understanding of Dx/Px/POC: good   Prognosis: good    Goals  1  In 4-6 weeks, patient will demonstrate a decrease in pain to 2/10 during functional activities  2  In 4-6 weeks, patient will demonstrate an increase in range of motion by 5 degrees  3  In 4-6 weeks, patient will demonstrate an increase in strength by 1/2 grade on MMT    1  In 6-8 weeks, patient will be independent with their home exercise program  2  In 6-8 weeks, patient will have zero limitations with strength  3  In 6-8 weeks, patient will have zero limitations with ROM  4    In 6-8 weeks, patient will have zero limitations with ambulation  5  In 6-8 weeks, patient will have zero limitations with stair negotiation    Plan  Patient would benefit from: skilled physical therapy  Planned therapy interventions: therapeutic exercise, manual therapy and home exercise program  Frequency: 3x week  Duration in weeks: 4  Treatment plan discussed with: patient  Plan details: Patient was provided a home exercise program and demonstrated an understanding of exercises  Patient was advised to stop performing home exercise program if symptoms increase or new complaints developed  Verbal understanding demonstrated regarding home exercise program instructions  Subjective Evaluation    History of Present Illness  Date of onset: 5/3/2018  Mechanism of injury: The patient reports that his back started to bother him about three months ago  He reports that he was squirting is 118 pound pig with a hose  He reports that the pig got excited, and ended up knocking him to the ground, he reports falling on an outstretched hand, and he reports that is when he hurt his back     Pain  Current pain ratin  At best pain ratin  At worst pain rating: 10  Quality: sharp and throbbing  Relieving factors: heat  Aggravating factors: sitting, walking and lifting  Progression: no change    Social Support    Employment status: not working    Diagnostic Tests  X-ray: abnormal (Athritis )  Treatments  Previous treatment: medication  Current treatment: physical therapy  Patient Goals  Patient goals for therapy: decreased pain          Objective     Active Range of Motion     Lumbar   Flexion: 30 degrees   Extension: 10 degrees   Left lateral flexion: 70 degrees   Right lateral flexion: 55 degrees     Strength/Myotome Testing     Left Hip   Planes of Motion   Flexion: 4-  Abduction: 4-  Adduction: 4-    Right Hip   Planes of Motion   Flexion: 3+  Abduction: 3+  Adduction: 3+    Additional Strength Details  Sensation Intact Bilaterally    Reflexes - 2+ Bilaterally     Seated SLR - Negative Bilaterally    Straight Leg Raise - Positive on right side    Supine to long Sit Test - Negative     ZOILA - Positive Bilaterally     Repeated Flexion - No Change in Sx     Repeated Lumbar Extension - No Change in Sx      Pain and tenderness to Upper Thoracic spine, during palpation  Significant Thoracic kyphosis during static standing                 Precautions:     Daily Treatment Diary     Manual              B Hamstrings                                                                     Exercise Diary              PPT             Hip Add with Ball             Clamshells              SLR             Supine Marching              Pball ABS              Step Ups              Mini Squat              SLS              HR/TR             Nu Step              MTP/LTP with TB                                                                                                                         Modalities

## 2018-08-04 LAB — BACTERIA UR CULT: ABNORMAL

## 2018-08-06 ENCOUNTER — APPOINTMENT (OUTPATIENT)
Dept: LAB | Facility: CLINIC | Age: 58
End: 2018-08-06
Payer: COMMERCIAL

## 2018-08-06 ENCOUNTER — OFFICE VISIT (OUTPATIENT)
Dept: PHYSICAL THERAPY | Facility: CLINIC | Age: 58
End: 2018-08-06
Payer: MEDICARE

## 2018-08-06 DIAGNOSIS — G89.29 CHRONIC BILATERAL LOW BACK PAIN WITHOUT SCIATICA: Primary | ICD-10-CM

## 2018-08-06 DIAGNOSIS — M54.50 CHRONIC BILATERAL LOW BACK PAIN WITHOUT SCIATICA: Primary | ICD-10-CM

## 2018-08-06 LAB
BACTERIA UR QL AUTO: ABNORMAL /HPF
BILIRUB UR QL STRIP: ABNORMAL
CAOX CRY URNS QL MICRO: ABNORMAL /HPF
CLARITY UR: ABNORMAL
COLOR UR: ABNORMAL
GLUCOSE UR STRIP-MCNC: NEGATIVE MG/DL
HGB UR QL STRIP.AUTO: ABNORMAL
KETONES UR STRIP-MCNC: ABNORMAL MG/DL
LEUKOCYTE ESTERASE UR QL STRIP: ABNORMAL
NITRITE UR QL STRIP: NEGATIVE
NON-SQ EPI CELLS URNS QL MICRO: ABNORMAL /HPF
PH UR STRIP.AUTO: 6 [PH] (ref 4.5–8)
PROT UR STRIP-MCNC: ABNORMAL MG/DL
RBC #/AREA URNS AUTO: ABNORMAL /HPF
SP GR UR STRIP.AUTO: 1.03 (ref 1–1.03)
URATE CRY URNS QL MICRO: ABNORMAL /HPF
UROBILINOGEN UR QL STRIP.AUTO: 1 E.U./DL
WBC #/AREA URNS AUTO: ABNORMAL /HPF

## 2018-08-06 PROCEDURE — 87086 URINE CULTURE/COLONY COUNT: CPT

## 2018-08-06 PROCEDURE — 97110 THERAPEUTIC EXERCISES: CPT | Performed by: PHYSICAL THERAPIST

## 2018-08-06 PROCEDURE — 97140 MANUAL THERAPY 1/> REGIONS: CPT | Performed by: PHYSICAL THERAPIST

## 2018-08-06 PROCEDURE — 81001 URINALYSIS AUTO W/SCOPE: CPT

## 2018-08-06 NOTE — PROGRESS NOTES
Daily Note     Today's date: 2018  Patient name: Lino Sandhoff  : 1960  MRN: [id number]  Referring provider: Moira Benitez DO  Dx:   Encounter Diagnosis     ICD-10-CM    1  Chronic bilateral low back pain without sciatica M54 5     G89 29                   Subjective: " Im still here, I feel alright "       Objective: See treatment diary below  Manual              B Hamstrings NP             PA mobs to thoracic spine 15 min                                                        Exercise Diary              PPT 10''10x            Hip Add with Ball 5''20x            Clamshells  3x10 Blue             SLR 3x10            Supine Marching              Pball ABS  5''20x            Step Ups              Mini Squat              SLS              HR/TR             Nu Step              MTP/LTP with TB Red 5''20x                                                                                                                        Modalities                                                         Assessment: Tolerated treatment well  Patient exhibited good technique with therapeutic exercises  Significant tightness in upper and and mid thoracic spine during manual therapy  Plan: Continue per plan of care

## 2018-08-07 LAB — BACTERIA UR CULT: NORMAL

## 2018-08-08 ENCOUNTER — APPOINTMENT (OUTPATIENT)
Dept: PHYSICAL THERAPY | Facility: CLINIC | Age: 58
End: 2018-08-08
Payer: MEDICARE

## 2018-08-10 ENCOUNTER — OFFICE VISIT (OUTPATIENT)
Dept: OBGYN CLINIC | Facility: HOSPITAL | Age: 58
End: 2018-08-10
Payer: OTHER MISCELLANEOUS

## 2018-08-10 ENCOUNTER — APPOINTMENT (OUTPATIENT)
Dept: PHYSICAL THERAPY | Facility: CLINIC | Age: 58
End: 2018-08-10
Payer: MEDICARE

## 2018-08-10 VITALS
HEIGHT: 69 IN | SYSTOLIC BLOOD PRESSURE: 113 MMHG | DIASTOLIC BLOOD PRESSURE: 72 MMHG | WEIGHT: 133 LBS | BODY MASS INDEX: 19.7 KG/M2 | HEART RATE: 77 BPM

## 2018-08-10 DIAGNOSIS — G56.01 CARPAL TUNNEL SYNDROME ON RIGHT: Primary | ICD-10-CM

## 2018-08-10 DIAGNOSIS — M65.321 TRIGGER FINGER, RIGHT INDEX FINGER: ICD-10-CM

## 2018-08-10 PROCEDURE — 20550 NJX 1 TENDON SHEATH/LIGAMENT: CPT | Performed by: ORTHOPAEDIC SURGERY

## 2018-08-10 PROCEDURE — 99213 OFFICE O/P EST LOW 20 MIN: CPT | Performed by: ORTHOPAEDIC SURGERY

## 2018-08-10 RX ORDER — BETAMETHASONE SODIUM PHOSPHATE AND BETAMETHASONE ACETATE 3; 3 MG/ML; MG/ML
6 INJECTION, SUSPENSION INTRA-ARTICULAR; INTRALESIONAL; INTRAMUSCULAR; SOFT TISSUE
Status: COMPLETED | OUTPATIENT
Start: 2018-08-10 | End: 2018-08-10

## 2018-08-10 RX ORDER — LIDOCAINE HYDROCHLORIDE 10 MG/ML
1 INJECTION, SOLUTION INFILTRATION; PERINEURAL
Status: COMPLETED | OUTPATIENT
Start: 2018-08-10 | End: 2018-08-10

## 2018-08-10 RX ADMIN — LIDOCAINE HYDROCHLORIDE 1 ML: 10 INJECTION, SOLUTION INFILTRATION; PERINEURAL at 12:39

## 2018-08-10 RX ADMIN — BETAMETHASONE SODIUM PHOSPHATE AND BETAMETHASONE ACETATE 6 MG: 3; 3 INJECTION, SUSPENSION INTRA-ARTICULAR; INTRALESIONAL; INTRAMUSCULAR; SOFT TISSUE at 12:39

## 2018-08-10 NOTE — PROGRESS NOTES
ASSESSMENT/PLAN:    Assessment:   Carpal tunnel syndrome, right  Index finger trigger finger, right    Plan:   Cortisone injection for right index trigger finger was administered today in the office  We will see him back in 6-8 weeks  To Do Next Visit:      General Discussions:       Operative Discussions:         _____________________________________________________  CHIEF COMPLAINT:  No chief complaint on file  SUBJECTIVE:  Dianelys Byers is a 65y o  year old male who presents to the office for follow up of right carpal tunnel  He has a history of right endoscopic carpal tunnel release in 2016  Last visit, he received a cortisone injection which was only beneficial for 1 day  His right index trigger finger is still bothering him  He has active locking and catching that is painful  He has been taking care of his other health issues and reports that he has started to regain some weight back      PAST MEDICAL HISTORY:  Past Medical History:   Diagnosis Date    Anesthesia     Pt wakes up during "almost every surgery"    Arthritis     Asthma     Cervical radiculopathy     Chemotherapy follow-up examination     Chronic pain     COPD (chronic obstructive pulmonary disease) ([address]     Diabetes mellitus ([address] )     borderline "years ago"    Food allergy     clams    GERD (gastroesophageal reflux disease)     Heart murmur     Hiatal hernia     History of malignant neoplasm     History of pneumonia 04/12/2016    History of pulmonary embolism     History of ulceration     Hyperlipidemia     Lung cancer (HCC)     left lung    Migraine     Postgastrectomy malabsorption     Right scapholunate ligament tear     Skipped heart beats     Wears partial dentures     upper and lower       PAST SURGICAL HISTORY:  Past Surgical History:   Procedure Laterality Date    BRONCHOSCOPY      COLONOSCOPY      FRACTURE SURGERY      femur right 1985    GASTRIC BYPASS LAPAROSCOPIC N/A 7/12/2017    Procedure: GASTRIC DIVERSION WITH [place]-EN-Y RECONSTRUCTION WITH  SHORT 60 cm LIMB;  Surgeon: Brent Nicole MD;  Location: [facility];  Service: Bariatrics    KNEE ARTHROSCOPY Right     right shoulder    LUNG LOBECTOMY Left     LYMPHADENECTOMY  05/22/2012    Dr Leisa Feliz ANT INTERBODY MIN DISCECTOMY, CERVICAL BELOW C2 N/A 10/17/2017    Procedure: C5-6, C6-7 ACDF WITH ALLOGRAFT (NEURO MONITORING); Surgeon: Randy Saba MD;  [facility];  Service: Orthopedics    MO COLONOSCOPY FLX DX W/COLLJ SPEC WHEN PFRMD N/A 4/14/2017    Procedure: COLONOSCOPY;  Surgeon: Anthony Alcantara MD;  Location: [facility];  Service: Gastroenterology    MO ESOPHAGOGASTRODUODENOSCOPY TRANSORAL DIAGNOSTIC N/A 1/11/2017    Procedure: ESOPHAGOGASTRODUODENOSCOPY (EGD); Surgeon: Anthony Alcantara MD;  [facility];   Service: Gastroenterology    MO LAP, REPAIR PARAESOPHAGEAL HERNIA, INCL FUNDOPLASTY W/ MESH N/A 7/12/2017    Procedure: REPAIR HERNIA PARAESOPHAGEAL  LAPAROSCOPIC;  Surgeon: Brent Nicole MD;  [facility];  Service: Fonnie Radar MO WRIST Karmen Wale LIG Right 6/9/2016    Procedure: RELEASE CARPAL TUNNEL ENDOSCOPIC;  Surgeon: Juarze Clifford MD;  [facility];  Service: Orthopedics    RECONSTRUCTION DISTAL RADIUS/ULNA JOINT (DRUJ) Right 9/6/2016    Procedure: WRIST SCAPHOLUNATE LIGAMENT RECONSTRUCTION WITH ECRB TENDON AUTOGRAPH , SPLINT APPLICATION ;  Surgeon: Juarez Clifford MD;  [facility];  Service:    [facility] SHOULDER SURGERY      TONSILLECTOMY         FAMILY HISTORY:  Family History   Problem Relation Age of Onset    Other Mother         Back disorder    Diabetes Mother         Diabetes Mellitus    Hypertension Mother     Heart disease Father     Hypertension Father     Breast cancer Sister     Skin cancer Sister     Breast cancer Paternal Grandmother     Skin cancer Paternal Grandmother        SOCIAL HISTORY:  Social History   Substance Use Topics    Smoking status: Former Smoker     Packs/day: 0 50     Years: 48 00     Types: Cigarettes     Quit date: 5/1/2017    Smokeless tobacco: Never Used    Alcohol use Yes      Comment: occasional        MEDICATIONS:    Current Outpatient Prescriptions:     albuterol (PROVENTIL HFA,VENTOLIN HFA) 90 mcg/act inhaler, Inhale 2 puffs every 6 (six) hours as needed for wheezing, Disp: , Rfl:     atorvastatin (LIPITOR) 40 mg tablet, Take 80 mg by mouth daily  , Disp: , Rfl:     diclofenac sodium (VOLTAREN) 1 %, Place on the skin, Disp: , Rfl:     DULoxetine (CYMBALTA) 60 mg delayed release capsule, , Disp: , Rfl:     gabapentin, once-daily, (GRALISE) 300 MG tablet, Take 1 tablet by mouth 3 (three) times a day, Disp: , Rfl:     Magnesium 400 MG CAPS, Take 1 tablet by mouth daily, Disp: , Rfl:     megestrol (MEGACE) 40 MG/ML suspension, Take 5 mL (200 mg total) by mouth daily, Disp: 240 mL, Rfl: 0    methocarbamol (ROBAXIN) 500 mg tablet, Take 1 tablet (500 mg total) by mouth 3 (three) times a day, Disp: 30 tablet, Rfl: 2    nortriptyline (PAMELOR) 10 mg capsule, Take 10 mg by mouth daily at bedtime 3 tabs=30mg at bedtime , Disp: , Rfl:     ONETOUCH DELICA LANCETS FINE MISC, Use 2-3 times per day as needed, Disp: 100 each, Rfl: 2    ONETOUCH VERIO test strip, Test 2-3 times daily as instructed, Disp: 100 each, Rfl: 3    oxyCODONE-acetaminophen (PERCOCET) 5-325 mg per tablet, Take 1 tablet by mouth every 4 (four) hours as needed for moderate pain Max Daily Amount: 6 tablets, Disp: 30 tablet, Rfl: 0    pantoprazole (PROTONIX) 40 mg tablet, Take 1 tablet by mouth daily, Disp: , Rfl: 0    predniSONE 10 mg tablet, 4 pills for 3 days, then 3 pills for 3 days, then 2 pills for 3 days, then 1 pills for 3 days, then stop, Disp: 30 tablet, Rfl: 0    Protein POWD, Take by mouth, Disp: , Rfl:     topiramate (TOPAMAX) 100 mg tablet, Take 100 mg by mouth daily Pt reports currently takes 75mg AM and 75mg PM , Disp: , Rfl:     traMADol (ULTRAM) 50 mg tablet, Take by mouth, Disp: , Rfl:     ALLERGIES:  Allergies   Allergen Reactions    Codeine Hives, GI Intolerance and Itching     Other reaction(s): Nausea and/or vomiting    Hydrocodone Hives and GI Intolerance    Penicillins Anaphylaxis and Throat Swelling    Shellfish-Derived Products      Other reaction(s): Nausea and/or vomiting  Clams only - nausea & vomiting       REVIEW OF SYSTEMS:  Pertinent items are noted in HPI  A comprehensive review of systems was negative  LABS:  HgA1c:   Lab Results   Component Value Date    HGBA1C 5 1 07/13/2018     BMP:   Lab Results   Component Value Date    GLUCOSE 134 10/18/2017    CALCIUM 9 1 07/13/2018     07/13/2018    K 4 4 07/13/2018    CO2 26 07/13/2018     07/13/2018    BUN 14 07/13/2018    CREATININE 0 88 07/13/2018       _____________________________________________________  PHYSICAL EXAMINATION:  General: well developed and well nourished, alert, oriented times 3 and appears comfortable  Psychiatric: Normal  HEENT: Trachea Midline, No torticollis  Cardiovascular: No discernable arrhythmia  Pulmonary: No wheezing or stridor  Skin: No masses, erthema, lacerations, fluctation, ulcerations  Neurovascular: Sensation Intact to the Median, Ulnar, Radial Nerve, Motor Intact to the Median, Ulnar, Radial Nerve and Pulses Intact    MUSCULOSKELETAL EXAMINATION:  Right hand:   Well healed incisions  Active locking and crepitation at index finger  Nodule present inline with index finger      _____________________________________________________  STUDIES REVIEWED:  No Studies to review      PROCEDURES PERFORMED:  Hand/upper extremity injection  Date/Time: 8/10/2018 12:39 PM  Consent given by: patient  Timeout: Immediately prior to procedure a time out was called to verify the correct patient, procedure, equipment, support staff and site/side marked as required   Supporting Documentation  Indications: pain and therapeutic   Procedure Details  Condition:trigger finger Location: index finger - R index A1   Preparation: Patient was prepped and draped in the usual sterile fashion  Needle size: 25 G  Ultrasound guidance: no  Approach: volar  Medications administered: 1 mL lidocaine 1 %; 6 mg betamethasone acetate-betamethasone sodium phosphate 6 (3-3) mg/mL  Patient tolerance: patient tolerated the procedure well with no immediate complications  Dressing:  Sterile dressing applied            Scribe Attestation    I,:   Arnaud Abdalla am acting as a scribe while in the presence of the attending physician :        I,:   Richy Parada MD personally performed the services described in this documentation    as scribed in my presence :

## 2018-08-13 ENCOUNTER — OFFICE VISIT (OUTPATIENT)
Dept: PHYSICAL THERAPY | Facility: CLINIC | Age: 58
End: 2018-08-13
Payer: MEDICARE

## 2018-08-13 DIAGNOSIS — G89.29 CHRONIC BILATERAL LOW BACK PAIN WITHOUT SCIATICA: Primary | ICD-10-CM

## 2018-08-13 DIAGNOSIS — M54.50 CHRONIC BILATERAL LOW BACK PAIN WITHOUT SCIATICA: Primary | ICD-10-CM

## 2018-08-13 PROCEDURE — 97140 MANUAL THERAPY 1/> REGIONS: CPT | Performed by: PHYSICAL THERAPIST

## 2018-08-13 PROCEDURE — 97110 THERAPEUTIC EXERCISES: CPT | Performed by: PHYSICAL THERAPIST

## 2018-08-13 NOTE — PROGRESS NOTES
Daily Note     Today's date: 2018  Patient name: Zeinab Jacobson  : 1960  MRN: [id number]  Referring provider: Zabrina Dash DO  Dx:   Encounter Diagnosis     ICD-10-CM    1  Chronic bilateral low back pain without sciatica M54 5     G89 29                   Subjective: " My back feels pretty good today"       Objective: See treatment diary below    Manual             B Hamstrings NP             PA mobs to thoracic spine 15 min  15 min                                                      Exercise Diary             PPT 10''10x 10''10x           Hip Add with Ball 5''20x 5''20x           Clamshells  3x10 Blue  3x10 Blue            SLR 3x10 3x10           Supine Marching              Pball ABS  5''20x 5''20x           Step Ups              Mini Squat              SLS              HR/TR             Nu Step              MTP/LTP with TB Red 5''20x Red 5''20x                                                                                                                       Modalities                                                           Assessment: Tolerated treatment well  Patient exhibited good technique with therapeutic exercises      Plan: Continue per plan of care

## 2018-08-15 ENCOUNTER — OFFICE VISIT (OUTPATIENT)
Dept: PHYSICAL THERAPY | Facility: CLINIC | Age: 58
End: 2018-08-15
Payer: MEDICARE

## 2018-08-15 DIAGNOSIS — G89.29 CHRONIC BILATERAL LOW BACK PAIN WITHOUT SCIATICA: Primary | ICD-10-CM

## 2018-08-15 DIAGNOSIS — M54.50 CHRONIC BILATERAL LOW BACK PAIN WITHOUT SCIATICA: Primary | ICD-10-CM

## 2018-08-15 PROCEDURE — 97140 MANUAL THERAPY 1/> REGIONS: CPT | Performed by: PHYSICAL THERAPIST

## 2018-08-15 PROCEDURE — 97110 THERAPEUTIC EXERCISES: CPT | Performed by: PHYSICAL THERAPIST

## 2018-08-15 NOTE — PROGRESS NOTES
Daily Note     Today's date: 8/15/2018  Patient name: Kg Singleton  : 1960  MRN: [id number]  Referring provider: Itzel Everett DO  Dx:   Encounter Diagnosis     ICD-10-CM    1  Chronic bilateral low back pain without sciatica M54 5     G89 29                   Subjective: Pt reports that he feels alright today      Objective: See treatment diary below  Manual  8/6 8/13 8/15     B Hamstrings NP        PA mobs to thoracic spine 15 min  15 min 15  min                                  Exercise Diary  8/6 8/13 8/15    PPT 10''10x 10''10x 10''10x    Hip Add with Ball 5''20x 5''20x 5''20x    Clamshells  3x10 Blue  3x10 Blue  3x10 Blue    SLR 3x10 3x10 3x10    Supine Marching        Pball ABS  5''20x 5''20x 5''20x    Step Ups        Mini Squat        SLS        HR/TR       Nu Step        MTP/LTP with TB Red 5''20x Red 5''20x Red 5''20x                                                                Modalities                                                           Assessment: Tolerated treatment well  Patient exhibited good technique with therapeutic exercises      Plan: Continue per plan of care

## 2018-08-17 ENCOUNTER — OFFICE VISIT (OUTPATIENT)
Dept: PHYSICAL THERAPY | Facility: CLINIC | Age: 58
End: 2018-08-17
Payer: MEDICARE

## 2018-08-17 DIAGNOSIS — G89.29 CHRONIC BILATERAL LOW BACK PAIN WITHOUT SCIATICA: Primary | ICD-10-CM

## 2018-08-17 DIAGNOSIS — M54.50 CHRONIC BILATERAL LOW BACK PAIN WITHOUT SCIATICA: Primary | ICD-10-CM

## 2018-08-17 PROCEDURE — 97140 MANUAL THERAPY 1/> REGIONS: CPT

## 2018-08-17 PROCEDURE — 97110 THERAPEUTIC EXERCISES: CPT

## 2018-08-17 NOTE — PROGRESS NOTES
Daily Note     Today's date: 2018  Patient name: Edward Hussein  : 1960  MRN: [id number]  Referring provider: Matthew Siddiqui DO  Dx:   Encounter Diagnosis     ICD-10-CM    1  Chronic bilateral low back pain without sciatica M54 5     G89 29        Start Time: 0705  Stop Time: 0800  Total time in clinic (min): 55 minutes    Subjective: Pt reports that he is "sore today "      Objective: See treatment diary below  Manual  8/6 8/13 8/15 8/17   B Hamstrings NP       PA mobs to thoracic spine 15 min  15 min 15' Paraspinal transverse massage 15 min                            Exercise Diary  8/6 8/13 8/15 8/17   PPT 10''10x 10''10x 10''10x 10" x10   Hip Add with Ball 5''20x 5''20x 5''20x 5" x20   Clamshells  3x10 Blue  3x10 Blue  3x10 Blue 3x10 Blue   SLR 3x10 3x10 3x10 3x10 Flex   Supine Marching        Pball ABS  5''20x 5''20x 5''20x 5" x20   Step Ups        Mini Squat        SLS        HR/TR       Nu Step     L4 x 10'   MTP/LTP with TB Red 5''20x Red 5''20x Red 5''20x Red 5" x30   Doorway stretch     10" x10                                                        Modalities                                                         Assessment: Tolerated treatment well  Pt Patient demonstrated fatigue post treatment and would benefit from continued PT      Plan: Continue per plan of care  Progress treatment as tolerated

## 2018-08-20 ENCOUNTER — OFFICE VISIT (OUTPATIENT)
Dept: PHYSICAL THERAPY | Facility: CLINIC | Age: 58
End: 2018-08-20
Payer: MEDICARE

## 2018-08-20 DIAGNOSIS — G89.29 CHRONIC BILATERAL LOW BACK PAIN WITHOUT SCIATICA: Primary | ICD-10-CM

## 2018-08-20 DIAGNOSIS — M54.50 CHRONIC BILATERAL LOW BACK PAIN WITHOUT SCIATICA: Primary | ICD-10-CM

## 2018-08-20 PROCEDURE — 97140 MANUAL THERAPY 1/> REGIONS: CPT

## 2018-08-20 PROCEDURE — 97110 THERAPEUTIC EXERCISES: CPT

## 2018-08-20 NOTE — PROGRESS NOTES
Daily Note     Today's date: 2018  Patient name: Jean Claude Lopez  : 1960  MRN: [id number]  Referring provider: Haley Mg DO  Dx:   Encounter Diagnosis     ICD-10-CM    1  Chronic bilateral low back pain without sciatica M54 5     G89 29                   Subjective: Pt reports that he is "sore today "    Objective: See treatment diary below  Manual  8/20 8/13 8/15 8/17   B Hamstrings       PA mobs to thoracic spine Paraspinal transverse massage  15 min 15' Paraspinal transverse massage 15 min    15 min                        Exercise Diary  8/20 8/13 8/15 8/17   PPT 10''10x 10''10x 10''10x 10" x10   Hip Add with Ball 5''20x 5''20x 5''20x 5" x20   Clamshells  3x10 Blue  3x10 Blue  3x10 Blue 3x10 Blue   SLR 3x10 3x10 3x10 3x10 Flex   Bridges  5" x20      Pball ABS  5''20x 5''20x 5''20x 5" x20   Step Ups        Mini Squat        SLS        HR/TR       Nu Step     L4 x 10'   MTP/LTP with TB Red 5''20x Red 5''20x Red 5''20x Red 5" x30   Doorway stretch  10" x10   10" x10    rpm 5' fwd/ 5' retro                                                     Modalities                                                         Assessment: Tolerated treatment well  Initiated bridges this date  No exacerbations of symptoms  Patient demonstrated fatigue post treatment, exhibited good technique with therapeutic exercises and would benefit from continued PT  Plan: Continue per plan of care  Progress treatment as tolerated

## 2018-08-22 ENCOUNTER — OFFICE VISIT (OUTPATIENT)
Dept: PHYSICAL THERAPY | Facility: CLINIC | Age: 58
End: 2018-08-22
Payer: MEDICARE

## 2018-08-22 DIAGNOSIS — G89.29 CHRONIC BILATERAL LOW BACK PAIN WITHOUT SCIATICA: Primary | ICD-10-CM

## 2018-08-22 DIAGNOSIS — M54.50 CHRONIC BILATERAL LOW BACK PAIN WITHOUT SCIATICA: Primary | ICD-10-CM

## 2018-08-22 PROCEDURE — 97140 MANUAL THERAPY 1/> REGIONS: CPT | Performed by: PHYSICAL THERAPIST

## 2018-08-22 PROCEDURE — 97110 THERAPEUTIC EXERCISES: CPT | Performed by: PHYSICAL THERAPIST

## 2018-08-22 NOTE — PROGRESS NOTES
Daily Note     Today's date: 2018  Patient name: Krystin Shaikh  : 1960  MRN: [id number]  Referring provider: Brook Andrade DO  Dx:   Encounter Diagnosis     ICD-10-CM    1  Chronic bilateral low back pain without sciatica M54 5     G89 29                   Subjective: "Pt reports the he feels ok today "       Objective: See treatment diary below  Manual  8/20 8/21 8/15 8/17   B Hamstrings       PA mobs to thoracic spine Paraspinal transverse massage  15 min 15' Paraspinal transverse massage 15 min    15 min                        Exercise Diary  8/20 8/21 8/15 8/17   PPT 10''10x 10''10x 10''10x 10" x10   Hip Add with Ball 5''20x 5''20x 5''20x 5" x20   Clamshells  3x10 Blue  3x10 Blue  3x10 Blue 3x10 Blue   SLR 3x10 3x10 3x10 3x10 Flex   Bridges  5" x20      Pball ABS  5''20x 5''20x 5''20x 5" x20   Step Ups        Mini Squat        SLS        HR/TR       Nu Step     L4 x 10'   MTP/LTP with TB Red 5''20x Red 5''20x Red 5''20x Red 5" x30   Doorway stretch  10" x10   10" x10    rpm 5' fwd/ 5' retro                                                     Modalities                                                           Assessment: Tolerated treatment well  Patient exhibited good technique with therapeutic exercises the patient presents with continued hypomobility in his thoracic spine during manual therapy  Plan: Continue per plan of care  Progress treatment as tolerated

## 2018-08-24 ENCOUNTER — APPOINTMENT (OUTPATIENT)
Dept: PHYSICAL THERAPY | Facility: CLINIC | Age: 58
End: 2018-08-24
Payer: MEDICARE

## 2018-08-27 ENCOUNTER — OFFICE VISIT (OUTPATIENT)
Dept: PHYSICAL THERAPY | Facility: CLINIC | Age: 58
End: 2018-08-27
Payer: MEDICARE

## 2018-08-27 DIAGNOSIS — M54.50 CHRONIC BILATERAL LOW BACK PAIN WITHOUT SCIATICA: Primary | ICD-10-CM

## 2018-08-27 DIAGNOSIS — G89.29 CHRONIC BILATERAL LOW BACK PAIN WITHOUT SCIATICA: Primary | ICD-10-CM

## 2018-08-27 PROCEDURE — 97140 MANUAL THERAPY 1/> REGIONS: CPT | Performed by: PHYSICAL THERAPIST

## 2018-08-27 PROCEDURE — 97110 THERAPEUTIC EXERCISES: CPT | Performed by: PHYSICAL THERAPIST

## 2018-08-27 NOTE — PROGRESS NOTES
Daily Note     Today's date: 2018  Patient name: Frances Rausch  : 1960  MRN: [id number]  Referring provider: Reanna Bullard DO  Dx:   Encounter Diagnosis     ICD-10-CM    1  Chronic bilateral low back pain without sciatica M54 5     G89 29                   Subjective: Pt offers no new complaints this session  Objective: See treatment diary below  Manual     B Hamstrings       PA mobs to thoracic spine Paraspinal transverse massage  15 min 15' Paraspinal transverse massage 15 min    15 min                        Exercise Diary     PPT 10''10x 10''10x 10''10x 10" x10   Hip Add with Ball 5''20x 5''20x 5''20x 5" x20   Clamshells  3x10 Blue  3x10 Blue  3x10 Blue 3x10 Blue   SLR 3x10 3x10 3x10 3x10 Flex   Bridges  5" x20      Pball ABS  5''20x 5''20x 5''20x 5" x20   Step Ups        Mini Squat        SLS        HR/TR       Nu Step     L4 x 10'   MTP/LTP with TB Red 5''20x Red 5''20x Red 5''20x Red 5" x30   Doorway stretch  10" x10  10''10x 10" x10    rpm 5' fwd/ 5' retro   110 RPM                                                   Modalities                                                             Assessment: Tolerated treatment well  Patient exhibited good technique with therapeutic exercises   Consider using [employer] for PA mobs to T spine next session  Plan: Continue per plan of care  Progress treatment as tolerated

## 2018-08-30 ENCOUNTER — OFFICE VISIT (OUTPATIENT)
Dept: FAMILY MEDICINE CLINIC | Facility: CLINIC | Age: 58
End: 2018-08-30
Payer: COMMERCIAL

## 2018-08-30 VITALS
SYSTOLIC BLOOD PRESSURE: 160 MMHG | WEIGHT: 136.2 LBS | HEIGHT: 69 IN | DIASTOLIC BLOOD PRESSURE: 80 MMHG | BODY MASS INDEX: 20.17 KG/M2 | TEMPERATURE: 98.8 F

## 2018-08-30 DIAGNOSIS — Z23 NEED FOR PNEUMOCOCCAL VACCINATION: ICD-10-CM

## 2018-08-30 DIAGNOSIS — C34.90 MALIGNANT NEOPLASM OF LUNG, UNSPECIFIED LATERALITY, UNSPECIFIED PART OF LUNG (HCC): ICD-10-CM

## 2018-08-30 DIAGNOSIS — G89.29 CHRONIC BILATERAL THORACIC BACK PAIN: ICD-10-CM

## 2018-08-30 DIAGNOSIS — Z00.00 MEDICARE ANNUAL WELLNESS VISIT, SUBSEQUENT: Primary | ICD-10-CM

## 2018-08-30 DIAGNOSIS — R63.4 ABNORMAL WEIGHT LOSS: ICD-10-CM

## 2018-08-30 DIAGNOSIS — M54.6 CHRONIC BILATERAL THORACIC BACK PAIN: ICD-10-CM

## 2018-08-30 DIAGNOSIS — Z98.84 STATUS POST BARIATRIC SURGERY: ICD-10-CM

## 2018-08-30 PROCEDURE — 99214 OFFICE O/P EST MOD 30 MIN: CPT | Performed by: FAMILY MEDICINE

## 2018-08-30 PROCEDURE — G0439 PPPS, SUBSEQ VISIT: HCPCS | Performed by: FAMILY MEDICINE

## 2018-08-30 PROCEDURE — 90471 IMMUNIZATION ADMIN: CPT

## 2018-08-30 PROCEDURE — 90670 PCV13 VACCINE IM: CPT

## 2018-08-30 RX ORDER — MEGESTROL ACETATE 40 MG/ML
200 SUSPENSION ORAL DAILY
Qty: 240 ML | Refills: 6 | Status: SHIPPED | OUTPATIENT
Start: 2018-08-30 | End: 2018-09-25 | Stop reason: SDUPTHER

## 2018-08-30 NOTE — PROGRESS NOTES
Assessment and Plan:    Problem List Items Addressed This Visit     None      Visit Diagnoses     Medicare annual wellness visit, subsequent    -  Primary        Health Maintenance Due   Topic Date Due    Medicare Annual Wellness Visit (AWV)  1960    PT PLAN OF CARE  1960    Pneumococcal PPSV23 Highest Risk Adult (1 of 3 - PCV13) 11/05/1980    DTaP,Tdap,and Td Vaccines (1 - Tdap) 11/05/1982         HPI:  Zeinab Jacobson is a 65 y o  male here for his Subsequent Wellness Visit      Patient Active Problem List   Diagnosis    Right scapholunate ligament tear    History of pulmonary embolism    Saucedo's esophagus without dysplasia    Cervical disc disorder with radiculopathy of cervicothoracic region    Cervical radiculopathy    S/P repair of paraesophageal hernia    Bariatric surgery status    Postsurgical malabsorption    Adenocarcinoma of lung (HCC)    Dyslipidemia    Reactive hypoglycemia    Vitamin D deficiency    Low vitamin B12 level    Generalized abdominal pain    Constipation     Past Medical History:   Diagnosis Date    Anesthesia     Pt wakes up during "almost every surgery"    Arthritis     Asthma     Cervical radiculopathy     Chemotherapy follow-up examination     Chronic pain     COPD (chronic obstructive pulmonary disease) ([address]     Diabetes mellitus ([address] )     borderline "years ago"    Food allergy     clams    GERD (gastroesophageal reflux disease)     Heart murmur     Hiatal hernia     History of malignant neoplasm     History of pneumonia 04/12/2016    History of pulmonary embolism     History of ulceration     Hyperlipidemia     Lung cancer (HCC)     left lung    Migraine     Postgastrectomy malabsorption     Right scapholunate ligament tear     Skipped heart beats     Wears partial dentures     upper and lower     Past Surgical History:   Procedure Laterality Date    BRONCHOSCOPY      COLONOSCOPY      FRACTURE SURGERY      femur right 1985    GASTRIC BYPASS LAPAROSCOPIC N/A 7/12/2017    Procedure: GASTRIC DIVERSION WITH [place]-EN-Y RECONSTRUCTION WITH  SHORT 60 cm LIMB;  Surgeon: Haja Bills MD;  Location: [facility];  Service: Bariatrics    KNEE ARTHROSCOPY Right     right shoulder    LUNG LOBECTOMY Left     LYMPHADENECTOMY  05/22/2012    Dr Renetta Gillette ANT INTERBODY MIN DISCECTOMY, CERVICAL BELOW C2 N/A 10/17/2017    Procedure: C5-6, C6-7 ACDF WITH ALLOGRAFT (NEURO MONITORING); Surgeon: Aan Martin MD;  [facility];  Service: Orthopedics    DC COLONOSCOPY FLX DX W/COLLJ SPEC WHEN PFRMD N/A 4/14/2017    Procedure: COLONOSCOPY;  Surgeon: Ponce Barrow MD;  Location: [facility];  Service: Gastroenterology    DC ESOPHAGOGASTRODUODENOSCOPY TRANSORAL DIAGNOSTIC N/A 1/11/2017    Procedure: ESOPHAGOGASTRODUODENOSCOPY (EGD); Surgeon: Ponce Barrow MD;  [facility];   Service: Gastroenterology    DC LAP, REPAIR PARAESOPHAGEAL HERNIA, INCL FUNDOPLASTY W/ MESH N/A 7/12/2017    Procedure: REPAIR HERNIA PARAESOPHAGEAL  LAPAROSCOPIC;  Surgeon: Haja Bills MD;  [facility];  Service: Bariatrics    DC WRIST Gurpreet Boozer LIG Right 6/9/2016    Procedure: RELEASE CARPAL TUNNEL ENDOSCOPIC;  Surgeon: Eric Wallace MD;  [facility];  Service: Orthopedics    RECONSTRUCTION DISTAL RADIUS/ULNA JOINT (DRUJ) Right 9/6/2016    Procedure: WRIST SCAPHOLUNATE LIGAMENT RECONSTRUCTION WITH ECRB TENDON AUTOGRAPH , SPLINT APPLICATION ;  Surgeon: Eric Wallace MD;  [facility];  Service:    Berto Robles SHOULDER SURGERY      TONSILLECTOMY       Family History   Problem Relation Age of Onset    Other Mother         Back disorder    Diabetes Mother         Diabetes Mellitus    Hypertension Mother     Heart disease Father     Hypertension Father     Breast cancer Sister     Skin cancer Sister     Breast cancer Paternal Grandmother     Skin cancer Paternal Grandmother      History   Smoking Status    Former Smoker    Packs/day: 0 50    Years: 48 00    Types: Cigarettes    Quit date: 5/1/2017   Smokeless Tobacco    Never Used     History   Alcohol Use    Yes     Comment: occasional       History   Drug Use No       Current Outpatient Prescriptions   Medication Sig Dispense Refill    albuterol (PROVENTIL HFA,VENTOLIN HFA) 90 mcg/act inhaler Inhale 2 puffs every 6 (six) hours as needed for wheezing      atorvastatin (LIPITOR) 40 mg tablet Take 80 mg by mouth daily        diclofenac sodium (VOLTAREN) 1 % Place on the skin      DULoxetine (CYMBALTA) 60 mg delayed release capsule       gabapentin, once-daily, (GRALISE) 300 MG tablet Take 1 tablet by mouth 3 (three) times a day      Magnesium 400 MG CAPS Take 1 tablet by mouth daily      megestrol (MEGACE) 40 MG/ML suspension Take 5 mL (200 mg total) by mouth daily 240 mL 0    methocarbamol (ROBAXIN) 500 mg tablet Take 1 tablet (500 mg total) by mouth 3 (three) times a day 30 tablet 2    nortriptyline (PAMELOR) 10 mg capsule Take 10 mg by mouth daily at bedtime 3 tabs=30mg at bedtime       ONETOUCH DELICA LANCETS FINE MISC Use 2-3 times per day as needed 100 each 2    ONETOUCH VERIO test strip Test 2-3 times daily as instructed 100 each 3    oxyCODONE-acetaminophen (PERCOCET) 5-325 mg per tablet Take 1 tablet by mouth every 4 (four) hours as needed for moderate pain Max Daily Amount: 6 tablets 30 tablet 0    pantoprazole (PROTONIX) 40 mg tablet Take 1 tablet by mouth daily  0    predniSONE 10 mg tablet 4 pills for 3 days, then 3 pills for 3 days, then 2 pills for 3 days, then 1 pills for 3 days, then stop 30 tablet 0    Protein POWD Take by mouth      topiramate (TOPAMAX) 100 mg tablet Take 100 mg by mouth daily Pt reports currently takes 75mg AM and 75mg PM       traMADol (ULTRAM) 50 mg tablet Take by mouth       No current facility-administered medications for this visit  Allergies   Allergen Reactions    Codeine Hives, GI Intolerance and Itching     Other reaction(s): Nausea and/or vomiting    Hydrocodone Hives and GI Intolerance    Penicillins Anaphylaxis and Throat Swelling    Shellfish-Derived Products      Other reaction(s): Nausea and/or vomiting  Clams only - nausea & vomiting     Immunization History   Administered Date(s) Administered    Hep A / Hep B 02/03/2004    Influenza 11/12/2015, 10/20/2016    Influenza Quadrivalent Preservative Free 3 years and older IM 10/18/2017    Influenza TIV (IM) 11/12/2015, 10/20/2016       Patient Care Team:  Jazmin Morales DO as PCP - General (Family Medicine)  Jazmin Morales DO as PCP - MD Donnell Espitia MD Zona Baltimore, MD Rosi Haley, MD Orval Dawes, MD Glynis Currier, MD Suzzanna Mings, MD    Medicare Screening Tests and Risk Assessments:      Health Risk Assessment:  Patient rates overall health as fair  Patient feels that their physical health rating is Same  Eyesight was rated as Slightly worse  Hearing was rated as Same  Patient feels that their emotional and mental health rating is Same  Pain experienced by patient in the last 7 days has been A lot  Patient's pain rating has been 8/10  Patient states that he has experienced weight loss or gain in last 6 months  Emotional/Mental Health:  Patient has been feeling nervous/anxious  PHQ-9 Depression Screening:    Frequency of the following problems over the past two weeks:      1  Little interest or pleasure in doing things: 0 - not at all      2  Feeling down, depressed, or hopeless: 0 - not at all  PHQ-2 Score: 0          Broken Bones/Falls: Fall Risk Assessment:    In the past year, patient has experienced: History of falling in past year     Number of falls: greater than 3  Patient does not feel he is unsteady standing  Patient is not taking medication that can cause feelings of lightheadedness or tiredness   Patient often has no need to rush to the toilet  Bladder/Bowel:  Patient has not leaked urine accidently in the last six months  Patient reports no loss of bowel control  Immunizations:  Patient has had a flu vaccination within the last year  Patient has received a pneumonia shot  Patient has not received a shingles shot  Patient has received tetanus/diphtheria shot  Home Safety:  Patient does not have trouble with stairs inside or outside of their home  Patient currently reports that there are no safety hazards present in home, working smoke alarms, no working carbon monoxide detectors  Preventative Screenings:   prostate cancer screen performed, colon cancer screen completed, cholesterol screen completed, no glaucoma eye exam completed    Nutrition:  Current diet: Regular with servings of the following:    Medications:  Patient is not currently taking any over-the-counter supplements  Patient is able to manage medications  Lifestyle Choices:  Patient reports current tobacco use  Patient reports alcohol use  Patient drives a vehicle  Patient wears seat belt  Activities of Daily Living:  Can get out of bed by his or her self, able to dress self, able to make own meals, able to do own shopping, able to bathe self, can do own laundry/housekeeping, can manage own money, pay bills and track expenses    Previous Hospitalizations:  Hospitalization or ED visit in past 12 months  Number of hospitalizations within the last year: 1-2        Advanced Directives:  Patient has decided on a power of   Patient has spoken to designated power of   Patient has completed advanced directive          Preventative Screening/Counseling:      Cardiovascular:      General: Screening Current          Diabetes:      General: Screening Current          Colorectal Cancer:      General: Screening Current          Prostate Cancer:      General: Screening Current          Osteoporosis:      General: Screening Not Indicated          AAA:      General: Screening Not Indicated          Glaucoma:      Referrals: Ophthalmology          Advanced Directives:   Patient has living will for healthcare, has durable POA for healthcare, patient has an advanced directive  End of life assessment reviewed with patient  Provider agrees with end of life decisions        Immunizations:      Influenza: Influenza Recommended Annually      Pneumococcal: Pneumococcal Due Today      TD: Vaccine Status Unknown      TDAP: Vaccine Status Unknown

## 2018-08-30 NOTE — PROGRESS NOTES
Assessment/Plan:    No problem-specific Assessment & Plan notes found for this encounter  There are no diagnoses linked to this encounter  Subjective:      Patient ID: Anson Sutherland is a 65 y o  male  HPI    The following portions of the patient's history were reviewed and updated as appropriate: allergies, current medications, past family history, past medical history, past social history, past surgical history and problem list     Review of Systems      Objective: There were no vitals taken for this visit           Physical Exam

## 2018-08-30 NOTE — PROGRESS NOTES
Assessment/Plan:    No problem-specific Assessment & Plan notes found for this encounter  Diagnoses and all orders for this visit:    Medicare annual wellness visit, subsequent    Abnormal weight loss  Comments:  pt is gaining weight since last visit  Orders:  -     megestrol (MEGACE) 40 MG/ML suspension; Take 5 mL (200 mg total) by mouth daily    Chronic bilateral thoracic back pain  -     Ambulatory referral to Physical Therapy; Future    Malignant neoplasm of lung, unspecified laterality, unspecified part of lung ([facility] [address] )  Comments:  pt is cancer free according to oncology    Status post bariatric surgery    Abnormal weight loss  -     megestrol (MEGACE) 40 MG/ML suspension; Take 5 mL (200 mg total) by mouth daily          Subjective:      Patient ID: Claude Branch is a 65 y o  male  Follow up for weight loss, pt started megace and has gained 3 lbs since last visit, pt states his appetite is increasing, pt has had trouble maintaining his weight since his bariatric surgery, follow up for chronic thoracic back pain pt has been undergoing PT but states it is not helping, follow up for lung cancer pt is following up with oncology who state he is cancer free        The following portions of the patient's history were reviewed and updated as appropriate: allergies, current medications, past family history, past medical history, past social history, past surgical history and problem list     Review of Systems   Constitutional: Negative for chills and fever  Respiratory: Negative for shortness of breath and wheezing  Cardiovascular: Negative for chest pain and palpitations  Gastrointestinal: Negative for abdominal pain, blood in stool, diarrhea, nausea and vomiting           Objective:      /80 (BP Location: Right arm, Patient Position: Sitting, Cuff Size: Adult)   Temp 98 8 °F (37 1 °C) (Tympanic)   Ht 5' 9" (1 753 m)   Wt 61 8 kg (136 lb 3 2 oz)   BMI 20 11 kg/m²          Physical Exam Constitutional: He is oriented to person, place, and time  He appears well-developed and well-nourished  No distress  HENT:   Head: Normocephalic and atraumatic  Eyes: Conjunctivae and EOM are normal  Pupils are equal, round, and reactive to light  No scleral icterus  Neck: Normal range of motion  Neck supple  Cardiovascular: Normal rate, regular rhythm and normal heart sounds  No murmur heard  Pulmonary/Chest: Effort normal and breath sounds normal  No respiratory distress  He has no wheezes  He has no rales  Abdominal: Soft  Bowel sounds are normal  He exhibits no distension and no mass  There is no tenderness  There is no rebound and no guarding  Musculoskeletal: He exhibits no edema  Lymphadenopathy:     He has no cervical adenopathy  Neurological: He is alert and oriented to person, place, and time  He exhibits normal muscle tone  Skin: Skin is warm and dry  No rash noted  He is not diaphoretic  No erythema  No pallor  Psychiatric: He has a normal mood and affect  His behavior is normal  Judgment and thought content normal    Nursing note and vitals reviewed

## 2018-09-04 ENCOUNTER — OFFICE VISIT (OUTPATIENT)
Dept: BARIATRICS | Facility: CLINIC | Age: 58
End: 2018-09-04
Payer: COMMERCIAL

## 2018-09-04 VITALS
BODY MASS INDEX: 20.61 KG/M2 | HEART RATE: 74 BPM | DIASTOLIC BLOOD PRESSURE: 84 MMHG | WEIGHT: 136 LBS | TEMPERATURE: 98.2 F | SYSTOLIC BLOOD PRESSURE: 130 MMHG | HEIGHT: 68 IN

## 2018-09-04 DIAGNOSIS — K91.2 POSTSURGICAL MALABSORPTION: ICD-10-CM

## 2018-09-04 DIAGNOSIS — E55.9 VITAMIN D DEFICIENCY: ICD-10-CM

## 2018-09-04 DIAGNOSIS — Z98.84 BARIATRIC SURGERY STATUS: Primary | ICD-10-CM

## 2018-09-04 DIAGNOSIS — E16.1 REACTIVE HYPOGLYCEMIA: ICD-10-CM

## 2018-09-04 DIAGNOSIS — E53.8 LOW VITAMIN B12 LEVEL: ICD-10-CM

## 2018-09-04 DIAGNOSIS — K22.70 BARRETT'S ESOPHAGUS WITHOUT DYSPLASIA: ICD-10-CM

## 2018-09-04 PROCEDURE — 99214 OFFICE O/P EST MOD 30 MIN: CPT | Performed by: PHYSICIAN ASSISTANT

## 2018-09-04 RX ORDER — ERGOCALCIFEROL 1.25 MG/1
50000 CAPSULE ORAL WEEKLY
Qty: 4 CAPSULE | Refills: 2 | Status: SHIPPED | OUTPATIENT
Start: 2018-09-04 | End: 2019-09-18 | Stop reason: ALTCHOICE

## 2018-09-04 NOTE — ASSESSMENT & PLAN NOTE
Advised on taking 50,000 IU vitamin D2 weekly for 12 weeks  Then add an EXTRA 2000 IU daily to his current bariatric supplements  Will recheck level in 5 months

## 2018-09-04 NOTE — ASSESSMENT & PLAN NOTE
7/12/2017 status post laparoscopic repair of paraesophageal hernia with gastric diversion with Britney-en-Y gastric bypass surgery reconstruction with short 60 cm limb by Dr Apple Trujillo  He also has history of chronic GERD/cook's esophagus and remote history of  Lung CA- He is reportedly considered cancer free at this point as noted by his PCP's office visit 8/30/2018    He is eating small, frequent meals/snacks-notes he even gets up during the night to snack since being put on Megace in July  by PCP   Advised to monitor weight -most patients will regain some weight back without help after the first year /advised to talk with PCP as needed-but right now he is staying stable    Offered follow-up with our RD to help with diet for low blood sugars/weight regain as needed-he met with her already but declines need for further help now-advised he can make follow-up with her as needed      Initial: 216 5 lb  Current: 136 lb  EWL: 156% which is significantly above average  Sohan:Current  Current BMI is Body mass index is 20 68 kg/m²  Since he is at least maintaining his weight now this is good  If he continues to lose more weight this would be concerning   I offered to see him back sooner-in 6 months to monitor weight for trends/otherwise will plan to see him at his annual visit    He was advised if he loses > 5 lb he should let us know/make follow-up with RD and may benefit from seeing Dr Apple Trujillo in follow-up  Tolerating a regular diet-yes  Eating at least 60 grams of protein per day-yes  Following 30/60 minute rule with liquids-following 30/30 which is appropriate  Drinking at least 64 ounces of fluid per day-yes  Drinking carbonated beverages-no  Sufficient exercise-walking  Using NSAIDs regularly-no  Using nicotine-no  Using alcohol-occasional beer-advised on effect after gastric surgery and that it can bring blood sugars down dangerously-advised to avoid

## 2018-09-04 NOTE — ASSESSMENT & PLAN NOTE
Only mildly high LDL and low HDL/encouraged some regular exercise as tolerated   Otherwise defer to his pcp

## 2018-09-04 NOTE — ASSESSMENT & PLAN NOTE
Malabsorption- patient is at risk for malabsorption of vitamins/minerals secondary to malabsorption from her procedure and restriction of intakes  Reviewed current supplements and advised on same    He is taking 4 bariatric fusion, an extra ?  500 mcg vitamin B12  And extra 1000 IU vitamin D3 daily

## 2018-09-04 NOTE — ASSESSMENT & PLAN NOTE
Notes he sometimes feels as though he is going low -but reports lowest was around 70 mg/dl  He is eating small,frequent meals/snacks  Advised on protein with high fiber carbohydrates-food sources reviewed with him  He is following with endocrinologist and advised him to follow their advice  Last hgA1c of 5 1%    He is aware he can see our RD again if/as needed for diet for this and to help him for adequate calories for weight regain

## 2018-09-04 NOTE — PROGRESS NOTES
Assessment/Plan:    Bariatric surgery status  7/12/2017 status post laparoscopic repair of paraesophageal hernia with gastric diversion with Britney-en-Y gastric bypass surgery reconstruction with short 60 cm limb by Dr Maurisio Cole  He also has history of chronic GERD/cook's esophagus and remote history of  Lung CA- He is reportedly considered cancer free at this point as noted by his PCP's office visit 8/30/2018    He is eating small, frequent meals/snacks-notes he even gets up during the night to snack since being put on Megace in July  by PCP   Advised to monitor weight -most patients will regain some weight back without help after the first year /advised to talk with PCP as needed-but right now he is staying stable    Offered follow-up with our RD to help with diet for low blood sugars/weight regain as needed-he met with her already but declines need for further help now-advised he can make follow-up with her as needed      Initial: 216 5 lb  Current: 136 lb  EWL: 156% which is significantly above average  Sohan:Current  Current BMI is Body mass index is 20 68 kg/m²  Since he is at least maintaining his weight now this is good  If he continues to lose more weight this would be concerning   I offered to see him back sooner-in 6 months to monitor weight for trends/otherwise will plan to see him at his annual visit    He was advised if he loses > 5 lb he should let us know/make follow-up with RD and may benefit from seeing Dr Maurisio Cole in follow-up  Tolerating a regular diet-yes  Eating at least 60 grams of protein per day-yes  Following 30/60 minute rule with liquids-following 30/30 which is appropriate  Drinking at least 64 ounces of fluid per day-yes  Drinking carbonated beverages-no  Sufficient exercise-walking  Using NSAIDs regularly-no  Using nicotine-no  Using alcohol-occasional beer-advised on effect after gastric surgery and that it can bring blood sugars down dangerously-advised to avoid      Vitamin D deficiency  Advised on taking 50,000 IU vitamin D2 weekly for 12 weeks  Then add an EXTRA 2000 IU daily to his current bariatric supplements  Will recheck level in 5 months  Low vitamin B12 level  Advised on vitamin B12 per protocol  Will have him increase his extra vitamin B12 to 1000 mcg daily-will recheck level in 5 months  Dyslipidemia  Only mildly high LDL and low HDL/encouraged some regular exercise as tolerated  Otherwise defer to his pcp    Cook's esophagus without dysplasia  He had stopped taking his ppi between visits  He denies heart burn  Pathology from upper endoscopy from 1/17 did demonstrate cook's with metaplasia but no dysplasia or malignancy  Advised I would tell him to continue daily ppi unless otherwise advised by his gastroenterologist   Manuela Manuel he should have regular surveillance for the barretts and he sees Dr Irene Prado  Advised him to make follow-up there  Reactive hypoglycemia  Notes he sometimes feels as though he is going low -but reports lowest was around 70 mg/dl  He is eating small,frequent meals/snacks  Advised on protein with high fiber carbohydrates-food sources reviewed with him  He is following with endocrinologist and advised him to follow their advice  Last hgA1c of 5 1%    He is aware he can see our RD again if/as needed for diet for this and to help him for adequate calories for weight regain  Postsurgical malabsorption  Malabsorption- patient is at risk for malabsorption of vitamins/minerals secondary to malabsorption from her procedure and restriction of intakes  Reviewed current supplements and advised on same    He is taking 4 bariatric fusion, an extra ? 500 mcg vitamin B12  And extra 1000 IU vitamin D3 daily       Diagnoses and all orders for this visit:    Bariatric surgery status  -     Vitamin B12; Future  -     Vitamin D 25 hydroxy; Future  -     ergocalciferol (VITAMIN D2) 50,000 units;  Take 1 capsule (50,000 Units total) by mouth once a week for 12 doses    Vitamin D deficiency  -     Vitamin D 25 hydroxy; Future  -     ergocalciferol (VITAMIN D2) 50,000 units; Take 1 capsule (50,000 Units total) by mouth once a week for 12 doses    Low vitamin B12 level  -     Vitamin B12; Future    Postsurgical malabsorption  -     Vitamin B12; Future  -     Vitamin D 25 hydroxy; Future  -     ergocalciferol (VITAMIN D2) 50,000 units; Take 1 capsule (50,000 Units total) by mouth once a week for 12 doses    Saucedo's esophagus without dysplasia    Reactive hypoglycemia          Subjective:      Patient ID: Kg Singleton is a 65 y o  male  He is here in routine follow-up  No longer with any abdominal pain  Notes he did have a UTI at the time he was experiencing abdominal pain/was treated with antibiotics at the time with resolution  He was put on megace for appetite stimulation  Has remote history of lung CA but considered cancer-free right now/has routine follow-up yearly for this  He is following with endocrinologist for low blood sugar symptoms  Notes lowest blood sugar more recently around 70 mg/dl -not lower  He notes he is eating small, frequent meals/snacks even wakes up to eat  He is taking bariatric supplements  He walks for exercise  He has no other complaints related to his bariatric surgery today        The following portions of the patient's history were reviewed and updated as appropriate: allergies, current medications, past family history, past medical history, past social history, past surgical history and problem list     Review of Systems   Constitutional: Negative for chills, fever and unexpected weight change (stable weigth from July 2018)  Respiratory: Negative for shortness of breath and wheezing  Cardiovascular: Negative for chest pain and palpitations  Gastrointestinal: Negative for abdominal pain, constipation, diarrhea, nausea and vomiting  Psychiatric/Behavioral: Suicidal ideas: no complait of anxiety or depression  Objective:      /84 (BP Location: Left arm, Patient Position: Sitting, Cuff Size: Adult)   Pulse 74   Temp 98 2 °F (36 8 °C) (Tympanic)   Ht 5' 8" (1 727 m)   Wt 61 7 kg (136 lb)   BMI 20 68 kg/m²          Physical Exam   Constitutional: He is oriented to person, place, and time  He appears well-developed and well-nourished  Thin appearing   HENT:   Mouth/Throat: Oropharynx is clear and moist    Eyes: Conjunctivae are normal  No scleral icterus  Cardiovascular: Normal rate, regular rhythm and normal heart sounds  Pulmonary/Chest: Effort normal  He has no wheezes  He has no rales  Decreased breath sounds through out   Abdominal: Soft  Bowel sounds are normal  There is no tenderness  Neurological: He is alert and oriented to person, place, and time  Psychiatric: He has a normal mood and affect  Nursing note and vitals reviewed     Goals:  Maintain weight loss with optimally some weight regain with good nutrition intakes  Normal vitamin and mineral levels  Exercise as tolerated

## 2018-09-04 NOTE — PATIENT INSTRUCTIONS
I would recommend a follow-up with me in 6 months to monitor your weight a little closer  If you are agreeable, please make this for March 2018 [id number] in one year  We kindly ask that you arrive 15 minutes before your scheduled appointment time with your provider to allow you to be roomed, have your vital signs checked and your chart updated by our staff  We thank you for your patience at your visit  Follow diet as discussed  Follow  vitamin and mineral recommendations as reviewed with you  Exercise as tolerated    If you have gotten a lab slip at this visit, please note that most labs are FASTING-but you need to drink water the night before and the morning before your labs are done  It is HIGHLY RECOMMENDED that you check with your insurance to make sure all the labs ordered are covered by your individual insurance policy  This is especially important if you also get labs done by other providers outside of [facility]  You want to avoid having duplicate labs done  Note you will be given a lab slip AFTER  your annual visit next year to check your vitamin and mineral levels  You will not need to make a second appointment after the labs are received/reviewed  You will receive a letter/and or phone call with your results  Most labs do NOT honor a lab slip dated one year in advance now  Call our office if he have any problems with abdominal pain especially if associated with fever, chills, nausea, vomiting or any other concerns  All  Post-bariatric surgery patients should be aware that very small quantities of any alcohol  can cause impairment and it is very possible not to feel the effect  The effect can be in the system for several hours  It is also a stomach irritant  It is advised to AVOID alcohol, Nonsteroidal antiinflammatory drugs (NSAIDS) and nicotine of all forms   Any of these can cause stomach irritation/pain        I have sent a prescription to your pharmacy for 50,000 IU vitamin D2 weekly for a TOTAL of 12 weeks-once done then add a DAILY 2000 IU vitamin D3   I will repeat vitamin D and vitamin B12 levels in 5 months  If you find your weight is going down more than 5 pounds (currently 136 lb) please make follow-up with our RD for further diet help  Most patients will regain a few pounds back after the first year  Follow-up with endocrinologist for symptoms of low blood sugars  Continue pantoprazole DAILY UNLESS advised otherwise by Dr Ananya Pablo   With a history of cook's esophagus you 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

## 2018-09-04 NOTE — ASSESSMENT & PLAN NOTE
Advised on vitamin B12 per protocol  Will have him increase his extra vitamin B12 to 1000 mcg daily-will recheck level in 5 months

## 2018-09-04 NOTE — ASSESSMENT & PLAN NOTE
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

## 2018-09-14 ENCOUNTER — TELEPHONE (OUTPATIENT)
Dept: ENDOCRINOLOGY | Facility: CLINIC | Age: 58
End: 2018-09-14

## 2018-09-14 ENCOUNTER — OFFICE VISIT (OUTPATIENT)
Dept: ENDOCRINOLOGY | Facility: CLINIC | Age: 58
End: 2018-09-14
Payer: COMMERCIAL

## 2018-09-14 VITALS
HEART RATE: 70 BPM | BODY MASS INDEX: 21.23 KG/M2 | HEIGHT: 68 IN | SYSTOLIC BLOOD PRESSURE: 140 MMHG | WEIGHT: 140.1 LBS | DIASTOLIC BLOOD PRESSURE: 82 MMHG

## 2018-09-14 DIAGNOSIS — E16.1 REACTIVE HYPOGLYCEMIA: Primary | ICD-10-CM

## 2018-09-14 PROCEDURE — 99213 OFFICE O/P EST LOW 20 MIN: CPT | Performed by: NURSE PRACTITIONER

## 2018-09-14 NOTE — PATIENT INSTRUCTIONS
Non-diabetic Hypoglycemia   WHAT YOU NEED TO KNOW:   What is non-diabetic hypoglycemia? Non-diabetic hypoglycemia is a condition that causes the sugar (glucose) in your blood to drop too low  This can happen in people who do not have diabetes  The 2 types of non-diabetic hypoglycemia are fasting hypoglycemia and reactive hypoglycemia  Fasting hypoglycemia often happens after the person goes without food for 8 hours or longer  Reactive hypoglycemia usually happens about 2 to 4 hours after a meal  When your blood sugar level is low, your muscles and brain cells do not have enough energy to work well  What causes non-diabetic hypoglycemia? · Fasting hypoglycemia:      ¨ Certain medicines or herbal supplements such as fenugreek, ginseng, or cinnamon    ¨ Alcohol     ¨ Exercise    ¨ Medical conditions such as liver disease, hypothyroidism, and tumors    ¨ Eating disorders or malnutrition    ¨ Stomach surgery or hemodialysis    · Reactive hypoglycemia:  The causes of reactive hypoglycemia may be unknown  ¨ Hyperinsulinism    ¨ Meals high in refined carbohydrates such as white bread or foods high in sugar    ¨ Prediabetes    ¨ Any surgery of the digestive system  What are the signs and symptoms of non-diabetic hypoglycemia? · Blurred vision or changes in vision    · Dizziness, lightheadedness, or shakiness    · Fatigue and weakness    · Fast or pounding heartbeat    · Sweating more than usual    · Headache     · Nausea or hunger    · Anxiety, Irritability, or confusion  How is non-diabetic hypoglycemia diagnosed? · Blood tests  are done to measure your blood sugar levels  These tests may also be done to find the cause of your hypoglycemia  · Fasting tests  may be done  You may have an overnight fasting test or a 72-hour fasting test  After you have fasted overnight, your blood sugar levels will be tested 2 times  For a 72-hour fasting test, you will not be given food for a period of up to 72 hours   During this time, healthcare providers will check to see if your blood sugar drops to a certain level  · An oral glucose tolerance test  may be done  After you have fasted for 8 hours, your blood sugar level is tested  You are then given a glucose drink  Your blood sugar level is checked after 1 hour and again after 2 hours  Healthcare providers look at how much your blood sugar level increases from the first check  How is non-diabetic hypoglycemia treated? · Keep food or drinks that contain carbohydrates on hand  Carbohydrates will raise your blood sugar level when you have hypoglycemia  Carbohydrates are found in bread, rice, cereal, fruits, juice, and milk  If you cannot eat or drink, your healthcare provider will give you glucose through an IV  An IV is a small tube placed in your vein  You may also receive a hormone called glucagon that helps raise your blood sugar level  · Treatment will depend on the cause of the hypoglycemia  For example, if a medicine you take is causing hypoglycemia, healthcare providers may change or stop giving you the medicine  If hypoglycemia is caused by low hormone levels, you may need to take hormones  How can I prevent hypoglycemia? You may need to change what and when you eat to prevent low blood sugar levels  Follow the meal plan that you and the dietitian have planned  The following guidelines may help you keep your blood sugar levels under control  · Eat 5 to 6 small meals each day instead of 3 large meals  Eat the same amount of carbohydrate at meals and snacks each day  Most people need about 3 to 4 servings of carbohydrate at meals and 1 to 2 servings for snacks  Do not skip meals  Carbohydrate counting can be used plan your meals  Ask your healthcare provider or dietitian for information about carbohydrate counting  · Limit refined carbohydrates  Examples are white bread, pastries (pies and cakes), regular sodas, syrups, and candy      · Do not have drinks or foods that contain caffeine  Examples are coffee, tea, and certain types of sodas  Caffeine may cause you to have the same symptoms as hypoglycemia, and may cause you to feel worse  · Limit or do not drink alcohol  Women should limit alcohol to 1 drink a day  Men should limit alcohol to 2 drinks a day  A drink of alcohol is 12 ounces of beer, 5 ounces of wine, or 1½ ounces of liquor  Do not drink alcohol on an empty stomach  Drink alcohol with meals to avoid hypoglycemia  · Include protein foods and vegetables in your meals  Some foods that are high in protein include beef, pork, fish, poultry (chicken and turkey), beans, and nuts  Eat a variety of vegetables with your meals  When should I contact my healthcare provider? · You have blurred vision or vision changes  · You feel very tired and weak  · You are sweating more than usual for you  · You have a fast heartbeat  · You feel dizzy, lightheaded, and shaky  · You have questions about your condition or care  When should I seek immediate care or call 911? · You have symptoms of hypoglycemia and cannot eat  · You have trouble thinking clearly  · You have a seizure or faint  CARE AGREEMENT:   You have the right to help plan your care  Learn about your health condition and how it may be treated  Discuss treatment options with your caregivers to decide what care you want to receive  You always have the right to refuse treatment  The above information is an  only  It is not intended as medical advice for individual conditions or treatments  Talk to your doctor, nurse or pharmacist before following any medical regimen to see if it is safe and effective for you  © 2017 [employer]  Information is for End User's use only and may not be sold, redistributed or otherwise used for commercial purposes   All illustrations and images included in CareNotes® are the copyrighted property of [employer]  or [employer]

## 2018-09-14 NOTE — PROGRESS NOTES
Established Patient Progress Note       Chief Complaint   Patient presents with    Hypoglycemia        History of Present Illness:     Jus Pyle is a 65 y o  male with a history of reactive hypoglycemia  Patient had julio-en-y and gastrectomy in July 2017 and has had issues with hypoglycemia since the surgery  He did not bring BG log or meter in to today's visit  He did not complete diagnostic cgm  He reports the hypoglycemia was improving but as of last month he has noticed symptoms daily  He reports it occurs at various times of the day, however he does to always check his BG when he feels symptomatic  He reports when he does check during a hypoglycemic episode his BG is usually 60-70  He reports his last episode was yesterday after he ate taco [employer]  He usually feels shaky and sweaty during hypoglycemia episode           Patient Active Problem List   Diagnosis    Right scapholunate ligament tear    History of pulmonary embolism    Saucedo's esophagus without dysplasia    Cervical disc disorder with radiculopathy of cervicothoracic region    Cervical radiculopathy    S/P repair of paraesophageal hernia    Bariatric surgery status    Postsurgical malabsorption    Adenocarcinoma of lung (HCC)    Dyslipidemia    Reactive hypoglycemia    Vitamin D deficiency    Low vitamin B12 level    Generalized abdominal pain    Constipation      Past Medical History:   Diagnosis Date    Anesthesia     Pt wakes up during "almost every surgery"    Arthritis     Asthma     Cervical radiculopathy     Chemotherapy follow-up examination     Chronic pain     COPD (chronic obstructive pulmonary disease) ([address]     Diabetes mellitus ([address] )     borderline "years ago"    Food allergy     clams    GERD (gastroesophageal reflux disease)     Heart murmur     Hiatal hernia     History of malignant neoplasm     History of pneumonia 04/12/2016    History of pulmonary embolism     History of ulceration     Hyperlipidemia     Lung cancer [facility] )     left lung    Migraine     Postgastrectomy malabsorption     Right scapholunate ligament tear     Skipped heart beats     Wears partial dentures     upper and lower      Past Surgical History:   Procedure Laterality Date    BRONCHOSCOPY      COLONOSCOPY      FRACTURE SURGERY      femur right 1985    GASTRIC BYPASS LAPAROSCOPIC N/A 7/12/2017    Procedure: GASTRIC DIVERSION WITH [place]-EN-Y RECONSTRUCTION WITH  SHORT 60 cm LIMB;  Surgeon: Raman Ling MD;  [facility];  Service: Bariatrics    KNEE ARTHROSCOPY Right     right shoulder    LUNG LOBECTOMY Left     LYMPHADENECTOMY  05/22/2012    Dr Ahn Reas ANT INTERBODY MIN DISCECTOMY, CERVICAL BELOW C2 N/A 10/17/2017    Procedure: C5-6, C6-7 ACDF WITH ALLOGRAFT (NEURO MONITORING); Surgeon: Magdaleno Colvin MD;  [facility];  Service: Orthopedics    OK COLONOSCOPY FLX DX W/COLLJ SPEC WHEN PFRMD N/A 4/14/2017    Procedure: COLONOSCOPY;  Surgeon: Mitali King MD;  [facility]: [facility];  Service: Gastroenterology    OK ESOPHAGOGASTRODUODENOSCOPY TRANSORAL DIAGNOSTIC N/A 1/11/2017    Procedure: ESOPHAGOGASTRODUODENOSCOPY (EGD); Surgeon: Mitali King MD;  [facility];   Service: Gastroenterology    OK LAP, REPAIR PARAESOPHAGEAL HERNIA, INCL FUNDOPLASTY W/ MESH N/A 7/12/2017    Procedure: REPAIR HERNIA PARAESOPHAGEAL  LAPAROSCOPIC;  Surgeon: Raman Ling MD;  [facility];  Service: Bariatrics    OK WRIST [employer] LIG Right 6/9/2016    Procedure: RELEASE CARPAL TUNNEL ENDOSCOPIC;  Surgeon: Oj Rose MD;  [facility];  Service: Orthopedics    RECONSTRUCTION DISTAL RADIUS/ULNA JOINT (DRUJ) Right 9/6/2016    Procedure: WRIST SCAPHOLUNATE LIGAMENT RECONSTRUCTION WITH ECRB TENDON AUTOGRAPH , SPLINT APPLICATION ;  Surgeon: Oj Rose MD;  [facility];  Service:  SHOULDER SURGERY      TONSILLECTOMY        Family History   Problem Relation Age of Onset    Other Mother         Back disorder    Diabetes Mother         Diabetes Mellitus    Hypertension Mother     Heart disease Father     Hypertension Father     Breast cancer Sister     Skin cancer Sister     Breast cancer Paternal Grandmother     Skin cancer Paternal Grandmother      Social History   Substance Use Topics    Smoking status: Former Smoker     Packs/day: 0 50     Years: 48 00     Types: Cigarettes     Quit date: 5/1/2017    Smokeless tobacco: Never Used    Alcohol use Yes      Comment: occasional      Allergies   Allergen Reactions    Codeine Hives, GI Intolerance and Itching     Other reaction(s): Nausea and/or vomiting    Hydrocodone Hives and GI Intolerance    Penicillins Anaphylaxis and Throat Swelling    Shellfish-Derived Products      Other reaction(s): Nausea and/or vomiting  Clams only - nausea & vomiting       Current Outpatient Prescriptions:     albuterol (PROVENTIL HFA,VENTOLIN HFA) 90 mcg/act inhaler, Inhale 2 puffs every 6 (six) hours as needed for wheezing, Disp: , Rfl:     atorvastatin (LIPITOR) 40 mg tablet, Take 80 mg by mouth daily  , Disp: , Rfl:     diclofenac sodium (VOLTAREN) 1 %, Place on the skin, Disp: , Rfl:     DULoxetine (CYMBALTA) 60 mg delayed release capsule, , Disp: , Rfl:     ergocalciferol (VITAMIN D2) 50,000 units, Take 1 capsule (50,000 Units total) by mouth once a week for 12 doses, Disp: 4 capsule, Rfl: 2    gabapentin, once-daily, (GRALISE) 300 MG tablet, Take 1 tablet by mouth 3 (three) times a day, Disp: , Rfl:     Magnesium 400 MG CAPS, Take 1 tablet by mouth daily, Disp: , Rfl:     megestrol (MEGACE) 40 MG/ML suspension, Take 5 mL (200 mg total) by mouth daily, Disp: 240 mL, Rfl: 6    methocarbamol (ROBAXIN) 500 mg tablet, Take 1 tablet (500 mg total) by mouth 3 (three) times a day, Disp: 30 tablet, Rfl: 2    ONETOUCH DELICA LANCETS FINE MISC, Use 2-3 times per day as needed, Disp: 100 each, Rfl: 2    ONETOUCH VERIO test strip, Test 2-3 times daily as instructed, Disp: 100 each, Rfl: 3    pantoprazole (PROTONIX) 40 mg tablet, Take 1 tablet by mouth daily, Disp: , Rfl: 0    Protein POWD, Take by mouth, Disp: , Rfl:     nortriptyline (PAMELOR) 10 mg capsule, Take 10 mg by mouth daily at bedtime 3 tabs=30mg at bedtime , Disp: , Rfl:     oxyCODONE-acetaminophen (PERCOCET) 5-325 mg per tablet, Take 1 tablet by mouth every 4 (four) hours as needed for moderate pain Max Daily Amount: 6 tablets (Patient not taking: Reported on 9/14/2018 ), Disp: 30 tablet, Rfl: 0    predniSONE 10 mg tablet, 4 pills for 3 days, then 3 pills for 3 days, then 2 pills for 3 days, then 1 pills for 3 days, then stop (Patient not taking: Reported on 9/14/2018 ), Disp: 30 tablet, Rfl: 0    topiramate (TOPAMAX) 100 mg tablet, Take 100 mg by mouth daily Pt reports currently takes 75mg AM and 75mg PM , Disp: , Rfl:     traMADol (ULTRAM) 50 mg tablet, Take by mouth, Disp: , Rfl:     Review of Systems   Constitutional: Negative for activity change, appetite change and fatigue  HENT: Negative for sore throat, trouble swallowing and voice change  Eyes: Negative for visual disturbance  Respiratory: Negative for choking, chest tightness and shortness of breath  Cardiovascular: Negative for chest pain, palpitations and leg swelling  Gastrointestinal: Negative for abdominal pain, constipation and diarrhea  Endocrine: Negative for cold intolerance, heat intolerance, polydipsia, polyphagia and polyuria  Genitourinary: Negative for frequency  Musculoskeletal: Negative for arthralgias and myalgias  Skin: Negative for rash  Neurological: Negative for dizziness and syncope  Hematological: Negative for adenopathy  Psychiatric/Behavioral: Negative for sleep disturbance  All other systems reviewed and are negative        Physical Exam:  Body mass index is 21 3 kg/m²   /82   Pulse 70   Ht 5' 8" (1 727 m)   Wt 63 5 kg (140 lb 1 6 oz)   BMI 21 30 kg/m²    Wt Readings from Last 3 Encounters:   09/14/18 63 5 kg (140 lb 1 6 oz)   09/04/18 61 7 kg (136 lb)   08/30/18 61 8 kg (136 lb 3 2 oz)       Physical Exam   Constitutional: He is oriented to person, place, and time  He appears well-developed and well-nourished  No distress  HENT:   Head: Normocephalic and atraumatic  Mouth/Throat: Oropharynx is clear and moist    Eyes: Conjunctivae and EOM are normal  Pupils are equal, round, and reactive to light  Neck: Normal range of motion  Neck supple  No thyromegaly present  Cardiovascular: Normal rate, regular rhythm and normal heart sounds  No murmur heard  Pulmonary/Chest: Effort normal and breath sounds normal  No respiratory distress  He has no wheezes  He has no rales  Abdominal: Soft  Bowel sounds are normal  He exhibits no distension  There is no tenderness  Musculoskeletal: Normal range of motion  He exhibits no edema  Lymphadenopathy:     He has no cervical adenopathy  Neurological: He is alert and oriented to person, place, and time  Skin: Skin is warm and dry  Psychiatric: He has a normal mood and affect  Vitals reviewed  Labs:     Component      Latest Ref Rng & Units 6/21/2018   Free T4      0 76 - 1 46 ng/dL 1 07   TSH 3RD GENERATON      0 358 - 3 740 uIU/mL 1 400         Impression & Plan:    Problem List Items Addressed This Visit     Reactive hypoglycemia - Primary     Instructed patient to check BG when he is feeling symptomatic and to record when the episode occurred and what he ate at that time  Patient should avoid taco bell and high carb foods and sweets to prevent reactive hypoglycemia  Recommend diagnostic cgm to better determine pattern of hypoglycemic episodes  If hypoglycemia does not improve with change in diet he can be started on acarbose             Relevant Orders    Continous glucose monitoring dexcom placement Continous glucose monitoring dexcom intrepretation          Orders Placed This Encounter   Procedures    Continous glucose monitoring dexcom placement     Standing Status:   Future     Standing Expiration Date:   9/14/2019    Continous glucose monitoring dexcom intrepretation     Standing Status:   Future     Standing Expiration Date:   9/14/2019       Patient Instructions     Non-diabetic Hypoglycemia   WHAT YOU NEED TO KNOW:   What is non-diabetic hypoglycemia? Non-diabetic hypoglycemia is a condition that causes the sugar (glucose) in your blood to drop too low  This can happen in people who do not have diabetes  The 2 types of non-diabetic hypoglycemia are fasting hypoglycemia and reactive hypoglycemia  Fasting hypoglycemia often happens after the person goes without food for 8 hours or longer  Reactive hypoglycemia usually happens about 2 to 4 hours after a meal  When your blood sugar level is low, your muscles and brain cells do not have enough energy to work well  What causes non-diabetic hypoglycemia? · Fasting hypoglycemia:      ¨ Certain medicines or herbal supplements such as fenugreek, ginseng, or cinnamon    ¨ Alcohol     ¨ Exercise    ¨ Medical conditions such as liver disease, hypothyroidism, and tumors    ¨ Eating disorders or malnutrition    ¨ Stomach surgery or hemodialysis    · Reactive hypoglycemia:  The causes of reactive hypoglycemia may be unknown  ¨ Hyperinsulinism    ¨ Meals high in refined carbohydrates such as white bread or foods high in sugar    ¨ Prediabetes    ¨ Any surgery of the digestive system  What are the signs and symptoms of non-diabetic hypoglycemia? · Blurred vision or changes in vision    · Dizziness, lightheadedness, or shakiness    · Fatigue and weakness    · Fast or pounding heartbeat    · Sweating more than usual    · Headache     · Nausea or hunger    · Anxiety, Irritability, or confusion  How is non-diabetic hypoglycemia diagnosed?    · Blood tests  are done to measure your blood sugar levels  These tests may also be done to find the cause of your hypoglycemia  · Fasting tests  may be done  You may have an overnight fasting test or a 72-hour fasting test  After you have fasted overnight, your blood sugar levels will be tested 2 times  For a 72-hour fasting test, you will not be given food for a period of up to 72 hours  During this time, healthcare providers will check to see if your blood sugar drops to a certain level  · An oral glucose tolerance test  may be done  After you have fasted for 8 hours, your blood sugar level is tested  You are then given a glucose drink  Your blood sugar level is checked after 1 hour and again after 2 hours  Healthcare providers look at how much your blood sugar level increases from the first check  How is non-diabetic hypoglycemia treated? · Keep food or drinks that contain carbohydrates on hand  Carbohydrates will raise your blood sugar level when you have hypoglycemia  Carbohydrates are found in bread, rice, cereal, fruits, juice, and milk  If you cannot eat or drink, your healthcare provider will give you glucose through an IV  An IV is a small tube placed in your vein  You may also receive a hormone called glucagon that helps raise your blood sugar level  · Treatment will depend on the cause of the hypoglycemia  For example, if a medicine you take is causing hypoglycemia, healthcare providers may change or stop giving you the medicine  If hypoglycemia is caused by low hormone levels, you may need to take hormones  How can I prevent hypoglycemia? You may need to change what and when you eat to prevent low blood sugar levels  Follow the meal plan that you and the dietitian have planned  The following guidelines may help you keep your blood sugar levels under control  · Eat 5 to 6 small meals each day instead of 3 large meals  Eat the same amount of carbohydrate at meals and snacks each day   Most people need about 3 to 4 servings of carbohydrate at meals and 1 to 2 servings for snacks  Do not skip meals  Carbohydrate counting can be used plan your meals  Ask your healthcare provider or dietitian for information about carbohydrate counting  · Limit refined carbohydrates  Examples are white bread, pastries (pies and cakes), regular sodas, syrups, and candy  · Do not have drinks or foods that contain caffeine  Examples are coffee, tea, and certain types of sodas  Caffeine may cause you to have the same symptoms as hypoglycemia, and may cause you to feel worse  · Limit or do not drink alcohol  Women should limit alcohol to 1 drink a day  Men should limit alcohol to 2 drinks a day  A drink of alcohol is 12 ounces of beer, 5 ounces of wine, or 1½ ounces of liquor  Do not drink alcohol on an empty stomach  Drink alcohol with meals to avoid hypoglycemia  · Include protein foods and vegetables in your meals  Some foods that are high in protein include beef, pork, fish, poultry (chicken and turkey), beans, and nuts  Eat a variety of vegetables with your meals  When should I contact my healthcare provider? · You have blurred vision or vision changes  · You feel very tired and weak  · You are sweating more than usual for you  · You have a fast heartbeat  · You feel dizzy, lightheaded, and shaky  · You have questions about your condition or care  When should I seek immediate care or call 911? · You have symptoms of hypoglycemia and cannot eat  · You have trouble thinking clearly  · You have a seizure or faint  CARE AGREEMENT:   You have the right to help plan your care  Learn about your health condition and how it may be treated  Discuss treatment options with your caregivers to decide what care you want to receive  You always have the right to refuse treatment  The above information is an  only  It is not intended as medical advice for individual conditions or treatments   Talk to your doctor, nurse or pharmacist before following any medical regimen to see if it is safe and effective for you  © 2017 [address] Information is for End User's use only and may not be sold, redistributed or otherwise used for commercial purposes  All illustrations and images included in CareNotes® are the copyrighted property of [employer]  or [facility]  Discussed with the patient and all questioned fully answered  He will call me if any problems arise  Follow-up appointment in 4 months       Counseled patient on diagnostic results, prognosis, risk and benefit of treatment options, instruction for management, importance of treatment compliance, Risk  factor reduction and impressions      Chad Rivera [address]

## 2018-09-14 NOTE — ASSESSMENT & PLAN NOTE
Instructed patient to check BG when he is feeling symptomatic and to record when the episode occurred and what he ate at that time  Patient should avoid taco bell and high carb foods and sweets to prevent reactive hypoglycemia  Recommend diagnostic cgm to better determine pattern of hypoglycemic episodes  If hypoglycemia does not improve with change in diet he can be started on acarbose

## 2018-09-20 DIAGNOSIS — E16.1 REACTIVE HYPOGLYCEMIA: Primary | ICD-10-CM

## 2018-09-25 ENCOUNTER — OFFICE VISIT (OUTPATIENT)
Dept: FAMILY MEDICINE CLINIC | Facility: CLINIC | Age: 58
End: 2018-09-25
Payer: COMMERCIAL

## 2018-09-25 VITALS
HEIGHT: 68 IN | HEART RATE: 62 BPM | DIASTOLIC BLOOD PRESSURE: 66 MMHG | SYSTOLIC BLOOD PRESSURE: 124 MMHG | WEIGHT: 140.4 LBS | TEMPERATURE: 98.8 F | BODY MASS INDEX: 21.28 KG/M2 | OXYGEN SATURATION: 100 %

## 2018-09-25 DIAGNOSIS — R63.4 ABNORMAL WEIGHT LOSS: Primary | ICD-10-CM

## 2018-09-25 DIAGNOSIS — G89.29 CHRONIC BILATERAL THORACIC BACK PAIN: ICD-10-CM

## 2018-09-25 DIAGNOSIS — R63.4 ABNORMAL WEIGHT LOSS: ICD-10-CM

## 2018-09-25 DIAGNOSIS — Z98.84 STATUS POST BARIATRIC SURGERY: ICD-10-CM

## 2018-09-25 DIAGNOSIS — M54.6 CHRONIC BILATERAL THORACIC BACK PAIN: ICD-10-CM

## 2018-09-25 PROCEDURE — 99214 OFFICE O/P EST MOD 30 MIN: CPT | Performed by: FAMILY MEDICINE

## 2018-09-25 RX ORDER — MEGESTROL ACETATE 40 MG/ML
200 SUSPENSION ORAL DAILY
Qty: 240 ML | Refills: 6 | Status: SHIPPED | OUTPATIENT
Start: 2018-09-25 | End: 2018-10-02 | Stop reason: SDUPTHER

## 2018-09-25 NOTE — PROGRESS NOTES
Assessment/Plan:    No problem-specific Assessment & Plan notes found for this encounter  Diagnoses and all orders for this visit:    Abnormal weight loss  Comments:  leveling off, negative work up  Orders:  -     megestrol (MEGACE) 40 MG/ML suspension; Take 5 mL (200 mg total) by mouth daily    Chronic bilateral thoracic back pain  Comments:  resolved    Status post bariatric surgery  Comments:  stable continue megace    Abnormal weight loss  -     megestrol (MEGACE) 40 MG/ML suspension; Take 5 mL (200 mg total) by mouth daily          Subjective:      Patient ID: Jean Claude Lopez is a 65 y o  male  Follow up for weight loss, pt has maintained his weight since last visit, pt saw the bariatric surgeon who feels he is stable, pt also saw the endocrinologist who is setting him up for continuous glucose monitoring, pt states he feels well today and feels like he is gaining weight, pt is taking megace which he feels is increasing his appetite, follow up for back pain which is now resolved        The following portions of the patient's history were reviewed and updated as appropriate: allergies, current medications, past family history, past medical history, past social history, past surgical history and problem list     Review of Systems   Constitutional: Negative for chills, fatigue and fever  Gastrointestinal: Negative for abdominal pain, constipation, diarrhea, nausea and vomiting  Objective:      /66   Pulse 62   Temp 98 8 °F (37 1 °C) (Tympanic)   Ht 5' 8" (1 727 m)   Wt 63 7 kg (140 lb 6 4 oz)   SpO2 100%   BMI 21 35 kg/m²          Physical Exam   Constitutional: He is oriented to person, place, and time  He appears well-developed and well-nourished  No distress  HENT:   Head: Normocephalic and atraumatic  Eyes: Conjunctivae and EOM are normal  Pupils are equal, round, and reactive to light  No scleral icterus  Neck: Normal range of motion  Neck supple     Cardiovascular: Normal rate, regular rhythm and normal heart sounds  No murmur heard  Pulmonary/Chest: Effort normal and breath sounds normal  No respiratory distress  He has no wheezes  He has no rales  Abdominal: Soft  Bowel sounds are normal  He exhibits no distension and no mass  There is no tenderness  There is no rebound and no guarding  Musculoskeletal: He exhibits no edema  Lymphadenopathy:     He has no cervical adenopathy  Neurological: He is alert and oriented to person, place, and time  He exhibits normal muscle tone  Skin: Skin is warm and dry  No rash noted  He is not diaphoretic  No erythema  No pallor  Psychiatric: He has a normal mood and affect  His behavior is normal  Judgment and thought content normal    Nursing note and vitals reviewed

## 2018-10-02 DIAGNOSIS — R63.4 ABNORMAL WEIGHT LOSS: ICD-10-CM

## 2018-10-02 RX ORDER — MEGESTROL ACETATE 40 MG/ML
200 SUSPENSION ORAL DAILY
Qty: 480 ML | Refills: 2 | Status: SHIPPED | OUTPATIENT
Start: 2018-10-02 | End: 2019-08-16 | Stop reason: HOSPADM

## 2018-10-03 ENCOUNTER — OFFICE VISIT (OUTPATIENT)
Dept: OBGYN CLINIC | Facility: HOSPITAL | Age: 58
End: 2018-10-03
Payer: OTHER MISCELLANEOUS

## 2018-10-03 VITALS
SYSTOLIC BLOOD PRESSURE: 116 MMHG | HEART RATE: 84 BPM | HEIGHT: 69 IN | WEIGHT: 141 LBS | DIASTOLIC BLOOD PRESSURE: 74 MMHG | BODY MASS INDEX: 20.88 KG/M2

## 2018-10-03 DIAGNOSIS — M65.331 TRIGGER FINGER, RIGHT MIDDLE FINGER: ICD-10-CM

## 2018-10-03 DIAGNOSIS — G56.01 CARPAL TUNNEL SYNDROME ON RIGHT: ICD-10-CM

## 2018-10-03 DIAGNOSIS — M65.321 TRIGGER FINGER, RIGHT INDEX FINGER: Primary | ICD-10-CM

## 2018-10-03 PROCEDURE — 99214 OFFICE O/P EST MOD 30 MIN: CPT | Performed by: ORTHOPAEDIC SURGERY

## 2018-10-03 RX ORDER — LIDOCAINE HYDROCHLORIDE AND EPINEPHRINE 10; 10 MG/ML; UG/ML
20 INJECTION, SOLUTION INFILTRATION; PERINEURAL ONCE
Status: CANCELLED | OUTPATIENT
Start: 2018-10-03 | End: 2018-10-03

## 2018-10-03 NOTE — PROGRESS NOTES
ASSESSMENT/PLAN:    Assessment:   Carpal Tunnel Syndrome  right and Trigger Finger  right  Long finger    Plan: We will order an EMG to evaluate to continued numbness and tingling to his right hand  The patient would like to proceed with Right long finger trigger finger release under local     Follow Up: After Surgery    To Do Next Visit:    and Sutures out    General Discussions:       Operative Discussions:  Trigger Finger Release: The anatomy and physiology of trigger finger was discussed with the patient today in the office  Edema and increased contact pressure within the flexor tendons at the A1 pulley can cause pain, crepitation, and limitation of function  Treatment options include resting MP blocking splints to decrease edema, oral anti-inflammatory medications, home or formal therapy exercises, up to 2 steroid injections or surgical release  While majority of patients do respond to conservative treatment, up to 20% may require surgical release  The patient has elected release of the trigger finger  The patient has elected to undergo a release of the A1 pulley (trigger finger)  A small incision will be made over the palmar aspect of the hand, the tendon sheath holding the flexor tendons will be released  In the postoperative period, light activities are allowed immediately, driving is allowed when narcotic medication has stopped, and the incision may get wet after 2 days  Heavy activities (lifting more than approximately 10 pounds) will be allowed after the follow up appointment in 1-2 weeks  While the pain and discomfort within the wrist typically improves rapidly, some residual discomfort may be present for up to 6 weeks  The nodule that is typically palpable in the palmar aspect of the hand will not be removed, as this would necessitate removal of a portion of the flexor tendon, however the catching, clicking, and locking should resolve  Approximate success rate is 98%  The risks and benefits of the procedure were explained to the patient, which include, but are not limited to: Bleeding, infection, recurrence, pain, scar, damage to tendons, damage to nerves, and damage to blood vessels, need for future surgery and complications related to anesthesia  If bony work is done, risks also include malunion and nonunion  These risks, along with alternative conservative treatment options, and postoperative protocols were voiced back and understood by the patient  All questions were answered to the patient's satisfaction  The patient agrees to comply with a standard postoperative protocol, and is willing to proceed  Education was provided via written and auditory forms  There were no barriers to learning  Written handouts regarding wound care, incision and scar care, and general preoperative information, as well as risks and benefits were provided to the patient       _____________________________________________________  CHIEF COMPLAINT:  Chief Complaint   Patient presents with    Right Wrist - Follow-up         SUBJECTIVE:  Doris Adrian is a 65y o  year old male who presents for follow up regarding Carpal Tunnel Syndrome  right and Trigger Finger  right  index finger  Since last visit, Doris Adrian has tried steroid injections into his right index finger  with only partial relief  Pt states that the injection gave him relief for a week and a half  Today there is Numbness to the right hand, index finger, long finger and thumb  Pt does have a hx of R SL ligament repair on 12/29/15 and RCTR 6/9/16  Pt states he never had relief of his symptoms in regards to his CTR    Radiation: Yes to the  hand, index finger, long finger and thumb  Associated symptoms: No Complaints    PAST MEDICAL HISTORY:  Past Medical History:   Diagnosis Date    Anesthesia     Pt wakes up during "almost every surgery"    Arthritis     Asthma     Cervical radiculopathy     Chemotherapy follow-up examination     Chronic pain     [facility] [address]     Diabetes mellitus ([facility] [address] [facility]     borderline "years ago"    Food allergy     clams    GERD (gastroesophageal reflux disease)     Heart murmur     Hiatal hernia     History of malignant neoplasm     History of pneumonia 04/12/2016    History of pulmonary embolism     History of ulceration     Hyperlipidemia     Lung cancer (HCC)     left lung    Migraine     Postgastrectomy malabsorption     Right scapholunate ligament tear     Skipped heart beats     Wears partial dentures     upper and lower       PAST SURGICAL HISTORY:  Past Surgical History:   Procedure Laterality Date    BRONCHOSCOPY      COLONOSCOPY      FRACTURE SURGERY      femur right 1985    GASTRIC BYPASS LAPAROSCOPIC N/A 7/12/2017    Procedure: GASTRIC DIVERSION WITH BROOKLYN-EN-Y RECONSTRUCTION WITH  SHORT 60 cm LIMB;  Surgeon: Rosalba Warren MD;  [facility];  Service: Bariatrics    KNEE ARTHROSCOPY Right     right shoulder    LUNG LOBECTOMY Left     LYMPHADENECTOMY  05/22/2012    Dr Mattie Romero ANT INTERBODY MIN DISCECTOMY, CERVICAL BELOW C2 N/A 10/17/2017    Procedure: C5-6, C6-7 ACDF WITH ALLOGRAFT (NEURO MONITORING); Surgeon: Bharti Hernandez MD;  [facility];  Service: Orthopedics    OR COLONOSCOPY FLX DX W/COLLJ SPEC WHEN PFRMD N/A 4/14/2017    Procedure: COLONOSCOPY;  Surgeon: Marelyn Hammans, MD;  [facility];  Service: Gastroenterology    OR ESOPHAGOGASTRODUODENOSCOPY TRANSORAL DIAGNOSTIC N/A 1/11/2017    Procedure: ESOPHAGOGASTRODUODENOSCOPY (EGD); Surgeon: Marelyn Hammans, MD;  [facility];   Service: Gastroenterology    OR LAP, REPAIR PARAESOPHAGEAL HERNIA, INCL FUNDOPLASTY W/ MESH N/A 7/12/2017    Procedure: REPAIR HERNIA PARAESOPHAGEAL  LAPAROSCOPIC;  Surgeon: Rosalba Warren MD;  [facility];  Service: Bariatrics    OR WRIST Dalia Rave LIG Right 6/9/2016    Procedure: RELEASE CARPAL TUNNEL ENDOSCOPIC;  Surgeon: Keysha Bryan MD;  [facility];  Service: Orthopedics    RECONSTRUCTION DISTAL RADIUS/ULNA JOINT (DRUJ) Right 9/6/2016    Procedure: WRIST SCAPHOLUNATE LIGAMENT RECONSTRUCTION WITH ECRB TENDON AUTOGRAPH , SPLINT APPLICATION ;  Surgeon: Keysha Bryan MD;  [facility];  [facility]    [facility]      TONSILLECTOMY         FAMILY HISTORY:  Family History   Problem Relation Age of Onset    Other Mother         Back disorder    Diabetes Mother         Diabetes Mellitus    Hypertension Mother     Heart disease Father     Hypertension Father     Breast cancer Sister     Skin cancer Sister     Breast cancer Paternal Grandmother     Skin cancer Paternal Grandmother        SOCIAL HISTORY:  Social History   Substance Use Topics    Smoking status: Former Smoker     Packs/day: 0 50     Years: 48 00     Types: Cigarettes     Quit date: 5/1/2017    Smokeless tobacco: Never Used    Alcohol use Yes      Comment: occasional        MEDICATIONS:    Current Outpatient Prescriptions:     albuterol (PROVENTIL HFA,VENTOLIN HFA) 90 mcg/act inhaler, Inhale 2 puffs every 6 (six) hours as needed for wheezing, Disp: , Rfl:     atorvastatin (LIPITOR) 40 mg tablet, Take 80 mg by mouth daily  , Disp: , Rfl:     diclofenac sodium (VOLTAREN) 1 %, Place on the skin, Disp: , Rfl:     DULoxetine (CYMBALTA) 60 mg delayed release capsule, , Disp: , Rfl:     ergocalciferol (VITAMIN D2) 50,000 units, Take 1 capsule (50,000 Units total) by mouth once a week for 12 doses, Disp: 4 capsule, Rfl: 2    gabapentin, once-daily, (GRALISE) 300 MG tablet, Take 1 tablet by mouth 3 (three) times a day, Disp: , Rfl:     Magnesium 400 MG CAPS, Take 1 tablet by mouth daily, Disp: , Rfl:     megestrol (MEGACE) 40 MG/ML suspension, Take 5 mL (200 mg total) by mouth daily, Disp: 480 mL, Rfl: 2    methocarbamol (ROBAXIN) 500 mg tablet, Take 1 tablet (500 mg total) by mouth 3 (three) times a day, Disp: 30 tablet, Rfl: 2    nortriptyline (PAMELOR) 10 mg capsule, Take 10 mg by mouth daily at bedtime 3 tabs=30mg at bedtime , Disp: , Rfl:     ONETOUCH DELICA LANCETS FINE MISC, Use 2-3 times per day as needed, Disp: 100 each, Rfl: 2    ONETOUCH VERIO test strip, Test 2-3 times daily as instructed, Disp: 100 each, Rfl: 3    oxyCODONE-acetaminophen (PERCOCET) 5-325 mg per tablet, Take 1 tablet by mouth every 4 (four) hours as needed for moderate pain Max Daily Amount: 6 tablets, Disp: 30 tablet, Rfl: 0    pantoprazole (PROTONIX) 40 mg tablet, Take 1 tablet by mouth daily, Disp: , Rfl: 0    predniSONE 10 mg tablet, 4 pills for 3 days, then 3 pills for 3 days, then 2 pills for 3 days, then 1 pills for 3 days, then stop, Disp: 30 tablet, Rfl: 0    Protein POWD, Take by mouth, Disp: , Rfl:     topiramate (TOPAMAX) 100 mg tablet, Take 100 mg by mouth daily Pt reports currently takes 75mg AM and 75mg PM , Disp: , Rfl:     traMADol (ULTRAM) 50 mg tablet, Take by mouth, Disp: , Rfl:     ALLERGIES:  Allergies   Allergen Reactions    Codeine Hives, GI Intolerance and Itching     Other reaction(s): Nausea and/or vomiting    Hydrocodone Hives and GI Intolerance    Penicillins Anaphylaxis and Throat Swelling    Shellfish-Derived Products      Other reaction(s): Nausea and/or vomiting  Clams only - nausea & vomiting       REVIEW OF SYSTEMS:  Pertinent items are noted in HPI      LABS:  HgA1c:   Lab Results   Component Value Date    HGBA1C 5 1 07/13/2018     BMP:   Lab Results   Component Value Date    GLUCOSE 90 01/04/2016    CALCIUM 9 1 07/13/2018     07/13/2018    K 4 4 07/13/2018    CO2 26 07/13/2018     07/13/2018    BUN 14 07/13/2018    CREATININE 0 88 07/13/2018           _____________________________________________________  PHYSICAL EXAMINATION:  General: well developed and well nourished, alert, oriented times 3 and appears comfortable  Psychiatric: Normal  HEENT: Trachea Midline, No torticollis  Cardiovascular: No discernable arrhythmia  Pulmonary: No wheezing or stridor  Skin: No masses, erthema, lacerations, fluctation, ulcerations  Neurovascular: Pulses Intact    MUSCULOSKELETAL EXAMINATION:  RIGHT SIDE:  Carpal tunnel:  Postive Tinel's and Finger:  No instability, Triggering  long finger and Nodules  long finger, with crepitation     _____________________________________________________  STUDIES REVIEWED:  No Studies to review      PROCEDURES PERFORMED:  Procedures  No Procedures performed today   Scribe Attestation    I,:   Rob Pretty am acting as a scribe while in the presence of the attending physician :        I,:   Amara Renee MD personally performed the services described in this documentation    as scribed in my presence :

## 2018-10-03 NOTE — H&P
ASSESSMENT/PLAN:    Assessment:   Carpal Tunnel Syndrome  right and Trigger Finger  right  Long finger    Plan: We will order an EMG to evaluate to continued numbness and tingling to his right hand  The patient would like to proceed with Right long finger trigger finger release under local     Follow Up: After Surgery    To Do Next Visit:    and Sutures out    General Discussions:       Operative Discussions:  Trigger Finger Release: The anatomy and physiology of trigger finger was discussed with the patient today in the office  Edema and increased contact pressure within the flexor tendons at the A1 pulley can cause pain, crepitation, and limitation of function  Treatment options include resting MP blocking splints to decrease edema, oral anti-inflammatory medications, home or formal therapy exercises, up to 2 steroid injections or surgical release  While majority of patients do respond to conservative treatment, up to 20% may require surgical release  The patient has elected release of the trigger finger  The patient has elected to undergo a release of the A1 pulley (trigger finger)  A small incision will be made over the palmar aspect of the hand, the tendon sheath holding the flexor tendons will be released  In the postoperative period, light activities are allowed immediately, driving is allowed when narcotic medication has stopped, and the incision may get wet after 2 days  Heavy activities (lifting more than approximately 10 pounds) will be allowed after the follow up appointment in 1-2 weeks  While the pain and discomfort within the wrist typically improves rapidly, some residual discomfort may be present for up to 6 weeks  The nodule that is typically palpable in the palmar aspect of the hand will not be removed, as this would necessitate removal of a portion of the flexor tendon, however the catching, clicking, and locking should resolve  Approximate success rate is 98%  The risks and benefits of the procedure were explained to the patient, which include, but are not limited to: Bleeding, infection, recurrence, pain, scar, damage to tendons, damage to nerves, and damage to blood vessels, need for future surgery and complications related to anesthesia  If bony work is done, risks also include malunion and nonunion  These risks, along with alternative conservative treatment options, and postoperative protocols were voiced back and understood by the patient  All questions were answered to the patient's satisfaction  The patient agrees to comply with a standard postoperative protocol, and is willing to proceed  Education was provided via written and auditory forms  There were no barriers to learning  Written handouts regarding wound care, incision and scar care, and general preoperative information, as well as risks and benefits were provided to the patient       _____________________________________________________  CHIEF COMPLAINT:  Chief Complaint   Patient presents with    Right Wrist - Follow-up         SUBJECTIVE:  Farooq Spangler is a 65y o  year old male who presents for follow up regarding Carpal Tunnel Syndrome  right and Trigger Finger  right  index finger  Since last visit, Farooq Spangler has tried steroid injections into his right index finger  with only partial relief  Pt states that the injection gave him relief for a week and a half  Today there is Numbness to the right hand, index finger, long finger and thumb  Pt does have a hx of R SL ligament repair on 12/29/15 and RCTR 6/9/16  Pt states he never had relief of his symptoms in regards to his CTR    Radiation: Yes to the  hand, index finger, long finger and thumb  Associated symptoms: No Complaints    PAST MEDICAL HISTORY:  Past Medical History:   Diagnosis Date    Anesthesia     Pt wakes up during "almost every surgery"    Arthritis     Asthma     Cervical radiculopathy     Chemotherapy follow-up examination     Chronic pain     [facility]) [facility] [address]     Diabetes mellitus ([facility] [address] [facility]     borderline "years ago"    Food allergy     clams    GERD (gastroesophageal reflux disease)     Heart murmur     Hiatal hernia     History of malignant neoplasm     History of pneumonia 04/12/2016    History of pulmonary embolism     History of ulceration     Hyperlipidemia     Lung cancer (HCC)     left lung    Migraine     Postgastrectomy malabsorption     Right scapholunate ligament tear     Skipped heart beats     Wears partial dentures     upper and lower       PAST SURGICAL HISTORY:  Past Surgical History:   Procedure Laterality Date    BRONCHOSCOPY      COLONOSCOPY      FRACTURE SURGERY      femur right 1985    GASTRIC BYPASS LAPAROSCOPIC N/A 7/12/2017    Procedure: GASTRIC DIVERSION WITH BROOK[place]-EN-Y RECONSTRUCTION WITH  SHORT 60 cm LIMB;  Surgeon: Claudia Menjivar MD;  [facility];  Service: Bariatrics    KNEE ARTHROSCOPY Right     right shoulder    LUNG LOBECTOMY Left     LYMPHADENECTOMY  05/22/2012    Dr Katie Card ANT INTERBODY MIN DISCECTOMY, CERVICAL BELOW C2 N/A 10/17/2017    Procedure: C5-6, C6-7 ACDF WITH ALLOGRAFT (NEURO MONITORING); Surgeon: Andreia Gayle MD;  [facility];  Service: Orthopedics    OK COLONOSCOPY FLX DX W/COLLJ SPEC WHEN PFRMD N/A 4/14/2017    Procedure: COLONOSCOPY;  Surgeon: Chalo Hamilton MD;  [facility];  Service: Gastroenterology    OK ESOPHAGOGASTRODUODENOSCOPY TRANSORAL DIAGNOSTIC N/A 1/11/2017    Procedure: ESOPHAGOGASTRODUODENOSCOPY (EGD); Surgeon: Chalo Hamilton MD;  [facility];   Service: Gastroenterology    OK LAP, REPAIR PARAESOPHAGEAL HERNIA, INCL FUNDOPLASTY W/ MESH N/A 7/12/2017    Procedure: REPAIR HERNIA PARAESOPHAGEAL  LAPAROSCOPIC;  Surgeon: Claudia Menjivar MD;  [facility];  Service: Bariatrics    OK WRIST Gavino Narrow LIG Right 6/9/2016    Procedure: RELEASE CARPAL TUNNEL ENDOSCOPIC;  Surgeon: Venice Dominguez MD;  [facility];  Service: Orthopedics    RECONSTRUCTION DISTAL RADIUS/ULNA JOINT (DRUJ) Right 9/6/2016    Procedure: WRIST SCAPHOLUNATE LIGAMENT RECONSTRUCTION WITH ECRB TENDON AUTOGRAPH , SPLINT APPLICATION ;  Surgeon: Venice Dominguez MD;  [facility];  Service:    [employer] SHOULDER SURGERY      TONSILLECTOMY         FAMILY HISTORY:  Family History   Problem Relation Age of Onset    Other Mother         Back disorder    Diabetes Mother         Diabetes Mellitus    Hypertension Mother     Heart disease Father     Hypertension Father     Breast cancer Sister     Skin cancer Sister     Breast cancer Paternal Grandmother     Skin cancer Paternal Grandmother        SOCIAL HISTORY:  Social History   Substance Use Topics    Smoking status: Former Smoker     Packs/day: 0 50     Years: 48 00     Types: Cigarettes     Quit date: 5/1/2017    Smokeless tobacco: Never Used    Alcohol use Yes      Comment: occasional        MEDICATIONS:    Current Outpatient Prescriptions:     albuterol (PROVENTIL HFA,VENTOLIN HFA) 90 mcg/act inhaler, Inhale 2 puffs every 6 (six) hours as needed for wheezing, Disp: , Rfl:     atorvastatin (LIPITOR) 40 mg tablet, Take 80 mg by mouth daily  , Disp: , Rfl:     diclofenac sodium (VOLTAREN) 1 %, Place on the skin, Disp: , Rfl:     DULoxetine (CYMBALTA) 60 mg delayed release capsule, , Disp: , Rfl:     ergocalciferol (VITAMIN D2) 50,000 units, Take 1 capsule (50,000 Units total) by mouth once a week for 12 doses, Disp: 4 capsule, Rfl: 2    gabapentin, once-daily, (GRALISE) 300 MG tablet, Take 1 tablet by mouth 3 (three) times a day, Disp: , Rfl:     Magnesium 400 MG CAPS, Take 1 tablet by mouth daily, Disp: , Rfl:     megestrol (MEGACE) 40 MG/ML suspension, Take 5 mL (200 mg total) by mouth daily, Disp: 480 mL, Rfl: 2    methocarbamol (ROBAXIN) 500 mg tablet, Take 1 tablet (500 mg total) by mouth 3 (three) times a day, Disp: 30 tablet, Rfl: 2    nortriptyline (PAMELOR) 10 mg capsule, Take 10 mg by mouth daily at bedtime 3 tabs=30mg at bedtime , Disp: , Rfl:     ONETOUCH DELICA LANCETS FINE MISC, Use 2-3 times per day as needed, Disp: 100 each, Rfl: 2    ONETOUCH VERIO test strip, Test 2-3 times daily as instructed, Disp: 100 each, Rfl: 3    oxyCODONE-acetaminophen (PERCOCET) 5-325 mg per tablet, Take 1 tablet by mouth every 4 (four) hours as needed for moderate pain Max Daily Amount: 6 tablets, Disp: 30 tablet, Rfl: 0    pantoprazole (PROTONIX) 40 mg tablet, Take 1 tablet by mouth daily, Disp: , Rfl: 0    predniSONE 10 mg tablet, 4 pills for 3 days, then 3 pills for 3 days, then 2 pills for 3 days, then 1 pills for 3 days, then stop, Disp: 30 tablet, Rfl: 0    Protein POWD, Take by mouth, Disp: , Rfl:     topiramate (TOPAMAX) 100 mg tablet, Take 100 mg by mouth daily Pt reports currently takes 75mg AM and 75mg PM , Disp: , Rfl:     traMADol (ULTRAM) 50 mg tablet, Take by mouth, Disp: , Rfl:     ALLERGIES:  Allergies   Allergen Reactions    Codeine Hives, GI Intolerance and Itching     Other reaction(s): Nausea and/or vomiting    Hydrocodone Hives and GI Intolerance    Penicillins Anaphylaxis and Throat Swelling    Shellfish-Derived Products      Other reaction(s): Nausea and/or vomiting  Clams only - nausea & vomiting       REVIEW OF SYSTEMS:  Pertinent items are noted in HPI      LABS:  HgA1c:   Lab Results   Component Value Date    HGBA1C 5 1 07/13/2018     BMP:   Lab Results   Component Value Date    GLUCOSE 90 01/04/2016    CALCIUM 9 1 07/13/2018     07/13/2018    K 4 4 07/13/2018    CO2 26 07/13/2018     07/13/2018    BUN 14 07/13/2018    CREATININE 0 88 07/13/2018           _____________________________________________________  PHYSICAL EXAMINATION:  General: well developed and well nourished, alert, oriented times 3 and appears comfortable  Psychiatric: Normal  HEENT: Trachea Midline, No torticollis  Cardiovascular: No discernable arrhythmia  Pulmonary: No wheezing or stridor  Skin: No masses, erthema, lacerations, fluctation, ulcerations  Neurovascular: Pulses Intact    MUSCULOSKELETAL EXAMINATION:  RIGHT SIDE:  Carpal tunnel:  Postive Tinel's and Finger:  No instability, Triggering  long finger and Nodules  long finger, with crepitation     _____________________________________________________  STUDIES REVIEWED:  No Studies to review      PROCEDURES PERFORMED:  Procedures  No Procedures performed today   Scribe Attestation    I,:   Christine Hinojosa am acting as a scribe while in the presence of the attending physician :        I,:   Ab Chandra MD personally performed the services described in this documentation    as scribed in my presence :

## 2018-10-11 NOTE — PROGRESS NOTES
Patient stopped coming to physical therapy, measurements are from the last patient progress note and were unable to be updated  As a result, patient is discharged from physical therapy

## 2018-10-16 ENCOUNTER — HOSPITAL ENCOUNTER (OUTPATIENT)
Dept: NEUROLOGY | Facility: CLINIC | Age: 58
Discharge: HOME | End: 2018-10-16
Payer: OTHER MISCELLANEOUS

## 2018-10-16 ENCOUNTER — OFFICE VISIT (OUTPATIENT)
Dept: DIABETES SERVICES | Facility: CLINIC | Age: 58
End: 2018-10-16

## 2018-10-16 ENCOUNTER — OFFICE VISIT (OUTPATIENT)
Dept: ENDOCRINOLOGY | Facility: CLINIC | Age: 58
End: 2018-10-16
Payer: COMMERCIAL

## 2018-10-16 DIAGNOSIS — E16.1 REACTIVE HYPOGLYCEMIA: ICD-10-CM

## 2018-10-16 DIAGNOSIS — G56.01 CARPAL TUNNEL SYNDROME ON RIGHT: ICD-10-CM

## 2018-10-16 PROCEDURE — 95886 MUSC TEST DONE W/N TEST COMP: CPT | Performed by: PHYSICAL MEDICINE & REHABILITATION

## 2018-10-16 PROCEDURE — 95910 NRV CNDJ TEST 7-8 STUDIES: CPT | Performed by: PHYSICAL MEDICINE & REHABILITATION

## 2018-10-16 PROCEDURE — DEXON

## 2018-10-16 PROCEDURE — 95250 CONT GLUC MNTR PHYS/QHP EQP: CPT | Performed by: NURSE PRACTITIONER

## 2018-10-16 NOTE — PROGRESS NOTES
[employer]    Met with Tash Carroll for [employer]  Training today included:    - Explained procedure to [facility]  - Patient agreement signed  - Checked sensor expiration date; Sensor lot number: [id number]  4/10/19, Transmitter Id: [id number]  - Patient has a blood glucose meter and strips  -  has been charged and unblinded  - Transmitter has been cleaned with alcohol and dried  - Discussed site selection; identified insertion site: left upper arm  - Cleansed the skin with alcohol  - Inserted sensor per instructions: smooth tape on the skin, insert sensor, withdraw needle, remove insertion tool  - Snapped in grey transmitter  - Verified transmitter fully snapped in on both sides (2clicks)  - Removed transmitter latch  - Disposed insertion tool in sharps container  - Started Sensor and verified communication with  (antenna symbol)    Patient instructions reviewed with patient:    - Perform 2 start up finger stick BG's 2 hours after insertion and calibrate  - Calibrate  every 12 hours using BG meter readings  - Fill out log sheets, one for each day  - Sensor is waterproof for showering and swimming, remove for hot tub, MRI  -  is not waterproof  - Remove if redness, bleeding, swelling, discomfort at site  - Removal is in 1 week with return instructions  Sensor inserted by: Garret Delaney RN,CDE    [facility] will return in one week for sensor removal and data download        Garret Delaney RN  [address] [place] [phone]

## 2018-10-16 NOTE — PROCEDURES
[facility] , [address]   [phone]        Name: Carlita Snell  Patient ID: [id number]   Age: 65 Years 6 Months  YOB: 1960   Account Number:    Gender: Male   Technologist:    Date of Exam: 10/16/2018 10:29   Referring Physician: Theresa Troy MD  Temperature:  31 3   Examining Physician: Jaxon Alfredo MD  Height: 5 feet 8 inch               Patient History:   65 y o male with right hand numbness in digits 1-3, for approximately 3 years  Also with neck pain radiating to the RUE  Referred for CTS versus cervical radiculopathy evaluation  [address]      Nerve / Sites Muscle Latency Ref  Amplitude Ref  Duration Rel Amp Distance Lat Diff Velocity Ref  ms ms mV mV ms % mm ms m/s m/s   R Median - APB      Wrist APB 4 90 ?4 20 7 7 ?4 0 7 03 100 70         Elbow APB 10 10  7 6  7 55 98 2 240 5 21 46 ?50   R Ulnar - ADM      Wrist ADM 3 39 ?3 30 10 4 ?5 0 5 99 100 70         B  Elbow ADM 7 55  9 8  6 15 93 8 210 4 17 50 ?50      A  Elbow ADM 9 58  9 4  6 41 96 5 100 2 03 49 ?49            6 20         SNC      Nerve / Sites Rec  Site Onset Lat Peak Lat Ref  Amp Ref  Distance Peak Diff Ref  Velocity Ref       ms ms ms µV µV mm ms ms m/s m/s   R Median - Digit II (Antidromic)      Wrist Dig II 3 91 4 69 ?3 50 7 9 ?10 0 130   33 ?50   R Ulnar - Digit V (Antidromic)      Wrist Dig V 2 76 3 59 ?3 10 11 1 ?10 0 110   40 ?50   R Radial - Anatomical snuff box (Forearm)      Forearm Wrist 1 93 2 81 ?2 90 13 6 ?10 0 100   52 ?50   R Median, Ulnar - Transcarpal comparison      Median Palm Wrist 2 45 3 18 ?2 20 7 5 ?50 0 80   33       Ulnar Palm Wrist 1 98 2 60 ?2 20 10 5 ?12 0 80   40            0 57 ?0 40     R Median, Radial - Thumb comparison      Median Wrist Thumb 3 49 4 32  3 5  100   29       Radial Wrist Thumb 2 86 3 54  4 0  100   35            0 78 ?0 50     R Median, Ulnar - Ring finger comparison      Median Wrist Ring finger 4 22 5 00  7 8 140   33       Ulnar Wrist Ring finger 3 96 4 38  3 6  140   35            0 63 ?0 50         EMG         Needle EMG Examination     Insertional Spontaneous MUAP   Muscle Nerve Roots Activity Fib PSW Fasc Dur  Amp Poly Config Recruitment   R  Deltoid Axillary C5-C6 Normal None None None Normal Normal None Normal Normal   R  Biceps brachii Musculocutaneous C5-C6 Normal None None None Normal Normal None Normal Normal   R  Triceps brachii Radial C6-C8 Normal None None None Normal Normal None Normal Normal   R  Pronator teres Median C6-C7 Normal None None None Normal Normal None Normal Normal   R  First dorsal interosseous Ulnar C8-T1 Normal None None None Normal Normal None Normal Normal   R  Abductor pollicis brevis Median N5-G7 Normal None None None Normal Normal None Normal Normal         Findings:   Motor:  Right median compound motor action potential (CMAP) demonstrated prolonged distal latency, normal amplitude, and mildly decreased conduction velocity across the forearm    Right ulnar compound motor action potential (CMAP) demonstrated mildly decreased distal latency, normal amplitude, and normal conduction velocity across the elbow and forearm  Sensory:  Right median sensory nerve action potential (SNAP) demonstrated decreased amplitude and prolonged peak latency  Right ulnar sensory nerve action potential (SNAP) demonstrated normal amplitude and prolonged peak latency  When corrected for temperature, latency is mildly prolonged  Right radial sensory nerve action potential (SNAP) demonstrated normal amplitude and normal peak latency  Right median and ulnar sensory nerve action potential (SNAP) comparison study to the wrist (transcarpal) demonstrated prolonged latency difference  Right median and ulnar sensory nerve action potential (SNAP) comparison study to digit 4 demonstrated prolonged latency difference       Right median and radial sensory nerve action potential (SNAP) comparison study to digit 1 demonstrated prolonged latency difference  EMG:  A disposable monopolar needle was used to study selected muscles in the right upper extremity including the deltoid, biceps, triceps, pronator teres, first dorsal interosseous, and abductor pollicis brevis  All muscles tested demonstrated normal insertional activity, no abnormal spontaneous activity, and normal volitional motor unit action potentials  Impression: Abnormal study  1  There is electrodiagnostic evidence of a right demyelinating median sensorimotor mononeuropathy across the wrist, consistent with moderate carpal tunnel syndrome  The combined sensory index was 1 98   2  There is no electrodiagnostic evidence of a right cervical radiculopathy or brachial plexopathy  3  There is no electrodiagnostic evidence of a right ulnar or radial mononeuropathy             ___________________________  Nguyen Ziegler MD

## 2018-10-16 NOTE — PATIENT INSTRUCTIONS
Follow instructions on information sheet  Calibrate day 1 within 2 hours of sensor start when prompted  Then calibrate at least every 12 hours

## 2018-10-22 ENCOUNTER — CLINICAL SUPPORT (OUTPATIENT)
Dept: ENDOCRINOLOGY | Facility: CLINIC | Age: 58
End: 2018-10-22
Payer: COMMERCIAL

## 2018-10-22 ENCOUNTER — HOSPITAL ENCOUNTER (OUTPATIENT)
Dept: RADIOLOGY | Facility: HOSPITAL | Age: 58
Discharge: HOME | End: 2018-10-22
Attending: ORTHOPAEDIC SURGERY
Payer: OTHER MISCELLANEOUS

## 2018-10-22 ENCOUNTER — OFFICE VISIT (OUTPATIENT)
Dept: OBGYN CLINIC | Facility: HOSPITAL | Age: 58
End: 2018-10-22
Payer: OTHER MISCELLANEOUS

## 2018-10-22 VITALS
DIASTOLIC BLOOD PRESSURE: 74 MMHG | BODY MASS INDEX: 21.52 KG/M2 | WEIGHT: 142 LBS | HEIGHT: 68 IN | SYSTOLIC BLOOD PRESSURE: 122 MMHG | HEART RATE: 94 BPM

## 2018-10-22 DIAGNOSIS — M54.12 CERVICAL RADICULOPATHY: ICD-10-CM

## 2018-10-22 DIAGNOSIS — E55.9 VITAMIN D DEFICIENCY: ICD-10-CM

## 2018-10-22 DIAGNOSIS — E16.1 REACTIVE HYPOGLYCEMIA: Primary | ICD-10-CM

## 2018-10-22 DIAGNOSIS — M54.2 NECK PAIN: ICD-10-CM

## 2018-10-22 DIAGNOSIS — Z98.1 S/P CERVICAL SPINAL FUSION: Primary | ICD-10-CM

## 2018-10-22 PROCEDURE — 95251 CONT GLUC MNTR ANALYSIS I&R: CPT | Performed by: INTERNAL MEDICINE

## 2018-10-22 PROCEDURE — 72040 X-RAY EXAM NECK SPINE 2-3 VW: CPT

## 2018-10-22 PROCEDURE — 99213 OFFICE O/P EST LOW 20 MIN: CPT | Performed by: ORTHOPAEDIC SURGERY

## 2018-10-22 NOTE — PROGRESS NOTES
Assessment/Plan:     Problem List Items Addressed This Visit     Cervical radiculopathy     Patient is 1 year status post ACDF C5-7  Overall he notes improvement compared to preop symptoms and is happy with his progress thus far  He reports occasional neck discomfort especially when the weather changes    He is neurologically stable with good strength C5 through T1 bilaterally  X-rays today demonstrate stable fusion and instrumentation  Follow-up p r n  Other Visit Diagnoses     S/P cervical spinal fusion    -  Primary    Neck pain        Relevant Orders    XR spine cervical 2 or 3 vw injury            Subjective:      Patient ID: Teressa Franco is a 65 y o  male  HPI  Patient presents to the office 1 year status post ACDF C5-7 on 10/17/2017  He reports improvement in preoperative symptoms due to the surgery  He has pain and discomfort with weather changes  He has no new complaints or concerns today in the office  The following portions of the patient's history were reviewed and updated as appropriate: current medications, past family history, past medical history, past social history, past surgical history and problem list     Review of Systems   Constitutional: Negative for chills and fever  HENT: Negative for drooling, sneezing and voice change  Eyes: Negative for discharge and redness  Respiratory: Negative for cough and wheezing  Gastrointestinal: Negative for vomiting  Musculoskeletal: Negative for neck pain  Skin: Negative for color change  Neurological: Negative for weakness and numbness  Psychiatric/Behavioral: Negative for behavioral problems  The patient is not nervous/anxious  Objective:      /74   Pulse 94   Ht 5' 8" (1 727 m)   Wt 64 4 kg (142 lb)   BMI 21 59 kg/m²          Physical Exam   Constitutional: He is oriented to person, place, and time  He appears well-developed and well-nourished  HENT:   Head: Normocephalic and atraumatic     Eyes: Pupils are equal, round, and reactive to light  Neck: Neck supple  Cardiovascular: Intact distal pulses  Pulmonary/Chest: Breath sounds normal    Neurological: He is alert and oriented to person, place, and time  Skin: Skin is warm and dry  Psychiatric: He has a normal mood and affect  His behavior is normal        Patient ambulates without assistance  Incision well healed  Strength C5-T1 5/5 bilaterally  Sensation C5-T1 intact bilaterally  Neurologically stable      X-rays of cervical spine were reviewed and shows hardware in good alignment

## 2018-10-22 NOTE — PROGRESS NOTES
Continous glucose monitoring dexcom intrepretation     Date/Time 10/22/2018 12:56 PM     Performed by  Hailey Camarena by Shannon Marquis       Consent: Written consent obtained    Risks and benefits: risks, benefits and alternatives were discussed  Consent given by: patient  Patient understanding: patient states understanding of the procedure being performed  Patient consent: the patient's understanding of the procedure matches consent given  Procedure consent: procedure consent matches procedure scheduled  Relevant documents: relevant documents present and verified  Test results: test results available and properly labeled  Site marked: the operative site was not marked  Imaging studies: imaging studies not available  Patient identity confirmed: verbally with patient      Site preparation: Isopropyl alcohol    Local anesthesia used: no     Anesthesia   Local anesthesia used: no     Sedation   Patient sedated: no      Specimen: no    Culture: no   Procedure Details   Patient tolerance: Patient tolerated the procedure well with no immediate complications

## 2018-10-22 NOTE — ASSESSMENT & PLAN NOTE
Patient is 1 year status post ACDF C5-7  Overall he notes improvement compared to preop symptoms and is happy with his progress thus far  He reports occasional neck discomfort especially when the weather changes    He is neurologically stable with good strength C5 through T1 bilaterally  X-rays today demonstrate stable fusion and instrumentation  Follow-up p r n

## 2018-10-24 ENCOUNTER — TELEPHONE (OUTPATIENT)
Dept: ENDOCRINOLOGY | Facility: CLINIC | Age: 58
End: 2018-10-24

## 2018-10-24 NOTE — TELEPHONE ENCOUNTER
Left message that trial Dexcom report reviewed and recommend seeing dietitian to increase protein and decrease carbohydrates in diet, provided phone number for education dept    he may benefit from the personal Dexcom and also to stop Jaylan    Sent email to Dexcom rep Silvia Luis to please reach out to patient to discuss the personal Dexcom

## 2018-10-25 ENCOUNTER — TELEPHONE (OUTPATIENT)
Dept: ENDOCRINOLOGY | Facility: CLINIC | Age: 58
End: 2018-10-25

## 2018-10-25 ENCOUNTER — PREP FOR PROCEDURE (OUTPATIENT)
Dept: OBGYN CLINIC | Facility: HOSPITAL | Age: 58
End: 2018-10-25

## 2018-10-25 NOTE — TELEPHONE ENCOUNTER
received following message from Nadine Rubio regarding the personal Dexcom    I called Jim Conway but he doesnt qualify because he is not on insulin and Ernst does not have pharmacy coverage  I told him if he goes on insulin (3 inj/day ) to call me back    Thank you,  Janette

## 2018-10-29 ENCOUNTER — OFFICE VISIT (OUTPATIENT)
Dept: FAMILY MEDICINE CLINIC | Facility: CLINIC | Age: 58
End: 2018-10-29
Payer: COMMERCIAL

## 2018-10-29 VITALS
SYSTOLIC BLOOD PRESSURE: 118 MMHG | TEMPERATURE: 99.3 F | DIASTOLIC BLOOD PRESSURE: 72 MMHG | BODY MASS INDEX: 21.55 KG/M2 | HEIGHT: 68 IN | WEIGHT: 142.2 LBS

## 2018-10-29 DIAGNOSIS — R63.4 ABNORMAL WEIGHT LOSS: Primary | ICD-10-CM

## 2018-10-29 DIAGNOSIS — Z23 NEED FOR INFLUENZA VACCINATION: ICD-10-CM

## 2018-10-29 DIAGNOSIS — Z98.84 STATUS POST BARIATRIC SURGERY: ICD-10-CM

## 2018-10-29 PROCEDURE — 3008F BODY MASS INDEX DOCD: CPT | Performed by: FAMILY MEDICINE

## 2018-10-29 PROCEDURE — 90471 IMMUNIZATION ADMIN: CPT

## 2018-10-29 PROCEDURE — 90686 IIV4 VACC NO PRSV 0.5 ML IM: CPT

## 2018-10-29 PROCEDURE — 99213 OFFICE O/P EST LOW 20 MIN: CPT | Performed by: FAMILY MEDICINE

## 2018-10-29 NOTE — PROGRESS NOTES
Assessment/Plan:    No problem-specific Assessment & Plan notes found for this encounter  Diagnoses and all orders for this visit:    Abnormal weight loss  Comments:  weight is stable ace in 6 weeks    Status post bariatric surgery          Subjective:      Patient ID: Patrick Desouza is a 65 y o  male  Follow up for unintentional weight loss following bariatric surgery that occurred over a year ago, pt has been on megace to try to gain weight for about 2-3 months, pt has stopped losing weight and his weight has been stable at 2 consecutive visits        The following portions of the patient's history were reviewed and updated as appropriate: allergies, current medications, past family history, past medical history, past social history, past surgical history and problem list     Review of Systems   Eyes: Negative for visual disturbance  Cardiovascular: Negative for chest pain and palpitations  Gastrointestinal: Negative for abdominal pain, blood in stool, constipation, diarrhea, nausea and vomiting  Neurological: Negative for headaches  Objective:      /72 (BP Location: Left arm, Patient Position: Sitting, Cuff Size: Adult)   Temp 99 3 °F (37 4 °C) (Tympanic)   Ht 5' 8" (1 727 m)   Wt 64 5 kg (142 lb 3 2 oz)   BMI 21 62 kg/m²          Physical Exam   Constitutional: He is oriented to person, place, and time  He appears well-developed and well-nourished  No distress  HENT:   Head: Normocephalic and atraumatic  Eyes: Pupils are equal, round, and reactive to light  Conjunctivae and EOM are normal  No scleral icterus  Neck: Normal range of motion  Neck supple  Cardiovascular: Normal rate, regular rhythm and normal heart sounds  No murmur heard  Pulmonary/Chest: Effort normal and breath sounds normal  No respiratory distress  He has no wheezes  He has no rales  Abdominal: Soft  Bowel sounds are normal  He exhibits no distension and no mass  There is no tenderness   There is no rebound and no guarding  Musculoskeletal: He exhibits no edema  Lymphadenopathy:     He has no cervical adenopathy  Neurological: He is alert and oriented to person, place, and time  He exhibits normal muscle tone  Skin: Skin is warm and dry  No rash noted  He is not diaphoretic  No erythema  No pallor  Psychiatric: He has a normal mood and affect  His behavior is normal  Judgment and thought content normal    Nursing note and vitals reviewed

## 2018-11-12 NOTE — PROGRESS NOTES
Continous glucose monitoring dexcom placement     Date/Time 11/12/2018 3:44 PM     Performed by  Lesvia Wright by Amanda Duran       Consent: Verbal consent obtained  Written consent obtained    Risks and benefits: risks, benefits and alternatives were discussed  Consent given by: patient  Patient understanding: patient states understanding of the procedure being performed  Patient consent: the patient's understanding of the procedure matches consent given  Procedure consent: procedure consent matches procedure scheduled  Relevant documents: relevant documents present and verified  Test results: test results not available  Site marked: the operative site was not marked  Imaging studies: imaging studies not available  Required items: required blood products, implants, devices, and special equipment available  Patient identity confirmed: verbally with patient      Site preparation: Isopropyl alcohol    Local anesthesia used: no     Anesthesia   Local anesthesia used: no     Sedation   Patient sedated: no      Specimen: no    Culture: no   Procedure Details   Patient tolerance: Patient tolerated the procedure well with no immediate complications

## 2018-11-27 ENCOUNTER — HOSPITAL ENCOUNTER (OUTPATIENT)
Facility: HOSPITAL | Age: 58
Setting detail: OUTPATIENT SURGERY
Discharge: HOME | End: 2018-11-27
Attending: ORTHOPAEDIC SURGERY | Admitting: ORTHOPAEDIC SURGERY
Payer: COMMERCIAL

## 2018-11-27 VITALS
RESPIRATION RATE: 16 BRPM | TEMPERATURE: 100.2 F | OXYGEN SATURATION: 98 % | WEIGHT: 142 LBS | SYSTOLIC BLOOD PRESSURE: 119 MMHG | HEIGHT: 68 IN | DIASTOLIC BLOOD PRESSURE: 76 MMHG | BODY MASS INDEX: 21.52 KG/M2 | HEART RATE: 84 BPM

## 2018-11-27 DIAGNOSIS — M65.331 TRIGGER FINGER, RIGHT MIDDLE FINGER: Primary | ICD-10-CM

## 2018-11-27 DIAGNOSIS — M65.321 TRIGGER FINGER, RIGHT INDEX FINGER: ICD-10-CM

## 2018-11-27 LAB — GLUCOSE SERPL-MCNC: 82 MG/DL (ref 65–140)

## 2018-11-27 PROCEDURE — 82948 REAGENT STRIP/BLOOD GLUCOSE: CPT

## 2018-11-27 PROCEDURE — 26055 INCISE FINGER TENDON SHEATH: CPT | Performed by: ORTHOPAEDIC SURGERY

## 2018-11-27 RX ORDER — LIDOCAINE HYDROCHLORIDE AND EPINEPHRINE 10; 10 MG/ML; UG/ML
20 INJECTION, SOLUTION INFILTRATION; PERINEURAL ONCE
Status: DISCONTINUED | OUTPATIENT
Start: 2018-11-27 | End: 2018-11-27 | Stop reason: HOSPADM

## 2018-11-27 RX ORDER — SENNOSIDES 8.6 MG
650 CAPSULE ORAL EVERY 8 HOURS
Qty: 15 TABLET | Refills: 0 | Status: SHIPPED | OUTPATIENT
Start: 2018-11-27

## 2018-11-27 RX ORDER — NAPROXEN SODIUM 220 MG
220 TABLET ORAL 2 TIMES DAILY WITH MEALS
Qty: 10 TABLET | Refills: 0 | Status: ON HOLD | OUTPATIENT
Start: 2018-11-27 | End: 2019-01-09 | Stop reason: ALTCHOICE

## 2018-11-27 RX ORDER — OXYCODONE HYDROCHLORIDE AND ACETAMINOPHEN 5; 325 MG/1; MG/1
TABLET ORAL
Qty: 10 TABLET | Refills: 0 | Status: ON HOLD | OUTPATIENT
Start: 2018-11-27 | End: 2019-01-09 | Stop reason: ALTCHOICE

## 2018-11-27 RX ADMIN — Medication: at 11:18

## 2018-11-27 NOTE — DISCHARGE INSTRUCTIONS

## 2018-11-27 NOTE — H&P
H&P Exam - Orthopedics   Fely Nino 63 y o  male MRN: [id number]  Unit/Bed#:     Assessment/Plan   Assessment:  R long trigger finger  Plan:  R long TFR to be performed today    History of Present Illness   HPI:  Fely Nino is a 63 y o  y o  male who presents with continued pain and clicking of right long finger  Patient has tried previous injx with partial relief      Historical Information  Review Of Systems:   · Skin: Normal  · Neuro: See HPI  · Musculoskeletal: See HPI  · 14 point review of systems negative except as stated above     Past Medical History:   Past Medical History:   Diagnosis Date    Anesthesia     Pt wakes up during "almost every surgery"    Arthritis     Asthma     Cervical radiculopathy     Chemotherapy follow-up examination     Chronic pain     COPD (chronic obstructive pulmonary disease) ([address] )     Diabetes mellitus ([address] )     borderline "years ago"    Food allergy     clams    GERD (gastroesophageal reflux disease)     Heart murmur     Hiatal hernia     History of malignant neoplasm     History of pneumonia 04/12/2016    History of pulmonary embolism     History of ulceration     Hyperlipidemia     Lung cancer (HCC)     left lung    Migraine     Postgastrectomy malabsorption     Right scapholunate ligament tear     Skipped heart beats     Wears partial dentures     upper and lower       Past Surgical History:   Past Surgical History:   Procedure Laterality Date    BRONCHOSCOPY      COLONOSCOPY      FRACTURE SURGERY      femur right 1985    GASTRIC BYPASS LAPAROSCOPIC N/A 7/12/2017    Procedure: GASTRIC DIVERSION WITH [place]-EN-Y RECONSTRUCTION WITH  SHORT 60 cm LIMB;  Surgeon: Laurel Briseno MD;  Location: [facility];  Service: Bariatrics    KNEE ARTHROSCOPY Right     right shoulder    LUNG LOBECTOMY Left     LYMPHADENECTOMY  05/22/2012    Dr Cristian Anderson ANT INTERBODY MIN DISCECTOMY, CERVICAL BELOW C2 N/A 10/17/2017    Procedure: C5-6, C6-7 ACDF WITH ALLOGRAFT (NEURO MONITORING); Surgeon: Yancy Mcfarland MD;  [facility];  Service: Orthopedics    AZ COLONOSCOPY FLX DX W/COLLJ SPEC WHEN PFRMD N/A 4/14/2017    Procedure: COLONOSCOPY;  Surgeon: Neil Ignacio MD;  Location: [facility];  Service: Gastroenterology    AZ ESOPHAGOGASTRODUODENOSCOPY TRANSORAL DIAGNOSTIC N/A 1/11/2017    Procedure: ESOPHAGOGASTRODUODENOSCOPY (EGD); Surgeon: Neil Ignacio MD;  [facility];   Service: Gastroenterology    AZ LAP, REPAIR PARAESOPHAGEAL HERNIA, INCL FUNDOPLASTY W/ MESH N/A 7/12/2017    Procedure: REPAIR HERNIA PARAESOPHAGEAL  LAPAROSCOPIC;  Surgeon: Mcihelle Bazan MD;  Location: [facility];  Service: Nathan Wright WRIST E[address]ie Ku LIG Right 6/9/2016    Procedure: RELEASE CARPAL TUNNEL ENDOSCOPIC;  Surgeon: Frances Goddard MD;  [facility];  Service: Orthopedics    RECONSTRUCTION DISTAL RADIUS/ULNA JOINT (DRUJ) Right 9/6/2016    Procedure: WRIST SCAPHOLUNATE LIGAMENT RECONSTRUCTION WITH ECRB TENDON AUTOGRAPH , SPLINT APPLICATION ;  Surgeon: Frances Goddard MD;  [facility];  Service:    Tracy Welch SHOULDER SURGERY      TONSILLECTOMY         Family History:  Family history reviewed and non-contributory  Family History   Problem Relation Age of Onset    Other Mother         Back disorder    Diabetes Mother         Diabetes Mellitus    Hypertension Mother     Heart disease Father     Hypertension Father     Breast cancer Sister     Skin cancer Sister     Breast cancer Paternal Grandmother     Skin cancer Paternal Grandmother        Social History:  Social History     Social History    Marital status: /Civil Union     Spouse name: N/A    Number of children: N/A    Years of education: N/A     Social History Main Topics    Smoking status: Former Smoker     Packs/day: 0 50     Years: 48 00     Types: Cigarettes     Quit date: 5/1/2017    Smokeless tobacco: Never Used    Alcohol use Yes      Comment: occasional     Drug use: No    Sexual activity: Not on file     Other Topics Concern    Not on file     Social History Narrative    No narrative on file       Allergies: Allergies   Allergen Reactions    Codeine Hives, GI Intolerance and Itching     Other reaction(s): Nausea and/or vomiting    Hydrocodone Hives and GI Intolerance    Penicillins Anaphylaxis and Throat Swelling    Shellfish-Derived Products      Other reaction(s): Nausea and/or vomiting  Clams only - nausea & vomiting           Labs:    0  Lab Value Date/Time   HCT 46 0 07/13/2018 0722   HCT 44 8 02/13/2018 0737   HCT 39 8 10/18/2017 0505   HCT 46 0 01/04/2016 0824   HCT 43 4 01/23/2014 0850   HCT 42 0 11/21/2013 1102   HGB 14 5 07/13/2018 0722   HGB 15 3 02/13/2018 0737   HGB 13 2 10/18/2017 0505   HGB 15 7 01/04/2016 0824   HGB 14 8 01/23/2014 0850   HGB 14 4 11/21/2013 1102   INR 1 05 10/05/2017 0856   INR 1 55 (H) 02/21/2014 0806   WBC 11 49 (H) 07/13/2018 0722   WBC 6 46 02/13/2018 0737   WBC 12 57 (H) 10/18/2017 0505   WBC 6 48 01/04/2016 0824   WBC 8 23 01/23/2014 0850   WBC 6 97 11/21/2013 1102       Meds:  No current facility-administered medications for this encounter       Current Outpatient Prescriptions:     albuterol (PROVENTIL HFA,VENTOLIN HFA) 90 mcg/act inhaler, Inhale 2 puffs every 6 (six) hours as needed for wheezing, Disp: , Rfl:     atorvastatin (LIPITOR) 40 mg tablet, Take 80 mg by mouth daily  , Disp: , Rfl:     diclofenac sodium (VOLTAREN) 1 %, Place on the skin, Disp: , Rfl:     DULoxetine (CYMBALTA) 60 mg delayed release capsule, , Disp: , Rfl:     ergocalciferol (VITAMIN D2) 50,000 units, Take 1 capsule (50,000 Units total) by mouth once a week for 12 doses, Disp: 4 capsule, Rfl: 2    gabapentin, once-daily, (GRALISE) 300 MG tablet, Take 1 tablet by mouth 3 (three) times a day, Disp: , Rfl:     Magnesium 400 MG CAPS, Take 1 tablet by mouth daily, Disp: , Rfl:     megestrol (MEGACE) 40 MG/ML suspension, Take 5 mL (200 mg total) by mouth daily, Disp: 480 mL, Rfl: 2    methocarbamol (ROBAXIN) 500 mg tablet, Take 1 tablet (500 mg total) by mouth 3 (three) times a day, Disp: 30 tablet, Rfl: 2    nortriptyline (PAMELOR) 10 mg capsule, Take 10 mg by mouth daily at bedtime 3 tabs=30mg at bedtime , Disp: , Rfl:     ONETOUCH DELICA LANCETS FINE MISC, Use 2-3 times per day as needed, Disp: 100 each, Rfl: 2    ONETOUCH VERIO test strip, Test 2-3 times daily as instructed, Disp: 100 each, Rfl: 3    oxyCODONE-acetaminophen (PERCOCET) 5-325 mg per tablet, Take 1 tablet by mouth every 4 (four) hours as needed for moderate pain Max Daily Amount: 6 tablets, Disp: 30 tablet, Rfl: 0    pantoprazole (PROTONIX) 40 mg tablet, Take 1 tablet by mouth daily, Disp: , Rfl: 0    predniSONE 10 mg tablet, 4 pills for 3 days, then 3 pills for 3 days, then 2 pills for 3 days, then 1 pills for 3 days, then stop, Disp: 30 tablet, Rfl: 0    Protein POWD, Take by mouth, Disp: , Rfl:     topiramate (TOPAMAX) 100 mg tablet, Take 100 mg by mouth daily Pt reports currently takes 75mg AM and 75mg PM , Disp: , Rfl:     traMADol (ULTRAM) 50 mg tablet, Take by mouth, Disp: , Rfl:     Blood Culture:   No results found for: BLOODCX    Wound Culture:   No results found for: WOUNDCULT    Ins and Outs:  No intake/output data recorded  Physical Exam  There were no vitals taken for this visit  Gen: Alert and oriented to person, place, time  HEENT: EOMI, eyes clear, moist mucus membranes, hearing intact  Respiratory: Bilateral chest rise   No audible wheezing found  Cardiovascular: Regular Rate and Rhythm  Abdomen: soft nontender/nondistended  Ortho Exam: + ttp A1 pulley right long finger with + nodules and triggering  Neuro Exam: motor grossly intact, + tinels carpal tunnel    Lab Results: Reviewed  Imaging: Reviewed

## 2018-11-27 NOTE — OP NOTE
OPERATIVE REPORT  PATIENT NAME: King Conley  :  1960  MRN: [id number]  Pt Location: [facility]    SURGERY DATE: 18    Surgeon(s) and Role:     * Frances Goddard MD - Primary    Pre-Op Diagnosis:  Trigger finger, right middle finger [M65 331]    Post-Op Diagnosis Codes:     * Trigger finger, right middle finger [M65 331]    Procedure(s):  RELEASE TRIGGER FINGER long and index finger (Right)    Specimen(s):  * No orders in the log *    Estimated Blood Loss:   Minimal      Anesthesia Type:   Local    Operative Indications: The patient has a history of Trigger Finger  right  index finger, long finger that was recalcitrant to conservative management  The decision was made to bring the patient to the operating room for Trigger Finger Release  right  index finger, long finger  Risks of the procedure were explained which include, but are not limited to bleeding; infection; damage to nerves, arteries,veins, tendons; scar; pain; need for reoperation; failure to give desired result; and risks of anaesthesia  All questions were answered to satisfaction and they were willing to proceed  Operative Findings:  Right index and long finger trigger finger release with an inability to flex prior to surgery, after surgery, full motion achieved  Complications:   None    Procedure and Technique:  After the patient, site, and procedure were identified, the patient was brought into the operating room in a supine position  Local anaesthesia was adminstered in the preoperative holding area  A tourniquet was not used  The  right upper extremity was then prepped and drapped in a normal, sterile, orthopedic fashion  After the patient, site, and procedure were once again identified, attention was turned to the right index finger  An incision was made over the flexor tendon sheath at the level of the A1 pulley    Dissection was carried out in-line with the flexor tendon sheath and the radial and ulnar digital artery and nerve were protected  The A1 pulley was identified at the base of the incision  Under direct visualization, the A1 pulley was divided along the midline in its entirety with care taken to avoid injury to the underlying tendon  The tendons were examined to ensure that no further catching, popping, clicking or locking occurred with motion of the finger  The above mentioned procedure was then replicated on the right long finger  At the completion of the procedure, hemostasis was obtained with cautery and direct pressure  The wounds were copiously irrigated with sterile solution  The wounds were closed with Prolene  Sterile dressings were applied, including Xeroform, gauze, tweeners, webril, ACE  Please note, all sponge, needle, and instrument counts were correct prior to closure  Loupe magnification was utilized  The patient tolerated the procedure well       I was present for the entire procedure and A qualified resident physician was not available    Patient Disposition:  APU and hemodynamically stable    SIGNATURE: Kate Robertson MD  DATE: 11/27/18  TIME: 12:10 PM

## 2018-12-07 ENCOUNTER — OFFICE VISIT (OUTPATIENT)
Dept: OBGYN CLINIC | Facility: HOSPITAL | Age: 58
End: 2018-12-07

## 2018-12-07 VITALS
HEIGHT: 68 IN | SYSTOLIC BLOOD PRESSURE: 128 MMHG | DIASTOLIC BLOOD PRESSURE: 78 MMHG | HEART RATE: 80 BPM | BODY MASS INDEX: 21.55 KG/M2 | WEIGHT: 142.2 LBS

## 2018-12-07 DIAGNOSIS — M65.331 TRIGGER FINGER, RIGHT MIDDLE FINGER: Primary | ICD-10-CM

## 2018-12-07 DIAGNOSIS — M65.321 TRIGGER FINGER, RIGHT INDEX FINGER: ICD-10-CM

## 2018-12-07 PROCEDURE — 99024 POSTOP FOLLOW-UP VISIT: CPT | Performed by: ORTHOPAEDIC SURGERY

## 2018-12-07 NOTE — PROGRESS NOTES
Assessment:   PO right long and index trigger finger release 11/27/18    Plan:   Resume normal activities     Follow Up:  PRN        CHIEF COMPLAINT:  Chief Complaint   Patient presents with    Right Hand - Post-op         SUBJECTIVE:  Keith Ballard is a 63y o  year old male who presents for follow up after Trigger Finger Release  right  long and index finger  He continues to have numbness and tingling in the right long finger  Patient had a prior right ECTR         PHYSICAL EXAMINATION:  General: well developed and well nourished, alert, oriented times 3 and appears comfortable  Psychiatric: Normal    MUSCULOSKELETAL EXAMINATION:  Incision: Clean, dry, intact  Range of Motion: full composite fist possible  Neurovascular status: Neuro intact, good cap refill  Activity Restrictions: No restrictions   full ROM with no crepitation      STUDIES REVIEWED:  EMG reviewed and compared to prior emgs  10/16/18  Median nerve latency 4 9  Sensory latency 4 69  Amplitude 7 9    4/27/16  Median nerve latency 4 3  Sensory latency 4 9  Amplitude 5 5      PROCEDURES PERFORMED:  Procedures  No Procedures performed today     Scribe Attestation    I,:   Shanthi Pandey am acting as a scribe while in the presence of the attending physician :        I,:   Sanya Carson MD personally performed the services described in this documentation    as scribed in my presence :          Scribe Attestation    I,:   Shanthi Pandey am acting as a scribe while in the presence of the attending physician :        I,:   Sanya Carson MD personally performed the services described in this documentation    as scribed in my presence :

## 2018-12-12 ENCOUNTER — OFFICE VISIT (OUTPATIENT)
Dept: FAMILY MEDICINE CLINIC | Facility: CLINIC | Age: 58
End: 2018-12-12
Payer: COMMERCIAL

## 2018-12-12 ENCOUNTER — APPOINTMENT (EMERGENCY)
Dept: CT IMAGING | Facility: HOSPITAL | Age: 58
End: 2018-12-12
Payer: COMMERCIAL

## 2018-12-12 ENCOUNTER — HOSPITAL ENCOUNTER (EMERGENCY)
Facility: HOSPITAL | Age: 58
Discharge: HOME | End: 2018-12-12
Admitting: EMERGENCY MEDICINE
Payer: COMMERCIAL

## 2018-12-12 VITALS
SYSTOLIC BLOOD PRESSURE: 113 MMHG | HEIGHT: 68 IN | BODY MASS INDEX: 20.61 KG/M2 | RESPIRATION RATE: 16 BRPM | DIASTOLIC BLOOD PRESSURE: 76 MMHG | TEMPERATURE: 97.9 F | OXYGEN SATURATION: 100 % | WEIGHT: 136 LBS | HEART RATE: 95 BPM

## 2018-12-12 VITALS
TEMPERATURE: 97.5 F | WEIGHT: 136 LBS | SYSTOLIC BLOOD PRESSURE: 146 MMHG | BODY MASS INDEX: 20.61 KG/M2 | HEART RATE: 82 BPM | DIASTOLIC BLOOD PRESSURE: 78 MMHG | OXYGEN SATURATION: 98 % | HEIGHT: 68 IN

## 2018-12-12 DIAGNOSIS — R10.84 GENERALIZED ABDOMINAL PAIN: ICD-10-CM

## 2018-12-12 DIAGNOSIS — N34.2 INFECTIVE URETHRITIS: ICD-10-CM

## 2018-12-12 DIAGNOSIS — K25.9 GASTRIC ULCER: Primary | ICD-10-CM

## 2018-12-12 DIAGNOSIS — R63.4 ABNORMAL WEIGHT LOSS: Primary | ICD-10-CM

## 2018-12-12 DIAGNOSIS — Z98.84 STATUS POST BARIATRIC SURGERY: ICD-10-CM

## 2018-12-12 LAB
ALBUMIN SERPL BCP-MCNC: 5.1 G/DL (ref 3.5–5.7)
ALP SERPL-CCNC: 83 U/L (ref 40–150)
ALT SERPL W P-5'-P-CCNC: 9 U/L (ref 7–52)
ANION GAP SERPL CALCULATED.3IONS-SCNC: 10 MMOL/L (ref 4–13)
APTT PPP: 33 SECONDS (ref 26–38)
AST SERPL W P-5'-P-CCNC: 11 U/L (ref 13–39)
BASOPHILS # BLD AUTO: 0.1 THOUSANDS/ΜL (ref 0–0.1)
BASOPHILS NFR BLD AUTO: 1 % (ref 0–2)
BILIRUB SERPL-MCNC: 0.6 MG/DL (ref 0.2–1)
BILIRUB UR QL STRIP: NEGATIVE
BUN SERPL-MCNC: 12 MG/DL (ref 7–25)
CALCIUM SERPL-MCNC: 10.1 MG/DL (ref 8.6–10.5)
CHLORIDE SERPL-SCNC: 102 MMOL/L (ref 98–107)
CLARITY UR: CLEAR
CO2 SERPL-SCNC: 24 MMOL/L (ref 21–31)
COLOR UR: YELLOW
CREAT SERPL-MCNC: 0.82 MG/DL (ref 0.7–1.3)
EOSINOPHIL # BLD AUTO: 0 THOUSAND/ΜL (ref 0–0.61)
EOSINOPHIL NFR BLD AUTO: 0 % (ref 0–5)
ERYTHROCYTE [DISTWIDTH] IN BLOOD BY AUTOMATED COUNT: 12.9 % (ref 11.5–14.5)
GFR SERPL CREATININE-BSD FRML MDRD: 98 ML/MIN/1.73SQ M
GLUCOSE SERPL-MCNC: 84 MG/DL (ref 65–99)
GLUCOSE UR STRIP-MCNC: NEGATIVE MG/DL
HCT VFR BLD AUTO: 46.6 % (ref 36.5–49.3)
HGB BLD-MCNC: 15.5 G/DL (ref 14–18)
HGB UR QL STRIP.AUTO: NEGATIVE
INR PPP: 1.1 (ref 0.9–1.5)
KETONES UR STRIP-MCNC: NEGATIVE MG/DL
LEUKOCYTE ESTERASE UR QL STRIP: NEGATIVE
LIPASE SERPL-CCNC: 10 U/L (ref 11–82)
LYMPHOCYTES # BLD AUTO: 1.9 THOUSANDS/ΜL (ref 0.6–4.47)
LYMPHOCYTES NFR BLD AUTO: 19 % (ref 21–51)
MCH RBC QN AUTO: 30.8 PG (ref 26–34)
MCHC RBC AUTO-ENTMCNC: 33.4 G/DL (ref 31–37)
MCV RBC AUTO: 92 FL (ref 81–99)
MONOCYTES # BLD AUTO: 0.6 THOUSAND/ΜL (ref 0.17–1.22)
MONOCYTES NFR BLD AUTO: 6 % (ref 2–12)
NEUTROPHILS # BLD AUTO: 7.3 THOUSANDS/ΜL (ref 1.4–6.5)
NEUTS SEG NFR BLD AUTO: 73 % (ref 42–75)
NITRITE UR QL STRIP: NEGATIVE
NRBC BLD AUTO-RTO: 0 /100 WBCS
PH UR STRIP.AUTO: 5.5 [PH] (ref 5–8)
PLATELET # BLD AUTO: 498 THOUSANDS/UL (ref 149–390)
PMV BLD AUTO: 7.2 FL (ref 8.6–11.7)
POTASSIUM SERPL-SCNC: 3.7 MMOL/L (ref 3.5–5.5)
PROT SERPL-MCNC: 8.1 G/DL (ref 6.4–8.9)
PROT UR STRIP-MCNC: NEGATIVE MG/DL
PROTHROMBIN TIME: 12.8 SECONDS (ref 10.2–13)
RBC # BLD AUTO: 5.05 MILLION/UL (ref 4.3–5.9)
SL AMB  POCT GLUCOSE, UA: ABNORMAL
SL AMB LEUKOCYTE ESTERASE,UA: ABNORMAL
SL AMB POCT BILIRUBIN,UA: ABNORMAL
SL AMB POCT BLOOD,UA: ABNORMAL
SL AMB POCT CLARITY,UA: CLEAR
SL AMB POCT COLOR,UA: YELLOW
SL AMB POCT KETONES,UA: ABNORMAL
SL AMB POCT NITRITE,UA: ABNORMAL
SL AMB POCT PH,UA: 6
SL AMB POCT SPECIFIC GRAVITY,UA: 1.01
SL AMB POCT URINE PROTEIN: ABNORMAL
SL AMB POCT UROBILINOGEN: 0.2
SODIUM SERPL-SCNC: 136 MMOL/L (ref 134–143)
SP GR UR STRIP.AUTO: 1.02 (ref 1–1.03)
UROBILINOGEN UR QL STRIP.AUTO: 0.2 E.U./DL
WBC # BLD AUTO: 10 THOUSAND/UL (ref 4.8–10.8)

## 2018-12-12 PROCEDURE — 36415 COLL VENOUS BLD VENIPUNCTURE: CPT | Performed by: PHYSICIAN ASSISTANT

## 2018-12-12 PROCEDURE — 81003 URINALYSIS AUTO W/O SCOPE: CPT | Performed by: PHYSICIAN ASSISTANT

## 2018-12-12 PROCEDURE — 85730 THROMBOPLASTIN TIME PARTIAL: CPT | Performed by: PHYSICIAN ASSISTANT

## 2018-12-12 PROCEDURE — 81002 URINALYSIS NONAUTO W/O SCOPE: CPT | Performed by: FAMILY MEDICINE

## 2018-12-12 PROCEDURE — 3008F BODY MASS INDEX DOCD: CPT | Performed by: FAMILY MEDICINE

## 2018-12-12 PROCEDURE — 74177 CT ABD & PELVIS W/CONTRAST: CPT

## 2018-12-12 PROCEDURE — 85025 COMPLETE CBC W/AUTO DIFF WBC: CPT | Performed by: PHYSICIAN ASSISTANT

## 2018-12-12 PROCEDURE — 85610 PROTHROMBIN TIME: CPT | Performed by: PHYSICIAN ASSISTANT

## 2018-12-12 PROCEDURE — 99284 EMERGENCY DEPT VISIT MOD MDM: CPT

## 2018-12-12 PROCEDURE — 96361 HYDRATE IV INFUSION ADD-ON: CPT

## 2018-12-12 PROCEDURE — 99214 OFFICE O/P EST MOD 30 MIN: CPT | Performed by: FAMILY MEDICINE

## 2018-12-12 PROCEDURE — 96360 HYDRATION IV INFUSION INIT: CPT

## 2018-12-12 PROCEDURE — 80053 COMPREHEN METABOLIC PANEL: CPT | Performed by: PHYSICIAN ASSISTANT

## 2018-12-12 PROCEDURE — 83690 ASSAY OF LIPASE: CPT | Performed by: PHYSICIAN ASSISTANT

## 2018-12-12 RX ORDER — SUCRALFATE ORAL 1 G/10ML
1 SUSPENSION ORAL
Qty: 420 ML | Refills: 1 | Status: SHIPPED | OUTPATIENT
Start: 2018-12-12 | End: 2019-01-29

## 2018-12-12 RX ORDER — FAMOTIDINE 40 MG/1
20 TABLET, FILM COATED ORAL 2 TIMES DAILY
Qty: 30 TABLET | Refills: 0 | Status: SHIPPED | OUTPATIENT
Start: 2018-12-12 | End: 2019-09-18 | Stop reason: ALTCHOICE

## 2018-12-12 RX ORDER — SULFAMETHOXAZOLE AND TRIMETHOPRIM 800; 160 MG/1; MG/1
1 TABLET ORAL EVERY 12 HOURS SCHEDULED
Qty: 10 TABLET | Refills: 0 | Status: SHIPPED | OUTPATIENT
Start: 2018-12-12 | End: 2018-12-17

## 2018-12-12 RX ORDER — SUCRALFATE ORAL 1 G/10ML
1000 SUSPENSION ORAL ONCE
Status: COMPLETED | OUTPATIENT
Start: 2018-12-12 | End: 2018-12-12

## 2018-12-12 RX ORDER — FAMOTIDINE 20 MG/1
40 TABLET, FILM COATED ORAL 2 TIMES DAILY
Status: DISCONTINUED | OUTPATIENT
Start: 2018-12-12 | End: 2018-12-12 | Stop reason: HOSPADM

## 2018-12-12 RX ADMIN — SUCRALFATE 1000 MG: 1 SUSPENSION ORAL at 14:19

## 2018-12-12 RX ADMIN — SODIUM CHLORIDE 1000 ML: 0.9 INJECTION, SOLUTION INTRAVENOUS at 14:19

## 2018-12-12 RX ADMIN — IOHEXOL 50 ML: 240 INJECTION, SOLUTION INTRATHECAL; INTRAVASCULAR; INTRAVENOUS; ORAL at 14:00

## 2018-12-12 RX ADMIN — IOHEXOL 80 ML: 350 INJECTION, SOLUTION INTRAVENOUS at 16:00

## 2018-12-12 RX ADMIN — FAMOTIDINE 40 MG: 20 TABLET, FILM COATED ORAL at 17:29

## 2018-12-12 NOTE — DISCHARGE INSTRUCTIONS
Peptic Ulcer   WHAT YOU NEED TO KNOW:   A peptic ulcer is an open sore in the lining of your stomach, intestine, or esophagus  Peptic ulcers have different names, depending on their location  Gastric ulcers are peptic ulcers in the stomach  Duodenal ulcers are peptic ulcers in the intestine  A peptic ulcer in the esophagus is called an esophageal ulcer  Peptic ulcers may be a short-term or long-term problem  DISCHARGE INSTRUCTIONS:   Medicines:   · Medicines  that decrease the amount of acid made by your stomach may be given  You may also need medicines that protect your stomach lining from acid and antibiotics to treat H  pylori infection  · Take your medicine as directed  Contact your healthcare provider if you think your medicine is not helping or if you have side effects  Tell him or her if you are allergic to any medicine  Keep a list of the medicines, vitamins, and herbs you take  Include the amounts, and when and why you take them  Bring the list or the pill bottles to follow-up visits  Carry your medicine list with you in case of an emergency  Follow up with your healthcare provider as directed:  Write down your questions so you remember to ask them during your visits  Nutrition:   · Avoid carbonated drinks, alcohol, and caffeine  Caffeine is found in some coffees, teas, and sodas  It is also found in chocolate  · Do not eat foods that upset your stomach  These include spicy or acidic foods, such as oranges  · Eat small meals more often rather than big meals  An empty stomach may make your symptoms worse  Quit smoking:  If you smoke, it is never too late to quit  Smoking increases your risk of developing ulcers  Ask your healthcare provider for information if you need help quitting  Contact your healthcare provider if:   · You have a fever  · You have diarrhea or constipation  · Your stomach pain does not go away or gets worse after you take medicine      · You have questions or concerns about your condition or care  Return to the emergency department if:   · You have a fast heartbeat, fast breathing, or are too dizzy or weak to stand up  · You have severe pain in your stomach  · Your vomit looks like coffee grounds or has blood in it  · Your bowel movements are bloody or black  · You have sudden shortness of breath  © 2017 [employer] Information is for End User's use only and may not be sold, redistributed or otherwise used for commercial purposes  All illustrations and images included in CareNotes® are the copyrighted property of [employer]  or [employer]  The above information is an  only  It is not intended as medical advice for individual conditions or treatments  Talk to your doctor, nurse or pharmacist before following any medical regimen to see if it is safe and effective for you

## 2018-12-12 NOTE — PROGRESS NOTES
Assessment/Plan:    No problem-specific Assessment & Plan notes found for this encounter  Diagnoses and all orders for this visit:    Abnormal weight loss  -     CBC and differential; Future  -     Comprehensive metabolic panel; Future    Generalized abdominal pain  -     CBC and differential; Future  -     Comprehensive metabolic panel; Future    Status post bariatric surgery  -     CBC and differential; Future  -     Comprehensive metabolic panel; Future    Infective urethritis  -     sulfamethoxazole-trimethoprim (BACTRIM DS) 800-160 mg per tablet; Take 1 tablet by mouth every 12 (twelve) hours for 5 days          Subjective:      Patient ID: Samm Champion is a 63 y o  male  Follow up for abnormal weight loss, pt has lost more weight since last visit, pt had bariatric surgery over a year ago, pt had a negative CT of the abdomen and pelvis this year, pt had a negative CT of the chest and was declared cancer free by his oncologist, pt complains today of sharp abdominal pain that is diffuse, it started 3 weeks ago, worse with food, pt has tried no medication for this, pt complains of dark urine but no dysuria        The following portions of the patient's history were reviewed and updated as appropriate: allergies, current medications, past family history, past medical history, past social history, past surgical history and problem list     Review of Systems   Constitutional: Negative for chills, fatigue and fever  Respiratory: Negative for shortness of breath and wheezing  Cardiovascular: Negative for chest pain and palpitations  Gastrointestinal: Positive for abdominal pain  Negative for blood in stool, constipation, diarrhea, nausea and vomiting  Objective:      /78   Pulse 82   Temp 97 5 °F (36 4 °C) (Tympanic)   Ht 5' 8" (1 727 m)   Wt 61 7 kg (136 lb)   SpO2 98%   BMI 20 68 kg/m²          Physical Exam   Constitutional: He is oriented to person, place, and time   He appears well-developed and well-nourished  No distress  HENT:   Head: Normocephalic and atraumatic  Eyes: Pupils are equal, round, and reactive to light  Conjunctivae and EOM are normal  No scleral icterus  Neck: Normal range of motion  Neck supple  Cardiovascular: Normal rate, regular rhythm and normal heart sounds  No murmur heard  Pulmonary/Chest: Effort normal and breath sounds normal  No respiratory distress  He has no wheezes  He has no rales  Abdominal: Soft  Bowel sounds are normal  He exhibits no distension and no mass  There is tenderness  There is guarding  There is no rebound  Musculoskeletal: He exhibits no edema  Lymphadenopathy:     He has no cervical adenopathy  Neurological: He is alert and oriented to person, place, and time  He exhibits normal muscle tone  Skin: Skin is warm and dry  No rash noted  He is not diaphoretic  No erythema  No pallor  Psychiatric: He has a normal mood and affect  His behavior is normal  Judgment and thought content normal    Nursing note and vitals reviewed

## 2018-12-12 NOTE — ED PROVIDER NOTES
History  Chief Complaint   Patient presents with    Abdominal Pain     lower abdominal pain for the past 3 weeks  patient denies N,V,D     Patient is a 52-year-old male status post gastric bypass Britney-en-Y approximately 1 year ago who has been having intermittent abdominal pain over the last 2 weeks was seen at his family [de-identified] office this morning told he had a urinary tract infection per the UA in the office but they wanted to come to the emergency department for further evaluation due to the abnormal excessive tenderness on his abdominal exam   Patient states that the pain is exacerbated by food nothing really makes it better he denies fevers or chills nausea vomiting or diarrhea he states he did have 1 or 2 episodes over the last couple days of a bright red blood in his stool no rectal pain  Prior to Admission Medications   Prescriptions Last Dose Informant Patient Reported? Taking?    DULoxetine (CYMBALTA) 60 mg delayed release capsule  Self Yes No   Magnesium 400 MG CAPS  Self Yes No   Sig: Take 1 tablet by mouth daily   ONETOUCH DELICA LANCETS FINE MISC  Self No No   Sig: Use 2-3 times per day as needed   ONETOUCH VERIO test strip  Self No No   Sig: Test 2-3 times daily as instructed   Protein POWD  Self Yes No   Sig: Take by mouth   acetaminophen (TYLENOL 8 HOUR) 650 mg CR tablet   No No   Sig: Take 1 tablet (650 mg total) by mouth every 8 (eight) hours   albuterol (PROVENTIL HFA,VENTOLIN HFA) 90 mcg/act inhaler  Self Yes No   Sig: Inhale 2 puffs every 6 (six) hours as needed for wheezing   atorvastatin (LIPITOR) 40 mg tablet  Self Yes No   Sig: Take 80 mg by mouth daily     diclofenac sodium (VOLTAREN) 1 %  Self Yes No   Sig: Place on the skin   ergocalciferol (VITAMIN D2) 50,000 units  Self No No   Sig: Take 1 capsule (50,000 Units total) by mouth once a week for 12 doses   gabapentin, once-daily, (GRALISE) 300 MG tablet  Self Yes No   Sig: Take 1 tablet by mouth 3 (three) times a day megestrol (MEGACE) 40 MG/ML suspension  Self No No   Sig: Take 5 mL (200 mg total) by mouth daily   naproxen sodium (ALEVE) 220 MG tablet   No No   Sig: Take 1 tablet (220 mg total) by mouth 2 (two) times a day with meals for 5 days   oxyCODONE-acetaminophen (PERCOCET) 5-325 mg per tablet   No No   Si tab by  Mouth every 6 hours as needed for pain   Patient not taking: Reported on 2018    sulfamethoxazole-trimethoprim (BACTRIM DS) 800-160 mg per tablet   No No   Sig: Take 1 tablet by mouth every 12 (twelve) hours for 5 days      Facility-Administered Medications: None       Past Medical History:   Diagnosis Date    Anesthesia     Pt wakes up during "almost every surgery"    Arthritis     Asthma     Cervical radiculopathy     Chemotherapy follow-up examination     Chronic pain     COPD (chronic obstructive pulmonary disease) ([address] )     Diabetes mellitus ([address] )     borderline "years ago"    Food allergy     clams    GERD (gastroesophageal reflux disease)     Heart murmur     Hiatal hernia     History of malignant neoplasm     History of pneumonia 2016    History of pulmonary embolism     History of ulceration     Hyperlipidemia     Lung cancer (HCC)     left lung    Migraine     Postgastrectomy malabsorption     Right scapholunate ligament tear     Skipped heart beats     Wears partial dentures     upper and lower       Past Surgical History:   Procedure Laterality Date    BRONCHOSCOPY      COLONOSCOPY      FRACTURE SURGERY      femur right 1985    GASTRIC BYPASS LAPAROSCOPIC N/A 2017    Procedure: GASTRIC DIVERSION WITH [place]-EN-Y RECONSTRUCTION WITH  SHORT 60 cm LIMB;  Surgeon: Michelle Bazan MD;  Location[facility];  Service: Bariatrics    KNEE ARTHROSCOPY Right     right shoulder    LUNG LOBECTOMY Left     LYMPHADENECTOMY  2012    Dr Victorina Diez ANT INTERBODY MIN DISCECTOMY, CERVICAL BELOW C2 N/A 10/17/2017    Procedure: C5-6, C6-7 ACDF WITH ALLOGRAFT (NEURO MONITORING); Surgeon: Booker John MD;  [facility];  Service: Orthopedics    MA COLONOSCOPY FLX DX W/COLLJ SPEC WHEN PFRMD N/A 4/14/2017    Procedure: COLONOSCOPY;  Surgeon: Luciano Yanez MD;  Location: [facility];  Service: Gastroenterology    MA ESOPHAGOGASTRODUODENOSCOPY TRANSORAL DIAGNOSTIC N/A 1/11/2017    Procedure: ESOPHAGOGASTRODUODENOSCOPY (EGD); Surgeon: Luciano Yanez MD;  Location: [facility]; Service: Gastroenterology    MA INCISE FINGER TENDON SHEATH Right 11/27/2018    Procedure: RELEASE TRIGGER FINGER long and index finger;  Surgeon: Fidel Wesley MD;  [facility];  Service: Orthopedics    MA LAP, REPAIR PARAESOPHAGEAL HERNIA, INCL FUNDOPLASTY W/ MESH N/A 7/12/2017    Procedure: REPAIR HERNIA PARAESOPHAGEAL  LAPAROSCOPIC;  Surgeon: Graham Stevenson MD;  [facility];  Service: Malick Jonathan LIG Right 6/9/2016    Procedure: RELEASE CARPAL TUNNEL ENDOSCOPIC;  Surgeon: Fidel Wesley MD;  [facility];  Service: Orthopedics    RECONSTRUCTION DISTAL RADIUS/ULNA JOINT (DRUJ) Right 9/6/2016    Procedure: WRIST SCAPHOLUNATE LIGAMENT RECONSTRUCTION WITH ECRB TENDON AUTOGRAPH , SPLINT APPLICATION ;  Surgeon: Fidel Wesley MD;  [facility];  Service:    [employer] SHOULDER SURGERY      TONSILLECTOMY         Family History   Problem Relation Age of Onset    Other Mother         Back disorder    Diabetes Mother         Diabetes Mellitus    Hypertension Mother     Heart disease Father     Hypertension Father     Breast cancer Sister     Skin cancer Sister     Breast cancer Paternal Grandmother     Skin cancer Paternal Grandmother      I have reviewed and agree with the history as documented      Social History   Substance Use Topics    Smoking status: Current Some Day Smoker     Packs/day: 0 50     Years: 48 00     Types: Cigarettes Last attempt to quit: 5/1/2017    Smokeless tobacco: Never Used    Alcohol use Yes      Comment: occasional         Review of Systems   Constitutional: Negative for chills, diaphoresis, fatigue and fever  HENT: Negative for congestion, ear pain, rhinorrhea, sneezing and sore throat  Respiratory: Negative for cough, shortness of breath, wheezing and stridor  Cardiovascular: Negative for chest pain, palpitations and leg swelling  Gastrointestinal: Positive for abdominal pain and blood in stool  Negative for abdominal distention, constipation, diarrhea, nausea and vomiting  Genitourinary: Negative for difficulty urinating, dysuria, frequency, hematuria and urgency  Musculoskeletal: Negative for gait problem, myalgias and neck pain  Skin: Negative for rash  Neurological: Negative for dizziness, syncope, weakness, light-headedness and headaches  All other systems reviewed and are negative  Physical Exam  Physical Exam   Constitutional: He is oriented to person, place, and time  He appears well-developed and well-nourished  No distress  HENT:   Head: Normocephalic and atraumatic  Right Ear: External ear normal    Left Ear: External ear normal    Nose: Nose normal    Mouth/Throat: Oropharynx is clear and moist    Eyes: Pupils are equal, round, and reactive to light  Conjunctivae and EOM are normal    Neck: Normal range of motion  Neck supple  No thyromegaly present  Cardiovascular: Normal rate, regular rhythm and normal heart sounds  Exam reveals no gallop and no friction rub  No murmur heard  Pulmonary/Chest: Effort normal and breath sounds normal  No stridor  Abdominal: Soft  Bowel sounds are normal  He exhibits no distension and no mass  There is tenderness  There is no rebound and no guarding  No hernia  Moderate generalized abdominal tenderness with involuntary guarding  Musculoskeletal: He exhibits no edema, tenderness or deformity     Lymphadenopathy:     He has no cervical adenopathy  Neurological: He is alert and oriented to person, place, and time  Skin: He is not diaphoretic  Psychiatric: He has a normal mood and affect  His behavior is normal    Nursing note and vitals reviewed  Vital Signs  ED Triage Vitals [12/12/18 1328]   Temperature Pulse Respirations Blood Pressure SpO2   97 9 °F (36 6 °C) 95 16 113/76 100 %      Temp Source Heart Rate Source Patient Position - Orthostatic VS BP Location FiO2 (%)   Temporal Monitor Sitting Left arm --      Pain Score       8           Vitals:    12/12/18 1328   BP: 113/76   Pulse: 95   Patient Position - Orthostatic VS: Sitting       Visual Acuity      ED Medications  Medications   famotidine (PEPCID) tablet 40 mg (40 mg Oral Given 12/12/18 1729)   sodium chloride 0 9 % bolus 1,000 mL (0 mL Intravenous Stopped 12/12/18 1737)   sucralfate (CARAFATE) oral suspension 1,000 mg (1,000 mg Oral Given 12/12/18 1419)   iohexol (OMNIPAQUE) 350 MG/ML injection (MULTI-DOSE) 80 mL (80 mL Intravenous Given 12/12/18 1600)   iohexol (OMNIPAQUE) 240 MG/ML solution 50 mL (50 mL Oral Given 12/12/18 1400)       Diagnostic Studies  Results Reviewed     Procedure Component Value Units Date/Time    Comprehensive metabolic panel [[id number]]  (Abnormal) Collected:  12/12/18 1416    Lab Status:  Final result Specimen:  Blood from Arm, Left Updated:  12/12/18 1456     Sodium 136 mmol/L      Potassium 3 7 mmol/L      Chloride 102 mmol/L      CO2 24 mmol/L      ANION GAP 10 mmol/L      BUN 12 mg/dL      Creatinine 0 82 mg/dL      Glucose 84 mg/dL      Calcium 10 1 mg/dL      AST 11 (L) U/L      ALT 9 U/L      Alkaline Phosphatase 83 U/L      Total Protein 8 1 g/dL      Albumin 5 1 g/dL      Total Bilirubin 0 60 mg/dL      eGFR 98 ml/min/1 73sq m     Narrative:         National Kidney Disease Education Program recommendations are as follows:  GFR calculation is accurate only with a steady state creatinine  Chronic Kidney disease less than 60 ml/min/1 73 sq  meters  Kidney failure less than 15 ml/min/1 73 sq  meters      Lipase [[id number]]  (Abnormal) Collected:  12/12/18 1416    Lab Status:  Final result Specimen:  Blood from Arm, Left Updated:  12/12/18 1456     Lipase 10 (L) u/L     APTT [[id number]]  (Normal) Collected:  12/12/18 1416    Lab Status:  Final result Specimen:  Blood from Arm, Left Updated:  12/12/18 1453     PTT 33 seconds     Protime-INR [[id number]]  (Normal) Collected:  12/12/18 1416    Lab Status:  Final result Specimen:  Blood from Arm, Left Updated:  12/12/18 1453     Protime 12 8 seconds      INR 1 10    UA w Reflex to Microscopic w Reflex to Culture [[id number]]  (Normal) Collected:  12/12/18 1425    Lab Status:  Final result Specimen:  Urine from Urine, Clean Catch Updated:  12/12/18 1440     Color, UA Yellow     Clarity, UA Clear     Specific Gravity, UA 1 025     pH, UA 5 5     Leukocytes, UA Negative     Nitrite, UA Negative     Protein, UA Negative mg/dl      Glucose, UA Negative mg/dl      Ketones, UA Negative mg/dl      Urobilinogen, UA 0 2 E U /dl      Bilirubin, UA Negative     Blood, UA Negative    CBC and differential [[id number]]  (Abnormal) Collected:  12/12/18 1416    Lab Status:  Final result Specimen:  Blood from Arm, Left Updated:  12/12/18 1438     WBC 10 00 Thousand/uL      RBC 5 05 Million/uL      Hemoglobin 15 5 g/dL      Hematocrit 46 6 %      MCV 92 fL      MCH 30 8 pg      MCHC 33 4 g/dL      RDW 12 9 %      MPV 7 2 (L) fL      Platelets 116 (H) Thousands/uL      nRBC 0 /100 WBCs      Neutrophils Relative 73 %      Lymphocytes Relative 19 (L) %      Monocytes Relative 6 %      Eosinophils Relative 0 %      Basophils Relative 1 %      Neutrophils Absolute 7 30 (H) Thousands/µL      Lymphocytes Absolute 1 90 Thousands/µL      Monocytes Absolute 0 60 Thousand/µL      Eosinophils Absolute 0 00 Thousand/µL      Basophils Absolute 0 10 Thousands/µL                  CT abdomen pelvis with contrast   Final Result by Mliss Prader (12/12 1653)   Postsurgical changes of gastric bypass  There is wall thickening of the   excluded stomach, gastric pouch and proximal Britney limb  Suspect a 1 4 cm   ulcer along the posterior wall of the proximal Britney limb just distal to   the anastomosis seen best on image 15 with mild surrounding inflammatory   change  No extraluminal gas or abscess  Recommend further evaluation   with endoscopy  Signed by Alejandro Erickson MD                 Procedures  Procedures       Phone Contacts  ED Phone Contact    ED Course                               MDM  Number of Diagnoses or Management Options  Diagnosis management comments: Spoke w/ Dr Jhon Kussmaul in regards to CT findings and states that pt can be treated as an out pt w/ pepcid and sucralfate and f/u up in clinic next week to schedule upper endoscopy  Patient's labs are otherwise reassuring has got a white count he has been afebrile no nausea or vomiting pain is slightly improved with Carafate  Return precautions and anticipatory guidance was discussed with patient she verbalized understanding  CritCare Time    Disposition  Final diagnoses:   Gastric ulcer     Time reflects when diagnosis was documented in both MDM as applicable and the Disposition within this note     Time User Action Codes Description Comment    12/12/2018  5:19 PM Zakia Miguel [K25 9] Gastric ulcer       ED Disposition     ED Disposition Condition Comment    Discharge  [facility] discharge to home/self care      Condition at discharge: Good        Follow-up Information     Follow up With Specialties Details Why Mile Osorio MD General Surgery, [address] [place] [address] In 3 days  [address] [facility] [address] [facility]  [phone]            Discharge Medication List as of 12/12/2018  5:21 PM      START taking these medications    Details   famotidine (PEPCID) 40 MG tablet Take 0 5 tablets (20 mg total) by mouth 2 (two) times a day, Starting Wed 12/12/2018, Print sucralfate (CARAFATE) 1 g/10 mL suspension Take 10 mL (1 g total) by mouth 4 (four) times a day (with meals and at bedtime), Starting Wed 12/12/2018, Print         CONTINUE these medications which have NOT CHANGED    Details   acetaminophen (TYLENOL 8 HOUR) 650 mg CR tablet Take 1 tablet (650 mg total) by mouth every 8 (eight) hours, Starting Tue 11/27/2018, Normal      albuterol (PROVENTIL HFA,VENTOLIN HFA) 90 mcg/act inhaler Inhale 2 puffs every 6 (six) hours as needed for wheezing, Historical Med      atorvastatin (LIPITOR) 40 mg tablet Take 80 mg by mouth daily  , Historical Med      diclofenac sodium (VOLTAREN) 1 % Place on the skin, Starting Fri 10/21/2016, Historical Med      DULoxetine (CYMBALTA) 60 mg delayed release capsule Starting Tue 2/20/2018, Historical Med      ergocalciferol (VITAMIN D2) 50,000 units Take 1 capsule (50,000 Units total) by mouth once a week for 12 doses, Starting Tue 9/4/2018, Until Wed 12/12/2018, Normal      gabapentin, once-daily, (GRALISE) 300 MG tablet Take 1 tablet by mouth 3 (three) times a day, Historical Med      Magnesium 400 MG CAPS Take 1 tablet by mouth daily, Starting Wed 11/23/2016, Historical Med      megestrol (MEGACE) 40 MG/ML suspension Take 5 mL (200 mg total) by mouth daily, Starting Tue 10/2/2018, Normal      naproxen sodium (ALEVE) 220 MG tablet Take 1 tablet (220 mg total) by mouth 2 (two) times a day with meals for 5 days, Starting Tue 11/27/2018, Until Sun 12/2/2018, Normal      ONETOUCH DELICA LANCETS FINE MISC Use 2-3 times per day as needed, Normal      ONETOUCH VERIO test strip Test 2-3 times daily as instructed, Normal      oxyCODONE-acetaminophen (PERCOCET) 5-325 mg per tablet 1 tab by  Mouth every 6 hours as needed for pain, Normal      Protein POWD Take by mouth, Historical Med      sulfamethoxazole-trimethoprim (BACTRIM DS) 800-160 mg per tablet Take 1 tablet by mouth every 12 (twelve) hours for 5 days, Starting Wed 12/12/2018, Until Mon 12/17/2018, Normal           No discharge procedures on file      ED Provider  Electronically Signed by           Dwain Toure PA-C  12/12/18 0357

## 2018-12-13 ENCOUNTER — APPOINTMENT (OUTPATIENT)
Dept: LAB | Facility: CLINIC | Age: 58
End: 2018-12-13
Payer: COMMERCIAL

## 2018-12-13 DIAGNOSIS — R63.4 ABNORMAL WEIGHT LOSS: ICD-10-CM

## 2018-12-13 DIAGNOSIS — R10.84 GENERALIZED ABDOMINAL PAIN: ICD-10-CM

## 2018-12-13 DIAGNOSIS — Z98.84 STATUS POST BARIATRIC SURGERY: ICD-10-CM

## 2018-12-13 LAB
ALBUMIN SERPL BCP-MCNC: 4 G/DL (ref 3.5–5)
ALP SERPL-CCNC: 95 U/L (ref 46–116)
ALT SERPL W P-5'-P-CCNC: 15 U/L (ref 12–78)
ANION GAP SERPL CALCULATED.3IONS-SCNC: 8 MMOL/L (ref 4–13)
AST SERPL W P-5'-P-CCNC: 8 U/L (ref 5–45)
BASOPHILS # BLD AUTO: 0.07 THOUSANDS/ΜL (ref 0–0.1)
BASOPHILS NFR BLD AUTO: 1 % (ref 0–1)
BILIRUB SERPL-MCNC: 0.7 MG/DL (ref 0.2–1)
BUN SERPL-MCNC: 12 MG/DL (ref 5–25)
CALCIUM SERPL-MCNC: 9.9 MG/DL (ref 8.3–10.1)
CHLORIDE SERPL-SCNC: 105 MMOL/L (ref 100–108)
CO2 SERPL-SCNC: 24 MMOL/L (ref 21–32)
CREAT SERPL-MCNC: 0.9 MG/DL (ref 0.6–1.3)
EOSINOPHIL # BLD AUTO: 0.06 THOUSAND/ΜL (ref 0–0.61)
EOSINOPHIL NFR BLD AUTO: 1 % (ref 0–6)
ERYTHROCYTE [DISTWIDTH] IN BLOOD BY AUTOMATED COUNT: 12.5 % (ref 11.6–15.1)
GFR SERPL CREATININE-BSD FRML MDRD: 94 ML/MIN/1.73SQ M
GLUCOSE P FAST SERPL-MCNC: 104 MG/DL (ref 65–99)
HCT VFR BLD AUTO: 45.3 % (ref 36.5–49.3)
HGB BLD-MCNC: 14.9 G/DL (ref 12–17)
IMM GRANULOCYTES # BLD AUTO: 0.02 THOUSAND/UL (ref 0–0.2)
IMM GRANULOCYTES NFR BLD AUTO: 0 % (ref 0–2)
LYMPHOCYTES # BLD AUTO: 2.18 THOUSANDS/ΜL (ref 0.6–4.47)
LYMPHOCYTES NFR BLD AUTO: 25 % (ref 14–44)
MCH RBC QN AUTO: 30.8 PG (ref 26.8–34.3)
MCHC RBC AUTO-ENTMCNC: 32.9 G/DL (ref 31.4–37.4)
MCV RBC AUTO: 94 FL (ref 82–98)
MONOCYTES # BLD AUTO: 0.6 THOUSAND/ΜL (ref 0.17–1.22)
MONOCYTES NFR BLD AUTO: 7 % (ref 4–12)
NEUTROPHILS # BLD AUTO: 5.85 THOUSANDS/ΜL (ref 1.85–7.62)
NEUTS SEG NFR BLD AUTO: 66 % (ref 43–75)
NRBC BLD AUTO-RTO: 0 /100 WBCS
PLATELET # BLD AUTO: 469 THOUSANDS/UL (ref 149–390)
PMV BLD AUTO: 9 FL (ref 8.9–12.7)
POTASSIUM SERPL-SCNC: 4.2 MMOL/L (ref 3.5–5.3)
PROT SERPL-MCNC: 7.9 G/DL (ref 6.4–8.2)
RBC # BLD AUTO: 4.83 MILLION/UL (ref 3.88–5.62)
SODIUM SERPL-SCNC: 137 MMOL/L (ref 136–145)
WBC # BLD AUTO: 8.78 THOUSAND/UL (ref 4.31–10.16)

## 2018-12-13 PROCEDURE — 36415 COLL VENOUS BLD VENIPUNCTURE: CPT

## 2018-12-13 PROCEDURE — 85025 COMPLETE CBC W/AUTO DIFF WBC: CPT

## 2018-12-13 PROCEDURE — 80053 COMPREHEN METABOLIC PANEL: CPT

## 2018-12-18 ENCOUNTER — OFFICE VISIT (OUTPATIENT)
Dept: FAMILY MEDICINE CLINIC | Facility: CLINIC | Age: 58
End: 2018-12-18
Payer: COMMERCIAL

## 2018-12-18 VITALS
HEIGHT: 68 IN | WEIGHT: 137 LBS | BODY MASS INDEX: 20.76 KG/M2 | SYSTOLIC BLOOD PRESSURE: 122 MMHG | DIASTOLIC BLOOD PRESSURE: 80 MMHG | TEMPERATURE: 97.1 F

## 2018-12-18 DIAGNOSIS — K25.7 CHRONIC GASTRIC ULCER WITHOUT HEMORRHAGE AND WITHOUT PERFORATION: Primary | ICD-10-CM

## 2018-12-18 PROCEDURE — 99213 OFFICE O/P EST LOW 20 MIN: CPT | Performed by: FAMILY MEDICINE

## 2018-12-18 PROCEDURE — 3008F BODY MASS INDEX DOCD: CPT | Performed by: FAMILY MEDICINE

## 2018-12-18 NOTE — PROGRESS NOTES
Assessment/Plan:    No problem-specific Assessment & Plan notes found for this encounter  Diagnoses and all orders for this visit:    Chronic gastric ulcer without hemorrhage and without perforation  Comments:  keep follow up for EGD and surgeon          Subjective:      Patient ID: Davide Martinez is a 63 y o  male  Follow up for ER, pt had severe abdominal pain pt was found to have an ulcer, pt was placed on pepcid and carafate and is doing much better, pt has a history of gastrric sleeve surgery and will be seeing his surgeon next month, pt will have an EGD on January 9th        The following portions of the patient's history were reviewed and updated as appropriate: allergies, current medications, past family history, past medical history, past social history, past surgical history and problem list     Review of Systems   Constitutional: Negative for chills, fever and unexpected weight change  Respiratory: Negative for shortness of breath and wheezing  Cardiovascular: Negative for chest pain and palpitations  Gastrointestinal: Positive for abdominal pain  Negative for blood in stool, constipation, diarrhea, nausea and vomiting  Objective:      /80 (BP Location: Left arm, Patient Position: Sitting, Cuff Size: Adult)   Temp (!) 97 1 °F (36 2 °C) (Tympanic)   Ht 5' 8" (1 727 m)   Wt 62 1 kg (137 lb)   BMI 20 83 kg/m²          Physical Exam   Constitutional: He is oriented to person, place, and time  He appears well-developed and well-nourished  No distress  HENT:   Head: Normocephalic and atraumatic  Eyes: Pupils are equal, round, and reactive to light  Conjunctivae and EOM are normal  No scleral icterus  Neck: Normal range of motion  Neck supple  Cardiovascular: Normal rate, regular rhythm and normal heart sounds  No murmur heard  Pulmonary/Chest: Effort normal and breath sounds normal  No respiratory distress  He has no wheezes  He has no rales  Abdominal: Soft   Bowel sounds are normal  He exhibits no distension and no mass  There is no tenderness  There is no rebound and no guarding  Musculoskeletal: He exhibits no edema  Lymphadenopathy:     He has no cervical adenopathy  Neurological: He is alert and oriented to person, place, and time  He exhibits normal muscle tone  Skin: Skin is warm and dry  No rash noted  He is not diaphoretic  No erythema  No pallor  Psychiatric: He has a normal mood and affect  His behavior is normal  Judgment and thought content normal    Nursing note and vitals reviewed

## 2019-01-08 ENCOUNTER — ANESTHESIA EVENT (OUTPATIENT)
Dept: GASTROENTEROLOGY | Facility: HOSPITAL | Age: 59
End: 2019-01-08
Payer: COMMERCIAL

## 2019-01-09 ENCOUNTER — ANESTHESIA (OUTPATIENT)
Dept: GASTROENTEROLOGY | Facility: HOSPITAL | Age: 59
End: 2019-01-09
Payer: COMMERCIAL

## 2019-01-09 ENCOUNTER — HOSPITAL ENCOUNTER (OUTPATIENT)
Facility: HOSPITAL | Age: 59
Setting detail: OUTPATIENT SURGERY
Discharge: HOME | End: 2019-01-09
Attending: SURGERY | Admitting: SURGERY
Payer: COMMERCIAL

## 2019-01-09 VITALS
SYSTOLIC BLOOD PRESSURE: 115 MMHG | HEIGHT: 68 IN | RESPIRATION RATE: 12 BRPM | BODY MASS INDEX: 20.61 KG/M2 | TEMPERATURE: 99.7 F | WEIGHT: 136 LBS | DIASTOLIC BLOOD PRESSURE: 70 MMHG | OXYGEN SATURATION: 100 % | HEART RATE: 68 BPM

## 2019-01-09 DIAGNOSIS — Z98.84 BARIATRIC SURGERY STATUS: Primary | ICD-10-CM

## 2019-01-09 LAB — GLUCOSE SERPL-MCNC: 91 MG/DL (ref 65–140)

## 2019-01-09 PROCEDURE — 43235 EGD DIAGNOSTIC BRUSH WASH: CPT | Performed by: SURGERY

## 2019-01-09 PROCEDURE — 82948 REAGENT STRIP/BLOOD GLUCOSE: CPT

## 2019-01-09 RX ORDER — PROPOFOL 10 MG/ML
INJECTION, EMULSION INTRAVENOUS AS NEEDED
Status: DISCONTINUED | OUTPATIENT
Start: 2019-01-09 | End: 2019-01-09 | Stop reason: SURG

## 2019-01-09 RX ORDER — SODIUM CHLORIDE 9 MG/ML
125 INJECTION, SOLUTION INTRAVENOUS CONTINUOUS
Status: DISCONTINUED | OUTPATIENT
Start: 2019-01-09 | End: 2019-01-09 | Stop reason: HOSPADM

## 2019-01-09 RX ORDER — OMEPRAZOLE 20 MG/1
20 TABLET, DELAYED RELEASE ORAL 2 TIMES DAILY
Qty: 90 TABLET | Refills: 6 | Status: SHIPPED | OUTPATIENT
Start: 2019-01-09 | End: 2019-09-18 | Stop reason: ALTCHOICE

## 2019-01-09 RX ADMIN — LIDOCAINE HYDROCHLORIDE 100 MG: 20 INJECTION, SOLUTION INTRAVENOUS at 09:54

## 2019-01-09 RX ADMIN — PROPOFOL 150 MG: 10 INJECTION, EMULSION INTRAVENOUS at 09:57

## 2019-01-09 RX ADMIN — SODIUM CHLORIDE 125 ML/HR: 0.9 INJECTION, SOLUTION INTRAVENOUS at 09:24

## 2019-01-09 NOTE — DISCHARGE INSTRUCTIONS
Peptic Ulcer   WHAT YOU NEED TO KNOW:   A peptic ulcer is an open sore in the lining of your stomach, intestine, or esophagus  Peptic ulcers have different names, depending on their location  Gastric ulcers are peptic ulcers in the stomach  Duodenal ulcers are peptic ulcers in the intestine  A peptic ulcer in the esophagus is called an esophageal ulcer  Peptic ulcers may be a short-term or long-term problem  DISCHARGE INSTRUCTIONS:   Medicines:   · Medicines  that decrease the amount of acid made by your stomach may be given  You may also need medicines that protect your stomach lining from acid and antibiotics to treat H  pylori infection  · Take your medicine as directed  Contact your healthcare provider if you think your medicine is not helping or if you have side effects  Tell him or her if you are allergic to any medicine  Keep a list of the medicines, vitamins, and herbs you take  Include the amounts, and when and why you take them  Bring the list or the pill bottles to follow-up visits  Carry your medicine list with you in case of an emergency  Follow up with your healthcare provider as directed:  Write down your questions so you remember to ask them during your visits  Nutrition:   · Avoid carbonated drinks, alcohol, and caffeine  Caffeine is found in some coffees, teas, and sodas  It is also found in chocolate  · Do not eat foods that upset your stomach  These include spicy or acidic foods, such as oranges  · Eat small meals more often rather than big meals less often  An empty stomach may make your symptoms worse  Quit smoking:  If you smoke, it is never too late to quit  Smoking increases your risk of developing ulcers  Ask your healthcare provider for information if you need help quitting  Contact your healthcare provider if:   · You have a fever  · You have diarrhea or constipation  · Your stomach pain does not go away or gets worse after you take medicine      · You have questions or concerns about your condition or care  Seek care immediately or call 911 if:   · You have a fast heartbeat, fast breathing, or are too dizzy or weak to stand up  · You have severe pain in your stomach  · Your vomit looks like coffee grounds or has blood in it  · Your bowel movements are bloody or black  · You have sudden shortness of breath  © 2017 [address] Information is for End User's use only and may not be sold, redistributed or otherwise used for commercial purposes  All illustrations and images included in CareNotes® are the copyrighted property of A D A M , Inc  or Nicolás Peters  The above information is an  only  It is not intended as medical advice for individual conditions or treatments  Talk to your doctor, nurse or pharmacist before following any medical regimen to see if it is safe and effective for you  Upper Endoscopy   WHAT YOU NEED TO KNOW:   An upper endoscopy is also called an upper gastrointestinal (GI) endoscopy, or an esophagogastroduodenoscopy (EGD)  You may feel bloated, gassy, or have some abdominal discomfort after your procedure  Your throat may be sore for 24 to 36 hours  You may burp or pass gas from air that is still inside your body  DISCHARGE INSTRUCTIONS:   Call 911 for any of the following:   · You have sudden chest pain or trouble breathing  Seek care immediately if:   · You feel dizzy or faint  · You have trouble swallowing  · Your bowel movements are very dark or black  · Your abdomen is hard and firm and you have severe pain  · You vomit blood  Contact your healthcare provider if:   · You feel full or bloated and cannot burp or pass gas  · You have not had a bowel movement for 3 days after your procedure  · You have neck pain  · You have a fever or chills  · You have nausea or are vomiting  · You have a rash or hives      · You have questions or concerns about your endoscopy  Relieve a sore throat:  Suck on throat lozenges or crushed ice  Gargle with a small amount of warm salt water  Mix 1 teaspoon of salt and 1 cup of warm water to make salt water  Relieve gas and discomfort from bloating:  Lie on your right side with a heating pad on your abdomen  Take short walks to help pass gas  Eat small meals until bloating is relieved  Rest after your procedure: You have been given medicine to relax you  Do not  drive or make important decisions until the day after your procedure  Return to your normal activity as directed  You can usually return to work the day after your procedure  Follow up with your healthcare provider as directed:  Write down your questions so you remember to ask them during your visits  © 2017 [address] is for End User's use only and may not be sold, redistributed or otherwise used for commercial purposes  All illustrations and images included in CareNotes® are the copyrighted property of A D A M , Inc  or [employer]  The above information is an  only  It is not intended as medical advice for individual conditions or treatments  Talk to your doctor, nurse or pharmacist before following any medical regimen to see if it is safe and effective for you  Omeprazole (By mouth)   Omeprazole ([id number])  Treats heartburn, a damaged esophagus, stomach ulcers, and gastroesophageal reflux disease (GERD)  This medicine is a proton pump inhibitor (PPI)  Brand Name(s): First-Omeprazole, [facility] Omeprazole, Good Sense Omeprazole, Leader Omeprazole, Omeclamox-Chaka, PrevidolRx Analgesic [place]aka, PriLOSEC, PriLOSEC OTC, [facility] Choice Omeprazole Magnesium, [facility] Omeprazole, [employer] Omeprazole, TopCare Omeprazole   There may be other brand names for this medicine  When This Medicine Should Not Be Used: This medicine is not right for everyone   Do not use it if you had an allergic reaction to omeprazole or similar medicines  How to Use This Medicine:   Delayed Release Capsule, Packet, Powder for Suspension, Delayed Release Tablet  · Your doctor will tell you how much medicine to use  Do not use more than directed  · This medicine should come with a Medication Guide  Ask your pharmacist for a copy if you do not have one  · Follow the instructions on the medicine label if you are using this medicine without a prescription  If you are using the over-the-counter medicine, do not take it for more than 14 days or use the treatment more often than every 4 months unless your doctor tells you to  · Take this medicine at least 1 hour before a meal and for the full time of treatment, even if you begin to feel better after a few days  · Capsule or tablet:   ¨ Swallow whole  Do not crush or chew it  ¨ If you cannot swallow the capsule, you may open it and pour the medicine into a small amount of applesauce  Stir this mixture well and swallow it without chewing  To help make sure you get the full dose, drink a full glass of water  · Oral packet:   ¨ Mix the contents of a 2 5-milligram (mg) packet with 5 milliliters (mL) of water or mix the contents of a 10-mg packet with 15 mL of water  Do not use other liquids or food  ¨ Stir well  Let the mixture thicken for 2 to 3 minutes  ¨ Stir again and drink the medicine within 30 minutes  ¨ If there is any medicine left, add more water, stir, and drink immediately  § To prepare the oral packet for a feeding tube:   § Add 5 mL of water to a catheter-tipped syringe  Then add the contents of a 2 5-mg packet (or use 15 mL of water for the 10-mg packet)  § Shake the syringe right away  Let the mixture thicken for 2 to 3 minutes  § Shake the syringe once more  Give the medicine through the tube within 30 minutes  § Refill the syringe with an equal amount of water and shake it  Use the water to flush the tube to make sure all the medicine is given    · Missed dose: Take a dose as soon as you remember  If it is almost time for your next dose, wait until then and take a regular dose  Do not take extra medicine to make up for a missed dose  · Store the medicine in a closed container at room temperature, away from heat, moisture, and direct light  Drugs and Foods to Avoid:   Ask your doctor or pharmacist before using any other medicine, including over-the-counter medicines, vitamins, and herbal products  · Do not use omeprazole if you are also using medicines that contain rilpivirine  · Some foods and medicines can affect how omeprazole works  Tell your doctor if you are using any of the following:  ¨ Amoxicillin, atazanavir, cilostazol, citalopram, clarithromycin, clopidogrel, cyclosporine, dasatinib, digoxin, disulfiram, erlotinib, itraconazole, ketoconazole, methotrexate, mycophenolate mofetil, nelfinavir, nilotinib, phenytoin, rifampin, saquinavir, Tami's wort, tacrolimus, voriconazole  ¨ Benzodiazepine medicine (including diazepam)  ¨ Blood thinner (including warfarin)  ¨ Diuretic (water pill)  ¨ Iron supplements  Warnings While Using This Medicine:   · Tell your doctor if you are pregnant or breastfeeding, or if you have liver disease, lupus, or osteoporosis  · This medicine may cause the following problems:   ¨ Kidney problems  ¨ Low vitamin B12 or magnesium levels in the blood  ¨ Increased risk of broken bones in the hip, wrist, or spine  · This medicine can cause diarrhea  Call your doctor if the diarrhea becomes severe, does not stop, or is bloody  Do not take any medicine to stop diarrhea until you have talked to your doctor  Diarrhea can occur 2 months or more after you stop taking this medicine  · Tell any doctor or dentist who treats you that you are using this medicine  This medicine may affect certain medical test results  · Your doctor will check your progress and the effects of this medicine at regular visits  Keep all appointments    · Keep all medicine out of the reach of children  Never share your medicine with anyone  Possible Side Effects While Using This Medicine:   Call your doctor right away if you notice any of these side effects:  · Allergic reaction: Itching or hives, swelling in your face or hands, swelling or tingling in your mouth or throat, chest tightness, trouble breathing  · Blistering, peeling, red skin rash  · Fever, joint pain, swelling in the body, unusual weight gain, change in how much or how often you urinate  · Joint pain, rash on your cheeks or arms that gets worse in the sun  · Seizures, dizziness, uneven heartbeat, muscle cramps or twitching  · Severe diarrhea, stomach cramps, fever  · Stomach pain, nausea, vomiting, weight loss  If you notice these less serious side effects, talk with your doctor:   · Headache  · Mild diarrhea or stomach pain  If you notice other side effects that you think are caused by this medicine, tell your doctor  Call your doctor for medical advice about side effects  You may report side effects to FDA at 3-695-FDA-4392  © 2017 [address]  Information is for End User's use only and may not be sold, redistributed or otherwise used for commercial purposes  The above information is an  only  It is not intended as medical advice for individual conditions or treatments  Talk to your doctor, nurse or pharmacist before following any medical regimen to see if it is safe and effective for you  Cigarette Smoking and Your Health   WHAT YOU NEED TO KNOW:   What are the risks to my health if I smoke tobacco?  Nicotine and other chemicals found in tobacco damage every cell in your body  Even if you are a light smoker, you have an increased risk for cancer, heart disease, and lung disease  If you are pregnant or have diabetes, smoking increases your risk for complications  What are the benefits to my health if I stop smoking?    · You decrease respiratory symptoms such as coughing, wheezing, and shortness of breath  · You reduce your risk for cancers of the lung, mouth, throat, kidney, bladder, pancreas, stomach, and cervix  If you already have cancer, you increase the benefits of chemotherapy  You also reduce your risk for cancer returning or a second cancer from developing  · You reduce your risk for heart disease, blood clots, heart attack, and stroke  · You reduce your risk for lung infections, and diseases such as pneumonia, asthma, chronic bronchitis, and emphysema  · Your circulation improves  More oxygen can be delivered to your body  If you have diabetes, you lower your risk for complications, such as kidney, artery, and eye diseases  You also lower your risk for nerve damage  Nerve damage can lead to amputations, poor vision, and blindness  · You improve your body's ability to heal and to fight infections  What are the health benefits to others if I stop smoking? Tobacco is harmful to nonsmokers who breathe in your secondhand smoke  The following are ways the health of others around you may improve when you stop smoking:  · You lower the risks for lung cancer and heart disease in nonsmoking adults  · If you are pregnant, you lower the risk for miscarriage, early delivery, low birth weight, and stillbirth  You also lower your baby's risk for SIDS, obesity, developmental delay, and neurobehavioral problems, such as ADHD  · If you have children, you lower their risk for ear infections, colds, pneumonia, bronchitis, and asthma  Where can I find more information and support to stop smoking? · [employer]  Phone: 7- 602 - [phone]  Web Address: [employer]  CARE AGREEMENT:   You have the right to help plan your care  Learn about your health condition and how it may be treated  Discuss treatment options with your caregivers to decide what care you want to receive  You always have the right to refuse treatment   The above information is an  only  It is not intended as medical advice for individual conditions or treatments  Talk to your doctor, nurse or pharmacist before following any medical regimen to see if it is safe and effective for you  © 2017 [address] Information is for End User's use only and may not be sold, redistributed or otherwise used for commercial purposes  All illustrations and images included in CareNotes® are the copyrighted property of A D A M , Inc  or [employer]  Cigarette Smoking and Your Health, Ambulatory Care   GENERAL INFORMATION:   What are the risks to my health if I smoke? Chemicals in tobacco are addictive and damage every cell in your body  All tobacco products are dangerous to you and to nonsmokers who breathe your secondhand smoke  Even if you are a light smoker or a social smoker, you have an increased risk for cancer, heart disease, and lung disease  If you are pregnant or have diabetes, smoking increases your risk for complications  What are the benefits to my health if I stop smoking? · Lower risk of cancer, heart disease, blood clots, heart attack, and stroke    · Lower risk of diabetes complications, such as kidney, artery, or eye disease, and nerve damage that can result in amputations    · Lower risk of lung infections and diseases, such as pneumonia, asthma, chronic bronchitis, and emphysema           · Increased benefits of chemotherapy if you already have cancer, and decreased risk for cancer returning after treatment    · Improved circulation, allowing more oxygen to be delivered to your body    · Improved ability to heal and to fight infections  What are the benefits to the health of others if I stop smoking?    · Lower risk of lung cancer and heart disease in nonsmokers    · Lower risk of miscarriage, early delivery, low birth weight, and stillbirth    · Lower risk of SIDS, obesity, developmental delay, ear infections, colds, pneumonia, bronchitis, asthma, and ADHD in your child  Where can I find more information and support to stop smoking? It is never too late to stop smoking  Ask your healthcare provider for more information if you need help  · [place]  [employer]  Phone: 6- 817 - [phone]  Web Address: www smokefree  [employer]  Follow up with your healthcare provider as directed:  Write down your questions so you remember to ask them during your visits  CARE AGREEMENT:   You have the right to help plan your care  Learn about your health condition and how it may be treated  Discuss treatment options with your caregivers to decide what care you want to receive  You always have the right to refuse treatment  The above information is an  only  It is not intended as medical advice for individual conditions or treatments  Talk to your doctor, nurse or pharmacist before following any medical regimen to see if it is safe and effective for you  © 2014 [address] is for End User's use only and may not be sold, redistributed or otherwise used for commercial purposes  All illustrations and images included in CareNotes® are the copyrighted property of [employer]  or Nicolás Peters  How to Stop Smoking   AMBULATORY CARE:   You will improve your health and the health of others around you  if you stop smoking  Your risk for heart and lung disease, cancer, stroke, heart attack, and vision problems will also decrease  You can benefit from quitting no matter how long you have smoked  Prepare to stop smoking:  Nicotine is a highly addictive drug found in cigarettes  Withdrawal symptoms can happen when you stop smoking and make it hard to quit  These include anxiety, depression, irritability, trouble sleeping, and increased appetite  You increase your chances of success if you prepare to quit  · Set a quit date  B[employer]y [employer] a date that is within the next 2 weeks   Do not pick a day that you think may be stressful or busy  Write down the day or [place] it on your calender  · Tell friends and family that you plan to quit  Explain that you may have withdrawal symptoms when you try to quit  Ask them to support you  They may be able to encourage you and help reduce your stress to make it easier for you to quit  · Make a list of your reasons for quitting  Put the list somewhere you will see it every day, such as your refrigerator  You can look at the list when you have a craving  · Remove all tobacco and nicotine products from your home, car, and workplace  Also, remove anything else that will tempt you to smoke, such as lighters, matches, or ashtrays  Clean your car, home, and places at work that smell like smoke  The smell of smoke can trigger a craving  · Identify triggers that make you want to smoke  This may include activities, feelings, or people  Also write down 1 way you can deal with each of your triggers  For example, if you want to smoke as soon as you wake up, plan another activity during this time, such as exercise  · Make a plan for how you will quit  Learn about the tools that can help you quit, such as medicine, counseling, or nicotine replacement therapy  Choose at least 2 options to help you quit  Tools to help you stop smoking:   · Counseling  from a trained healthcare provider can provide you with support and skills to quit smoking  The provider will also teach you to manage your withdrawal symptoms and cravings  You may receive counseling from one counselor, in group therapy, or through phone therapy called a quit line  · Nicotine replacement therapy (NRT)  such as nicotine patches, gum, or lozenges may help reduce your nicotine cravings  You may get these without a doctor's order  Do not use e-cigarettes or smokeless tobacco in place of cigarettes or to help you quit  They still contain nicotine      · Prescription medicines  such as nasal sprays or nicotine inhalers may help reduce your withdrawal symptoms  Other medicines may also be used to reduce your urge to smoke  Ask your healthcare provider about these medicines  You may need to start certain medicines 2 weeks before your quit date for them to work well  · Hypnosis  is a practice that helps guide you through thoughts and feelings  Hypnosis may help decrease your cravings and make you more willing to quit  · Acupuncture therapy  uses very thin needles to balance energy channels in the body  This is thought to help decrease cravings and symptoms of nicotine withdrawal      · Support groups  let you talk to others who are trying to quit or have already quit  It may be helpful to speak with others about how they quit  Manage your cravings:   · Avoid situations, people, and places that tempt you to smoke  Go to nonsmoking places, such as libraries or restaurants  Understand what tempts you and try to avoid these things  · Keep your hands busy  Hold things such as a stress ball or pen  · Put candy or toothpicks in your mouth  Keep lollipops, sugarless gum, or toothpicks with you at all times  · Do not have alcohol or caffeine  These drinks may tempt you to smoke  Drink healthy liquids such as water or juice instead  · Reward yourself when you resist your cravings  Rewards will motivate you and help you stay positive  · Do an activity that distracts you from your craving  Examples include going for a walk, exercising, or cleaning  Prevent weight gain after you quit:  You may gain a few pounds after you quit smoking  It is healthier for you to gain a few pounds than to continue to smoke  The following can help you prevent weight gain:  · Eat healthy foods  These include fruits, vegetables, whole-grain breads, low-fat dairy products, beans, lean meats, and fish  Eat healthy snacks, such as low-fat yogurt, if you get hungry between meals       · Drink water before, during, and between meals   This will make your stomach feel full and help prevent you from overeating  Ask your healthcare provider how much liquid to drink each day and which liquids are best for you  · Exercise  Take a walk or do some kind of exercise every day  Ask your healthcare provider what exercise is right for you  This may help reduce your cravings and reduce stress  For more support and information:   · [employer]  gov  Phone: 2- 152 - 409-6864  Web Address: www smokefree  gov  © 2017 [address] Information is for End User's use only and may not be sold, redistributed or otherwise used for commercial purposes  All illustrations and images included in CareNotes® are the copyrighted property of [employer] M , Inc  or Nicolás Peters  The above information is an  only  It is not intended as medical advice for individual conditions or treatments  Talk to your doctor, nurse or pharmacist before following any medical regimen to see if it is safe and effective for you  How to Stop Smoking, Ambulatory Care   GENERAL INFORMATION:   Why you should stop smoking: You will improve your health and the health of others around you if you stop smoking  Your risk of heart and lung disease, cancer, stroke, heart attack, and vision problems will also decrease  You can benefit from quitting no matter how long you have smoked  Quitting may even prolong your life  Prepare to stop smoking:  Nicotine is a highly addictive drug found in cigarettes  Withdrawal symptoms can happen when you stop smoking and make it hard to quit  These include anxiety, depression, irritability, trouble sleeping, and increased appetite  You increase your chances of success if you prepare to quit  · Set a quit date  This will help confirm your decision to stop smoking  · Tell friends and family that you plan to quit  Explain that you may have withdrawal symptoms when you try to quit  Ask them to support you   They may be able to encourage you and help reduce your stress to make it easier for you to quit  · Expect it to be hard to quit, but know you can do it  Smoking is a daily habit that becomes part of your life  Know the triggers that tempt you to smoke, so you can break this habit  Write down a list of these challenges and have a plan to avoid them  · Remove all tobacco and nicotine products from your home, car, and workplace  Also, remove anything else that will tempt you to smoke, such as lighters, matches, or rachel trays  Tools to help you stop smoking: You may be able to quit on your own, or you may need to try one or more of the following:  · Counseling  from trained caregivers will help teach you skills to quit smoking  They will also teach you to manage your withdrawal symptoms and cravings  You may receive counseling from one counselor, in group therapy, or through phone therapy called a quit line  · Nicotine replacement therapy (NRT)  such as nicotine patches, gum, or lozenges may help reduce your nicotine cravings and other withdrawal symptoms  You may get these without a doctor's order  · Prescription medicines  such as nasal sprays or nicotine inhalers may help reduce your withdrawal symptoms  Other medicines may also be used to reduce your urge to smoke  Ask your primary healthcare provider about these medicines  You may need to start certain medicines 2 weeks before your quit date for them to work well  Manage your cravings:   · Avoid situations, people, and places that tempt you to smoke  Go to nonsmoking places, such as libraries or restaurants  Understand what tempts you and try to avoid these things  · Keep your hands busy  Hold things such as a stress ball or pen  Keep lollipops, gum, or toothpicks in your mouth to distract you from your cravings  · Avoid alcohol and caffeine  These drinks may tempt you to smoke  Drink healthy liquids such as water or juice instead      · Reward yourself when you resist your cravings  Rewards will motivate you and help you stay positive  For more support and information:   · Smokefree  gov  Phone: 4- 319 - 705-0330  Web Address: www smokefree  LOCK8  CARE AGREEMENT:   You have the right to help plan your care  Learn about your health condition and how it may be treated  Discuss treatment options with your caregivers to decide what care you want to receive  You always have the right to refuse treatment  The above information is an  only  It is not intended as medical advice for individual conditions or treatments  Talk to your doctor, nurse or pharmacist before following any medical regimen to see if it is safe and effective for you  © 2014 [address] is for End User's use only and may not be sold, redistributed or otherwise used for commercial purposes  All illustrations and images included in CareNotes® are the copyrighted property of A D A M , Inc  or Nicolás Peters

## 2019-01-09 NOTE — ANESTHESIA PREPROCEDURE EVALUATION
Review of Systems/Medical History  Patient summary reviewed  Chart reviewed  No history of anesthetic complications     Cardiovascular  EKG reviewed, Negative cardio ROS Hyperlipidemia,   Comment: Hyperlipidemia,  NSR, IRBBB,  Pulmonary  Smoker ex-smoker  , COPD mild , Asthma , well controlled/ stable Last rescue: < 6 months ago ,   Comment: Hx partial L lobectomy for CA     GI/Hepatic    GERD well controlled,  Hiatal hernia, Bariatric surgery,             Endo/Other  Diabetes well controlled type 2 Diet controlled,   Comment: Chronic  Pain right shoulder    GYN       Hematology   Musculoskeletal    Arthritis     Neurology    Headaches,    Psychology           Physical Exam    Airway  Comment: Limited extension from cervical fusion  Mallampati score: II  TM Distance: >3 FB  Neck ROM: limited     Dental   Comment: Upper and lower partial plate, No notable dental hx lower dentures and upper dentures,     Cardiovascular  Comment: Negative ROS, Rhythm: regular, Rate: normal, Cardiovascular exam normal    Pulmonary  Pulmonary exam normal Breath sounds clear to auscultation,     Other Findings        Anesthesia Plan  ASA Score- 2     Anesthesia Type- IV sedation with anesthesia with ASA Monitors  Additional Monitors:   Airway Plan:         Plan Factors-Patient not instructed to abstain from smoking on day of procedure  Patient did not smoke on day of surgery  Induction- intravenous  Postoperative Plan-     Informed Consent- Anesthetic plan and risks discussed with patient  I personally reviewed this patient with the CRNA  Discussed and agreed on the Anesthesia Plan with the CRNA  Moi Kate

## 2019-01-09 NOTE — PROGRESS NOTES
D/C instructions given and pt verbalizes understanding  OOB to ambulate, gait steady denies dizziness  Dr Brian Perry was here to talk with Pt

## 2019-01-09 NOTE — OP NOTE
OPERATIVE REPORT  PATIENT NAME: Sinan Jordan    :  1960  MRN: [id number]  Pt Location: [facility] 01    SURGERY DATE: 2019    Surgeon(s) and Role:     * Claudia Menjivar MD - Primary    Preop Diagnosis:  Bariatric surgery status [Z98 84]    Post-Op Diagnosis Codes: * Bariatric surgery status [Z98 84]    Procedure(s) (LRB):  ESOPHAGOGASTRODUODENOSCOPY (EGD) (N/A)    Specimen(s):  * No specimens in log *    Estimated Blood Loss:   Minimal    Drains:       Anesthesia Type:   IV Sedation with Anesthesia    Operative Indications:  Bariatric surgery status [Z98 84]      Operative Findings:  Marginal ulceration    Complications:   None    Procedure and Technique:  Upper endoscopy   I was present for the entire procedure    Patient Disposition:  hemodynamically stable     Indication:   Patient status post paraesophageal hernia and Britney-en-Y reconstruction    Description of the procedure: The patient was brought to the endoscopy suite and was placed in  left lateral decubitus position  A time-out was called and the patient was identified by name and by armband  The patient received IV  Propofol under closed monitoring  by the anesthesiologist and nurse anesthesist     Upper endoscopy was performed under direct visualization  Esophagus was visualized and showed no evidence of changes suspicious for Saucedo's in the lower esophagus   The GE junction was normal   The stomach pouch was then inspected and showed normal findings  Gastrojejunostomy was inspected and showed a marginal ulcer on the small bowel side    Blind end and Britney limbs were both inspected and showed normal findings    Gastro-gastric Fistula was not identified      The patient tolerated the procedure well and was transferred to the recovery unit in stable condition           SIGNATURE: Claudia Menjivar MD  DATE: 2019  TIME: 10:01 AM

## 2019-01-19 NOTE — ADDENDUM NOTE
Addended by: Carlos Branch on: 8/1/2018 04:46 PM     Modules accepted: Orders Strong peripheral pulses

## 2019-01-29 ENCOUNTER — OFFICE VISIT (OUTPATIENT)
Dept: FAMILY MEDICINE CLINIC | Facility: CLINIC | Age: 59
End: 2019-01-29
Payer: COMMERCIAL

## 2019-01-29 VITALS
TEMPERATURE: 97.5 F | HEIGHT: 68 IN | SYSTOLIC BLOOD PRESSURE: 130 MMHG | WEIGHT: 140.2 LBS | BODY MASS INDEX: 21.25 KG/M2 | DIASTOLIC BLOOD PRESSURE: 82 MMHG

## 2019-01-29 DIAGNOSIS — R63.4 ABNORMAL WEIGHT LOSS: ICD-10-CM

## 2019-01-29 DIAGNOSIS — R10.84 GENERALIZED ABDOMINAL PAIN: ICD-10-CM

## 2019-01-29 DIAGNOSIS — K25.7 CHRONIC GASTRIC ULCER WITHOUT HEMORRHAGE AND WITHOUT PERFORATION: Primary | ICD-10-CM

## 2019-01-29 PROCEDURE — 99214 OFFICE O/P EST MOD 30 MIN: CPT | Performed by: FAMILY MEDICINE

## 2019-01-29 NOTE — PROGRESS NOTES
Assessment/Plan:    No problem-specific Assessment & Plan notes found for this encounter  Diagnoses and all orders for this visit:    Chronic gastric ulcer without hemorrhage and without perforation  Comments:  remain on prilosec    Abnormal weight loss  Comments:  pt is gaining weight now    Generalized abdominal pain  Comments:  improved          Subjective:      Patient ID: Haritha Ocasio is a 63 y o  male  Follow up for abdominal pain with gastric ulcer, pt was placed on pepcid and carafate which did help, pt underwent an EGD by his bariatric surgeon which showed a gastric ulcer, pt was placed on prilosec, pt states that the pain is much better, pt is avoiding meats which seem to make it worse        The following portions of the patient's history were reviewed and updated as appropriate: allergies, current medications, past family history, past medical history, past social history, past surgical history and problem list     Review of Systems   Constitutional: Negative for chills, fever and unexpected weight change  Gastrointestinal: Negative for abdominal pain, blood in stool, constipation, diarrhea, nausea and vomiting  Objective:      /82 (BP Location: Left arm, Patient Position: Sitting, Cuff Size: Adult)   Temp 97 5 °F (36 4 °C) (Tympanic)   Ht 5' 8" (1 727 m)   Wt 63 6 kg (140 lb 3 2 oz)   BMI 21 32 kg/m²          Physical Exam   Constitutional: He is oriented to person, place, and time  He appears well-developed and well-nourished  No distress  HENT:   Head: Normocephalic and atraumatic  Eyes: Pupils are equal, round, and reactive to light  Conjunctivae and EOM are normal  No scleral icterus  Neck: Normal range of motion  Neck supple  Cardiovascular: Normal rate, regular rhythm and normal heart sounds  No murmur heard  Pulmonary/Chest: Effort normal and breath sounds normal  No respiratory distress  He has no wheezes  He has no rales  Abdominal: Soft   Bowel sounds are normal  He exhibits no distension and no mass  There is no tenderness  There is no rebound and no guarding  Musculoskeletal: He exhibits no edema  Lymphadenopathy:     He has no cervical adenopathy  Neurological: He is alert and oriented to person, place, and time  He exhibits normal muscle tone  Skin: Skin is warm and dry  No rash noted  He is not diaphoretic  No erythema  No pallor  Psychiatric: He has a normal mood and affect  His behavior is normal  Judgment and thought content normal    Nursing note and vitals reviewed

## 2019-02-14 ENCOUNTER — OFFICE VISIT (OUTPATIENT)
Dept: BARIATRICS | Facility: CLINIC | Age: 59
End: 2019-02-14
Payer: COMMERCIAL

## 2019-02-14 VITALS
DIASTOLIC BLOOD PRESSURE: 76 MMHG | SYSTOLIC BLOOD PRESSURE: 124 MMHG | HEART RATE: 74 BPM | TEMPERATURE: 98 F | HEIGHT: 68 IN | BODY MASS INDEX: 20.54 KG/M2 | WEIGHT: 135.5 LBS

## 2019-02-14 DIAGNOSIS — E66.01 MORBID (SEVERE) OBESITY DUE TO EXCESS CALORIES (HCC): Primary | ICD-10-CM

## 2019-02-14 DIAGNOSIS — K28.9 MARGINAL ULCER: ICD-10-CM

## 2019-02-14 DIAGNOSIS — K28.9: Primary | ICD-10-CM

## 2019-02-14 PROCEDURE — 99213 OFFICE O/P EST LOW 20 MIN: CPT | Performed by: SURGERY

## 2019-02-14 RX ORDER — SUCRALFATE ORAL 1 G/10ML
1 SUSPENSION ORAL 4 TIMES DAILY
Qty: 420 ML | Refills: 1 | Status: SHIPPED | OUTPATIENT
Start: 2019-02-14 | End: 2019-04-15

## 2019-02-14 NOTE — PROGRESS NOTES
Follow Up - Bariatric Surgery   Gallo Gamez 63 y o  male MRN: [id number]  Unit/Bed#:  Encounter: [id number]            Subjective:   Patient is presenting for follow-up upper endoscopy showed evidence of marginal ulceration patient is currently on Carafate and PPI and is feeling better    Objective:         Lab, Imaging and other studies: I have personally reviewed pertinent labs  Family History: non-contributory    Meds/Allergies   all medications and allergies reviewed    Vitals: Blood pressure 124/76, pulse 74, temperature 98 °F (36 7 °C), temperature source Tympanic, height 5' 8" (1 727 m), weight 61 5 kg (135 lb 8 oz)  ,Body mass index is 20 6 kg/m²      [unfilled]    Invasive Devices          None          Physical Exam:     NAD  Abdomen soft non-tender, incisions well healed  No palpable hernias    Assessment/Plan:    patient is s/p paraesophageal hernia repair and Britney-en-Y reconstruction with marginal ulceration patient is responding to medical management follow-up in 6 months            Brian Cottrell MD

## 2019-03-12 ENCOUNTER — OFFICE VISIT (OUTPATIENT)
Dept: FAMILY MEDICINE CLINIC | Facility: CLINIC | Age: 59
End: 2019-03-12
Payer: COMMERCIAL

## 2019-03-12 VITALS
OXYGEN SATURATION: 98 % | SYSTOLIC BLOOD PRESSURE: 110 MMHG | TEMPERATURE: 97.4 F | WEIGHT: 138.8 LBS | DIASTOLIC BLOOD PRESSURE: 76 MMHG | HEIGHT: 68 IN | BODY MASS INDEX: 21.04 KG/M2 | HEART RATE: 89 BPM

## 2019-03-12 DIAGNOSIS — K25.7 CHRONIC GASTRIC ULCER WITHOUT HEMORRHAGE AND WITHOUT PERFORATION: Primary | ICD-10-CM

## 2019-03-12 PROCEDURE — 99213 OFFICE O/P EST LOW 20 MIN: CPT | Performed by: FAMILY MEDICINE

## 2019-03-12 NOTE — PROGRESS NOTES
Assessment/Plan:    No problem-specific Assessment & Plan notes found for this encounter  Diagnoses and all orders for this visit:    Chronic gastric ulcer without hemorrhage and without perforation          Subjective:      Patient ID: Davide Martinez is a 63 y o  male  Follow up for chronic gastric ulcer, pt is on prilosec which does help but did have increased pain in the last 2 days, pt had been missing his carafate for a couple of days because it did not come in the mail, follow up for abnormal weight loss, pt has gained more weight since last visit      The following portions of the patient's history were reviewed and updated as appropriate: allergies, current medications, past family history, past medical history, past social history, past surgical history and problem list     Review of Systems   Constitutional: Negative for chills, fever and unexpected weight change  Gastrointestinal: Positive for abdominal pain  Negative for blood in stool, constipation, diarrhea, nausea and vomiting  Objective:      /76   Pulse 89   Temp (!) 97 4 °F (36 3 °C) (Tympanic)   Ht 5' 8" (1 727 m)   Wt 63 kg (138 lb 12 8 oz)   SpO2 98%   BMI 21 10 kg/m²          Physical Exam   Constitutional: He is oriented to person, place, and time  He appears well-developed and well-nourished  No distress  HENT:   Head: Normocephalic and atraumatic  Eyes: Pupils are equal, round, and reactive to light  Conjunctivae and EOM are normal  No scleral icterus  Neck: Normal range of motion  Neck supple  Cardiovascular: Normal rate, regular rhythm and normal heart sounds  No murmur heard  Pulmonary/Chest: Effort normal and breath sounds normal  No respiratory distress  He has no wheezes  He has no rales  Abdominal: Soft  Bowel sounds are normal  He exhibits no distension and no mass  There is no tenderness  There is no rebound and no guarding  Musculoskeletal: He exhibits no edema     Lymphadenopathy:     He has no cervical adenopathy  Neurological: He is alert and oriented to person, place, and time  Skin: Skin is warm and dry  He is not diaphoretic  Psychiatric: He has a normal mood and affect  His behavior is normal  Judgment and thought content normal    Nursing note and vitals reviewed

## 2019-04-15 ENCOUNTER — OFFICE VISIT (OUTPATIENT)
Dept: FAMILY MEDICINE CLINIC | Facility: CLINIC | Age: 59
End: 2019-04-15
Payer: COMMERCIAL

## 2019-04-15 VITALS
SYSTOLIC BLOOD PRESSURE: 108 MMHG | WEIGHT: 141.8 LBS | BODY MASS INDEX: 21.49 KG/M2 | TEMPERATURE: 98.7 F | HEIGHT: 68 IN | DIASTOLIC BLOOD PRESSURE: 68 MMHG

## 2019-04-15 DIAGNOSIS — Z12.5 SCREENING FOR PROSTATE CANCER: ICD-10-CM

## 2019-04-15 DIAGNOSIS — R63.4 ABNORMAL WEIGHT LOSS: ICD-10-CM

## 2019-04-15 DIAGNOSIS — K25.7 CHRONIC GASTRIC ULCER WITHOUT HEMORRHAGE AND WITHOUT PERFORATION: ICD-10-CM

## 2019-04-15 DIAGNOSIS — R10.84 GENERALIZED ABDOMINAL PAIN: Primary | ICD-10-CM

## 2019-04-15 DIAGNOSIS — E78.00 HYPERCHOLESTEROLEMIA: ICD-10-CM

## 2019-04-15 PROCEDURE — 99214 OFFICE O/P EST MOD 30 MIN: CPT | Performed by: FAMILY MEDICINE

## 2019-06-11 ENCOUNTER — HOSPITAL ENCOUNTER (OUTPATIENT)
Dept: CT IMAGING | Facility: HOSPITAL | Age: 59
Discharge: HOME | End: 2019-06-11
Payer: COMMERCIAL

## 2019-06-11 DIAGNOSIS — C34.92 ADENOCARCINOMA OF LEFT LUNG (HCC): ICD-10-CM

## 2019-06-11 PROCEDURE — 71250 CT THORAX DX C-: CPT

## 2019-06-14 ENCOUNTER — OFFICE VISIT (OUTPATIENT)
Dept: CARDIAC SURGERY | Facility: MEDICAL CENTER | Age: 59
End: 2019-06-14
Payer: COMMERCIAL

## 2019-06-14 VITALS
DIASTOLIC BLOOD PRESSURE: 58 MMHG | WEIGHT: 137.2 LBS | HEART RATE: 71 BPM | SYSTOLIC BLOOD PRESSURE: 112 MMHG | TEMPERATURE: 97.2 F | BODY MASS INDEX: 20.79 KG/M2 | HEIGHT: 68 IN

## 2019-06-14 DIAGNOSIS — C34.92 ADENOCARCINOMA OF LEFT LUNG (HCC): Primary | ICD-10-CM

## 2019-06-14 PROCEDURE — 99213 OFFICE O/P EST LOW 20 MIN: CPT | Performed by: THORACIC SURGERY (CARDIOTHORACIC VASCULAR SURGERY)

## 2019-07-25 ENCOUNTER — OFFICE VISIT (OUTPATIENT)
Dept: FAMILY MEDICINE CLINIC | Facility: CLINIC | Age: 59
End: 2019-07-25
Payer: COMMERCIAL

## 2019-07-25 VITALS
BODY MASS INDEX: 20 KG/M2 | SYSTOLIC BLOOD PRESSURE: 108 MMHG | WEIGHT: 132 LBS | TEMPERATURE: 97.7 F | DIASTOLIC BLOOD PRESSURE: 72 MMHG | HEIGHT: 68 IN

## 2019-07-25 DIAGNOSIS — M54.9 ACUTE MID BACK PAIN: Primary | ICD-10-CM

## 2019-07-25 DIAGNOSIS — R10.84 GENERALIZED ABDOMINAL PAIN: ICD-10-CM

## 2019-07-25 PROCEDURE — 3008F BODY MASS INDEX DOCD: CPT | Performed by: FAMILY MEDICINE

## 2019-07-25 PROCEDURE — 99213 OFFICE O/P EST LOW 20 MIN: CPT | Performed by: FAMILY MEDICINE

## 2019-07-25 RX ORDER — METHYLPREDNISOLONE 4 MG/1
TABLET ORAL
Qty: 21 EACH | Refills: 0 | Status: SHIPPED | OUTPATIENT
Start: 2019-07-25 | End: 2019-08-16 | Stop reason: HOSPADM

## 2019-07-25 RX ORDER — METHOCARBAMOL 500 MG/1
500 TABLET, FILM COATED ORAL 3 TIMES DAILY
Qty: 60 TABLET | Refills: 2 | Status: SHIPPED | OUTPATIENT
Start: 2019-07-25 | End: 2019-09-18 | Stop reason: ALTCHOICE

## 2019-07-25 NOTE — PROGRESS NOTES
Assessment/Plan:    No problem-specific Assessment & Plan notes found for this encounter  Diagnoses and all orders for this visit:    Acute mid back pain  -     methylPREDNISolone 4 MG tablet therapy pack; Use as directed on package  -     methocarbamol (ROBAXIN) 500 mg tablet; Take 1 tablet (500 mg total) by mouth 3 (three) times a day    Generalized abdominal pain          PHQ-9 Depression Screening    PHQ-9:    Frequency of the following problems over the past two weeks:       Little interest or pleasure in doing things:  0 - not at all  Feeling down, depressed, or hopeless:  0 - not at all  PHQ-2 Score:  0            Subjective:      Patient ID: Edward Hussein is a 63 y o  male  Back Pain   This is a new problem  The current episode started in the past 7 days  The problem occurs constantly  The pain is present in the thoracic spine and lumbar spine  The quality of the pain is described as aching  The pain does not radiate  The pain is at a severity of 10/10  He has tried ice, heat and analgesics for the symptoms  The treatment provided no relief  The following portions of the patient's history were reviewed and updated as appropriate: allergies, current medications, past family history, past medical history, past social history, past surgical history and problem list     Review of Systems   Gastrointestinal:        Negative for stool incontinence   Genitourinary:        Negative for urinary incontinence   Musculoskeletal: Positive for back pain  Objective:    /72   Temp 97 7 °F (36 5 °C)   Ht 5' 8" (1 727 m)   Wt 59 9 kg (132 lb)   BMI 20 07 kg/m²      Physical Exam   Constitutional: He is oriented to person, place, and time  He appears well-developed and well-nourished  No distress  HENT:   Head: Normocephalic and atraumatic  Eyes: Pupils are equal, round, and reactive to light  Conjunctivae and EOM are normal  No scleral icterus  Neck: Normal range of motion  Neck supple  Cardiovascular: Normal rate, regular rhythm and normal heart sounds  No murmur heard  Pulmonary/Chest: Effort normal and breath sounds normal  No respiratory distress  He has no wheezes  He has no rales  Musculoskeletal: He exhibits no edema  Lymphadenopathy:     He has no cervical adenopathy  Neurological: He is alert and oriented to person, place, and time  Skin: Skin is warm and dry  He is not diaphoretic  Psychiatric: He has a normal mood and affect  His behavior is normal  Judgment and thought content normal    Nursing note and vitals reviewed

## 2019-07-26 ENCOUNTER — ANESTHESIA EVENT (EMERGENCY)
Dept: PERIOP | Facility: HOSPITAL | Age: 59
DRG: 326 | End: 2019-07-26
Payer: COMMERCIAL

## 2019-07-26 ENCOUNTER — ANESTHESIA (EMERGENCY)
Dept: PERIOP | Facility: HOSPITAL | Age: 59
DRG: 326 | End: 2019-07-26
Payer: COMMERCIAL

## 2019-07-26 ENCOUNTER — HOSPITAL ENCOUNTER (INPATIENT)
Facility: HOSPITAL | Age: 59
LOS: 21 days | Discharge: NURSING, REHAB OR LONG-TERM CARE | DRG: 326 | End: 2019-08-16
Attending: EMERGENCY MEDICINE | Admitting: SURGERY
Payer: COMMERCIAL

## 2019-07-26 ENCOUNTER — HOSPITAL ENCOUNTER (OUTPATIENT)
Facility: HOSPITAL | Age: 59
Setting detail: OUTPATIENT SURGERY
DRG: 326 | End: 2019-07-26
Attending: SURGERY | Admitting: SURGERY
Payer: COMMERCIAL

## 2019-07-26 ENCOUNTER — APPOINTMENT (EMERGENCY)
Dept: CT IMAGING | Facility: HOSPITAL | Age: 59
End: 2019-07-26
Payer: COMMERCIAL

## 2019-07-26 ENCOUNTER — HOSPITAL ENCOUNTER (EMERGENCY)
Facility: HOSPITAL | Age: 59
End: 2019-07-26
Attending: EMERGENCY MEDICINE
Payer: COMMERCIAL

## 2019-07-26 VITALS
HEIGHT: 68 IN | WEIGHT: 134 LBS | BODY MASS INDEX: 20.31 KG/M2 | SYSTOLIC BLOOD PRESSURE: 178 MMHG | HEART RATE: 99 BPM | RESPIRATION RATE: 22 BRPM | OXYGEN SATURATION: 100 % | TEMPERATURE: 97.1 F | DIASTOLIC BLOOD PRESSURE: 94 MMHG

## 2019-07-26 DIAGNOSIS — Z98.890 S/P REPAIR OF PARAESOPHAGEAL HERNIA: ICD-10-CM

## 2019-07-26 DIAGNOSIS — J69.0 ASPIRATION PNEUMONIA (HCC): ICD-10-CM

## 2019-07-26 DIAGNOSIS — R10.84 GENERALIZED ABDOMINAL PAIN: ICD-10-CM

## 2019-07-26 DIAGNOSIS — K56.609 SMALL BOWEL OBSTRUCTION (HCC): Primary | ICD-10-CM

## 2019-07-26 DIAGNOSIS — J90 PLEURAL EFFUSION: ICD-10-CM

## 2019-07-26 DIAGNOSIS — Z98.84 BARIATRIC SURGERY STATUS: Primary | ICD-10-CM

## 2019-07-26 DIAGNOSIS — Z87.19 S/P REPAIR OF PARAESOPHAGEAL HERNIA: ICD-10-CM

## 2019-07-26 DIAGNOSIS — R10.9 ABDOMINAL PAIN: ICD-10-CM

## 2019-07-26 DIAGNOSIS — S31.109D OPEN WOUND OF ABDOMINAL WALL, SUBSEQUENT ENCOUNTER: ICD-10-CM

## 2019-07-26 DIAGNOSIS — K91.2 POSTSURGICAL MALABSORPTION: ICD-10-CM

## 2019-07-26 DIAGNOSIS — K28.5 CHRONIC MARGINAL ULCER WITH PERFORATION (HCC): ICD-10-CM

## 2019-07-26 DIAGNOSIS — K65.1 INTRA-ABDOMINAL ABSCESS (HCC): ICD-10-CM

## 2019-07-26 DIAGNOSIS — Z93.1 G TUBE FEEDINGS (HCC): ICD-10-CM

## 2019-07-26 DIAGNOSIS — E43 SEVERE PROTEIN-CALORIE MALNUTRITION (HCC): ICD-10-CM

## 2019-07-26 DIAGNOSIS — K45.8 INTERNAL HERNIA: ICD-10-CM

## 2019-07-26 LAB
ABO GROUP BLD: NORMAL
ANION GAP SERPL CALCULATED.3IONS-SCNC: 9 MMOL/L (ref 4–13)
ANION GAP SERPL CALCULATED.3IONS-SCNC: 9 MMOL/L (ref 4–13)
BASOPHILS # BLD AUTO: 0.02 THOUSANDS/ΜL (ref 0–0.1)
BASOPHILS # BLD AUTO: 0.1 THOUSANDS/ΜL (ref 0–0.1)
BASOPHILS NFR BLD AUTO: 0 % (ref 0–1)
BASOPHILS NFR BLD AUTO: 1 % (ref 0–2)
BLD GP AB SCN SERPL QL: NEGATIVE
BUN SERPL-MCNC: 11 MG/DL (ref 5–25)
BUN SERPL-MCNC: 13 MG/DL (ref 7–25)
CALCIUM SERPL-MCNC: 9 MG/DL (ref 8.3–10.1)
CALCIUM SERPL-MCNC: 9.6 MG/DL (ref 8.6–10.5)
CHLORIDE SERPL-SCNC: 100 MMOL/L (ref 100–108)
CHLORIDE SERPL-SCNC: 101 MMOL/L (ref 98–107)
CO2 SERPL-SCNC: 27 MMOL/L (ref 21–31)
CO2 SERPL-SCNC: 27 MMOL/L (ref 21–32)
CREAT SERPL-MCNC: 0.78 MG/DL (ref 0.6–1.3)
CREAT SERPL-MCNC: 0.85 MG/DL (ref 0.7–1.3)
EOSINOPHIL # BLD AUTO: 0 THOUSAND/ΜL (ref 0–0.61)
EOSINOPHIL # BLD AUTO: 0 THOUSAND/ΜL (ref 0–0.61)
EOSINOPHIL NFR BLD AUTO: 0 % (ref 0–5)
EOSINOPHIL NFR BLD AUTO: 0 % (ref 0–6)
ERYTHROCYTE [DISTWIDTH] IN BLOOD BY AUTOMATED COUNT: 12.4 % (ref 11.6–15.1)
ERYTHROCYTE [DISTWIDTH] IN BLOOD BY AUTOMATED COUNT: 13.2 % (ref 11.5–14.5)
GFR SERPL CREATININE-BSD FRML MDRD: 100 ML/MIN/1.73SQ M
GFR SERPL CREATININE-BSD FRML MDRD: 97 ML/MIN/1.73SQ M
GLUCOSE SERPL-MCNC: 102 MG/DL (ref 65–140)
GLUCOSE SERPL-MCNC: 113 MG/DL (ref 65–99)
HCT VFR BLD AUTO: 43.4 % (ref 42–47)
HCT VFR BLD AUTO: 44.2 % (ref 36.5–49.3)
HGB BLD-MCNC: 14.9 G/DL (ref 12–17)
HGB BLD-MCNC: 15 G/DL (ref 14–18)
IMM GRANULOCYTES # BLD AUTO: 0.02 THOUSAND/UL (ref 0–0.2)
IMM GRANULOCYTES NFR BLD AUTO: 0 % (ref 0–2)
LACTATE SERPL-SCNC: 0.7 MMOL/L (ref 0.5–2)
LYMPHOCYTES # BLD AUTO: 0.88 THOUSANDS/ΜL (ref 0.6–4.47)
LYMPHOCYTES # BLD AUTO: 1.1 THOUSANDS/ΜL (ref 0.6–4.47)
LYMPHOCYTES NFR BLD AUTO: 11 % (ref 14–44)
LYMPHOCYTES NFR BLD AUTO: 11 % (ref 21–51)
MAGNESIUM SERPL-MCNC: 1.9 MG/DL (ref 1.6–2.6)
MCH RBC QN AUTO: 30.5 PG (ref 26.8–34.3)
MCH RBC QN AUTO: 30.5 PG (ref 26–34)
MCHC RBC AUTO-ENTMCNC: 33.7 G/DL (ref 31.4–37.4)
MCHC RBC AUTO-ENTMCNC: 34.5 G/DL (ref 31–37)
MCV RBC AUTO: 88 FL (ref 81–99)
MCV RBC AUTO: 90 FL (ref 82–98)
MONOCYTES # BLD AUTO: 0.3 THOUSAND/ΜL (ref 0.17–1.22)
MONOCYTES # BLD AUTO: 0.48 THOUSAND/ΜL (ref 0.17–1.22)
MONOCYTES NFR BLD AUTO: 3 % (ref 2–12)
MONOCYTES NFR BLD AUTO: 6 % (ref 4–12)
NEUTROPHILS # BLD AUTO: 6.87 THOUSANDS/ΜL (ref 1.85–7.62)
NEUTROPHILS # BLD AUTO: 9 THOUSANDS/ΜL (ref 1.4–6.5)
NEUTS SEG NFR BLD AUTO: 83 % (ref 43–75)
NEUTS SEG NFR BLD AUTO: 85 % (ref 42–75)
NRBC BLD AUTO-RTO: 0 /100 WBCS
PLATELET # BLD AUTO: 302 THOUSANDS/UL (ref 149–390)
PLATELET # BLD AUTO: 302 THOUSANDS/UL (ref 149–390)
PMV BLD AUTO: 7.7 FL (ref 8.6–11.7)
PMV BLD AUTO: 9.6 FL (ref 8.9–12.7)
POTASSIUM SERPL-SCNC: 3.7 MMOL/L (ref 3.5–5.5)
POTASSIUM SERPL-SCNC: 4.4 MMOL/L (ref 3.5–5.3)
RBC # BLD AUTO: 4.89 MILLION/UL (ref 3.88–5.62)
RBC # BLD AUTO: 4.91 MILLION/UL (ref 4.3–5.9)
RH BLD: POSITIVE
SODIUM SERPL-SCNC: 136 MMOL/L (ref 136–145)
SODIUM SERPL-SCNC: 137 MMOL/L (ref 134–143)
SPECIMEN EXPIRATION DATE: NORMAL
WBC # BLD AUTO: 10.6 THOUSAND/UL (ref 4.8–10.8)
WBC # BLD AUTO: 8.27 THOUSAND/UL (ref 4.31–10.16)

## 2019-07-26 PROCEDURE — 86850 RBC ANTIBODY SCREEN: CPT | Performed by: PHYSICIAN ASSISTANT

## 2019-07-26 PROCEDURE — 36415 COLL VENOUS BLD VENIPUNCTURE: CPT | Performed by: EMERGENCY MEDICINE

## 2019-07-26 PROCEDURE — 80048 BASIC METABOLIC PNL TOTAL CA: CPT | Performed by: EMERGENCY MEDICINE

## 2019-07-26 PROCEDURE — 99285 EMERGENCY DEPT VISIT HI MDM: CPT

## 2019-07-26 PROCEDURE — 96374 THER/PROPH/DIAG INJ IV PUSH: CPT

## 2019-07-26 PROCEDURE — 74177 CT ABD & PELVIS W/CONTRAST: CPT

## 2019-07-26 PROCEDURE — 43659 UNLISTED LAPS PX STOMACH: CPT | Performed by: PHYSICIAN ASSISTANT

## 2019-07-26 PROCEDURE — 85025 COMPLETE CBC W/AUTO DIFF WBC: CPT | Performed by: SURGERY

## 2019-07-26 PROCEDURE — 83735 ASSAY OF MAGNESIUM: CPT | Performed by: SURGERY

## 2019-07-26 PROCEDURE — 83605 ASSAY OF LACTIC ACID: CPT | Performed by: SURGERY

## 2019-07-26 PROCEDURE — 0WQF0ZZ REPAIR ABDOMINAL WALL, OPEN APPROACH: ICD-10-PCS | Performed by: SURGERY

## 2019-07-26 PROCEDURE — 80048 BASIC METABOLIC PNL TOTAL CA: CPT | Performed by: SURGERY

## 2019-07-26 PROCEDURE — 0DJV4ZZ INSPECTION OF MESENTERY, PERCUTANEOUS ENDOSCOPIC APPROACH: ICD-10-PCS | Performed by: SURGERY

## 2019-07-26 PROCEDURE — 96372 THER/PROPH/DIAG INJ SC/IM: CPT

## 2019-07-26 PROCEDURE — 85025 COMPLETE CBC W/AUTO DIFF WBC: CPT | Performed by: EMERGENCY MEDICINE

## 2019-07-26 PROCEDURE — 99284 EMERGENCY DEPT VISIT MOD MDM: CPT | Performed by: PHYSICIAN ASSISTANT

## 2019-07-26 PROCEDURE — 0DQV0ZZ REPAIR MESENTERY, OPEN APPROACH: ICD-10-PCS | Performed by: SURGERY

## 2019-07-26 PROCEDURE — 96375 TX/PRO/DX INJ NEW DRUG ADDON: CPT

## 2019-07-26 PROCEDURE — 86900 BLOOD TYPING SEROLOGIC ABO: CPT | Performed by: PHYSICIAN ASSISTANT

## 2019-07-26 PROCEDURE — C9290 INJ, BUPIVACAINE LIPOSOME: HCPCS | Performed by: PHYSICIAN ASSISTANT

## 2019-07-26 PROCEDURE — 86901 BLOOD TYPING SEROLOGIC RH(D): CPT | Performed by: PHYSICIAN ASSISTANT

## 2019-07-26 PROCEDURE — 43659 UNLISTED LAPS PX STOMACH: CPT | Performed by: SURGERY

## 2019-07-26 RX ORDER — PROPOFOL 10 MG/ML
INJECTION, EMULSION INTRAVENOUS AS NEEDED
Status: DISCONTINUED | OUTPATIENT
Start: 2019-07-26 | End: 2019-07-26 | Stop reason: SURG

## 2019-07-26 RX ORDER — LEVOFLOXACIN 5 MG/ML
750 INJECTION, SOLUTION INTRAVENOUS EVERY 24 HOURS
Status: COMPLETED | OUTPATIENT
Start: 2019-07-27 | End: 2019-07-27

## 2019-07-26 RX ORDER — MORPHINE SULFATE 10 MG/ML
6 INJECTION, SOLUTION INTRAMUSCULAR; INTRAVENOUS ONCE
Status: COMPLETED | OUTPATIENT
Start: 2019-07-26 | End: 2019-07-26

## 2019-07-26 RX ORDER — SODIUM CHLORIDE 9 MG/ML
125 INJECTION, SOLUTION INTRAVENOUS CONTINUOUS
Status: DISCONTINUED | OUTPATIENT
Start: 2019-07-26 | End: 2019-07-26 | Stop reason: HOSPADM

## 2019-07-26 RX ORDER — ACETAMINOPHEN 325 MG/1
975 TABLET ORAL EVERY 6 HOURS SCHEDULED
Status: DISCONTINUED | OUTPATIENT
Start: 2019-07-26 | End: 2019-07-28

## 2019-07-26 RX ORDER — ATORVASTATIN CALCIUM 40 MG/1
80 TABLET, FILM COATED ORAL
Status: DISCONTINUED | OUTPATIENT
Start: 2019-07-26 | End: 2019-07-28

## 2019-07-26 RX ORDER — EPHEDRINE SULFATE 50 MG/ML
INJECTION INTRAVENOUS AS NEEDED
Status: DISCONTINUED | OUTPATIENT
Start: 2019-07-26 | End: 2019-07-26 | Stop reason: SURG

## 2019-07-26 RX ORDER — KETOROLAC TROMETHAMINE 30 MG/ML
INJECTION, SOLUTION INTRAMUSCULAR; INTRAVENOUS AS NEEDED
Status: DISCONTINUED | OUTPATIENT
Start: 2019-07-26 | End: 2019-07-26 | Stop reason: SURG

## 2019-07-26 RX ORDER — FENTANYL CITRATE 50 UG/ML
100 INJECTION, SOLUTION INTRAMUSCULAR; INTRAVENOUS ONCE
Status: COMPLETED | OUTPATIENT
Start: 2019-07-26 | End: 2019-07-26

## 2019-07-26 RX ORDER — DIPHENHYDRAMINE HYDROCHLORIDE 50 MG/ML
25 INJECTION INTRAMUSCULAR; INTRAVENOUS EVERY 6 HOURS PRN
Status: DISCONTINUED | OUTPATIENT
Start: 2019-07-26 | End: 2019-07-26

## 2019-07-26 RX ORDER — OXYCODONE HCL 5 MG/5 ML
10 SOLUTION, ORAL ORAL EVERY 4 HOURS PRN
Status: DISCONTINUED | OUTPATIENT
Start: 2019-07-26 | End: 2019-07-28

## 2019-07-26 RX ORDER — ONDANSETRON 2 MG/ML
4 INJECTION INTRAMUSCULAR; INTRAVENOUS ONCE
Status: COMPLETED | OUTPATIENT
Start: 2019-07-26 | End: 2019-07-26

## 2019-07-26 RX ORDER — SCOLOPAMINE TRANSDERMAL SYSTEM 1 MG/1
1 PATCH, EXTENDED RELEASE TRANSDERMAL ONCE
Status: DISCONTINUED | OUTPATIENT
Start: 2019-07-26 | End: 2019-07-28

## 2019-07-26 RX ORDER — SODIUM CHLORIDE 9 MG/ML
75 INJECTION, SOLUTION INTRAVENOUS CONTINUOUS
Status: DISCONTINUED | OUTPATIENT
Start: 2019-07-26 | End: 2019-07-29

## 2019-07-26 RX ORDER — METOCLOPRAMIDE HYDROCHLORIDE 5 MG/ML
10 INJECTION INTRAMUSCULAR; INTRAVENOUS ONCE
Status: COMPLETED | OUTPATIENT
Start: 2019-07-26 | End: 2019-07-26

## 2019-07-26 RX ORDER — FENTANYL CITRATE 50 UG/ML
INJECTION, SOLUTION INTRAMUSCULAR; INTRAVENOUS AS NEEDED
Status: DISCONTINUED | OUTPATIENT
Start: 2019-07-26 | End: 2019-07-26 | Stop reason: SURG

## 2019-07-26 RX ORDER — MORPHINE SULFATE 4 MG/ML
4 INJECTION, SOLUTION INTRAMUSCULAR; INTRAVENOUS ONCE
Status: COMPLETED | OUTPATIENT
Start: 2019-07-26 | End: 2019-07-26

## 2019-07-26 RX ORDER — ONDANSETRON 2 MG/ML
4 INJECTION INTRAMUSCULAR; INTRAVENOUS ONCE
Status: DISCONTINUED | OUTPATIENT
Start: 2019-07-26 | End: 2019-07-26 | Stop reason: HOSPADM

## 2019-07-26 RX ORDER — HEPARIN SODIUM 5000 [USP'U]/ML
5000 INJECTION, SOLUTION INTRAVENOUS; SUBCUTANEOUS ONCE
Status: COMPLETED | OUTPATIENT
Start: 2019-07-26 | End: 2019-07-26

## 2019-07-26 RX ORDER — HYDROMORPHONE HCL/PF 1 MG/ML
0.5 SYRINGE (ML) INJECTION
Status: DISCONTINUED | OUTPATIENT
Start: 2019-07-26 | End: 2019-07-26 | Stop reason: HOSPADM

## 2019-07-26 RX ORDER — LABETALOL 20 MG/4 ML (5 MG/ML) INTRAVENOUS SYRINGE
AS NEEDED
Status: DISCONTINUED | OUTPATIENT
Start: 2019-07-26 | End: 2019-07-26 | Stop reason: SURG

## 2019-07-26 RX ORDER — FENTANYL CITRATE/PF 50 MCG/ML
25 SYRINGE (ML) INJECTION
Status: DISCONTINUED | OUTPATIENT
Start: 2019-07-26 | End: 2019-07-26 | Stop reason: HOSPADM

## 2019-07-26 RX ORDER — PROMETHAZINE HYDROCHLORIDE 25 MG/ML
25 INJECTION, SOLUTION INTRAMUSCULAR; INTRAVENOUS EVERY 6 HOURS PRN
Status: DISCONTINUED | OUTPATIENT
Start: 2019-07-26 | End: 2019-07-28

## 2019-07-26 RX ORDER — ONDANSETRON 2 MG/ML
INJECTION INTRAMUSCULAR; INTRAVENOUS AS NEEDED
Status: DISCONTINUED | OUTPATIENT
Start: 2019-07-26 | End: 2019-07-26 | Stop reason: SURG

## 2019-07-26 RX ORDER — CIPROFLOXACIN 2 MG/ML
400 INJECTION, SOLUTION INTRAVENOUS ONCE
Status: COMPLETED | OUTPATIENT
Start: 2019-07-26 | End: 2019-07-26

## 2019-07-26 RX ORDER — LORAZEPAM 2 MG/ML
0.5 INJECTION INTRAMUSCULAR EVERY 6 HOURS PRN
Status: DISCONTINUED | OUTPATIENT
Start: 2019-07-26 | End: 2019-07-28

## 2019-07-26 RX ORDER — ROCURONIUM BROMIDE 10 MG/ML
INJECTION, SOLUTION INTRAVENOUS AS NEEDED
Status: DISCONTINUED | OUTPATIENT
Start: 2019-07-26 | End: 2019-07-26 | Stop reason: SURG

## 2019-07-26 RX ORDER — GABAPENTIN 300 MG/1
300 CAPSULE ORAL ONCE
Status: CANCELLED | OUTPATIENT
Start: 2019-07-26 | End: 2019-07-26

## 2019-07-26 RX ORDER — NEOSTIGMINE METHYLSULFATE 1 MG/ML
INJECTION INTRAVENOUS AS NEEDED
Status: DISCONTINUED | OUTPATIENT
Start: 2019-07-26 | End: 2019-07-26 | Stop reason: SURG

## 2019-07-26 RX ORDER — GLYCOPYRROLATE 0.2 MG/ML
INJECTION INTRAMUSCULAR; INTRAVENOUS AS NEEDED
Status: DISCONTINUED | OUTPATIENT
Start: 2019-07-26 | End: 2019-07-26 | Stop reason: SURG

## 2019-07-26 RX ORDER — FENTANYL CITRATE 50 UG/ML
75 INJECTION, SOLUTION INTRAMUSCULAR; INTRAVENOUS ONCE
Status: COMPLETED | OUTPATIENT
Start: 2019-07-26 | End: 2019-07-26

## 2019-07-26 RX ORDER — HEPARIN SODIUM 5000 [USP'U]/ML
5000 INJECTION, SOLUTION INTRAVENOUS; SUBCUTANEOUS
Status: CANCELLED | OUTPATIENT
Start: 2019-07-26 | End: 2019-07-27

## 2019-07-26 RX ORDER — HYDROMORPHONE HCL/PF 1 MG/ML
SYRINGE (ML) INJECTION AS NEEDED
Status: DISCONTINUED | OUTPATIENT
Start: 2019-07-26 | End: 2019-07-26 | Stop reason: SURG

## 2019-07-26 RX ORDER — HYDROMORPHONE HCL/PF 1 MG/ML
0.5 SYRINGE (ML) INJECTION ONCE
Status: COMPLETED | OUTPATIENT
Start: 2019-07-26 | End: 2019-07-26

## 2019-07-26 RX ORDER — DEXAMETHASONE SODIUM PHOSPHATE 10 MG/ML
INJECTION, SOLUTION INTRAMUSCULAR; INTRAVENOUS AS NEEDED
Status: DISCONTINUED | OUTPATIENT
Start: 2019-07-26 | End: 2019-07-26 | Stop reason: SURG

## 2019-07-26 RX ORDER — ALBUTEROL SULFATE 90 UG/1
2 AEROSOL, METERED RESPIRATORY (INHALATION) EVERY 6 HOURS PRN
Status: DISCONTINUED | OUTPATIENT
Start: 2019-07-26 | End: 2019-07-28

## 2019-07-26 RX ORDER — SODIUM CHLORIDE 9 MG/ML
125 INJECTION, SOLUTION INTRAVENOUS CONTINUOUS
Status: DISCONTINUED | OUTPATIENT
Start: 2019-07-26 | End: 2019-07-26

## 2019-07-26 RX ORDER — METOCLOPRAMIDE HYDROCHLORIDE 5 MG/ML
10 INJECTION INTRAMUSCULAR; INTRAVENOUS EVERY 6 HOURS PRN
Status: DISCONTINUED | OUTPATIENT
Start: 2019-07-26 | End: 2019-07-28

## 2019-07-26 RX ORDER — HEPARIN SODIUM 5000 [USP'U]/ML
5000 INJECTION, SOLUTION INTRAVENOUS; SUBCUTANEOUS EVERY 8 HOURS SCHEDULED
Status: DISCONTINUED | OUTPATIENT
Start: 2019-07-26 | End: 2019-08-16 | Stop reason: HOSPADM

## 2019-07-26 RX ORDER — ONDANSETRON 2 MG/ML
4 INJECTION INTRAMUSCULAR; INTRAVENOUS EVERY 4 HOURS PRN
Status: DISCONTINUED | OUTPATIENT
Start: 2019-07-26 | End: 2019-07-28

## 2019-07-26 RX ORDER — BUPIVACAINE HYDROCHLORIDE 5 MG/ML
INJECTION, SOLUTION PERINEURAL AS NEEDED
Status: DISCONTINUED | OUTPATIENT
Start: 2019-07-26 | End: 2019-07-26 | Stop reason: HOSPADM

## 2019-07-26 RX ORDER — HYDROMORPHONE HCL/PF 1 MG/ML
1 SYRINGE (ML) INJECTION EVERY 4 HOURS PRN
Status: DISCONTINUED | OUTPATIENT
Start: 2019-07-26 | End: 2019-07-28

## 2019-07-26 RX ORDER — LIDOCAINE HYDROCHLORIDE 10 MG/ML
INJECTION, SOLUTION INFILTRATION; PERINEURAL AS NEEDED
Status: DISCONTINUED | OUTPATIENT
Start: 2019-07-26 | End: 2019-07-26 | Stop reason: SURG

## 2019-07-26 RX ORDER — ACETAMINOPHEN 325 MG/1
975 TABLET ORAL ONCE
Status: CANCELLED | OUTPATIENT
Start: 2019-07-26 | End: 2019-07-26

## 2019-07-26 RX ORDER — SIMETHICONE 80 MG
80 TABLET,CHEWABLE ORAL EVERY 12 HOURS
Status: DISCONTINUED | OUTPATIENT
Start: 2019-07-26 | End: 2019-07-28

## 2019-07-26 RX ORDER — METHOCARBAMOL 500 MG/1
500 TABLET, FILM COATED ORAL 3 TIMES DAILY
Status: DISCONTINUED | OUTPATIENT
Start: 2019-07-26 | End: 2019-07-28

## 2019-07-26 RX ORDER — HEPARIN SODIUM 5000 [USP'U]/ML
INJECTION, SOLUTION INTRAVENOUS; SUBCUTANEOUS AS NEEDED
Status: DISCONTINUED | OUTPATIENT
Start: 2019-07-26 | End: 2019-07-26 | Stop reason: SURG

## 2019-07-26 RX ORDER — SODIUM CHLORIDE, SODIUM LACTATE, POTASSIUM CHLORIDE, CALCIUM CHLORIDE 600; 310; 30; 20 MG/100ML; MG/100ML; MG/100ML; MG/100ML
125 INJECTION, SOLUTION INTRAVENOUS CONTINUOUS
Status: CANCELLED | OUTPATIENT
Start: 2019-07-26

## 2019-07-26 RX ORDER — MIDAZOLAM HYDROCHLORIDE 1 MG/ML
INJECTION INTRAMUSCULAR; INTRAVENOUS AS NEEDED
Status: DISCONTINUED | OUTPATIENT
Start: 2019-07-26 | End: 2019-07-26 | Stop reason: SURG

## 2019-07-26 RX ORDER — MORPHINE SULFATE 4 MG/ML
4 INJECTION, SOLUTION INTRAMUSCULAR; INTRAVENOUS EVERY 4 HOURS PRN
Status: CANCELLED | OUTPATIENT
Start: 2019-07-26

## 2019-07-26 RX ORDER — LEVOFLOXACIN 5 MG/ML
750 INJECTION, SOLUTION INTRAVENOUS ONCE
Status: COMPLETED | OUTPATIENT
Start: 2019-07-26 | End: 2019-07-26

## 2019-07-26 RX ORDER — OXYCODONE HCL 5 MG/5 ML
5 SOLUTION, ORAL ORAL EVERY 4 HOURS PRN
Status: DISCONTINUED | OUTPATIENT
Start: 2019-07-26 | End: 2019-07-28

## 2019-07-26 RX ADMIN — FAMOTIDINE 20 MG: 10 INJECTION, SOLUTION INTRAVENOUS at 20:13

## 2019-07-26 RX ADMIN — ROCURONIUM BROMIDE 60 MG: 100 INJECTION, SOLUTION INTRAVENOUS at 15:20

## 2019-07-26 RX ADMIN — LABETALOL 20 MG/4 ML (5 MG/ML) INTRAVENOUS SYRINGE 5 MG: at 16:14

## 2019-07-26 RX ADMIN — PROPOFOL 200 MG: 10 INJECTION, EMULSION INTRAVENOUS at 15:20

## 2019-07-26 RX ADMIN — EPHEDRINE SULFATE 5 MG: 50 INJECTION, SOLUTION INTRAVENOUS at 16:35

## 2019-07-26 RX ADMIN — KETOROLAC TROMETHAMINE 30 MG: 30 INJECTION, SOLUTION INTRAMUSCULAR at 17:32

## 2019-07-26 RX ADMIN — PHENYLEPHRINE HYDROCHLORIDE 100 MCG: 10 INJECTION INTRAVENOUS at 16:45

## 2019-07-26 RX ADMIN — ROCURONIUM BROMIDE 10 MG: 100 INJECTION, SOLUTION INTRAVENOUS at 17:00

## 2019-07-26 RX ADMIN — ONDANSETRON 4 MG: 2 INJECTION INTRAMUSCULAR; INTRAVENOUS at 04:19

## 2019-07-26 RX ADMIN — HYDROMORPHONE HYDROCHLORIDE 0.25 MG: 1 INJECTION, SOLUTION INTRAMUSCULAR; INTRAVENOUS; SUBCUTANEOUS at 17:16

## 2019-07-26 RX ADMIN — SODIUM CHLORIDE: 0.9 INJECTION, SOLUTION INTRAVENOUS at 16:53

## 2019-07-26 RX ADMIN — DEXAMETHASONE SODIUM PHOSPHATE 4 MG: 10 INJECTION, SOLUTION INTRAMUSCULAR; INTRAVENOUS at 15:39

## 2019-07-26 RX ADMIN — NEOSTIGMINE METHYLSULFATE 4 MG: 1 INJECTION, SOLUTION INTRAVENOUS at 17:29

## 2019-07-26 RX ADMIN — SIMETHICONE CHEW TAB 80 MG 80 MG: 80 TABLET ORAL at 20:13

## 2019-07-26 RX ADMIN — FENTANYL CITRATE 100 MCG: 50 INJECTION, SOLUTION INTRAMUSCULAR; INTRAVENOUS at 15:20

## 2019-07-26 RX ADMIN — HYDROMORPHONE HYDROCHLORIDE 0.5 MG: 1 INJECTION, SOLUTION INTRAMUSCULAR; INTRAVENOUS; SUBCUTANEOUS at 10:40

## 2019-07-26 RX ADMIN — EPHEDRINE SULFATE 5 MG: 50 INJECTION, SOLUTION INTRAVENOUS at 16:40

## 2019-07-26 RX ADMIN — FENTANYL CITRATE 75 MCG: 50 INJECTION INTRAMUSCULAR; INTRAVENOUS at 02:50

## 2019-07-26 RX ADMIN — THIAMINE HYDROCHLORIDE: 100 INJECTION, SOLUTION INTRAMUSCULAR; INTRAVENOUS at 20:12

## 2019-07-26 RX ADMIN — ATORVASTATIN CALCIUM 80 MG: 80 TABLET, FILM COATED ORAL at 20:13

## 2019-07-26 RX ADMIN — MIDAZOLAM 4 MG: 1 INJECTION INTRAMUSCULAR; INTRAVENOUS at 15:15

## 2019-07-26 RX ADMIN — GLYCOPYRROLATE 0.8 MG: 0.2 INJECTION INTRAMUSCULAR; INTRAVENOUS at 17:29

## 2019-07-26 RX ADMIN — ACETAMINOPHEN 975 MG: 325 TABLET ORAL at 20:18

## 2019-07-26 RX ADMIN — PHENYLEPHRINE HYDROCHLORIDE 50 MCG: 10 INJECTION INTRAVENOUS at 16:44

## 2019-07-26 RX ADMIN — PHENYLEPHRINE HYDROCHLORIDE 50 MCG/MIN: 10 INJECTION INTRAVENOUS at 16:48

## 2019-07-26 RX ADMIN — SCOPALAMINE 1 PATCH: 1 PATCH, EXTENDED RELEASE TRANSDERMAL at 15:04

## 2019-07-26 RX ADMIN — SODIUM CHLORIDE: 0.9 INJECTION, SOLUTION INTRAVENOUS at 15:48

## 2019-07-26 RX ADMIN — MORPHINE SULFATE 4 MG: 4 INJECTION INTRAVENOUS at 03:53

## 2019-07-26 RX ADMIN — PHENYLEPHRINE HYDROCHLORIDE 100 MCG: 10 INJECTION INTRAVENOUS at 16:48

## 2019-07-26 RX ADMIN — METOCLOPRAMIDE 10 MG: 5 INJECTION, SOLUTION INTRAMUSCULAR; INTRAVENOUS at 04:17

## 2019-07-26 RX ADMIN — HEPARIN SODIUM 5000 UNITS: 5000 INJECTION INTRAVENOUS; SUBCUTANEOUS at 21:58

## 2019-07-26 RX ADMIN — SODIUM CHLORIDE 1000 ML: 0.9 INJECTION, SOLUTION INTRAVENOUS at 07:46

## 2019-07-26 RX ADMIN — OXYCODONE HYDROCHLORIDE 10 MG: 5 SOLUTION ORAL at 20:37

## 2019-07-26 RX ADMIN — SODIUM CHLORIDE 100 ML/HR: 0.9 INJECTION, SOLUTION INTRAVENOUS at 18:27

## 2019-07-26 RX ADMIN — IOHEXOL 100 ML: 350 INJECTION, SOLUTION INTRAVENOUS at 04:59

## 2019-07-26 RX ADMIN — CIPROFLOXACIN 400 MG: 2 INJECTION, SOLUTION INTRAVENOUS at 07:57

## 2019-07-26 RX ADMIN — LIDOCAINE HYDROCHLORIDE 100 MG: 10 INJECTION, SOLUTION INFILTRATION; PERINEURAL at 15:20

## 2019-07-26 RX ADMIN — ROCURONIUM BROMIDE 10 MG: 100 INJECTION, SOLUTION INTRAVENOUS at 15:47

## 2019-07-26 RX ADMIN — FENTANYL CITRATE 50 MCG: 50 INJECTION, SOLUTION INTRAMUSCULAR; INTRAVENOUS at 15:50

## 2019-07-26 RX ADMIN — MORPHINE SULFATE 6 MG: 10 INJECTION INTRAVENOUS at 07:46

## 2019-07-26 RX ADMIN — DIPHENHYDRAMINE HYDROCHLORIDE 25 MG: 50 INJECTION, SOLUTION INTRAMUSCULAR; INTRAVENOUS at 10:40

## 2019-07-26 RX ADMIN — ONDANSETRON 4 MG: 2 INJECTION INTRAMUSCULAR; INTRAVENOUS at 17:11

## 2019-07-26 RX ADMIN — FENTANYL CITRATE 100 MCG: 50 INJECTION INTRAMUSCULAR; INTRAVENOUS at 05:12

## 2019-07-26 RX ADMIN — HYDROMORPHONE HYDROCHLORIDE 0.25 MG: 1 INJECTION, SOLUTION INTRAMUSCULAR; INTRAVENOUS; SUBCUTANEOUS at 17:11

## 2019-07-26 RX ADMIN — FENTANYL CITRATE 50 MCG: 50 INJECTION, SOLUTION INTRAMUSCULAR; INTRAVENOUS at 16:05

## 2019-07-26 RX ADMIN — LEVOFLOXACIN: 5 INJECTION, SOLUTION INTRAVENOUS at 15:03

## 2019-07-26 RX ADMIN — HEPARIN SODIUM 5000 UNITS: 5000 INJECTION, SOLUTION INTRAVENOUS; SUBCUTANEOUS at 15:55

## 2019-07-26 RX ADMIN — HEPARIN SODIUM 5000 UNITS: 5000 INJECTION INTRAVENOUS; SUBCUTANEOUS at 07:53

## 2019-07-26 RX ADMIN — METRONIDAZOLE 500 MG: 500 INJECTION, SOLUTION INTRAVENOUS at 20:11

## 2019-07-26 RX ADMIN — METHOCARBAMOL TABLETS 500 MG: 500 TABLET, COATED ORAL at 20:13

## 2019-07-26 RX ADMIN — EPHEDRINE SULFATE 10 MG: 50 INJECTION, SOLUTION INTRAVENOUS at 16:44

## 2019-07-26 NOTE — OP NOTE
OPERATIVE REPORT  PATIENT NAME: Anton Darnell    :  1960  MRN: [id number]  Pt Location: [facility] 06    SURGERY DATE: 2019    Surgeon(s) and Role:     * Ursula Chisholm MD - Primary     * Jenifer Armando PA-C - Assisting    Preop Diagnosis:  Bariatric surgery status [Z98 84]  Generalized abdominal pain [R10 84]  Internal hernia [K45 8]    Post-Op Diagnosis Codes: * Bariatric surgery status [Z98 84]     * Generalized abdominal pain [R10 84]     * Internal hernia [K45 8]    Procedure(s) (LRB):  LAPAROSCOPY DIAGNOSTIC CONVERSION TO OPEN, REDUCTION AND REPAIR OF INTERNAL HERNIA, REPAIR SEROSAL TEARS (N/A)    Specimen(s):  * No specimens in log *    Estimated Blood Loss:   100 mL    Drains:  * No LDAs found *    Anesthesia Type:   General    Operative Indications:  Bariatric surgery status [Z98 84]  Generalized abdominal pain [R10 84]  Internal hernia [K45 8]      Operative Findings:  Internal hernia at briceno's space involving entire small bowel; ischemia resolved and bowel was pink after reduction     Complications:   None    Procedure and Technique:  The patient was identified by name, armband and conversation  The patient was then brought to the operating room  After successful induction of general anesthesia the patient was prepped and draped in the usual sterile fashion  A time-out was performed and all were in agreement  Optiview technique was used to gain entrance into the abdomen in the left upper quadrant with a 10 mm 0 degree laparoscope  A 12 mm port was then placed below the umbilicus and two 5 mm ports were placed in the right and left lower quadrants  Four quadrant exparel marcaine TAP block was performed  Starting at the ileocecal valve and the bowel was examine going towards the jejunojejunostomy     The bowel appeared ischemic and there was surrounding chylous ascites  The hernia was very tight and serosal tears were created reducing the bowel    I felt that too many serosal tears were occurring due to the tight nature of this internal hernia so the decision was made to convert to open  Midline incision was made and cautery was used to dissect down to the fascia the fascia was then opened sharply with cautery and the peritoneal was opened with cautery as well  I then used my hands to reduce the bowel from the hernia defect located at [facility] space  Once the bowel was completely reduced the bowel returned to being perfused well  All serosal tears were repaired with 3 0 PDS  Mesenteric tear was repaired with a running 2 0 Vicryl  [facility] space was then closed with a running 2 0 Vicryl  The abdomen was then irrigated copiously with warm saline  The fascia was then closed with running #1 PDS  The port sites and exploratory laparotomy site was then closed with Monocryl and Dermabond  All instrument, sponge and needle counts were correct  EGD was then performed at the end of the case  The endoscope was advanced past the posterior pharynx and advanced into the esophagus  Bile was suctioned from the esophagus  The gastric pouch was not adequately visualized due to debris and there appeared to the a foreign body  I could not determine with certainty if a marginal ulcer was present  The julio limb appeared normal  The endoscope was then withdrawn     I was present for the entire procedure, A qualified resident physician was not available and A physician assistant was required during the procedure for retraction tissue handling,dissection and suturing    Patient Disposition:  PACU     SIGNATURE: Mala Salazar MD  DATE: July 26, 2019  TIME: 5:48 PM

## 2019-07-26 NOTE — ED CARE HANDOFF
Emergency Department Sign Out Note        Sign out and transfer of care from  Dr Gibson Gao  See Separate Emergency Department note  The patient, Sherren Cue, was evaluated by the previous provider for  Abdominal pain and high-grade small-bowel obstruction  Workup Completed:  Results for orders placed or performed during the hospital encounter of 07/26/19   CBC and differential   Result Value Ref Range    WBC 10 60 4 80 - 10 80 Thousand/uL    RBC 4 91 4 30 - 5 90 Million/uL    Hemoglobin 15 0 14 0 - 18 0 g/dL    Hematocrit 43 4 42 0 - 47 0 %    MCV 88 81 - 99 fL    MCH 30 5 26 0 - 34 0 pg    MCHC 34 5 31 0 - 37 0 g/dL    RDW 13 2 11 5 - 14 5 %    MPV 7 7 (L) 8 6 - 11 7 fL    Platelets 383 092 - 603 Thousands/uL    Neutrophils Relative 85 (H) 42 - 75 %    Lymphocytes Relative 11 (L) 21 - 51 %    Monocytes Relative 3 2 - 12 %    Eosinophils Relative 0 0 - 5 %    Basophils Relative 1 0 - 2 %    Neutrophils Absolute 9 00 (H) 1 40 - 6 50 Thousands/µL    Lymphocytes Absolute 1 10 0 60 - 4 47 Thousands/µL    Monocytes Absolute 0 30 0 17 - 1 22 Thousand/µL    Eosinophils Absolute 0 00 0 00 - 0 61 Thousand/µL    Basophils Absolute 0 10 0 00 - 0 10 Thousands/µL   Basic metabolic panel   Result Value Ref Range    Sodium 137 134 - 143 mmol/L    Potassium 3 7 3 5 - 5 5 mmol/L    Chloride 101 98 - 107 mmol/L    CO2 27 21 - 31 mmol/L    ANION GAP 9 4 - 13 mmol/L    BUN 13 7 - 25 mg/dL    Creatinine 0 85 0 70 - 1 30 mg/dL    Glucose 113 (H) 65 - 99 mg/dL    Calcium 9 6 8 6 - 10 5 mg/dL    eGFR 97 ml/min/1 73sq m     CT abdomen pelvis with contrast   Final Result      Limited evaluation due to lack of oral contrast       Postsurgical changes of Britney-en-Y gastric bypass complicated by internal hernia reflected by swirling and twisting of mesenteric vessels in the superior mesenteric artery region with profound mesenteric edema        Fluid distention of the gastric pouch, excluded stomach and some proximal small bowel loops with relative collapse of more distal bowel loops  A developing high-grade small bowel obstruction not excluded, therefore recommend close clinical follow-up    with repeat imaging if clinically indicated  I personally discussed this study with Lonita Lombard on 7/26/2019 at 5:58 AM                            Workstation performed: [id number]               ED Course / Workup Pending (followup):      ED Course as of Jul 26 0758   Fri Jul 26, 2019   0700  Patient care transitioned from Dr Cristobal Left  Patient seen and examined at bedside  Patient comfortable at this time      80  Dr Raciel Mireles contacted and case discussed  Patient to be transferred to [address] [place] [address] and will be evaluated there    Requests patient receive heparin antibiotics at this time      [phone]  EMS arrival for patient transport to [address] [place] [address] ED were patient will be seen as a private patient of Dr Raciel Mireles        Procedures  MDM    Disposition  Final diagnoses:   Small bowel obstruction ([address] )     Time reflects when diagnosis was documented in both MDM as applicable and the Disposition within this note     Time User Action Codes Description Comment    7/26/2019  7:28 AM Lidya Martinez Add [K56 609] Small bowel obstruction [facility])       ED Disposition     ED Disposition Condition Date/Time Comment    Transfer to Another Facility-In Network  Fri Jul 26, 2019  7:28 AM [facility] should be transferred out to  [address] [place] [address]      MD Documentation      Most Recent Value   Patient Condition  The patient has been stabilized such that within reasonable medical probability, no material deterioration of the patient condition or the condition of the unborn child(cesar) is likely to result from the transfer   Reason for Transfer  Level of Care needed not available at this facility   Benefits of Transfer  Specialized equipment and/or services available at the receiving facility (Include comment)________________________, Continuity of care   Risks of Transfer  Potential for delay in receiving treatment, Potential deterioration of medical condition, Loss of IV, Increased discomfort during transfer, Possible worsening of condition or death during transfer   Accepting Physician  Dr Hoang Dumont Name, Eric Miller   Provider Certification  General risk, such as traffic hazards, adverse weather conditions, rough terrain or turbulence, possible failure of equipment (including vehicle or aircraft), or consequences of actions of persons outside the control of the transport personnel, Unanticipated needs of medical equipment and personnel during transport, Risk of worsening condition, The possibility of a transport vehicle being unavailable      RN Documentation      Most Recent Value   Accepting Facility Name, [facility]      Follow-up Information    None       Patient's Medications   Discharge Prescriptions    No medications on file     No discharge procedures on file         ED Provider  Electronically Signed by     Ninfa Riley DO  07/26/19 9790

## 2019-07-26 NOTE — ED NOTES
Dr Keturah Butler discussed with the patient and family member the benefits of transferring to another facility  Patient is agreeable at this time        Janeth Gaines RN  07/26/19 7465

## 2019-07-26 NOTE — ED PROVIDER NOTES
History  Chief Complaint   Patient presents with    Back Pain     lower back pain, radiates down both legs  64-YEAR-OLD MALE WITH HISTORY OF COPD PRESENTS TO THE EMERGENCY DEPARTMENT WITH 5 DAYS OF BACK PAIN PREDOMINANTLY IN THE LUMBAR BACK WITHOUT ANTECEDENT EVENT TO EXPLAIN THE PAIN  PAIN ESCALATED THIS EVENING AS THE PATIENT WAS TRYING TO GET SOME SLEEP  THE LOW-BACK PAIN IS DESCRIBED AS SHARP RADIATING TO THE BACK OF BOTH LEGS AND DESCENDING TO THE CALVES OF BOTH LEGS  PAIN IS WORSE WITH MOVEMENT THERE IS NO CHANGE IN BOWEL OR BLADDER HABITS  NO PARESTHESIAS          Prior to Admission Medications   Prescriptions Last Dose Informant Patient Reported? Taking?    Magnesium 400 MG CAPS  Self Yes No   Sig: Take 1 tablet by mouth daily   ONETOUCH DELICA LANCETS FINE MISC  Self No No   Sig: Use 2-3 times per day as needed   ONETOUCH VERIO test strip  Self No No   Sig: Test 2-3 times daily as instructed   Protein POWD  Self Yes No   Sig: Take by mouth   acetaminophen (TYLENOL 8 HOUR) 650 mg CR tablet  Self No No   Sig: Take 1 tablet (650 mg total) by mouth every 8 (eight) hours   albuterol (PROVENTIL HFA,VENTOLIN HFA) 90 mcg/act inhaler  Self Yes No   Sig: Inhale 2 puffs every 6 (six) hours as needed for wheezing   atorvastatin (LIPITOR) 40 mg tablet  Self Yes No   Sig: Take 80 mg by mouth daily     diclofenac sodium (VOLTAREN) 1 %  Self Yes No   Sig: Place on the skin   ergocalciferol (VITAMIN D2) 50,000 units  Self No No   Sig: Take 1 capsule (50,000 Units total) by mouth once a week for 12 doses   famotidine (PEPCID) 40 MG tablet  Self No No   Sig: Take 0 5 tablets (20 mg total) by mouth 2 (two) times a day   Patient not taking: Reported on 3/12/2019   megestrol (MEGACE) 40 MG/ML suspension  Self No No   Sig: Take 5 mL (200 mg total) by mouth daily   Patient not taking: Reported on 3/12/2019   methocarbamol (ROBAXIN) 500 mg tablet   No No   Sig: Take 1 tablet (500 mg total) by mouth 3 (three) times a day methylPREDNISolone 4 MG tablet therapy pack   No No   Sig: Use as directed on package   omeprazole (PriLOSEC OTC) 20 MG tablet  Self No No   Sig: Take 1 tablet (20 mg total) by mouth 2 (two) times a day for 30 days      Facility-Administered Medications: None       Past Medical History:   Diagnosis Date    Anesthesia     Pt wakes up during "almost every surgery"    Arthritis     Asthma     Bariatric surgery status     Cervical radiculopathy     Chemotherapy follow-up examination     Chronic pain     Chronic pain disorder     [facility] [address]     Diabetes mellitus [facility] [address] [facility]     borderline "years ago"    Food allergy     clams    GERD (gastroesophageal reflux disease)     Heart murmur     Hiatal hernia     History of malignant neoplasm     History of pneumonia 04/12/2016    History of pulmonary embolism     History of ulceration     Hyperlipidemia     Lung cancer (HCC)     left lung, radiation and chemo tx    Migraine     Pneumonia     Postgastrectomy malabsorption     Right scapholunate ligament tear     Skipped heart beats     Wears partial dentures     upper and lower       Past Surgical History:   Procedure Laterality Date    BRONCHOSCOPY      COLONOSCOPY      FRACTURE SURGERY      femur right 1985    GASTRIC BYPASS LAPAROSCOPIC N/A 7/12/2017    Procedure: GASTRIC DIVERSION WITH BROOKLYN-EN-Y RECONSTRUCTION WITH  SHORT 60 cm LIMB;  Surgeon: Erica High MD;  [facility];  Service: Bariatrics    KNEE ARTHROSCOPY Right     right shoulder    LUNG LOBECTOMY Left     LYMPHADENECTOMY  05/22/2012    Dr Eleuterio Vigil ANT INTERBODY MIN DISCECTOMY, CERVICAL BELOW C2 N/A 10/17/2017    Procedure: C5-6, C6-7 ACDF WITH ALLOGRAFT (NEURO MONITORING);   Surgeon: Juan C Gutierrez MD;  [facility];  Service: Orthopedics    CO COLONOSCOPY FLX DX W/COLLJ SPEC WHEN PFRMD N/A 4/14/2017 Procedure: COLONOSCOPY;  Surgeon: Hamida Soriano MD;  [facility];  Service: Gastroenterology    OH EGD TRANSORAL BIOPSY SINGLE/MULTIPLE N/A 1/9/2019    Procedure: ESOPHAGOGASTRODUODENOSCOPY (EGD); Surgeon: Nomi Ritchie MD;  [facility]; Service: Bariatrics    OH ESOPHAGOGASTRODUODENOSCOPY TRANSORAL DIAGNOSTIC N/A 1/11/2017    Procedure: ESOPHAGOGASTRODUODENOSCOPY (EGD); Surgeon: Hamida Soriano MD;  [facility]; Service: Gastroenterology    OH INCISE FINGER TENDON SHEATH Right 11/27/2018    Procedure: RELEASE TRIGGER FINGER long and index finger;  Surgeon: Elva Vieira MD;  [facility];  Service: Orthopedics    OH LAP, REPAIR PARAESOPHAGEAL HERNIA, INCL FUNDOPLASTY W/ MESH N/A 7/12/2017    Procedure: REPAIR HERNIA PARAESOPHAGEAL  LAPAROSCOPIC;  Surgeon: Nomi Ritchie MD;  [facility];  Service: Tianna Shines LIG Right 6/9/2016    Procedure: RELEASE CARPAL TUNNEL ENDOSCOPIC;  Surgeon: Elva Vieira MD;  [facility];  Service: Orthopedics    RECONSTRUCTION DISTAL RADIUS/ULNA JOINT (DRUJ) Right 9/6/2016    Procedure: WRIST SCAPHOLUNATE LIGAMENT RECONSTRUCTION WITH ECRB TENDON AUTOGRAPH , SPLINT APPLICATION ;  Surgeon: Elva Vieira MD;  [facility];  Service:    [facility] SHOULDER SURGERY      TONSILLECTOMY         Family History   Problem Relation Age of Onset    Other Mother         Back disorder    Diabetes Mother         Diabetes Mellitus    Hypertension Mother     Heart disease Father     Hypertension Father     Breast cancer Sister     Skin cancer Sister     Breast cancer Paternal Grandmother     Skin cancer Paternal Grandmother      I have reviewed and agree with the history as documented      Social History     Tobacco Use    Smoking status: Former Smoker     Packs/day: 0 50     Years: 48 00     Pack years: 24 00     Types: Cigarettes     Last attempt to quit: 5/1/2017     Years since quittin 2    Smokeless tobacco: Never Used   Substance Use Topics    Alcohol use: Yes     Frequency: Monthly or less     Drinks per session: 1 or 2     Binge frequency: Never    Drug use: No        Review of Systems   Constitutional: Negative for chills and fever  HENT: Negative for ear pain, rhinorrhea and sore throat  Eyes: Negative for pain, redness and visual disturbance  Respiratory: Positive for cough and shortness of breath  Cardiovascular: Negative for chest pain and leg swelling  Gastrointestinal: Negative for abdominal pain, diarrhea, nausea and vomiting  Genitourinary: Negative for dysuria, flank pain, frequency and urgency  Musculoskeletal: Negative for back pain, myalgias and neck pain  Skin: Negative for rash  Neurological: Negative for dizziness, weakness, light-headedness and headaches  Hematological: Negative  Psychiatric/Behavioral: Negative for agitation, confusion and suicidal ideas  The patient is not nervous/anxious  All other systems reviewed and are negative  Physical Exam  Physical Exam   Constitutional: He is oriented to person, place, and time  He appears well-developed and well-nourished  HENT:   Nose: Nose normal    Mouth/Throat: Oropharynx is clear and moist  No oropharyngeal exudate  Eyes: Pupils are equal, round, and reactive to light  Conjunctivae and EOM are normal  No scleral icterus  Neck: Normal range of motion  Neck supple  No JVD present  No tracheal deviation present  Cardiovascular: Normal rate, regular rhythm and normal heart sounds  No murmur heard  Pulmonary/Chest: Effort normal and breath sounds normal  No respiratory distress  He has no wheezes  He has no rales  Abdominal: Soft  Bowel sounds are normal  He exhibits no mass  There is tenderness (SCATTERED)  There is guarding  There is no rebound  No hernia     THERE ARE NO DISTINCT MASSES OR BRUITS THAT ARE NOTED ON THE EXAM   Musculoskeletal: He exhibits no edema or tenderness  RANGE OF MOTION OF THE TRUNK THE LOWER EXTREMITIES IS COMPROMISED BY PAIN  THERE IS SOME PALPABLE TENDERNESS AT THE PARALUMBAR BACK GREATER ON THE LEFT THE RIGHT AND ALSO SOME MIDLINE TENDERNESS WITHOUT BONY DEFECT  THERE IS A NEGATIVE LASEGUE USE STRAIGHT LEG RAISE IS POSITIVE BILATERALLY  THERE IS DECREASED RANGE OF MOTION OF THE TRUNK IN ALL EXTREMITIES NEUROVASCULAR AND SENSORY IS INTACT   Neurological: He is alert and oriented to person, place, and time  No cranial nerve deficit or sensory deficit  He exhibits normal muscle tone  5/5 motor, nl sens   Skin: Skin is warm and dry  Psychiatric: He has a normal mood and affect  His behavior is normal    Nursing note and vitals reviewed  Vital Signs  ED Triage Vitals [07/26/19 0238]   Temperature Pulse Respirations Blood Pressure SpO2   (!) 97 1 °F (36 2 °C) 97 22 (!) 183/90 100 %      Temp Source Heart Rate Source Patient Position - Orthostatic VS BP Location FiO2 (%)   Temporal Monitor Sitting Left arm --      Pain Score       Worst Possible Pain           Vitals:    07/26/19 0238   BP: (!) 183/90   Pulse: 97   Patient Position - Orthostatic VS: Sitting         Visual Acuity      ED Medications  Medications   fentanyl citrate (PF) 100 MCG/2ML 75 mcg (75 mcg Intramuscular Given 7/26/19 0250)       Diagnostic Studies  Results Reviewed     None                 No orders to display              Procedures  Procedures       ED Course                               MDM  Number of Diagnoses or Management Options  Diagnosis management comments: THIS PATIENT HAS BEEN EPISODICALLY EVALUATED FOR HIS PAIN GIVEN THAT HE GOT A LITTLE RELIEF FROM THE MEDICATIONS WERE ADMINISTERED THE CONTINUES TO HAVE PAIN AT THIS TIME BOTH THE UPPER AND LOWER BACK THE CHEST AND ABDOMEN GREATER IN THE LOWER BACK AND ABDOMEN  ACCORDINGLY THE CT SCAN IS ORDERED        Disposition  Final diagnoses:   None     ED Disposition     None      Follow-up Information    None Patient's Medications   Discharge Prescriptions    No medications on file     No discharge procedures on file      ED Provider  Electronically Signed by           Angelica Green MD  07/26/19 6950       Angelica Green MD  07/26/19 1027       Angelica Green MD  07/26/19 9343

## 2019-07-26 NOTE — ANESTHESIA PREPROCEDURE EVALUATION
Review of Systems/Medical History          Cardiovascular  Hyperlipidemia,    Pulmonary  COPD , Asthma , well controlled/ stable Last rescue: > 1 year ago Asthma type of rescue: PRN inhaler,   Comment: Remote hx of pneumonia     GI/Hepatic    No GERD ,  No hiatal hernia,   Comment: Paraesophageal hernia repair 2017    Post Britney-en-Y gastric bypass with 100 pound weight loss     Negative  ROS        Endo/Other  Negative endo/other ROS No diabetes ,   Comment: Hx of pre diabetes last HGb A1C 5 1    GYN       Hematology  Negative hematology ROS      Musculoskeletal    Arthritis     Neurology    Headaches,    Psychology   Negative psychology ROS              Physical Exam    Airway    Mallampati score: III  TM Distance: >3 FB  Neck ROM: limited     Dental   upper dentures and lower dentures,     Cardiovascular  Rhythm: regular, Rate: normal, Cardiovascular exam normal    Pulmonary  Comment: Diminished breath sounds in bases, Breath sounds clear to auscultation, Decreased breath sounds,     Other Findings        Anesthesia Plan  ASA Score- 2 Emergent    Anesthesia Type- general with ASA Monitors  Additional Monitors:   Airway Plan:     Comment: Patient reportedly has had multiple episodes of awareness under anesthesia  Will use BISS monitor perioperatively  Patent was instructed that this would not guaruntee another episode of awareness  Plan is GA with RSI   Plan Factors-Patient not instructed to abstain from smoking on day of procedure  Patient did not smoke on day of surgery  Induction- rapid sequence induction and intravenous  Postoperative Plan- Plan for postoperative opioid use  Planned trial extubation    Informed Consent- Anesthetic plan and risks discussed with patient and spouse

## 2019-07-26 NOTE — ED NOTES
Report given to [facility] @ [facility]       [address] RN  07/26/19 4926

## 2019-07-26 NOTE — ED NOTES
Pt instructed to void ambulated to restroom with steady gait provided with chlorhexidine to clean himself and bedding changed        Maria Eelna Cole RN  07/26/19 4555

## 2019-07-26 NOTE — H&P
BARIATRIC HISTORY AND PHYSICAL - BARIATRIC SURGERY  Juan Antonio Craig 63 y o  male MRN: [id number]  Unit/Bed#: ED 28 Encounter: [id number]      HPI:  Juan Antonio Craig is a 63 y o  male who presents to ED due to generalized abdominal pain, back pain with radiation bilaterally in posterior legs x 5 days  Patient had Laparoscopic gastric diversion with RNY Gastric Bypass Revision and Hiatal Hernia repair done in 2017 by Dr Dayana Oh with over 100lb weight loss  Surgery course was uncomplicated until this year with workup in our bariatrics clinic for marginal ulcer, EGD done 1/2019  Patient denies taking NSAIDs, smoking, or alcohol usage  Last week patient was given a steroid taper pack due to back pain which did not improve symptoms  Overnight patient states pain became a 10/10 which prompted his visit to [facility] ED  He reports that this is his third episode of similar abdominal pain, last one noted in November of 2018  Denies any nausea or vomiting  Complains of abdominal bloating and inability to tolerate anything PO  Last bowel movement yesterday and reported as normal  Passing flatus  Review of Systems   Constitutional: Positive for appetite change  Negative for chills, fever and unexpected weight change  HENT: Negative for trouble swallowing  Respiratory: Positive for chest tightness and shortness of breath  Negative for choking  Gastrointestinal: Positive for abdominal pain  Negative for abdominal distention, constipation, diarrhea, nausea and vomiting  Psychiatric/Behavioral: Negative          Historical Information   Past Medical History:   Diagnosis Date    Anesthesia     Pt wakes up during "almost every surgery"    Arthritis     Asthma     Bariatric surgery status     Cervical radiculopathy     Chemotherapy follow-up examination     Chronic pain     Chronic pain disorder     [facility] [address]     Diabetes mellitus ([facility] [address] [facility]     borderline "years ago"    Food allergy clams    GERD (gastroesophageal reflux disease)     Heart murmur     Hiatal hernia     History of malignant neoplasm     History of pneumonia 04/12/2016    History of pulmonary embolism     History of ulceration     Hyperlipidemia     Lung cancer (HCC)     left lung, radiation and chemo tx    Migraine     Pneumonia     Postgastrectomy malabsorption     Right scapholunate ligament tear     Skipped heart beats     Wears partial dentures     upper and lower     Past Surgical History:   Procedure Laterality Date    BRONCHOSCOPY      COLONOSCOPY      FRACTURE SURGERY      femur right 1985    GASTRIC BYPASS LAPAROSCOPIC N/A 7/12/2017    Procedure: GASTRIC DIVERSION WITH BROOK[place]N-EN-Y RECONSTRUCTION WITH  SHORT 60 cm LIMB;  Surgeon: Dennys Murrell MD;  [facility];  Service: Bariatrics    KNEE ARTHROSCOPY Right     right shoulder    LUNG LOBECTOMY Left     LYMPHADENECTOMY  05/22/2012    Dr Burt Rodríguez ANT INTERBODY MIN DISCECTOMY, CERVICAL BELOW C2 N/A 10/17/2017    Procedure: C5-6, C6-7 ACDF WITH ALLOGRAFT (NEURO MONITORING); Surgeon: Daniel Arshad MD;  [facility];  Service: Orthopedics    LA COLONOSCOPY FLX DX W/COLLJ SPEC WHEN PFRMD N/A 4/14/2017    Procedure: COLONOSCOPY;  Surgeon: Shi Rivero MD;  [facility];  Service: Gastroenterology    LA EGD TRANSORAL BIOPSY SINGLE/MULTIPLE N/A 1/9/2019    Procedure: ESOPHAGOGASTRODUODENOSCOPY (EGD); Surgeon: Dennys Murrell MD;  [facility]; Service: Bariatrics    LA ESOPHAGOGASTRODUODENOSCOPY TRANSORAL DIAGNOSTIC N/A 1/11/2017    Procedure: ESOPHAGOGASTRODUODENOSCOPY (EGD); Surgeon: Shi Rivero MD;  [facility];   Service: Gastroenterology    LA INCISE FINGER TENDON SHEATH Right 11/27/2018    Procedure: RELEASE TRIGGER FINGER long and index finger;  Surgeon: Farrah Richter MD;  [facility];  Service: Orthopedics    SD LAP, REPAIR PARAESOPHAGEAL HERNIA, INCL FUNDOPLASTY W/ MESH N/A 2017    Procedure: REPAIR HERNIA PARAESOPHAGEAL  LAPAROSCOPIC;  Surgeon: Kirsty Castro MD;  [facility];  Service: Bariatrics    SD WRIST Jock Postal LIG Right 2016    Procedure: RELEASE CARPAL TUNNEL ENDOSCOPIC;  Surgeon: Sarita Castelan MD;  [facility];  Service: Orthopedics    RECONSTRUCTION DISTAL RADIUS/ULNA JOINT (DRUJ) Right 2016    Procedure: WRIST SCAPHOLUNATE LIGAMENT RECONSTRUCTION WITH ECRB TENDON AUTOGRAPH , SPLINT APPLICATION ;  Surgeon: Sarita Castelan MD;  [facility];  Service:    [facility] Davis SHOULDER SURGERY      TONSILLECTOMY       Social History   Social History     Substance and Sexual Activity   Alcohol Use Yes    Frequency: Monthly or less    Drinks per session: 1 or 2    Binge frequency: Never     Social History     Substance and Sexual Activity   Drug Use No     Social History     Tobacco Use   Smoking Status Former Smoker    Packs/day: 0 50    Years: 48 00    Pack years: 24 00    Types: Cigarettes    Last attempt to quit: 2017    Years since quittin 2   Smokeless Tobacco Never Used     Family History: non-contributory    Meds/Allergies   all medications and allergies reviewed  Allergies   Allergen Reactions    Codeine Hives, Itching, Nausea Only, GI Intolerance and Vomiting    Hydrocodone Hives and GI Intolerance    Penicillins Anaphylaxis and Throat Swelling    Shellfish-Derived Products Nausea Only and Vomiting       Objective     Current Vitals:   Blood Pressure: 166/90 (19)  Pulse: 90 (19)  Temperature: 98 5 °F (36 9 °C) (19)  Temp Source: Oral (19)  Respirations: 20 (19)  SpO2: 100 % (19)  bowel movement  No intake or output data in the 24 hours ending 19 1025    Invasive Devices     Peripheral Intravenous Line            Peripheral IV 19 Right Antecubital less than 1 day                Physical Exam   Constitutional: He appears well-developed and well-nourished  No distress  HENT:   Head: Normocephalic and atraumatic  Eyes: Pupils are equal, round, and reactive to light  Neck: Normal range of motion  Cardiovascular: Normal rate  Pulmonary/Chest: Effort normal  No respiratory distress  Abdominal: Soft  He exhibits no distension and no mass  There is tenderness  There is rebound and guarding  No hernia  Moderate tenderness to light palpation in all 4 abdominal quadrants; pain worse in epigastric region as well as lower mid abdomen; no incisional hernias appreciated    Skin: He is not diaphoretic  Lab Results:   I have personally reviewed pertinent lab results  , CBC:   Lab Results   Component Value Date    WBC 10 60 07/26/2019    HGB 15 0 07/26/2019    HCT 43 4 07/26/2019    MCV 88 07/26/2019     07/26/2019    MCH 30 5 07/26/2019    MCHC 34 5 07/26/2019    RDW 13 2 07/26/2019    MPV 7 7 (L) 07/26/2019   , CMP:   Lab Results   Component Value Date    SODIUM 137 07/26/2019    K 3 7 07/26/2019     07/26/2019    CO2 27 07/26/2019    BUN 13 07/26/2019    CREATININE 0 85 07/26/2019    CALCIUM 9 6 07/26/2019    EGFR 97 07/26/2019     Imaging: I have personally reviewed pertinent films in PACS  EKG, Pathology, and Other Studies: I have personally reviewed pertinent reports  Code Status: FULL CODE     Assessment/Plan:    Generalized abdominal pain  Internal hernia  Bariatric surgery status     Patient s/p RNY Gastric Bypass in 2012 with Dr Cassidy Barrios with over 100lb weight loss admitted due to generalized abdominal pain and back pain, currently afebrile and hemodynamically stable  Will repeat lab studies including lactic acid  IV pain medication on board         Results were reviewed with the patient including the blood work results and the CT findings suggesting internal hernia reflected by swirling and twisting of mesenteric vessels in the superior mesenteric artery region with profound mesenteric edema  Patient will go to OR today with Dr Teagan Browning for [address] [place] [address]  Risks and benefits explained to the patient  Alternatives to surgery and alternative forms of surgery were also explained  Consent was signed  Questions were answered and concerns were addressed  Patient wishes to proceed  As per [facility] guidelines, I had a discussion with the patient regarding his CODE STATUS in the perioperative period and the patient is level 1 or FULL CODE STATUS  Plan of care discussed with patient and patient's nurse  Plan of care discussed with Dr Teagan Browning

## 2019-07-26 NOTE — EMTALA/ACUTE CARE TRANSFER
[facility]  [address] [facility] [address] [phone]  Dept: [phone]      EMTALA TRANSFER CONSENT    NAME Kg Singleton                                         1960                              MRN [id number]    I have been informed of my rights regarding examination, treatment, and transfer   by Dr Priscilla Horne DO    Benefits: Specialized equipment and/or services available at the receiving facility (Include comment)________________________, Continuity of care    Risks: Potential for delay in receiving treatment, Potential deterioration of medical condition, Loss of IV, Increased discomfort during transfer, Possible worsening of condition or death during transfer      Transfer Request   I acknowledge that my medical condition has been evaluated and explained to me by the emergency department physician or other qualified medical person and/or my attending physician who has recommended and offered to me further medical examination and treatment  I understand the Hospital's obligation with respect to the treatment and stabilization of my emergency medical condition  I nevertheless request to be transferred  I release the Hospital, the doctor, and any other persons caring for me from all responsibility or liability for any injury or ill effects that may result from my transfer and agree to accept all responsibility for the consequences of my choice to transfer, rather than receive stabilizing treatment at the Hospital  I understand that because the transfer is my request, my insurance may not provide reimbursement for the services  The Hospital will assist and direct me and my family in how to make arrangements for transfer, but the hospital is not liable for any fees charged by the transport service    In spite of this understanding, I refuse to consent to further medical examination and treatment which has been offered to me, and request transfer to [address], [address] :  [facility]  I authorize the performance of emergency medical procedures and treatments upon me in both transit and upon arrival at the receiving facility  Additionally, I authorize the release of any and all medical records to the receiving facility and request they be transported with me, if possible  I authorize the performance of emergency medical procedures and treatments upon me in both transit and upon arrival at the receiving facility  Additionally, I authorize the release of any and all medical records to the receiving facility and request they be transported with me, if possible  I understand that the safest mode of transportation during a medical emergency is an ambulance and that the Hospital advocates the use of this mode of transport  Risks of traveling to the receiving facility by car, including absence of medical control, life sustaining equipment, such as oxygen, and medical personnel has been explained to me and I fully understand them  (VALERIO CORRECT BOX BELOW)  [  ]  I consent to the stated transfer and to be transported by ambulance/helicopter  [  ]  I consent to the stated transfer, but refuse transportation by ambulance and accept full responsibility for my transportation by car  I understand the risks of non-ambulance transfers and I exonerate the Hospital and its staff from any deterioration in my condition that results from this refusal     X___________________________________________    DATE  19  TIME________  Signature of patient or legally responsible individual signing on patient behalf           RELATIONSHIP TO PATIENT_________________________          Provider Certification    NAME Chary Darshan ANAYA 1960                              MRN [id number]    A medical screening exam was performed on the above named patient    Based on the examination:    Condition Necessitating Transfer The encounter diagnosis was Small bowel obstruction ([address] )  Patient Condition: The patient has been stabilized such that within reasonable medical probability, no material deterioration of the patient condition or the condition of the unborn child(cesar) is likely to result from the transfer    Reason for Transfer: Level of Care needed not available at this facility    Transfer Requirements: [facility]   · Space available and qualified personnel available for treatment as acknowledged by    · Agreed to accept transfer and to provide appropriate medical treatment as acknowledged by       Dr Johann Skinner  · Appropriate medical records of the examination and treatment of the patient are provided at the time of transfer   [address] [facility] _______  · Transfer will be performed by qualified personnel from    and appropriate transfer equipment as required, including the use of necessary and appropriate life support measures      Provider Certification: I have examined the patient and explained the following risks and benefits of being transferred/refusing transfer to the patient/family:  General risk, such as traffic hazards, adverse weather conditions, rough terrain or turbulence, possible failure of equipment (including vehicle or aircraft), or consequences of actions of persons outside the control of the transport personnel, Unanticipated needs of medical equipment and personnel during transport, Risk of worsening condition, The possibility of a transport vehicle being unavailable      Based on these reasonable risks and benefits to the patient and/or the unborn child(cesar), and based upon the information available at the time of the patients examination, I certify that the medical benefits reasonably to be expected from the provision of appropriate medical treatments at another medical facility outweigh the increasing risks, if any, to the individuals medical condition, and in the case of labor to the unborn child, from effecting the transfer      X____________________________________________ DATE 07/26/19        TIME_______      ORIGINAL - SEND TO MEDICAL RECORDS   COPY - SEND WITH PATIENT DURING TRANSFER

## 2019-07-26 NOTE — ED NOTES
All personal belongings wife took with her except dentures, dentures with pt        Emily Smallwood, JESSIE  07/26/19 2031

## 2019-07-26 NOTE — ED NOTES
Per anaesthesiologist pt to be stared on NSS at 125 ml/hr fluids started at this time        Dangelo Carrillo RN  07/26/19 0468

## 2019-07-27 LAB
ANION GAP SERPL CALCULATED.3IONS-SCNC: 11 MMOL/L (ref 4–13)
ANION GAP SERPL CALCULATED.3IONS-SCNC: 14 MMOL/L (ref 4–13)
BASOPHILS # BLD MANUAL: 0 THOUSAND/UL (ref 0–0.1)
BASOPHILS NFR MAR MANUAL: 0 % (ref 0–1)
BUN SERPL-MCNC: 20 MG/DL (ref 5–25)
BUN SERPL-MCNC: 24 MG/DL (ref 5–25)
CA-I BLD-SCNC: 1.08 MMOL/L (ref 1.12–1.32)
CALCIUM SERPL-MCNC: 7.8 MG/DL (ref 8.3–10.1)
CALCIUM SERPL-MCNC: 8.2 MG/DL (ref 8.3–10.1)
CHLORIDE SERPL-SCNC: 103 MMOL/L (ref 100–108)
CHLORIDE SERPL-SCNC: 104 MMOL/L (ref 100–108)
CO2 SERPL-SCNC: 19 MMOL/L (ref 21–32)
CO2 SERPL-SCNC: 20 MMOL/L (ref 21–32)
CREAT SERPL-MCNC: 0.94 MG/DL (ref 0.6–1.3)
CREAT SERPL-MCNC: 1.29 MG/DL (ref 0.6–1.3)
EOSINOPHIL # BLD MANUAL: 0 THOUSAND/UL (ref 0–0.4)
EOSINOPHIL NFR BLD MANUAL: 0 % (ref 0–6)
ERYTHROCYTE [DISTWIDTH] IN BLOOD BY AUTOMATED COUNT: 12.7 % (ref 11.6–15.1)
ERYTHROCYTE [DISTWIDTH] IN BLOOD BY AUTOMATED COUNT: 12.9 % (ref 11.6–15.1)
GFR SERPL CREATININE-BSD FRML MDRD: 61 ML/MIN/1.73SQ M
GFR SERPL CREATININE-BSD FRML MDRD: 90 ML/MIN/1.73SQ M
GLUCOSE SERPL-MCNC: 118 MG/DL (ref 65–140)
GLUCOSE SERPL-MCNC: 119 MG/DL (ref 65–140)
HCT VFR BLD AUTO: 44.6 % (ref 36.5–49.3)
HCT VFR BLD AUTO: 50.4 % (ref 36.5–49.3)
HGB BLD-MCNC: 14.8 G/DL (ref 12–17)
HGB BLD-MCNC: 16.5 G/DL (ref 12–17)
LACTATE SERPL-SCNC: 2.2 MMOL/L (ref 0.5–2)
LYMPHOCYTES # BLD AUTO: 0.76 THOUSAND/UL (ref 0.6–4.47)
LYMPHOCYTES # BLD AUTO: 9 % (ref 14–44)
MAGNESIUM SERPL-MCNC: 1.2 MG/DL (ref 1.6–2.6)
MCH RBC QN AUTO: 30.2 PG (ref 26.8–34.3)
MCH RBC QN AUTO: 30.3 PG (ref 26.8–34.3)
MCHC RBC AUTO-ENTMCNC: 32.7 G/DL (ref 31.4–37.4)
MCHC RBC AUTO-ENTMCNC: 33.2 G/DL (ref 31.4–37.4)
MCV RBC AUTO: 91 FL (ref 82–98)
MCV RBC AUTO: 92 FL (ref 82–98)
MONOCYTES # BLD AUTO: 0.59 THOUSAND/UL (ref 0–1.22)
MONOCYTES NFR BLD: 7 % (ref 4–12)
NEUTROPHILS # BLD MANUAL: 7.05 THOUSAND/UL (ref 1.85–7.62)
NEUTS SEG NFR BLD AUTO: 84 % (ref 43–75)
NRBC BLD AUTO-RTO: 0 /100 WBCS
PLATELET # BLD AUTO: 259 THOUSANDS/UL (ref 149–390)
PLATELET # BLD AUTO: 310 THOUSANDS/UL (ref 149–390)
PLATELET BLD QL SMEAR: ADEQUATE
PMV BLD AUTO: 9.5 FL (ref 8.9–12.7)
PMV BLD AUTO: 9.7 FL (ref 8.9–12.7)
POTASSIUM SERPL-SCNC: 4.8 MMOL/L (ref 3.5–5.3)
POTASSIUM SERPL-SCNC: 5.2 MMOL/L (ref 3.5–5.3)
RBC # BLD AUTO: 4.88 MILLION/UL (ref 3.88–5.62)
RBC # BLD AUTO: 5.47 MILLION/UL (ref 3.88–5.62)
RBC MORPH BLD: NORMAL
SODIUM SERPL-SCNC: 133 MMOL/L (ref 136–145)
SODIUM SERPL-SCNC: 138 MMOL/L (ref 136–145)
TOTAL CELLS COUNTED SPEC: 100
WBC # BLD AUTO: 4.02 THOUSAND/UL (ref 4.31–10.16)
WBC # BLD AUTO: 8.39 THOUSAND/UL (ref 4.31–10.16)

## 2019-07-27 PROCEDURE — 83735 ASSAY OF MAGNESIUM: CPT | Performed by: SURGERY

## 2019-07-27 PROCEDURE — 99024 POSTOP FOLLOW-UP VISIT: CPT | Performed by: SURGERY

## 2019-07-27 PROCEDURE — 85027 COMPLETE CBC AUTOMATED: CPT | Performed by: SURGERY

## 2019-07-27 PROCEDURE — 83605 ASSAY OF LACTIC ACID: CPT | Performed by: SURGERY

## 2019-07-27 PROCEDURE — 80048 BASIC METABOLIC PNL TOTAL CA: CPT | Performed by: SURGERY

## 2019-07-27 PROCEDURE — 82330 ASSAY OF CALCIUM: CPT | Performed by: SURGERY

## 2019-07-27 PROCEDURE — 85007 BL SMEAR W/DIFF WBC COUNT: CPT | Performed by: SURGERY

## 2019-07-27 RX ORDER — TAMSULOSIN HYDROCHLORIDE 0.4 MG/1
0.8 CAPSULE ORAL
Status: DISCONTINUED | OUTPATIENT
Start: 2019-07-27 | End: 2019-07-28

## 2019-07-27 RX ORDER — LORAZEPAM 2 MG/ML
0.5 INJECTION INTRAMUSCULAR ONCE
Status: COMPLETED | OUTPATIENT
Start: 2019-07-27 | End: 2019-07-27

## 2019-07-27 RX ORDER — MAGNESIUM SULFATE HEPTAHYDRATE 40 MG/ML
4 INJECTION, SOLUTION INTRAVENOUS ONCE
Status: COMPLETED | OUTPATIENT
Start: 2019-07-27 | End: 2019-07-28

## 2019-07-27 RX ORDER — ALBUMIN (HUMAN) 12.5 G/50ML
50 SOLUTION INTRAVENOUS ONCE
Status: COMPLETED | OUTPATIENT
Start: 2019-07-27 | End: 2019-07-27

## 2019-07-27 RX ORDER — KETOROLAC TROMETHAMINE 30 MG/ML
15 INJECTION, SOLUTION INTRAMUSCULAR; INTRAVENOUS ONCE
Status: COMPLETED | OUTPATIENT
Start: 2019-07-27 | End: 2019-07-27

## 2019-07-27 RX ORDER — FUROSEMIDE 10 MG/ML
20 INJECTION INTRAMUSCULAR; INTRAVENOUS ONCE
Status: COMPLETED | OUTPATIENT
Start: 2019-07-27 | End: 2019-07-27

## 2019-07-27 RX ADMIN — HEPARIN SODIUM 5000 UNITS: 5000 INJECTION INTRAVENOUS; SUBCUTANEOUS at 13:29

## 2019-07-27 RX ADMIN — SIMETHICONE CHEW TAB 80 MG 80 MG: 80 TABLET ORAL at 06:18

## 2019-07-27 RX ADMIN — SODIUM CHLORIDE, SODIUM LACTATE, POTASSIUM CHLORIDE, AND CALCIUM CHLORIDE 2000 ML: .6; .31; .03; .02 INJECTION, SOLUTION INTRAVENOUS at 07:30

## 2019-07-27 RX ADMIN — FAMOTIDINE 20 MG: 10 INJECTION, SOLUTION INTRAVENOUS at 17:10

## 2019-07-27 RX ADMIN — METHOCARBAMOL TABLETS 500 MG: 500 TABLET, COATED ORAL at 21:08

## 2019-07-27 RX ADMIN — ACETAMINOPHEN 975 MG: 325 TABLET ORAL at 07:26

## 2019-07-27 RX ADMIN — LEVOFLOXACIN 750 MG: 5 INJECTION, SOLUTION INTRAVENOUS at 14:30

## 2019-07-27 RX ADMIN — SODIUM CHLORIDE 100 ML/HR: 0.9 INJECTION, SOLUTION INTRAVENOUS at 18:31

## 2019-07-27 RX ADMIN — ACETAMINOPHEN 975 MG: 325 TABLET ORAL at 21:08

## 2019-07-27 RX ADMIN — FUROSEMIDE 20 MG: 10 INJECTION, SOLUTION INTRAMUSCULAR; INTRAVENOUS at 22:01

## 2019-07-27 RX ADMIN — KETOROLAC TROMETHAMINE 15 MG: 30 INJECTION, SOLUTION INTRAMUSCULAR at 21:52

## 2019-07-27 RX ADMIN — SODIUM CHLORIDE 100 ML/HR: 0.9 INJECTION, SOLUTION INTRAVENOUS at 10:05

## 2019-07-27 RX ADMIN — MAGNESIUM SULFATE HEPTAHYDRATE 4 G: 40 INJECTION, SOLUTION INTRAVENOUS at 22:57

## 2019-07-27 RX ADMIN — ATORVASTATIN CALCIUM 80 MG: 80 TABLET, FILM COATED ORAL at 16:20

## 2019-07-27 RX ADMIN — METHOCARBAMOL TABLETS 500 MG: 500 TABLET, COATED ORAL at 16:20

## 2019-07-27 RX ADMIN — ONDANSETRON 4 MG: 2 INJECTION INTRAMUSCULAR; INTRAVENOUS at 13:31

## 2019-07-27 RX ADMIN — FAMOTIDINE 20 MG: 10 INJECTION, SOLUTION INTRAVENOUS at 08:46

## 2019-07-27 RX ADMIN — SODIUM CHLORIDE 100 ML/HR: 0.9 INJECTION, SOLUTION INTRAVENOUS at 05:20

## 2019-07-27 RX ADMIN — FUROSEMIDE 20 MG: 10 INJECTION, SOLUTION INTRAMUSCULAR; INTRAVENOUS at 21:45

## 2019-07-27 RX ADMIN — OXYCODONE HYDROCHLORIDE 10 MG: 5 SOLUTION ORAL at 10:18

## 2019-07-27 RX ADMIN — SIMETHICONE CHEW TAB 80 MG 80 MG: 80 TABLET ORAL at 19:32

## 2019-07-27 RX ADMIN — LORAZEPAM 0.5 MG: 2 INJECTION INTRAMUSCULAR; INTRAVENOUS at 15:38

## 2019-07-27 RX ADMIN — METHOCARBAMOL TABLETS 500 MG: 500 TABLET, COATED ORAL at 08:45

## 2019-07-27 RX ADMIN — ALBUMIN (HUMAN) 50 G: 12.5 SOLUTION INTRAVENOUS at 17:14

## 2019-07-27 RX ADMIN — HYDROMORPHONE HYDROCHLORIDE 1 MG: 1 INJECTION, SOLUTION INTRAMUSCULAR; INTRAVENOUS; SUBCUTANEOUS at 11:43

## 2019-07-27 RX ADMIN — SODIUM CHLORIDE, SODIUM LACTATE, POTASSIUM CHLORIDE, AND CALCIUM CHLORIDE 2000 ML: .6; .31; .03; .02 INJECTION, SOLUTION INTRAVENOUS at 14:25

## 2019-07-27 RX ADMIN — OXYCODONE HYDROCHLORIDE 10 MG: 5 SOLUTION ORAL at 05:24

## 2019-07-27 RX ADMIN — ACETAMINOPHEN 975 MG: 325 TABLET ORAL at 13:28

## 2019-07-27 RX ADMIN — OXYCODONE HYDROCHLORIDE 10 MG: 5 SOLUTION ORAL at 14:28

## 2019-07-27 RX ADMIN — HEPARIN SODIUM 5000 UNITS: 5000 INJECTION INTRAVENOUS; SUBCUTANEOUS at 21:08

## 2019-07-27 RX ADMIN — TAMSULOSIN HYDROCHLORIDE 0.8 MG: 0.4 CAPSULE ORAL at 15:37

## 2019-07-27 RX ADMIN — THIAMINE HYDROCHLORIDE: 100 INJECTION, SOLUTION INTRAMUSCULAR; INTRAVENOUS at 10:09

## 2019-07-27 RX ADMIN — LORAZEPAM 0.5 MG: 2 INJECTION INTRAMUSCULAR; INTRAVENOUS at 21:07

## 2019-07-27 RX ADMIN — HEPARIN SODIUM 5000 UNITS: 5000 INJECTION INTRAVENOUS; SUBCUTANEOUS at 05:20

## 2019-07-27 RX ADMIN — METRONIDAZOLE 500 MG: 500 INJECTION, SOLUTION INTRAVENOUS at 02:15

## 2019-07-27 RX ADMIN — ACETAMINOPHEN 975 MG: 325 TABLET ORAL at 02:14

## 2019-07-27 RX ADMIN — OXYCODONE HYDROCHLORIDE 10 MG: 5 SOLUTION ORAL at 19:31

## 2019-07-27 RX ADMIN — CALCIUM GLUCONATE 1 G: 98 INJECTION, SOLUTION INTRAVENOUS at 23:00

## 2019-07-27 NOTE — QUICK NOTE
Patient seen and examined  Vitals noted  Patient resting comfortably in bed; states he feels much better than he prior to surgery  Sinus tachycardia   Abd soft, appropriately tender, wounds C/D/I with unchanged ecchymosis LUQ and lower midline wound   Tachycardia likely the result of toxin release from ischemic bowel and dehydration   Will continue to monitor and resuscitate

## 2019-07-27 NOTE — UTILIZATION REVIEW
Initial Clinical Review    Admission: Date/Time/Statement: inpatient  7/26/19 @ [address] ED TO [facility] ED FOR PRIVATE EVALUATION BY SURGERY TEAM     Orders Placed This Encounter   Procedures    Inpatient Admission     Standing Status:   Standing     Number of Occurrences:   1     Order Specific Question:   Admitting Physician     Answer:   Ria Grajeda     Order Specific Question:   Level of Care     Answer:   Med Surg [16]     Order Specific Question:   Estimated length of stay     Answer:   Inpatient Only Surgery     ED Arrival Information     Expected Arrival Acuity Means of Arrival Escorted By Service Admission Type    - 7/26/2019 09:00 Urgent Ambulance [address] [facility] Bariatrics Urgent    Arrival Complaint    internal hernia, free fluid in the abdomen        Chief Complaint   Patient presents with    Abdominal Pain     pt transfer [facility] with c/o lower back pain and abd pain  ct showed internal hernia and free fluid, hx gastric bypass pt is a private for bariatric sx  pt was given 6mg morphine pta and cipro iv   Back Pain     Assessment/Plan: 62 yo male s/p RNY gastric bypass in 2012 transferred from [address] [place] [address] ED to [facility] ED for private evaluation by surgical team  He initially presented to [address] [place] [address] ED from home, c/o 5 days of sharp lumbar back pain radiating into both legs down to his calves  Worse w/movement  Pain worsened and prevented him from sleeping  CT revealed bowel obstruction, and decision to transfer was made  Then admitted as inpatient on surgical service at [facility] due to bowel obstruction caused by internal hernia that twisted mesenteric vessels and caused mesenteric edema  Urgent surgical intervention required  Diagnostic laparoscopy was converted to open laparotomy, with reduction/repair internal hernia and repair of serosal tears   Has abdominal tenderness in all quadrants to light palp with rebound and guarding  IVF, IV antbx, IV analgesics, IV antiemetics in progress  Per OP report:  Procedure(s) (LRB):LAPAROSCOPY DIAGNOSTIC CONVERSION TO OPEN, REDUCTION AND REPAIR OF INTERNAL HERNIA, REPAIR SEROSAL TEARS  Anesthesia Type: General  Operative Findings:Internal hernia at briceno's space involving entire small bowel; ischemia resolved and bowel was pink after reduction  The bowel appeared ischemic and there was surrounding chylous ascites  The hernia was very tight and serosal tears were created reducing the bowel  I felt that too many serosal tears were occurring due to the tight nature of this internal hernia so the decision was made to convert to open       ED Triage Vitals [07/26/19 0906]   Temperature Pulse Respirations Blood Pressure SpO2   98 5 °F (36 9 °C) 90 20 166/90 100 %      Temp Source Heart Rate Source Patient Position - Orthostatic VS BP Location FiO2 (%)   Oral Monitor Sitting Right arm --      Pain Score       8        Wt Readings from Last 1 Encounters:   07/26/19 60 8 kg (134 lb)     Additional Vital Signs:   Date/Time Temp Pulse  Resp  BP  SpO2  O2 Device   07/27/19 0715 97 °F (36 1 °C)Abnormal  109  16  131/79  96 %  None (Room air)   07/26/19 2318 97 5 °F (36 4 °C) 87  16  106/60  98 %     07/26/19 2045  86    135/83  99 %  None (Room air)   07/26/19 2015  85    112/74       07/26/19 1945  83    121/71       07/26/19 1915 98 °F (36 7 °C) 87  16  124/80  98 %  None (Room air)   07/26/19 1847 97 5 °F (36 4 °C) 80  16  113/64  98 %  None (Room air)   07/26/19 1831  78  18  109/65  99 %  None (Room air)   07/26/19 1822  78  22  111/61  97 %  Nasal cannula  3 liters   07/26/19 1816  78  22    98 %  None (Room air)   07/26/19 1801 97 °F (36 1 °C)Abnormal  99  22  126/68  99 %  Nasal cannula  4 liters   07/26/19 1329  92  16  169/90  99 %  None (Room air)   07/26/19 1112  95  16  176/91Abnormal   99 %  None (Room air)       Pertinent Labs/Diagnostic Test Results:     7/26 CT a&p: Limited evaluation due to lack of oral contrast   Postsurgical changes of Britney-en-Y gastric bypass complicated by internal hernia reflected by swirling and twisting of mesenteric vessels in the superior mesenteric artery region with profound mesenteric edema    Fluid distention of the gastric pouch, excluded stomach and some proximal small bowel loops with relative collapse of more distal bowel loops   A developing high-grade small bowel obstruction not excluded    No EKG    Results from last 7 days   Lab Units 07/27/19  0514 07/26/19  1043 07/26/19  0411   WBC Thousand/uL 4 02* 8 27 10 60   HEMOGLOBIN g/dL 16 5 14 9 15 0   HEMATOCRIT % 50 4* 44 2 43 4   PLATELETS Thousands/uL 310 302 302   NEUTROS ABS Thousands/µL  --  6 87 9 00*     Results from last 7 days   Lab Units 07/27/19  0514 07/26/19  1043 07/26/19  0411   SODIUM mmol/L 138 136 137   POTASSIUM mmol/L 5 2 4 4 3 7   CHLORIDE mmol/L 104 100 101   CO2 mmol/L 20* 27 27   ANION GAP mmol/L 14* 9 9   BUN mg/dL 20 11 13   CREATININE mg/dL 0 94 0 78 0 85   EGFR ml/min/1 73sq m 90 100 97   CALCIUM mg/dL 8 2* 9 0 9 6   MAGNESIUM mg/dL  --  1 9  --      Results from last 7 days   Lab Units 07/27/19  0514 07/26/19  1043 07/26/19  0411   GLUCOSE RANDOM mg/dL 118 102 113*     Results from last 7 days   Lab Units 07/26/19  1043   LACTIC ACID mmol/L 0 7     ED Treatment:   Medication Administration from 07/26/2019 0840 to 07/26/2019 1453       Date/Time Order Dose Route Action     07/26/2019 1040 diphenhydrAMINE (BENADRYL) injection 25 mg 25 mg Intravenous Given     07/26/2019 1040 HYDROmorphone (DILAUDID) injection 0 5 mg 0 5 mg Intravenous Given        Past Medical History:   Diagnosis Date    Anesthesia     Pt wakes up during "almost every surgery"    Arthritis     Asthma     Bariatric surgery status     Cervical radiculopathy     Chemotherapy follow-up examination     Chronic pain     Chronic pain disorder     COPD [facility]ch[facility]) [facility]     Diabetes mellitus ([facility] )     borderline "years ago"    Food allergy     clams    GERD (gastroesophageal reflux disease)     Heart murmur     Hiatal hernia     History of malignant neoplasm     History of pneumonia 04/12/2016    History of pulmonary embolism     History of ulceration     Hyperlipidemia     Lung cancer (HCC)     left lung, radiation and chemo tx    Migraine     Pneumonia     Postgastrectomy malabsorption     Right scapholunate ligament tear     Skipped heart beats     Wears partial dentures     upper and lower         Admitting Diagnosis: Hernia, internal [K45 8]  Generalized abdominal pain [R10 84]  Internal hernia [K45 8]  Bariatric surgery status [Z98 84]  Age/Sex: 63 y o  male  Admission Orders:    Current Facility-Administered Medications:  acetaminophen 975 mg Oral Q6H [address]   albuterol 2 puff Inhalation Q6H PRN   atorvastatin 80 mg Oral Daily With Dinner   famotidine 20 mg Intravenous BID   heparin (porcine) 5,000 Units Subcutaneous Q8H [address]   HYDROmorphone 1 mg Intravenous Q4H PRN   lactated ringers 2,000 mL Intravenous Once   levofloxacin 750 mg Intravenous Q24H   LORazepam 0 5 mg Intravenous Q6H PRN   methocarbamol 500 mg Oral TID   metoclopramide 10 mg Intravenous Q6H PRN   multivitamin IVPB  Intravenous Daily   ondansetron 4 mg Intravenous Q4H PRN   oxyCODONE 10 mg Oral Q4H PRN x 2   oxyCODONE 5 mg Oral Q4H PRN   promethazine 25 mg Intramuscular Q6H PRN   scopolamine 1 patch Transdermal Once   simethicone 80 mg Oral Q12H   sodium chloride 100 mL/hr Intravenous Continuous     lactated ringers bolus 2,000 mL   Dose: 2,000 mL  Freq: Once Route: IV  Last Dose: 2,000 mL (07/27/19 0730)  Start: 07/27/19 0645  levofloxacin (LEVAQUIN) IVPB (premix) 750 mg   Dose: 750 mg  Freq:  Once Route: IV  Last Dose: Stopped (07/26/19 1551)  Start: 07/26/19 1515 End: 07/26/19 1551  metroNIDAZOLE (FLAGYL) IVPB (premix) 500 mg   Dose: 500 mg  Freq: Every 8 hours Route: IV  Indications of Use: PROPHYLAXIS  Indications Comment: surgical prophylaxis  Last Dose: Stopped (07/27/19 0714)  Start: 07/26/19 1915 End: 07/27/19 0714  sodium chloride 0 9 % infusion   Rate: 125 mL/hr Dose: 125 mL/hr  Freq: Continuous Route: IV  Indications of Use: IV Hydration  Last Dose: Stopped (07/26/19 1828)  Start: 07/26/19 1515 End: 07/26/19 1821  morphine (PF) 4 mg/mL injection 4 mg   (given at [address] ED)  Dose: 4 mg  Freq: Once Route: IM  Start: 07/26/19 0400 End: 07/26/19 0353  morphine (PF) 10 mg/mL injection 6 mg  (given at [address] ED)  Dose: 6 mg  Freq: Once Route: IV  Start: 07/26/19 0745 End: 07/26/19 0746  ondansetron (ZOFRAN) injection 4 mg  (given at [address] ED)  Dose: 4 mg  Freq: Once Route: IV  Start: 07/26/19 0415 End: 07/26/19 0419  sodium chloride 0 9 % bolus 1,000 mL   Dose: 1,000 mL  Freq: Once Route: IV  Indications of Use: FLUID AND ELECTROLYTE DISTURBANCE  Last Dose: 1,000 mL (07/26/19 0746)  Start: 07/26/19 0730 End: 07/26/19 0846  Network Utilization Review Department  Phone: [phone]; Fax [phone]  [email or web address]  ATTENTION: Please call with any questions or concerns to [phone]  and carefully listen to the prompts so that you are directed to the right person  Send all requests for admission clinical reviews, approved or denied determinations and any other requests to fax [phone]   All voicemails are confidential

## 2019-07-27 NOTE — ANESTHESIA POSTPROCEDURE EVALUATION
Post-Op Assessment Note    CV Status:  Stable  Pain Score: 0    Pain management: adequate     Mental Status:  Alert and awake   Hydration Status:  Euvolemic   PONV Controlled:  Controlled   Airway Patency:  Patent  Airway: intubated   Post Op Vitals Reviewed: Yes      Staff: Anesthesiologist           BP      Temp      Pulse     Resp      SpO2

## 2019-07-27 NOTE — PLAN OF CARE
Problem: Potential for Falls  Goal: Patient will remain free of falls  Description  INTERVENTIONS:  - Assess patient frequently for physical needs  -  Identify cognitive and physical deficits and behaviors that affect risk of falls  -  [place] fall precautions as indicated by assessment   - Educate patient/family on patient safety including physical limitations  - Instruct patient to call for assistance with activity based on assessment  - Modify environment to reduce risk of injury  - Consider OT/PT consult to assist with strengthening/mobility  Outcome: Progressing     Problem: Nutrition/Hydration-ADULT  Goal: Nutrient/Hydration intake appropriate for improving, restoring or maintaining nutritional needs  Description  Monitor and assess patient's nutrition/hydration status for malnutrition (ex- brittle hair, bruises, dry skin, pale skin and conjunctiva, muscle wasting, smooth red tongue, and disorientation)  Collaborate with interdisciplinary team and initiate plan and interventions as ordered  Monitor patient's weight and dietary intake as ordered or per policy  Utilize nutrition screening tool and intervene per policy  Determine patient's food preferences and provide high-protein, high-caloric foods as appropriate       INTERVENTIONS:  - Monitor oral intake, urinary output, labs, and treatment plans  - Assess nutrition and hydration status and recommend course of action  - Evaluate amount of meals eaten  - Assist patient with eating if necessary   - Allow adequate time for meals  - Recommend/ encourage appropriate diets, oral nutritional supplements, and vitamin/mineral supplements  - Order, calculate, and assess calorie counts as needed  - Recommend, monitor, and adjust tube feedings and TPN/PPN based on assessed needs  - Assess need for intravenous fluids  - Provide specific nutrition/hydration education as appropriate  - Include patient/family/caregiver in decisions related to nutrition  Outcome: Progressing     Problem: PAIN - ADULT  Goal: Verbalizes/displays adequate comfort level or baseline comfort level  Description  Interventions:  - Encourage patient to monitor pain and request assistance  - Assess pain using appropriate pain scale  - Administer analgesics based on type and severity of pain and evaluate response  - Implement non-pharmacological measures as appropriate and evaluate response  - Consider cultural and social influences on pain and pain management  - Notify physician/advanced practitioner if interventions unsuccessful or patient reports new pain  Outcome: Progressing     Problem: INFECTION - ADULT  Goal: Absence or prevention of progression during hospitalization  Description  INTERVENTIONS:  - Assess and monitor for signs and symptoms of infection  - Monitor lab/diagnostic results  - Monitor all insertion sites, i e  indwelling lines, tubes, and drains  - Monitor endotracheal (as able) and nasal secretions for changes in amount and color  - [place] appropriate cooling/warming therapies per order  - Administer medications as ordered  - Instruct and encourage patient and family to use good hand hygiene technique  - Identify and instruct in appropriate isolation precautions for identified infection/condition  Outcome: Progressing     Problem: DISCHARGE PLANNING  Goal: Discharge to home or other facility with appropriate resources  Description  INTERVENTIONS:  - Identify barriers to discharge w/patient and caregiver  - Arrange for needed discharge resources and transportation as appropriate  - Identify discharge learning needs (meds, wound care, etc )  - Arrange for interpretive services to assist at discharge as needed  - Refer to Case Management Department for coordinating discharge planning if the patient needs post-hospital services based on physician/advanced practitioner order or complex needs related to functional status, cognitive ability, or social support system  Outcome: Progressing     Problem: Knowledge Deficit  Goal: Patient/family/caregiver demonstrates understanding of disease process, treatment plan, medications, and discharge instructions  Description  Complete learning assessment and assess knowledge base    Interventions:  - Provide teaching at level of understanding  - Provide teaching via preferred learning methods  Outcome: Progressing     Problem: GASTROINTESTINAL - ADULT  Goal: Minimal or absence of nausea and/or vomiting  Description  INTERVENTIONS:  - Administer IV fluids as ordered to ensure adequate hydration  - Maintain NPO status until nausea and vomiting are resolved  - Nasogastric tube as ordered  - Administer ordered antiemetic medications as needed  - Provide nonpharmacologic comfort measures as appropriate  - Advance diet as tolerated, if ordered  - Nutrition services referral to assist patient with adequate nutrition and appropriate food choices  Outcome: Progressing  Goal: Maintains or returns to baseline bowel function  Description  INTERVENTIONS:  - Assess bowel function  - Encourage oral fluids to ensure adequate hydration  - Administer IV fluids as ordered to ensure adequate hydration  - Administer ordered medications as needed  - Encourage mobilization and activity  - Nutrition services referral to assist patient with appropriate food choices  Outcome: Progressing  Goal: Maintains adequate nutritional intake  Description  INTERVENTIONS:  - Monitor percentage of each meal consumed  - Identify factors contributing to decreased intake, treat as appropriate  - Assist with meals as needed  - Monitor I&O, WT and lab values  - Obtain nutrition services referral as needed  Outcome: Progressing     Problem: SKIN/TISSUE INTEGRITY - ADULT  Goal: Skin integrity remains intact  Description  INTERVENTIONS  - Identify patients at risk for skin breakdown  - Assess and monitor skin integrity  - Assess and monitor nutrition and hydration status  - Monitor labs (i e  albumin)  - Assess for incontinence   - Turn and reposition patient  - Assist with mobility/ambulation  - Relieve pressure over bony prominences  - Avoid friction and shearing  - Provide appropriate hygiene as needed including keeping skin clean and dry  - Evaluate need for skin moisturizer/barrier cream  - Collaborate with interdisciplinary team (i e  Nutrition, Rehabilitation, etc )   - Patient/family teaching  Outcome: Progressing  Goal: Incision(s), wounds(s) or drain site(s) healing without S/S of infection  Description  INTERVENTIONS  - Assess and document risk factors for skin impairment   - Assess and document dressing, incision, wound bed, drain sites and surrounding tissue  - Initiate Nutrition services consult and/or wound management as needed  Outcome: Progressing  Goal: Oral mucous membranes remain intact  Description  INTERVENTIONS  - Assess oral mucosa and hygiene practices  - Implement preventative oral hygiene regimen  - Implement oral medicated treatments as ordered  - Initiate Nutrition services referral as needed  Outcome: Progressing

## 2019-07-27 NOTE — UTILIZATION REVIEW
Notification of Inpatient Admission/Inpatient Authorization Request  This is a Notification of Inpatient Admission/Request for Inpatient Authorization for our facility [facility]  Be advised that this patient was admitted to our facility under Inpatient Status  Please contact the Utilization Review Department where the patient is receiving care services for additional admission information  Place of Service Code: 24   Place of Service Name: Inpatient Hospital  Presentation Date & Time: 7/26/2019  9:00 AM  Inpatient Admission Date & Time: 7/26/19 1820  Discharge Date & Time: No discharge date for patient encounter  [facility]if discharged): [facility]  Attending Physician: France Tracey, [address] [[id number]]  Admission Orders (From admission, onward)     Ordered        07/26/19 1820  Inpatient Admission  Once                   Facility: [facility] Utilization Review Department  Phone: [phone]; Fax [phone]  [email or web address]  ATTENTION: Please call with any questions or concerns to [phone]  and carefully listen to the prompts so that you are directed to the right person  Send all requests for admission clinical reviews, approved or denied determinations and any other requests to fax [phone]   All voicemails are confidential

## 2019-07-27 NOTE — PROGRESS NOTES
Patient noted to have a heart rate in the 140's while ambulating in the unit  He was then returned back to bed and heart rate noted was in the 120's  The Provider was notified and is aware of the patient's condition

## 2019-07-28 ENCOUNTER — APPOINTMENT (INPATIENT)
Dept: RADIOLOGY | Facility: HOSPITAL | Age: 59
DRG: 326 | End: 2019-07-28
Payer: COMMERCIAL

## 2019-07-28 ENCOUNTER — ANESTHESIA (INPATIENT)
Dept: PERIOP | Facility: HOSPITAL | Age: 59
DRG: 326 | End: 2019-07-28
Payer: COMMERCIAL

## 2019-07-28 ENCOUNTER — DOCUMENTATION (OUTPATIENT)
Dept: GASTROENTEROLOGY | Facility: MEDICAL CENTER | Age: 59
End: 2019-07-28

## 2019-07-28 ENCOUNTER — ANESTHESIA EVENT (INPATIENT)
Dept: PERIOP | Facility: HOSPITAL | Age: 59
DRG: 326 | End: 2019-07-28
Payer: COMMERCIAL

## 2019-07-28 LAB
ALBUMIN SERPL BCP-MCNC: 2.8 G/DL (ref 3.5–5)
ALP SERPL-CCNC: 42 U/L (ref 46–116)
ALT SERPL W P-5'-P-CCNC: 18 U/L (ref 12–78)
ANION GAP SERPL CALCULATED.3IONS-SCNC: 10 MMOL/L (ref 4–13)
ANION GAP SERPL CALCULATED.3IONS-SCNC: 13 MMOL/L (ref 4–13)
ANION GAP SERPL CALCULATED.3IONS-SCNC: 14 MMOL/L (ref 4–13)
APTT PPP: 43 SECONDS (ref 23–37)
AST SERPL W P-5'-P-CCNC: 37 U/L (ref 5–45)
BASE EXCESS BLDA CALC-SCNC: -8.7 MMOL/L
BASOPHILS # BLD AUTO: 0.01 THOUSANDS/ΜL (ref 0–0.1)
BASOPHILS # BLD AUTO: 0.03 THOUSANDS/ΜL (ref 0–0.1)
BASOPHILS NFR BLD AUTO: 0 % (ref 0–1)
BASOPHILS NFR BLD AUTO: 1 % (ref 0–1)
BILIRUB DIRECT SERPL-MCNC: 0.41 MG/DL (ref 0–0.2)
BILIRUB SERPL-MCNC: 0.96 MG/DL (ref 0.2–1)
BUN SERPL-MCNC: 19 MG/DL (ref 5–25)
BUN SERPL-MCNC: 23 MG/DL (ref 5–25)
BUN SERPL-MCNC: 24 MG/DL (ref 5–25)
CA-I BLD-SCNC: 1 MMOL/L (ref 1.12–1.32)
CA-I BLD-SCNC: 1.1 MMOL/L (ref 1.12–1.32)
CA-I BLD-SCNC: 1.11 MMOL/L (ref 1.12–1.32)
CALCIUM SERPL-MCNC: 7.1 MG/DL (ref 8.3–10.1)
CALCIUM SERPL-MCNC: 8.2 MG/DL (ref 8.3–10.1)
CALCIUM SERPL-MCNC: 8.7 MG/DL (ref 8.3–10.1)
CHLORIDE SERPL-SCNC: 100 MMOL/L (ref 100–108)
CHLORIDE SERPL-SCNC: 101 MMOL/L (ref 100–108)
CHLORIDE SERPL-SCNC: 106 MMOL/L (ref 100–108)
CO2 SERPL-SCNC: 19 MMOL/L (ref 21–32)
CO2 SERPL-SCNC: 20 MMOL/L (ref 21–32)
CO2 SERPL-SCNC: 20 MMOL/L (ref 21–32)
CREAT SERPL-MCNC: 1 MG/DL (ref 0.6–1.3)
CREAT SERPL-MCNC: 1.38 MG/DL (ref 0.6–1.3)
CREAT SERPL-MCNC: 1.4 MG/DL (ref 0.6–1.3)
EOSINOPHIL # BLD AUTO: 0 THOUSAND/ΜL (ref 0–0.61)
EOSINOPHIL # BLD AUTO: 0 THOUSAND/ΜL (ref 0–0.61)
EOSINOPHIL NFR BLD AUTO: 0 % (ref 0–6)
EOSINOPHIL NFR BLD AUTO: 0 % (ref 0–6)
ERYTHROCYTE [DISTWIDTH] IN BLOOD BY AUTOMATED COUNT: 12.9 % (ref 11.6–15.1)
ERYTHROCYTE [DISTWIDTH] IN BLOOD BY AUTOMATED COUNT: 13.2 % (ref 11.6–15.1)
GFR SERPL CREATININE-BSD FRML MDRD: 55 ML/MIN/1.73SQ M
GFR SERPL CREATININE-BSD FRML MDRD: 56 ML/MIN/1.73SQ M
GFR SERPL CREATININE-BSD FRML MDRD: 83 ML/MIN/1.73SQ M
GLUCOSE SERPL-MCNC: 115 MG/DL (ref 65–140)
GLUCOSE SERPL-MCNC: 115 MG/DL (ref 65–140)
GLUCOSE SERPL-MCNC: 126 MG/DL (ref 65–140)
HCO3 BLDA-SCNC: 18.7 MMOL/L (ref 22–28)
HCT VFR BLD AUTO: 35.4 % (ref 36.5–49.3)
HCT VFR BLD AUTO: 43.9 % (ref 36.5–49.3)
HGB BLD-MCNC: 11.6 G/DL (ref 12–17)
HGB BLD-MCNC: 14.3 G/DL (ref 12–17)
HOROWITZ INDEX BLDA+IHG-RTO: 75 MM[HG]
IMM GRANULOCYTES # BLD AUTO: 0.03 THOUSAND/UL (ref 0–0.2)
IMM GRANULOCYTES # BLD AUTO: 0.05 THOUSAND/UL (ref 0–0.2)
IMM GRANULOCYTES NFR BLD AUTO: 1 % (ref 0–2)
IMM GRANULOCYTES NFR BLD AUTO: 2 % (ref 0–2)
INR PPP: 1.86 (ref 0.84–1.19)
LACTATE SERPL-SCNC: 3.3 MMOL/L (ref 0.5–2)
LACTATE SERPL-SCNC: 3.3 MMOL/L (ref 0.5–2)
LACTATE SERPL-SCNC: 3.7 MMOL/L (ref 0.5–2)
LACTATE SERPL-SCNC: 4.9 MMOL/L (ref 0.5–2)
LYMPHOCYTES # BLD AUTO: 0.21 THOUSANDS/ΜL (ref 0.6–4.47)
LYMPHOCYTES # BLD AUTO: 0.28 THOUSANDS/ΜL (ref 0.6–4.47)
LYMPHOCYTES NFR BLD AUTO: 5 % (ref 14–44)
LYMPHOCYTES NFR BLD AUTO: 8 % (ref 14–44)
MAGNESIUM SERPL-MCNC: 2.2 MG/DL (ref 1.6–2.6)
MAGNESIUM SERPL-MCNC: 2.2 MG/DL (ref 1.6–2.6)
MAGNESIUM SERPL-MCNC: 2.3 MG/DL (ref 1.6–2.6)
MCH RBC QN AUTO: 29.9 PG (ref 26.8–34.3)
MCH RBC QN AUTO: 30.3 PG (ref 26.8–34.3)
MCHC RBC AUTO-ENTMCNC: 32.6 G/DL (ref 31.4–37.4)
MCHC RBC AUTO-ENTMCNC: 32.8 G/DL (ref 31.4–37.4)
MCV RBC AUTO: 92 FL (ref 82–98)
MCV RBC AUTO: 92 FL (ref 82–98)
MONOCYTES # BLD AUTO: 0.25 THOUSAND/ΜL (ref 0.17–1.22)
MONOCYTES # BLD AUTO: 0.38 THOUSAND/ΜL (ref 0.17–1.22)
MONOCYTES NFR BLD AUTO: 10 % (ref 4–12)
MONOCYTES NFR BLD AUTO: 7 % (ref 4–12)
NEUTROPHILS # BLD AUTO: 1.98 THOUSANDS/ΜL (ref 1.85–7.62)
NEUTROPHILS # BLD AUTO: 4.81 THOUSANDS/ΜL (ref 1.85–7.62)
NEUTS SEG NFR BLD AUTO: 80 % (ref 43–75)
NEUTS SEG NFR BLD AUTO: 86 % (ref 43–75)
NRBC BLD AUTO-RTO: 0 /100 WBCS
NRBC BLD AUTO-RTO: 0 /100 WBCS
O2 CT BLDA-SCNC: 17.7 ML/DL (ref 16–23)
OXYHGB MFR BLDA: 98.5 % (ref 94–97)
PCO2 BLDA: 46.2 MM HG (ref 36–44)
PEEP RESPIRATORY: 5 CM[H2O]
PH BLDA: 7.22 [PH] (ref 7.35–7.45)
PHOSPHATE SERPL-MCNC: 3.3 MG/DL (ref 2.7–4.5)
PHOSPHATE SERPL-MCNC: 3.7 MG/DL (ref 2.7–4.5)
PLATELET # BLD AUTO: 196 THOUSANDS/UL (ref 149–390)
PLATELET # BLD AUTO: 238 THOUSANDS/UL (ref 149–390)
PMV BLD AUTO: 10.4 FL (ref 8.9–12.7)
PMV BLD AUTO: 9.8 FL (ref 8.9–12.7)
PO2 BLDA: 310.4 MM HG (ref 75–129)
POTASSIUM SERPL-SCNC: 4.2 MMOL/L (ref 3.5–5.3)
POTASSIUM SERPL-SCNC: 4.5 MMOL/L (ref 3.5–5.3)
POTASSIUM SERPL-SCNC: 5.3 MMOL/L (ref 3.5–5.3)
PROT SERPL-MCNC: 5.3 G/DL (ref 6.4–8.2)
PROTHROMBIN TIME: 21.8 SECONDS (ref 11.6–14.5)
RBC # BLD AUTO: 3.83 MILLION/UL (ref 3.88–5.62)
RBC # BLD AUTO: 4.78 MILLION/UL (ref 3.88–5.62)
SODIUM SERPL-SCNC: 133 MMOL/L (ref 136–145)
SODIUM SERPL-SCNC: 135 MMOL/L (ref 136–145)
SODIUM SERPL-SCNC: 135 MMOL/L (ref 136–145)
SPECIMEN SOURCE: ABNORMAL
VENT AC: 12
VENT- AC: AC
VT SETTING VENT: 450 ML
WBC # BLD AUTO: 2.5 THOUSAND/UL (ref 4.31–10.16)
WBC # BLD AUTO: 5.53 THOUSAND/UL (ref 4.31–10.16)

## 2019-07-28 PROCEDURE — 43840 GSTRRPHY SUTR DUOL/GSTR ULCR: CPT | Performed by: SURGERY

## 2019-07-28 PROCEDURE — 85730 THROMBOPLASTIN TIME PARTIAL: CPT | Performed by: SURGERY

## 2019-07-28 PROCEDURE — 99232 SBSQ HOSP IP/OBS MODERATE 35: CPT | Performed by: INTERNAL MEDICINE

## 2019-07-28 PROCEDURE — 83605 ASSAY OF LACTIC ACID: CPT | Performed by: ANESTHESIOLOGY

## 2019-07-28 PROCEDURE — 99024 POSTOP FOLLOW-UP VISIT: CPT | Performed by: SURGERY

## 2019-07-28 PROCEDURE — 85610 PROTHROMBIN TIME: CPT | Performed by: SURGERY

## 2019-07-28 PROCEDURE — 82330 ASSAY OF CALCIUM: CPT | Performed by: SURGERY

## 2019-07-28 PROCEDURE — C1769 GUIDE WIRE: HCPCS | Performed by: SURGERY

## 2019-07-28 PROCEDURE — 0DJ68ZZ INSPECTION OF STOMACH, VIA NATURAL OR ARTIFICIAL OPENING ENDOSCOPIC: ICD-10-PCS | Performed by: SURGERY

## 2019-07-28 PROCEDURE — 0DU60JZ SUPPLEMENT STOMACH WITH SYNTHETIC SUBSTITUTE, OPEN APPROACH: ICD-10-PCS | Performed by: SURGERY

## 2019-07-28 PROCEDURE — 0W9J0ZZ DRAINAGE OF PELVIC CAVITY, OPEN APPROACH: ICD-10-PCS | Performed by: SURGERY

## 2019-07-28 PROCEDURE — 71045 X-RAY EXAM CHEST 1 VIEW: CPT

## 2019-07-28 PROCEDURE — 84100 ASSAY OF PHOSPHORUS: CPT | Performed by: SURGERY

## 2019-07-28 PROCEDURE — 83605 ASSAY OF LACTIC ACID: CPT | Performed by: SURGERY

## 2019-07-28 PROCEDURE — C1874 STENT, COATED/COV W/DEL SYS: HCPCS | Performed by: SURGERY

## 2019-07-28 PROCEDURE — 0DU607Z SUPPLEMENT STOMACH WITH AUTOLOGOUS TISSUE SUBSTITUTE, OPEN APPROACH: ICD-10-PCS | Performed by: SURGERY

## 2019-07-28 PROCEDURE — 94002 VENT MGMT INPAT INIT DAY: CPT

## 2019-07-28 PROCEDURE — 85025 COMPLETE CBC W/AUTO DIFF WBC: CPT | Performed by: SURGERY

## 2019-07-28 PROCEDURE — C9113 INJ PANTOPRAZOLE SODIUM, VIA: HCPCS | Performed by: SURGERY

## 2019-07-28 PROCEDURE — 0DH60UZ INSERTION OF FEEDING DEVICE INTO STOMACH, OPEN APPROACH: ICD-10-PCS | Performed by: SURGERY

## 2019-07-28 PROCEDURE — 82805 BLOOD GASES W/O2 SATURATION: CPT | Performed by: SURGERY

## 2019-07-28 PROCEDURE — 43840 GSTRRPHY SUTR DUOL/GSTR ULCR: CPT | Performed by: PHYSICIAN ASSISTANT

## 2019-07-28 PROCEDURE — 0DQ60ZZ REPAIR STOMACH, OPEN APPROACH: ICD-10-PCS | Performed by: SURGERY

## 2019-07-28 PROCEDURE — 80076 HEPATIC FUNCTION PANEL: CPT | Performed by: SURGERY

## 2019-07-28 PROCEDURE — 83735 ASSAY OF MAGNESIUM: CPT | Performed by: SURGERY

## 2019-07-28 PROCEDURE — 80048 BASIC METABOLIC PNL TOTAL CA: CPT | Performed by: SURGERY

## 2019-07-28 DEVICE — STENT SYSTEM
Type: IMPLANTABLE DEVICE | Status: NON-FUNCTIONAL
Brand: WALLFLEX™ ESOPHAGEAL
Removed: 2019-09-18

## 2019-07-28 RX ORDER — METOCLOPRAMIDE HYDROCHLORIDE 5 MG/ML
10 INJECTION INTRAMUSCULAR; INTRAVENOUS EVERY 6 HOURS PRN
Status: DISCONTINUED | OUTPATIENT
Start: 2019-07-28 | End: 2019-07-28

## 2019-07-28 RX ORDER — LEVOFLOXACIN 5 MG/ML
750 INJECTION, SOLUTION INTRAVENOUS EVERY 24 HOURS
Status: DISCONTINUED | OUTPATIENT
Start: 2019-07-29 | End: 2019-08-01

## 2019-07-28 RX ORDER — PROPOFOL 10 MG/ML
INJECTION, EMULSION INTRAVENOUS
Status: COMPLETED
Start: 2019-07-28 | End: 2019-07-28

## 2019-07-28 RX ORDER — HYDROMORPHONE HCL/PF 1 MG/ML
0.5 SYRINGE (ML) INJECTION
Status: DISCONTINUED | OUTPATIENT
Start: 2019-07-28 | End: 2019-07-29

## 2019-07-28 RX ORDER — PANTOPRAZOLE SODIUM 40 MG/1
40 INJECTION, POWDER, FOR SOLUTION INTRAVENOUS EVERY 12 HOURS SCHEDULED
Status: DISCONTINUED | OUTPATIENT
Start: 2019-07-28 | End: 2019-08-16 | Stop reason: HOSPADM

## 2019-07-28 RX ORDER — PROPOFOL 10 MG/ML
INJECTION, EMULSION INTRAVENOUS AS NEEDED
Status: DISCONTINUED | OUTPATIENT
Start: 2019-07-28 | End: 2019-07-28 | Stop reason: SURG

## 2019-07-28 RX ORDER — BISACODYL 10 MG
10 SUPPOSITORY, RECTAL RECTAL DAILY
Status: DISCONTINUED | OUTPATIENT
Start: 2019-07-28 | End: 2019-07-28

## 2019-07-28 RX ORDER — SODIUM CHLORIDE 9 MG/ML
INJECTION, SOLUTION INTRAVENOUS CONTINUOUS PRN
Status: DISCONTINUED | OUTPATIENT
Start: 2019-07-28 | End: 2019-07-28 | Stop reason: SURG

## 2019-07-28 RX ORDER — ONDANSETRON 2 MG/ML
4 INJECTION INTRAMUSCULAR; INTRAVENOUS ONCE AS NEEDED
Status: DISCONTINUED | OUTPATIENT
Start: 2019-07-28 | End: 2019-07-28 | Stop reason: HOSPADM

## 2019-07-28 RX ORDER — FLUCONAZOLE 2 MG/ML
200 INJECTION, SOLUTION INTRAVENOUS EVERY 24 HOURS
Status: DISCONTINUED | OUTPATIENT
Start: 2019-07-29 | End: 2019-08-01

## 2019-07-28 RX ORDER — FLUCONAZOLE 2 MG/ML
400 INJECTION, SOLUTION INTRAVENOUS ONCE
Status: COMPLETED | OUTPATIENT
Start: 2019-07-28 | End: 2019-07-28

## 2019-07-28 RX ORDER — SUCCINYLCHOLINE/SOD CL,ISO/PF 100 MG/5ML
SYRINGE (ML) INTRAVENOUS AS NEEDED
Status: DISCONTINUED | OUTPATIENT
Start: 2019-07-28 | End: 2019-07-28 | Stop reason: SURG

## 2019-07-28 RX ORDER — MIDAZOLAM HYDROCHLORIDE 1 MG/ML
INJECTION INTRAMUSCULAR; INTRAVENOUS AS NEEDED
Status: DISCONTINUED | OUTPATIENT
Start: 2019-07-28 | End: 2019-07-28 | Stop reason: SURG

## 2019-07-28 RX ORDER — ALBUMIN (HUMAN) 12.5 G/50ML
50 SOLUTION INTRAVENOUS ONCE
Status: COMPLETED | OUTPATIENT
Start: 2019-07-28 | End: 2019-07-28

## 2019-07-28 RX ORDER — METOCLOPRAMIDE HYDROCHLORIDE 5 MG/ML
5 INJECTION INTRAMUSCULAR; INTRAVENOUS EVERY 6 HOURS SCHEDULED
Status: DISCONTINUED | OUTPATIENT
Start: 2019-07-28 | End: 2019-07-28

## 2019-07-28 RX ORDER — FUROSEMIDE 10 MG/ML
40 INJECTION INTRAMUSCULAR; INTRAVENOUS ONCE
Status: COMPLETED | OUTPATIENT
Start: 2019-07-28 | End: 2019-07-28

## 2019-07-28 RX ORDER — VASOPRESSIN 20 U/ML
INJECTION PARENTERAL AS NEEDED
Status: DISCONTINUED | OUTPATIENT
Start: 2019-07-28 | End: 2019-07-28 | Stop reason: SURG

## 2019-07-28 RX ORDER — MAGNESIUM CARB/ALUMINUM HYDROX 105-160MG
296 TABLET,CHEWABLE ORAL ONCE
Status: COMPLETED | OUTPATIENT
Start: 2019-07-28 | End: 2019-07-28

## 2019-07-28 RX ORDER — PROPOFOL 10 MG/ML
5-50 INJECTION, EMULSION INTRAVENOUS
Status: DISCONTINUED | OUTPATIENT
Start: 2019-07-28 | End: 2019-07-29

## 2019-07-28 RX ORDER — FENTANYL CITRATE 50 UG/ML
INJECTION, SOLUTION INTRAMUSCULAR; INTRAVENOUS AS NEEDED
Status: DISCONTINUED | OUTPATIENT
Start: 2019-07-28 | End: 2019-07-28 | Stop reason: SURG

## 2019-07-28 RX ORDER — ROCURONIUM BROMIDE 10 MG/ML
INJECTION, SOLUTION INTRAVENOUS AS NEEDED
Status: DISCONTINUED | OUTPATIENT
Start: 2019-07-28 | End: 2019-07-28 | Stop reason: SURG

## 2019-07-28 RX ORDER — CHLORHEXIDINE GLUCONATE 0.12 MG/ML
15 RINSE ORAL EVERY 12 HOURS SCHEDULED
Status: DISCONTINUED | OUTPATIENT
Start: 2019-07-28 | End: 2019-07-29

## 2019-07-28 RX ORDER — FENTANYL CITRATE/PF 50 MCG/ML
50 SYRINGE (ML) INJECTION
Status: DISCONTINUED | OUTPATIENT
Start: 2019-07-28 | End: 2019-07-28 | Stop reason: HOSPADM

## 2019-07-28 RX ADMIN — SODIUM CHLORIDE: 0.9 INJECTION, SOLUTION INTRAVENOUS at 15:08

## 2019-07-28 RX ADMIN — MAGNESIUM CITRATE 296 ML: 1.75 LIQUID ORAL at 11:38

## 2019-07-28 RX ADMIN — MIDAZOLAM 2 MG: 1 INJECTION INTRAMUSCULAR; INTRAVENOUS at 16:55

## 2019-07-28 RX ADMIN — IOHEXOL 80 ML: 350 INJECTION, SOLUTION INTRAVENOUS at 15:45

## 2019-07-28 RX ADMIN — ROCURONIUM BROMIDE 20 MG: 10 INJECTION, SOLUTION INTRAVENOUS at 15:04

## 2019-07-28 RX ADMIN — FENTANYL CITRATE 50 MCG: 50 INJECTION, SOLUTION INTRAMUSCULAR; INTRAVENOUS at 13:28

## 2019-07-28 RX ADMIN — FUROSEMIDE 40 MG: 10 INJECTION, SOLUTION INTRAMUSCULAR; INTRAVENOUS at 11:30

## 2019-07-28 RX ADMIN — SIMETHICONE CHEW TAB 80 MG 80 MG: 80 TABLET ORAL at 08:07

## 2019-07-28 RX ADMIN — LEVOFLOXACIN 750 MG: 5 INJECTION, SOLUTION INTRAVENOUS at 13:34

## 2019-07-28 RX ADMIN — LORAZEPAM 0.5 MG: 2 INJECTION INTRAMUSCULAR; INTRAVENOUS at 11:33

## 2019-07-28 RX ADMIN — PHENYLEPHRINE HYDROCHLORIDE 200 MCG: 10 INJECTION INTRAVENOUS at 14:51

## 2019-07-28 RX ADMIN — VASOPRESSIN 1 UNITS: 20 INJECTION INTRAVENOUS at 14:56

## 2019-07-28 RX ADMIN — METRONIDAZOLE 500 MG: 500 INJECTION, SOLUTION INTRAVENOUS at 21:17

## 2019-07-28 RX ADMIN — ACETAMINOPHEN 975 MG: 325 TABLET ORAL at 02:13

## 2019-07-28 RX ADMIN — LIDOCAINE HYDROCHLORIDE 80 MG: 20 INJECTION, SOLUTION INTRAVENOUS at 13:28

## 2019-07-28 RX ADMIN — Medication 100 MG: at 13:28

## 2019-07-28 RX ADMIN — METOCLOPRAMIDE 5 MG: 5 INJECTION, SOLUTION INTRAMUSCULAR; INTRAVENOUS at 08:02

## 2019-07-28 RX ADMIN — SODIUM CHLORIDE: 0.9 INJECTION, SOLUTION INTRAVENOUS at 13:36

## 2019-07-28 RX ADMIN — ROCURONIUM BROMIDE 45 MG: 10 INJECTION, SOLUTION INTRAVENOUS at 13:44

## 2019-07-28 RX ADMIN — PHENYLEPHRINE HYDROCHLORIDE 200 MCG: 10 INJECTION INTRAVENOUS at 14:53

## 2019-07-28 RX ADMIN — PHENYLEPHRINE HYDROCHLORIDE 100 MCG: 10 INJECTION INTRAVENOUS at 13:37

## 2019-07-28 RX ADMIN — VASOPRESSIN 1 UNITS: 20 INJECTION INTRAVENOUS at 15:32

## 2019-07-28 RX ADMIN — PHENYLEPHRINE HYDROCHLORIDE 100 MCG: 10 INJECTION INTRAVENOUS at 14:13

## 2019-07-28 RX ADMIN — BISACODYL 10 MG: 5 TABLET, COATED ORAL at 11:31

## 2019-07-28 RX ADMIN — FUROSEMIDE 40 MG: 10 INJECTION, SOLUTION INTRAMUSCULAR; INTRAVENOUS at 09:37

## 2019-07-28 RX ADMIN — ROCURONIUM BROMIDE 20 MG: 10 INJECTION, SOLUTION INTRAVENOUS at 14:07

## 2019-07-28 RX ADMIN — ACETAMINOPHEN 975 MG: 325 TABLET ORAL at 08:06

## 2019-07-28 RX ADMIN — FLUCONAZOLE, SODIUM CHLORIDE: 2 INJECTION INTRAVENOUS at 14:50

## 2019-07-28 RX ADMIN — ALBUMIN (HUMAN) 50 G: 12.5 SOLUTION INTRAVENOUS at 07:13

## 2019-07-28 RX ADMIN — VASOPRESSIN 1 UNITS: 20 INJECTION INTRAVENOUS at 15:07

## 2019-07-28 RX ADMIN — FENTANYL CITRATE 50 MCG: 50 INJECTION, SOLUTION INTRAMUSCULAR; INTRAVENOUS at 13:45

## 2019-07-28 RX ADMIN — PROPOFOL 44.5 MCG/KG/MIN: 10 INJECTION, EMULSION INTRAVENOUS at 18:57

## 2019-07-28 RX ADMIN — PHENYLEPHRINE HYDROCHLORIDE 200 MCG: 10 INJECTION INTRAVENOUS at 14:17

## 2019-07-28 RX ADMIN — PHENYLEPHRINE HYDROCHLORIDE 50 MCG/MIN: 10 INJECTION INTRAVENOUS at 14:11

## 2019-07-28 RX ADMIN — CALCIUM GLUCONATE 2 G: 98 INJECTION, SOLUTION INTRAVENOUS at 08:07

## 2019-07-28 RX ADMIN — FENTANYL CITRATE 50 MCG: 50 INJECTION, SOLUTION INTRAMUSCULAR; INTRAVENOUS at 16:41

## 2019-07-28 RX ADMIN — ROCURONIUM BROMIDE 20 MG: 10 INJECTION, SOLUTION INTRAVENOUS at 16:06

## 2019-07-28 RX ADMIN — SODIUM CHLORIDE 100 ML/HR: 0.9 INJECTION, SOLUTION INTRAVENOUS at 04:34

## 2019-07-28 RX ADMIN — CHLORHEXIDINE GLUCONATE 0.12% ORAL RINSE 15 ML: 1.2 LIQUID ORAL at 21:17

## 2019-07-28 RX ADMIN — FAMOTIDINE 20 MG: 10 INJECTION, SOLUTION INTRAVENOUS at 08:01

## 2019-07-28 RX ADMIN — HYDROMORPHONE HYDROCHLORIDE 0.5 MG: 1 INJECTION, SOLUTION INTRAMUSCULAR; INTRAVENOUS; SUBCUTANEOUS at 19:04

## 2019-07-28 RX ADMIN — FENTANYL CITRATE 50 MCG: 50 INJECTION, SOLUTION INTRAMUSCULAR; INTRAVENOUS at 15:56

## 2019-07-28 RX ADMIN — BISACODYL 10 MG: 10 SUPPOSITORY RECTAL at 08:07

## 2019-07-28 RX ADMIN — PHENYLEPHRINE HYDROCHLORIDE 100 MCG: 10 INJECTION INTRAVENOUS at 13:28

## 2019-07-28 RX ADMIN — FENTANYL CITRATE 50 MCG: 50 INJECTION, SOLUTION INTRAMUSCULAR; INTRAVENOUS at 16:51

## 2019-07-28 RX ADMIN — HEPARIN SODIUM 5000 UNITS: 5000 INJECTION INTRAVENOUS; SUBCUTANEOUS at 05:24

## 2019-07-28 RX ADMIN — VASOPRESSIN 1 UNITS: 20 INJECTION INTRAVENOUS at 16:03

## 2019-07-28 RX ADMIN — FENTANYL CITRATE 50 MCG: 50 INJECTION, SOLUTION INTRAMUSCULAR; INTRAVENOUS at 14:07

## 2019-07-28 RX ADMIN — FENTANYL CITRATE 50 MCG: 50 INJECTION, SOLUTION INTRAMUSCULAR; INTRAVENOUS at 13:58

## 2019-07-28 RX ADMIN — HEPARIN SODIUM 5000 UNITS: 5000 INJECTION INTRAVENOUS; SUBCUTANEOUS at 21:17

## 2019-07-28 RX ADMIN — FENTANYL CITRATE 50 MCG: 50 INJECTION, SOLUTION INTRAMUSCULAR; INTRAVENOUS at 15:21

## 2019-07-28 RX ADMIN — SODIUM CHLORIDE: 0.9 INJECTION, SOLUTION INTRAVENOUS at 14:41

## 2019-07-28 RX ADMIN — PROPOFOL 150 MG: 10 INJECTION, EMULSION INTRAVENOUS at 13:28

## 2019-07-28 RX ADMIN — THIAMINE HYDROCHLORIDE: 100 INJECTION, SOLUTION INTRAMUSCULAR; INTRAVENOUS at 08:07

## 2019-07-28 RX ADMIN — HYDROMORPHONE HYDROCHLORIDE 1 MG: 1 INJECTION, SOLUTION INTRAMUSCULAR; INTRAVENOUS; SUBCUTANEOUS at 11:35

## 2019-07-28 RX ADMIN — SODIUM BICARBONATE 150 ML/HR: 84 INJECTION, SOLUTION INTRAVENOUS at 18:59

## 2019-07-28 RX ADMIN — PANTOPRAZOLE SODIUM 40 MG: 40 INJECTION, POWDER, FOR SOLUTION INTRAVENOUS at 18:55

## 2019-07-28 RX ADMIN — METRONIDAZOLE 500 MG: 500 INJECTION, SOLUTION INTRAVENOUS at 13:24

## 2019-07-28 RX ADMIN — SODIUM CHLORIDE: 0.9 INJECTION, SOLUTION INTRAVENOUS at 14:18

## 2019-07-28 RX ADMIN — VASOPRESSIN 1 UNITS: 20 INJECTION INTRAVENOUS at 16:37

## 2019-07-28 RX ADMIN — PHENYLEPHRINE HYDROCHLORIDE 100 MCG: 10 INJECTION INTRAVENOUS at 14:09

## 2019-07-28 RX ADMIN — PHENYLEPHRINE HYDROCHLORIDE 200 MCG: 10 INJECTION INTRAVENOUS at 14:41

## 2019-07-28 RX ADMIN — ROCURONIUM BROMIDE 5 MG: 10 INJECTION, SOLUTION INTRAVENOUS at 13:28

## 2019-07-28 RX ADMIN — METHOCARBAMOL TABLETS 500 MG: 500 TABLET, COATED ORAL at 08:07

## 2019-07-28 NOTE — OP NOTE
OPERATIVE REPORT  PATIENT NAME: Sherren Cue    :  1960  MRN: [id number]  Pt Location: [facility] OR ROOM 02    SURGERY DATE: 2019    Surgeon(s) and Role:     * France Tracey MD - Primary     * William Morales PA-C - Assisting    Preop Diagnosis:  Generalized abdominal pain [R10 84]    Post-Op Diagnosis Codes:     * Generalized abdominal pain [R10 84]    Procedure(s) (LRB):  LAPAROTOMY EXPLORATORY, WASHOUT OF SUCUS, REPAIR OF MARGINAL ULCER PERFORATION, ABD WASHOUT, INTRAOPERATIVE EGD, GI ASSISTED ENDOSCOPIC STENT PLACEMENT (N/A)  GASTROSTOMY TUBE PLACEMENT     Specimen(s):  * No specimens in log *    Estimated Blood Loss:   50 mL    Drains:  Closed/Suction Drain Right Abdomen Bulb 19 Fr  (Active)   Number of days: 0       Gastrostomy/Enterostomy Gastrostomy 22 Fr  LUQ (Active)   Surrounding Skin Intact 2019  4:22 PM   Drain Status Open to gravity drainage 2019  4:22 PM   Dressing Status Clean;Dry; Intact 2019  4:22 PM   Number of days: 0       Urethral Catheter Double-lumen 16 Fr  (Active)   Amt returned on insertion(mL) 200 mL 2019  9:00 PM   Site Assessment Clean;Skin intact 2019  9:00 PM   Collection Container Standard drainage bag 2019  9:00 PM   Securement Method Securing device (Describe) 2019  9:00 PM   Output (mL) 900 mL 2019 11:28 AM   Number of days: 1       Anesthesia Type:   General    Operative Indications:  Generalized abdominal pain [R10 84]  Abdominal compartment syndrome     Operative Findings:  Greater than 75% perforated marginal ulcer   Ulcer repaired primarily and then patch of omentum placed   Leak test negative on fluoroscopy and on immersion with saline with the julio-limb clammed     Complications:   None    Procedure and Technique:  The patient was identified by name, armband and conversation  The patient was then brought to the operative theatre   After successful induction of general anesthesia the patient was prepped and draped in the usual sterile fashion  A timeout was performed and all were in agreement  The previous midline incision was opened sharply down to the fashion  Previous PDS was removed from the fascia and the abdomen was opened  We immediately evacuated 4 8 liters of succus from the abdomen  The common channel, BP and julio limb were examined to determine the source of perforation  The serosal tear repairs all seemed to be intact and there was no perforation noted in these limbs  The Moore's repair also appeared intact  I then decided to extend the incision cephalad  This was done sharply and the fascia and peritoneal were opened with electrocautery  We then examined the gastrojejunostomy and there appeared to be a greater than 75% perforation of this anastomosis  Contents were seen emanating from this perforation and the right and left subdiaphragmatic spaces  It was at this time where I called my partner Dr Chantal Adrian who suggested that after repair a stent should be place to protect the repair internally due to the massive nature of this perforation  I then placed an intraoperative consult to Dr Orestes Hess for a covered stent across the anastomosis  An endoscopy was performed by myself and my PA guided the endoscope past the perforation into the julio limb  I then returned to the case  Bookwalter retractor was set up  I sharply dissected the gastric pouch away from the liver to have complete exposure of the anterior surface of the pouch  The gastric pouch was inseparable from the gastric remnant posteriorly  With the endoscope as a calibrator, I proceeded to close the perforation with a running 3-0 PDS incorporating the gastric remnant posteriorly for an air tight closure  There was a size mismatch between the julio limb and the gastric pouch leaving excess perforated julio limb  This was closed primarily with a running 3-0 PDS  A second layer using 3-0 PDS was used to buttress the entire repair   A piece of omentum was then placed on top of this repair and secured in place with 3-0 PDS  A gastrostomy tube was then placed in the gastric remnant  Two purse string sutures utilizing 2-0 PDS were placed  Gastrotomy was created in the center  A 22F gastrostomy tube was then brought into the abdomen in the previous incision at jeffers's point (left upper quadrant)  This was secured externally with nylon suture  The tube was then placed in the remnant stomach and balloon filled with 8 ml's of water  The PDS sutures were then tied down and the gastrostomy tube was secure  A covered stent was then placed by Dr Aubrie Burnham across the gastrojejunal anastomotic repair and this will be dictated separately  The abdomen was then copiously irrigated with 7 liters of warm normal saline  The julio limb, biliopancreatic limb and common channel were then examined once again and no obvious injuries noted  A 19 Latvian eleuterio drain was then placed across the repair and brought out through a previous incision in the right upper quadrant  We then changed gown and gloves  The fascia was then closed with a running #1 PDS  The skin was closed with staples and dressed with Mepilex silver  All instrument, sponge and needle counts were correct  This case was a 10/10 in difficulty due to the technical aspects needed for this procedure as well as complex decision making and assistance from outside consultants  I was present for the entire procedure, a qualified resident physician was not available and a physician assistant was required during the procedure for retraction tissue handling,dissection and suturing      Patient Disposition:  Critical Care Unit and hemodynamically stable    SIGNATURE: Mala Salazar MD  DATE: July 28, 2019  TIME: 5:43 PM

## 2019-07-28 NOTE — ANESTHESIA PREPROCEDURE EVALUATION
Review of Systems/Medical History    Chart reviewed      Cardiovascular  Exercise tolerance (METS): <4,  Hyperlipidemia,    Pulmonary  Pneumonia, COPD moderate- medication dependent , Asthma , well controlled/ stable Last rescue: > 1 year ago Asthma type of rescue: PRN inhaler,   Comment: Remote hx of pneumonia     GI/Hepatic    No GERD ,  No hiatal hernia,   Comment: Paraesophageal hernia repair 2017    Post Britney-en-Y gastric bypass with 100 pound weight loss     Negative  ROS        Endo/Other  Negative endo/other ROS No diabetes ,   Comment: Hx of pre diabetes last HGb A1C 5 1    GYN       Hematology  Negative hematology ROS      Musculoskeletal    Arthritis     Neurology    Headaches,    Psychology   Negative psychology ROS     Comment: Mental status change         Physical Exam    Airway    Mallampati score: III  TM Distance: >3 FB  Neck ROM: limited     Dental   upper dentures and lower dentures,     Cardiovascular  Rhythm: regular, Rate: normal, Cardiovascular exam normal    Pulmonary  Comment: Diminished breath sounds in bases, Breath sounds clear to auscultation, Decreased breath sounds,     Other Findings        Anesthesia Plan  ASA Score- 3 Emergent    Anesthesia Type- general with ASA Monitors  Additional Monitors: arterial line  Airway Plan: ETT  Comment: Patient reportedly has had multiple episodes of awareness under anesthesia  Will use BISS monitor perioperatively  Patent was instructed that this would not guaruntee another episode of awareness  Plan is GA with RSI   Plan Factors-Patient not instructed to abstain from smoking on day of procedure  Patient did not smoke on day of surgery  Induction- rapid sequence induction and intravenous  Postoperative Plan- Plan for postoperative opioid use  Planned trial extubation    Informed Consent- Anesthetic plan and risks discussed with spouse

## 2019-07-28 NOTE — ANESTHESIA PROCEDURE NOTES
Arterial Line Insertion  Performed by: Kylee Urbano CRNA  Authorized by: Keven Sosa DO   Consent: Verbal consent obtained  Written consent obtained  Risks and benefits: risks, benefits and alternatives were discussed  Consent given by: patient  Patient understanding: patient states understanding of the procedure being performed  Patient consent: the patient's understanding of the procedure matches consent given  Procedure consent: procedure consent matches procedure scheduled  Relevant documents: relevant documents present and verified  Test results: test results available and properly labeled  Site marked: the operative site was marked  Radiology Images: Radiology Images displayed and confirmed  If images not available, report reviewed  Required items: required blood products, implants, devices, and special equipment available  Patient identity confirmed: verbally with patient, provided demographic data and hospital-assigned identification number  Time out: Immediately prior to procedure a "time out" was called to verify the correct patient, procedure, equipment, support staff and site/side marked as required  Preparation: Patient was prepped and draped in the usual sterile fashion    Indications: multiple ABGs and hemodynamic monitoring  Orientation:  Right  Location: radial artery  Procedure Details:  Fuentes's test normal: yes  Needle gauge: 20  Seldinger technique: Seldinger technique used  Number of attempts: 1    Post-procedure:  Post-procedure: dressing applied  Waveform: good waveform  Post-procedure CNS: normal  Patient tolerance: Patient tolerated the procedure well with no immediate complications

## 2019-07-28 NOTE — PROGRESS NOTES
Dyspnea worsening  Abdominal pain increasing  Tachycardia worsening  Patient likely has abdominal comportment syndrome with small bowel ischemia and possible necrosis  Possible small bowel leak from serosal injuries during prior reduction of internal hernia 7/29/19  Patient sedated and unable to provide consent  Consent provided by wife  Patient will go to the operating room for a decompressive laparotomy, Abthera vac, possible small bowel resection  Patient will be transferred directly to the ICU after  Risk, benefits and alternatives explained to the wife  She understands well and wishes for us to proceed

## 2019-07-28 NOTE — ANESTHESIA POSTPROCEDURE EVALUATION
Post-Op Assessment Note    CV Status:  Stable  Pain Score: 0    Pain management: adequate     Mental Status:  Unresponsive, somnolent and sleepy   Hydration Status:  Stable   PONV Controlled:  None   Airway Patency:  Patent  Airway: intubated   Post Op Vitals Reviewed: Yes      Staff: Anesthesiologist           BP      Temp     Pulse    Resp      SpO2

## 2019-07-28 NOTE — QUICK NOTE
Patient seen and examined   P - 120-130s; Tachypneic   Distention increased, appropriately tender, wounds C/D/I; LUE/lower midline ecchymosis stable   Creatinine increased; lactic acid 2 2; Mg/Ca decreased  Will replete electrolytes;  Nunez placed  Will continue to monitor and resuscitate w/ IVF; third spacing secondary to toxin release from ischemic bowel; this should improve; will continue to serially monitor end points

## 2019-07-28 NOTE — H&P
History & Physical Exam - Imer Gauri Groves 63 y o  male MRN: [id number]  Unit/Bed#: ICU 13 Encounter: [id number]      Assessment/Plan:  1  Status post repair of marginal ulcer perforation, abdominal washout and endoscopic stent placement in the jejunum  · Postoperative care support  Patient remained hemodynamically stable pressures have been discontinued  · Manage postoperative pain  2  Status post reduction repair of internal hernia and repair of serosal tears on 26 July 2019  · Patient required surgery listed in 1  28 July 2019     3  History of Britney-en-Y surgery in 2017        Critical Care Time:   Documented critical care time excludes any procedures documented elsewhere  It also excludes any family updates    _____________________________________________________________________      HPI:    Jean Claude Lopez is a 63 y o  male who underwent Britney-en-Y surgery in 2017 presented 26 July 2019 with complaints of abdominal pain and back pain with radiation to his pass serial legs bilaterally  Patient was found to have a internal hernia that was subsequently taken to the operating room on 26 July 2019 for reduction repair  Initially this was a laparoscopic procedure however the hernia was extremely tight and required open procedure  Patient had a relatively quite postoperative course  However, on 28 July 2019 the patient began to develop shortness of breath and abdominal discomfort along with abdominal distention  The patient was subsequently taken back to the operating room where he was found to have a marginal ulcer perforation requiring abdominal washout with 7 L of fluid  Additionally GI was consulted to place a stent in the jejunum  Patient remained intubated and therefore was transferred to the intensive care unit under critical care service  Upon arrival to the intensive care unit the patient remains stable off pressors  He is intubated and sedated  He does not show any signs of distress  He will be ordered pain medications and sedation if needed  Family discussion was had between them and Dr Dary Tucker  The family demonstrated understanding of the current events  Extubation is planned for the morning  Review of Systems:  Unable to obtain as the patient is intubated and sedated    Historical Information   Past Medical History:   Diagnosis Date    Anesthesia     Pt wakes up during "almost every surgery"    Arthritis     Asthma     Bariatric surgery status     Cervical radiculopathy     Chemotherapy follow-up examination     Chronic pain     Chronic pain disorder     COPD [facility]chronic obstructive pulmonary disease) [facility] [address] [facility]     Diabetes mellitus ([facility] [address] )     borderline "years ago"    Food allergy     clams    GERD (gastroesophageal reflux disease)     Heart murmur     Hiatal hernia     History of malignant neoplasm     History of pneumonia 04/12/2016    History of pulmonary embolism     History of ulceration     Hyperlipidemia     Lung cancer (HCC)     left lung, radiation and chemo tx    Migraine     Pneumonia     Postgastrectomy malabsorption     Right scapholunate ligament tear     Skipped heart beats     Wears partial dentures     upper and lower     Past Surgical History:   Procedure Laterality Date    BRONCHOSCOPY      COLONOSCOPY      FRACTURE SURGERY      femur right 1985    GASTRIC BYPASS LAPAROSCOPIC N/A 7/12/2017    Procedure: GASTRIC DIVERSION WITH BR[place]LYN-EN-Y RECONSTRUCTION WITH  SHORT 60 cm LIMB;  Surgeon: Janie Vivas MD;  Location: [facility];  Service: Bariatrics    KNEE ARTHROSCOPY Right     right shoulder    LUNG LOBECTOMY Left     LYMPHADENECTOMY  05/22/2012    Dr Chayo Addison ANT INTERBODY MIN DISCECTOMY, CERVICAL BELOW C2 N/A 10/17/2017    Procedure: C5-6, C6-7 ACDF WITH ALLOGRAFT (NEURO MONITORING);   Surgeon: Tran Maria MD;  Location:  MAIN OR;  Service: Orthopedics    HI COLONOSCOPY FLX DX W/COLLJ SPEC WHEN PFRMD N/A 2017    Procedure: COLONOSCOPY;  Surgeon: Savita Bernard MD;  Location: [facility];  Service: Gastroenterology    AL EGD TRANSORAL BIOPSY SINGLE/MULTIPLE N/A 2019    Procedure: ESOPHAGOGASTRODUODENOSCOPY (EGD); Surgeon: Mark Louise MD;  Location: [facility]; Service: Bariatrics    AL ESOPHAGOGASTRODUODENOSCOPY TRANSORAL DIAGNOSTIC N/A 2017    Procedure: ESOPHAGOGASTRODUODENOSCOPY (EGD); Surgeon: Savita Bernard MD;  Location: [facility];   Service: Gastroenterology    AL INCISE FINGER TENDON SHEATH Right 2018    Procedure: RELEASE TRIGGER FINGER long and index finger;  Surgeon: Ethan Roberts MD;  [facility];  Service: Orthopedics    AL LAP, REPAIR PARAESOPHAGEAL HERNIA, INCL FUNDOPLASTY W/ MESH N/A 2017    Procedure: REPAIR HERNIA PARAESOPHAGEAL  LAPAROSCOPIC;  Surgeon: Mark Louise MD;  [facility];  Service: [facility] [place] [facility] WRIST Julane Money LIG Right 2016    Procedure: RELEASE CARPAL TUNNEL ENDOSCOPIC;  Surgeon: Ethan Roberts MD;  Location: [facility];  Service: Orthopedics    RECONSTRUCTION DISTAL RADIUS/ULNA JOINT (DRUJ) Right 2016    Procedure: WRIST SCAPHOLUNATE LIGAMENT RECONSTRUCTION WITH ECRB TENDON AUTOGRAPH , SPLINT APPLICATION ;  Surgeon: Ethan Roberts MD;  Location: [facility];  Service:    [facility] SHOULDER SURGERY      TONSILLECTOMY       Social History   Social History     Substance and Sexual Activity   Alcohol Use Yes    Frequency: Monthly or less    Drinks per session: 1 or 2    Binge frequency: Never     Social History     Substance and Sexual Activity   Drug Use No     Social History     Tobacco Use   Smoking Status Former Smoker    Packs/day: 0 50    Years: 48 00    Pack years: 24 00    Types: Cigarettes    Last attempt to quit: 2017    Years since quittin 2   Smokeless Tobacco Never Used       Family History:   Family History   Problem Relation Age of Onset    Other Mother         Back disorder    Diabetes Mother         Diabetes Mellitus    Hypertension Mother     Heart disease Father     Hypertension Father     Breast cancer Sister     Skin cancer Sister     Breast cancer Paternal Grandmother     Skin cancer Paternal Grandmother        Medications:  Pertinent medications were reviewed    Current Facility-Administered Medications:  chlorhexidine 15 mL Swish & Spit Q12H [address] [employer], [address]    [START ON 7/29/2019] famotidine 20 mg Intravenous Daily CHEYANNE Collins    [START ON 7/29/2019] fluconazole 200 mg Intravenous Q24H CHEYANNE Collins    heparin (porcine) 5,000 Units Subcutaneous Q8H [address] Jamison Heller MD    [START ON 7/29/2019] levofloxacin 750 mg Intravenous Q24H CHEYANNE Collins    metroNIDAZOLE 500 mg Intravenous Q8H CHEYANNE Collins    multivitamin IVPB  Intravenous Daily Jamison Heller MD Last Rate: 500 mL/hr at 07/28/19 0807   pantoprazole 40 mg Intravenous Q12H [address] Jamison Heller MD    sodium bicarbonate infusion 150 mL/hr Intravenous Continuous CHEYANNE Collins    sodium chloride 100 mL/hr Intravenous Continuous Jamison Heller MD Last Rate: 100 mL/hr (07/28/19 0434)         Allergies   Allergen Reactions    Codeine Hives, Itching, Nausea Only, GI Intolerance and Vomiting    Hydrocodone Hives and GI Intolerance    Penicillins Anaphylaxis and Throat Swelling    Shellfish-Derived Products Nausea Only and Vomiting         Vitals:   /68   Pulse (!) 112   Temp 97 8 °F (36 6 °C)   Resp 17   Wt 74 9 kg (165 lb 2 oz)   SpO2 97%   BMI 25 11 kg/m²   Body mass index is 25 11 kg/m²    SpO2: 97 %,   SpO2 Activity: At Rest,   O2 Device: Other (comment)(vent )      Intake/Output Summary (Last 24 hours) at 7/28/2019 1832  Last data filed at 7/28/2019 1821  Gross per 24 hour   Intake 6500 ml   Output 4000 ml   Net 2500 ml     Invasive Devices     Peripheral Intravenous Line            Peripheral IV 07/26/19 Right Antecubital 2 days    Peripheral IV 07/27/19 Left;Ventral (anterior) Forearm less than 1 day    Peripheral IV 07/28/19 Left Arm less than 1 day          Arterial Line            Arterial Line 07/28/19 Right Radial less than 1 day          Drain            Closed/Suction Drain Right Abdomen Bulb 19 Fr  less than 1 day    Gastrostomy/Enterostomy Gastrostomy 22 Fr  LUQ less than 1 day    Urethral Catheter Double-lumen 16 Fr  less than 1 day          Airway            ETT  Oral;Cuffed; Hi-Lo 8 mm less than 1 day                Physical Exam:  Gen:  Intubated and sedated  HEENT:  Atraumatic normocephalic pupils equal round reactive to light extraocular movements intact sclera anicteric:  Mucosa pink moist  Neck:  Supple no JVD no lymphadenopathy oropharynx is intubated no bruits auscultated  Chest:  Clear to auscultation bilaterally respiratory rate per the vent  Cor:  Regular rate and rhythm no murmurs rubs or gallops appreciated  Abd:  The abdomen is soft and quiet drains are in place  Ext:  No clubbing cyanosis or edema  Neuro:  Intubated and sedated  Skin:  Warm dry and intact no rash, abdominal incision is covered with clean dry dressing      Diagnostic Data:  Lab: I have personally reviewed pertinent lab results  ,   CBC:  Results from last 7 days   Lab Units 07/28/19  1748   WBC Thousand/uL 2 50*   HEMOGLOBIN g/dL 11 6*   HEMATOCRIT % 35 4*   PLATELETS Thousands/uL 196      CMP: Lab Results   Component Value Date    SODIUM 135 (L) 07/28/2019    K 5 3 07/28/2019     07/28/2019    CO2 19 (L) 07/28/2019    BUN 19 07/28/2019    CREATININE 1 00 07/28/2019    CALCIUM 7 1 (L) 07/28/2019    AST 37 07/28/2019    ALT 18 07/28/2019    ALKPHOS 42 (L) 07/28/2019    EGFR 83 07/28/2019   ,   PT/INR:   Lab Results   Component Value Date    INR 1 86 (H) 07/28/2019   ,   Magnesium: No components found for: MAG,  Phosphorous:   Lab Results   Component Value Date    PHOS 3 7 07/28/2019       ABG: Lab Results Component Value Date    PHART 7 225 (LL) 07/28/2019    XGO0BMC 46 2 (H) 07/28/2019    PO2ART 310 4 (H) 07/28/2019    UMH3OSR 18 7 (L) 07/28/2019    BEART -8 7 07/28/2019    SOURCE Line, Arterial 07/28/2019   ,     Microbiology:  No pending microbiology    Imaging: I have personally reviewed the pertinent imaging studies on the PACS system no recent imaging    Cardiac/EKG/telemetry/Echo:     Normal sinus rhythm      VTE Prophylaxis: Sequential compression device Brett Coy)     Code Status: Level 1 - Full Code    CHEYANNE Berman    Portions of the record may have been created with voice recognition software  Occasional wrong word or "sound a like" substitutions may have occurred due to the inherent limitations of voice recognition software  Read the chart carefully and recognize, using context, where substitutions have occurred

## 2019-07-28 NOTE — PROGRESS NOTES
Doctor Fabio Jesus from bariatric team called the unit at 2045 to check up on pt  MD ordered not to wait until pt urinates, but to insert pearce catheter now  Pearce was placed and MD was called back per his order  Pt is in severe pain, abd very distended, tachy in the 120s-130s and tachypneic  MD notified of current status and MD en route to see pt  Telephone order for lasix 20mg and toradol 15mg STAT were ordered and given  When MD arrived another 20mg of lasix was ordered and given  Stat lab work drawn and resulted  Mg and Ca were decreased and repleted  Instructed to continue IVF, check labs in the morning and continue to monitor  Instructed to call with any changes  Will continue to monitor

## 2019-07-28 NOTE — PLAN OF CARE
Problem: Potential for Falls  Goal: Patient will remain free of falls  Description  INTERVENTIONS:  - Assess patient frequently for physical needs  -  Identify cognitive and physical deficits and behaviors that affect risk of falls  -  [place] fall precautions as indicated by assessment   - Educate patient/family on patient safety including physical limitations  - Instruct patient to call for assistance with activity based on assessment  - Modify environment to reduce risk of injury  - Consider OT/PT consult to assist with strengthening/mobility  7/28/2019 0840 by Sherrill Delacruz RN  Outcome: Progressing  7/28/2019 0839 by Sherrill Delacruz RN  Outcome: Progressing  7/28/2019 0839 by Sherrill Delacruz RN  Outcome: Progressing     Problem: Nutrition/Hydration-ADULT  Goal: Nutrient/Hydration intake appropriate for improving, restoring or maintaining nutritional needs  Description  Monitor and assess patient's nutrition/hydration status for malnutrition (ex- brittle hair, bruises, dry skin, pale skin and conjunctiva, muscle wasting, smooth red tongue, and disorientation)  Collaborate with interdisciplinary team and initiate plan and interventions as ordered  Monitor patient's weight and dietary intake as ordered or per policy  Utilize nutrition screening tool and intervene per policy  Determine patient's food preferences and provide high-protein, high-caloric foods as appropriate       INTERVENTIONS:  - Monitor oral intake, urinary output, labs, and treatment plans  - Assess nutrition and hydration status and recommend course of action  - Evaluate amount of meals eaten  - Assist patient with eating if necessary   - Allow adequate time for meals  - Recommend/ encourage appropriate diets, oral nutritional supplements, and vitamin/mineral supplements  - Order, calculate, and assess calorie counts as needed  - Recommend, monitor, and adjust tube feedings and TPN/PPN based on assessed needs  - Assess need for intravenous fluids  - Provide specific nutrition/hydration education as appropriate  - Include patient/family/caregiver in decisions related to nutrition  7/28/2019 0840 by Angélica Saucedo RN  Outcome: Progressing  7/28/2019 0839 by Angélica Saucedo RN  Outcome: Progressing  7/28/2019 0839 by Angélica Saucedo RN  Outcome: Progressing     Problem: PAIN - ADULT  Goal: Verbalizes/displays adequate comfort level or baseline comfort level  Description  Interventions:  - Encourage patient to monitor pain and request assistance  - Assess pain using appropriate pain scale  - Administer analgesics based on type and severity of pain and evaluate response  - Implement non-pharmacological measures as appropriate and evaluate response  - Consider cultural and social influences on pain and pain management  - Notify physician/advanced practitioner if interventions unsuccessful or patient reports new pain  7/28/2019 0840 by Angélica Saucedo RN  Outcome: Progressing  7/28/2019 0839 by Angélica Saucedo RN  Outcome: Progressing  7/28/2019 0839 by Angélica Saucedo RN  Outcome: Progressing     Problem: INFECTION - ADULT  Goal: Absence or prevention of progression during hospitalization  Description  INTERVENTIONS:  - Assess and monitor for signs and symptoms of infection  - Monitor lab/diagnostic results  - Monitor all insertion sites, i e  indwelling lines, tubes, and drains  - Monitor endotracheal (as able) and nasal secretions for changes in amount and color  - [place] appropriate cooling/warming therapies per order  - Administer medications as ordered  - Instruct and encourage patient and family to use good hand hygiene technique  - Identify and instruct in appropriate isolation precautions for identified infection/condition  7/28/2019 0840 by Angélica Saucedo RN  Outcome: Progressing  7/28/2019 0839 by Angélica Saucedo RN  Outcome: Progressing  7/28/2019 0839 by Angélica Saucedo RN  Outcome: Progressing     Problem: DISCHARGE PLANNING  Goal: Discharge to home or other facility with appropriate resources  Description  INTERVENTIONS:  - Identify barriers to discharge w/patient and caregiver  - Arrange for needed discharge resources and transportation as appropriate  - Identify discharge learning needs (meds, wound care, etc )  - Arrange for interpretive services to assist at discharge as needed  - Refer to Case Management Department for coordinating discharge planning if the patient needs post-hospital services based on physician/advanced practitioner order or complex needs related to functional status, cognitive ability, or social support system  7/28/2019 0840 by Tisha Singleton RN  Outcome: Progressing  7/28/2019 0839 by Tisha Singleton RN  Outcome: Progressing  7/28/2019 0839 by Tisha Singleton RN  Outcome: Progressing     Problem: Knowledge Deficit  Goal: Patient/family/caregiver demonstrates understanding of disease process, treatment plan, medications, and discharge instructions  Description  Complete learning assessment and assess knowledge base    Interventions:  - Provide teaching at level of understanding  - Provide teaching via preferred learning methods  7/28/2019 0840 by Tisha Singleton RN  Outcome: Progressing  7/28/2019 0839 by Tisha Singleton RN  Outcome: Progressing  7/28/2019 0839 by Tisha Singleton RN  Outcome: Progressing     Problem: GASTROINTESTINAL - ADULT  Goal: Minimal or absence of nausea and/or vomiting  Description  INTERVENTIONS:  - Administer IV fluids as ordered to ensure adequate hydration  - Maintain NPO status until nausea and vomiting are resolved  - Nasogastric tube as ordered  - Administer ordered antiemetic medications as needed  - Provide nonpharmacologic comfort measures as appropriate  - Advance diet as tolerated, if ordered  - Nutrition services referral to assist patient with adequate nutrition and appropriate food choices  7/28/2019 0840 by Mayte Ortiz RN  Outcome: Progressing  7/28/2019 0839 by Mayte Ortiz RN  Outcome: Progressing  7/28/2019 0839 by Mayte Ortiz RN  Outcome: Progressing  Goal: Maintains or returns to baseline bowel function  Description  INTERVENTIONS:  - Assess bowel function  - Encourage oral fluids to ensure adequate hydration  - Administer IV fluids as ordered to ensure adequate hydration  - Administer ordered medications as needed  - Encourage mobilization and activity  - Nutrition services referral to assist patient with appropriate food choices  7/28/2019 0840 by Mayte Ortiz RN  Outcome: Progressing  7/28/2019 0839 by Mayte Ortiz RN  Outcome: Progressing  7/28/2019 0839 by Mayte Ortiz RN  Outcome: Progressing  Goal: Maintains adequate nutritional intake  Description  INTERVENTIONS:  - Monitor percentage of each meal consumed  - Identify factors contributing to decreased intake, treat as appropriate  - Assist with meals as needed  - Monitor I&O, WT and lab values  - Obtain nutrition services referral as needed  7/28/2019 0840 by Mayte Ortiz RN  Outcome: Progressing  7/28/2019 0839 by Mayte Ortiz RN  Outcome: Progressing  7/28/2019 0839 by Mayte Ortiz RN  Outcome: Progressing     Problem: SKIN/TISSUE INTEGRITY - ADULT  Goal: Skin integrity remains intact  Description  INTERVENTIONS  - Identify patients at risk for skin breakdown  - Assess and monitor skin integrity  - Assess and monitor nutrition and hydration status  - Monitor labs (i e  albumin)  - Assess for incontinence   - Turn and reposition patient  - Assist with mobility/ambulation  - Relieve pressure over bony prominences  - Avoid friction and shearing  - Provide appropriate hygiene as needed including keeping skin clean and dry  - Evaluate need for skin moisturizer/barrier cream  - Collaborate with interdisciplinary team (i e  Nutrition, Rehabilitation, etc )   - Patient/family teaching  7/28/2019 0840 by Rocco Dunn RN  Outcome: Progressing  7/28/2019 0839 by Rocco Dunn RN  Outcome: Progressing  7/28/2019 0839 by Rocco Dunn RN  Outcome: Progressing  Goal: Incision(s), wounds(s) or drain site(s) healing without S/S of infection  Description  INTERVENTIONS  - Assess and document risk factors for skin impairment   - Assess and document dressing, incision, wound bed, drain sites and surrounding tissue  - Initiate Nutrition services consult and/or wound management as needed  7/28/2019 0840 by Rocco Dunn RN  Outcome: Progressing  7/28/2019 0839 by Rocco Dunn RN  Outcome: Progressing  7/28/2019 0839 by Rocco Dunn RN  Outcome: Progressing  Goal: Oral mucous membranes remain intact  Description  INTERVENTIONS  - Assess oral mucosa and hygiene practices  - Implement preventative oral hygiene regimen  - Implement oral medicated treatments as ordered  - Initiate Nutrition services referral as needed  7/28/2019 0840 by Rocco Dunn RN  Outcome: Progressing  7/28/2019 0839 by Rocco Dunn RN  Outcome: Progressing  7/28/2019 0839 by Rocco Dunn RN  Outcome: Progressing

## 2019-07-28 NOTE — PROGRESS NOTES
Progress Note - Bariatric Surgery   [facility] 63 y o  male MRN: [id number]  Unit/Bed#: E5 -01 Encounter: [id number]    Assessment:  58/M POD 2 s/p diagnostic laparoscopy converted to open, reduction and repair of internal hernia, repair of serosal tears, EGD     Persistently tachycardic but improved  Tacypnea  Pain is stable  Nunez inserted o/n to closely monitor UOP and attempt to decrease intrabadominal pressure  40mg lasix administered o/n to improve work of breathing  Lactate increased  ALICIA  Possible ongoing ischemia reperfusion/SIRS with severe 3rd spacing and decreased intravascular volume from release of ischemic bowel from internal hernia but on going ischemia is a possibility  Plan:  NPO   Cont IVF; Albumin   IV MVI/Thaimine    IV H2 antagonist   Reglan; Bisacodyl suppository   IS  Pain control  Cont electrolyte repletion  Serial end points   DVT ppx     Subjective/Objective     Subjective: No flatus, burping, pain controlled, feels like he has to go to the bathroom    Objective:     Blood pressure 110/68, pulse (!) 117, temperature 97 9 °F (36 6 °C), temperature source Temporal, resp  rate 20, SpO2 95 %  ,There is no height or weight on file to calculate BMI        Intake/Output Summary (Last 24 hours) at 7/28/2019 0727  Last data filed at 7/28/2019 8906  Gross per 24 hour   Intake 2845 ml   Output 1300 ml   Net 1545 ml       Invasive Devices     Peripheral Intravenous Line            Peripheral IV 07/26/19 Right Antecubital 2 days    Peripheral IV 07/27/19 Left;Ventral (anterior) Forearm less than 1 day          Drain            Urethral Catheter Double-lumen 16 Fr  less than 1 day                Physical Exam:     Lethargic   Sinus tachy  Breathing slightly labored  Abd soft, appropriately tender, moderately to severely distended, wounds C/D/I, ecchymosis LUQ and inferior midline wound unchanged     Lab, Imaging and other studies:  CBC:   Lab Results   Component Value Date    WBC 5 53 07/28/2019    HGB 14 3 07/28/2019    HCT 43 9 07/28/2019    MCV 92 07/28/2019     07/28/2019    MCH 29 9 07/28/2019    MCHC 32 6 07/28/2019    RDW 12 9 07/28/2019    MPV 9 8 07/28/2019    NRBC 0 07/28/2019   , CMP:   Lab Results   Component Value Date    SODIUM 133 (L) 07/28/2019    K 4 5 07/28/2019     07/28/2019    CO2 20 (L) 07/28/2019    BUN 24 07/28/2019    CREATININE 1 38 (H) 07/28/2019    CALCIUM 8 2 (L) 07/28/2019    EGFR 56 07/28/2019     VTE Pharmacologic Prophylaxis: Heparin  VTE Mechanical Prophylaxis: sequential compression device

## 2019-07-28 NOTE — PROGRESS NOTES
ESOPHAGOGASTRODUODENOSCOPY    PROCEDURE: EGD/ esophageal stent placement    INDICATIONS: Gastrojejunal anastomosis leak  this was found during surgery  I was called by Dr Taylor Villanueva during surgery for placement of an esophageal stent  POST-OP DIAGNOSIS: See the impression below    SEDATION: Monitored anesthesia care, check anesthesia records    CONSENT:  Informed consent was obtained for the procedure, including sedation after explaining the risks and benefits of the procedure  Risks including but not limited to bleeding, perforation, infection, aspiration were discussed in detail  Also explained about less than 100% sensitivity with the exam and other alternatives  PREPARATION:   EKG tracing, pulse oximetry, blood pressure were monitored throughout the procedure  Patient was identified by myself both verbally and by visual inspection of ID band  DESCRIPTION:   Patient was placed in the left lateral decubitus position and was sedated with the above medication  The gastroscope was introduced in to the oropharynx and the esophagus was intubated under direct visualization  Scope was passed down the esophagus up to 2nd part of the duodenum  A careful inspection was made as the gastroscope was withdrawn, including a retroflexed view of the stomach; findings and interventions are described below  FINDINGS:    #1  Esophagus and GEJ- normal    #2  Stomach- evidence of prior surgery  The gastrojejunal anastomosis appeared to be significantly inflamed and a possible area of leak was seen at the anastomosis  This area was traversed using the endoscope  This was slightly narrowed  A wire was then advanced through the endoscope into the small bowel  This was visualized fluoroscopically  The endoscope was then removed and a fully covered [id number] esophageal stent was passed advanced across this area into the small bowel    Then under fluoroscopic guidance the stent was deployed with the midportion of the stent being at the [address] anastomosis  Contrast was also injected and no significant extravasation was seen  #3   Jejunum :  Limited evaluation was normal          IMPRESSIONS:      1  Possible leak at the [address] anastomosis in the post bypass patient  Successful placement of a fully covered esophageal stent  RECOMMENDATIONS:     1  Further care by surgery  2  Barium swallow in the next 1-2 days  COMPLICATIONS:  None; patient tolerated the procedure well            DISPOSITION: PACU           CONDITION: Stable

## 2019-07-29 LAB
ALBUMIN SERPL BCP-MCNC: 1.9 G/DL (ref 3.5–5)
ALP SERPL-CCNC: 31 U/L (ref 46–116)
ALT SERPL W P-5'-P-CCNC: 24 U/L (ref 12–78)
ANION GAP SERPL CALCULATED.3IONS-SCNC: 8 MMOL/L (ref 4–13)
AST SERPL W P-5'-P-CCNC: 59 U/L (ref 5–45)
BASE EXCESS BLDA CALC-SCNC: -3.7 MMOL/L
BILIRUB SERPL-MCNC: 0.72 MG/DL (ref 0.2–1)
BUN SERPL-MCNC: 17 MG/DL (ref 5–25)
CA-I BLD-SCNC: 0.94 MMOL/L (ref 1.12–1.32)
CALCIUM SERPL-MCNC: 7.3 MG/DL (ref 8.3–10.1)
CHLORIDE SERPL-SCNC: 105 MMOL/L (ref 100–108)
CO2 SERPL-SCNC: 24 MMOL/L (ref 21–32)
CREAT SERPL-MCNC: 0.91 MG/DL (ref 0.6–1.3)
ERYTHROCYTE [DISTWIDTH] IN BLOOD BY AUTOMATED COUNT: 13.6 % (ref 11.6–15.1)
GFR SERPL CREATININE-BSD FRML MDRD: 93 ML/MIN/1.73SQ M
GLUCOSE SERPL-MCNC: 92 MG/DL (ref 65–140)
HCO3 BLDA-SCNC: 20.4 MMOL/L (ref 22–28)
HCT VFR BLD AUTO: 32.2 % (ref 36.5–49.3)
HGB BLD-MCNC: 10.8 G/DL (ref 12–17)
MAGNESIUM SERPL-MCNC: 2.1 MG/DL (ref 1.6–2.6)
MCH RBC QN AUTO: 30.8 PG (ref 26.8–34.3)
MCHC RBC AUTO-ENTMCNC: 33.5 G/DL (ref 31.4–37.4)
MCV RBC AUTO: 92 FL (ref 82–98)
O2 CT BLDA-SCNC: 16.2 ML/DL (ref 16–23)
OXYHGB MFR BLDA: 96.1 % (ref 94–97)
PCO2 BLDA: 33.8 MM HG (ref 36–44)
PH BLDA: 7.4 [PH] (ref 7.35–7.45)
PLATELET # BLD AUTO: 197 THOUSANDS/UL (ref 149–390)
PMV BLD AUTO: 10 FL (ref 8.9–12.7)
PO2 BLDA: 91.2 MM HG (ref 75–129)
POTASSIUM SERPL-SCNC: 4 MMOL/L (ref 3.5–5.3)
PROT SERPL-MCNC: 4 G/DL (ref 6.4–8.2)
RBC # BLD AUTO: 3.51 MILLION/UL (ref 3.88–5.62)
SODIUM SERPL-SCNC: 137 MMOL/L (ref 136–145)
SPECIMEN SOURCE: ABNORMAL
WBC # BLD AUTO: 5.17 THOUSAND/UL (ref 4.31–10.16)

## 2019-07-29 PROCEDURE — 83735 ASSAY OF MAGNESIUM: CPT | Performed by: NURSE PRACTITIONER

## 2019-07-29 PROCEDURE — 82330 ASSAY OF CALCIUM: CPT | Performed by: NURSE PRACTITIONER

## 2019-07-29 PROCEDURE — C9113 INJ PANTOPRAZOLE SODIUM, VIA: HCPCS | Performed by: PHYSICIAN ASSISTANT

## 2019-07-29 PROCEDURE — 99232 SBSQ HOSP IP/OBS MODERATE 35: CPT | Performed by: INTERNAL MEDICINE

## 2019-07-29 PROCEDURE — 80053 COMPREHEN METABOLIC PANEL: CPT | Performed by: NURSE PRACTITIONER

## 2019-07-29 PROCEDURE — 85027 COMPLETE CBC AUTOMATED: CPT | Performed by: NURSE PRACTITIONER

## 2019-07-29 PROCEDURE — 82805 BLOOD GASES W/O2 SATURATION: CPT | Performed by: INTERNAL MEDICINE

## 2019-07-29 PROCEDURE — 94003 VENT MGMT INPAT SUBQ DAY: CPT

## 2019-07-29 PROCEDURE — 99024 POSTOP FOLLOW-UP VISIT: CPT | Performed by: SURGERY

## 2019-07-29 PROCEDURE — C9113 INJ PANTOPRAZOLE SODIUM, VIA: HCPCS | Performed by: SURGERY

## 2019-07-29 RX ORDER — HYDROMORPHONE HCL/PF 1 MG/ML
1 SYRINGE (ML) INJECTION EVERY 2 HOUR PRN
Status: DISCONTINUED | OUTPATIENT
Start: 2019-07-29 | End: 2019-08-01

## 2019-07-29 RX ORDER — FUROSEMIDE 10 MG/ML
40 INJECTION INTRAMUSCULAR; INTRAVENOUS ONCE
Status: COMPLETED | OUTPATIENT
Start: 2019-07-29 | End: 2019-07-29

## 2019-07-29 RX ORDER — HYDROMORPHONE HCL/PF 1 MG/ML
0.5 SYRINGE (ML) INJECTION EVERY 4 HOURS PRN
Status: DISCONTINUED | OUTPATIENT
Start: 2019-07-29 | End: 2019-08-01

## 2019-07-29 RX ADMIN — THIAMINE HYDROCHLORIDE: 100 INJECTION, SOLUTION INTRAMUSCULAR; INTRAVENOUS at 10:19

## 2019-07-29 RX ADMIN — FLUCONAZOLE, SODIUM CHLORIDE 200 MG: 2 INJECTION INTRAVENOUS at 14:17

## 2019-07-29 RX ADMIN — HYDROMORPHONE HYDROCHLORIDE 0.5 MG: 1 INJECTION, SOLUTION INTRAMUSCULAR; INTRAVENOUS; SUBCUTANEOUS at 02:06

## 2019-07-29 RX ADMIN — HYDROMORPHONE HYDROCHLORIDE 0.5 MG: 1 INJECTION, SOLUTION INTRAMUSCULAR; INTRAVENOUS; SUBCUTANEOUS at 07:12

## 2019-07-29 RX ADMIN — PANTOPRAZOLE SODIUM 40 MG: 40 INJECTION, POWDER, FOR SOLUTION INTRAVENOUS at 08:12

## 2019-07-29 RX ADMIN — PROPOFOL 40 MCG/KG/MIN: 10 INJECTION, EMULSION INTRAVENOUS at 08:11

## 2019-07-29 RX ADMIN — CALCIUM GLUCONATE 3 G: 98 INJECTION, SOLUTION INTRAVENOUS at 15:10

## 2019-07-29 RX ADMIN — HEPARIN SODIUM 5000 UNITS: 5000 INJECTION INTRAVENOUS; SUBCUTANEOUS at 21:45

## 2019-07-29 RX ADMIN — METRONIDAZOLE 500 MG: 500 INJECTION, SOLUTION INTRAVENOUS at 05:08

## 2019-07-29 RX ADMIN — METRONIDAZOLE 500 MG: 500 INJECTION, SOLUTION INTRAVENOUS at 15:14

## 2019-07-29 RX ADMIN — HYDROMORPHONE HYDROCHLORIDE 0.5 MG: 1 INJECTION, SOLUTION INTRAMUSCULAR; INTRAVENOUS; SUBCUTANEOUS at 10:36

## 2019-07-29 RX ADMIN — HEPARIN SODIUM 5000 UNITS: 5000 INJECTION INTRAVENOUS; SUBCUTANEOUS at 05:09

## 2019-07-29 RX ADMIN — LEVOFLOXACIN 750 MG: 5 INJECTION, SOLUTION INTRAVENOUS at 13:34

## 2019-07-29 RX ADMIN — HYDROMORPHONE HYDROCHLORIDE 1 MG: 1 INJECTION, SOLUTION INTRAMUSCULAR; INTRAVENOUS; SUBCUTANEOUS at 21:45

## 2019-07-29 RX ADMIN — FAMOTIDINE 20 MG: 10 INJECTION, SOLUTION INTRAVENOUS at 08:30

## 2019-07-29 RX ADMIN — PANTOPRAZOLE SODIUM 40 MG: 40 INJECTION, POWDER, FOR SOLUTION INTRAVENOUS at 20:34

## 2019-07-29 RX ADMIN — PROPOFOL 25 MCG/KG/MIN: 10 INJECTION, EMULSION INTRAVENOUS at 01:28

## 2019-07-29 RX ADMIN — FUROSEMIDE 40 MG: 10 INJECTION, SOLUTION INTRAMUSCULAR; INTRAVENOUS at 08:12

## 2019-07-29 RX ADMIN — CHLORHEXIDINE GLUCONATE 0.12% ORAL RINSE 15 ML: 1.2 LIQUID ORAL at 08:11

## 2019-07-29 RX ADMIN — SODIUM BICARBONATE 150 ML/HR: 84 INJECTION, SOLUTION INTRAVENOUS at 02:09

## 2019-07-29 RX ADMIN — METRONIDAZOLE 500 MG: 500 INJECTION, SOLUTION INTRAVENOUS at 21:46

## 2019-07-29 RX ADMIN — HEPARIN SODIUM 5000 UNITS: 5000 INJECTION INTRAVENOUS; SUBCUTANEOUS at 14:54

## 2019-07-29 NOTE — PROGRESS NOTES
Progress Note - Bariatric Surgery   Blaise Handing 63 y o  male MRN: [id number]  Unit/Bed#: ICU 13 Encounter: [id number]      Subjective/Objective     Subjective: 63year old male status post Exploratory Laparotomy with repair of perforated marginal ulcer, abdominal washout, G tube placement, and GI assisted endoscopic stent placement 7/28/2019 with Dr Cyndie Mansfield  Patient s/p Diagnostic laparoscopy converted to open 7/26/2019 with internal hernia repair  Patient in ICU, extubated today  Clinically improving  Pain adequately controlled on IV pain medication, NPO with no nausea or vomiting, ambulating without assistance, voiding well with Nunez inplace, using incentive spirometer  Denies fevers, chills, sweats, SOB, CP, calf pain  Objective:    /70   Pulse (!) 118   Temp 98 6 °F (37 °C) (Temporal)   Resp 16   Ht 5' 8" (1 727 m)   Wt 75 3 kg (166 lb 0 1 oz)   SpO2 98%   BMI 25 24 kg/m²       Intake/Output Summary (Last 24 hours) at 7/29/2019 1632  Last data filed at 7/29/2019 1533  Gross per 24 hour   Intake 5199 24 ml   Output 4390 ml   Net 809 24 ml       Invasive Devices     Peripheral Intravenous Line            Peripheral IV 07/26/19 Right Antecubital 3 days    Peripheral IV 07/27/19 Left;Ventral (anterior) Forearm 1 day    Peripheral IV 07/28/19 Left Arm 1 day          Arterial Line            Arterial Line 07/28/19 Right Radial 1 day          Drain            Closed/Suction Drain Right Abdomen Bulb 19 Fr  1 day    Gastrostomy/Enterostomy Gastrostomy 22 Fr  LUQ 1 day    Urethral Catheter Double-lumen 16 Fr  1 day                ROS: 10-point system completed  All negative except see HPI        Physical Exam    General Appearance:    Alert, cooperative, no distress, appears stated age   Head:    Normocephalic, without obvious abnormality, atraumatic   Lungs:     Respirations unlabored   Heart:    Regular rate and rhythm   Abdomen:     Soft, appropriate tenderness,  no masses, no organomegaly,   non distended; eleuterio drain in place with 60cc of serosanguinous fluid this AM; G tube to gravity with bile    Extremities:   Extremities normal, atraumatic, no cyanosis, mild bilateral hand edema   Neurologic:  Incision:      Psych:     Normal strength and sensation    Midline incision with staples in place and Meripex over incision; Day 1/7 for Meripex sterility; no drainage, bright red blood, or signs of infection      Normal mood and affect       Lab, Imaging and other studies:  I have personally reviewed pertinent lab results  , CBC:   Lab Results   Component Value Date    WBC 5 17 07/29/2019    HGB 10 8 (L) 07/29/2019    HCT 32 2 (L) 07/29/2019    MCV 92 07/29/2019     07/29/2019    MCH 30 8 07/29/2019    MCHC 33 5 07/29/2019    RDW 13 6 07/29/2019    MPV 10 0 07/29/2019    NRBC 0 07/28/2019   , CMP:   Lab Results   Component Value Date    SODIUM 137 07/29/2019    K 4 0 07/29/2019     07/29/2019    CO2 24 07/29/2019    BUN 17 07/29/2019    CREATININE 0 91 07/29/2019    CALCIUM 7 3 (L) 07/29/2019    AST 59 (H) 07/29/2019    ALT 24 07/29/2019    ALKPHOS 31 (L) 07/29/2019    EGFR 93 07/29/2019        VTE Mechanical Prophylaxis: sequential compression device, Heparin    Assessment/Plan    Perforated marginal ulcer  Internal hernia  Bariatric surgery status    Patient s/p Exploratory laparotomy, washout of sucus, repair of marginal ulcer perforation, abdominal washout, intraoperative EGD, GI assisted endoscopic stent placement and G tube placement 7/28/2019 with Dr Johann Skinner s/p RNY gastric bypass done 2017 with Dr Chiu March  Patient admitted 7/26/2019 due to generalized abdominal pain and back pain with positive swirl sign on CT and pelvis indicating an internal hernia  Patient went to OR 7/26/2019 for Diagnostic laparoscopy converted to open due to difficulty reducing the hernia  POD 1 patient had increasing shortness of breath, tachycardia, and extreme abdominal pain so patient was taken back to OR  Postoperatively patient remained intubated and in the ICU  Patient extubated today and clinically improving  Patient afebrile and hemodynamically stable   Will transfer patient to [facility] E5     - Maintain IV fluids at 100 cc an hour  - Continue NPO diet with trickle tube feedings today; Discontinue tube feedings overnight  - Sips with meds  - Continue IV antibiotics   - Encourage incentive spirometry, ambulation  - Continue current pain regimen  - PPI and Carafate for GI prophylaxis  - SCDs and heparin for DVT prophylaxis  - Will obtain UGI in AM to evaluate stent placement   - Will continue to trend labs     Plan of care was discussed with patient and patient's nurse  Care plan discussed with Dr Freeman: Patient to be transferred to [facility] E5 today; unable to be discharged to home today

## 2019-07-29 NOTE — SOCIAL WORK
CM met with the patient and wife at bedside to discuss dc needs and complete a general SW consult:    The patient lives with his wife in a bi-level home  He is independent with adls and ambulation  He drives  No hx of vna  No hx of str  He uses [address] for rx needs  His wife reports he has an Advanced Directive - requested copy  Wife is POA  PCP is Dr Arnie Gibbs    The patient had a G Tube placed yesterday and will be d/c home with tube feeds  Discussed [facility] DME referral and [facility]'s VNA referral  Will need to have nutrition assist with TF recommendations  Notified the wife that it can take a few days to know what pt will need to d/c with for nutrition       CM will continue to work with family and coordinate dc needs'

## 2019-07-29 NOTE — PROGRESS NOTES
Progress Note - Critical Care   [facility] 63 y o  male MRN: [id number]  Unit/Bed#: ICU 13 Encounter: [id number]    Assessment/Plan:  1  Acute hypoxic respiratory insufficiency status post exploratory laparotomy with abdominal washout, repair of marginal ulcer perforation  · Will continue vent support for now with a weaning trial this morning in hopes for extubation  · He may benefit from some diuresis prior to extubation  2  Status post repair of marginal ulcer, exploratory laparotomy, esophageal jejunal stent placement, postop day 1  · Management per surgical team's recommendations  3  Metabolic acidosis likely multifactorial related to 2  -improved  · Would discontinue the bicarbonate infusion  · We will continue to monitor his electrolytes closely  4  Status post Britney-en-Y gastric bypass surgery in 2017 and with recent internal hernia repair, postop day 3  · Management per surgical team's recommendations        Critical Care Time:   Documented critical care time excludes any procedures documented elsewhere  It also excludes any family updates    _____________________________________________________________________    HPI/24hr events:   Events of yesterday are noted  The patient remained intubated overnight      Medications:    Current Facility-Administered Medications:  chlorhexidine 15 mL Swish & Spit Q12H [facility] HOME Mace Davy, CRNP    famotidine 20 mg Intravenous Daily Mace Davy, CRNP    fluconazole 200 mg Intravenous Q24H Mace Davy, CRNP    heparin (porcine) 5,000 Units Subcutaneous [facility] Soren Huerta MD    HYDROmorphone 0 5 mg Intravenous Q3H PRN Mace Davy, CRNP    levofloxacin 750 mg Intravenous Q24H Mace Davy, CRNP    metroNIDAZOLE 500 mg Intravenous Q8H Mace Davy, CRNP Last Rate: Stopped (07/29/19 0600)   multivitamin IVPB  Intravenous Daily Soren Huerta MD Last Rate: 500 mL/hr at 07/28/19 0807   pantoprazole 40 mg Intravenous Q12H [facility] Soren Huerta MD propofol 5-50 mcg/kg/min Intravenous Titrated Roxene [place], CRNP Last Rate: 35 mcg/kg/min (07/29/19 0519)   sodium bicarbonate infusion 150 mL/hr Intravenous Continuous Roxene [place], CRNP Last Rate: 150 mL/hr (07/29/19 9214)   sodium chloride 100 mL/hr Intravenous Continuous Gerry Carrillo MD Last Rate: Stopped (07/28/19 1900)         propofol 5-50 mcg/kg/min Last Rate: 35 mcg/kg/min (07/29/19 0519)   sodium bicarbonate infusion 150 mL/hr Last Rate: 150 mL/hr (07/29/19 0209)   sodium chloride 100 mL/hr Last Rate: Stopped (07/28/19 1900)         Physical exam:  Vitals: Body mass index is 25 24 kg/m²  Blood pressure 112/67, pulse (!) 114, temperature 98 7 °F (37 1 °C), temperature source Temporal, resp   rate 15, height 5' 8" (1 727 m), weight 75 3 kg (166 lb 0 1 oz), SpO2 98 % ,  Temp  Min: 97 °F (36 1 °C)  Max: 98 7 °F (37 1 °C)  IBW: 68 4 kg    SpO2: 98 %  SpO2 Activity: At Rest  O2 Device: Other (comment)(vent )      Intake/Output Summary (Last 24 hours) at 7/29/2019 0656  Last data filed at 7/29/2019 0600  Gross per 24 hour   Intake 7715 3 ml   Output 4450 ml   Net 3265 3 ml       Invasive/non-invasive ventilation settings:   Respiratory    Lab Data (Last 4 hours)    None         O2/Vent Data (Last 4 hours)      07/29 0425           Vent Mode AC/VC       Resp Rate (BPM) (BPM) 16       Vt (mL) (mL) 450       FIO2 (%) (%) 40       PEEP (cmH2O) (cmH2O) 5       MV 7 95                 Invasive Devices     Peripheral Intravenous Line            Peripheral IV 07/26/19 Right Antecubital 3 days    Peripheral IV 07/27/19 Left;Ventral (anterior) Forearm 1 day    Peripheral IV 07/28/19 Left Arm less than 1 day          Arterial Line            Arterial Line 07/28/19 Right Radial less than 1 day          Drain            Urethral Catheter Double-lumen 16 Fr  1 day    Closed/Suction Drain Right Abdomen Bulb 19 Fr  less than 1 day    Gastrostomy/Enterostomy Gastrostomy 22 Fr  LUQ less than 1 day Airway            ETT  Oral;Cuffed; Hi-Lo 8 mm less than 1 day                  Physical Exam:  Gen:  Sedated but arousable  HEENT:  Pupils are equal round reactive to light  The oropharynx is intubated but otherwise clear  Neck:  Supple negative for lymphadenopathy  Chest:  Crackles in the bases bilaterally  Cor:  Regular rate and rhythm  Abd:  Mildly distended, and the incision is dressed and clean dry and intact the COURTNEY drain has some serosanguineous drainage  Ext:  There is no significant edema clubbing or cyanosis  Neuro:  Sedated but arousable, able to move his extremities  Skin:  Warm and dry      Diagnostic Data:  Lab: I have personally reviewed pertinent lab results  CBC:   Results from last 7 days   Lab Units 07/29/19 0440 07/28/19  1748 07/28/19  0541   WBC Thousand/uL 5 17 2 50* 5 53   HEMOGLOBIN g/dL 10 8* 11 6* 14 3   HEMATOCRIT % 32 2* 35 4* 43 9   PLATELETS Thousands/uL 197 196 238       CMP:   Results from last 7 days   Lab Units 07/29/19 0440 07/28/19 1749 07/28/19  1141 07/28/19  0541   SODIUM mmol/L 137 135* 135* 133*   POTASSIUM mmol/L 4 0 5 3 4 2 4 5   CHLORIDE mmol/L 105 106 101 100   CO2 mmol/L 24 19* 20* 20*   BUN mg/dL 17 19 23 24   CREATININE mg/dL 0 91 1 00 1 40* 1 38*   CALCIUM mg/dL 7 3* 7 1* 8 7 8 2*   ALK PHOS U/L 31*  --   --  42*   ALT U/L 24  --   --  18   AST U/L 59*  --   --  37     PT/INR:   Lab Results   Component Value Date    INR 1 86 (H) 07/28/2019   ,   Magnesium:   Results from last 7 days   Lab Units 07/29/19 0440 07/28/19 1749 07/28/19  1141   MAGNESIUM mg/dL 2 1 2 3 2 2     Phosphorous:   Results from last 7 days   Lab Units 07/28/19 1749 07/28/19  1141   PHOSPHORUS mg/dL 3 7 3 3       Microbiology:        Imaging:      Cardiac lab/EKG/telemetry/ECHO:       VTE Prophylaxis: heaprin    Code Status: Level 1 - Full Code    CHEYANNE Ruelas    Portions of the record may have been created with voice recognition software   Occasional wrong word or "sound a like" substitutions may have occurred due to the inherent limitations of voice recognition software  Read the chart carefully and recognize, using context, where substitutions have occurred

## 2019-07-29 NOTE — PLAN OF CARE
Problem: Prexisting or High Potential for Compromised Skin Integrity  Goal: Skin integrity is maintained or improved  Description  INTERVENTIONS:  - Identify patients at risk for skin breakdown  - Assess and monitor skin integrity  - Assess and monitor nutrition and hydration status  - Monitor labs (i e  albumin)  - Assess for incontinence   - Turn and reposition patient  - Assist with mobility/ambulation  - Relieve pressure over bony prominences  - Avoid friction and shearing  - Provide appropriate hygiene as needed including keeping skin clean and dry  - Evaluate need for skin moisturizer/barrier cream  - Collaborate with interdisciplinary team (i e  Nutrition, Rehabilitation, etc )   - Patient/family teaching  Outcome: Progressing     Problem: SAFETY,RESTRAINT: NV/NON-SELF DESTRUCTIVE BEHAVIOR  Goal: Remains free of harm/injury (restraint for non violent/non self-detsructive behavior)  Description  INTERVENTIONS:  - Instruct patient/family regarding restraint use   - Assess and monitor physiologic and psychological status   - Provide interventions and comfort measures to meet assessed patient needs   - Identify and implement measures to help patient regain control  - Assess readiness for release of restraint   Outcome: Progressing

## 2019-07-30 ENCOUNTER — APPOINTMENT (INPATIENT)
Dept: RADIOLOGY | Facility: HOSPITAL | Age: 59
DRG: 326 | End: 2019-07-30
Payer: COMMERCIAL

## 2019-07-30 DIAGNOSIS — Z71.89 COMPLEX CARE COORDINATION: Primary | ICD-10-CM

## 2019-07-30 LAB
ANION GAP SERPL CALCULATED.3IONS-SCNC: 10 MMOL/L (ref 4–13)
BASOPHILS # BLD AUTO: 0.01 THOUSANDS/ΜL (ref 0–0.1)
BASOPHILS NFR BLD AUTO: 0 % (ref 0–1)
BUN SERPL-MCNC: 17 MG/DL (ref 5–25)
CA-I BLD-SCNC: 1.06 MMOL/L (ref 1.12–1.32)
CALCIUM SERPL-MCNC: 8 MG/DL (ref 8.3–10.1)
CHLORIDE SERPL-SCNC: 104 MMOL/L (ref 100–108)
CO2 SERPL-SCNC: 25 MMOL/L (ref 21–32)
CREAT SERPL-MCNC: 0.73 MG/DL (ref 0.6–1.3)
EOSINOPHIL # BLD AUTO: 0.04 THOUSAND/ΜL (ref 0–0.61)
EOSINOPHIL NFR BLD AUTO: 0 % (ref 0–6)
ERYTHROCYTE [DISTWIDTH] IN BLOOD BY AUTOMATED COUNT: 13.4 % (ref 11.6–15.1)
GFR SERPL CREATININE-BSD FRML MDRD: 103 ML/MIN/1.73SQ M
GLUCOSE SERPL-MCNC: 78 MG/DL (ref 65–140)
HCT VFR BLD AUTO: 32.5 % (ref 36.5–49.3)
HGB BLD-MCNC: 11 G/DL (ref 12–17)
IMM GRANULOCYTES # BLD AUTO: 0.13 THOUSAND/UL (ref 0–0.2)
IMM GRANULOCYTES NFR BLD AUTO: 1 % (ref 0–2)
LYMPHOCYTES # BLD AUTO: 0.37 THOUSANDS/ΜL (ref 0.6–4.47)
LYMPHOCYTES NFR BLD AUTO: 3 % (ref 14–44)
MAGNESIUM SERPL-MCNC: 2.1 MG/DL (ref 1.6–2.6)
MCH RBC QN AUTO: 31.1 PG (ref 26.8–34.3)
MCHC RBC AUTO-ENTMCNC: 33.8 G/DL (ref 31.4–37.4)
MCV RBC AUTO: 92 FL (ref 82–98)
MONOCYTES # BLD AUTO: 1.17 THOUSAND/ΜL (ref 0.17–1.22)
MONOCYTES NFR BLD AUTO: 11 % (ref 4–12)
NEUTROPHILS # BLD AUTO: 9.22 THOUSANDS/ΜL (ref 1.85–7.62)
NEUTS SEG NFR BLD AUTO: 85 % (ref 43–75)
NRBC BLD AUTO-RTO: 0 /100 WBCS
PHOSPHATE SERPL-MCNC: 1.5 MG/DL (ref 2.7–4.5)
PLATELET # BLD AUTO: 193 THOUSANDS/UL (ref 149–390)
PMV BLD AUTO: 9.9 FL (ref 8.9–12.7)
POTASSIUM SERPL-SCNC: 3.3 MMOL/L (ref 3.5–5.3)
RBC # BLD AUTO: 3.54 MILLION/UL (ref 3.88–5.62)
SODIUM SERPL-SCNC: 139 MMOL/L (ref 136–145)
WBC # BLD AUTO: 10.94 THOUSAND/UL (ref 4.31–10.16)

## 2019-07-30 PROCEDURE — 85025 COMPLETE CBC W/AUTO DIFF WBC: CPT | Performed by: PHYSICIAN ASSISTANT

## 2019-07-30 PROCEDURE — C9113 INJ PANTOPRAZOLE SODIUM, VIA: HCPCS | Performed by: PHYSICIAN ASSISTANT

## 2019-07-30 PROCEDURE — 80048 BASIC METABOLIC PNL TOTAL CA: CPT | Performed by: PHYSICIAN ASSISTANT

## 2019-07-30 PROCEDURE — 84100 ASSAY OF PHOSPHORUS: CPT | Performed by: PHYSICIAN ASSISTANT

## 2019-07-30 PROCEDURE — 99024 POSTOP FOLLOW-UP VISIT: CPT | Performed by: SURGERY

## 2019-07-30 PROCEDURE — 74240 X-RAY XM UPR GI TRC 1CNTRST: CPT

## 2019-07-30 PROCEDURE — 83735 ASSAY OF MAGNESIUM: CPT | Performed by: PHYSICIAN ASSISTANT

## 2019-07-30 PROCEDURE — 82330 ASSAY OF CALCIUM: CPT | Performed by: PHYSICIAN ASSISTANT

## 2019-07-30 RX ORDER — ACETAMINOPHEN 325 MG/1
975 TABLET ORAL EVERY 8 HOURS SCHEDULED
Status: DISCONTINUED | OUTPATIENT
Start: 2019-07-30 | End: 2019-08-01

## 2019-07-30 RX ORDER — DIPHENHYDRAMINE HYDROCHLORIDE 50 MG/ML
25 INJECTION INTRAMUSCULAR; INTRAVENOUS EVERY 6 HOURS PRN
Status: DISCONTINUED | OUTPATIENT
Start: 2019-07-30 | End: 2019-08-16 | Stop reason: HOSPADM

## 2019-07-30 RX ORDER — ACETAMINOPHEN 325 MG/1
975 TABLET ORAL EVERY 8 HOURS SCHEDULED
Status: DISCONTINUED | OUTPATIENT
Start: 2019-07-30 | End: 2019-07-30

## 2019-07-30 RX ORDER — SIMETHICONE 80 MG
80 TABLET,CHEWABLE ORAL EVERY 6 HOURS PRN
Status: DISCONTINUED | OUTPATIENT
Start: 2019-07-30 | End: 2019-08-16 | Stop reason: HOSPADM

## 2019-07-30 RX ORDER — DOCUSATE SODIUM 100 MG/1
100 CAPSULE, LIQUID FILLED ORAL 2 TIMES DAILY
Status: DISCONTINUED | OUTPATIENT
Start: 2019-07-30 | End: 2019-08-04

## 2019-07-30 RX ORDER — PROMETHAZINE HYDROCHLORIDE 25 MG/ML
12.5 INJECTION, SOLUTION INTRAMUSCULAR; INTRAVENOUS EVERY 6 HOURS PRN
Status: DISCONTINUED | OUTPATIENT
Start: 2019-07-30 | End: 2019-08-16 | Stop reason: HOSPADM

## 2019-07-30 RX ORDER — SACCHAROMYCES BOULARDII 250 MG
250 CAPSULE ORAL 2 TIMES DAILY
Status: DISCONTINUED | OUTPATIENT
Start: 2019-07-30 | End: 2019-08-16 | Stop reason: HOSPADM

## 2019-07-30 RX ORDER — SUCRALFATE ORAL 1 G/10ML
1000 SUSPENSION ORAL EVERY 6 HOURS SCHEDULED
Status: DISCONTINUED | OUTPATIENT
Start: 2019-07-30 | End: 2019-07-30

## 2019-07-30 RX ORDER — ONDANSETRON 2 MG/ML
4 INJECTION INTRAMUSCULAR; INTRAVENOUS EVERY 6 HOURS PRN
Status: DISCONTINUED | OUTPATIENT
Start: 2019-07-30 | End: 2019-08-14

## 2019-07-30 RX ORDER — POTASSIUM CHLORIDE 14.9 MG/ML
20 INJECTION INTRAVENOUS
Status: DISCONTINUED | OUTPATIENT
Start: 2019-07-30 | End: 2019-07-30

## 2019-07-30 RX ORDER — SUCRALFATE ORAL 1 G/10ML
1000 SUSPENSION ORAL
Status: DISCONTINUED | OUTPATIENT
Start: 2019-07-30 | End: 2019-08-16 | Stop reason: HOSPADM

## 2019-07-30 RX ADMIN — LEVOFLOXACIN 750 MG: 5 INJECTION, SOLUTION INTRAVENOUS at 14:43

## 2019-07-30 RX ADMIN — IOHEXOL 30 ML: 350 INJECTION, SOLUTION INTRAVENOUS at 00:45

## 2019-07-30 RX ADMIN — HEPARIN SODIUM 5000 UNITS: 5000 INJECTION INTRAVENOUS; SUBCUTANEOUS at 22:18

## 2019-07-30 RX ADMIN — ONDANSETRON 4 MG: 2 INJECTION INTRAMUSCULAR; INTRAVENOUS at 20:28

## 2019-07-30 RX ADMIN — METRONIDAZOLE 500 MG: 500 INJECTION, SOLUTION INTRAVENOUS at 13:36

## 2019-07-30 RX ADMIN — Medication 250 MG: at 17:04

## 2019-07-30 RX ADMIN — METRONIDAZOLE 500 MG: 500 INJECTION, SOLUTION INTRAVENOUS at 22:19

## 2019-07-30 RX ADMIN — CALCIUM GLUCONATE 3 G: 98 INJECTION, SOLUTION INTRAVENOUS at 09:28

## 2019-07-30 RX ADMIN — HEPARIN SODIUM 5000 UNITS: 5000 INJECTION INTRAVENOUS; SUBCUTANEOUS at 13:44

## 2019-07-30 RX ADMIN — DOCUSATE SODIUM 100 MG: 100 CAPSULE, LIQUID FILLED ORAL at 17:04

## 2019-07-30 RX ADMIN — THIAMINE HYDROCHLORIDE: 100 INJECTION, SOLUTION INTRAMUSCULAR; INTRAVENOUS at 16:53

## 2019-07-30 RX ADMIN — FLUCONAZOLE, SODIUM CHLORIDE 200 MG: 2 INJECTION INTRAVENOUS at 15:45

## 2019-07-30 RX ADMIN — POTASSIUM PHOSPHATE, MONOBASIC AND POTASSIUM PHOSPHATE, DIBASIC 21 MMOL: 224; 236 INJECTION, SOLUTION INTRAVENOUS at 11:33

## 2019-07-30 RX ADMIN — METRONIDAZOLE 500 MG: 500 INJECTION, SOLUTION INTRAVENOUS at 06:02

## 2019-07-30 RX ADMIN — HYDROMORPHONE HYDROCHLORIDE 1 MG: 1 INJECTION, SOLUTION INTRAMUSCULAR; INTRAVENOUS; SUBCUTANEOUS at 09:28

## 2019-07-30 RX ADMIN — DIPHENHYDRAMINE HYDROCHLORIDE 25 MG: 50 INJECTION, SOLUTION INTRAMUSCULAR; INTRAVENOUS at 03:17

## 2019-07-30 RX ADMIN — SUCRALFATE 1000 MG: 1 SUSPENSION ORAL at 17:05

## 2019-07-30 RX ADMIN — PANTOPRAZOLE SODIUM 40 MG: 40 INJECTION, POWDER, FOR SOLUTION INTRAVENOUS at 22:18

## 2019-07-30 RX ADMIN — ACETAMINOPHEN 975 MG: 325 TABLET ORAL at 17:04

## 2019-07-30 RX ADMIN — HEPARIN SODIUM 5000 UNITS: 5000 INJECTION INTRAVENOUS; SUBCUTANEOUS at 06:02

## 2019-07-30 RX ADMIN — PANTOPRAZOLE SODIUM 40 MG: 40 INJECTION, POWDER, FOR SOLUTION INTRAVENOUS at 09:28

## 2019-07-30 NOTE — CONSULTS
TF currently on hold d/t bloating, pain, and residuals of bile  Awaiting bowel movement  Continue to replete K and P  Once able to intiate TF again rec Jevity 1 2 @20ml/hr, advance as tolerated to goal of 60ml/hr, water flushes 100ml q 4hrs provides total of 1728cal, 80g pro, 1766ml  Can evenutally transition to bolus feeds Jevity 1 2 237ml (1 can for home use) 6 x day, water flushes 50ml before and after each bolus provides total of 1706cal, 79g pro, 1751ml  Will monitor for po diet progression/tolerance and provide TF adjustments prn

## 2019-07-30 NOTE — SOCIAL WORK
Referral sent to [id number]  Pt remains on IV ABX and going to additional testing today  Per nursing tube feedings still need to restarted  CM following for nutrition plan

## 2019-07-30 NOTE — PLAN OF CARE
Problem: Nutrition/Hydration-ADULT  Goal: Nutrient/Hydration intake appropriate for improving, restoring or maintaining nutritional needs  Description  Monitor and assess patient's nutrition/hydration status for malnutrition (ex- brittle hair, bruises, dry skin, pale skin and conjunctiva, muscle wasting, smooth red tongue, and disorientation)  Collaborate with interdisciplinary team and initiate plan and interventions as ordered  Monitor patient's weight and dietary intake as ordered or per policy  Utilize nutrition screening tool and intervene per policy  Determine patient's food preferences and provide high-protein, high-caloric foods as appropriate  INTERVENTIONS:  - Monitor oral intake, urinary output, labs, and treatment plans  - Assess nutrition and hydration status and recommend course of action  - Evaluate amount of meals eaten  - Assist patient with eating if necessary   - Allow adequate time for meals  - Recommend/ encourage appropriate diets, oral nutritional supplements, and vitamin/mineral supplements  - Order, calculate, and assess calorie counts as needed  - Recommend, monitor, and adjust tube feedings and TPN/PPN based on assessed needs  - Assess need for intravenous fluids  - Provide specific nutrition/hydration education as appropriate  - Include patient/family/caregiver in decisions related to nutrition  7/30/2019 1146 by Rose Olivera RD  Outcome: Not Progressing  7/30/2019 1129 by Rose Olivera RD  Outcome: Progressing   TF currently on hold d/t bloating, pain, and residuals of bile  Awaiting bowel movement  Continue to replete K and P  Once able to intiate TF again rec Jevity 1 2 @20ml/hr, advance as tolerated to goal of 60ml/hr, water flushes 100ml q 4hrs provides total of 1728cal, 80g pro, 1766ml  Can evenutally transition to bolus feeds Jevity 1 2 237ml (1 can for home use) 6 x day, water flushes 50ml before and after each bolus provides total of 1706cal, 79g pro, 1751ml   Will monitor for po diet progression/tolerance and provide TF adjustments prn

## 2019-07-30 NOTE — PROGRESS NOTES
Progress Note - Bariatric Surgery   [facility] 63 y o  male MRN: [id number]  Unit/Bed#: E5 -01 Encounter: [id number]      Subjective/Objective     Subjective:  63year old male s/p Exploratory Laparotomy with repair of perforated marginal ulcer, abdominal washout, G tube placement, and GI assisted endoscopic stent placement 7/28/2019 with Dr Cyndie Mansfield  Patient also s/p Diagnostic laparoscopy converted to open 7/26/2019 with internal hernia repair  Patient was extubated yesterday and transferred from ICU to med surg  On eval today he appears to be clinically improving  UGI this morning was without evidence of leak  Patient was to be started on tube feeds however noted some abdominal distension given he has had no BMs over the past few days  Otherwise voiding well with pearce in place, ambulating in his room without assistance, using IS  Denies fevers, chills  Sweats, SOB, calf pain or CP  Objective:    /77 (BP Location: Left arm)   Pulse (!) 112   Temp 98 7 °F (37 1 °C) (Temporal)   Resp 22   Ht 5' 8" (1 727 m)   Wt 75 3 kg (166 lb 0 1 oz)   SpO2 97%   BMI 25 24 kg/m²       Intake/Output Summary (Last 24 hours) at 7/30/2019 1130  Last data filed at 7/30/2019 9499  Gross per 24 hour   Intake 1600 05 ml   Output 2285 ml   Net -684 95 ml       Invasive Devices     Peripheral Intravenous Line            Peripheral IV 07/27/19 Left;Ventral (anterior) Forearm 2 days    Peripheral IV 07/28/19 Left Arm 1 day          Drain            Urethral Catheter Double-lumen 16 Fr  2 days    Closed/Suction Drain Right Abdomen Bulb 19 Fr  1 day    Gastrostomy/Enterostomy Gastrostomy 22 Fr  LUQ 1 day                ROS: 10-point system completed  All negative except see HPI        Physical Exam    General Appearance:    Alert, cooperative, no distress, appears stated age   Head:    Normocephalic, without obvious abnormality, atraumatic   Lungs:     Respirations unlabored   Heart:    Regular rate and rhythm Abdomen:     Soft, appropriate tenderness,  no masses, no organomegaly,   non distended   Extremities:   Extremities normal, atraumatic, no cyanosis or edema   Neurologic:  Incision:  Psych:   Normal strength and sensation    Clean, dry, and intact    Normal mood and affect       Lab, Imaging and other studies:  CBC:   Lab Results   Component Value Date    WBC 10 94 (H) 07/30/2019    HGB 11 0 (L) 07/30/2019    HCT 32 5 (L) 07/30/2019    MCV 92 07/30/2019     07/30/2019    MCH 31 1 07/30/2019    MCHC 33 8 07/30/2019    RDW 13 4 07/30/2019    MPV 9 9 07/30/2019    NRBC 0 07/30/2019   , CMP:   Lab Results   Component Value Date    SODIUM 139 07/30/2019    K 3 3 (L) 07/30/2019     07/30/2019    CO2 25 07/30/2019    BUN 17 07/30/2019    CREATININE 0 73 07/30/2019    CALCIUM 8 0 (L) 07/30/2019    EGFR 103 07/30/2019        VTE Mechanical Prophylaxis: sequential compression device    Assessment/Plan  [age]year old male s/p Exploratory Laparotomy with repair of perforated marginal ulcer, abdominal washout, G tube placement, and GI assisted endoscopic stent placement 7/28/2019 with Dr Rinku Combs  Patient also s/p Diagnostic laparoscopy converted to open 7/26/2019 with internal hernia repair  Encourage ambulation and incentive spirometry  - Will hold tube feeds for now given his abdominal distension and discomfort  Tap water enema now and encourage ambulation to stimulate BM   - Little sips of water or ice are ok   - Discontinue pearce as patient voiding without assistance   - Encourage incentive spirometry and ambulation  - Continue current pain regimen  - PPI for GI prophylaxis  Will reassess to restart Carafate later today  - SCDs and Heparin for DVT prophylaxis      Plan of care was discussed with patient and patient's nurse  Care plan discussed with Dr Norlin Simmonds: Tap water enema, ambulation, continue pain regimen, GI prophylaxis  Reassess in the afternoon

## 2019-07-31 LAB
ANION GAP SERPL CALCULATED.3IONS-SCNC: 11 MMOL/L (ref 4–13)
ANION GAP SERPL CALCULATED.3IONS-SCNC: 8 MMOL/L (ref 4–13)
BASOPHILS # BLD MANUAL: 0 THOUSAND/UL (ref 0–0.1)
BASOPHILS NFR MAR MANUAL: 0 % (ref 0–1)
BUN SERPL-MCNC: 20 MG/DL (ref 5–25)
BUN SERPL-MCNC: 22 MG/DL (ref 5–25)
CA-I BLD-SCNC: 1.03 MMOL/L (ref 1.12–1.32)
CALCIUM SERPL-MCNC: 8 MG/DL (ref 8.3–10.1)
CALCIUM SERPL-MCNC: 8.1 MG/DL (ref 8.3–10.1)
CHLORIDE SERPL-SCNC: 104 MMOL/L (ref 100–108)
CHLORIDE SERPL-SCNC: 104 MMOL/L (ref 100–108)
CO2 SERPL-SCNC: 26 MMOL/L (ref 21–32)
CO2 SERPL-SCNC: 26 MMOL/L (ref 21–32)
CREAT SERPL-MCNC: 0.61 MG/DL (ref 0.6–1.3)
CREAT SERPL-MCNC: 0.65 MG/DL (ref 0.6–1.3)
EOSINOPHIL # BLD MANUAL: 0.2 THOUSAND/UL (ref 0–0.4)
EOSINOPHIL NFR BLD MANUAL: 1 % (ref 0–6)
ERYTHROCYTE [DISTWIDTH] IN BLOOD BY AUTOMATED COUNT: 13.4 % (ref 11.6–15.1)
GFR SERPL CREATININE-BSD FRML MDRD: 108 ML/MIN/1.73SQ M
GFR SERPL CREATININE-BSD FRML MDRD: 111 ML/MIN/1.73SQ M
GLUCOSE SERPL-MCNC: 102 MG/DL (ref 65–140)
GLUCOSE SERPL-MCNC: 95 MG/DL (ref 65–140)
HCT VFR BLD AUTO: 36.7 % (ref 36.5–49.3)
HGB BLD-MCNC: 12.4 G/DL (ref 12–17)
LYMPHOCYTES # BLD AUTO: 1.59 THOUSAND/UL (ref 0.6–4.47)
LYMPHOCYTES # BLD AUTO: 8 % (ref 14–44)
MAGNESIUM SERPL-MCNC: 1.8 MG/DL (ref 1.6–2.6)
MAGNESIUM SERPL-MCNC: 1.9 MG/DL (ref 1.6–2.6)
MCH RBC QN AUTO: 30 PG (ref 26.8–34.3)
MCHC RBC AUTO-ENTMCNC: 33.8 G/DL (ref 31.4–37.4)
MCV RBC AUTO: 89 FL (ref 82–98)
MONOCYTES # BLD AUTO: 1.19 THOUSAND/UL (ref 0–1.22)
MONOCYTES NFR BLD: 6 % (ref 4–12)
NEUTROPHILS # BLD MANUAL: 16.9 THOUSAND/UL (ref 1.85–7.62)
NEUTS BAND NFR BLD MANUAL: 20 % (ref 0–8)
NEUTS SEG NFR BLD AUTO: 65 % (ref 43–75)
NRBC BLD AUTO-RTO: 0 /100 WBCS
NRBC BLD AUTO-RTO: 1 /100 WBC (ref 0–2)
PHOSPHATE SERPL-MCNC: 1.6 MG/DL (ref 2.7–4.5)
PHOSPHATE SERPL-MCNC: 1.7 MG/DL (ref 2.7–4.5)
PLATELET # BLD AUTO: 204 THOUSANDS/UL (ref 149–390)
PLATELET BLD QL SMEAR: ADEQUATE
PMV BLD AUTO: 9.6 FL (ref 8.9–12.7)
POTASSIUM SERPL-SCNC: 2.9 MMOL/L (ref 3.5–5.3)
POTASSIUM SERPL-SCNC: 3.1 MMOL/L (ref 3.5–5.3)
RBC # BLD AUTO: 4.13 MILLION/UL (ref 3.88–5.62)
RBC MORPH BLD: NORMAL
SODIUM SERPL-SCNC: 138 MMOL/L (ref 136–145)
SODIUM SERPL-SCNC: 141 MMOL/L (ref 136–145)
TOTAL CELLS COUNTED SPEC: 100
WBC # BLD AUTO: 19.88 THOUSAND/UL (ref 4.31–10.16)

## 2019-07-31 PROCEDURE — 85007 BL SMEAR W/DIFF WBC COUNT: CPT | Performed by: SURGERY

## 2019-07-31 PROCEDURE — 80048 BASIC METABOLIC PNL TOTAL CA: CPT | Performed by: PHYSICIAN ASSISTANT

## 2019-07-31 PROCEDURE — 84100 ASSAY OF PHOSPHORUS: CPT | Performed by: PHYSICIAN ASSISTANT

## 2019-07-31 PROCEDURE — 84100 ASSAY OF PHOSPHORUS: CPT | Performed by: SURGERY

## 2019-07-31 PROCEDURE — 83735 ASSAY OF MAGNESIUM: CPT | Performed by: PHYSICIAN ASSISTANT

## 2019-07-31 PROCEDURE — 99024 POSTOP FOLLOW-UP VISIT: CPT | Performed by: SURGERY

## 2019-07-31 PROCEDURE — 82330 ASSAY OF CALCIUM: CPT | Performed by: SURGERY

## 2019-07-31 PROCEDURE — C9113 INJ PANTOPRAZOLE SODIUM, VIA: HCPCS | Performed by: PHYSICIAN ASSISTANT

## 2019-07-31 PROCEDURE — 83735 ASSAY OF MAGNESIUM: CPT | Performed by: SURGERY

## 2019-07-31 PROCEDURE — 80048 BASIC METABOLIC PNL TOTAL CA: CPT | Performed by: SURGERY

## 2019-07-31 PROCEDURE — 85027 COMPLETE CBC AUTOMATED: CPT | Performed by: SURGERY

## 2019-07-31 RX ORDER — DEXTROSE MONOHYDRATE 50 MG/ML
50 INJECTION, SOLUTION INTRAVENOUS CONTINUOUS
Status: DISCONTINUED | OUTPATIENT
Start: 2019-07-31 | End: 2019-08-02

## 2019-07-31 RX ORDER — POTASSIUM CHLORIDE 14.9 MG/ML
20 INJECTION INTRAVENOUS 3 TIMES DAILY
Status: DISCONTINUED | OUTPATIENT
Start: 2019-07-31 | End: 2019-07-31

## 2019-07-31 RX ORDER — SODIUM CHLORIDE, SODIUM LACTATE, POTASSIUM CHLORIDE, CALCIUM CHLORIDE 600; 310; 30; 20 MG/100ML; MG/100ML; MG/100ML; MG/100ML
75 INJECTION, SOLUTION INTRAVENOUS CONTINUOUS
Status: DISCONTINUED | OUTPATIENT
Start: 2019-07-31 | End: 2019-07-31

## 2019-07-31 RX ORDER — POTASSIUM CHLORIDE 14.9 MG/ML
20 INJECTION INTRAVENOUS 3 TIMES DAILY
Status: DISCONTINUED | OUTPATIENT
Start: 2019-07-31 | End: 2019-08-02 | Stop reason: CLARIF

## 2019-07-31 RX ADMIN — LEVOFLOXACIN 750 MG: 5 INJECTION, SOLUTION INTRAVENOUS at 13:49

## 2019-07-31 RX ADMIN — POTASSIUM CHLORIDE 20 MEQ: 200 INJECTION, SOLUTION INTRAVENOUS at 17:01

## 2019-07-31 RX ADMIN — HEPARIN SODIUM 5000 UNITS: 5000 INJECTION INTRAVENOUS; SUBCUTANEOUS at 05:29

## 2019-07-31 RX ADMIN — ONDANSETRON 4 MG: 2 INJECTION INTRAMUSCULAR; INTRAVENOUS at 05:30

## 2019-07-31 RX ADMIN — DOCUSATE SODIUM 100 MG: 100 CAPSULE, LIQUID FILLED ORAL at 09:44

## 2019-07-31 RX ADMIN — Medication 250 MG: at 09:44

## 2019-07-31 RX ADMIN — DEXTROSE 50 ML/HR: 5 SOLUTION INTRAVENOUS at 09:43

## 2019-07-31 RX ADMIN — PROMETHAZINE HYDROCHLORIDE 12.5 MG: 25 INJECTION INTRAMUSCULAR; INTRAVENOUS at 13:55

## 2019-07-31 RX ADMIN — POTASSIUM CHLORIDE 20 MEQ: 200 INJECTION, SOLUTION INTRAVENOUS at 09:45

## 2019-07-31 RX ADMIN — SUCRALFATE 1000 MG: 1 SUSPENSION ORAL at 17:01

## 2019-07-31 RX ADMIN — SIMETHICONE CHEW TAB 80 MG 80 MG: 80 TABLET ORAL at 13:55

## 2019-07-31 RX ADMIN — ACETAMINOPHEN 975 MG: 325 TABLET ORAL at 23:00

## 2019-07-31 RX ADMIN — HYDROMORPHONE HYDROCHLORIDE 1 MG: 1 INJECTION, SOLUTION INTRAMUSCULAR; INTRAVENOUS; SUBCUTANEOUS at 05:29

## 2019-07-31 RX ADMIN — METRONIDAZOLE 500 MG: 500 INJECTION, SOLUTION INTRAVENOUS at 15:07

## 2019-07-31 RX ADMIN — PANTOPRAZOLE SODIUM 40 MG: 40 INJECTION, POWDER, FOR SOLUTION INTRAVENOUS at 09:44

## 2019-07-31 RX ADMIN — Medication 250 MG: at 17:01

## 2019-07-31 RX ADMIN — HYDROMORPHONE HYDROCHLORIDE 1 MG: 1 INJECTION, SOLUTION INTRAMUSCULAR; INTRAVENOUS; SUBCUTANEOUS at 20:28

## 2019-07-31 RX ADMIN — ACETAMINOPHEN 975 MG: 325 TABLET ORAL at 15:05

## 2019-07-31 RX ADMIN — CALCIUM GLUCONATE 3 G: 98 INJECTION, SOLUTION INTRAVENOUS at 09:49

## 2019-07-31 RX ADMIN — DOCUSATE SODIUM 100 MG: 100 CAPSULE, LIQUID FILLED ORAL at 17:01

## 2019-07-31 RX ADMIN — POTASSIUM CHLORIDE 20 MEQ: 200 INJECTION, SOLUTION INTRAVENOUS at 22:51

## 2019-07-31 RX ADMIN — SUCRALFATE 1000 MG: 1 SUSPENSION ORAL at 23:00

## 2019-07-31 RX ADMIN — HEPARIN SODIUM 5000 UNITS: 5000 INJECTION INTRAVENOUS; SUBCUTANEOUS at 15:05

## 2019-07-31 RX ADMIN — ONDANSETRON 4 MG: 2 INJECTION INTRAMUSCULAR; INTRAVENOUS at 20:28

## 2019-07-31 RX ADMIN — METRONIDAZOLE 500 MG: 500 INJECTION, SOLUTION INTRAVENOUS at 23:40

## 2019-07-31 RX ADMIN — SUCRALFATE 1000 MG: 1 SUSPENSION ORAL at 11:50

## 2019-07-31 RX ADMIN — HEPARIN SODIUM 5000 UNITS: 5000 INJECTION INTRAVENOUS; SUBCUTANEOUS at 23:00

## 2019-07-31 RX ADMIN — POTASSIUM CHLORIDE 75 ML/HR: 2 INJECTION, SOLUTION, CONCENTRATE INTRAVENOUS at 11:50

## 2019-07-31 RX ADMIN — METRONIDAZOLE 500 MG: 500 INJECTION, SOLUTION INTRAVENOUS at 05:30

## 2019-07-31 RX ADMIN — FLUCONAZOLE, SODIUM CHLORIDE 200 MG: 2 INJECTION INTRAVENOUS at 16:10

## 2019-07-31 NOTE — NURSING NOTE
Pt had large amount of bile drain from G-tube through the night  Pt c/o N & pain this AM, did not want to re-start tube feed this AM @ 5  PA made aware, ok to hold tube feed at this time  Will cont to monitor

## 2019-07-31 NOTE — PLAN OF CARE
Problem: Potential for Falls  Goal: Patient will remain free of falls  Description  INTERVENTIONS:  - Assess patient frequently for physical needs  -  Identify cognitive and physical deficits and behaviors that affect risk of falls  -  Foster fall precautions as indicated by assessment   - Educate patient/family on patient safety including physical limitations  - Instruct patient to call for assistance with activity based on assessment  - Modify environment to reduce risk of injury  - Consider OT/PT consult to assist with strengthening/mobility  Outcome: Progressing     Problem: Nutrition/Hydration-ADULT  Goal: Nutrient/Hydration intake appropriate for improving, restoring or maintaining nutritional needs  Description  Monitor and assess patient's nutrition/hydration status for malnutrition (ex- brittle hair, bruises, dry skin, pale skin and conjunctiva, muscle wasting, smooth red tongue, and disorientation)  Collaborate with interdisciplinary team and initiate plan and interventions as ordered  Monitor patient's weight and dietary intake as ordered or per policy  Utilize nutrition screening tool and intervene per policy  Determine patient's food preferences and provide high-protein, high-caloric foods as appropriate       INTERVENTIONS:  - Monitor oral intake, urinary output, labs, and treatment plans  - Assess nutrition and hydration status and recommend course of action  - Evaluate amount of meals eaten  - Assist patient with eating if necessary   - Allow adequate time for meals  - Recommend/ encourage appropriate diets, oral nutritional supplements, and vitamin/mineral supplements  - Order, calculate, and assess calorie counts as needed  - Recommend, monitor, and adjust tube feedings and TPN/PPN based on assessed needs  - Assess need for intravenous fluids  - Provide specific nutrition/hydration education as appropriate  - Include patient/family/caregiver in decisions related to nutrition  Outcome: Progressing     Problem: PAIN - ADULT  Goal: Verbalizes/displays adequate comfort level or baseline comfort level  Description  Interventions:  - Encourage patient to monitor pain and request assistance  - Assess pain using appropriate pain scale  - Administer analgesics based on type and severity of pain and evaluate response  - Implement non-pharmacological measures as appropriate and evaluate response  - Consider cultural and social influences on pain and pain management  - Notify physician/advanced practitioner if interventions unsuccessful or patient reports new pain  Outcome: Progressing     Problem: INFECTION - ADULT  Goal: Absence or prevention of progression during hospitalization  Description  INTERVENTIONS:  - Assess and monitor for signs and symptoms of infection  - Monitor lab/diagnostic results  - Monitor all insertion sites, i e  indwelling lines, tubes, and drains  - Monitor endotracheal (as able) and nasal secretions for changes in amount and color  - [place] appropriate cooling/warming therapies per order  - Administer medications as ordered  - Instruct and encourage patient and family to use good hand hygiene technique  - Identify and instruct in appropriate isolation precautions for identified infection/condition  Outcome: Progressing     Problem: DISCHARGE PLANNING  Goal: Discharge to home or other facility with appropriate resources  Description  INTERVENTIONS:  - Identify barriers to discharge w/patient and caregiver  - Arrange for needed discharge resources and transportation as appropriate  - Identify discharge learning needs (meds, wound care, etc )  - Arrange for interpretive services to assist at discharge as needed  - Refer to Case Management Department for coordinating discharge planning if the patient needs post-hospital services based on physician/advanced practitioner order or complex needs related to functional status, cognitive ability, or social support system  Outcome: Progressing     Problem: Knowledge Deficit  Goal: Patient/family/caregiver demonstrates understanding of disease process, treatment plan, medications, and discharge instructions  Description  Complete learning assessment and assess knowledge base    Interventions:  - Provide teaching at level of understanding  - Provide teaching via preferred learning methods  Outcome: Progressing     Problem: GASTROINTESTINAL - ADULT  Goal: Minimal or absence of nausea and/or vomiting  Description  INTERVENTIONS:  - Administer IV fluids as ordered to ensure adequate hydration  - Maintain NPO status until nausea and vomiting are resolved  - Nasogastric tube as ordered  - Administer ordered antiemetic medications as needed  - Provide nonpharmacologic comfort measures as appropriate  - Advance diet as tolerated, if ordered  - Nutrition services referral to assist patient with adequate nutrition and appropriate food choices  Outcome: Progressing  Goal: Maintains or returns to baseline bowel function  Description  INTERVENTIONS:  - Assess bowel function  - Encourage oral fluids to ensure adequate hydration  - Administer IV fluids as ordered to ensure adequate hydration  - Administer ordered medications as needed  - Encourage mobilization and activity  - Nutrition services referral to assist patient with appropriate food choices  Outcome: Progressing  Goal: Maintains adequate nutritional intake  Description  INTERVENTIONS:  - Monitor percentage of each meal consumed  - Identify factors contributing to decreased intake, treat as appropriate  - Assist with meals as needed  - Monitor I&O, WT and lab values  - Obtain nutrition services referral as needed  Outcome: Progressing     Problem: SKIN/TISSUE INTEGRITY - ADULT  Goal: Skin integrity remains intact  Description  INTERVENTIONS  - Identify patients at risk for skin breakdown  - Assess and monitor skin integrity  - Assess and monitor nutrition and hydration status  - Monitor labs (i e  albumin)  - Assess for incontinence   - Turn and reposition patient  - Assist with mobility/ambulation  - Relieve pressure over bony prominences  - Avoid friction and shearing  - Provide appropriate hygiene as needed including keeping skin clean and dry  - Evaluate need for skin moisturizer/barrier cream  - Collaborate with interdisciplinary team (i e  Nutrition, Rehabilitation, etc )   - Patient/family teaching  Outcome: Progressing  Goal: Incision(s), wounds(s) or drain site(s) healing without S/S of infection  Description  INTERVENTIONS  - Assess and document risk factors for skin impairment   - Assess and document dressing, incision, wound bed, drain sites and surrounding tissue  - Initiate Nutrition services consult and/or wound management as needed  Outcome: Progressing  Goal: Oral mucous membranes remain intact  Description  INTERVENTIONS  - Assess oral mucosa and hygiene practices  - Implement preventative oral hygiene regimen  - Implement oral medicated treatments as ordered  - Initiate Nutrition services referral as needed  Outcome: Progressing     Problem: Prexisting or High Potential for Compromised Skin Integrity  Goal: Skin integrity is maintained or improved  Description  INTERVENTIONS:  - Identify patients at risk for skin breakdown  - Assess and monitor skin integrity  - Assess and monitor nutrition and hydration status  - Monitor labs (i e  albumin)  - Assess for incontinence   - Turn and reposition patient  - Assist with mobility/ambulation  - Relieve pressure over bony prominences  - Avoid friction and shearing  - Provide appropriate hygiene as needed including keeping skin clean and dry  - Evaluate need for skin moisturizer/barrier cream  - Collaborate with interdisciplinary team (i e  Nutrition, Rehabilitation, etc )   - Patient/family teaching  Outcome: Progressing     Problem: SAFETY,RESTRAINT: NV/NON-SELF DESTRUCTIVE BEHAVIOR  Goal: Remains free of harm/injury (restraint for non violent/non self-detsructive behavior)  Description  INTERVENTIONS:  - Instruct patient/family regarding restraint use   - Assess and monitor physiologic and psychological status   - Provide interventions and comfort measures to meet assessed patient needs   - Identify and implement measures to help patient regain control  - Assess readiness for release of restraint   Outcome: Progressing  Goal: Returns to optimal restraint-free functioning  Description  INTERVENTIONS:  - Assess the patient's behavior and symptoms that indicate continued need for restraint  - Identify and implement measures to help patient regain control  - Assess readiness for release of restraint   Outcome: Progressing

## 2019-07-31 NOTE — PROGRESS NOTES
Progress Note - Bariatric Surgery   [facility] 63 y o  male MRN: [id number]  Unit/Bed#: E5 -01 Encounter: [id number]      Subjective/Objective     Subjective: 63year old male status post Exploratory Laparotomy with repair of perforated marginal ulcer, abdominal washout, G tube placement, and GI assisted endoscopic stent placement 7/28/2019 with Dr Los Marion  Patient s/p Diagnostic laparoscopy converted to open 7/26/2019 with internal hernia repair  Clinically improving  Pain adequately controlled on IV pain medication, NPO with potential to start tube feedings this afternoon now that nausea is better controlled, no episodes of vomiting since last night, ambulating without assistance, voiding well, using incentive spirometer  Denies fevers, chills, sweats, SOB, CP, calf pain  Complains this am of mild nausea, improving from last night  No additional episodes of vomitus  Objective:    BP (!) 157/109 (BP Location: Right arm)   Pulse (!) 109   Temp 98 1 °F (36 7 °C) (Temporal)   Resp 20   Ht 5' 8" (1 727 m)   Wt 75 3 kg (166 lb 0 1 oz)   SpO2 92%   BMI 25 24 kg/m²       Intake/Output Summary (Last 24 hours) at 7/31/2019 0442  Last data filed at 7/31/2019 0601  Gross per 24 hour   Intake    Output 1790 ml   Net -1790 ml       Invasive Devices     Peripheral Intravenous Line            Peripheral IV 07/27/19 Left;Ventral (anterior) Forearm 3 days    Peripheral IV 07/28/19 Left Arm 2 days          Drain            Closed/Suction Drain Right Abdomen Bulb 19 Fr  2 days    Gastrostomy/Enterostomy Gastrostomy 22 Fr  LUQ 2 days                ROS: 10-point system completed  All negative except see HPI        Physical Exam    General Appearance:    Alert, cooperative, no distress, appears stated age   Head:    Normocephalic, without obvious abnormality, atraumatic   Lungs:     Respirations unlabored   Heart:    Tachycardic with regular rhythm   Abdomen:     Soft, appropriate tenderness,  no masses, no organomegaly,   non distended, eleuterio drain in place with serosanguinous fluid this AM; G tube to gravity with bile draining    Extremities:   Extremities normal, atraumatic, no cyanosis or edema   Neurologic:  Incision:  Psych:   Normal strength and sensation    Clean, dry, and intact    Normal mood and affect       Lab, Imaging and other studies:  I have personally reviewed pertinent lab results  , CBC:   Lab Results   Component Value Date    WBC 19 88 (H) 07/31/2019    HGB 12 4 07/31/2019    HCT 36 7 07/31/2019    MCV 89 07/31/2019     07/31/2019    MCH 30 0 07/31/2019    MCHC 33 8 07/31/2019    RDW 13 4 07/31/2019    MPV 9 6 07/31/2019    NRBC 0 07/31/2019    NRBC 1 07/31/2019   , CMP:   Lab Results   Component Value Date    SODIUM 141 07/31/2019    K 2 9 (L) 07/31/2019     07/31/2019    CO2 26 07/31/2019    BUN 22 07/31/2019    CREATININE 0 65 07/31/2019    CALCIUM 8 0 (L) 07/31/2019    EGFR 108 07/31/2019        VTE Mechanical Prophylaxis: sequential compression device, Heparin     Assessment/Plan  Perforated marginal ulcer  Internal hernia  Bariatric surgery status     Patient s/p Exploratory laparotomy, washout of sucus, repair of marginal ulcer perforation, abdominal washout, intraoperative EGD, GI assisted endoscopic stent placement and G tube placement 7/28/2019 with Dr Liz Tran s/p RNY gastric bypass done in 2017 with Dr Lisandra Briscoe  Patient admitted 7/26/2019 due to generalized abdominal pain and back pain with positive swirl sign on CT and pelvis indicating an internal hernia  Patient went to OR 7/26/2019 for Diagnostic laparoscopy converted to open due to difficulty reducing the hernia  POD 1 of diagnostic laparoscopy, patient had increasing shortness of breath, tachycardia, and extreme abdominal pain so patient was taken back to OR 7/28/2019  Patient clinically improving  Tube feeds held yesterday and overnight due to nausea  Will attempt to restart tube trial today      Will replete electrolytes today  - Maintain IV fluids at 50 cc an hour  - Encourage incentive spirometry, ambulation, can have sips with meds  - Continue current pain and anti-nausea regimen  - PPI for GI prophylaxis  - SCDs and Heparin for DVT prophylaxis  - Will obtain labs to evaluate electrolytes this afternoon   - Will continue to monitor WBC count; ID consulted and appreciate input     Plan of care was discussed with patient and patient's nurse  Care plan discussed with Dr Lian Aburto:  Unable to discharge to home today

## 2019-08-01 ENCOUNTER — APPOINTMENT (INPATIENT)
Dept: CT IMAGING | Facility: HOSPITAL | Age: 59
DRG: 326 | End: 2019-08-01
Payer: COMMERCIAL

## 2019-08-01 LAB
ANION GAP SERPL CALCULATED.3IONS-SCNC: 9 MMOL/L (ref 4–13)
BASOPHILS # BLD AUTO: 0.13 THOUSANDS/ΜL (ref 0–0.1)
BASOPHILS NFR BLD AUTO: 1 % (ref 0–1)
BUN SERPL-MCNC: 17 MG/DL (ref 5–25)
CA-I BLD-SCNC: 1.02 MMOL/L (ref 1.12–1.32)
CALCIUM SERPL-MCNC: 7.3 MG/DL (ref 8.3–10.1)
CHLORIDE SERPL-SCNC: 97 MMOL/L (ref 100–108)
CO2 SERPL-SCNC: 29 MMOL/L (ref 21–32)
CREAT SERPL-MCNC: 0.64 MG/DL (ref 0.6–1.3)
EOSINOPHIL # BLD AUTO: 0.05 THOUSAND/ΜL (ref 0–0.61)
EOSINOPHIL NFR BLD AUTO: 0 % (ref 0–6)
ERYTHROCYTE [DISTWIDTH] IN BLOOD BY AUTOMATED COUNT: 13.8 % (ref 11.6–15.1)
GFR SERPL CREATININE-BSD FRML MDRD: 109 ML/MIN/1.73SQ M
GLUCOSE SERPL-MCNC: 334 MG/DL (ref 65–140)
HCT VFR BLD AUTO: 40 % (ref 36.5–49.3)
HGB BLD-MCNC: 13.3 G/DL (ref 12–17)
IMM GRANULOCYTES # BLD AUTO: 0.3 THOUSAND/UL (ref 0–0.2)
IMM GRANULOCYTES NFR BLD AUTO: 1 % (ref 0–2)
LYMPHOCYTES # BLD AUTO: 0.82 THOUSANDS/ΜL (ref 0.6–4.47)
LYMPHOCYTES NFR BLD AUTO: 3 % (ref 14–44)
MAGNESIUM SERPL-MCNC: 1.5 MG/DL (ref 1.6–2.6)
MCH RBC QN AUTO: 29.6 PG (ref 26.8–34.3)
MCHC RBC AUTO-ENTMCNC: 33.3 G/DL (ref 31.4–37.4)
MCV RBC AUTO: 89 FL (ref 82–98)
MONOCYTES # BLD AUTO: 2.32 THOUSAND/ΜL (ref 0.17–1.22)
MONOCYTES NFR BLD AUTO: 8 % (ref 4–12)
NEUTROPHILS # BLD AUTO: 24.09 THOUSANDS/ΜL (ref 1.85–7.62)
NEUTS SEG NFR BLD AUTO: 87 % (ref 43–75)
NRBC BLD AUTO-RTO: 0 /100 WBCS
PHOSPHATE SERPL-MCNC: 1.7 MG/DL (ref 2.7–4.5)
PLATELET # BLD AUTO: 181 THOUSANDS/UL (ref 149–390)
PMV BLD AUTO: 10.1 FL (ref 8.9–12.7)
POTASSIUM SERPL-SCNC: 3.1 MMOL/L (ref 3.5–5.3)
PROCALCITONIN SERPL-MCNC: 2.75 NG/ML
RBC # BLD AUTO: 4.5 MILLION/UL (ref 3.88–5.62)
SODIUM SERPL-SCNC: 135 MMOL/L (ref 136–145)
WBC # BLD AUTO: 27.71 THOUSAND/UL (ref 4.31–10.16)

## 2019-08-01 PROCEDURE — 99223 1ST HOSP IP/OBS HIGH 75: CPT | Performed by: INTERNAL MEDICINE

## 2019-08-01 PROCEDURE — 84100 ASSAY OF PHOSPHORUS: CPT | Performed by: PHYSICIAN ASSISTANT

## 2019-08-01 PROCEDURE — 49406 IMAGE CATH FLUID PERI/RETRO: CPT | Performed by: RADIOLOGY

## 2019-08-01 PROCEDURE — 84145 PROCALCITONIN (PCT): CPT | Performed by: INTERNAL MEDICINE

## 2019-08-01 PROCEDURE — 82330 ASSAY OF CALCIUM: CPT | Performed by: SURGERY

## 2019-08-01 PROCEDURE — 83735 ASSAY OF MAGNESIUM: CPT | Performed by: PHYSICIAN ASSISTANT

## 2019-08-01 PROCEDURE — 87077 CULTURE AEROBIC IDENTIFY: CPT | Performed by: RADIOLOGY

## 2019-08-01 PROCEDURE — 49406 IMAGE CATH FLUID PERI/RETRO: CPT

## 2019-08-01 PROCEDURE — 87205 SMEAR GRAM STAIN: CPT | Performed by: RADIOLOGY

## 2019-08-01 PROCEDURE — 85025 COMPLETE CBC W/AUTO DIFF WBC: CPT | Performed by: PHYSICIAN ASSISTANT

## 2019-08-01 PROCEDURE — 71260 CT THORAX DX C+: CPT

## 2019-08-01 PROCEDURE — 87070 CULTURE OTHR SPECIMN AEROBIC: CPT | Performed by: RADIOLOGY

## 2019-08-01 PROCEDURE — C1769 GUIDE WIRE: HCPCS

## 2019-08-01 PROCEDURE — 99024 POSTOP FOLLOW-UP VISIT: CPT | Performed by: SURGERY

## 2019-08-01 PROCEDURE — 80048 BASIC METABOLIC PNL TOTAL CA: CPT | Performed by: PHYSICIAN ASSISTANT

## 2019-08-01 PROCEDURE — C9113 INJ PANTOPRAZOLE SODIUM, VIA: HCPCS | Performed by: PHYSICIAN ASSISTANT

## 2019-08-01 PROCEDURE — 87106 FUNGI IDENTIFICATION YEAST: CPT | Performed by: RADIOLOGY

## 2019-08-01 PROCEDURE — 87040 BLOOD CULTURE FOR BACTERIA: CPT | Performed by: INTERNAL MEDICINE

## 2019-08-01 PROCEDURE — C1729 CATH, DRAINAGE: HCPCS

## 2019-08-01 PROCEDURE — 74177 CT ABD & PELVIS W/CONTRAST: CPT

## 2019-08-01 RX ORDER — FENTANYL CITRATE 50 UG/ML
INJECTION, SOLUTION INTRAMUSCULAR; INTRAVENOUS
Status: COMPLETED
Start: 2019-08-01 | End: 2019-08-01

## 2019-08-01 RX ORDER — ACETAMINOPHEN 160 MG/5ML
975 SUSPENSION, ORAL (FINAL DOSE FORM) ORAL EVERY 8 HOURS SCHEDULED
Status: DISCONTINUED | OUTPATIENT
Start: 2019-08-01 | End: 2019-08-04

## 2019-08-01 RX ORDER — MAGNESIUM SULFATE HEPTAHYDRATE 40 MG/ML
4 INJECTION, SOLUTION INTRAVENOUS ONCE
Status: COMPLETED | OUTPATIENT
Start: 2019-08-01 | End: 2019-08-01

## 2019-08-01 RX ORDER — FENTANYL CITRATE 50 UG/ML
INJECTION, SOLUTION INTRAMUSCULAR; INTRAVENOUS CODE/TRAUMA/SEDATION MEDICATION
Status: COMPLETED | OUTPATIENT
Start: 2019-08-01 | End: 2019-08-01

## 2019-08-01 RX ORDER — HYDROMORPHONE HCL/PF 1 MG/ML
1 SYRINGE (ML) INJECTION EVERY 2 HOUR PRN
Status: DISCONTINUED | OUTPATIENT
Start: 2019-08-01 | End: 2019-08-12

## 2019-08-01 RX ORDER — OXYCODONE HCL 5 MG/5 ML
10 SOLUTION, ORAL ORAL EVERY 4 HOURS PRN
Status: DISCONTINUED | OUTPATIENT
Start: 2019-08-01 | End: 2019-08-05

## 2019-08-01 RX ORDER — ALBUTEROL SULFATE 90 UG/1
2 AEROSOL, METERED RESPIRATORY (INHALATION) 4 TIMES DAILY
Status: DISCONTINUED | OUTPATIENT
Start: 2019-08-01 | End: 2019-08-16 | Stop reason: HOSPADM

## 2019-08-01 RX ORDER — FLUCONAZOLE 2 MG/ML
400 INJECTION, SOLUTION INTRAVENOUS EVERY 24 HOURS
Status: COMPLETED | OUTPATIENT
Start: 2019-08-02 | End: 2019-08-06

## 2019-08-01 RX ORDER — HYDROMORPHONE HCL/PF 1 MG/ML
0.5 SYRINGE (ML) INJECTION EVERY 4 HOURS PRN
Status: DISCONTINUED | OUTPATIENT
Start: 2019-08-01 | End: 2019-08-05

## 2019-08-01 RX ADMIN — CALCIUM GLUCONATE 3 G: 98 INJECTION, SOLUTION INTRAVENOUS at 08:04

## 2019-08-01 RX ADMIN — POTASSIUM CHLORIDE 75 ML/HR: 2 INJECTION, SOLUTION, CONCENTRATE INTRAVENOUS at 03:03

## 2019-08-01 RX ADMIN — DOCUSATE SODIUM 100 MG: 100 CAPSULE, LIQUID FILLED ORAL at 18:14

## 2019-08-01 RX ADMIN — MAGNESIUM SULFATE IN WATER 4 G: 40 INJECTION, SOLUTION INTRAVENOUS at 08:04

## 2019-08-01 RX ADMIN — AZTREONAM 2000 MG: 2 INJECTION, POWDER, LYOPHILIZED, FOR SOLUTION INTRAMUSCULAR; INTRAVENOUS at 18:12

## 2019-08-01 RX ADMIN — SUCRALFATE 1000 MG: 1 SUSPENSION ORAL at 18:15

## 2019-08-01 RX ADMIN — SUCRALFATE 1000 MG: 1 SUSPENSION ORAL at 06:03

## 2019-08-01 RX ADMIN — ALBUTEROL SULFATE 2 PUFF: 90 AEROSOL, METERED RESPIRATORY (INHALATION) at 18:07

## 2019-08-01 RX ADMIN — POTASSIUM CHLORIDE 20 MEQ: 200 INJECTION, SOLUTION INTRAVENOUS at 18:14

## 2019-08-01 RX ADMIN — LEVOFLOXACIN 750 MG: 5 INJECTION, SOLUTION INTRAVENOUS at 14:17

## 2019-08-01 RX ADMIN — IOHEXOL 50 ML: 240 INJECTION, SOLUTION INTRATHECAL; INTRAVASCULAR; INTRAVENOUS; ORAL at 12:16

## 2019-08-01 RX ADMIN — METRONIDAZOLE 500 MG: 500 INJECTION, SOLUTION INTRAVENOUS at 14:25

## 2019-08-01 RX ADMIN — FLUCONAZOLE, SODIUM CHLORIDE 200 MG: 2 INJECTION INTRAVENOUS at 16:53

## 2019-08-01 RX ADMIN — SUCRALFATE 1000 MG: 1 SUSPENSION ORAL at 21:40

## 2019-08-01 RX ADMIN — FENTANYL CITRATE 50 MCG: 50 INJECTION, SOLUTION INTRAMUSCULAR; INTRAVENOUS at 15:23

## 2019-08-01 RX ADMIN — ALBUTEROL SULFATE 2 PUFF: 90 AEROSOL, METERED RESPIRATORY (INHALATION) at 21:56

## 2019-08-01 RX ADMIN — Medication 250 MG: at 18:15

## 2019-08-01 RX ADMIN — METRONIDAZOLE 500 MG: 500 INJECTION, SOLUTION INTRAVENOUS at 21:40

## 2019-08-01 RX ADMIN — FENTANYL CITRATE 50 MCG: 50 INJECTION, SOLUTION INTRAMUSCULAR; INTRAVENOUS at 16:05

## 2019-08-01 RX ADMIN — IOHEXOL 100 ML: 350 INJECTION, SOLUTION INTRAVENOUS at 12:16

## 2019-08-01 RX ADMIN — ACETAMINOPHEN 975 MG: 325 TABLET ORAL at 13:37

## 2019-08-01 RX ADMIN — POTASSIUM PHOSPHATE, MONOBASIC AND POTASSIUM PHOSPHATE, DIBASIC 30 MMOL: 224; 236 INJECTION, SOLUTION INTRAVENOUS at 20:27

## 2019-08-01 RX ADMIN — OXYCODONE HYDROCHLORIDE 10 MG: 5 SOLUTION ORAL at 22:06

## 2019-08-01 RX ADMIN — HEPARIN SODIUM 5000 UNITS: 5000 INJECTION INTRAVENOUS; SUBCUTANEOUS at 13:37

## 2019-08-01 RX ADMIN — FENTANYL CITRATE 50 MCG: 50 INJECTION, SOLUTION INTRAMUSCULAR; INTRAVENOUS at 15:27

## 2019-08-01 RX ADMIN — POTASSIUM CHLORIDE 20 MEQ: 200 INJECTION, SOLUTION INTRAVENOUS at 10:00

## 2019-08-01 RX ADMIN — METRONIDAZOLE 500 MG: 500 INJECTION, SOLUTION INTRAVENOUS at 06:03

## 2019-08-01 RX ADMIN — HYDROMORPHONE HYDROCHLORIDE 0.5 MG: 1 INJECTION, SOLUTION INTRAMUSCULAR; INTRAVENOUS; SUBCUTANEOUS at 13:37

## 2019-08-01 RX ADMIN — PANTOPRAZOLE SODIUM 40 MG: 40 INJECTION, POWDER, FOR SOLUTION INTRAVENOUS at 09:31

## 2019-08-01 RX ADMIN — PROMETHAZINE HYDROCHLORIDE 12.5 MG: 25 INJECTION INTRAMUSCULAR; INTRAVENOUS at 09:31

## 2019-08-01 RX ADMIN — HEPARIN SODIUM 5000 UNITS: 5000 INJECTION INTRAVENOUS; SUBCUTANEOUS at 21:39

## 2019-08-01 RX ADMIN — DEXTROSE 50 ML/HR: 5 SOLUTION INTRAVENOUS at 08:02

## 2019-08-01 RX ADMIN — HYDROMORPHONE HYDROCHLORIDE 0.5 MG: 1 INJECTION, SOLUTION INTRAMUSCULAR; INTRAVENOUS; SUBCUTANEOUS at 18:06

## 2019-08-01 RX ADMIN — HEPARIN SODIUM 5000 UNITS: 5000 INJECTION INTRAVENOUS; SUBCUTANEOUS at 06:03

## 2019-08-01 RX ADMIN — POTASSIUM CHLORIDE 20 MEQ: 200 INJECTION, SOLUTION INTRAVENOUS at 22:46

## 2019-08-01 RX ADMIN — FENTANYL CITRATE 50 MCG: 50 INJECTION, SOLUTION INTRAMUSCULAR; INTRAVENOUS at 15:42

## 2019-08-01 RX ADMIN — ACETAMINOPHEN 975 MG: 160 SUSPENSION ORAL at 22:06

## 2019-08-01 RX ADMIN — ACETAMINOPHEN 975 MG: 325 TABLET ORAL at 06:03

## 2019-08-01 RX ADMIN — VANCOMYCIN HYDROCHLORIDE 1250 MG: 5 INJECTION, POWDER, LYOPHILIZED, FOR SOLUTION INTRAVENOUS at 18:11

## 2019-08-01 RX ADMIN — PANTOPRAZOLE SODIUM 40 MG: 40 INJECTION, POWDER, FOR SOLUTION INTRAVENOUS at 20:18

## 2019-08-01 NOTE — CONSULTS
Consultation - Infectious Disease   Santy Kam 63 y o  male MRN: [id number]  Unit/Bed#: E5 -01 Encounter: [id number]      IMPRESSION & RECOMMENDATIONS:   1  Sepsis-leukocytosis and tachycardia  Suspect all secondary to the patient's intra-abdominal abscesses  Perhaps the patient's pulmonary consolidation with pleural effusion are playing a role  No other clear source appreciated  Consideration for the possibility of bacteremia  Very limited antibiotic options in this patient who has anaphylaxis to penicillin  -discontinue Levaquin  -continue Flagyl  -begin vancomycin 1 25 g IV q 12 hours  -consult pharmacy for vancomycin trough management  -begin aztreonam 2 g IV q 8 hours  -increase fluconazole to 400 mg daily  -monitor CBC with diff and CMP  -check procalcitonin level now and tomorrow a m   -supportive care    2  Intra-abdominal abscesses-status post IR drainage with COURTNEY drains remaining in place  The most likely organisms would be alpha Streptococcus, anaerobes, with also possibility of fungal, and gram-negative rods  At this point awaiting additional data it is reasonable to maintain the patient on broad antibiotics  Limited antibiotic choices based upon the patient's allergies  -antibiotics as above  -followup fluid cultures and adjust antibiotics as needed  -drain management  -close surgical follow-up    3  Peritonitis-in the setting of a perforated marginal ulceration  The patient is now status post exploratory laparotomy with repair and stent placement, and now COURTNEY drain placement   -antibiotics as above  -close surgical follow-up  -serial abdominal exams    4  Abnormal CT of the chest-with some consolidation and pleural effusion noted on the right  Perhaps this is all atelectasis with reactive effusion  Less likely but also possible would be pneumonia with empyema    Patient's respiratory status is stable although he does have some shortness of breath   -antibiotics as above  -monitor respiratory status  -future imaging to confirm clearance  -may end up needing thoracentesis    5  COPD-no current clinical evidence of an acute exacerbation at this time      HISTORY OF PRESENT ILLNESS:  Reason for Consult:  Intra-abdominal abscess, pleural effusion, aspiration pneumonia  HPI: Jus Pyle is a 63y o  year old male with a history of Britney-en-Y gastric bypass surgery in the past admitted to [facility] in [facility] on the 26th of July with severe abdominal pain who I am asked to assist with management of intra-abdominal abscesses  The patient had undergone gastric bypass surgery back in 2017 and had lost approximately 100 lb  His surgical course was uncomplicated until this year when he was found to have a marginal ulcer on EGD  The week prior to this admission he apparently developed some back pain and was placed on a steroid taper  On the night prior to admission the patient developed the sudden onset of severe abdominal pain and went to the emergency department at [facility]  CT scan was suggestive of an internal hernia with twisting of the mesenteric vessels and mesenteric edema causing bowel obstruction  He was transferred to [facility] for further management  He underwent exploratory laparotomy with hernia repair  However on the 28th of July the patient developed worsening shortness of breath and abdominal discomfort as well as distension and was taken back to the operating room and found to have a marginal ulcer perforation requiring abdominal washout with repair and placement of a gastrojejunal stent by GI  Postoperatively he went to the ICU for ventilatory management  He also was noted to have profound metabolic acidosis and was treated supportively with IV fluid resuscitation and bicarbonate  The patient's acidosis gradually improved and he was eventually weaned off the ventilator and transferred out of the intensive care unit    The patient has been maintained on levofloxacin, Flagyl, and fluconazole as he has a life-threatening allergy to penicillins  During the patient's hospital stay he has remained afebrile  He had some evidence of clinical improvement, however he was noted to have a large bump in his white count  He underwent repeat CT of the chest abdomen and pelvis that revealed evidence of intra-abdominal abscess formation and loculated pleural effusion on the right  He was therefore taken to interventional Radiology where he underwent placement of 2 COURTNEY drains  Patient currently denies any fevers but had some chills and sweats  He denies any current vomiting but he does have an NG tube in place  He is having bowel movements but he denies any diarrhea  He denies any dysuria or hematuria  He does admit to having some shortness of breath and cough that is productive  REVIEW OF SYSTEMS:  A complete 12 point system-based review of systems is negative other than that noted in the HPI      PAST MEDICAL HISTORY:  Past Medical History:   Diagnosis Date    Anesthesia     Pt wakes up during "almost every surgery"    Arthritis     Asthma     Bariatric surgery status     Cervical radiculopathy     Chemotherapy follow-up examination     Chronic pain     Chronic pain disorder     COPD (chronic obstructive pulmonary disease) ([address]     Diabetes mellitus ([address] )     borderline "years ago"    Food allergy     clams    GERD (gastroesophageal reflux disease)     Heart murmur     Hiatal hernia     History of malignant neoplasm     History of pneumonia 04/12/2016    History of pulmonary embolism     History of ulceration     Hyperlipidemia     Lung cancer (HCC)     left lung, radiation and chemo tx    Migraine     Pneumonia     Postgastrectomy malabsorption     Right scapholunate ligament tear     Skipped heart beats     Wears partial dentures     upper and lower     Past Surgical History:   Procedure Laterality Date    BRONCHOSCOPY      COLONOSCOPY      FRACTURE SURGERY      femur right 1985    GASTRIC BYPASS LAPAROSCOPIC N/A 7/12/2017    Procedure: GASTRIC DIVERSION WITH BROOKLYN-EN-Y RECONSTRUCTION WITH  SHORT 60 cm LIMB;  Surgeon: Brooke Alanis MD;  [facility];  Service: Leandra Wesley IR IMAGE GUIDED ASPIRATION / DRAINAGE  8/1/2019    KNEE ARTHROSCOPY Right     right shoulder    LAPAROTOMY N/A 7/28/2019    Procedure: LAPAROTOMY EXPLORATORY, WASHOUT OF SUCUS, REPAIR OF MARGINAL ULCER PERFORATION, ABD WASHOUT, INTRAOPERATIVE EGD, GI ASSISTED ENDOSCOPIC STENT PLACEMENT;  Surgeon: Armand Lin MD;  [facility];  Service: Zollie Deepti Left     LYMPHADENECTOMY  05/22/2012    Dr Alana Snellen ANT INTERBODY MIN DISCECTOMY, CERVICAL BELOW C2 N/A 10/17/2017    Procedure: C5-6, C6-7 ACDF WITH ALLOGRAFT (NEURO MONITORING); Surgeon: Richard Branch MD;  [facility];  Service: Orthopedics    WA COLONOSCOPY FLX DX W/COLLJ SPEC WHEN PFRMD N/A 4/14/2017    Procedure: COLONOSCOPY;  Surgeon: Jay Tate MD;  Location: [facility];  Service: Gastroenterology    WA EGD TRANSORAL BIOPSY SINGLE/MULTIPLE N/A 1/9/2019    Procedure: ESOPHAGOGASTRODUODENOSCOPY (EGD); Surgeon: Brooke Alanis MD;  [facility]; Service: Bariatrics    WA ESOPHAGOGASTRODUODENOSCOPY TRANSORAL DIAGNOSTIC N/A 1/11/2017    Procedure: ESOPHAGOGASTRODUODENOSCOPY (EGD); Surgeon: Jay Tate MD;  [facility];   Service: Gastroenterology    WA INCISE FINGER TENDON SHEATH Right 11/27/2018    Procedure: RELEASE TRIGGER FINGER long and index finger;  Surgeon: Glendale Apley, MD;  [facility];  Service: Orthopedics    WA LAP, REPAIR PARAESOPHAGEAL HERNIA, INCL FUNDOPLASTY W/ MESH N/A 7/12/2017    Procedure: REPAIR HERNIA PARAESOPHAGEAL  LAPAROSCOPIC;  Surgeon: Brooke Alanis MD;  [facility];  Service: Bariatrics    WA [address] ABDOMEN N/A 2019    Procedure: LAPAROSCOPY DIAGNOSTIC CONVERSION TO OPEN, REDUCTION AND REPAIR OF INTERNAL HERNIA, REPAIR SEROSAL TEARS;  Surgeon: Mala Salazar MD;  [facility];  Service: Bariatrics    CT WRIST Bernie Abbott LIG Right 2016    Procedure: RELEASE CARPAL TUNNEL ENDOSCOPIC;  Surgeon: Josiah Nunez MD;  [facility];  Service: Orthopedics    RECONSTRUCTION DISTAL RADIUS/ULNA JOINT (DRUJ) Right 2016    Procedure: WRIST SCAPHOLUNATE LIGAMENT RECONSTRUCTION WITH ECRB TENDON AUTOGRAPH , SPLINT APPLICATION ;  Surgeon: Josiah Nunez MD;  [facility];  Service:     SHOULDER SURGERY      TONSILLECTOMY         FAMILY HISTORY:  Non-contributory    SOCIAL HISTORY:  Social History   Social History     Substance and Sexual Activity   Alcohol Use Yes    Frequency: Monthly or less    Drinks per session: 1 or 2    Binge frequency: Never     Social History     Substance and Sexual Activity   Drug Use No     Social History     Tobacco Use   Smoking Status Former Smoker    Packs/day: 0 50    Years: 48 00    Pack years: 24 00    Types: Cigarettes    Last attempt to quit: 2017    Years since quittin 2   Smokeless Tobacco Never Used       ALLERGIES:  Allergies   Allergen Reactions    Codeine Hives, Itching, Nausea Only, GI Intolerance and Vomiting    Hydrocodone Hives and GI Intolerance    Penicillins Anaphylaxis and Throat Swelling    Shellfish-Derived Products Nausea Only and Vomiting       MEDICATIONS:  All current active medications have been reviewed    Antibiotics:  Levaquin 7, Flagyl 7, fluconazole 5    PHYSICAL EXAM:  Temp:  [97 3 °F (36 3 °C)-99 8 °F (37 7 °C)] 99 8 °F (37 7 °C)  HR:  [] 110  Resp:  [22-23] 23  BP: (157-178)/(78-87) 170/79  SpO2:  [90 %-99 %] 90 %  Temp (24hrs), Av 5 °F (36 9 °C), Min:97 3 °F (36 3 °C), Max:99 8 °F (37 7 °C)  Current: Temperature: 99 8 °F (37 7 °C)    Intake/Output Summary (Last 24 hours) at 8/1/2019 1741  Last data filed at 8/1/2019 1445  Gross per 24 hour   Intake 451 67 ml   Output 1975 ml   Net -1523 33 ml       General Appearance:  Chronically ill, thin, nontoxic, and in no distress   Head:  Normocephalic, without obvious abnormality, atraumatic   Eyes:  Conjunctiva pink and sclera anicteric, both eyes   Nose: Nares normal, mucosa normal, no drainage   Throat: Oropharynx moist without lesions   Neck: Supple, symmetrical, no adenopathy, no tenderness/mass/nodules   Back:   Symmetric, no curvature, ROM normal, no CVA tenderness   Lungs:   Decreased breath sounds bilaterally, respirations unlabored   Chest Wall:  No tenderness or deformity   Heart:  Tachycardia; no murmur, rub or gallop   Abdomen:   Soft, mild diffuse tenderness, mildly distended, decreased bowel sounds  Three COURTNEY drains in place  Incision without erythema or drainage  Gastric tube in place     Extremities: No cyanosis, clubbing, trace lower extremity edema   Skin: No rashes or lesions  No draining wounds noted  Lymph nodes: Cervical, supraclavicular nodes normal   Neurologic: Alert and oriented times 3, extremity strength 5/5 and symmetric       LABS, IMAGING, & OTHER STUDIES:  Lab Results:  I have personally reviewed pertinent labs    Results from last 7 days   Lab Units 08/01/19  0526 07/31/19  0606 07/30/19  0602   WBC Thousand/uL 27 71* 19 88* 10 94*   HEMOGLOBIN g/dL 13 3 12 4 11 0*   PLATELETS Thousands/uL 181 204 193     Results from last 7 days   Lab Units 08/01/19  0526 07/31/19  1559 07/31/19  0606  07/29/19  0440  07/28/19  0541   SODIUM mmol/L 135* 138 141   < > 137   < > 133*   POTASSIUM mmol/L 3 1* 3 1* 2 9*   < > 4 0   < > 4 5   CHLORIDE mmol/L 97* 104 104   < > 105   < > 100   CO2 mmol/L 29 26 26   < > 24   < > 20*   BUN mg/dL 17 20 22   < > 17   < > 24   CREATININE mg/dL 0 64 0 61 0 65   < > 0 91   < > 1 38*   EGFR ml/min/1 73sq m 109 111 108   < > 93   < > 56   CALCIUM mg/dL 7 3* 8 1* 8 0*   < > 7 3* < > 8 2*   AST U/L  --   --   --   --  59*  --  37   ALT U/L  --   --   --   --  24  --  18   ALK PHOS U/L  --   --   --   --  31*  --  42*    < > = values in this interval not displayed  fluid cultures pending        Imaging Studies:   I have personally reviewed pertinent imaging study reports and images in PACS      CT chest abdomen and pelvis-large loculated rim enhancing subcapsular collection extending into the pericolic gutter, 2nd rim enhancing collection surrounding this spleen, scattered ground-glass opacities, loculated right pleural effusion, dilated small-bowel loops, postoperative changes    Images personally reviewed by me in PACS

## 2019-08-01 NOTE — PROGRESS NOTES
Progress Note - Bariatric Surgery   Juan Antonio Craig 63 y o  male MRN: [id number]  Unit/Bed#: E5 -01 Encounter: [id number]      Subjective/Objective     Subjective: 63year old male status post Exploratory Laparotomy with repair of perforated marginal ulcer, abdominal washout, G tube placement, and GI assisted endoscopic stent placement 7/28/2019 with Dr Aide Rodríguez  Patient s/p Diagnostic laparoscopy converted to open 7/26/2019 with internal hernia repair  Clinically improving  Pain adequately controlled on IV pain medication, Hold G tube feedings today due to vomiting of bile last night, ambulating without assistance, voiding well, using incentive spirometer  Denies fevers, chills, sweats, SOB, CP, calf pain      Complains this am of mild nausea and bloating  No additional episodes of vomitus since last night  Objective:    /82 (BP Location: Right arm)   Pulse 99   Temp 98 3 °F (36 8 °C) (Temporal)   Resp 22   Ht 5' 8" (1 727 m)   Wt 75 3 kg (166 lb 0 1 oz)   SpO2 94%   BMI 25 24 kg/m²       Intake/Output Summary (Last 24 hours) at 8/1/2019 0817  Last data filed at 8/1/2019 0527  Gross per 24 hour   Intake 451 67 ml   Output 1450 ml   Net -998 33 ml       Invasive Devices     Peripheral Intravenous Line            Peripheral IV 07/28/19 Left Arm 3 days    Peripheral IV 07/31/19 Right Antecubital less than 1 day          Drain            Closed/Suction Drain Right Abdomen Bulb 19 Fr  3 days    Gastrostomy/Enterostomy Gastrostomy 22 Fr  LUQ 3 days                ROS: 10-point system completed  All negative except see HPI        Physical Exam    General Appearance:    Alert, cooperative, no distress, appears stated age   Head:    Normocephalic, without obvious abnormality, atraumatic   Lungs:     Respirations unlabored   Heart:    Regular rate and rhythm   Abdomen:     Soft, appropriate tenderness,  no masses, no organomegaly,   non distended; dressing personally changed by myself today; staples in tack no drainage from wound, no erythema, no signs of infection; serosanguinous drainage from Debbi Allen drain; G tube to gravity    Extremities:   Extremities normal, atraumatic, no cyanosis or edema   Neurologic:  Incision:  Psych:   Normal strength and sensation    Clean, dry, and intact    Normal mood and affect       Lab, Imaging and other studies:  I have personally reviewed pertinent lab results  , CBC:   Lab Results   Component Value Date    WBC 27 71 (H) 08/01/2019    HGB 13 3 08/01/2019    HCT 40 0 08/01/2019    MCV 89 08/01/2019     08/01/2019    MCH 29 6 08/01/2019    MCHC 33 3 08/01/2019    RDW 13 8 08/01/2019    MPV 10 1 08/01/2019    NRBC 0 08/01/2019   , CMP:   Lab Results   Component Value Date    SODIUM 135 (L) 08/01/2019    K 3 1 (L) 08/01/2019    CL 97 (L) 08/01/2019    CO2 29 08/01/2019    BUN 17 08/01/2019    CREATININE 0 64 08/01/2019    CALCIUM 7 3 (L) 08/01/2019    EGFR 109 08/01/2019        VTE Mechanical Prophylaxis: sequential compression device, Heparin    Assessment/Plan  Perforated marginal ulcer  Internal hernia  Bariatric surgery status     Patient s/p Exploratory laparotomy, washout of sucus, repair of marginal ulcer perforation, abdominal washout, intraoperative EGD, GI assisted endoscopic stent placement, and G tube placement 7/28/2019 with Dr Kellie Sosa s/p RNY gastric bypass done in 2017 with Dr Kellie Velázquez       Patient clinically improving, wants to go home  Tube feeds held overnight due to nausea and one episode of 10cc bilious vomitus   Will continue to hold today       Will continue to replete electrolytes today       - Maintain IV fluids at 50 cc an hour  - Encourage incentive spirometry, ambulation, can have sips with meds  - Continue current pain and anti-nausea regimen  - PPI for GI prophylaxis  - SCDs and Heparin for DVT prophylaxis  - Will continue to monitor labs   - CT chest abdomen pelvis today     Plan of care was discussed with patient and patient's nurse  Care plan discussed with Dr Serena Morley: Unable to discharge to home today

## 2019-08-01 NOTE — PLAN OF CARE
Problem: Potential for Falls  Goal: Patient will remain free of falls  Description  INTERVENTIONS:  - Assess patient frequently for physical needs  -  Identify cognitive and physical deficits and behaviors that affect risk of falls  -  [place] fall precautions as indicated by assessment   - Educate patient/family on patient safety including physical limitations  - Instruct patient to call for assistance with activity based on assessment  - Modify environment to reduce risk of injury  - Consider OT/PT consult to assist with strengthening/mobility  Outcome: Progressing     Problem: Nutrition/Hydration-ADULT  Goal: Nutrient/Hydration intake appropriate for improving, restoring or maintaining nutritional needs  Description  Monitor and assess patient's nutrition/hydration status for malnutrition (ex- brittle hair, bruises, dry skin, pale skin and conjunctiva, muscle wasting, smooth red tongue, and disorientation)  Collaborate with interdisciplinary team and initiate plan and interventions as ordered  Monitor patient's weight and dietary intake as ordered or per policy  Utilize nutrition screening tool and intervene per policy  Determine patient's food preferences and provide high-protein, high-caloric foods as appropriate       INTERVENTIONS:  - Monitor oral intake, urinary output, labs, and treatment plans  - Assess nutrition and hydration status and recommend course of action  - Evaluate amount of meals eaten  - Assist patient with eating if necessary   - Allow adequate time for meals  - Recommend/ encourage appropriate diets, oral nutritional supplements, and vitamin/mineral supplements  - Order, calculate, and assess calorie counts as needed  - Recommend, monitor, and adjust tube feedings and TPN/PPN based on assessed needs  - Assess need for intravenous fluids  - Provide specific nutrition/hydration education as appropriate  - Include patient/family/caregiver in decisions related to nutrition  Outcome: Progressing     Problem: PAIN - ADULT  Goal: Verbalizes/displays adequate comfort level or baseline comfort level  Description  Interventions:  - Encourage patient to monitor pain and request assistance  - Assess pain using appropriate pain scale  - Administer analgesics based on type and severity of pain and evaluate response  - Implement non-pharmacological measures as appropriate and evaluate response  - Consider cultural and social influences on pain and pain management  - Notify physician/advanced practitioner if interventions unsuccessful or patient reports new pain  Outcome: Progressing     Problem: INFECTION - ADULT  Goal: Absence or prevention of progression during hospitalization  Description  INTERVENTIONS:  - Assess and monitor for signs and symptoms of infection  - Monitor lab/diagnostic results  - Monitor all insertion sites, i e  indwelling lines, tubes, and drains  - Monitor endotracheal (as able) and nasal secretions for changes in amount and color  - [place] appropriate cooling/warming therapies per order  - Administer medications as ordered  - Instruct and encourage patient and family to use good hand hygiene technique  - Identify and instruct in appropriate isolation precautions for identified infection/condition  Outcome: Progressing     Problem: DISCHARGE PLANNING  Goal: Discharge to home or other facility with appropriate resources  Description  INTERVENTIONS:  - Identify barriers to discharge w/patient and caregiver  - Arrange for needed discharge resources and transportation as appropriate  - Identify discharge learning needs (meds, wound care, etc )  - Arrange for interpretive services to assist at discharge as needed  - Refer to Case Management Department for coordinating discharge planning if the patient needs post-hospital services based on physician/advanced practitioner order or complex needs related to functional status, cognitive ability, or social support system  Outcome: Progressing     Problem: Knowledge Deficit  Goal: Patient/family/caregiver demonstrates understanding of disease process, treatment plan, medications, and discharge instructions  Description  Complete learning assessment and assess knowledge base    Interventions:  - Provide teaching at level of understanding  - Provide teaching via preferred learning methods  Outcome: Progressing     Problem: GASTROINTESTINAL - ADULT  Goal: Minimal or absence of nausea and/or vomiting  Description  INTERVENTIONS:  - Administer IV fluids as ordered to ensure adequate hydration  - Maintain NPO status until nausea and vomiting are resolved  - Nasogastric tube as ordered  - Administer ordered antiemetic medications as needed  - Provide nonpharmacologic comfort measures as appropriate  - Advance diet as tolerated, if ordered  - Nutrition services referral to assist patient with adequate nutrition and appropriate food choices  Outcome: Progressing  Goal: Maintains or returns to baseline bowel function  Description  INTERVENTIONS:  - Assess bowel function  - Encourage oral fluids to ensure adequate hydration  - Administer IV fluids as ordered to ensure adequate hydration  - Administer ordered medications as needed  - Encourage mobilization and activity  - Nutrition services referral to assist patient with appropriate food choices  Outcome: Progressing  Goal: Maintains adequate nutritional intake  Description  INTERVENTIONS:  - Monitor percentage of each meal consumed  - Identify factors contributing to decreased intake, treat as appropriate  - Assist with meals as needed  - Monitor I&O, WT and lab values  - Obtain nutrition services referral as needed  Outcome: Progressing     Problem: SKIN/TISSUE INTEGRITY - ADULT  Goal: Skin integrity remains intact  Description  INTERVENTIONS  - Identify patients at risk for skin breakdown  - Assess and monitor skin integrity  - Assess and monitor nutrition and hydration status  - Monitor labs (i e  albumin)  - Assess for incontinence   - Turn and reposition patient  - Assist with mobility/ambulation  - Relieve pressure over bony prominences  - Avoid friction and shearing  - Provide appropriate hygiene as needed including keeping skin clean and dry  - Evaluate need for skin moisturizer/barrier cream  - Collaborate with interdisciplinary team (i e  Nutrition, Rehabilitation, etc )   - Patient/family teaching  Outcome: Progressing  Goal: Incision(s), wounds(s) or drain site(s) healing without S/S of infection  Description  INTERVENTIONS  - Assess and document risk factors for skin impairment   - Assess and document dressing, incision, wound bed, drain sites and surrounding tissue  - Initiate Nutrition services consult and/or wound management as needed  Outcome: Progressing  Goal: Oral mucous membranes remain intact  Description  INTERVENTIONS  - Assess oral mucosa and hygiene practices  - Implement preventative oral hygiene regimen  - Implement oral medicated treatments as ordered  - Initiate Nutrition services referral as needed  Outcome: Progressing     Problem: Prexisting or High Potential for Compromised Skin Integrity  Goal: Skin integrity is maintained or improved  Description  INTERVENTIONS:  - Identify patients at risk for skin breakdown  - Assess and monitor skin integrity  - Assess and monitor nutrition and hydration status  - Monitor labs (i e  albumin)  - Assess for incontinence   - Turn and reposition patient  - Assist with mobility/ambulation  - Relieve pressure over bony prominences  - Avoid friction and shearing  - Provide appropriate hygiene as needed including keeping skin clean and dry  - Evaluate need for skin moisturizer/barrier cream  - Collaborate with interdisciplinary team (i e  Nutrition, Rehabilitation, etc )   - Patient/family teaching  Outcome: Progressing     Problem: SAFETY,RESTRAINT: NV/NON-SELF DESTRUCTIVE BEHAVIOR  Goal: Remains free of harm/injury (restraint for non violent/non self-detsructive behavior)  Description  INTERVENTIONS:  - Instruct patient/family regarding restraint use   - Assess and monitor physiologic and psychological status   - Provide interventions and comfort measures to meet assessed patient needs   - Identify and implement measures to help patient regain control  - Assess readiness for release of restraint   Outcome: Progressing  Goal: Returns to optimal restraint-free functioning  Description  INTERVENTIONS:  - Assess the patient's behavior and symptoms that indicate continued need for restraint  - Identify and implement measures to help patient regain control  - Assess readiness for release of restraint   Outcome: Progressing

## 2019-08-01 NOTE — PROGRESS NOTES
Vancomycin Assessment    Cleve Puentes is a 63 y o  male who is currently receiving vancomycin 1250mg IV X1 dose for peritonitis   Relevant clinical data and objective history reviewed:  Creatinine   Date Value Ref Range Status   08/01/2019 0 64 0 60 - 1 30 mg/dL Final     Comment:     Standardized to IDMS reference method   07/31/2019 0 61 0 60 - 1 30 mg/dL Final     Comment:     Standardized to IDMS reference method   07/31/2019 0 65 0 60 - 1 30 mg/dL Final     Comment:     Standardized to IDMS reference method   01/04/2016 1 07 0 60 - 1 30 mg/dL Final     Comment:     Standardized to IDMS reference method   12/04/2015 0 95 0 60 - 1 30 mg/dL Final     Comment:     Standardized to IDMS reference method   06/11/2015 1 00 0 60 - 1 30 mg/dL Final     Comment:     Standardized to IDMS reference method     /79   Pulse (!) 110   Temp 99 8 °F (37 7 °C) (Tympanic)   Resp (!) 23   Ht 5' 8" (1 727 m)   Wt 75 3 kg (166 lb 0 1 oz)   SpO2 90%   BMI 25 24 kg/m²   I/O last 3 completed shifts: In: 931 7 [P O :480; I V :451 7]  Out: 2740 [Urine:1800; Emesis/NG output:900; Drains:40]  Lab Results   Component Value Date/Time    BUN 17 08/01/2019 05:26 AM    BUN 18 01/04/2016 08:24 AM    WBC 27 71 (H) 08/01/2019 05:26 AM    WBC 6 48 01/04/2016 08:24 AM    HGB 13 3 08/01/2019 05:26 AM    HGB 15 7 01/04/2016 08:24 AM    HCT 40 0 08/01/2019 05:26 AM    HCT 46 0 01/04/2016 08:24 AM    MCV 89 08/01/2019 05:26 AM    MCV 88 01/04/2016 08:24 AM     08/01/2019 05:26 AM     01/04/2016 08:24 AM     Temp Readings from Last 3 Encounters:   08/01/19 99 8 °F (37 7 °C) (Tympanic)   07/26/19 (!) 97 1 °F (36 2 °C) (Temporal)   07/25/19 97 7 °F (36 5 °C)     Vancomycin Days of Therapy: 1    Assessment/Plan  The patient is currently on vancomycin utilizing scheduled dosing based on actual body weight  Baseline risks associated with therapy include: concomitant nephrotoxic medications    The patient is currently receiving 1250mg IV X1 dose and after clinical evaluation will be changed to 1500mg IV every 12 hours  Pharmacy will also follow closely for s/sx of nephrotoxicity, infusion reactions and appropriateness of therapy  BMP and CBC will be ordered per protocol  Plan for trough as patient approaches steady state, prior to the 4th  dose at approximately 01 72 64 30 83 on 8/3  Due to infection severity, will target a trough of 15-20 (appropriate for most indications)   Pharmacy will continue to follow the patients culture results and clinical progress daily      Sotero Hernandez Pharmacist

## 2019-08-01 NOTE — BRIEF OP NOTE (RAD/CATH)
Abdominal Drainage Procedure Note    PATIENT NAME: Cleev Puentes  : 1960  MRN: [id number]     Pre-op Diagnosis:   1  Bariatric surgery status    2  Generalized abdominal pain    3  Internal hernia    4  G tube feedings ([address] )    5  Postsurgical malabsorption    6  Intra-abdominal abscess ([address] )    7  Pleural effusion    8  Aspiration pneumonia (HCC)      Post-op Diagnosis:   1  Bariatric surgery status    2  Generalized abdominal pain    3  Internal hernia    4  G tube feedings ([address] )    5  Postsurgical malabsorption    6  Intra-abdominal abscess ([address] )    7  Pleural effusion    8  Aspiration pneumonia [facility])        Surgeon:   Michael Jett MD  Assistants:     No qualified resident was available, Resident is only observing    Estimated Blood Loss: none  Findings: 8 5 Fr drain place with CT guidance into right perihepatic fluid  Yielded 220 ml of slightly cloudy, timmy fluid  8 5 Fr drain placed with CT guidance into the left perisplenic fluid yielded 50 ml cloudy, tan, purulent fluid  Specimens: fluid from each collectin for culture      Complications:  none    Anesthesia: Conscious sedation and Local    Michael Jett MD     Date: 2019  Time: 4:16 PM

## 2019-08-02 LAB
ANION GAP SERPL CALCULATED.3IONS-SCNC: 8 MMOL/L (ref 4–13)
BASOPHILS # BLD AUTO: 0.11 THOUSANDS/ΜL (ref 0–0.1)
BASOPHILS NFR BLD AUTO: 0 % (ref 0–1)
BUN SERPL-MCNC: 15 MG/DL (ref 5–25)
CA-I BLD-SCNC: 1.01 MMOL/L (ref 1.12–1.32)
CALCIUM SERPL-MCNC: 7.4 MG/DL (ref 8.3–10.1)
CHLORIDE SERPL-SCNC: 102 MMOL/L (ref 100–108)
CO2 SERPL-SCNC: 29 MMOL/L (ref 21–32)
CREAT SERPL-MCNC: 0.61 MG/DL (ref 0.6–1.3)
EOSINOPHIL # BLD AUTO: 0.06 THOUSAND/ΜL (ref 0–0.61)
EOSINOPHIL NFR BLD AUTO: 0 % (ref 0–6)
ERYTHROCYTE [DISTWIDTH] IN BLOOD BY AUTOMATED COUNT: 13.9 % (ref 11.6–15.1)
GFR SERPL CREATININE-BSD FRML MDRD: 111 ML/MIN/1.73SQ M
GLUCOSE SERPL-MCNC: 112 MG/DL (ref 65–140)
HCT VFR BLD AUTO: 37.3 % (ref 36.5–49.3)
HGB BLD-MCNC: 12.5 G/DL (ref 12–17)
IMM GRANULOCYTES # BLD AUTO: 0.47 THOUSAND/UL (ref 0–0.2)
IMM GRANULOCYTES NFR BLD AUTO: 1 % (ref 0–2)
LYMPHOCYTES # BLD AUTO: 1.15 THOUSANDS/ΜL (ref 0.6–4.47)
LYMPHOCYTES NFR BLD AUTO: 4 % (ref 14–44)
MAGNESIUM SERPL-MCNC: 1.9 MG/DL (ref 1.6–2.6)
MCH RBC QN AUTO: 30 PG (ref 26.8–34.3)
MCHC RBC AUTO-ENTMCNC: 33.5 G/DL (ref 31.4–37.4)
MCV RBC AUTO: 90 FL (ref 82–98)
MONOCYTES # BLD AUTO: 1.99 THOUSAND/ΜL (ref 0.17–1.22)
MONOCYTES NFR BLD AUTO: 6 % (ref 4–12)
NEUTROPHILS # BLD AUTO: 29.16 THOUSANDS/ΜL (ref 1.85–7.62)
NEUTS SEG NFR BLD AUTO: 89 % (ref 43–75)
NRBC BLD AUTO-RTO: 0 /100 WBCS
PHOSPHATE SERPL-MCNC: 2.3 MG/DL (ref 2.7–4.5)
PLATELET # BLD AUTO: 174 THOUSANDS/UL (ref 149–390)
PMV BLD AUTO: 9.7 FL (ref 8.9–12.7)
POTASSIUM SERPL-SCNC: 3.7 MMOL/L (ref 3.5–5.3)
PROCALCITONIN SERPL-MCNC: 1.88 NG/ML
RBC # BLD AUTO: 4.16 MILLION/UL (ref 3.88–5.62)
SODIUM SERPL-SCNC: 139 MMOL/L (ref 136–145)
WBC # BLD AUTO: 32.94 THOUSAND/UL (ref 4.31–10.16)

## 2019-08-02 PROCEDURE — 83735 ASSAY OF MAGNESIUM: CPT | Performed by: SURGERY

## 2019-08-02 PROCEDURE — 84145 PROCALCITONIN (PCT): CPT | Performed by: INTERNAL MEDICINE

## 2019-08-02 PROCEDURE — 99024 POSTOP FOLLOW-UP VISIT: CPT | Performed by: SURGERY

## 2019-08-02 PROCEDURE — 99233 SBSQ HOSP IP/OBS HIGH 50: CPT | Performed by: INTERNAL MEDICINE

## 2019-08-02 PROCEDURE — 80048 BASIC METABOLIC PNL TOTAL CA: CPT | Performed by: SURGERY

## 2019-08-02 PROCEDURE — 82330 ASSAY OF CALCIUM: CPT | Performed by: SURGERY

## 2019-08-02 PROCEDURE — C9113 INJ PANTOPRAZOLE SODIUM, VIA: HCPCS | Performed by: PHYSICIAN ASSISTANT

## 2019-08-02 PROCEDURE — 85025 COMPLETE CBC W/AUTO DIFF WBC: CPT | Performed by: SURGERY

## 2019-08-02 PROCEDURE — 84100 ASSAY OF PHOSPHORUS: CPT | Performed by: SURGERY

## 2019-08-02 RX ADMIN — DOCUSATE SODIUM 100 MG: 100 CAPSULE, LIQUID FILLED ORAL at 17:50

## 2019-08-02 RX ADMIN — Medication 250 MG: at 17:50

## 2019-08-02 RX ADMIN — POTASSIUM PHOSPHATE, MONOBASIC AND POTASSIUM PHOSPHATE, DIBASIC 30 MMOL: 224; 236 INJECTION, SOLUTION INTRAVENOUS at 12:57

## 2019-08-02 RX ADMIN — SUCRALFATE 1000 MG: 1 SUSPENSION ORAL at 15:16

## 2019-08-02 RX ADMIN — SUCRALFATE 1000 MG: 1 SUSPENSION ORAL at 06:00

## 2019-08-02 RX ADMIN — HEPARIN SODIUM 5000 UNITS: 5000 INJECTION INTRAVENOUS; SUBCUTANEOUS at 22:30

## 2019-08-02 RX ADMIN — ALBUTEROL SULFATE 2 PUFF: 90 AEROSOL, METERED RESPIRATORY (INHALATION) at 22:31

## 2019-08-02 RX ADMIN — SUCRALFATE 1000 MG: 1 SUSPENSION ORAL at 22:30

## 2019-08-02 RX ADMIN — METRONIDAZOLE 500 MG: 500 INJECTION, SOLUTION INTRAVENOUS at 22:30

## 2019-08-02 RX ADMIN — ALBUTEROL SULFATE 2 PUFF: 90 AEROSOL, METERED RESPIRATORY (INHALATION) at 08:54

## 2019-08-02 RX ADMIN — ALBUTEROL SULFATE 2 PUFF: 90 AEROSOL, METERED RESPIRATORY (INHALATION) at 17:51

## 2019-08-02 RX ADMIN — THIAMINE HYDROCHLORIDE: 100 INJECTION, SOLUTION INTRAMUSCULAR; INTRAVENOUS at 09:03

## 2019-08-02 RX ADMIN — FLUCONAZOLE, SODIUM CHLORIDE 400 MG: 2 INJECTION INTRAVENOUS at 15:19

## 2019-08-02 RX ADMIN — PANTOPRAZOLE SODIUM 40 MG: 40 INJECTION, POWDER, FOR SOLUTION INTRAVENOUS at 08:53

## 2019-08-02 RX ADMIN — ALBUTEROL SULFATE 2 PUFF: 90 AEROSOL, METERED RESPIRATORY (INHALATION) at 05:58

## 2019-08-02 RX ADMIN — CALCIUM GLUCONATE 3 G: 98 INJECTION, SOLUTION INTRAVENOUS at 10:31

## 2019-08-02 RX ADMIN — HEPARIN SODIUM 5000 UNITS: 5000 INJECTION INTRAVENOUS; SUBCUTANEOUS at 13:08

## 2019-08-02 RX ADMIN — OXYCODONE HYDROCHLORIDE 10 MG: 5 SOLUTION ORAL at 08:53

## 2019-08-02 RX ADMIN — VANCOMYCIN HYDROCHLORIDE 1500 MG: 1 INJECTION, POWDER, LYOPHILIZED, FOR SOLUTION INTRAVENOUS at 05:55

## 2019-08-02 RX ADMIN — HEPARIN SODIUM 5000 UNITS: 5000 INJECTION INTRAVENOUS; SUBCUTANEOUS at 05:55

## 2019-08-02 RX ADMIN — OXYCODONE HYDROCHLORIDE 10 MG: 5 SOLUTION ORAL at 15:17

## 2019-08-02 RX ADMIN — THIAMINE HYDROCHLORIDE: 100 INJECTION, SOLUTION INTRAMUSCULAR; INTRAVENOUS at 01:47

## 2019-08-02 RX ADMIN — METRONIDAZOLE 500 MG: 500 INJECTION, SOLUTION INTRAVENOUS at 07:35

## 2019-08-02 RX ADMIN — VANCOMYCIN HYDROCHLORIDE 1500 MG: 1 INJECTION, POWDER, LYOPHILIZED, FOR SOLUTION INTRAVENOUS at 19:15

## 2019-08-02 RX ADMIN — AZTREONAM 2000 MG: 2 INJECTION, POWDER, LYOPHILIZED, FOR SOLUTION INTRAMUSCULAR; INTRAVENOUS at 01:41

## 2019-08-02 RX ADMIN — DOCUSATE SODIUM 100 MG: 100 CAPSULE, LIQUID FILLED ORAL at 08:53

## 2019-08-02 RX ADMIN — POTASSIUM CHLORIDE 50 ML/HR: 2 INJECTION, SOLUTION, CONCENTRATE INTRAVENOUS at 01:35

## 2019-08-02 RX ADMIN — AZTREONAM 2000 MG: 2 INJECTION, POWDER, LYOPHILIZED, FOR SOLUTION INTRAMUSCULAR; INTRAVENOUS at 17:21

## 2019-08-02 RX ADMIN — METRONIDAZOLE 500 MG: 500 INJECTION, SOLUTION INTRAVENOUS at 13:13

## 2019-08-02 RX ADMIN — HYDROMORPHONE HYDROCHLORIDE 0.5 MG: 1 INJECTION, SOLUTION INTRAMUSCULAR; INTRAVENOUS; SUBCUTANEOUS at 13:13

## 2019-08-02 RX ADMIN — Medication 250 MG: at 08:53

## 2019-08-02 RX ADMIN — SUCRALFATE 1000 MG: 1 SUSPENSION ORAL at 12:56

## 2019-08-02 RX ADMIN — ALBUTEROL SULFATE 2 PUFF: 90 AEROSOL, METERED RESPIRATORY (INHALATION) at 12:58

## 2019-08-02 RX ADMIN — AZTREONAM 2000 MG: 2 INJECTION, POWDER, LYOPHILIZED, FOR SOLUTION INTRAMUSCULAR; INTRAVENOUS at 10:31

## 2019-08-02 RX ADMIN — PANTOPRAZOLE SODIUM 40 MG: 40 INJECTION, POWDER, FOR SOLUTION INTRAVENOUS at 20:54

## 2019-08-02 NOTE — PROGRESS NOTES
Clarified diet order with Malia Milian PA-C  Pt is allowed to have sips of water with meds as tolerated

## 2019-08-02 NOTE — PROGRESS NOTES
TF currently on hold d/t ileus w/ no bowel movement  If ileus resolves initiate Jevity 1 2@ 10ml/hr advance by 10ml q 6hrs to goal of 60ml/hr, water flushes 100ml q 4hrs provides total of 1728cal, 80g pro, 1766ml  Eventually can switch to bolus feeds, will provide recs when appropriate  If ileus persists recommend intiating standard TPN, continue to replete electrolytes, advance as tolerated to TPN goal D40 700ml, AA10 800ml, 20% lipids 200ml provides total of 1672cal, 80g pro, 1700ml  Glucose load 2 5mg/kg/min, lipid load   5g/kg/day  Will continue to monitor POC for TF vs TPN

## 2019-08-02 NOTE — PROGRESS NOTES
Vancomycin IV Pharmacy-to-Dose Consultation    Lino Sandhoff is a 63 y o  male who is currently receiving Vancomycin IV with management by the Pharmacy Consult service  Assessment/Plan:  The patient was reviewed  Renal function is stable and no signs or symptoms of nephrotoxicity and/or infusion reactions were documented in the chart  Based on todays assessment, continue current vancomycin (day #2) dosing of 1500mg q12h, with a plan for trough to be drawn at 1745 on 8/3/19  We will continue to follow the patients culture results and clinical progress daily      Penny Kaur, Pharmacist

## 2019-08-02 NOTE — PROGRESS NOTES
Progress Note - Infectious Disease   Anton Darnell 63 y o  male MRN: [id number]  Unit/Bed#: E5 -01 Encounter: [id number]      Impression/Plan:  1  Sepsis-leukocytosis and tachycardia  Suspect all secondary to the patient's intra-abdominal abscesses  Perhaps the patient's pulmonary consolidation with pleural effusion are playing a role  No other clear source appreciated  Consideration for the possibility of bacteremia  Very limited antibiotic options in this patient who has anaphylaxis to penicillin  The patient remains relatively ill with ongoing tachycardia and the white count continues to rise  His blood pressures remained stable  -continue vancomycin, aztreonam, Flagyl, and fluconazole for now  -recheck CBC with diff and BMP  -recheck procalcitonin level  -follow-up blood cultures  -supportive care     2  Intra-abdominal abscesses-status post IR drainage with COURTNEY drains remaining in place  The most likely organisms would be alpha Streptococcus, anaerobes, with also possibility of fungal, and gram-negative rods  At this point awaiting additional data it is reasonable to maintain the patient on broad antibiotics  Limited antibiotic choices based upon the patient's allergies  -antibiotics as above  -followup fluid cultures and adjust antibiotics as needed  -drain management  -close surgical follow-up     3  Peritonitis-in the setting of a perforated marginal ulceration  The patient is now status post exploratory laparotomy with repair and stent placement, and now COURTNEY drain placement   -antibiotics as above  -follow-up fluid cultures as above  -close surgical follow-up  -serial abdominal exams     4  Abnormal CT of the chest-with some consolidation and pleural effusion noted on the right  Perhaps this is all atelectasis with reactive effusion  Less likely but also possible would be pneumonia with empyema    Patient's respiratory status is stable although he does have some shortness of breath   -antibiotics as above  -monitor respiratory status  -future imaging to confirm clearance  -if the patient's white count continues to rise without another source, recommend right-sided thoracentesis and send fluid for pH, LDH, total protein, cell counts, Gram stain and culture     5  COPD-no current clinical evidence of an acute exacerbation at this time      Antibiotics:  Vancomycin 2  Aztreonam 2  Flagyl 8  High-dose fluconazole 2    Subjective:  Patient has no fever, chills, sweats; no vomiting, or diarrhea; still with some cough, and shortness of breath but not requiring any oxygen support; no increase pain  No new symptoms  Objective:  Vitals:  Temp:  [97 3 °F (36 3 °C)-99 8 °F (37 7 °C)] 98 6 °F (37 °C)  HR:  [102-115] 107  Resp:  [18-23] 18  BP: (145-178)/(79-92) 173/92  SpO2:  [90 %-99 %] 96 %  Temp (24hrs), Av 6 °F (37 °C), Min:97 3 °F (36 3 °C), Max:99 8 °F (37 7 °C)  Current: Temperature: 98 6 °F (37 °C)    Physical Exam:   General Appearance:  Alert, interactive, nontoxic, no acute distress  Throat: Oropharynx moist without lesions  Lungs:   Decreased breath sounds right greater than left; no wheezes, rhonchi or rales; respirations unlabored   Heart:  Tachycardia; no murmur, rub or gallop   Abdomen:   Soft, mildly tender, non-distended, decreased bowel sounds  Three COURTNEY drains in place  G-tube in place  Incision with a dry dressing in place  Extremities: No clubbing, cyanosis or edema   Skin: No new rashes or lesions  No draining wounds noted         Labs, Imaging, & Other studies:   All pertinent labs and imaging studies were personally reviewed  Results from last 7 days   Lab Units 19  0700 19  0526 19  0606   WBC Thousand/uL 32 94* 27 71* 19 88*   HEMOGLOBIN g/dL 12 5 13 3 12 4   PLATELETS Thousands/uL 174 181 204     Results from last 7 days   Lab Units 19  0700 19  0526 19  1559  19  0440  19  0541   SODIUM mmol/L 139 135* 138 < > 137   < > 133*   POTASSIUM mmol/L 3 7 3 1* 3 1*   < > 4 0   < > 4 5   CHLORIDE mmol/L 102 97* 104   < > 105   < > 100   CO2 mmol/L 29 29 26   < > 24   < > 20*   BUN mg/dL 15 17 20   < > 17   < > 24   CREATININE mg/dL 0 61 0 64 0 61   < > 0 91   < > 1 38*   EGFR ml/min/1 73sq m 111 109 111   < > 93   < > 56   CALCIUM mg/dL 7 4* 7 3* 8 1*   < > 7 3*   < > 8 2*   AST U/L  --   --   --   --  59*  --  37   ALT U/L  --   --   --   --  24  --  18   ALK PHOS U/L  --   --   --   --  31*  --  42*    < > = values in this interval not displayed       Results from last 7 days   Lab Units 08/01/19  1546   GRAM STAIN RESULT  Rare Polys  No bacteria seen     Results from last 7 days   Lab Units 08/01/19  1846   PROCALCITONIN ng/ml 2 75*

## 2019-08-02 NOTE — PLAN OF CARE
Problem: Potential for Falls  Goal: Patient will remain free of falls  Description  INTERVENTIONS:  - Assess patient frequently for physical needs  -  Identify cognitive and physical deficits and behaviors that affect risk of falls  -  [place] fall precautions as indicated by assessment   - Educate patient/family on patient safety including physical limitations  - Instruct patient to call for assistance with activity based on assessment  - Modify environment to reduce risk of injury  - Consider OT/PT consult to assist with strengthening/mobility  Outcome: Progressing     Problem: Nutrition/Hydration-ADULT  Goal: Nutrient/Hydration intake appropriate for improving, restoring or maintaining nutritional needs  Description  Monitor and assess patient's nutrition/hydration status for malnutrition (ex- brittle hair, bruises, dry skin, pale skin and conjunctiva, muscle wasting, smooth red tongue, and disorientation)  Collaborate with interdisciplinary team and initiate plan and interventions as ordered  Monitor patient's weight and dietary intake as ordered or per policy  Utilize nutrition screening tool and intervene per policy  Determine patient's food preferences and provide high-protein, high-caloric foods as appropriate       INTERVENTIONS:  - Monitor oral intake, urinary output, labs, and treatment plans  - Assess nutrition and hydration status and recommend course of action  - Evaluate amount of meals eaten  - Assist patient with eating if necessary   - Allow adequate time for meals  - Recommend/ encourage appropriate diets, oral nutritional supplements, and vitamin/mineral supplements  - Order, calculate, and assess calorie counts as needed  - Recommend, monitor, and adjust tube feedings and TPN/PPN based on assessed needs  - Assess need for intravenous fluids  - Provide specific nutrition/hydration education as appropriate  - Include patient/family/caregiver in decisions related to nutrition  Outcome: Progressing     Problem: PAIN - ADULT  Goal: Verbalizes/displays adequate comfort level or baseline comfort level  Description  Interventions:  - Encourage patient to monitor pain and request assistance  - Assess pain using appropriate pain scale  - Administer analgesics based on type and severity of pain and evaluate response  - Implement non-pharmacological measures as appropriate and evaluate response  - Consider cultural and social influences on pain and pain management  - Notify physician/advanced practitioner if interventions unsuccessful or patient reports new pain  Outcome: Progressing     Problem: INFECTION - ADULT  Goal: Absence or prevention of progression during hospitalization  Description  INTERVENTIONS:  - Assess and monitor for signs and symptoms of infection  - Monitor lab/diagnostic results  - Monitor all insertion sites, i e  indwelling lines, tubes, and drains  - Monitor endotracheal (as able) and nasal secretions for changes in amount and color  - [place] appropriate cooling/warming therapies per order  - Administer medications as ordered  - Instruct and encourage patient and family to use good hand hygiene technique  - Identify and instruct in appropriate isolation precautions for identified infection/condition  Outcome: Progressing     Problem: DISCHARGE PLANNING  Goal: Discharge to home or other facility with appropriate resources  Description  INTERVENTIONS:  - Identify barriers to discharge w/patient and caregiver  - Arrange for needed discharge resources and transportation as appropriate  - Identify discharge learning needs (meds, wound care, etc )  - Arrange for interpretive services to assist at discharge as needed  - Refer to Case Management Department for coordinating discharge planning if the patient needs post-hospital services based on physician/advanced practitioner order or complex needs related to functional status, cognitive ability, or social support system  Outcome: Progressing     Problem: Knowledge Deficit  Goal: Patient/family/caregiver demonstrates understanding of disease process, treatment plan, medications, and discharge instructions  Description  Complete learning assessment and assess knowledge base    Interventions:  - Provide teaching at level of understanding  - Provide teaching via preferred learning methods  Outcome: Progressing     Problem: GASTROINTESTINAL - ADULT  Goal: Minimal or absence of nausea and/or vomiting  Description  INTERVENTIONS:  - Administer IV fluids as ordered to ensure adequate hydration  - Maintain NPO status until nausea and vomiting are resolved  - Nasogastric tube as ordered  - Administer ordered antiemetic medications as needed  - Provide nonpharmacologic comfort measures as appropriate  - Advance diet as tolerated, if ordered  - Nutrition services referral to assist patient with adequate nutrition and appropriate food choices  Outcome: Progressing  Goal: Maintains or returns to baseline bowel function  Description  INTERVENTIONS:  - Assess bowel function  - Encourage oral fluids to ensure adequate hydration  - Administer IV fluids as ordered to ensure adequate hydration  - Administer ordered medications as needed  - Encourage mobilization and activity  - Nutrition services referral to assist patient with appropriate food choices  Outcome: Progressing  Goal: Maintains adequate nutritional intake  Description  INTERVENTIONS:  - Monitor percentage of each meal consumed  - Identify factors contributing to decreased intake, treat as appropriate  - Assist with meals as needed  - Monitor I&O, WT and lab values  - Obtain nutrition services referral as needed  Outcome: Progressing     Problem: SKIN/TISSUE INTEGRITY - ADULT  Goal: Skin integrity remains intact  Description  INTERVENTIONS  - Identify patients at risk for skin breakdown  - Assess and monitor skin integrity  - Assess and monitor nutrition and hydration status  - Monitor labs (i e  albumin)  - Assess for incontinence   - Turn and reposition patient  - Assist with mobility/ambulation  - Relieve pressure over bony prominences  - Avoid friction and shearing  - Provide appropriate hygiene as needed including keeping skin clean and dry  - Evaluate need for skin moisturizer/barrier cream  - Collaborate with interdisciplinary team (i e  Nutrition, Rehabilitation, etc )   - Patient/family teaching  Outcome: Progressing  Goal: Incision(s), wounds(s) or drain site(s) healing without S/S of infection  Description  INTERVENTIONS  - Assess and document risk factors for skin impairment   - Assess and document dressing, incision, wound bed, drain sites and surrounding tissue  - Initiate Nutrition services consult and/or wound management as needed  Outcome: Progressing  Goal: Oral mucous membranes remain intact  Description  INTERVENTIONS  - Assess oral mucosa and hygiene practices  - Implement preventative oral hygiene regimen  - Implement oral medicated treatments as ordered  - Initiate Nutrition services referral as needed  Outcome: Progressing     Problem: Prexisting or High Potential for Compromised Skin Integrity  Goal: Skin integrity is maintained or improved  Description  INTERVENTIONS:  - Identify patients at risk for skin breakdown  - Assess and monitor skin integrity  - Assess and monitor nutrition and hydration status  - Monitor labs (i e  albumin)  - Assess for incontinence   - Turn and reposition patient  - Assist with mobility/ambulation  - Relieve pressure over bony prominences  - Avoid friction and shearing  - Provide appropriate hygiene as needed including keeping skin clean and dry  - Evaluate need for skin moisturizer/barrier cream  - Collaborate with interdisciplinary team (i e  Nutrition, Rehabilitation, etc )   - Patient/family teaching  Outcome: Progressing     Problem: SAFETY,RESTRAINT: NV/NON-SELF DESTRUCTIVE BEHAVIOR  Goal: Remains free of harm/injury (restraint for non violent/non self-detsructive behavior)  Description  INTERVENTIONS:  - Instruct patient/family regarding restraint use   - Assess and monitor physiologic and psychological status   - Provide interventions and comfort measures to meet assessed patient needs   - Identify and implement measures to help patient regain control  - Assess readiness for release of restraint   Outcome: Progressing  Goal: Returns to optimal restraint-free functioning  Description  INTERVENTIONS:  - Assess the patient's behavior and symptoms that indicate continued need for restraint  - Identify and implement measures to help patient regain control  - Assess readiness for release of restraint   Outcome: Progressing Wounds

## 2019-08-02 NOTE — PROGRESS NOTES
Progress Note - Bariatric Surgery   [facility] 63 y o  male MRN: [id number]  Unit/Bed#: E5 -01 Encounter: [id number]      Subjective/Objective     Subjective: 63year old male status post Exploratory Laparotomy with repair of perforated marginal ulcer, abdominal washout, G tube placement, and GI assisted endoscopic stent placement 7/28/2019 with Dr Noam Briones  Patient s/p Diagnostic laparoscopy converted to open 7/26/2019 with internal hernia repair  Clinically improving  Pain adequately controlled on IV pain medication, Hold G tube feedings today due to post-surgical ileus with no bowel movement, ambulating without assistance, voiding well, using incentive spirometer with productive cough      Complains this am of abdominal bloating  No nausea or vomiting  Sips for comfort  Patient walked 2 laps in the hallway yesterday and states that he feels overall like he is improving     Denies fevers, chills, sweats, SOB, CP, calf pain  Objective:    BP (!) 173/92 (BP Location: Left arm)   Pulse (!) 107   Temp 98 6 °F (37 °C) (Temporal)   Resp 18   Ht 5' 8" (1 727 m)   Wt 75 3 kg (166 lb 0 1 oz)   SpO2 96%   BMI 25 24 kg/m²       Intake/Output Summary (Last 24 hours) at 8/2/2019 1210  Last data filed at 8/2/2019 0155  Gross per 24 hour   Intake 700 ml   Output 1735 ml   Net -1035 ml       Invasive Devices     Peripheral Intravenous Line            Peripheral IV 07/28/19 Left Arm 4 days    Peripheral IV 07/31/19 Right Antecubital 2 days          Drain            Closed/Suction Drain Right Abdomen Bulb 19 Fr  4 days    Gastrostomy/Enterostomy Gastrostomy 22 Fr  LUQ 4 days    Closed/Suction Drain Left LUQ Bulb 8 Fr  less than 1 day    Closed/Suction Drain Right RUQ Bulb 8 Fr  less than 1 day                ROS: 10-point system completed  All negative except see HPI        Physical Exam    General Appearance:    Alert, cooperative, no distress, appears stated age   Head:    Normocephalic, without obvious abnormality, atraumatic   Lungs:     Respirations unlabored   Heart:    Tachycardic and regular rhythm   Abdomen:     Soft, appropriate tenderness,  no masses, no organomegaly,   non distended; eleuterio drain in place from surgery with serosanguinous output; 2 drains placed by IR yesterday due to liver and spleen abscess; yellow serosanguinous drainage; midline incision with no erythema, mild drainage, no signs of infection; dressing changed this AM    Extremities:   Extremities normal, atraumatic, no cyanosis or edema   Neurologic:  Incision:  Psych:   Normal strength and sensation    Clean, dry, and intact    Normal mood and affect       Lab, Imaging and other studies:  I have personally reviewed pertinent lab results  , CBC:   Lab Results   Component Value Date    WBC 32 94 (HH) 08/02/2019    HGB 12 5 08/02/2019    HCT 37 3 08/02/2019    MCV 90 08/02/2019     08/02/2019    MCH 30 0 08/02/2019    MCHC 33 5 08/02/2019    RDW 13 9 08/02/2019    MPV 9 7 08/02/2019    NRBC 0 08/02/2019   , CMP:   Lab Results   Component Value Date    SODIUM 139 08/02/2019    K 3 7 08/02/2019     08/02/2019    CO2 29 08/02/2019    BUN 15 08/02/2019    CREATININE 0 61 08/02/2019    CALCIUM 7 4 (L) 08/02/2019    EGFR 111 08/02/2019        VTE Mechanical Prophylaxis: sequential compression device, Heparin    Assessment/Plan  Perforated marginal ulcer  Internal hernia  Bariatric surgery status     Patient s/p Exploratory laparotomy, washout of sucus, repair of marginal ulcer perforation, abdominal washout, intraoperative EGD, GI assisted endoscopic stent placement, and G tube placement 7/28/2019 with Dr Jeremi Wright s/p RNY gastric bypass done in 2017 with Dr Denzel Lee       Patient clinically improving, wants to go home       Continue to hold tube feedings and leave G tube to gravity connected to Nunez bag   Likely will advance tube feedings once patient has had bowel movement       Will continue to replete electrolytes today  Hold IV potassium riders per pharmacy due to normalization of potassium on labs this AM       - Maintain IV fluids at 50 cc an hour  - Encourage incentive spirometry, ambulation, can have sips with meds  - PICC insertion not an option at this time due to possible sepsis   - Continue current pain and anti-nausea regimen  - IV antibiotics per ID; appreciate input    Vancomycin Day 2   Aztreonam Day 2   Flagyl Day 8   High-dose fluconazole Day 2   - PPI for GI prophylaxis  - SCDs and Heparin for DVT prophylaxis  - Will continue to monitor labs   - Will continue to monitor drain output    - Molasses enema today     Plan of care was discussed with patient and patient's nurse  Care plan discussed with Dr Ezra Warner: Unable to discharge to home today

## 2019-08-03 ENCOUNTER — APPOINTMENT (INPATIENT)
Dept: NON INVASIVE DIAGNOSTICS | Facility: HOSPITAL | Age: 59
DRG: 326 | End: 2019-08-03
Payer: COMMERCIAL

## 2019-08-03 LAB
ANION GAP SERPL CALCULATED.3IONS-SCNC: 7 MMOL/L (ref 4–13)
BASOPHILS # BLD AUTO: 0.07 THOUSANDS/ΜL (ref 0–0.1)
BASOPHILS NFR BLD AUTO: 0 % (ref 0–1)
BUN SERPL-MCNC: 13 MG/DL (ref 5–25)
CA-I BLD-SCNC: 1 MMOL/L (ref 1.12–1.32)
CALCIUM SERPL-MCNC: 7.6 MG/DL (ref 8.3–10.1)
CHLORIDE SERPL-SCNC: 101 MMOL/L (ref 100–108)
CO2 SERPL-SCNC: 28 MMOL/L (ref 21–32)
CREAT SERPL-MCNC: 0.5 MG/DL (ref 0.6–1.3)
EOSINOPHIL # BLD AUTO: 0.08 THOUSAND/ΜL (ref 0–0.61)
EOSINOPHIL NFR BLD AUTO: 0 % (ref 0–6)
ERYTHROCYTE [DISTWIDTH] IN BLOOD BY AUTOMATED COUNT: 14.2 % (ref 11.6–15.1)
GFR SERPL CREATININE-BSD FRML MDRD: 120 ML/MIN/1.73SQ M
GLUCOSE SERPL-MCNC: 77 MG/DL (ref 65–140)
GLUCOSE SERPL-MCNC: 92 MG/DL (ref 65–140)
HCT VFR BLD AUTO: 39.6 % (ref 36.5–49.3)
HGB BLD-MCNC: 13 G/DL (ref 12–17)
IMM GRANULOCYTES # BLD AUTO: >0.5 THOUSAND/UL (ref 0–0.2)
IMM GRANULOCYTES NFR BLD AUTO: 2 % (ref 0–2)
LYMPHOCYTES # BLD AUTO: 1.33 THOUSANDS/ΜL (ref 0.6–4.47)
LYMPHOCYTES NFR BLD AUTO: 4 % (ref 14–44)
MAGNESIUM SERPL-MCNC: 2 MG/DL (ref 1.6–2.6)
MCH RBC QN AUTO: 29.9 PG (ref 26.8–34.3)
MCHC RBC AUTO-ENTMCNC: 32.8 G/DL (ref 31.4–37.4)
MCV RBC AUTO: 91 FL (ref 82–98)
MONOCYTES # BLD AUTO: 1.34 THOUSAND/ΜL (ref 0.17–1.22)
MONOCYTES NFR BLD AUTO: 5 % (ref 4–12)
NEUTROPHILS # BLD AUTO: 26.52 THOUSANDS/ΜL (ref 1.85–7.62)
NEUTS SEG NFR BLD AUTO: 89 % (ref 43–75)
NRBC BLD AUTO-RTO: 0 /100 WBCS
PHOSPHATE SERPL-MCNC: 2.6 MG/DL (ref 2.7–4.5)
PLATELET # BLD AUTO: 195 THOUSANDS/UL (ref 149–390)
PMV BLD AUTO: 10.1 FL (ref 8.9–12.7)
POTASSIUM SERPL-SCNC: 4.6 MMOL/L (ref 3.5–5.3)
PROCALCITONIN SERPL-MCNC: 1.46 NG/ML
RBC # BLD AUTO: 4.35 MILLION/UL (ref 3.88–5.62)
SODIUM SERPL-SCNC: 136 MMOL/L (ref 136–145)
VANCOMYCIN TROUGH SERPL-MCNC: 8.4 UG/ML (ref 10–20)
WBC # BLD AUTO: 29.92 THOUSAND/UL (ref 4.31–10.16)

## 2019-08-03 PROCEDURE — 84100 ASSAY OF PHOSPHORUS: CPT | Performed by: SURGERY

## 2019-08-03 PROCEDURE — 99233 SBSQ HOSP IP/OBS HIGH 50: CPT | Performed by: INTERNAL MEDICINE

## 2019-08-03 PROCEDURE — 82948 REAGENT STRIP/BLOOD GLUCOSE: CPT

## 2019-08-03 PROCEDURE — 82330 ASSAY OF CALCIUM: CPT | Performed by: SURGERY

## 2019-08-03 PROCEDURE — 80202 ASSAY OF VANCOMYCIN: CPT | Performed by: INTERNAL MEDICINE

## 2019-08-03 PROCEDURE — 93970 EXTREMITY STUDY: CPT

## 2019-08-03 PROCEDURE — 80048 BASIC METABOLIC PNL TOTAL CA: CPT | Performed by: SURGERY

## 2019-08-03 PROCEDURE — 85025 COMPLETE CBC W/AUTO DIFF WBC: CPT | Performed by: SURGERY

## 2019-08-03 PROCEDURE — 84145 PROCALCITONIN (PCT): CPT | Performed by: INTERNAL MEDICINE

## 2019-08-03 PROCEDURE — 99024 POSTOP FOLLOW-UP VISIT: CPT | Performed by: SURGERY

## 2019-08-03 PROCEDURE — 83735 ASSAY OF MAGNESIUM: CPT | Performed by: SURGERY

## 2019-08-03 PROCEDURE — C9113 INJ PANTOPRAZOLE SODIUM, VIA: HCPCS | Performed by: PHYSICIAN ASSISTANT

## 2019-08-03 PROCEDURE — 99223 1ST HOSP IP/OBS HIGH 75: CPT | Performed by: HOSPITALIST

## 2019-08-03 RX ADMIN — AZTREONAM 2000 MG: 2 INJECTION, POWDER, LYOPHILIZED, FOR SOLUTION INTRAMUSCULAR; INTRAVENOUS at 19:57

## 2019-08-03 RX ADMIN — VANCOMYCIN HYDROCHLORIDE 1500 MG: 5 INJECTION, POWDER, LYOPHILIZED, FOR SOLUTION INTRAVENOUS at 19:58

## 2019-08-03 RX ADMIN — SUCRALFATE 1000 MG: 1 SUSPENSION ORAL at 22:03

## 2019-08-03 RX ADMIN — HYDROMORPHONE HYDROCHLORIDE 0.5 MG: 1 INJECTION, SOLUTION INTRAMUSCULAR; INTRAVENOUS; SUBCUTANEOUS at 22:07

## 2019-08-03 RX ADMIN — HEPARIN SODIUM 5000 UNITS: 5000 INJECTION INTRAVENOUS; SUBCUTANEOUS at 05:44

## 2019-08-03 RX ADMIN — PANTOPRAZOLE SODIUM 40 MG: 40 INJECTION, POWDER, FOR SOLUTION INTRAVENOUS at 22:03

## 2019-08-03 RX ADMIN — HYDROMORPHONE HYDROCHLORIDE 1 MG: 1 INJECTION, SOLUTION INTRAMUSCULAR; INTRAVENOUS; SUBCUTANEOUS at 15:28

## 2019-08-03 RX ADMIN — AZTREONAM 2000 MG: 2 INJECTION, POWDER, LYOPHILIZED, FOR SOLUTION INTRAMUSCULAR; INTRAVENOUS at 02:19

## 2019-08-03 RX ADMIN — ALBUTEROL SULFATE 2 PUFF: 90 AEROSOL, METERED RESPIRATORY (INHALATION) at 17:48

## 2019-08-03 RX ADMIN — VANCOMYCIN HYDROCHLORIDE 1500 MG: 1 INJECTION, POWDER, LYOPHILIZED, FOR SOLUTION INTRAVENOUS at 05:44

## 2019-08-03 RX ADMIN — HYDROMORPHONE HYDROCHLORIDE 1 MG: 1 INJECTION, SOLUTION INTRAMUSCULAR; INTRAVENOUS; SUBCUTANEOUS at 06:50

## 2019-08-03 RX ADMIN — SUCRALFATE 1000 MG: 1 SUSPENSION ORAL at 05:42

## 2019-08-03 RX ADMIN — DOCUSATE SODIUM 100 MG: 100 CAPSULE, LIQUID FILLED ORAL at 17:48

## 2019-08-03 RX ADMIN — FLUCONAZOLE, SODIUM CHLORIDE 400 MG: 2 INJECTION INTRAVENOUS at 16:58

## 2019-08-03 RX ADMIN — DOCUSATE SODIUM 100 MG: 100 CAPSULE, LIQUID FILLED ORAL at 10:22

## 2019-08-03 RX ADMIN — HEPARIN SODIUM 5000 UNITS: 5000 INJECTION INTRAVENOUS; SUBCUTANEOUS at 15:40

## 2019-08-03 RX ADMIN — AZTREONAM 2000 MG: 2 INJECTION, POWDER, LYOPHILIZED, FOR SOLUTION INTRAMUSCULAR; INTRAVENOUS at 10:37

## 2019-08-03 RX ADMIN — METRONIDAZOLE 500 MG: 500 INJECTION, SOLUTION INTRAVENOUS at 15:56

## 2019-08-03 RX ADMIN — ALBUTEROL SULFATE 2 PUFF: 90 AEROSOL, METERED RESPIRATORY (INHALATION) at 11:18

## 2019-08-03 RX ADMIN — SUCRALFATE 1000 MG: 1 SUSPENSION ORAL at 15:42

## 2019-08-03 RX ADMIN — THIAMINE HYDROCHLORIDE: 100 INJECTION, SOLUTION INTRAMUSCULAR; INTRAVENOUS at 11:18

## 2019-08-03 RX ADMIN — HEPARIN SODIUM 5000 UNITS: 5000 INJECTION INTRAVENOUS; SUBCUTANEOUS at 22:02

## 2019-08-03 RX ADMIN — Medication 250 MG: at 10:22

## 2019-08-03 RX ADMIN — POTASSIUM CHLORIDE 50 ML/HR: 2 INJECTION, SOLUTION, CONCENTRATE INTRAVENOUS at 06:54

## 2019-08-03 RX ADMIN — METRONIDAZOLE 500 MG: 500 INJECTION, SOLUTION INTRAVENOUS at 05:42

## 2019-08-03 RX ADMIN — METRONIDAZOLE 500 MG: 500 INJECTION, SOLUTION INTRAVENOUS at 22:03

## 2019-08-03 RX ADMIN — SUCRALFATE 1000 MG: 1 SUSPENSION ORAL at 10:44

## 2019-08-03 RX ADMIN — PANTOPRAZOLE SODIUM 40 MG: 40 INJECTION, POWDER, FOR SOLUTION INTRAVENOUS at 10:23

## 2019-08-03 RX ADMIN — POTASSIUM PHOSPHATE, MONOBASIC AND POTASSIUM PHOSPHATE, DIBASIC 30 MMOL: 224; 236 INJECTION, SOLUTION INTRAVENOUS at 12:45

## 2019-08-03 RX ADMIN — Medication 250 MG: at 17:48

## 2019-08-03 RX ADMIN — POTASSIUM CHLORIDE: 2 INJECTION, SOLUTION, CONCENTRATE INTRAVENOUS at 22:10

## 2019-08-03 RX ADMIN — ALBUTEROL SULFATE 2 PUFF: 90 AEROSOL, METERED RESPIRATORY (INHALATION) at 22:07

## 2019-08-03 NOTE — PROGRESS NOTES
Vancomycin IV Pharmacy-to-Dose Consultation    Sherren Cue is a 63 y o  male who is currently receiving Vancomycin IV with management by the Pharmacy Consult service  Assessment/Plan:  The patient was reviewed  Renal function is stable and no signs or symptoms of nephrotoxicity and/or infusion reactions were documented in the chart  The most recent vancomycin level is 8 4, subtherapeutic (goal trough 15-20)  The patient is currently receiving vancomycin 1500mg IV every 12 hours  Based on today's assessment, will adjust vancomycin to dosing of 1500mg IV every 8 hours  Plan for trough prior to 4th dose on 8/4/19 at 1900    We will continue to follow the patients culture results and clinical progress daily      Jamir Marlow, Pharmacist

## 2019-08-03 NOTE — CONSULTS
Consulted for Medical Management by:  Dr Tawnya Padgett      Assessment/Plan      1-severe sepsis in a patient with a complicated hospital course-initially underwent exploratory laparotomy with open hernia repair on 07/26/2019 and subsequently developed perforated marginal ulcer requiring exploratory laparotomy with repair of ulcer 2 days later, later developed intra-abdominal abscess require making placement of 2 COURTNEY drains  Patient is improving slowly  Responding to vancomycin, aztreonam, Flagyl and fluconazole  No clear source of sepsis currently  Id managing antibiotics  Plan to narrow antibiotic spectrum in the next 24-48 hours depending on response  Blood cultures negative so far  Procalcitonin trending down   2-hypocalcemia, hypophosphatemia, hypomagnesemia-these are all being corrected  Current magnesium levels off 2, phosphorus levels of 2 6  I naya remains lowat 1  With initiation  of tube feeds will attempt correction through enteral route as much as possible  Will change KCL to 30 mEq IV with sodium phosphate 21 millimoles at 50 mL/hour    3-right lower lung opacity and pleural effusion-unclear whether this is atelectasis versus parapneumonic effusion  Patient however is responding to antibiotics with defervescence of fever, down trend in procalcitonin and decrease in WBC count from 53837 to 43444  Reasonable to monitor for now with incentive spirometry and supportive measures  If there is any decline in respiratory status or development of fever or up trend in WBC count-may need repeat imaging of the chest and thoracocentesis with evaluation of fluid for complicated pleural effusion versus empyema  4-COPD-no evidence of exacerbation currently continue supportive measures    5-diet-controlled diabetes mellitus-blood sugars are stable at present and does not warrant any intervention currently other than close monitoring of blood sugar levels      6-moderate protein calorie malnutrition-secondary to underlying illness-patient started on tube feeds today  Will continue to monitor and replete electrolytes as needed  7-edema both lower extremities-likely secondary to venostasis  No evidence of any DVT  Lower extremity Dopplers have been ordered by the primary service  8-coagulopathy with an INR of 1 8 secondary to malabsorption  No evidence of any bleeding  9-GERD-no acute issues currently  On PPI      Thank you for the consultation  will follow the patient with you  History of Present Illness     HPI:  Sherren Cue is a 63 y o  male with a complicated course during this admission including intra-abdominal abscess, aspiration pneumonia, pleural effusion  In 2017 patient had a Britney-en-Y gastric bypass-he did well postoperatively but early this year he was noted to have a marginal ulcer on EGD when he presented with abdominal pain  One week prior to this admission he developed back pain and was placed on a steroid taper  One day prior to admission he developed severe abdominal pain and evaluation at [facility] was suggestive of a bowel obstruction secondary to twisting of the mesenteric vessels  Patient was transferred to [facility] and underwent an exploratory laparotomy with open hernia repair on 07/26/2019  Two days later on 07/28/2019 he developed abdominal distension and respiratory distress-patient returned to the OR and was found to have of perforated marginal ulcer which required exploratory laparotomy with repair of the perforated ulcer, abdominal washout G-tube placement and GI assisted endoscopic stent placement  Patient was then transferred to the ICU for ventilatory management and sepsis  Patient was fluid resuscitated and received bicarbonate for severe metabolic acidosis    He initially improved again but his WBC count bumped up and repeat imaging of the CT abdomen and pelvis revealed intra-abdominal abscess and loculated pleural effusion on the right  He underwent placement of 2 COURTNEY drains by Interventional Radiology on 08/01/2019  He has been improving slowly since then  He has been transferred to a med surge floor  His procalcitonin is trending down  He remains on vancomycin, aztreonam, Flagyl and fluconazole  Patient has been started on tube feeds today  Patient reports feeling stronger with no fever, chills, abdominal pain  Patient reports having a bowel movement this morning  He has been attempting to ambulate since yesterday and using an incentive spirometer  No history of any calf pain  Slim was consulted for medical management            Historical Information   Past Medical History:   Diagnosis Date    Anesthesia     Pt wakes up during "almost every surgery"    Arthritis     Asthma     Bariatric surgery status     Cervical radiculopathy     Chemotherapy follow-up examination     Chronic pain     Chronic pain disorder     COPD (chronic obstructive pulmonary disease) ([facility] [address] )     Diabetes mellitus (HCC)     borderline "years ago"    Food allergy     clams    GERD (gastroesophageal reflux disease)     Heart murmur     Hiatal hernia     History of malignant neoplasm     History of pneumonia 04/12/2016    History of pulmonary embolism     History of ulceration     Hyperlipidemia     Lung cancer (HCC)     left lung, radiation and chemo tx    Migraine     Pneumonia     Postgastrectomy malabsorption     Right scapholunate ligament tear     Skipped heart beats     Wears partial dentures     upper and lower     Patient Active Problem List   Diagnosis    Right scapholunate ligament tear    History of pulmonary embolism    Saucedo's esophagus without dysplasia    Cervical disc disorder with radiculopathy of cervicothoracic region    Cervical radiculopathy    S/P repair of paraesophageal hernia    Bariatric surgery status    Postsurgical malabsorption    Adenocarcinoma of lung ([facility] [address] )    Dyslipidemia    Reactive hypoglycemia    Vitamin D deficiency    Low vitamin B12 level    Generalized abdominal pain    Constipation    Internal hernia     Past Surgical History:   Procedure Laterality Date    BRONCHOSCOPY      COLONOSCOPY      FRACTURE SURGERY      femur right 1985    GASTRIC BYPASS LAPAROSCOPIC N/A 7/12/2017    Procedure: GASTRIC DIVERSION WITH [place]-EN-Y RECONSTRUCTION WITH  SHORT 60 cm LIMB;  Surgeon: Samantha Kim MD;  [facility];  Service: [place] IR IMAGE GUIDED ASPIRATION / DRAINAGE  8/1/2019    KNEE ARTHROSCOPY Right     right shoulder    LAPAROTOMY N/A 7/28/2019    Procedure: LAPAROTOMY EXPLORATORY, WASHOUT OF SUCUS, REPAIR OF MARGINAL ULCER PERFORATION, ABD WASHOUT, INTRAOPERATIVE EGD, GI ASSISTED ENDOSCOPIC STENT PLACEMENT;  Surgeon: Олег Dodson MD;  [facility];  Service: Rojean Severe Left     LYMPHADENECTOMY  05/22/2012    Dr Farhan Bruner ANT INTERBODY MIN DISCECTOMY, CERVICAL BELOW C2 N/A 10/17/2017    Procedure: C5-6, C6-7 ACDF WITH ALLOGRAFT (NEURO MONITORING); Surgeon: Carlos Alberto Christopher MD;  [facility];  Service: Orthopedics    TN COLONOSCOPY FLX DX W/COLLJ SPEC WHEN PFRMD N/A 4/14/2017    Procedure: COLONOSCOPY;  Surgeon: Linda England MD;  Location: [facility];  Service: Gastroenterology    TN EGD TRANSORAL BIOPSY SINGLE/MULTIPLE N/A 1/9/2019    Procedure: ESOPHAGOGASTRODUODENOSCOPY (EGD); Surgeon: Samantha Kim MD;  [facility]: [facility]; Service: Bariatrics    TN ESOPHAGOGASTRODUODENOSCOPY TRANSORAL DIAGNOSTIC N/A 1/11/2017    Procedure: ESOPHAGOGASTRODUODENOSCOPY (EGD); Surgeon: Linda England MD;  [facility];   Service: Gastroenterology    TN INCISE FINGER TENDON SHEATH Right 11/27/2018    Procedure: RELEASE TRIGGER FINGER long and index finger;  Surgeon: Christy Eubanks MD;  [facility];  Service: Orthopedics    TN LAP, REPAIR PARAESOPHAGEAL HERNIA, INCL FUNDOPLASTY W/ MESH N/A 2017    Procedure: REPAIR HERNIA PARAESOPHAGEAL  LAPAROSCOPIC;  Surgeon: Kmiber Moeller MD;  [facility];  Service: Bariatrics    WA LAP,DIAGNOSTIC ABDOMEN N/A 2019    Procedure: LAPAROSCOPY DIAGNOSTIC CONVERSION TO OPEN, REDUCTION AND REPAIR OF INTERNAL HERNIA, REPAIR SEROSAL TEARS;  Surgeon: Keara Zelaya MD;  [facility];  Service: Bariatrics    WA WRIST Christia Jewell LIG Right 2016    Procedure: RELEASE CARPAL TUNNEL ENDOSCOPIC;  Surgeon: Tracey Rose MD;  [facility];  Service: Orthopedics    RECONSTRUCTION DISTAL RADIUS/ULNA JOINT (DRUJ) Right 2016    Procedure: WRIST SCAPHOLUNATE LIGAMENT RECONSTRUCTION WITH ECRB TENDON AUTOGRAPH , SPLINT APPLICATION ;  Surgeon: Tracey Rose MD;  [facility];  Service:    Irene Johan SHOULDER SURGERY      TONSILLECTOMY         Social History   Social History     Substance and Sexual Activity   Alcohol Use Yes    Frequency: Monthly or less    Drinks per session: 1 or 2    Binge frequency: Never     Social History     Substance and Sexual Activity   Drug Use No     Social History     Tobacco Use   Smoking Status Former Smoker    Packs/day: 0 50    Years: 48 00    Pack years: 24 00    Types: Cigarettes    Last attempt to quit: 2017    Years since quittin 2   Smokeless Tobacco Never Used       Family History:   Family History   Problem Relation Age of Onset    Other Mother         Back disorder    Diabetes Mother         Diabetes Mellitus    Hypertension Mother     Heart disease Father     Hypertension Father     Breast cancer Sister     Skin cancer Sister     Breast cancer Paternal Grandmother     Skin cancer Paternal Grandmother        Meds/Allergies       Current Facility-Administered Medications:     acetaminophen (TYLENOL) oral suspension 975 mg, 975 mg, Oral, Q8H [facility], Stephen Self PA-C, Stopped at 19 1400    albuterol (PROVENTIL HFA,VENTOLIN HFA) inhaler 2 puff, 2 puff, Inhalation, 4x Daily, Leopold Barman, PA-C, 2 puff at 08/03/19 1118    aztreonam (AZACTAM) 2,000 mg in sodium chloride 0 9 % 100 mL IVPB, 2,000 mg, Intravenous, Q8H, Christophe Salazar MD, Last Rate: 100 mL/hr at 08/03/19 1037, 2,000 mg at 08/03/19 1037    diphenhydrAMINE (BENADRYL) injection 25 mg, 25 mg, Intravenous, Q6H PRN, Leopold Barman, PA-C, 25 mg at 07/30/19 0317    docusate sodium (COLACE) capsule 100 mg, 100 mg, Oral, BID, Leopold Barman, PA-C, 100 mg at 08/03/19 1022    fluconazole (DIFLUCAN) IVPB (premix) 400 mg, 400 mg, Intravenous, Q24H, Christophe Salazar MD, Last Rate: 100 mL/hr at 08/02/19 1519, 400 mg at 08/02/19 1519    heparin (porcine) subcutaneous injection 5,000 Units, 5,000 Units, Subcutaneous, Q8H [facility], 5,000 Units at 08/03/19 0544 **AND** [CANCELED] Platelet count, , , Once, Armand Lin MD    HYDROmorphone (DILAUDID) injection 0 5 mg, 0 5 mg, Intravenous, Q4H PRN, Leopold Barman, PA-C, 0 5 mg at 08/02/19 1313    HYDROmorphone (DILAUDID) injection 1 mg, 1 mg, Intravenous, Q2H PRN, Leopold Barman, PA-C, 1 mg at 08/03/19 0650    metroNIDAZOLE (FLAGYL) IVPB (premix) 500 mg, 500 mg, Intravenous, Q8H, Leopold Barman, PA-C, Last Rate: 200 mL/hr at 08/03/19 0542, 500 mg at 08/03/19 0542    multivitamin 10 mL, thiamine 100 mg in sodium chloride 0 9 % 1,000 mL IVPB mixture, , Intravenous, Daily, Leopold Barman, PA-C, Last Rate: 500 mL/hr at 08/03/19 1118    ondansetron (ZOFRAN) injection 4 mg, 4 mg, Intravenous, Q6H PRN, Leopold Barman, PA-C, 4 mg at 07/31/19 2028    oxyCODONE (ROXICODONE) oral solution 10 mg, 10 mg, Oral, Q4H PRN, Leopold Barman, PA-C, 10 mg at 08/02/19 1517    pantoprazole (PROTONIX) injection 40 mg, 40 mg, Intravenous, Q12H [facility], Leopold Barman, PA-C, 40 mg at 08/03/19 1023    potassium phosphate 30 mmol in sodium chloride 0 9 % 250 mL infusion, 30 mmol, Intravenous, Once, Cami Rausch PA-C, Last Rate: 41 7 mL/hr at 08/03/19 1245, 30 mmol at 08/03/19 1245    promethazine (PHENERGAN) injection 12 5 mg, 12 5 mg, Intravenous, Q6H PRN, ROSAURA Hood-MARIA ELENA, 12 5 mg at 08/01/19 4172    saccharomyces boulardii (FLORASTOR) capsule 250 mg, 250 mg, Oral, BID, Ruth Christine PA-C, 250 mg at 08/03/19 1022    simethicone (MYLICON) chewable tablet 80 mg, 80 mg, Oral, Q6H PRN, ROSAURA Hood-C, 80 mg at 07/31/19 1355    sodium chloride 0 9 % 1,000 mL with potassium chloride 30 mEq, potassium phosphate 20 46 mmol infusion, 50 mL/hr, Intravenous, Continuous, Leann Kauffman MD, Stopped at 08/03/19 1110    sucralfate (CARAFATE) oral suspension 1,000 mg, 1,000 mg, Oral, 4x Daily (AC & HS), ROSAURA Hood-C, 1,000 mg at 08/03/19 1044    vancomycin (VANCOCIN) 1,500 mg in sodium chloride 0 9 % 250 mL IVPB, 20 mg/kg, Intravenous, Q12H, Aranza Portillo MD, Last Rate: 166 7 mL/hr at 08/03/19 0544, 1,500 mg at 08/03/19 0544    Allergies   Allergen Reactions    Codeine Hives, Itching, Nausea Only, GI Intolerance and Vomiting    Hydrocodone Hives and GI Intolerance    Penicillins Anaphylaxis and Throat Swelling    Shellfish-Derived Products Nausea Only and Vomiting       Review of Systems  A detailed 12 point review of systems was conducted and is negative apart from those mentioned in the HPI  Objective   Vitals: Blood pressure 132/60, pulse 84, temperature (!) 97 °F (36 1 °C), temperature source Temporal, resp  rate 20, height 5' 8" (1 727 m), weight 75 3 kg (166 lb 0 1 oz), SpO2 95 %  Physical Exam   General-ill-appearing  Not in any acute distress  Interactive  HEENT: PERRLA, EOMI, sclera anicteric, dry mucous membranes, tongue mucosa dry without lesions  Neck: supple, no JVD, lymphadenopathy, thyromegaly  Heart: Regular rate and rhythm, S1S2 present  No murmur, rub or gallop  Lungs; Clear to auscultation bilaterally  No wheezing, crackles or rhonchi    No accessory muscle use or respiratory distress  Abdomen: soft, non-tender, non-distended, NABS  Two drains left and right quadrant with serosanguineous discharge  Elijah drain with serosanguineous discharge     Midline incision clean dry  Extremities: no clubbing, cyanosis, 1+ edema  2+ pedal pulses bilaterally  Full range of motion  Neurologic:  Cranial nerves II-XII intact  Strength and sensation globally intact  Speech fluent and goal directed  Awake, alert and oriented x 3  Skin: warm and dry  No petechiae, purpura or rash  Lab Results:     Results from last 7 days   Lab Units 08/03/19  0709   WBC Thousand/uL 29 92*   HEMOGLOBIN g/dL 13 0   HEMATOCRIT % 39 6   PLATELETS Thousands/uL 195   NEUTROS PCT % 89*   LYMPHS PCT % 4*   MONOS PCT % 5   EOS PCT % 0     Results from last 7 days   Lab Units 08/03/19  0709  07/29/19  0440   POTASSIUM mmol/L 4 6   < > 4 0   CHLORIDE mmol/L 101   < > 105   CO2 mmol/L 28   < > 24   BUN mg/dL 13   < > 17   CREATININE mg/dL 0 50*   < > 0 91   CALCIUM mg/dL 7 6*   < > 7 3*   ALK PHOS U/L  --   --  31*   ALT U/L  --   --  24   AST U/L  --   --  59*    < > = values in this interval not displayed  Results from last 7 days   Lab Units 07/28/19  1749   INR  1 86*     Magnesium 2, phosphorus 2 6, ical 1  Procalcitonin 1 46  Imaging: All pertinent labs and imaging studies personally reviewed        Code Status: Level 1 - Full Code      Counseling / Coordination of Care  Total floor / unit time spent today 35 minutes

## 2019-08-03 NOTE — PLAN OF CARE
Problem: Potential for Falls  Goal: Patient will remain free of falls  Description  INTERVENTIONS:  - Assess patient frequently for physical needs  -  Identify cognitive and physical deficits and behaviors that affect risk of falls  -  [place] fall precautions as indicated by assessment   - Educate patient/family on patient safety including physical limitations  - Instruct patient to call for assistance with activity based on assessment  - Modify environment to reduce risk of injury  - Consider OT/PT consult to assist with strengthening/mobility  Outcome: Progressing     Problem: Nutrition/Hydration-ADULT  Goal: Nutrient/Hydration intake appropriate for improving, restoring or maintaining nutritional needs  Description  Monitor and assess patient's nutrition/hydration status for malnutrition (ex- brittle hair, bruises, dry skin, pale skin and conjunctiva, muscle wasting, smooth red tongue, and disorientation)  Collaborate with interdisciplinary team and initiate plan and interventions as ordered  Monitor patient's weight and dietary intake as ordered or per policy  Utilize nutrition screening tool and intervene per policy  Determine patient's food preferences and provide high-protein, high-caloric foods as appropriate       INTERVENTIONS:  - Monitor oral intake, urinary output, labs, and treatment plans  - Assess nutrition and hydration status and recommend course of action  - Evaluate amount of meals eaten  - Assist patient with eating if necessary   - Allow adequate time for meals  - Recommend/ encourage appropriate diets, oral nutritional supplements, and vitamin/mineral supplements  - Order, calculate, and assess calorie counts as needed  - Recommend, monitor, and adjust tube feedings and TPN/PPN based on assessed needs  - Assess need for intravenous fluids  - Provide specific nutrition/hydration education as appropriate  - Include patient/family/caregiver in decisions related to nutrition  Outcome: Progressing     Problem: PAIN - ADULT  Goal: Verbalizes/displays adequate comfort level or baseline comfort level  Description  Interventions:  - Encourage patient to monitor pain and request assistance  - Assess pain using appropriate pain scale  - Administer analgesics based on type and severity of pain and evaluate response  - Implement non-pharmacological measures as appropriate and evaluate response  - Consider cultural and social influences on pain and pain management  - Notify physician/advanced practitioner if interventions unsuccessful or patient reports new pain  Outcome: Progressing     Problem: INFECTION - ADULT  Goal: Absence or prevention of progression during hospitalization  Description  INTERVENTIONS:  - Assess and monitor for signs and symptoms of infection  - Monitor lab/diagnostic results  - Monitor all insertion sites, i e  indwelling lines, tubes, and drains  - Monitor endotracheal (as able) and nasal secretions for changes in amount and color  - [place] appropriate cooling/warming therapies per order  - Administer medications as ordered  - Instruct and encourage patient and family to use good hand hygiene technique  - Identify and instruct in appropriate isolation precautions for identified infection/condition  Outcome: Progressing     Problem: DISCHARGE PLANNING  Goal: Discharge to home or other facility with appropriate resources  Description  INTERVENTIONS:  - Identify barriers to discharge w/patient and caregiver  - Arrange for needed discharge resources and transportation as appropriate  - Identify discharge learning needs (meds, wound care, etc )  - Arrange for interpretive services to assist at discharge as needed  - Refer to Case Management Department for coordinating discharge planning if the patient needs post-hospital services based on physician/advanced practitioner order or complex needs related to functional status, cognitive ability, or social support system  Outcome: Progressing     Problem: Knowledge Deficit  Goal: Patient/family/caregiver demonstrates understanding of disease process, treatment plan, medications, and discharge instructions  Description  Complete learning assessment and assess knowledge base    Interventions:  - Provide teaching at level of understanding  - Provide teaching via preferred learning methods  Outcome: Progressing     Problem: GASTROINTESTINAL - ADULT  Goal: Minimal or absence of nausea and/or vomiting  Description  INTERVENTIONS:  - Administer IV fluids as ordered to ensure adequate hydration  - Maintain NPO status until nausea and vomiting are resolved  - Nasogastric tube as ordered  - Administer ordered antiemetic medications as needed  - Provide nonpharmacologic comfort measures as appropriate  - Advance diet as tolerated, if ordered  - Nutrition services referral to assist patient with adequate nutrition and appropriate food choices  Outcome: Progressing  Goal: Maintains or returns to baseline bowel function  Description  INTERVENTIONS:  - Assess bowel function  - Encourage oral fluids to ensure adequate hydration  - Administer IV fluids as ordered to ensure adequate hydration  - Administer ordered medications as needed  - Encourage mobilization and activity  - Nutrition services referral to assist patient with appropriate food choices  Outcome: Progressing  Goal: Maintains adequate nutritional intake  Description  INTERVENTIONS:  - Monitor percentage of each meal consumed  - Identify factors contributing to decreased intake, treat as appropriate  - Assist with meals as needed  - Monitor I&O, WT and lab values  - Obtain nutrition services referral as needed  Outcome: Progressing     Problem: SKIN/TISSUE INTEGRITY - ADULT  Goal: Skin integrity remains intact  Description  INTERVENTIONS  - Identify patients at risk for skin breakdown  - Assess and monitor skin integrity  - Assess and monitor nutrition and hydration status  - Monitor labs (i e  albumin)  - Assess for incontinence   - Turn and reposition patient  - Assist with mobility/ambulation  - Relieve pressure over bony prominences  - Avoid friction and shearing  - Provide appropriate hygiene as needed including keeping skin clean and dry  - Evaluate need for skin moisturizer/barrier cream  - Collaborate with interdisciplinary team (i e  Nutrition, Rehabilitation, etc )   - Patient/family teaching  Outcome: Progressing  Goal: Incision(s), wounds(s) or drain site(s) healing without S/S of infection  Description  INTERVENTIONS  - Assess and document risk factors for skin impairment   - Assess and document dressing, incision, wound bed, drain sites and surrounding tissue  - Initiate Nutrition services consult and/or wound management as needed  Outcome: Progressing  Goal: Oral mucous membranes remain intact  Description  INTERVENTIONS  - Assess oral mucosa and hygiene practices  - Implement preventative oral hygiene regimen  - Implement oral medicated treatments as ordered  - Initiate Nutrition services referral as needed  Outcome: Progressing     Problem: Prexisting or High Potential for Compromised Skin Integrity  Goal: Skin integrity is maintained or improved  Description  INTERVENTIONS:  - Identify patients at risk for skin breakdown  - Assess and monitor skin integrity  - Assess and monitor nutrition and hydration status  - Monitor labs (i e  albumin)  - Assess for incontinence   - Turn and reposition patient  - Assist with mobility/ambulation  - Relieve pressure over bony prominences  - Avoid friction and shearing  - Provide appropriate hygiene as needed including keeping skin clean and dry  - Evaluate need for skin moisturizer/barrier cream  - Collaborate with interdisciplinary team (i e  Nutrition, Rehabilitation, etc )   - Patient/family teaching  Outcome: Progressing     Problem: SAFETY,RESTRAINT: NV/NON-SELF DESTRUCTIVE BEHAVIOR  Goal: Remains free of harm/injury (restraint for non violent/non self-detsructive behavior)  Description  INTERVENTIONS:  - Instruct patient/family regarding restraint use   - Assess and monitor physiologic and psychological status   - Provide interventions and comfort measures to meet assessed patient needs   - Identify and implement measures to help patient regain control  - Assess readiness for release of restraint   Outcome: Progressing  Goal: Returns to optimal restraint-free functioning  Description  INTERVENTIONS:  - Assess the patient's behavior and symptoms that indicate continued need for restraint  - Identify and implement measures to help patient regain control  - Assess readiness for release of restraint   Outcome: Progressing

## 2019-08-03 NOTE — PROGRESS NOTES
Progress Note - Infectious Disease   Graham Gaston 63 y o  male MRN: [id number]  Unit/Bed#: E5 -01 Encounter: [id number]      Impression/Plan:  1  Sepsis-leukocytosis and tachycardia   Suspect all secondary to the patient's intra-abdominal abscesses   Perhaps the patient's pulmonary consolidation with pleural effusion are playing a role   No other clear source appreciated   Consideration for the possibility of bacteremia   Very limited antibiotic options in this patient who has anaphylaxis to penicillin  The patient remains relatively ill with ongoing tachycardia and the white count continues to rise  His blood pressures remained stable  -continue vancomycin, aztreonam, Flagyl, and fluconazole for now  -recheck CBC with diff and BMP  -recheck procalcitonin level  -follow-up blood cultures  -follow-up pending fluid cultures  -supportive care  -hopefully narrow antibiotics further tomorrow     2  Intra-abdominal abscesses-status post IR drainage with COURTNEY drains remaining in place   The most likely organisms would be alpha Streptococcus, anaerobes, with also possibility of fungal, and gram-negative rods   At this point awaiting additional data it is reasonable to maintain the patient on broad antibiotics   Limited antibiotic choices based upon the patient's allergies  -antibiotics as above  -followup fluid cultures and adjust antibiotics as needed  -drain management  -close surgical follow-up     3  Peritonitis-in the setting of a perforated marginal ulceration   The patient is now status post exploratory laparotomy with repair and stent placement, and now COURTNEY drain placement   -antibiotics as above  -follow-up fluid cultures as above  -close surgical follow-up  -serial abdominal exams     4   Abnormal CT of the chest-with some consolidation and pleural effusion noted on the right   Perhaps this is all atelectasis with reactive effusion   Less likely but also possible would be pneumonia with empyema   Patient's respiratory status is stable although he does have some shortness of breath   -antibiotics as above  -monitor respiratory status  -future imaging to confirm clearance  -if the patient's white count continues to rise without another source, recommend right-sided thoracentesis and send fluid for pH, LDH, total protein, cell counts, Gram stain and culture     5  COPD-no current clinical evidence of an acute exacerbation at this time     Above plan discussed in detail with the patient  ID consult service will continue to follow       Antibiotics:  Vancomycin/aztreonam/fluconazole/Flagyl    24 hour events:  No acute events noted overnight on chart review  Patient is currently afebrile  White blood cell count 29  Culture so far with alpha hemolytic strep  Blood cultures without growth for 24 hours  No new imaging overnight  Patient's other vitals are stable  Creatinine is stable and differential on CBC unremarkable  Procalcitonin 1 8 yesterday    Subjective:  Patient currently denies having any nausea or vomiting  He denies having any significant abdominal pain  He overall reports feeling fatigued  He denies having any shortness of breath or chest pain  Currently tolerating antibiotic without development of rash  Objective:  Vitals:  Temp:  [96 8 °F (36 °C)-98 9 °F (37 2 °C)] 96 8 °F (36 °C)  HR:  [] 92  Resp:  [18-20] 20  BP: (138-162)/(70-80) 146/78  SpO2:  [89 %-98 %] 96 %  Temp (24hrs), Av 6 °F (36 4 °C), Min:96 8 °F (36 °C), Max:98 9 °F (37 2 °C)  Current: Temperature: (!) 96 8 °F (36 °C)    Physical Exam:   General Appearance:  Chronically ill-appearing and interactive, nontoxic, no acute distress  Throat: Oropharynx moist without lesions  Lungs:   Clear to auscultation bilaterally; no wheezes, rhonchi or rales; respirations unlabored on room air   Heart:  RRR; no murmur, rub or gallop   Abdomen:   Soft, mild tenderness to palpation in the center of the abdomen  , non-distended, minimal bowel sounds  Patient has 3 drains in place with purulent drainage present  Midline scar is currently dressed  Extremities: No clubbing, cyanosis or edema   Skin: No new rashes or lesions  No new draining wounds noted  Labs, Imaging, & Other studies:   All pertinent labs and imaging studies were personally reviewed  Results from last 7 days   Lab Units 08/03/19  0709 08/02/19  0700 08/01/19  0526   WBC Thousand/uL 29 92* 32 94* 27 71*   HEMOGLOBIN g/dL 13 0 12 5 13 3   PLATELETS Thousands/uL 195 174 181     Results from last 7 days   Lab Units 08/03/19  0709  07/29/19  0440  07/28/19  0541   POTASSIUM mmol/L 4 6   < > 4 0   < > 4 5   CHLORIDE mmol/L 101   < > 105   < > 100   CO2 mmol/L 28   < > 24   < > 20*   BUN mg/dL 13   < > 17   < > 24   CREATININE mg/dL 0 50*   < > 0 91   < > 1 38*   EGFR ml/min/1 73sq m 120   < > 93   < > 56   CALCIUM mg/dL 7 6*   < > 7 3*   < > 8 2*   AST U/L  --   --  59*  --  37   ALT U/L  --   --  24  --  18   ALK PHOS U/L  --   --  31*  --  42*    < > = values in this interval not displayed  Results from last 7 days   Lab Units 08/01/19  1845 08/01/19  1609 08/01/19  1546   BLOOD CULTURE  No Growth at 24 hrs    No Growth at 24 hrs   --   --    Ernesto Query STAIN RESULT   --  2+ RBC's*  3+ Gram positive cocci in pairs and chains* Rare Polys  No bacteria seen   BODY FLUID CULTURE, STERILE   --  4+ Growth of Alpha Hemolytic Streptococcus* No growth

## 2019-08-03 NOTE — PROGRESS NOTES
Progress Note - Bariatric Surgery   Rashid Johnson 63 y o  male MRN: [id number]  Unit/Bed#: E5 -01 Encounter: [id number]      Subjective/Objective     Subjective: This is a 63year old male s/p exploratory laparotomy with repair of perforated marginal ulcer, abdominal washout, G tube placement and GI assisted endoscopic stent placement on 7/28/19 by Dr Caralyn Goltz  Patient also underwent a diagnostic laparoscopy  That was converted to open on 7/26/2019 with internal hernia repair  On eval today the patient appears to be clinically improving and reports he is feeling better  His tube feedings are still being held due to postoperative ileus however reports he moved his bowels yesterday after the molasses enema and this morning on his own  Pain adequately controlled on IV pain medication, ambulating without assistance and states he has been walking consistently since yesterday, voiding well, using incentive spirometer however still has some productive cough  Denies fevers, chills, sweats, SOB, CP, calf pain  Objective:    /78 (BP Location: Right arm)   Pulse 92   Temp (!) 96 8 °F (36 °C) (Temporal)   Resp 20   Ht 5' 8" (1 727 m)   Wt 75 3 kg (166 lb 0 1 oz)   SpO2 96%   BMI 25 24 kg/m²       Intake/Output Summary (Last 24 hours) at 8/3/2019 8610  Last data filed at 8/3/2019 0540  Gross per 24 hour   Intake 1300 ml   Output 900 ml   Net 400 ml       Invasive Devices     Peripheral Intravenous Line            Peripheral IV 07/31/19 Right Antecubital 2 days    Peripheral IV 08/02/19 Left Wrist less than 1 day          Drain            Closed/Suction Drain Right Abdomen Bulb 19 Fr  5 days    Gastrostomy/Enterostomy Gastrostomy 22 Fr  LUQ 5 days    Closed/Suction Drain Left LUQ Bulb 8 Fr  1 day    Closed/Suction Drain Right RUQ Bulb 8 Fr  1 day                ROS: 10-point system completed  All negative except see HPI        Physical Exam    General Appearance:    Alert, cooperative, no distress, appears stated age   Head:    Normocephalic, without obvious abnormality, atraumatic   Lungs:     Respirations unlabored   Heart:    Regular rate and rhythm   Abdomen:     Soft, appropriate tenderness,  no masses, no organomegaly,   non distended  Elijah drain is in place with serosanguinos drainage  2 additional drains placed by IR due to liver and spleen abscess with serosanguinous drainage    Extremities:   Extremities normal, atraumatic, no cyanosis or edema   Neurologic:  Incision:  Psych:   Normal strength and sensation    Midline incision is Clean, dry, and intact with no signs of infection  Dressing was changed last night  Normal mood and affect       Lab, Imaging and other studies:I have personally reviewed pertinent lab results  VTE Mechanical Prophylaxis: sequential compression device    Assessment/Plan  1)  This is a 63year old male s/p gastric bypass in 2017 with Dr Ivelisse Vincent  S/p exploratory laparotomy with repair of perforated marginal ulcer, abdominal washout, G tube placement and GI assisted endoscopic stent placement on 7/28/19 by Dr Nir Calvin  Patient also underwent a diagnostic laparoscopy  That was converted to open on 7/26/2019 with internal hernia repair   Patient overall clinically improving     - patient with 3 BMs yesterday after molasses enema and 1 BM this morning on his own  - will restart tube feeds at 5 cc/hr and hold the tube feeds at night time  - continue vancomycin, aztreonam, Flagyl, and fluconazole as per ID   - continue IVF at 50 cc/hr  - White count trending down from 32 9 yesterday to 29 9 this morning   - phosphorous 2 6, will replete IV  - continue to trend labs and vitals daily   - Continue to monitor drain output   - Encourage IS, ambulation, can have sips with meds   - PPI and Carafate for GI prophylaxis  - SCDs and Heparin for DVT prophylaxis    Plan of care was discussed with patient and patient's nurse  Care plan discussed with Dr Rona Bernheim: unable to discharge home today

## 2019-08-04 PROBLEM — E43 SEVERE PROTEIN-CALORIE MALNUTRITION (HCC): Status: ACTIVE | Noted: 2019-08-04

## 2019-08-04 PROBLEM — K28.5 CHRONIC MARGINAL ULCER WITH PERFORATION (HCC): Status: ACTIVE | Noted: 2019-08-04

## 2019-08-04 PROBLEM — D68.9 COAGULOPATHY (HCC): Status: ACTIVE | Noted: 2019-08-04

## 2019-08-04 PROBLEM — E11.9 DIET-CONTROLLED DIABETES MELLITUS (HCC): Status: ACTIVE | Noted: 2019-08-04

## 2019-08-04 PROBLEM — R91.8 ABNORMAL CT SCAN, LUNG: Status: ACTIVE | Noted: 2019-08-04

## 2019-08-04 PROBLEM — E87.8 ELECTROLYTE DISTURBANCE: Status: ACTIVE | Noted: 2019-08-04

## 2019-08-04 LAB
ALBUMIN SERPL BCP-MCNC: 1.7 G/DL (ref 3.5–5)
ALP SERPL-CCNC: 106 U/L (ref 46–116)
ALT SERPL W P-5'-P-CCNC: 17 U/L (ref 12–78)
ANION GAP SERPL CALCULATED.3IONS-SCNC: 8 MMOL/L (ref 4–13)
AST SERPL W P-5'-P-CCNC: 34 U/L (ref 5–45)
BACTERIA SPEC BFLD CULT: ABNORMAL
BILIRUB SERPL-MCNC: 1.43 MG/DL (ref 0.2–1)
BUN SERPL-MCNC: 11 MG/DL (ref 5–25)
CA-I BLD-SCNC: 0.94 MMOL/L (ref 1.12–1.32)
CALCIUM SERPL-MCNC: 7.5 MG/DL (ref 8.3–10.1)
CHLORIDE SERPL-SCNC: 101 MMOL/L (ref 100–108)
CO2 SERPL-SCNC: 26 MMOL/L (ref 21–32)
CREAT SERPL-MCNC: 0.49 MG/DL (ref 0.6–1.3)
ERYTHROCYTE [DISTWIDTH] IN BLOOD BY AUTOMATED COUNT: 14.4 % (ref 11.6–15.1)
GFR SERPL CREATININE-BSD FRML MDRD: 121 ML/MIN/1.73SQ M
GLUCOSE SERPL-MCNC: 101 MG/DL (ref 65–140)
GLUCOSE SERPL-MCNC: 101 MG/DL (ref 65–140)
GRAM STN SPEC: ABNORMAL
HCT VFR BLD AUTO: 35.4 % (ref 36.5–49.3)
HGB BLD-MCNC: 11.8 G/DL (ref 12–17)
MCH RBC QN AUTO: 30.2 PG (ref 26.8–34.3)
MCHC RBC AUTO-ENTMCNC: 33.3 G/DL (ref 31.4–37.4)
MCV RBC AUTO: 91 FL (ref 82–98)
PHOSPHATE SERPL-MCNC: 2.8 MG/DL (ref 2.7–4.5)
PLATELET # BLD AUTO: 250 THOUSANDS/UL (ref 149–390)
PMV BLD AUTO: 9.7 FL (ref 8.9–12.7)
POTASSIUM SERPL-SCNC: 3.8 MMOL/L (ref 3.5–5.3)
PROCALCITONIN SERPL-MCNC: 0.87 NG/ML
PROT SERPL-MCNC: 4.7 G/DL (ref 6.4–8.2)
RBC # BLD AUTO: 3.91 MILLION/UL (ref 3.88–5.62)
SODIUM SERPL-SCNC: 135 MMOL/L (ref 136–145)
VANCOMYCIN TROUGH SERPL-MCNC: 16.1 UG/ML (ref 10–20)
WBC # BLD AUTO: 28.85 THOUSAND/UL (ref 4.31–10.16)

## 2019-08-04 PROCEDURE — 82948 REAGENT STRIP/BLOOD GLUCOSE: CPT

## 2019-08-04 PROCEDURE — 82330 ASSAY OF CALCIUM: CPT | Performed by: PHYSICIAN ASSISTANT

## 2019-08-04 PROCEDURE — 84100 ASSAY OF PHOSPHORUS: CPT | Performed by: PHYSICIAN ASSISTANT

## 2019-08-04 PROCEDURE — 99024 POSTOP FOLLOW-UP VISIT: CPT | Performed by: SURGERY

## 2019-08-04 PROCEDURE — 84145 PROCALCITONIN (PCT): CPT | Performed by: PHYSICIAN ASSISTANT

## 2019-08-04 PROCEDURE — 99233 SBSQ HOSP IP/OBS HIGH 50: CPT | Performed by: INTERNAL MEDICINE

## 2019-08-04 PROCEDURE — 99232 SBSQ HOSP IP/OBS MODERATE 35: CPT | Performed by: INTERNAL MEDICINE

## 2019-08-04 PROCEDURE — C9113 INJ PANTOPRAZOLE SODIUM, VIA: HCPCS | Performed by: PHYSICIAN ASSISTANT

## 2019-08-04 PROCEDURE — 80053 COMPREHEN METABOLIC PANEL: CPT | Performed by: HOSPITALIST

## 2019-08-04 PROCEDURE — 85027 COMPLETE CBC AUTOMATED: CPT | Performed by: INTERNAL MEDICINE

## 2019-08-04 PROCEDURE — 93970 EXTREMITY STUDY: CPT | Performed by: SURGERY

## 2019-08-04 PROCEDURE — 80202 ASSAY OF VANCOMYCIN: CPT | Performed by: INTERNAL MEDICINE

## 2019-08-04 RX ADMIN — VANCOMYCIN HYDROCHLORIDE 1500 MG: 5 INJECTION, POWDER, LYOPHILIZED, FOR SOLUTION INTRAVENOUS at 04:34

## 2019-08-04 RX ADMIN — PANTOPRAZOLE SODIUM 40 MG: 40 INJECTION, POWDER, FOR SOLUTION INTRAVENOUS at 21:00

## 2019-08-04 RX ADMIN — HEPARIN SODIUM 5000 UNITS: 5000 INJECTION INTRAVENOUS; SUBCUTANEOUS at 13:15

## 2019-08-04 RX ADMIN — FLUCONAZOLE, SODIUM CHLORIDE 400 MG: 2 INJECTION INTRAVENOUS at 15:42

## 2019-08-04 RX ADMIN — Medication 250 MG: at 08:27

## 2019-08-04 RX ADMIN — METRONIDAZOLE 500 MG: 500 INJECTION, SOLUTION INTRAVENOUS at 23:05

## 2019-08-04 RX ADMIN — METRONIDAZOLE 500 MG: 500 INJECTION, SOLUTION INTRAVENOUS at 06:28

## 2019-08-04 RX ADMIN — SUCRALFATE 1000 MG: 1 SUSPENSION ORAL at 08:21

## 2019-08-04 RX ADMIN — SUCRALFATE 1000 MG: 1 SUSPENSION ORAL at 15:26

## 2019-08-04 RX ADMIN — VANCOMYCIN HYDROCHLORIDE 1500 MG: 5 INJECTION, POWDER, LYOPHILIZED, FOR SOLUTION INTRAVENOUS at 13:05

## 2019-08-04 RX ADMIN — HYDROMORPHONE HYDROCHLORIDE 1 MG: 1 INJECTION, SOLUTION INTRAMUSCULAR; INTRAVENOUS; SUBCUTANEOUS at 18:13

## 2019-08-04 RX ADMIN — ALBUTEROL SULFATE 2 PUFF: 90 AEROSOL, METERED RESPIRATORY (INHALATION) at 08:28

## 2019-08-04 RX ADMIN — ALBUTEROL SULFATE 2 PUFF: 90 AEROSOL, METERED RESPIRATORY (INHALATION) at 18:30

## 2019-08-04 RX ADMIN — ALBUTEROL SULFATE 2 PUFF: 90 AEROSOL, METERED RESPIRATORY (INHALATION) at 11:24

## 2019-08-04 RX ADMIN — AZTREONAM 2000 MG: 2 INJECTION, POWDER, LYOPHILIZED, FOR SOLUTION INTRAMUSCULAR; INTRAVENOUS at 06:28

## 2019-08-04 RX ADMIN — AZTREONAM 2000 MG: 2 INJECTION, POWDER, LYOPHILIZED, FOR SOLUTION INTRAMUSCULAR; INTRAVENOUS at 14:22

## 2019-08-04 RX ADMIN — HYDROMORPHONE HYDROCHLORIDE 1 MG: 1 INJECTION, SOLUTION INTRAMUSCULAR; INTRAVENOUS; SUBCUTANEOUS at 21:00

## 2019-08-04 RX ADMIN — HEPARIN SODIUM 5000 UNITS: 5000 INJECTION INTRAVENOUS; SUBCUTANEOUS at 06:28

## 2019-08-04 RX ADMIN — HYDROMORPHONE HYDROCHLORIDE 1 MG: 1 INJECTION, SOLUTION INTRAMUSCULAR; INTRAVENOUS; SUBCUTANEOUS at 08:23

## 2019-08-04 RX ADMIN — METRONIDAZOLE 500 MG: 500 INJECTION, SOLUTION INTRAVENOUS at 15:39

## 2019-08-04 RX ADMIN — DOCUSATE SODIUM 100 MG: 100 CAPSULE, LIQUID FILLED ORAL at 08:26

## 2019-08-04 RX ADMIN — HEPARIN SODIUM 5000 UNITS: 5000 INJECTION INTRAVENOUS; SUBCUTANEOUS at 22:38

## 2019-08-04 RX ADMIN — Medication 250 MG: at 18:30

## 2019-08-04 RX ADMIN — SUCRALFATE 1000 MG: 1 SUSPENSION ORAL at 11:01

## 2019-08-04 RX ADMIN — HYDROMORPHONE HYDROCHLORIDE 0.5 MG: 1 INJECTION, SOLUTION INTRAMUSCULAR; INTRAVENOUS; SUBCUTANEOUS at 03:23

## 2019-08-04 RX ADMIN — PANTOPRAZOLE SODIUM 40 MG: 40 INJECTION, POWDER, FOR SOLUTION INTRAVENOUS at 08:39

## 2019-08-04 RX ADMIN — SUCRALFATE 1000 MG: 1 SUSPENSION ORAL at 22:38

## 2019-08-04 RX ADMIN — ALBUTEROL SULFATE 2 PUFF: 90 AEROSOL, METERED RESPIRATORY (INHALATION) at 22:40

## 2019-08-04 RX ADMIN — CALCIUM GLUCONATE 3 G: 98 INJECTION, SOLUTION INTRAVENOUS at 11:25

## 2019-08-04 RX ADMIN — HYDROMORPHONE HYDROCHLORIDE 0.5 MG: 1 INJECTION, SOLUTION INTRAMUSCULAR; INTRAVENOUS; SUBCUTANEOUS at 13:20

## 2019-08-04 RX ADMIN — VANCOMYCIN HYDROCHLORIDE 1500 MG: 5 INJECTION, POWDER, LYOPHILIZED, FOR SOLUTION INTRAVENOUS at 20:59

## 2019-08-04 RX ADMIN — THIAMINE HYDROCHLORIDE: 100 INJECTION, SOLUTION INTRAMUSCULAR; INTRAVENOUS at 08:48

## 2019-08-04 NOTE — PROGRESS NOTES
Progress Note Corie Gong 1960, 63 y o  male MRN: [id number]    Unit/Bed#: E5 -01 Encounter: [id number]    Primary Care Provider: Raúl Jaeger DO   Date and time admitted to hospital: 7/26/2019  9:00 AM        * Chronic marginal ulcer with perforation ([facility] )  Assessment & Plan  Marginal ulcer with perforation  Patient initially admitted for open hernia repair but subsequently developed perforated marginal ulcer requiring laparotomy  Subsequently developed intra-abdominal abscesses requiring placement of drains  Currently on vancomycin and aztreonam metronidazole and fluconazole per ID    Electrolyte disturbance  Assessment & Plan  Electrolyte disturbances including hypokalemia hypomagnesemia and hypophosphatemia  Currently being repleted with IV builder of potassium and phosphorus  Results from last 7 days   Lab Units 08/04/19  0438 08/04/19  0033 08/03/19  0709 08/02/19  0700 08/01/19  0526 07/31/19  1559 07/31/19  0606 07/30/19  0602 07/29/19  0440 07/28/19  1749   MAGNESIUM mg/dL  --   --  2 0 1 9 1 5* 1 8 1 9 2 1 2 1 2 3   PHOSPHORUS mg/dL 2 8  --  2 6* 2 3* 1 7* 1 7* 1 6* 1 5*  --  3 7   POTASSIUM mmol/L  --  3 8 4 6 3 7 3 1* 3 1* 2 9* 3 3* 4 0 5 3       Diet-controlled diabetes mellitus [facility])  Assessment & Plan  Lab Results   Component Value Date    HGBA1C 5 1 07/13/2018     Recent Labs     08/03/19  1322 08/04/19  0852   POCGLU 77 101     Diet controlled diabetes  Accucheks within limits  Coagulopathy ([facility] )  Assessment & Plan  Coagulopathy secondary to malnutrition  Results from last 7 days   Lab Units 07/28/19  1749   INR  1 86*       Abnormal CT scan, lung  Assessment & Plan  Right lower lobe opacity:  Differentials includes atelectasis versus loculated effusion  Patient already on broad-spectrum antibiotics for intra-abdominal abscess  No respiratory symptoms at this time  Leukocytosis improving    Consider pulmonary consultation or thoracentesis/sampling if clinically warranted    Severe protein-calorie malnutrition ([facility] )  Assessment & Plan  Body mass index is 25 24 kg/m²  VTE Pharmacologic Prophylaxis: Heparin    Patient Centered Rounds: I have performed bedside rounds with nursing staff today  Discussions with Specialists or Other Care Team Provider:   Education and Discussions with Family / Patient:  Family at bedside    Time Spent for Care: 30 mins  More than 50% of total time spent on counseling and coordination of care as described above  Current Length of Stay: 9 day(s)  Current Patient Status: Inpatient     Certification Statement: The patient will continue to require additional inpatient hospital stay due to Chronic marginal ulcer with perforation [facility])  Discharge Plan / Estimated Discharge Date:     Code Status: Level 1 - Full Code  ______________________________________________________________________________    Subjective:   Patient seen and examined  No new complaints  Still feeling very weak  Objective:   Vitals: Blood pressure 167/91, pulse 104, temperature (!) 97 1 °F (36 2 °C), temperature source Temporal, resp  rate 22, height 5' 8" (1 727 m), weight 75 3 kg (166 lb 0 1 oz), SpO2 98 %      Physical Exam:   General appearance: alert, appears stated age and cooperative  Head: atraumatic  Lungs: diminished breath sounds  Heart: regular rate and rhythm  Abdomen: soft binder in place  Back: negative  Extremities: edema +1-2 lower extremities bilaterally  Neurologic: Grossly normal    Additional Data:   Labs:  Results from last 7 days   Lab Units 08/04/19  0438 08/03/19  0709 08/02/19  0700 08/01/19  0526 07/31/19  0606 07/30/19  0602 07/29/19  0440 07/28/19  1749 07/28/19  1748   WBC Thousand/uL 28 85* 29 92* 32 94* 27 71* 19 88* 10 94* 5 17  --  2 50*   HEMOGLOBIN g/dL 11 8* 13 0 12 5 13 3 12 4 11 0* 10 8*  --  11 6*   HEMATOCRIT % 35 4* 39 6 37 3 40 0 36 7 32 5* 32 2*  --  35 4*   MCV fL 91 91 90 89 89 92 92  --  92   TOTAL NEUT ABS Thousand/uL  -- --   --   --  16 90*  --   --   --   --    BANDS PCT %  --   --   --   --  20*  --   --   --   --    PLATELETS Thousands/uL 250 195 174 181 204 193 197  --  196   INR   --   --   --   --   --   --   --  1 86*  --      Results from last 7 days   Lab Units 08/04/19  0033 08/03/19  0709 08/02/19  0700 08/01/19  0526 07/31/19  1559 07/31/19  0606 07/30/19  0602 07/29/19  0440 07/28/19  1749   SODIUM mmol/L 135* 136 139 135* 138 141 139 137 135*   POTASSIUM mmol/L 3 8 4 6 3 7 3 1* 3 1* 2 9* 3 3* 4 0 5 3   CHLORIDE mmol/L 101 101 102 97* 104 104 104 105 106   CO2 mmol/L 26 28 29 29 26 26 25 24 19*   ANION GAP mmol/L 8 7 8 9 8 11 10 8 10   BUN mg/dL 11 13 15 17 20 22 17 17 19   CREATININE mg/dL 0 49* 0 50* 0 61 0 64 0 61 0 65 0 73 0 91 1 00   CALCIUM mg/dL 7 5* 7 6* 7 4* 7 3* 8 1* 8 0* 8 0* 7 3* 7 1*   ALBUMIN g/dL 1 7*  --   --   --   --   --   --  1 9*  --    TOTAL BILIRUBIN mg/dL 1 43*  --   --   --   --   --   --  0 72  --    ALK PHOS U/L 106  --   --   --   --   --   --  31*  --    ALT U/L 17  --   --   --   --   --   --  24  --    AST U/L 34  --   --   --   --   --   --  59*  --    EGFR ml/min/1 73sq m 121 120 111 109 111 108 103 93 83   GLUCOSE RANDOM mg/dL 101 92 112 334* 102 95 78 92 115     Results from last 7 days   Lab Units 08/04/19  0438 08/03/19  0709 08/02/19  0700 08/01/19  0526 07/31/19  1559 07/31/19  0606   MAGNESIUM mg/dL  --  2 0 1 9 1 5* 1 8 1 9   PHOSPHORUS mg/dL 2 8 2 6* 2 3* 1 7* 1 7* 1 6*              Results from last 7 days   Lab Units 08/04/19  0438  07/28/19  1748   LACTIC ACID mmol/L  --   --  3 3*   PROCALCITONIN ng/ml 0 87*   < >  --     < > = values in this interval not displayed  Results from last 7 days   Lab Units 08/04/19  0852 08/03/19  1322   POC GLUCOSE mg/dl 101 77             * I Have Reviewed All Lab Data Listed Above  Cultures:   Results from last 7 days   Lab Units 08/01/19  1845 08/01/19  1609 08/01/19  1546   BLOOD CULTURE  No Growth at 48 hrs  No Growth at 48 hrs  --   --    GRAM STAIN RESULT   --  2+ RBC's*  3+ Gram positive cocci in pairs and chains* Rare Polys  No bacteria seen   BODY FLUID CULTURE, STERILE   --  4+ Growth of Streptococcus mitis oralis group*  Few Colonies of Candida albicans* Few Colonies of Streptococcus mitis oralis group*         Imaging:  Imaging Reports Reviewed Today Include:   Procedure: Vas Lower Limb Venous Duplex Study, Complete Bilateral  Result Date: 8/4/2019  RIGHT LOWER LIMB: There is evidence of chronic, non-occlusive deep vein thrombosis in the popliteal vein  No evidence of superficial thrombophlebitis noted  Doppler evaluation shows a normal response to augmentation maneuvers  Popliteal, posterior tibial and anterior tibial arterial Doppler waveforms are triphasic/biphasic  LEFT LOWER LIMB: No evidence of acute or chronic deep vein thrombosis  No evidence of superficial thrombophlebitis noted in the visualized portions of the great saphenous vein  Doppler evaluation shows a normal response to augmentation maneuvers  Popliteal, posterior tibial and anterior tibial arterial Doppler waveforms are triphasic  Technically challenging exam due to edema  Technical findings were given to Chipper Lennox, RN at 4:50 PM on 8/3/19      SIGNATURE: Electronically Signed by: Sushil Webb on 2019-08-04 05:08:08 PM    Scheduled Meds:  Current Facility-Administered Medications:  albuterol 2 puff Inhalation 4x Daily Jenifer Armando PA-C    aztreonam 2,000 mg Intravenous Q8H Tony Mcclendon MD Last Rate: 2,000 mg (08/04/19 1422)   diphenhydrAMINE 25 mg Intravenous Q6H PRN Jenifer Armando PA-C    fluconazole 400 mg Intravenous Q24H Juanita Garcia MD Last Rate: 400 mg (08/04/19 1542)   heparin (porcine) 5,000 Units Subcutaneous [facility] Jenifer Armando PA-C    HYDROmorphone 0 5 mg Intravenous Q4H PRN Jenifer Armando PA-C    HYDROmorphone 1 mg Intravenous Q2H PRN Jenifer Armando PA-C    metroNIDAZOLE 500 mg Intravenous Leopoldo Beach, MD Last Rate: 500 mg (08/04/19 1539)   multivitamin IVPB  Intravenous Daily Ann Kingston PA-C Last Rate: Stopped (08/04/19 1305)   ondansetron 4 mg Intravenous Q6H PRN Ann Kingston PA-C    oxyCODONE 10 mg Oral Q4H PRN Ann Kingston PA-C    pantoprazole 40 mg Intravenous Q12H [facility] Ann Kingston PA-C    IV infusion builder  Intravenous Continuous Renae Mendez MD Last Rate: 50 mL/hr at 08/03/19 2210   promethazine 12 5 mg Intravenous Q6H PRN Ann Kingston PA-C    saccharomyces boulardii 250 mg Oral BID Ann Kingston PA-C    simethicone 80 mg Oral Q6H PRN Ann Kingston PA-C    sucralfate 1,000 mg Oral 4x Daily (AC & HS) Ann Kingston PA-C    vancomycin 20 mg/kg Intravenous Q8H Danielle Dunlap MD Last Rate: 1,500 mg (08/04/19 1305)       Lynnette Blevins DO  [facility] Internal Medicine  Hospitalist    ** Please Note: This note has been constructed using a voice recognition system   **

## 2019-08-04 NOTE — ASSESSMENT & PLAN NOTE
Electrolyte disturbances including hypokalemia hypomagnesemia and hypophosphatemia  Currently being repleted with IV builder of potassium and phosphorus      Results from last 7 days   Lab Units 08/04/19  0438 08/04/19  0033 08/03/19  0709 08/02/19  0700 08/01/19  0526 07/31/19  1559 07/31/19  0606 07/30/19  0602 07/29/19  0440 07/28/19  1749   MAGNESIUM mg/dL  --   --  2 0 1 9 1 5* 1 8 1 9 2 1 2 1 2 3   PHOSPHORUS mg/dL 2 8  --  2 6* 2 3* 1 7* 1 7* 1 6* 1 5*  --  3 7   POTASSIUM mmol/L  --  3 8 4 6 3 7 3 1* 3 1* 2 9* 3 3* 4 0 5 3

## 2019-08-04 NOTE — ASSESSMENT & PLAN NOTE
Lab Results   Component Value Date    HGBA1C 5 1 07/13/2018     Recent Labs     08/03/19  1322 08/04/19  0852   POCGLU 77 101     Diet controlled diabetes  Accucheks within limits

## 2019-08-04 NOTE — PROGRESS NOTES
Progress Note - Bariatric Surgery   [facility] 63 y o  male MRN: [id number]  Unit/Bed#: E5 -01 Encounter: [id number]      Subjective/Objective     Subjective: 63year old male status post Exploratory Laparotomy with repair of perforated marginal ulcer, abdominal washout, G tube placement, and GI assisted endoscopic stent placement 7/28/2019 with Dr Matthew Escamilla  Patient s/p Diagnostic laparoscopy converted to open 7/26/2019 with internal hernia repair  Clinically improving  Pain adequately controlled on IV pain medication, Tolerating G tube feedings with no bloating, nausea, or vomiting, Having Bowel movements and passing flatus, ambulating without assistance, voiding well, using incentive spirometer with productive cough  Denies any chest pain pr worsening from baseline of SOB  Calf pain improved from yesterday with Doppler of bilateral lower extremities revealing nonoccluded choric clot behind right knee  Had discussion with patient that this is likely due to his low protein and he will continue to hold fluid  Upon dressing change patient had erythema with no drainage of midline incision below the umbilicus  Denies fevers, chills, sweats  ID also looked at incision and stated that if drainage occurs, will culture drainage  Patient tolerated dressing change and 4 staple removal with mild tenderness       Objective:    /91 (BP Location: Left arm)   Pulse 91   Temp 97 7 °F (36 5 °C) (Temporal)   Resp (!) 24   Ht 5' 8" (1 727 m)   Wt 75 3 kg (166 lb 0 1 oz)   SpO2 98%   BMI 25 24 kg/m²       Intake/Output Summary (Last 24 hours) at 8/4/2019 1008  Last data filed at 8/4/2019 0758  Gross per 24 hour   Intake 70 ml   Output 2505 ml   Net -2435 ml       Invasive Devices     Peripheral Intravenous Line            Peripheral IV 07/31/19 Right Antecubital 3 days    Peripheral IV 08/02/19 Left Wrist 1 day          Drain            Closed/Suction Drain Right Abdomen Bulb 19 Fr  6 days Gastrostomy/Enterostomy Gastrostomy 22 Fr  LUQ 6 days    Closed/Suction Drain Left LUQ Bulb 8 Fr  2 days    Closed/Suction Drain Right RUQ Bulb 8 Fr  2 days                ROS: 10-point system completed  All negative except see HPI  Physical Exam    General Appearance:    Alert, cooperative, no distress, appears stated age   Head:    Normocephalic, without obvious abnormality, atraumatic   Lungs:     Respirations unlabored   Heart:    Regular rate and rhythm   Abdomen:     Soft, appropriate tenderness,  no masses, no organomegaly,   non distended; Left IR drain with minimal output; eleuterio surgical drain in place with serosanguinous drainage, R IR drain in place with serosanguinous drainage; midline incision below umbilicus with erythema and blanching upon light palpation; no drainage; continue twice daily dressing changes    Extremities:   Extremities normal, atraumatic, no cyanosis, with +1 pitting edema   Neurologic:  Psych:   Normal strength and sensation    Normal mood and affect       Lab, Imaging and other studies:  I have personally reviewed pertinent lab results    , CBC:   Lab Results   Component Value Date    WBC 28 85 (H) 08/04/2019    HGB 11 8 (L) 08/04/2019    HCT 35 4 (L) 08/04/2019    MCV 91 08/04/2019     08/04/2019    MCH 30 2 08/04/2019    MCHC 33 3 08/04/2019    RDW 14 4 08/04/2019    MPV 9 7 08/04/2019   , CMP:   Lab Results   Component Value Date    SODIUM 135 (L) 08/04/2019    K 3 8 08/04/2019     08/04/2019    CO2 26 08/04/2019    BUN 11 08/04/2019    CREATININE 0 49 (L) 08/04/2019    CALCIUM 7 5 (L) 08/04/2019    AST 34 08/04/2019    ALT 17 08/04/2019    ALKPHOS 106 08/04/2019    EGFR 121 08/04/2019        VTE Mechanical Prophylaxis: sequential compression device, Heparin     Assessment/Plan    Perforated marginal ulcer  Internal hernia  Bariatric surgery status     Patient s/p Exploratory laparotomy, washout of sucus, repair of marginal ulcer perforation, abdominal washout, intraoperative EGD, GI assisted endoscopic stent placement, and G tube placement 7/28/2019 with Dr Raciel Mireles s/p RNY gastric bypass done in 2017 with Dr Jorge Paredes       - Continue current bowel regimen   - Increase tube feedings to 15ml per hour   - Continue vancomycin Day 4, aztreonam Day 4, Flagyl Day 10, and fluconazole Day 4  - ID on board; appreciate input  - Pharmacy consult placed; appreciate input   - Continue twice daily dressing changes; if midline incision begins to have drainage, will culture    - Continue IVF at 50 cc/hr  - Continue to trend electrolytes and WBC count  - Continue to monitor drain output   - Encourage IS, ambulation, can have sips with meds   - PPI and Carafate for GI prophylaxis  - SCDs and Heparin for DVT prophylaxis  - Case management on board for home tube feedings     Patient will need a revision surgery in the future with our team  Patient is aware that the tube feedings will be temporary until his nutrition is optimized  Lengthy discussion was held regarding future care plan  All questions answered  Plan of care was discussed with patient and patient's nurse  Care plan discussed with Dr Carlos Begum:  Unable to discharge to home today

## 2019-08-04 NOTE — ASSESSMENT & PLAN NOTE
Right lower lobe opacity:  Differentials includes atelectasis versus loculated effusion  Patient already on broad-spectrum antibiotics for intra-abdominal abscess  No respiratory symptoms at this time  Leukocytosis improving    Consider pulmonary consultation or thoracentesis/sampling if clinically warranted

## 2019-08-04 NOTE — PLAN OF CARE
Problem: Potential for Falls  Goal: Patient will remain free of falls  Description  INTERVENTIONS:  - Assess patient frequently for physical needs  -  Identify cognitive and physical deficits and behaviors that affect risk of falls  -  Anatone fall precautions as indicated by assessment   - Educate patient/family on patient safety including physical limitations  - Instruct patient to call for assistance with activity based on assessment  - Modify environment to reduce risk of injury  - Consider OT/PT consult to assist with strengthening/mobility  Outcome: Progressing     Problem: Nutrition/Hydration-ADULT  Goal: Nutrient/Hydration intake appropriate for improving, restoring or maintaining nutritional needs  Description  Monitor and assess patient's nutrition/hydration status for malnutrition (ex- brittle hair, bruises, dry skin, pale skin and conjunctiva, muscle wasting, smooth red tongue, and disorientation)  Collaborate with interdisciplinary team and initiate plan and interventions as ordered  Monitor patient's weight and dietary intake as ordered or per policy  Utilize nutrition screening tool and intervene per policy  Determine patient's food preferences and provide high-protein, high-caloric foods as appropriate       INTERVENTIONS:  - Monitor oral intake, urinary output, labs, and treatment plans  - Assess nutrition and hydration status and recommend course of action  - Evaluate amount of meals eaten  - Assist patient with eating if necessary   - Allow adequate time for meals  - Recommend/ encourage appropriate diets, oral nutritional supplements, and vitamin/mineral supplements  - Order, calculate, and assess calorie counts as needed  - Recommend, monitor, and adjust tube feedings and TPN/PPN based on assessed needs  - Assess need for intravenous fluids  - Provide specific nutrition/hydration education as appropriate  - Include patient/family/caregiver in decisions related to nutrition  Outcome: Progressing     Problem: PAIN - ADULT  Goal: Verbalizes/displays adequate comfort level or baseline comfort level  Description  Interventions:  - Encourage patient to monitor pain and request assistance  - Assess pain using appropriate pain scale  - Administer analgesics based on type and severity of pain and evaluate response  - Implement non-pharmacological measures as appropriate and evaluate response  - Consider cultural and social influences on pain and pain management  - Notify physician/advanced practitioner if interventions unsuccessful or patient reports new pain  Outcome: Progressing     Problem: INFECTION - ADULT  Goal: Absence or prevention of progression during hospitalization  Description  INTERVENTIONS:  - Assess and monitor for signs and symptoms of infection  - Monitor lab/diagnostic results  - Monitor all insertion sites, i e  indwelling lines, tubes, and drains  - Monitor endotracheal (as able) and nasal secretions for changes in amount and color  - [place] appropriate cooling/warming therapies per order  - Administer medications as ordered  - Instruct and encourage patient and family to use good hand hygiene technique  - Identify and instruct in appropriate isolation precautions for identified infection/condition  Outcome: Progressing     Problem: DISCHARGE PLANNING  Goal: Discharge to home or other facility with appropriate resources  Description  INTERVENTIONS:  - Identify barriers to discharge w/patient and caregiver  - Arrange for needed discharge resources and transportation as appropriate  - Identify discharge learning needs (meds, wound care, etc )  - Arrange for interpretive services to assist at discharge as needed  - Refer to Case Management Department for coordinating discharge planning if the patient needs post-hospital services based on physician/advanced practitioner order or complex needs related to functional status, cognitive ability, or social support system  Outcome: Progressing     Problem: Knowledge Deficit  Goal: Patient/family/caregiver demonstrates understanding of disease process, treatment plan, medications, and discharge instructions  Description  Complete learning assessment and assess knowledge base    Interventions:  - Provide teaching at level of understanding  - Provide teaching via preferred learning methods  Outcome: Progressing     Problem: GASTROINTESTINAL - ADULT  Goal: Minimal or absence of nausea and/or vomiting  Description  INTERVENTIONS:  - Administer IV fluids as ordered to ensure adequate hydration  - Maintain NPO status until nausea and vomiting are resolved  - Nasogastric tube as ordered  - Administer ordered antiemetic medications as needed  - Provide nonpharmacologic comfort measures as appropriate  - Advance diet as tolerated, if ordered  - Nutrition services referral to assist patient with adequate nutrition and appropriate food choices  Outcome: Progressing  Goal: Maintains or returns to baseline bowel function  Description  INTERVENTIONS:  - Assess bowel function  - Encourage oral fluids to ensure adequate hydration  - Administer IV fluids as ordered to ensure adequate hydration  - Administer ordered medications as needed  - Encourage mobilization and activity  - Nutrition services referral to assist patient with appropriate food choices  Outcome: Progressing  Goal: Maintains adequate nutritional intake  Description  INTERVENTIONS:  - Monitor percentage of each meal consumed  - Identify factors contributing to decreased intake, treat as appropriate  - Assist with meals as needed  - Monitor I&O, WT and lab values  - Obtain nutrition services referral as needed  Outcome: Progressing     Problem: SKIN/TISSUE INTEGRITY - ADULT  Goal: Skin integrity remains intact  Description  INTERVENTIONS  - Identify patients at risk for skin breakdown  - Assess and monitor skin integrity  - Assess and monitor nutrition and hydration status  - Monitor labs (i e  albumin)  - Assess for incontinence   - Turn and reposition patient  - Assist with mobility/ambulation  - Relieve pressure over bony prominences  - Avoid friction and shearing  - Provide appropriate hygiene as needed including keeping skin clean and dry  - Evaluate need for skin moisturizer/barrier cream  - Collaborate with interdisciplinary team (i e  Nutrition, Rehabilitation, etc )   - Patient/family teaching  Outcome: Progressing  Goal: Incision(s), wounds(s) or drain site(s) healing without S/S of infection  Description  INTERVENTIONS  - Assess and document risk factors for skin impairment   - Assess and document dressing, incision, wound bed, drain sites and surrounding tissue  - Initiate Nutrition services consult and/or wound management as needed  Outcome: Progressing  Goal: Oral mucous membranes remain intact  Description  INTERVENTIONS  - Assess oral mucosa and hygiene practices  - Implement preventative oral hygiene regimen  - Implement oral medicated treatments as ordered  - Initiate Nutrition services referral as needed  Outcome: Progressing     Problem: Prexisting or High Potential for Compromised Skin Integrity  Goal: Skin integrity is maintained or improved  Description  INTERVENTIONS:  - Identify patients at risk for skin breakdown  - Assess and monitor skin integrity  - Assess and monitor nutrition and hydration status  - Monitor labs (i e  albumin)  - Assess for incontinence   - Turn and reposition patient  - Assist with mobility/ambulation  - Relieve pressure over bony prominences  - Avoid friction and shearing  - Provide appropriate hygiene as needed including keeping skin clean and dry  - Evaluate need for skin moisturizer/barrier cream  - Collaborate with interdisciplinary team (i e  Nutrition, Rehabilitation, etc )   - Patient/family teaching  Outcome: Progressing     Problem: SAFETY,RESTRAINT: NV/NON-SELF DESTRUCTIVE BEHAVIOR  Goal: Remains free of harm/injury (restraint for non violent/non self-detsructive behavior)  Description  INTERVENTIONS:  - Instruct patient/family regarding restraint use   - Assess and monitor physiologic and psychological status   - Provide interventions and comfort measures to meet assessed patient needs   - Identify and implement measures to help patient regain control  - Assess readiness for release of restraint   Outcome: Progressing  Goal: Returns to optimal restraint-free functioning  Description  INTERVENTIONS:  - Assess the patient's behavior and symptoms that indicate continued need for restraint  - Identify and implement measures to help patient regain control  - Assess readiness for release of restraint   Outcome: Progressing

## 2019-08-04 NOTE — ASSESSMENT & PLAN NOTE
Coagulopathy secondary to malnutrition      Results from last 7 days   Lab Units 07/28/19  8974   INR  1 86*

## 2019-08-04 NOTE — PROGRESS NOTES
Progress Note - Infectious Disease   Darshan Roblero 63 y o  male MRN: [id number]  Unit/Bed#: E5 -01 Encounter: [id number]      Impression/Plan:  1  Sepsis-leukocytosis and tachycardia   Suspect all secondary to the patient's intra-abdominal abscesses   Perhaps the patient's pulmonary consolidation with pleural effusion are playing a role   No other clear source appreciated   Consideration for the possibility of bacteremia   Very limited antibiotic options in this patient who has anaphylaxis to penicillin   The patient remains relatively ill   His blood pressures remained stable, white count slowly down trending  Procalcitonin down trending   -continue antibiotics as below  -repeat CBC and BMP tomorrow  -recheck procalcitonin if there is a clinical change  -follow-up blood cultures  -follow-up pending fluid cultures  -supportive care     2  Intra-abdominal abscesses-status post IR drainage with COURTNEY drains remaining in place   The most likely organisms would be alpha Streptococcus, anaerobes, with also possibility of fungal, and gram-negative rods   At this point awaiting additional data it is reasonable to maintain the patient on broad antibiotics   Limited antibiotic choices based upon the patient's allergies  Mild theo wound erythema today   -continue vancomycin/fluconazole/Flagyl/aztreonam  -followup fluid cultures and adjust antibiotics as needed  -potentially drop gram-negative coverage with aztreonam pending culture data  -consider repeat imaging of the abdomen, to help determine duration of therapy  -follow-up re-evaluation by bariatric of patient's distal wound, if drainage present please send cultures  -drain management as per IR  -close surgical follow-up     3   Peritonitis-in the setting of a perforated marginal ulceration   The patient is now status post exploratory laparotomy with repair and stent placement, and now COURTNEY drain placement   -antibiotics as above  -follow-up fluid cultures as above  -close surgical follow-up  -serial abdominal exams     4  Abnormal CT of the chest-with some consolidation and pleural effusion noted on the right   Perhaps this is all atelectasis with reactive effusion   Less likely but also possible would be pneumonia with empyema   Patient's respiratory status is stable although he does have some shortness of breath   -antibiotics as above  -monitor respiratory status  -future imaging to confirm clearance  -if the patient's white count begins to rise without another source, recommend right-sided thoracentesis and send fluid for pH, LDH, total protein, cell counts, Gram stain and culture     5  COPD-no current clinical evidence of an acute exacerbation at this time      Above plan discussed in detail with the patient      ID consult service will continue to follow        Antibiotics:  Vancomycin/fluconazole/aztreonam/Flagyl    24 hour events:  No acute events noted overnight on chart review  Patient remains afebrile  White count 28  Blood cultures without growth at 48 hours  Wound cultures still preliminary with strep mitis and now yeast  No new imaging  Patient's other vitals are stable    Subjective:  Patient currently denies having any nausea, vomiting, chest pain or shortness of breath  He is tolerating antibiotic without development of diarrhea or rash  On exam today patient was noted to have erythema present at the lower aspect of his wound  Currently plan to be evaluated by bariatric for staple removal   Patient notes that this has been more recently developing and he has some mild pain at the site      Objective:  Vitals:  Temp:  [97 °F (36 1 °C)-98 6 °F (37 °C)] 97 7 °F (36 5 °C)  HR:  [] 91  Resp:  [20-24] 24  BP: (132-168)/(60-91) 156/91  SpO2:  [94 %-98 %] 98 %  Temp (24hrs), Av 8 °F (36 6 °C), Min:97 °F (36 1 °C), Max:98 6 °F (37 °C)  Current: Temperature: 97 7 °F (36 5 °C)    Physical Exam:   General Appearance:  Alert, interactive, nontoxic, no acute distress  Chronically ill-appearing  Throat: Oropharynx moist without lesions  Lungs:   Clear to auscultation bilaterally; no wheezes, rhonchi or rales; respirations unlabored   Heart:  RRR; no murmur, rub or gallop   Abdomen:   Patient's abdomen is soft  , there is erythema noted at the most distal aspect of his midline scar  Carrie remain in place and there is no drainage expressed  Drains currently in place or putting out purulent material   No other acute skin changes noted  Extremities: No clubbing, cyanosis or edema   Skin: No other rashes or lesions  No other draining wounds noted  Labs, Imaging, & Other studies:   All pertinent labs and imaging studies were personally reviewed  Results from last 7 days   Lab Units 08/04/19  0438 08/03/19  0709 08/02/19  0700   WBC Thousand/uL 28 85* 29 92* 32 94*   HEMOGLOBIN g/dL 11 8* 13 0 12 5   PLATELETS Thousands/uL 250 195 174     Results from last 7 days   Lab Units 08/04/19  0033  07/29/19  0440   POTASSIUM mmol/L 3 8   < > 4 0   CHLORIDE mmol/L 101   < > 105   CO2 mmol/L 26   < > 24   BUN mg/dL 11   < > 17   CREATININE mg/dL 0 49*   < > 0 91   EGFR ml/min/1 73sq m 121   < > 93   CALCIUM mg/dL 7 5*   < > 7 3*   AST U/L 34  --  59*   ALT U/L 17  --  24   ALK PHOS U/L 106  --  31*    < > = values in this interval not displayed  Results from last 7 days   Lab Units 08/01/19  1845 08/01/19  1609 08/01/19  1546   BLOOD CULTURE  No Growth at 48 hrs    No Growth at 48 hrs   --   --    GRAM STAIN RESULT   --  2+ RBC's*  3+ Gram positive cocci in pairs and chains* Rare Polys  No bacteria seen   BODY FLUID CULTURE, STERILE   --  4+ Growth of Streptococcus mitis oralis group*  Few Colonies of Yeast* Few Colonies of Alpha Hemolytic Streptococcus*

## 2019-08-04 NOTE — ASSESSMENT & PLAN NOTE
Marginal ulcer with perforation  Patient initially admitted for open hernia repair but subsequently developed perforated marginal ulcer requiring laparotomy  Subsequently developed intra-abdominal abscesses requiring placement of drains    Currently on vancomycin and aztreonam metronidazole and fluconazole per ID

## 2019-08-05 ENCOUNTER — APPOINTMENT (INPATIENT)
Dept: RADIOLOGY | Facility: HOSPITAL | Age: 59
DRG: 326 | End: 2019-08-05
Payer: COMMERCIAL

## 2019-08-05 LAB
ANION GAP SERPL CALCULATED.3IONS-SCNC: 7 MMOL/L (ref 4–13)
BUN SERPL-MCNC: 8 MG/DL (ref 5–25)
CALCIUM SERPL-MCNC: 7.4 MG/DL (ref 8.3–10.1)
CHLORIDE SERPL-SCNC: 100 MMOL/L (ref 100–108)
CO2 SERPL-SCNC: 27 MMOL/L (ref 21–32)
CREAT SERPL-MCNC: 0.42 MG/DL (ref 0.6–1.3)
GFR SERPL CREATININE-BSD FRML MDRD: 129 ML/MIN/1.73SQ M
GLUCOSE SERPL-MCNC: 102 MG/DL (ref 65–140)
MAGNESIUM SERPL-MCNC: 2 MG/DL (ref 1.6–2.6)
PHOSPHATE SERPL-MCNC: 2.9 MG/DL (ref 2.7–4.5)
POTASSIUM SERPL-SCNC: 3.8 MMOL/L (ref 3.5–5.3)
SODIUM SERPL-SCNC: 134 MMOL/L (ref 136–145)

## 2019-08-05 PROCEDURE — 99232 SBSQ HOSP IP/OBS MODERATE 35: CPT | Performed by: FAMILY MEDICINE

## 2019-08-05 PROCEDURE — 87205 SMEAR GRAM STAIN: CPT | Performed by: PHYSICIAN ASSISTANT

## 2019-08-05 PROCEDURE — 99024 POSTOP FOLLOW-UP VISIT: CPT | Performed by: SURGERY

## 2019-08-05 PROCEDURE — 83735 ASSAY OF MAGNESIUM: CPT | Performed by: SURGERY

## 2019-08-05 PROCEDURE — C9113 INJ PANTOPRAZOLE SODIUM, VIA: HCPCS | Performed by: PHYSICIAN ASSISTANT

## 2019-08-05 PROCEDURE — 80048 BASIC METABOLIC PNL TOTAL CA: CPT | Performed by: INTERNAL MEDICINE

## 2019-08-05 PROCEDURE — 99232 SBSQ HOSP IP/OBS MODERATE 35: CPT | Performed by: INTERNAL MEDICINE

## 2019-08-05 PROCEDURE — 74240 X-RAY XM UPR GI TRC 1CNTRST: CPT

## 2019-08-05 PROCEDURE — 87070 CULTURE OTHR SPECIMN AEROBIC: CPT | Performed by: PHYSICIAN ASSISTANT

## 2019-08-05 PROCEDURE — 84100 ASSAY OF PHOSPHORUS: CPT | Performed by: SURGERY

## 2019-08-05 RX ORDER — OXYCODONE HCL 5 MG/5 ML
5 SOLUTION, ORAL ORAL EVERY 4 HOURS PRN
Status: DISCONTINUED | OUTPATIENT
Start: 2019-08-05 | End: 2019-08-16 | Stop reason: HOSPADM

## 2019-08-05 RX ORDER — OXYCODONE HCL 5 MG/5 ML
10 SOLUTION, ORAL ORAL EVERY 4 HOURS PRN
Status: DISCONTINUED | OUTPATIENT
Start: 2019-08-05 | End: 2019-08-16 | Stop reason: HOSPADM

## 2019-08-05 RX ADMIN — VANCOMYCIN HYDROCHLORIDE 1500 MG: 5 INJECTION, POWDER, LYOPHILIZED, FOR SOLUTION INTRAVENOUS at 21:26

## 2019-08-05 RX ADMIN — Medication 250 MG: at 18:07

## 2019-08-05 RX ADMIN — METRONIDAZOLE 500 MG: 500 INJECTION, SOLUTION INTRAVENOUS at 22:39

## 2019-08-05 RX ADMIN — ALBUTEROL SULFATE 2 PUFF: 90 AEROSOL, METERED RESPIRATORY (INHALATION) at 12:46

## 2019-08-05 RX ADMIN — METRONIDAZOLE 500 MG: 500 INJECTION, SOLUTION INTRAVENOUS at 05:27

## 2019-08-05 RX ADMIN — ALBUTEROL SULFATE 2 PUFF: 90 AEROSOL, METERED RESPIRATORY (INHALATION) at 18:07

## 2019-08-05 RX ADMIN — HYDROMORPHONE HYDROCHLORIDE 1 MG: 1 INJECTION, SOLUTION INTRAMUSCULAR; INTRAVENOUS; SUBCUTANEOUS at 05:25

## 2019-08-05 RX ADMIN — SUCRALFATE 1000 MG: 1 SUSPENSION ORAL at 12:43

## 2019-08-05 RX ADMIN — OXYCODONE HYDROCHLORIDE 10 MG: 5 SOLUTION ORAL at 15:27

## 2019-08-05 RX ADMIN — ALBUTEROL SULFATE 2 PUFF: 90 AEROSOL, METERED RESPIRATORY (INHALATION) at 21:30

## 2019-08-05 RX ADMIN — FLUCONAZOLE, SODIUM CHLORIDE 400 MG: 2 INJECTION INTRAVENOUS at 16:39

## 2019-08-05 RX ADMIN — METRONIDAZOLE 500 MG: 500 INJECTION, SOLUTION INTRAVENOUS at 14:39

## 2019-08-05 RX ADMIN — IOHEXOL 50 ML: 350 INJECTION, SOLUTION INTRAVENOUS at 14:15

## 2019-08-05 RX ADMIN — HYDROMORPHONE HYDROCHLORIDE 1 MG: 1 INJECTION, SOLUTION INTRAMUSCULAR; INTRAVENOUS; SUBCUTANEOUS at 01:32

## 2019-08-05 RX ADMIN — HYDROMORPHONE HYDROCHLORIDE 1 MG: 1 INJECTION, SOLUTION INTRAMUSCULAR; INTRAVENOUS; SUBCUTANEOUS at 21:41

## 2019-08-05 RX ADMIN — HEPARIN SODIUM 5000 UNITS: 5000 INJECTION INTRAVENOUS; SUBCUTANEOUS at 14:40

## 2019-08-05 RX ADMIN — HYDROMORPHONE HYDROCHLORIDE 1 MG: 1 INJECTION, SOLUTION INTRAMUSCULAR; INTRAVENOUS; SUBCUTANEOUS at 18:29

## 2019-08-05 RX ADMIN — VANCOMYCIN HYDROCHLORIDE 1500 MG: 5 INJECTION, POWDER, LYOPHILIZED, FOR SOLUTION INTRAVENOUS at 12:45

## 2019-08-05 RX ADMIN — HEPARIN SODIUM 5000 UNITS: 5000 INJECTION INTRAVENOUS; SUBCUTANEOUS at 21:30

## 2019-08-05 RX ADMIN — HEPARIN SODIUM 5000 UNITS: 5000 INJECTION INTRAVENOUS; SUBCUTANEOUS at 06:45

## 2019-08-05 RX ADMIN — Medication 250 MG: at 08:56

## 2019-08-05 RX ADMIN — SUCRALFATE 1000 MG: 1 SUSPENSION ORAL at 21:30

## 2019-08-05 RX ADMIN — THIAMINE HYDROCHLORIDE: 100 INJECTION, SOLUTION INTRAMUSCULAR; INTRAVENOUS at 08:56

## 2019-08-05 RX ADMIN — VANCOMYCIN HYDROCHLORIDE 1500 MG: 5 INJECTION, POWDER, LYOPHILIZED, FOR SOLUTION INTRAVENOUS at 07:23

## 2019-08-05 RX ADMIN — AZTREONAM 2000 MG: 2 INJECTION, POWDER, LYOPHILIZED, FOR SOLUTION INTRAMUSCULAR; INTRAVENOUS at 15:32

## 2019-08-05 RX ADMIN — AZTREONAM 2000 MG: 2 INJECTION, POWDER, LYOPHILIZED, FOR SOLUTION INTRAMUSCULAR; INTRAVENOUS at 00:15

## 2019-08-05 RX ADMIN — OXYCODONE HYDROCHLORIDE 10 MG: 5 SOLUTION ORAL at 09:06

## 2019-08-05 RX ADMIN — AZTREONAM 2000 MG: 2 INJECTION, POWDER, LYOPHILIZED, FOR SOLUTION INTRAMUSCULAR; INTRAVENOUS at 07:24

## 2019-08-05 RX ADMIN — ALBUTEROL SULFATE 2 PUFF: 90 AEROSOL, METERED RESPIRATORY (INHALATION) at 08:56

## 2019-08-05 RX ADMIN — PANTOPRAZOLE SODIUM 40 MG: 40 INJECTION, POWDER, FOR SOLUTION INTRAVENOUS at 08:56

## 2019-08-05 RX ADMIN — POTASSIUM CHLORIDE: 2 INJECTION, SOLUTION, CONCENTRATE INTRAVENOUS at 14:39

## 2019-08-05 RX ADMIN — SUCRALFATE 1000 MG: 1 SUSPENSION ORAL at 15:28

## 2019-08-05 RX ADMIN — SUCRALFATE 1000 MG: 1 SUSPENSION ORAL at 06:45

## 2019-08-05 RX ADMIN — PANTOPRAZOLE SODIUM 40 MG: 40 INJECTION, POWDER, FOR SOLUTION INTRAVENOUS at 21:30

## 2019-08-05 NOTE — PLAN OF CARE
Problem: Potential for Falls  Goal: Patient will remain free of falls  Description  INTERVENTIONS:  - Assess patient frequently for physical needs  -  Identify cognitive and physical deficits and behaviors that affect risk of falls  -  [place] fall precautions as indicated by assessment   - Educate patient/family on patient safety including physical limitations  - Instruct patient to call for assistance with activity based on assessment  - Modify environment to reduce risk of injury  - Consider OT/PT consult to assist with strengthening/mobility  Outcome: Progressing     Problem: Nutrition/Hydration-ADULT  Goal: Nutrient/Hydration intake appropriate for improving, restoring or maintaining nutritional needs  Description  Monitor and assess patient's nutrition/hydration status for malnutrition (ex- brittle hair, bruises, dry skin, pale skin and conjunctiva, muscle wasting, smooth red tongue, and disorientation)  Collaborate with interdisciplinary team and initiate plan and interventions as ordered  Monitor patient's weight and dietary intake as ordered or per policy  Utilize nutrition screening tool and intervene per policy  Determine patient's food preferences and provide high-protein, high-caloric foods as appropriate       INTERVENTIONS:  - Monitor oral intake, urinary output, labs, and treatment plans  - Assess nutrition and hydration status and recommend course of action  - Evaluate amount of meals eaten  - Assist patient with eating if necessary   - Allow adequate time for meals  - Recommend/ encourage appropriate diets, oral nutritional supplements, and vitamin/mineral supplements  - Order, calculate, and assess calorie counts as needed  - Recommend, monitor, and adjust tube feedings and TPN/PPN based on assessed needs  - Assess need for intravenous fluids  - Provide specific nutrition/hydration education as appropriate  - Include patient/family/caregiver in decisions related to nutrition  Outcome: Progressing     Problem: PAIN - ADULT  Goal: Verbalizes/displays adequate comfort level or baseline comfort level  Description  Interventions:  - Encourage patient to monitor pain and request assistance  - Assess pain using appropriate pain scale  - Administer analgesics based on type and severity of pain and evaluate response  - Implement non-pharmacological measures as appropriate and evaluate response  - Consider cultural and social influences on pain and pain management  - Notify physician/advanced practitioner if interventions unsuccessful or patient reports new pain  Outcome: Progressing     Problem: INFECTION - ADULT  Goal: Absence or prevention of progression during hospitalization  Description  INTERVENTIONS:  - Assess and monitor for signs and symptoms of infection  - Monitor lab/diagnostic results  - Monitor all insertion sites, i e  indwelling lines, tubes, and drains  - Monitor endotracheal (as able) and nasal secretions for changes in amount and color  - [place] appropriate cooling/warming therapies per order  - Administer medications as ordered  - Instruct and encourage patient and family to use good hand hygiene technique  - Identify and instruct in appropriate isolation precautions for identified infection/condition  Outcome: Progressing     Problem: DISCHARGE PLANNING  Goal: Discharge to home or other facility with appropriate resources  Description  INTERVENTIONS:  - Identify barriers to discharge w/patient and caregiver  - Arrange for needed discharge resources and transportation as appropriate  - Identify discharge learning needs (meds, wound care, etc )  - Arrange for interpretive services to assist at discharge as needed  - Refer to Case Management Department for coordinating discharge planning if the patient needs post-hospital services based on physician/advanced practitioner order or complex needs related to functional status, cognitive ability, or social support system  Outcome: Progressing     Problem: Knowledge Deficit  Goal: Patient/family/caregiver demonstrates understanding of disease process, treatment plan, medications, and discharge instructions  Description  Complete learning assessment and assess knowledge base    Interventions:  - Provide teaching at level of understanding  - Provide teaching via preferred learning methods  Outcome: Progressing     Problem: GASTROINTESTINAL - ADULT  Goal: Minimal or absence of nausea and/or vomiting  Description  INTERVENTIONS:  - Administer IV fluids as ordered to ensure adequate hydration  - Maintain NPO status until nausea and vomiting are resolved  - Nasogastric tube as ordered  - Administer ordered antiemetic medications as needed  - Provide nonpharmacologic comfort measures as appropriate  - Advance diet as tolerated, if ordered  - Nutrition services referral to assist patient with adequate nutrition and appropriate food choices  Outcome: Progressing  Goal: Maintains or returns to baseline bowel function  Description  INTERVENTIONS:  - Assess bowel function  - Encourage oral fluids to ensure adequate hydration  - Administer IV fluids as ordered to ensure adequate hydration  - Administer ordered medications as needed  - Encourage mobilization and activity  - Nutrition services referral to assist patient with appropriate food choices  Outcome: Progressing  Goal: Maintains adequate nutritional intake  Description  INTERVENTIONS:  - Monitor percentage of each meal consumed  - Identify factors contributing to decreased intake, treat as appropriate  - Assist with meals as needed  - Monitor I&O, WT and lab values  - Obtain nutrition services referral as needed  Outcome: Progressing     Problem: SKIN/TISSUE INTEGRITY - ADULT  Goal: Skin integrity remains intact  Description  INTERVENTIONS  - Identify patients at risk for skin breakdown  - Assess and monitor skin integrity  - Assess and monitor nutrition and hydration status  - Monitor labs (i e  albumin)  - Assess for incontinence   - Turn and reposition patient  - Assist with mobility/ambulation  - Relieve pressure over bony prominences  - Avoid friction and shearing  - Provide appropriate hygiene as needed including keeping skin clean and dry  - Evaluate need for skin moisturizer/barrier cream  - Collaborate with interdisciplinary team (i e  Nutrition, Rehabilitation, etc )   - Patient/family teaching  Outcome: Progressing  Goal: Incision(s), wounds(s) or drain site(s) healing without S/S of infection  Description  INTERVENTIONS  - Assess and document risk factors for skin impairment   - Assess and document dressing, incision, wound bed, drain sites and surrounding tissue  - Initiate Nutrition services consult and/or wound management as needed  Outcome: Progressing  Goal: Oral mucous membranes remain intact  Description  INTERVENTIONS  - Assess oral mucosa and hygiene practices  - Implement preventative oral hygiene regimen  - Implement oral medicated treatments as ordered  - Initiate Nutrition services referral as needed  Outcome: Progressing     Problem: Prexisting or High Potential for Compromised Skin Integrity  Goal: Skin integrity is maintained or improved  Description  INTERVENTIONS:  - Identify patients at risk for skin breakdown  - Assess and monitor skin integrity  - Assess and monitor nutrition and hydration status  - Monitor labs (i e  albumin)  - Assess for incontinence   - Turn and reposition patient  - Assist with mobility/ambulation  - Relieve pressure over bony prominences  - Avoid friction and shearing  - Provide appropriate hygiene as needed including keeping skin clean and dry  - Evaluate need for skin moisturizer/barrier cream  - Collaborate with interdisciplinary team (i e  Nutrition, Rehabilitation, etc )   - Patient/family teaching  Outcome: Progressing     Problem: SAFETY,RESTRAINT: NV/NON-SELF DESTRUCTIVE BEHAVIOR  Goal: Remains free of harm/injury (restraint for non violent/non self-detsructive behavior)  Description  INTERVENTIONS:  - Instruct patient/family regarding restraint use   - Assess and monitor physiologic and psychological status   - Provide interventions and comfort measures to meet assessed patient needs   - Identify and implement measures to help patient regain control  - Assess readiness for release of restraint   Outcome: Progressing  Goal: Returns to optimal restraint-free functioning  Description  INTERVENTIONS:  - Assess the patient's behavior and symptoms that indicate continued need for restraint  - Identify and implement measures to help patient regain control  - Assess readiness for release of restraint   Outcome: Progressing

## 2019-08-05 NOTE — PLAN OF CARE
Problem: Potential for Falls  Goal: Patient will remain free of falls  Description  INTERVENTIONS:  - Assess patient frequently for physical needs  -  Identify cognitive and physical deficits and behaviors that affect risk of falls  -  [place] fall precautions as indicated by assessment   - Educate patient/family on patient safety including physical limitations  - Instruct patient to call for assistance with activity based on assessment  - Modify environment to reduce risk of injury  - Consider OT/PT consult to assist with strengthening/mobility  Outcome: Progressing     Problem: Nutrition/Hydration-ADULT  Goal: Nutrient/Hydration intake appropriate for improving, restoring or maintaining nutritional needs  Description  Monitor and assess patient's nutrition/hydration status for malnutrition (ex- brittle hair, bruises, dry skin, pale skin and conjunctiva, muscle wasting, smooth red tongue, and disorientation)  Collaborate with interdisciplinary team and initiate plan and interventions as ordered  Monitor patient's weight and dietary intake as ordered or per policy  Utilize nutrition screening tool and intervene per policy  Determine patient's food preferences and provide high-protein, high-caloric foods as appropriate       INTERVENTIONS:  - Monitor oral intake, urinary output, labs, and treatment plans  - Assess nutrition and hydration status and recommend course of action  - Evaluate amount of meals eaten  - Assist patient with eating if necessary   - Allow adequate time for meals  - Recommend/ encourage appropriate diets, oral nutritional supplements, and vitamin/mineral supplements  - Order, calculate, and assess calorie counts as needed  - Recommend, monitor, and adjust tube feedings and TPN/PPN based on assessed needs  - Assess need for intravenous fluids  - Provide specific nutrition/hydration education as appropriate  - Include patient/family/caregiver in decisions related to nutrition  Outcome: Progressing     Problem: PAIN - ADULT  Goal: Verbalizes/displays adequate comfort level or baseline comfort level  Description  Interventions:  - Encourage patient to monitor pain and request assistance  - Assess pain using appropriate pain scale  - Administer analgesics based on type and severity of pain and evaluate response  - Implement non-pharmacological measures as appropriate and evaluate response  - Consider cultural and social influences on pain and pain management  - Notify physician/advanced practitioner if interventions unsuccessful or patient reports new pain  Outcome: Progressing     Problem: INFECTION - ADULT  Goal: Absence or prevention of progression during hospitalization  Description  INTERVENTIONS:  - Assess and monitor for signs and symptoms of infection  - Monitor lab/diagnostic results  - Monitor all insertion sites, i e  indwelling lines, tubes, and drains  - Monitor endotracheal (as able) and nasal secretions for changes in amount and color  - [place] appropriate cooling/warming therapies per order  - Administer medications as ordered  - Instruct and encourage patient and family to use good hand hygiene technique  - Identify and instruct in appropriate isolation precautions for identified infection/condition  Outcome: Progressing     Problem: DISCHARGE PLANNING  Goal: Discharge to home or other facility with appropriate resources  Description  INTERVENTIONS:  - Identify barriers to discharge w/patient and caregiver  - Arrange for needed discharge resources and transportation as appropriate  - Identify discharge learning needs (meds, wound care, etc )  - Arrange for interpretive services to assist at discharge as needed  - Refer to Case Management Department for coordinating discharge planning if the patient needs post-hospital services based on physician/advanced practitioner order or complex needs related to functional status, cognitive ability, or social support system  Outcome: Progressing     Problem: Knowledge Deficit  Goal: Patient/family/caregiver demonstrates understanding of disease process, treatment plan, medications, and discharge instructions  Description  Complete learning assessment and assess knowledge base    Interventions:  - Provide teaching at level of understanding  - Provide teaching via preferred learning methods  Outcome: Progressing     Problem: GASTROINTESTINAL - ADULT  Goal: Minimal or absence of nausea and/or vomiting  Description  INTERVENTIONS:  - Administer IV fluids as ordered to ensure adequate hydration  - Maintain NPO status until nausea and vomiting are resolved  - Nasogastric tube as ordered  - Administer ordered antiemetic medications as needed  - Provide nonpharmacologic comfort measures as appropriate  - Advance diet as tolerated, if ordered  - Nutrition services referral to assist patient with adequate nutrition and appropriate food choices  Outcome: Progressing  Goal: Maintains or returns to baseline bowel function  Description  INTERVENTIONS:  - Assess bowel function  - Encourage oral fluids to ensure adequate hydration  - Administer IV fluids as ordered to ensure adequate hydration  - Administer ordered medications as needed  - Encourage mobilization and activity  - Nutrition services referral to assist patient with appropriate food choices  Outcome: Progressing  Goal: Maintains adequate nutritional intake  Description  INTERVENTIONS:  - Monitor percentage of each meal consumed  - Identify factors contributing to decreased intake, treat as appropriate  - Assist with meals as needed  - Monitor I&O, WT and lab values  - Obtain nutrition services referral as needed  Outcome: Progressing     Problem: SKIN/TISSUE INTEGRITY - ADULT  Goal: Skin integrity remains intact  Description  INTERVENTIONS  - Identify patients at risk for skin breakdown  - Assess and monitor skin integrity  - Assess and monitor nutrition and hydration status  - Monitor labs (i e  albumin)  - Assess for incontinence   - Turn and reposition patient  - Assist with mobility/ambulation  - Relieve pressure over bony prominences  - Avoid friction and shearing  - Provide appropriate hygiene as needed including keeping skin clean and dry  - Evaluate need for skin moisturizer/barrier cream  - Collaborate with interdisciplinary team (i e  Nutrition, Rehabilitation, etc )   - Patient/family teaching  Outcome: Progressing  Goal: Incision(s), wounds(s) or drain site(s) healing without S/S of infection  Description  INTERVENTIONS  - Assess and document risk factors for skin impairment   - Assess and document dressing, incision, wound bed, drain sites and surrounding tissue  - Initiate Nutrition services consult and/or wound management as needed  Outcome: Progressing  Goal: Oral mucous membranes remain intact  Description  INTERVENTIONS  - Assess oral mucosa and hygiene practices  - Implement preventative oral hygiene regimen  - Implement oral medicated treatments as ordered  - Initiate Nutrition services referral as needed  Outcome: Progressing     Problem: Prexisting or High Potential for Compromised Skin Integrity  Goal: Skin integrity is maintained or improved  Description  INTERVENTIONS:  - Identify patients at risk for skin breakdown  - Assess and monitor skin integrity  - Assess and monitor nutrition and hydration status  - Monitor labs (i e  albumin)  - Assess for incontinence   - Turn and reposition patient  - Assist with mobility/ambulation  - Relieve pressure over bony prominences  - Avoid friction and shearing  - Provide appropriate hygiene as needed including keeping skin clean and dry  - Evaluate need for skin moisturizer/barrier cream  - Collaborate with interdisciplinary team (i e  Nutrition, Rehabilitation, etc )   - Patient/family teaching  Outcome: Progressing     Problem: SAFETY,RESTRAINT: NV/NON-SELF DESTRUCTIVE BEHAVIOR  Goal: Remains free of harm/injury (restraint for non violent/non self-detsructive behavior)  Description  INTERVENTIONS:  - Instruct patient/family regarding restraint use   - Assess and monitor physiologic and psychological status   - Provide interventions and comfort measures to meet assessed patient needs   - Identify and implement measures to help patient regain control  - Assess readiness for release of restraint   Outcome: Progressing  Goal: Returns to optimal restraint-free functioning  Description  INTERVENTIONS:  - Assess the patient's behavior and symptoms that indicate continued need for restraint  - Identify and implement measures to help patient regain control  - Assess readiness for release of restraint   Outcome: Progressing

## 2019-08-05 NOTE — PROGRESS NOTES
Vancomycin IV Pharmacy-to-Dose Consultation    Santy Kam is a 63 y o  male who is currently receiving Vancomycin IV with management by the Pharmacy Consult service  Assessment/Plan:  The patient was reviewed  Renal function is stable and no signs or symptoms of nephrotoxicity and/or infusion reactions were documented in the chart  Based on todays assessment, continue current vancomycin  dosing of 1500mg IV every 8 hours with a plan for trough to be drawn on 8/11/19 at 1130    We will continue to follow the patients culture results and clinical progress daily      Sangeetha Bolton, Pharmacist

## 2019-08-05 NOTE — PROGRESS NOTES
Vancomycin IV Pharmacy-to-Dose Consultation    Rashid Johnson is a 63 y o  male who is currently receiving Vancomycin IV with management by the Pharmacy Consult service  Assessment/Plan:  The patient was reviewed  Renal function is stable and no signs or symptoms of nephrotoxicity and/or infusion reactions were documented in the chart  The most recent vancomycin trough is 16 1, therapeutic (goal trough 15-20)  Based on todays assessment, continue current vancomycin dosing of 1500mg IV every 8 hours, with a plan for trough to be drawn in 1 week on 8/11/19 at 1130    We will continue to follow the patients culture results and clinical progress daily      Jose Estrada, Pharmacist

## 2019-08-05 NOTE — PROGRESS NOTES
Progress Note - Infectious Disease   Tessa Nielson 63 y o  male MRN: [id number]  Unit/Bed#: E5 -01 Encounter: [id number]      Impression/Plan:  1  Sepsis-leukocytosis and tachycardia  Etiology most likely intra-abdominal abscess, although lung consolidations may play a role  Patient is improving clinically slowly  He remains systemically well  Temperature is down but WBC still quite elevated, but trending down  Procalcitonin decreasing Blood cultures are negative so far  If WBC remains elevated next few days, will need to repeat CT  Atibiotic plan as in below  Monitor temperature/WBC  Monitor procalcitonin  Consider repeat chest/abdomen/pelvis CT in next few days, if WBC remains elevated and procalcitonin does not further decrease      2  Intra-abdominal abscesses-status post IR drainage with COURTNEY drains remaining in place  Abscess culture is growing strep mitis and Candida  Limited antibiotic choices based upon the patient's allergies  Continue vancomycin, Flagyl, fluconazole for now  Continue aztreonam for now  Likely discontinue aztreonam after 7 days  Continue drainage per IR  Close surgical follow-up      3  Peritonitis-in the setting of a perforated marginal ulceration   The patient is now status post exploratory laparotomy with repair and stent placement, and now COURTNEY drain placement  Antibiotic plan as in above  Continue drain as in above  Serial abdominal exams  Surgery follow-up      4  Abnormal CT of the chest-with some consolidation and pleural effusion noted on the right   Perhaps this is all atelectasis with reactive effusion   Less likely but also possible would be pneumonia with empyema   Patient's respiratory status improving slowly  As in above, may need to repeat chest CT if WBC remains elevated  Patient may also need thoracentesis  Antibiotic plan as in above  Monitor respiratory status  May need to repeat chest CT  May need thoracentesis      5   COPD-no current clinical evidence of an acute exacerbation at this time      6  Multiple antibiotic allergies, including PCN anaphylaxis  This limits antibiotic choice, as in above  Discussed with patient in detail regarding the above plan      Antibiotics:  Vancomycin/aztreonam # 5  Flagyl # 11  High-dose fluconazole # 5     Subjective:  Patient is comfortable  Dyspnea improved  Mild weak cough  Abdominal pain mild and improved  Temperature stays down  No chills  He is tolerating antibiotics well  No nausea, vomiting or diarrhea  Objective:  Vitals:  Temp:  [97 1 °F (36 2 °C)-97 5 °F (36 4 °C)] 97 2 °F (36 2 °C)  HR:  [] 91  Resp:  [20-22] 20  BP: (125-167)/(61-91) 125/61  SpO2:  [98 %-100 %] 99 %  Temp (24hrs), Av 3 °F (36 3 °C), Min:97 1 °F (36 2 °C), Max:97 5 °F (36 4 °C)  Current: Temperature: (!) 97 2 °F (36 2 °C)    Physical Exam:     General: Awake, alert, cooperative, no distress  Neck:  Supple  No mass  No lymphadenopathy  Lungs: Expansion symmetric, no rales, no wheezing, respirations unlabored  Heart:  Regular rate and rhythm, S1 and S2 normal, no murmur  Abdomen: Soft, mild to moderate distention  Drains in place, mostly serous  Mild tenderness at drain site  Extremities: Trace leg edema  No erythema/warmth  No ulcer  Nontender to palpation  Skin:  No rash  Neuro: Moves all extremities  Invasive Devices     Peripheral Intravenous Line            Peripheral IV 19 Left Antecubital less than 1 day    Peripheral IV 19 Right Arm less than 1 day          Drain            Closed/Suction Drain Right Abdomen Bulb 19 Fr  7 days    Gastrostomy/Enterostomy Gastrostomy 22 Fr  LUQ 7 days    Closed/Suction Drain Left LUQ Bulb 8 Fr  3 days    Closed/Suction Drain Right RUQ Bulb 8 Fr  3 days                Labs studies:   I have personally reviewed pertinent labs    Results from last 7 days   Lab Units 19  0720 19  0033 19  0709   POTASSIUM mmol/L 3 8 3 8 4 6 CHLORIDE mmol/L 100 101 101   CO2 mmol/L 27 26 28   BUN mg/dL 8 11 13   CREATININE mg/dL 0 42* 0 49* 0 50*   EGFR ml/min/1 73sq m 129 121 120   CALCIUM mg/dL 7 4* 7 5* 7 6*   AST U/L  --  34  --    ALT U/L  --  17  --    ALK PHOS U/L  --  106  --      Results from last 7 days   Lab Units 08/04/19  0438 08/03/19  0709 08/02/19  0700   WBC Thousand/uL 28 85* 29 92* 32 94*   HEMOGLOBIN g/dL 11 8* 13 0 12 5   PLATELETS Thousands/uL 250 195 174     Results from last 7 days   Lab Units 08/01/19  1845 08/01/19  1609 08/01/19  1546   BLOOD CULTURE  No Growth at 72 hrs  No Growth at 72 hrs   --   --    GRAM STAIN RESULT   --  2+ RBC's*  3+ Gram positive cocci in pairs and chains* Rare Polys  No bacteria seen   BODY FLUID CULTURE, STERILE   --  4+ Growth of Streptococcus mitis oralis group*  Few Colonies of Candida albicans* Few Colonies of Streptococcus mitis oralis group*       Imaging Studies:   I have personally reviewed pertinent imaging study reports and images in PACS  EKG, Pathology, and Other Studies:   I have personally reviewed pertinent reports

## 2019-08-05 NOTE — ASSESSMENT & PLAN NOTE
Marginal ulcer with perforation  Patient initially admitted for open hernia repair but subsequently developed perforated marginal ulcer requiring laparotomy  Subsequently developed intra-abdominal abscesses requiring placement of drains  Currently on vancomycin and aztreonam metronidazole and fluconazole per ID  As per ID, will continue aztreonam for total of 7 days and then discontinue

## 2019-08-05 NOTE — PROGRESS NOTES
Progress Note Deepak Tai 1960, 63 y o  male MRN: [id number]    Unit/Bed#: E5 -01 Encounter: [id number]    Primary Care Provider: Katie Saab DO   Date and time admitted to hospital: 7/26/2019  9:00 AM        * Chronic marginal ulcer with perforation ([facility] )  Assessment & Plan  Marginal ulcer with perforation  Patient initially admitted for open hernia repair but subsequently developed perforated marginal ulcer requiring laparotomy  Subsequently developed intra-abdominal abscesses requiring placement of drains  Currently on vancomycin and aztreonam metronidazole and fluconazole per ID  As per ID, will continue aztreonam for total of 7 days and then discontinue  Severe protein-calorie malnutrition ([facility] 75 )  Assessment & Plan  Body mass index is 25 24 kg/m²  Electrolyte disturbance  Assessment & Plan  Electrolyte disturbances were present including hypokalemia hypomagnesemia and hypophosphatemia  Electrolyte disturbances have been replaced with IV builder of potassium and phosphorus  Current electrolytes within normal limits  Results from last 7 days   Lab Units 08/05/19  0720 08/04/19  0438 08/04/19  0033 08/03/19  0709 08/02/19  0700 08/01/19  0526 07/31/19  1559 07/31/19  0606 07/30/19  0602   MAGNESIUM mg/dL 2 0  --   --  2 0 1 9 1 5* 1 8 1 9 2 1   PHOSPHORUS mg/dL 2 9 2 8  --  2 6* 2 3* 1 7* 1 7* 1 6* 1 5*   POTASSIUM mmol/L 3 8  --  3 8 4 6 3 7 3 1* 3 1* 2 9* 3 3*       Diet-controlled diabetes mellitus [facility])  Assessment & Plan  Lab Results   Component Value Date    HGBA1C 5 1 07/13/2018     Recent Labs     08/03/19  1322 08/04/19  0852   POCGLU 77 101     Diet controlled diabetes  Accucheks within limits  Abnormal CT scan, lung  Assessment & Plan  Right lower lobe opacity:  Differentials includes atelectasis versus loculated effusion  Patient already on broad-spectrum antibiotics for intra-abdominal abscess  No respiratory symptoms at this time  Leukocytosis improving  Consider pulmonary consultation or thoracentesis/sampling if clinically warranted    Coagulopathy [facility])  Assessment & Plan  Coagulopathy secondary to malnutrition  VTE Pharmacologic Prophylaxis:   Pharmacologic: Heparin  Mechanical VTE Prophylaxis in Place: No    Patient Centered Rounds: I have performed bedside rounds with nursing staff today  Discussions with Specialists or Other Care Team Provider:  Yes    Education and Discussions with Family / Patient:  Yes    Time Spent for Care: 15 minutes  More than 50% of total time spent on counseling and coordination of care as described above  Current Length of Stay: 10 day(s)    Current Patient Status: Inpatient   Certification Statement: The patient will continue to require additional inpatient hospital stay due to Chronic complications from bariatric surgery    Discharge Plan: To be determined    Code Status: Level 1 - Full Code      Subjective:   Patient complaining of mild abdominal pain  Objective:     Vitals:   Temp (24hrs), Av 1 °F (36 2 °C), Min:96 5 °F (35 8 °C), Max:97 5 °F (36 4 °C)    Temp:  [96 5 °F (35 8 °C)-97 5 °F (36 4 °C)] 96 5 °F (35 8 °C)  HR:  [] 100  Resp:  [19-20] 19  BP: (125-163)/(61-75) 147/75  SpO2:  [99 %-100 %] 99 %  Body mass index is 25 24 kg/m²  Input and Output Summary (last 24 hours): Intake/Output Summary (Last 24 hours) at 2019 1801  Last data filed at 2019 1402  Gross per 24 hour   Intake 350 ml   Output 875 ml   Net -525 ml       Physical Exam:     Physical Exam   Constitutional: He is oriented to person, place, and time  He appears well-developed  No distress  Appears malnourished   HENT:   Head: Normocephalic and atraumatic  Eyes: Pupils are equal, round, and reactive to light  EOM are normal    Neck: Normal range of motion  Neck supple  No JVD present  Cardiovascular: Normal rate, regular rhythm and normal heart sounds     Pulmonary/Chest: Effort normal and breath sounds normal    Abdominal: Soft  Bowel sounds are normal  He exhibits no distension  There is no tenderness  Musculoskeletal: He exhibits no edema  Neurological: He is alert and oriented to person, place, and time  Skin: Skin is warm and dry  He is not diaphoretic  Additional Data:     Labs:    Results from last 7 days   Lab Units 08/04/19  0438 08/03/19  0709  07/31/19  0606   WBC Thousand/uL 28 85* 29 92*   < > 19 88*   HEMOGLOBIN g/dL 11 8* 13 0   < > 12 4   HEMATOCRIT % 35 4* 39 6   < > 36 7   PLATELETS Thousands/uL 250 195   < > 204   BANDS PCT %  --   --   --  20*   NEUTROS PCT %  --  89*   < >  --    LYMPHS PCT %  --  4*   < >  --    LYMPHO PCT %  --   --   --  8*   MONOS PCT %  --  5   < >  --    MONO PCT %  --   --   --  6   EOS PCT %  --  0   < > 1    < > = values in this interval not displayed  Results from last 7 days   Lab Units 08/05/19  0720 08/04/19  0033   SODIUM mmol/L 134* 135*   POTASSIUM mmol/L 3 8 3 8   CHLORIDE mmol/L 100 101   CO2 mmol/L 27 26   BUN mg/dL 8 11   CREATININE mg/dL 0 42* 0 49*   ANION GAP mmol/L 7 8   CALCIUM mg/dL 7 4* 7 5*   ALBUMIN g/dL  --  1 7*   TOTAL BILIRUBIN mg/dL  --  1 43*   ALK PHOS U/L  --  106   ALT U/L  --  17   AST U/L  --  34   GLUCOSE RANDOM mg/dL 102 101         Results from last 7 days   Lab Units 08/04/19  0852 08/03/19  1322   POC GLUCOSE mg/dl 101 77         Results from last 7 days   Lab Units 08/04/19  0438 08/03/19  0709 08/02/19  0700 08/01/19  1846   PROCALCITONIN ng/ml 0 87* 1 46* 1 88* 2 75*           * I Have Reviewed All Lab Data Listed Above  * Additional Pertinent Lab Tests Reviewed: Caitlin 66 Admission Reviewed    Imaging:    Imaging Reports Reviewed Today Include:  None available  Imaging Personally Reviewed by Myself Includes:  None    Recent Cultures (last 7 days):     Results from last 7 days   Lab Units 08/05/19  0917 08/01/19  1845 08/01/19  1609 08/01/19  1546   BLOOD CULTURE   --  No Growth at 72 hrs    No Growth at 72 hrs   --   --    Ruslan Monk STAIN RESULT  Rare Disintegrating polys  No bacteria seen  --  2+ RBC's*  3+ Gram positive cocci in pairs and chains* Rare Polys  No bacteria seen   BODY FLUID CULTURE, STERILE   --   --  4+ Growth of Streptococcus mitis oralis group*  Few Colonies of Candida albicans* Few Colonies of Streptococcus mitis oralis group*       Last 24 Hours Medication List:     Current Facility-Administered Medications:  albuterol 2 puff Inhalation 4x Daily Ronit Gil PA-C    aztreonam 2,000 mg Intravenous Q8H Stacie Mejia MD Last Rate: 2,000 mg (08/05/19 1532)   diphenhydrAMINE 25 mg Intravenous Q6H PRN Ronit Gil PA-C    fluconazole 400 mg Intravenous Q24H Wil Jenkins MD Last Rate: 400 mg (08/05/19 1639)   heparin (porcine) 5,000 Units Subcutaneous [facility] Ronit Gil PA-C    HYDROmorphone 1 mg Intravenous Q2H PRN Ronit Gil PA-C    metroNIDAZOLE 500 mg Intravenous Q8H Walter Trejo MD Last Rate: 500 mg (08/05/19 1439)   multivitamin IVPB  Intravenous Daily Ronit Gil PA-C Last Rate: 500 mL/hr at 08/05/19 0856   ondansetron 4 mg Intravenous Q6H PRN Ronit Gil PA-C    oxyCODONE 10 mg Oral Q4H PRN Zita Busch MD    oxyCODONE 5 mg Oral Q4H PRN Zita Busch MD    pantoprazole 40 mg Intravenous Q12H [address] Ronit Gil PA-C    IV infusion builder  Intravenous Continuous Alexander Lazcano MD Last Rate: 50 mL/hr at 08/05/19 1439   promethazine 12 5 mg Intravenous Q6H PRN Ronit Gil PA-C    saccharomyces boulardii 250 mg Oral BID Ronit Gil PA-C    simethicone 80 mg Oral Q6H PRN Ronit Gil PA-C    sucralfate 1,000 mg Oral 4x Daily (AC & HS) Ronit Gil PA-C    vancomycin 20 mg/kg Intravenous Q8H Stacie Mejia MD Last Rate: 1,500 mg (08/05/19 1245)        Today, Patient Was Seen By: Karmen Cartwright MD    ** Please Note: Dictation voice to text software may have been used in the creation of this document  **

## 2019-08-05 NOTE — PROGRESS NOTES
Progress Note - Bariatric Surgery   [facility] 63 y o  male MRN: [id number]  Unit/Bed#: E5 -01 Encounter: [id number]      Subjective/Objective     Subjective: 63year old male status post Exploratory Laparotomy with repair of perforated marginal ulcer, abdominal washout, G tube placement, and GI assisted endoscopic stent placement 7/28/2019 with Dr Emily Drake  Patient s/p Diagnostic laparoscopy converted to open 7/26/2019 with internal hernia repair  Clinically improving  Pain adequately controlled on IV pain medication, Tolerating G tube feedings with no bloating, nausea, or vomiting, having Bowel movements and passing flatus, ambulating without assistance, voiding well, using incentive spirometer with productive cough  Denies any chest pain or worsening SOB       Midline incision with erythema without streaking  Minimal drainage but will culture today  Will continue dressing changes  Denies fevers, chills, sweats  Objective:    /72 (BP Location: Left arm)   Pulse 105   Temp 97 5 °F (36 4 °C) (Temporal)   Resp 20   Ht 5' 8" (1 727 m)   Wt 75 3 kg (166 lb 0 1 oz)   SpO2 100%   BMI 25 24 kg/m²       Intake/Output Summary (Last 24 hours) at 8/5/2019 9988  Last data filed at 8/5/2019 0401  Gross per 24 hour   Intake 370 ml   Output 835 ml   Net -465 ml       Invasive Devices     Peripheral Intravenous Line            Peripheral IV 08/04/19 Left Antecubital less than 1 day    Peripheral IV 08/04/19 Right Arm less than 1 day          Drain            Closed/Suction Drain Right Abdomen Bulb 19 Fr  7 days    Gastrostomy/Enterostomy Gastrostomy 22 Fr  LUQ 7 days    Closed/Suction Drain Left LUQ Bulb 8 Fr  3 days    Closed/Suction Drain Right RUQ Bulb 8 Fr  3 days                ROS: 10-point system completed  All negative except see HPI        Physical Exam    General Appearance:    Alert, cooperative, no distress, appears stated age   Head:    Normocephalic, without obvious abnormality, atraumatic Lungs:     Respirations unlabored   Heart:    Regular rate and rhythm   Abdomen:     Soft, appropriate tenderness,  no masses, no organomegaly,   non distended; Left IR drain with minimal output; eleuterio surgical drain in place with serosanguinous drainage, R IR drain in place with serosanguinous drainage; midline incision below umbilicus with erythema and blanching upon light palpation; minimal drainage; continue twice daily dressing changes    Extremities:   Extremities normal, atraumatic, no cyanosis, +1 pitting edema   Neurologic:  Incision:  Psych:   Normal strength and sensation    Clean, dry, and intact    Normal mood and affect       Lab, Imaging and other studies:I have personally reviewed pertinent lab results       VTE Mechanical Prophylaxis: sequential compression device, Heparin     Assessment/Plan   Perforated marginal ulcer  Internal hernia  Bariatric surgery status     Patient s/p Exploratory laparotomy, washout of sucus, repair of marginal ulcer perforation, abdominal washout, intraoperative EGD, GI assisted endoscopic stent placement, and G tube placement 7/28/2019 with Dr Alexandra De Luna s/p RNY gastric bypass done in 2017 with Dr Antonio Espitia       - Continue current bowel regimen   - Increase tube feedings to 20ml per hour   - Continue vancomycin Day 5, aztreonam Day 5, Flagyl Day 11, and fluconazole Day 5  - ID on board; appreciate input  - Pharmacy consult placed; appreciate input   - Will culture drainage from midline incision  - Continue twice daily dressing changes    - Continue IVF at 50 cc/hr  - Continue to trend electrolytes and WBC count  - Continue to monitor drain output   - Consult to IR for drain flushing protocol   - Encourage IS, ambulation, can have sips with meds   - UGI today; will advance diet upon results   - PPI and Carafate for GI prophylaxis  - SCDs and Heparin for DVT prophylaxis  - Case management on board for home tube feedings      Patient will need a revision surgery in the future with our team  Patient is aware that the tube feedings will be temporary until his nutrition is optimized  Lengthy discussion was held regarding future care plan  All questions answered  Plan of care was discussed with patient and patient's nurse  Care plan discussed with Dr Kindra Lyle:  Unable to be discharged to home today

## 2019-08-05 NOTE — ASSESSMENT & PLAN NOTE
Electrolyte disturbances were present including hypokalemia hypomagnesemia and hypophosphatemia  Electrolyte disturbances have been replaced with IV builder of potassium and phosphorus  Current electrolytes within normal limits      Results from last 7 days   Lab Units 08/05/19  0720 08/04/19  0438 08/04/19  0033 08/03/19  0709 08/02/19  0700 08/01/19  0526 07/31/19  1559 07/31/19  0606 07/30/19  0602   MAGNESIUM mg/dL 2 0  --   --  2 0 1 9 1 5* 1 8 1 9 2 1   PHOSPHORUS mg/dL 2 9 2 8  --  2 6* 2 3* 1 7* 1 7* 1 6* 1 5*   POTASSIUM mmol/L 3 8  --  3 8 4 6 3 7 3 1* 3 1* 2 9* 3 3*

## 2019-08-05 NOTE — PROGRESS NOTES
Multiple attempts made to get blood work this morning  PICC nurse Maci Kirby attempted blood work with ultrasound machine   Unable to get blood work at this time

## 2019-08-06 ENCOUNTER — APPOINTMENT (INPATIENT)
Dept: PERIOP | Facility: HOSPITAL | Age: 59
DRG: 326 | End: 2019-08-06
Payer: COMMERCIAL

## 2019-08-06 ENCOUNTER — ANESTHESIA (INPATIENT)
Dept: PERIOP | Facility: HOSPITAL | Age: 59
DRG: 326 | End: 2019-08-06
Payer: COMMERCIAL

## 2019-08-06 ENCOUNTER — APPOINTMENT (INPATIENT)
Dept: RADIOLOGY | Facility: HOSPITAL | Age: 59
DRG: 326 | End: 2019-08-06
Payer: COMMERCIAL

## 2019-08-06 ENCOUNTER — ANESTHESIA EVENT (INPATIENT)
Dept: PERIOP | Facility: HOSPITAL | Age: 59
DRG: 326 | End: 2019-08-06
Payer: COMMERCIAL

## 2019-08-06 LAB
ANION GAP SERPL CALCULATED.3IONS-SCNC: 5 MMOL/L (ref 4–13)
BACTERIA BLD CULT: NORMAL
BACTERIA BLD CULT: NORMAL
BASOPHILS # BLD AUTO: 0.07 THOUSANDS/ΜL (ref 0–0.1)
BASOPHILS NFR BLD AUTO: 0 % (ref 0–1)
BUN SERPL-MCNC: 4 MG/DL (ref 5–25)
CALCIUM SERPL-MCNC: 7.4 MG/DL (ref 8.3–10.1)
CHLORIDE SERPL-SCNC: 102 MMOL/L (ref 100–108)
CO2 SERPL-SCNC: 31 MMOL/L (ref 21–32)
CREAT SERPL-MCNC: 0.49 MG/DL (ref 0.6–1.3)
EOSINOPHIL # BLD AUTO: 0.07 THOUSAND/ΜL (ref 0–0.61)
EOSINOPHIL NFR BLD AUTO: 0 % (ref 0–6)
ERYTHROCYTE [DISTWIDTH] IN BLOOD BY AUTOMATED COUNT: 14.9 % (ref 11.6–15.1)
GFR SERPL CREATININE-BSD FRML MDRD: 121 ML/MIN/1.73SQ M
GLUCOSE SERPL-MCNC: 113 MG/DL (ref 65–140)
HCT VFR BLD AUTO: 32.3 % (ref 36.5–49.3)
HGB BLD-MCNC: 10.5 G/DL (ref 12–17)
IMM GRANULOCYTES # BLD AUTO: 0.23 THOUSAND/UL (ref 0–0.2)
IMM GRANULOCYTES NFR BLD AUTO: 1 % (ref 0–2)
LYMPHOCYTES # BLD AUTO: 0.97 THOUSANDS/ΜL (ref 0.6–4.47)
LYMPHOCYTES NFR BLD AUTO: 5 % (ref 14–44)
MAGNESIUM SERPL-MCNC: 1.7 MG/DL (ref 1.6–2.6)
MCH RBC QN AUTO: 30.1 PG (ref 26.8–34.3)
MCHC RBC AUTO-ENTMCNC: 32.5 G/DL (ref 31.4–37.4)
MCV RBC AUTO: 93 FL (ref 82–98)
MONOCYTES # BLD AUTO: 1.23 THOUSAND/ΜL (ref 0.17–1.22)
MONOCYTES NFR BLD AUTO: 6 % (ref 4–12)
NEUTROPHILS # BLD AUTO: 16.51 THOUSANDS/ΜL (ref 1.85–7.62)
NEUTS SEG NFR BLD AUTO: 88 % (ref 43–75)
NRBC BLD AUTO-RTO: 0 /100 WBCS
PHOSPHATE SERPL-MCNC: 2.9 MG/DL (ref 2.7–4.5)
PLATELET # BLD AUTO: 460 THOUSANDS/UL (ref 149–390)
PMV BLD AUTO: 9.3 FL (ref 8.9–12.7)
POTASSIUM SERPL-SCNC: 3.3 MMOL/L (ref 3.5–5.3)
PROCALCITONIN SERPL-MCNC: 0.68 NG/ML
RBC # BLD AUTO: 3.49 MILLION/UL (ref 3.88–5.62)
SODIUM SERPL-SCNC: 138 MMOL/L (ref 136–145)
WBC # BLD AUTO: 19.08 THOUSAND/UL (ref 4.31–10.16)

## 2019-08-06 PROCEDURE — 84145 PROCALCITONIN (PCT): CPT | Performed by: FAMILY MEDICINE

## 2019-08-06 PROCEDURE — 99024 POSTOP FOLLOW-UP VISIT: CPT | Performed by: SURGERY

## 2019-08-06 PROCEDURE — 0BH17EZ INSERTION OF ENDOTRACHEAL AIRWAY INTO TRACHEA, VIA NATURAL OR ARTIFICIAL OPENING: ICD-10-PCS | Performed by: SURGERY

## 2019-08-06 PROCEDURE — 84100 ASSAY OF PHOSPHORUS: CPT | Performed by: FAMILY MEDICINE

## 2019-08-06 PROCEDURE — 99232 SBSQ HOSP IP/OBS MODERATE 35: CPT | Performed by: INTERNAL MEDICINE

## 2019-08-06 PROCEDURE — 99232 SBSQ HOSP IP/OBS MODERATE 35: CPT | Performed by: FAMILY MEDICINE

## 2019-08-06 PROCEDURE — 43235 EGD DIAGNOSTIC BRUSH WASH: CPT

## 2019-08-06 PROCEDURE — 0DW58DZ REVISION OF INTRALUMINAL DEVICE IN ESOPHAGUS, VIA NATURAL OR ARTIFICIAL OPENING ENDOSCOPIC: ICD-10-PCS | Performed by: INTERNAL MEDICINE

## 2019-08-06 PROCEDURE — 85025 COMPLETE CBC W/AUTO DIFF WBC: CPT | Performed by: FAMILY MEDICINE

## 2019-08-06 PROCEDURE — C9113 INJ PANTOPRAZOLE SODIUM, VIA: HCPCS | Performed by: PHYSICIAN ASSISTANT

## 2019-08-06 PROCEDURE — 71045 X-RAY EXAM CHEST 1 VIEW: CPT

## 2019-08-06 PROCEDURE — 83735 ASSAY OF MAGNESIUM: CPT | Performed by: FAMILY MEDICINE

## 2019-08-06 PROCEDURE — 80048 BASIC METABOLIC PNL TOTAL CA: CPT | Performed by: FAMILY MEDICINE

## 2019-08-06 PROCEDURE — 5A1935Z RESPIRATORY VENTILATION, LESS THAN 24 CONSECUTIVE HOURS: ICD-10-PCS | Performed by: SURGERY

## 2019-08-06 PROCEDURE — NC001 PR NO CHARGE: Performed by: PHYSICIAN ASSISTANT

## 2019-08-06 RX ORDER — ONDANSETRON 2 MG/ML
4 INJECTION INTRAMUSCULAR; INTRAVENOUS EVERY 6 HOURS PRN
Status: DISCONTINUED | OUTPATIENT
Start: 2019-08-06 | End: 2019-08-06 | Stop reason: HOSPADM

## 2019-08-06 RX ORDER — ROCURONIUM BROMIDE 10 MG/ML
INJECTION, SOLUTION INTRAVENOUS AS NEEDED
Status: DISCONTINUED | OUTPATIENT
Start: 2019-08-06 | End: 2019-08-06 | Stop reason: SURG

## 2019-08-06 RX ORDER — FENTANYL CITRATE 50 UG/ML
INJECTION, SOLUTION INTRAMUSCULAR; INTRAVENOUS AS NEEDED
Status: DISCONTINUED | OUTPATIENT
Start: 2019-08-06 | End: 2019-08-06 | Stop reason: SURG

## 2019-08-06 RX ORDER — PROPOFOL 10 MG/ML
INJECTION, EMULSION INTRAVENOUS AS NEEDED
Status: DISCONTINUED | OUTPATIENT
Start: 2019-08-06 | End: 2019-08-06 | Stop reason: SURG

## 2019-08-06 RX ORDER — SODIUM CHLORIDE 9 MG/ML
INJECTION, SOLUTION INTRAVENOUS CONTINUOUS PRN
Status: DISCONTINUED | OUTPATIENT
Start: 2019-08-06 | End: 2019-08-06 | Stop reason: SURG

## 2019-08-06 RX ORDER — SUCCINYLCHOLINE/SOD CL,ISO/PF 100 MG/5ML
SYRINGE (ML) INTRAVENOUS AS NEEDED
Status: DISCONTINUED | OUTPATIENT
Start: 2019-08-06 | End: 2019-08-06 | Stop reason: SURG

## 2019-08-06 RX ADMIN — OXYCODONE HYDROCHLORIDE 10 MG: 5 SOLUTION ORAL at 02:51

## 2019-08-06 RX ADMIN — HYDROMORPHONE HYDROCHLORIDE 1 MG: 1 INJECTION, SOLUTION INTRAMUSCULAR; INTRAVENOUS; SUBCUTANEOUS at 06:06

## 2019-08-06 RX ADMIN — Medication 100 MG: at 15:50

## 2019-08-06 RX ADMIN — POTASSIUM CHLORIDE: 2 INJECTION, SOLUTION, CONCENTRATE INTRAVENOUS at 10:51

## 2019-08-06 RX ADMIN — METRONIDAZOLE 500 MG: 500 INJECTION, SOLUTION INTRAVENOUS at 13:58

## 2019-08-06 RX ADMIN — VANCOMYCIN HYDROCHLORIDE 1500 MG: 5 INJECTION, POWDER, LYOPHILIZED, FOR SOLUTION INTRAVENOUS at 21:46

## 2019-08-06 RX ADMIN — AZTREONAM 2000 MG: 2 INJECTION, POWDER, LYOPHILIZED, FOR SOLUTION INTRAMUSCULAR; INTRAVENOUS at 00:30

## 2019-08-06 RX ADMIN — PANTOPRAZOLE SODIUM 40 MG: 40 INJECTION, POWDER, FOR SOLUTION INTRAVENOUS at 23:21

## 2019-08-06 RX ADMIN — AZTREONAM 2000 MG: 2 INJECTION, POWDER, LYOPHILIZED, FOR SOLUTION INTRAMUSCULAR; INTRAVENOUS at 14:56

## 2019-08-06 RX ADMIN — PROPOFOL 200 MG: 10 INJECTION, EMULSION INTRAVENOUS at 15:50

## 2019-08-06 RX ADMIN — ALBUTEROL SULFATE 2 PUFF: 90 AEROSOL, METERED RESPIRATORY (INHALATION) at 23:22

## 2019-08-06 RX ADMIN — METRONIDAZOLE 500 MG: 500 INJECTION, SOLUTION INTRAVENOUS at 23:21

## 2019-08-06 RX ADMIN — Medication 250 MG: at 18:22

## 2019-08-06 RX ADMIN — SUCRALFATE 1000 MG: 1 SUSPENSION ORAL at 23:22

## 2019-08-06 RX ADMIN — VANCOMYCIN HYDROCHLORIDE 1500 MG: 5 INJECTION, POWDER, LYOPHILIZED, FOR SOLUTION INTRAVENOUS at 05:40

## 2019-08-06 RX ADMIN — PANTOPRAZOLE SODIUM 40 MG: 40 INJECTION, POWDER, FOR SOLUTION INTRAVENOUS at 08:40

## 2019-08-06 RX ADMIN — ALBUTEROL SULFATE 2 PUFF: 90 AEROSOL, METERED RESPIRATORY (INHALATION) at 08:50

## 2019-08-06 RX ADMIN — SUCRALFATE 1000 MG: 1 SUSPENSION ORAL at 12:15

## 2019-08-06 RX ADMIN — METRONIDAZOLE 500 MG: 500 INJECTION, SOLUTION INTRAVENOUS at 07:19

## 2019-08-06 RX ADMIN — FLUCONAZOLE, SODIUM CHLORIDE 400 MG: 2 INJECTION INTRAVENOUS at 16:59

## 2019-08-06 RX ADMIN — OXYCODONE HYDROCHLORIDE 10 MG: 5 SOLUTION ORAL at 08:37

## 2019-08-06 RX ADMIN — ROCURONIUM BROMIDE 5 MG: 10 INJECTION, SOLUTION INTRAVENOUS at 15:50

## 2019-08-06 RX ADMIN — FENTANYL CITRATE 100 MCG: 50 INJECTION, SOLUTION INTRAMUSCULAR; INTRAVENOUS at 16:10

## 2019-08-06 RX ADMIN — ALBUTEROL SULFATE 2 PUFF: 90 AEROSOL, METERED RESPIRATORY (INHALATION) at 18:23

## 2019-08-06 RX ADMIN — FENTANYL CITRATE 100 MCG: 50 INJECTION, SOLUTION INTRAMUSCULAR; INTRAVENOUS at 15:50

## 2019-08-06 RX ADMIN — SUCRALFATE 1000 MG: 1 SUSPENSION ORAL at 18:21

## 2019-08-06 RX ADMIN — HEPARIN SODIUM 5000 UNITS: 5000 INJECTION INTRAVENOUS; SUBCUTANEOUS at 23:22

## 2019-08-06 RX ADMIN — HYDROMORPHONE HYDROCHLORIDE 1 MG: 1 INJECTION, SOLUTION INTRAMUSCULAR; INTRAVENOUS; SUBCUTANEOUS at 12:16

## 2019-08-06 RX ADMIN — Medication 250 MG: at 08:49

## 2019-08-06 RX ADMIN — HYDROMORPHONE HYDROCHLORIDE 1 MG: 1 INJECTION, SOLUTION INTRAMUSCULAR; INTRAVENOUS; SUBCUTANEOUS at 18:23

## 2019-08-06 RX ADMIN — ONDANSETRON 4 MG: 2 INJECTION INTRAMUSCULAR; INTRAVENOUS at 15:57

## 2019-08-06 RX ADMIN — SUCRALFATE 1000 MG: 1 SUSPENSION ORAL at 05:57

## 2019-08-06 RX ADMIN — HEPARIN SODIUM 5000 UNITS: 5000 INJECTION INTRAVENOUS; SUBCUTANEOUS at 05:41

## 2019-08-06 RX ADMIN — THIAMINE HYDROCHLORIDE: 100 INJECTION, SOLUTION INTRAMUSCULAR; INTRAVENOUS at 09:49

## 2019-08-06 RX ADMIN — AZTREONAM 2000 MG: 2 INJECTION, POWDER, LYOPHILIZED, FOR SOLUTION INTRAMUSCULAR; INTRAVENOUS at 08:44

## 2019-08-06 RX ADMIN — ALBUTEROL SULFATE 2 PUFF: 90 AEROSOL, METERED RESPIRATORY (INHALATION) at 12:16

## 2019-08-06 RX ADMIN — VANCOMYCIN HYDROCHLORIDE 1500 MG: 5 INJECTION, POWDER, LYOPHILIZED, FOR SOLUTION INTRAVENOUS at 12:20

## 2019-08-06 RX ADMIN — HYDROMORPHONE HYDROCHLORIDE 1 MG: 1 INJECTION, SOLUTION INTRAMUSCULAR; INTRAVENOUS; SUBCUTANEOUS at 23:47

## 2019-08-06 RX ADMIN — SODIUM CHLORIDE: 0.9 INJECTION, SOLUTION INTRAVENOUS at 15:50

## 2019-08-06 NOTE — PROGRESS NOTES
Progress Note [place] [facility] 1960, 63 y o  male MRN: [id number]    Unit/Bed#: E5 -01 Encounter: [id number]    Primary Care Provider: Liborio Gomez DO   Date and time admitted to hospital: 7/26/2019  9:00 AM        * Chronic marginal ulcer with perforation ([email or web address] 75 )  Assessment & Plan  Marginal ulcer with perforation  Patient initially admitted for open hernia repair but subsequently developed perforated marginal ulcer requiring laparotomy  Subsequently developed intra-abdominal abscesses requiring placement of drains  Currently on vancomycin and aztreonam metronidazole and fluconazole per ID  As per ID, will continue aztreonam for total of 7 days and then discontinue  Patient had abnormal upper GI series yesterday which showed a leak in the proximal aspect of the gastrojejunal stent into the left upper quadrant collection with drainage  Patient is to get emergent EGD today by GI this afternoon  Tube feeds currently held  Continue Protonix and Carafate for GI prophylaxis  Severe protein-calorie malnutrition ([email or web address] 75 )  Assessment & Plan  Body mass index is 25 24 kg/m²  Patient getting tube feeds but these have been held secondary to emergent EGD to be performed this afternoon  Electrolyte disturbance  Assessment & Plan  Electrolyte disturbances currently hypokalemia with potassium of 3 3  Electrolyte disturbances have been replaced with IV builder of potassium and phosphorus  Replace potassium with IV potassium chloride 20 mEq      Results from last 7 days   Lab Units 08/06/19  0546 08/05/19  0720 08/04/19  0438 08/04/19  0033 08/03/19  0709 08/02/19  0700 08/01/19  0526 07/31/19  1559 07/31/19  0606   MAGNESIUM mg/dL 1 7 2 0  --   --  2 0 1 9 1 5* 1 8 1 9   PHOSPHORUS mg/dL 2 9 2 9 2 8  --  2 6* 2 3* 1 7* 1 7* 1 6*   POTASSIUM mmol/L 3 3* 3 8  --  3 8 4 6 3 7 3 1* 3 1* 2 9*       Diet-controlled diabetes mellitus ([facility])  Assessment & Plan  Lab Results   Component Value Date    HGBA1C 5 1 2018     Recent Labs     19  0852   POCGLU 101     Diet controlled diabetes  Accucheks within limits  Abnormal CT scan, lung  Assessment & Plan  Right lower lobe opacity:  Differentials includes atelectasis versus loculated effusion  Patient already on broad-spectrum antibiotics for intra-abdominal abscess  No respiratory symptoms at this time  Leukocytosis improving  Consider pulmonary consultation or thoracentesis/sampling if clinically warranted    Coagulopathy [facility])  Assessment & Plan  Coagulopathy secondary to malnutrition  VTE Pharmacologic Prophylaxis:   Pharmacologic: Heparin  Mechanical VTE Prophylaxis in Place: No    Patient Centered Rounds: I have performed bedside rounds with nursing staff today  Discussions with Specialists or Other Care Team Provider:  Yes    Education and Discussions with Family / Patient:  Yes    Time Spent for Care: 15 minutes  More than 50% of total time spent on counseling and coordination of care as described above  Current Length of Stay: 11 day(s)    Current Patient Status: Inpatient   Certification Statement: The patient will continue to require additional inpatient hospital stay due to As per bariatric surgery    Discharge Plan:  As per bariatric surgery    Code Status: Level 1 - Full Code      Subjective:   Patient has no complaints  Objective:     Vitals:   Temp (24hrs), Av 4 °F (36 3 °C), Min:96 5 °F (35 8 °C), Max:98 7 °F (37 1 °C)    Temp:  [96 5 °F (35 8 °C)-98 7 °F (37 1 °C)] 97 °F (36 1 °C)  HR:  [] 85  Resp:  [18-19] 18  BP: (132-147)/(66-75) 132/66  SpO2:  [99 %-100 %] 100 %  Body mass index is 25 24 kg/m²  Input and Output Summary (last 24 hours): Intake/Output Summary (Last 24 hours) at 2019 1458  Last data filed at 2019 1408  Gross per 24 hour   Intake 20 ml   Output 1247 ml   Net -1227 ml       Physical Exam:     Physical Exam   Constitutional: He is oriented to person, place, and time   He appears well-developed  No distress  Moderately malnourished appearance   HENT:   Head: Normocephalic and atraumatic  Eyes: Pupils are equal, round, and reactive to light  EOM are normal    Neck: Normal range of motion  Neck supple  No JVD present  Cardiovascular: Normal rate, regular rhythm and normal heart sounds  No murmur heard  Pulmonary/Chest: Effort normal and breath sounds normal    Abdominal: Soft  Bowel sounds are normal  There is tenderness  Intact COURTNEY drain  Musculoskeletal: He exhibits no edema  Neurological: He is alert and oriented to person, place, and time  Skin: Skin is warm and dry  He is not diaphoretic  Additional Data:     Labs:    Results from last 7 days   Lab Units 08/06/19  0546  07/31/19  0606   WBC Thousand/uL 19 08*   < > 19 88*   HEMOGLOBIN g/dL 10 5*   < > 12 4   HEMATOCRIT % 32 3*   < > 36 7   PLATELETS Thousands/uL 460*   < > 204   BANDS PCT %  --   --  20*   NEUTROS PCT % 88*   < >  --    LYMPHS PCT % 5*   < >  --    LYMPHO PCT %  --   --  8*   MONOS PCT % 6   < >  --    MONO PCT %  --   --  6   EOS PCT % 0   < > 1    < > = values in this interval not displayed  Results from last 7 days   Lab Units 08/06/19  0546  08/04/19  0033   SODIUM mmol/L 138   < > 135*   POTASSIUM mmol/L 3 3*   < > 3 8   CHLORIDE mmol/L 102   < > 101   CO2 mmol/L 31   < > 26   BUN mg/dL 4*   < > 11   CREATININE mg/dL 0 49*   < > 0 49*   ANION GAP mmol/L 5   < > 8   CALCIUM mg/dL 7 4*   < > 7 5*   ALBUMIN g/dL  --   --  1 7*   TOTAL BILIRUBIN mg/dL  --   --  1 43*   ALK PHOS U/L  --   --  106   ALT U/L  --   --  17   AST U/L  --   --  34   GLUCOSE RANDOM mg/dL 113   < > 101    < > = values in this interval not displayed           Results from last 7 days   Lab Units 08/04/19  0852 08/03/19  1322   POC GLUCOSE mg/dl 101 77         Results from last 7 days   Lab Units 08/06/19  0546 08/04/19  0438 08/03/19  0709 08/02/19  0700 08/01/19  1846   PROCALCITONIN ng/ml 0 68* 0 87* 1 46* 1 88* 2 75*           * I Have Reviewed All Lab Data Listed Above  * Additional Pertinent Lab Tests Reviewed: Caitlin 66 Admission Reviewed    Imaging:    Imaging Reports Reviewed Today Include:  Upper GI series  Imaging Personally Reviewed by Myself Includes:  None    Recent Cultures (last 7 days):     Results from last 7 days   Lab Units 08/05/19  0917 08/01/19  1845 08/01/19  1609 08/01/19  1546   BLOOD CULTURE   --  No Growth After 4 Days  No Growth After 4 Days    --   --    GRAM STAIN RESULT  Rare Disintegrating polys  No bacteria seen  --  2+ RBC's*  3+ Gram positive cocci in pairs and chains* Rare Polys  No bacteria seen   WOUND CULTURE  No growth  --   --   --    BODY FLUID CULTURE, STERILE   --   --  4+ Growth of Streptococcus mitis oralis group*  Few Colonies of Candida albicans* Few Colonies of Streptococcus mitis oralis group*       Last 24 Hours Medication List:     Current Facility-Administered Medications:  albuterol 2 puff Inhalation 4x Daily Tash Chavez PA-C    aztreonam 2,000 mg Intravenous Q8H Dalia De Luna MD Last Rate: 2,000 mg (08/06/19 0844)   diphenhydrAMINE 25 mg Intravenous Q6H PRN Tash Chavez PA-C    fluconazole 400 mg Intravenous Q24H Katie Nava MD Last Rate: 400 mg (08/05/19 1639)   heparin (porcine) 5,000 Units Subcutaneous [facility] Tash Chavez PA-C    HYDROmorphone 1 mg Intravenous Q2H PRN Tash Chavez PA-C    metroNIDAZOLE 500 mg Intravenous Q8H Joyce Davis MD Last Rate: 500 mg (08/06/19 1358)   multivitamin IVPB  Intravenous Daily Tash Chavez PA-C Last Rate: 500 mL/hr at 08/06/19 0949   ondansetron 4 mg Intravenous Q6H PRN Tash Chavez PA-C    oxyCODONE 10 mg Oral Q4H PRN Mala Salazar MD    oxyCODONE 5 mg Oral Q4H PRN Mala Salazar MD    pantoprazole 40 mg Intravenous Q12H [facility] Tash Chavez PA-C    IV infusion builder  Intravenous Continuous Joyce Davis MD Last Rate: 50 mL/hr at 08/06/19 1051 promethazine 12 5 mg Intravenous Q6H PRN Feng Wu PA-C    saccharomyces boulardii 250 mg Oral BID Feng Wu PA-C    simethicone 80 mg Oral Q6H PRN Feng Wu PA-C    sucralfate 1,000 mg Oral 4x Daily (AC & HS) Feng Wu PA-C    vancomycin 20 mg/kg Intravenous Q8H Brianna Clarke MD Last Rate: 1,500 mg (08/06/19 1220)        Today, Patient Was Seen By: Anna Pope MD    ** Please Note: Dictation voice to text software may have been used in the creation of this document   **

## 2019-08-06 NOTE — ANESTHESIA POSTPROCEDURE EVALUATION
Post-Op Assessment Note    CV Status:  Stable  Pain Score: 0    Pain management: adequate     Mental Status:  Alert and awake   Hydration Status:  Euvolemic   PONV Controlled:  Controlled   Airway Patency:  Patent   Post Op Vitals Reviewed: Yes      Staff: Anesthesiologist           /64 (08/06/19 1730)    Temp 98 6 °F (37 °C) (08/06/19 1730)    Pulse 101 (08/06/19 1730)   Resp 12 (08/06/19 1730)    SpO2 99 % (08/06/19 1730)

## 2019-08-06 NOTE — PROGRESS NOTES
Progress Note - Infectious Disease   Rashid Johnson 63 y o  male MRN: [id number]  Unit/Bed#: E5 -01 Encounter: [id number]      Impression/Plan:  1  Sepsis-leukocytosis and tachycardia  Etiology most likely intra-abdominal abscess, although lung consolidations may play a role  Patient is improving clinically slowly  He remains systemically well  Temperature is down  WBC continues to trend down  Procalcitonin continues decrease  Blood cultures remain negative  Atibiotic plan as in below  Monitor temperature/WBC  Monitor procalcitonin  Consider repeat chest/abdomen/pelvis CT in next few days, if WBC remains elevated and procalcitonin does not further decrease      2  Intra-abdominal abscesses-status post IR drainage with COURTNEY drains remaining in place  Abscess culture is growing strep mitis and Candida  Limited antibiotic choices based upon the patient's allergies  Continue vancomycin, Flagyl, fluconazole for now  Continue aztreonam for now  Likely discontinue aztreonam after 7 days, after tomorrow  Continue drainage per IR  Close surgical follow-up      3  Peritonitis-in the setting of a perforated marginal ulceration   The patient is now status post exploratory laparotomy with repair and stent placement, and now COURTNEY drain placement  Antibiotic plan as in above  Continue drain as in above  Serial abdominal exams  Surgery follow-up      4  Abnormal CT of the chest-with some consolidation and pleural effusion noted on the right   Perhaps this is all atelectasis with reactive effusion   Less likely but also possible would be pneumonia with empyema   Patient's respiratory status improving slowly  Monitor closely for now  Antibiotic plan as in above  Monitor respiratory status  May need to repeat chest CT in thoracentesis if patient does not continue to improve      5  COPD-no current clinical evidence of an acute exacerbation at this time       6   Multiple antibiotic allergies, including PCN anaphylaxis  This limits antibiotic choice, as in above      Discussed with patient and his family in detail regarding the above plan      Antibiotics:  Vancomycin/aztreonam # 6  Flagyl # 12  High-dose fluconazole # 6     Subjective:  Patient is feeling better every day  Dyspnea improved  Mild weak cough but improved  Abdominal pain improving  Temperature stays down  No chills  He is tolerating antibiotics well  No nausea, vomiting or diarrhea  Objective:  Vitals:  Temp:  [96 5 °F (35 8 °C)-98 7 °F (37 1 °C)] 97 °F (36 1 °C)  HR:  [] 85  Resp:  [18-19] 18  BP: (132-147)/(66-75) 132/66  SpO2:  [99 %-100 %] 100 %  Temp (24hrs), Av 4 °F (36 3 °C), Min:96 5 °F (35 8 °C), Max:98 7 °F (37 1 °C)  Current: Temperature: (!) 97 °F (36 1 °C)    Physical Exam:     General: Awake, alert, cooperative, no distress  Neck:  Supple  No mass  No lymphadenopathy  Lungs: Expansion symmetric, no rales, no wheezing, respirations unlabored  Heart:  Regular rate and rhythm, S1 and S2 normal, no murmur  Abdomen: Soft, mild distention  Drains mostly serous  Mild tenderness at drain site  Extremities: Trace edema  No erythema/warmth  No ulcer  Nontender to palpation  Skin:  No rash  Neuro: Moves all extremities  Invasive Devices     Peripheral Intravenous Line            Peripheral IV 19 Left Antecubital 1 day    Peripheral IV 19 Left;Ventral (anterior) Forearm 1 day          Drain            Closed/Suction Drain Right Abdomen Bulb 19 Fr  8 days    Gastrostomy/Enterostomy Gastrostomy 22 Fr  LUQ 8 days    Closed/Suction Drain Left LUQ Bulb 8 Fr  4 days    Closed/Suction Drain Right RUQ Bulb 8 Fr  4 days                Labs studies:   I have personally reviewed pertinent labs    Results from last 7 days   Lab Units 19  0546 19  0720 19  0033   POTASSIUM mmol/L 3 3* 3 8 3 8   CHLORIDE mmol/L 102 100 101   CO2 mmol/L 31 27 26   BUN mg/dL 4* 8 11   CREATININE mg/dL 0 49* 0 42* 0 49*   EGFR ml/min/1 73sq m 121 129 121   CALCIUM mg/dL 7 4* 7 4* 7 5*   AST U/L  --   --  34   ALT U/L  --   --  17   ALK PHOS U/L  --   --  106     Results from last 7 days   Lab Units 08/06/19  0546 08/04/19  0438 08/03/19  0709   WBC Thousand/uL 19 08* 28 85* 29 92*   HEMOGLOBIN g/dL 10 5* 11 8* 13 0   PLATELETS Thousands/uL 460* 250 195     Results from last 7 days   Lab Units 08/05/19  0917 08/01/19  1845 08/01/19  1609 08/01/19  1546   BLOOD CULTURE   --  No Growth After 4 Days  No Growth After 4 Days  --   --    GRAM STAIN RESULT  Rare Disintegrating polys  No bacteria seen  --  2+ RBC's*  3+ Gram positive cocci in pairs and chains* Rare Polys  No bacteria seen   WOUND CULTURE  No growth  --   --   --    BODY FLUID CULTURE, STERILE   --   --  4+ Growth of Streptococcus mitis oralis group*  Few Colonies of Candida albicans* Few Colonies of Streptococcus mitis oralis group*       Imaging Studies:   I have personally reviewed pertinent imaging study reports and images in PACS  Upper GI reviewed  Persistent leak  EKG, Pathology, and Other Studies:   I have personally reviewed pertinent reports

## 2019-08-06 NOTE — ANESTHESIA PREPROCEDURE EVALUATION
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     Negative  ROS        Endo/Other  Negative endo/other ROS No diabetes ,   Comment: Hx of pre diabetes last HGb A1C 5 1    GYN       Hematology  Negative hematology ROS      Musculoskeletal    Arthritis     Neurology    Headaches,    Psychology   Negative psychology ROS              Physical Exam    Airway    Mallampati score: III  TM Distance: >3 FB  Neck ROM: limited     Dental   upper dentures and lower dentures,     Cardiovascular  Rhythm: regular, Rate: normal, Cardiovascular exam normal    Pulmonary  Comment: Diminished breath sounds in bases, Breath sounds clear to auscultation, Decreased breath sounds,     Other Findings        Anesthesia Plan  ASA Score- 2 Emergent    Anesthesia Type- general and IV sedation with anesthesia with ASA Monitors  Additional Monitors:   Airway Plan:     Comment: Patient reportedly has had multiple episodes of awareness under anesthesia        Plan Factors-Patient not instructed to abstain from smoking on day of procedure  Patient did not smoke on day of surgery  Induction- rapid sequence induction and intravenous  Postoperative Plan- Plan for postoperative opioid use  Planned trial extubation    Informed Consent- Anesthetic plan and risks discussed with patient and spouse

## 2019-08-06 NOTE — PROGRESS NOTES
Vancomycin IV Pharmacy-to-Dose Consultation    Jus Pyle is a 63 y o  male who is currently receiving Vancomycin IV with management by the Pharmacy Consult service  Assessment/Plan:  The patient was reviewed  Renal function is stable and no signs or symptoms of nephrotoxicity and/or infusion reactions were documented in the chart  Based on todays assessment, continue current vancomycin dosing of 1500mg IV every 8 hours, with a plan for trough to be drawn at 1130 on 8/11/19    We will continue to follow the patients culture results and clinical progress daily      Luz Crowell, Pharmacist

## 2019-08-06 NOTE — PROGRESS NOTES
Dr Jordan Nice has been requested to perform emergency EGD with stent adjustment today due to abnormal upper GI series showing leak from proximal aspect of gastrojejunal stent into LUQ collection  Plan was discussed with Bariatrics

## 2019-08-06 NOTE — ASSESSMENT & PLAN NOTE
Marginal ulcer with perforation  Patient initially admitted for open hernia repair but subsequently developed perforated marginal ulcer requiring laparotomy  Subsequently developed intra-abdominal abscesses requiring placement of drains  Currently on vancomycin and aztreonam metronidazole and fluconazole per ID  As per ID, will continue aztreonam for total of 7 days and then discontinue  Patient had abnormal upper GI series yesterday which showed a leak in the proximal aspect of the gastrojejunal stent into the left upper quadrant collection with drainage  Patient is to get emergent EGD today by GI this afternoon  Tube feeds currently held  Continue Protonix and Carafate for GI prophylaxis

## 2019-08-06 NOTE — ASSESSMENT & PLAN NOTE
Electrolyte disturbances currently hypokalemia with potassium of 3 3  Electrolyte disturbances have been replaced with IV builder of potassium and phosphorus  Replace potassium with IV potassium chloride 20 mEq      Results from last 7 days   Lab Units 08/06/19  0546 08/05/19  0720 08/04/19  1282 08/04/19  0033 08/03/19  0709 08/02/19  0700 08/01/19  0526 07/31/19  1559 07/31/19  0606   MAGNESIUM mg/dL 1 7 2 0  --   --  2 0 1 9 1 5* 1 8 1 9   PHOSPHORUS mg/dL 2 9 2 9 2 8  --  2 6* 2 3* 1 7* 1 7* 1 6*   POTASSIUM mmol/L 3 3* 3 8  --  3 8 4 6 3 7 3 1* 3 1* 2 9*

## 2019-08-06 NOTE — PROGRESS NOTES
Progress Note - Bariatric Surgery   [facility] 63 y o  male MRN: [id number]  Unit/Bed#: E5 -01 Encounter: [id number]      Subjective/Objective     Subjective: This is a 63year old male s/p exploratory laparotomy with repair of perforated marginal ulcer, abdominal washout, G tube placement and GI assisted endoscopic stent placement on 7/28/19 by Dr Oswaldo Perez  Patient also underwent a diagnostic laparoscopy  That was converted to open on 7/26/2019 with internal hernia repair  Tube feeds currently being held pending his EGD with stent adjustment this afternoon  Yesterday 4 staples were removed from the distal end of his midline incision now incision with open wound  Otherwise patient had been tolerating his tube feedings with no bloating, N/V  He is moving his bowels and voiding well  Using IS  Denies any CP or SOB  Objective:    /66 (BP Location: Right arm)   Pulse 85   Temp (!) 97 °F (36 1 °C) (Temporal)   Resp 18   Ht 5' 8" (1 727 m)   Wt 75 3 kg (166 lb 0 1 oz)   SpO2 100%   BMI 25 24 kg/m²       Intake/Output Summary (Last 24 hours) at 8/6/2019 1032  Last data filed at 8/6/2019 0719  Gross per 24 hour   Intake 20 ml   Output 825 ml   Net -805 ml       Invasive Devices     Peripheral Intravenous Line            Peripheral IV 08/04/19 Left Antecubital 1 day    Peripheral IV 08/05/19 Left;Ventral (anterior) Forearm 1 day          Drain            Closed/Suction Drain Right Abdomen Bulb 19 Fr  8 days    Gastrostomy/Enterostomy Gastrostomy 22 Fr  LUQ 8 days    Closed/Suction Drain Left LUQ Bulb 8 Fr  4 days    Closed/Suction Drain Right RUQ Bulb 8 Fr  4 days                ROS: 10-point system completed  All negative except see HPI        Physical Exam    General Appearance:    Alert, cooperative, no distress, appears stated age   Head:    Normocephalic, without obvious abnormality, atraumatic   Lungs:     Respirations unlabored   Heart:    Regular rate and rhythm   Abdomen:     Soft, appropriate tenderness,  no masses, no organomegaly,   non distended  Left and right IR drains with serosanguinous output  Elijah drain also in place with serosanguineous output  Extremities:   Extremities normal, atraumatic, no cyanosis or edema   Neurologic:  Incision:        Psych:   Normal strength and sensation    4 staples were removed from Midline incision below level of umbilicus now now with open wound that was packed with 4x4 gauze and covered by abdominal pad  To continue twice daily dressing changes  Normal mood and affect       Lab, Imaging and other studies:I have personally reviewed pertinent lab results  VTE Mechanical Prophylaxis: sequential compression device and Heparin    Assessment/Plan  1)  This is a 63year old male s/p exploratory laparotomy with repair of perforated marginal ulcer, abdominal washout, G tube placement and GI assisted endoscopic stent placement on 7/28/19 by Dr Maite Oliva   Patient also underwent a diagnostic laparoscopy  That was converted to open on 7/26/2019 with internal hernia repair       -- UGI yesterday showed evidence of Leak from the proximal aspect of the gastrojejunal stent into the left upper quadrant collection with drainage  - Patient getting EGD with stent adjustment this afternoon with Dr Jv Ji; tube feeds are currently held and to be held for the rest of the day today   - Continue antibiotics, antifungal  - Pharmacy consult placed; appreciate input   - Abdominal wound culture, gram stain result from yesterday with "no growth, rare disintegrating polys, no bacteria sen"; ID is on board, continue to appreciate input  - Abdominal wound was packed with 4x4 today and abdominal pad was applied, Continue twice daily dressing changes     catheter in place; diet NPO except sips with meds   - Continue IVF at 50 cc/hr  - Continue to trend electrolytes and WBC count  - Continue to monitor drain output   - Consult to IR for drain flushing protocol   - Encourage IS, ambulation  - PPI and Carafate for GI prophylaxis  - SCDs and Heparin for DVT prophylaxis  - Case management on board for home tube feedings        Plan of care was discussed with patient and patient's nurse  Care plan discussed with Dr Edith Neumann:  Unable to discharge to home today

## 2019-08-06 NOTE — ASSESSMENT & PLAN NOTE
Lab Results   Component Value Date    HGBA1C 5 1 07/13/2018     Recent Labs     08/04/19  0852   POCGLU 101     Diet controlled diabetes  Accucheks within limits

## 2019-08-06 NOTE — ASSESSMENT & PLAN NOTE
Body mass index is 25 24 kg/m²  Patient getting tube feeds but these have been held secondary to emergent EGD to be performed this afternoon

## 2019-08-07 LAB
ANION GAP SERPL CALCULATED.3IONS-SCNC: 9 MMOL/L (ref 4–13)
BACTERIA WND AEROBE CULT: ABNORMAL
BASOPHILS # BLD AUTO: 0.08 THOUSANDS/ΜL (ref 0–0.1)
BASOPHILS NFR BLD AUTO: 1 % (ref 0–1)
BUN SERPL-MCNC: 5 MG/DL (ref 5–25)
CALCIUM SERPL-MCNC: 7.7 MG/DL (ref 8.3–10.1)
CHLORIDE SERPL-SCNC: 104 MMOL/L (ref 100–108)
CO2 SERPL-SCNC: 28 MMOL/L (ref 21–32)
CREAT SERPL-MCNC: 0.59 MG/DL (ref 0.6–1.3)
EOSINOPHIL # BLD AUTO: 0.05 THOUSAND/ΜL (ref 0–0.61)
EOSINOPHIL NFR BLD AUTO: 0 % (ref 0–6)
ERYTHROCYTE [DISTWIDTH] IN BLOOD BY AUTOMATED COUNT: 15.4 % (ref 11.6–15.1)
GFR SERPL CREATININE-BSD FRML MDRD: 112 ML/MIN/1.73SQ M
GLUCOSE SERPL-MCNC: 101 MG/DL (ref 65–140)
GRAM STN SPEC: ABNORMAL
GRAM STN SPEC: ABNORMAL
HCT VFR BLD AUTO: 33.1 % (ref 36.5–49.3)
HGB BLD-MCNC: 10.6 G/DL (ref 12–17)
IMM GRANULOCYTES # BLD AUTO: 0.18 THOUSAND/UL (ref 0–0.2)
IMM GRANULOCYTES NFR BLD AUTO: 1 % (ref 0–2)
LYMPHOCYTES # BLD AUTO: 0.92 THOUSANDS/ΜL (ref 0.6–4.47)
LYMPHOCYTES NFR BLD AUTO: 5 % (ref 14–44)
MCH RBC QN AUTO: 30.5 PG (ref 26.8–34.3)
MCHC RBC AUTO-ENTMCNC: 32 G/DL (ref 31.4–37.4)
MCV RBC AUTO: 95 FL (ref 82–98)
MONOCYTES # BLD AUTO: 1.14 THOUSAND/ΜL (ref 0.17–1.22)
MONOCYTES NFR BLD AUTO: 7 % (ref 4–12)
NEUTROPHILS # BLD AUTO: 14.77 THOUSANDS/ΜL (ref 1.85–7.62)
NEUTS SEG NFR BLD AUTO: 86 % (ref 43–75)
NRBC BLD AUTO-RTO: 0 /100 WBCS
PLATELET # BLD AUTO: 670 THOUSANDS/UL (ref 149–390)
PMV BLD AUTO: 9 FL (ref 8.9–12.7)
POTASSIUM SERPL-SCNC: 4.1 MMOL/L (ref 3.5–5.3)
RBC # BLD AUTO: 3.48 MILLION/UL (ref 3.88–5.62)
SODIUM SERPL-SCNC: 141 MMOL/L (ref 136–145)
WBC # BLD AUTO: 17.14 THOUSAND/UL (ref 4.31–10.16)

## 2019-08-07 PROCEDURE — 85025 COMPLETE CBC W/AUTO DIFF WBC: CPT | Performed by: FAMILY MEDICINE

## 2019-08-07 PROCEDURE — 99233 SBSQ HOSP IP/OBS HIGH 50: CPT | Performed by: INTERNAL MEDICINE

## 2019-08-07 PROCEDURE — 80048 BASIC METABOLIC PNL TOTAL CA: CPT | Performed by: FAMILY MEDICINE

## 2019-08-07 PROCEDURE — C9113 INJ PANTOPRAZOLE SODIUM, VIA: HCPCS | Performed by: PHYSICIAN ASSISTANT

## 2019-08-07 PROCEDURE — 99024 POSTOP FOLLOW-UP VISIT: CPT | Performed by: SURGERY

## 2019-08-07 PROCEDURE — 99232 SBSQ HOSP IP/OBS MODERATE 35: CPT | Performed by: FAMILY MEDICINE

## 2019-08-07 RX ADMIN — AZTREONAM 2000 MG: 2 INJECTION, POWDER, LYOPHILIZED, FOR SOLUTION INTRAMUSCULAR; INTRAVENOUS at 00:37

## 2019-08-07 RX ADMIN — SUCRALFATE 1000 MG: 1 SUSPENSION ORAL at 22:10

## 2019-08-07 RX ADMIN — METRONIDAZOLE 500 MG: 500 INJECTION, SOLUTION INTRAVENOUS at 06:29

## 2019-08-07 RX ADMIN — AZTREONAM 2000 MG: 2 INJECTION, POWDER, LYOPHILIZED, FOR SOLUTION INTRAMUSCULAR; INTRAVENOUS at 17:47

## 2019-08-07 RX ADMIN — PANTOPRAZOLE SODIUM 40 MG: 40 INJECTION, POWDER, FOR SOLUTION INTRAVENOUS at 20:05

## 2019-08-07 RX ADMIN — HYDROMORPHONE HYDROCHLORIDE 1 MG: 1 INJECTION, SOLUTION INTRAMUSCULAR; INTRAVENOUS; SUBCUTANEOUS at 22:10

## 2019-08-07 RX ADMIN — ALBUTEROL SULFATE 2 PUFF: 90 AEROSOL, METERED RESPIRATORY (INHALATION) at 08:48

## 2019-08-07 RX ADMIN — OXYCODONE HYDROCHLORIDE 10 MG: 5 SOLUTION ORAL at 20:56

## 2019-08-07 RX ADMIN — PANTOPRAZOLE SODIUM 40 MG: 40 INJECTION, POWDER, FOR SOLUTION INTRAVENOUS at 08:47

## 2019-08-07 RX ADMIN — HYDROMORPHONE HYDROCHLORIDE 1 MG: 1 INJECTION, SOLUTION INTRAMUSCULAR; INTRAVENOUS; SUBCUTANEOUS at 08:47

## 2019-08-07 RX ADMIN — SUCRALFATE 1000 MG: 1 SUSPENSION ORAL at 11:35

## 2019-08-07 RX ADMIN — Medication 250 MG: at 08:47

## 2019-08-07 RX ADMIN — VANCOMYCIN HYDROCHLORIDE 1500 MG: 5 INJECTION, POWDER, LYOPHILIZED, FOR SOLUTION INTRAVENOUS at 11:35

## 2019-08-07 RX ADMIN — HEPARIN SODIUM 5000 UNITS: 5000 INJECTION INTRAVENOUS; SUBCUTANEOUS at 13:59

## 2019-08-07 RX ADMIN — THIAMINE HYDROCHLORIDE: 100 INJECTION, SOLUTION INTRAMUSCULAR; INTRAVENOUS at 08:53

## 2019-08-07 RX ADMIN — SUCRALFATE 1000 MG: 1 SUSPENSION ORAL at 17:46

## 2019-08-07 RX ADMIN — ALBUTEROL SULFATE 2 PUFF: 90 AEROSOL, METERED RESPIRATORY (INHALATION) at 11:35

## 2019-08-07 RX ADMIN — VANCOMYCIN HYDROCHLORIDE 1500 MG: 5 INJECTION, POWDER, LYOPHILIZED, FOR SOLUTION INTRAVENOUS at 05:18

## 2019-08-07 RX ADMIN — HEPARIN SODIUM 5000 UNITS: 5000 INJECTION INTRAVENOUS; SUBCUTANEOUS at 05:19

## 2019-08-07 RX ADMIN — SUCRALFATE 1000 MG: 1 SUSPENSION ORAL at 05:19

## 2019-08-07 RX ADMIN — METRONIDAZOLE 500 MG: 500 INJECTION, SOLUTION INTRAVENOUS at 22:07

## 2019-08-07 RX ADMIN — ALBUTEROL SULFATE 2 PUFF: 90 AEROSOL, METERED RESPIRATORY (INHALATION) at 17:46

## 2019-08-07 RX ADMIN — ALBUTEROL SULFATE 2 PUFF: 90 AEROSOL, METERED RESPIRATORY (INHALATION) at 22:12

## 2019-08-07 RX ADMIN — OXYCODONE HYDROCHLORIDE 10 MG: 5 SOLUTION ORAL at 14:32

## 2019-08-07 RX ADMIN — ONDANSETRON 4 MG: 2 INJECTION INTRAMUSCULAR; INTRAVENOUS at 15:12

## 2019-08-07 RX ADMIN — VANCOMYCIN HYDROCHLORIDE 1500 MG: 5 INJECTION, POWDER, LYOPHILIZED, FOR SOLUTION INTRAVENOUS at 20:05

## 2019-08-07 RX ADMIN — POTASSIUM CHLORIDE: 2 INJECTION, SOLUTION, CONCENTRATE INTRAVENOUS at 09:01

## 2019-08-07 RX ADMIN — OXYCODONE HYDROCHLORIDE 10 MG: 5 SOLUTION ORAL at 07:11

## 2019-08-07 RX ADMIN — Medication 250 MG: at 17:46

## 2019-08-07 RX ADMIN — AZTREONAM 2000 MG: 2 INJECTION, POWDER, LYOPHILIZED, FOR SOLUTION INTRAMUSCULAR; INTRAVENOUS at 07:43

## 2019-08-07 RX ADMIN — HEPARIN SODIUM 5000 UNITS: 5000 INJECTION INTRAVENOUS; SUBCUTANEOUS at 22:10

## 2019-08-07 RX ADMIN — METRONIDAZOLE 500 MG: 500 INJECTION, SOLUTION INTRAVENOUS at 13:59

## 2019-08-07 RX ADMIN — HYDROMORPHONE HYDROCHLORIDE 1 MG: 1 INJECTION, SOLUTION INTRAMUSCULAR; INTRAVENOUS; SUBCUTANEOUS at 15:22

## 2019-08-07 NOTE — ASSESSMENT & PLAN NOTE
Resolved      Results from last 7 days   Lab Units 08/07/19  0529 08/06/19  0546 08/05/19  0720 08/04/19  0438 08/04/19  0033 08/03/19  0709 08/02/19  0700 08/01/19  0526 07/31/19  1559   MAGNESIUM mg/dL  --  1 7 2 0  --   --  2 0 1 9 1 5* 1 8   PHOSPHORUS mg/dL  --  2 9 2 9 2 8  --  2 6* 2 3* 1 7* 1 7*   POTASSIUM mmol/L 4 1 3 3* 3 8  --  3 8 4 6 3 7 3 1* 3 1*

## 2019-08-07 NOTE — PROGRESS NOTES
Progress Note Neetu Graff 1960, 63 y o  male MRN: [id number]    Unit/Bed#: E5 -01 Encounter: [id number]    Primary Care Provider: Tamika Ortiz DO   Date and time admitted to hospital: 7/26/2019  9:00 AM        * Chronic marginal ulcer with perforation ([facility] )  Assessment & Plan  Marginal ulcer with perforation  Patient initially admitted for open hernia repair but subsequently developed perforated marginal ulcer requiring laparotomy  Subsequently developed intra-abdominal abscesses requiring placement of drains  Currently on vancomycin and aztreonam metronidazole and fluconazole per ID  As per ID, will continue aztreonam for total of 7 days and then discontinue after today  Patient had abnormal upper GI series 8/5 which showed a leak in the proximal aspect of the gastrojejunal stent into the left upper quadrant collection with drainage  Patient had EGD performed on 08/06 with repair of leak  Continue to keep patient NPO with ice with sips of meds  Will have repeat upper GI series done tomorrow 8/8  Tube feeds currently held  Continue Protonix and Carafate for GI prophylaxis  Severe protein-calorie malnutrition ([facility] )  Assessment & Plan  Body mass index is 25 24 kg/m²  Patient getting tube feeds but these have been held in anticipation of repeat upper GI series tomorrow 8/8  Electrolyte disturbance  Assessment & Plan  Resolved  Results from last 7 days   Lab Units 08/07/19  0529 08/06/19  0546 08/05/19  0720 08/04/19  0438 08/04/19  0033 08/03/19  0709 08/02/19  0700 08/01/19  0526 07/31/19  1559   MAGNESIUM mg/dL  --  1 7 2 0  --   --  2 0 1 9 1 5* 1 8   PHOSPHORUS mg/dL  --  2 9 2 9 2 8  --  2 6* 2 3* 1 7* 1 7*   POTASSIUM mmol/L 4 1 3 3* 3 8  --  3 8 4 6 3 7 3 1* 3 1*       Diet-controlled diabetes mellitus (HCC)  Assessment & Plan  Lab Results   Component Value Date    HGBA1C 5 1 07/13/2018     No results for input(s): POCGLU in the last 72 hours  Diet controlled diabetes  Accucheks within limits  Abnormal CT scan, lung  Assessment & Plan  Right lower lobe opacity:  Differentials includes atelectasis versus loculated effusion  Patient already on broad-spectrum antibiotics for intra-abdominal abscess  No respiratory symptoms at this time  Leukocytosis improving  Consider pulmonary consultation or thoracentesis/sampling if clinically warranted    Coagulopathy [facility])  Assessment & Plan  Coagulopathy secondary to malnutrition  VTE Pharmacologic Prophylaxis:   Pharmacologic: Heparin  Mechanical VTE Prophylaxis in Place: No    Patient Centered Rounds: I have performed bedside rounds with nursing staff today  Discussions with Specialists or Other Care Team Provider:  Yes    Education and Discussions with Family / Patient:  Yes    Time Spent for Care: 15 minutes  More than 50% of total time spent on counseling and coordination of care as described above  Current Length of Stay: 12 day(s)    Current Patient Status: Inpatient   Certification Statement: The patient will continue to require additional inpatient hospital stay due to As per bariatric surgery    Discharge Plan:  As per bariatric surgery    Code Status: Level 1 - Full Code      Subjective:   Patient has no major complaints today  Objective:     Vitals:   Temp (24hrs), Av °F (36 7 °C), Min:97 3 °F (36 3 °C), Max:98 6 °F (37 °C)    Temp:  [97 3 °F (36 3 °C)-98 6 °F (37 °C)] 97 9 °F (36 6 °C)  HR:  [] 100  Resp:  [9-18] 18  BP: (121-149)/(55-73) 147/73  SpO2:  [91 %-100 %] 91 %  Body mass index is 25 24 kg/m²  Input and Output Summary (last 24 hours): Intake/Output Summary (Last 24 hours) at 2019 1549  Last data filed at 2019 1505  Gross per 24 hour   Intake 200 ml   Output 1860 ml   Net -1660 ml       Physical Exam:     Physical Exam   Constitutional: He is oriented to person, place, and time  He appears well-developed and well-nourished  No distress     HENT:   Head: Normocephalic and atraumatic  Eyes: Pupils are equal, round, and reactive to light  EOM are normal    Neck: Normal range of motion  Neck supple  Cardiovascular: Normal rate, regular rhythm and normal heart sounds  No murmur heard  Pulmonary/Chest: Effort normal    Decreased breath sounds bilaterally   Abdominal: Soft  Bowel sounds are normal  He exhibits no distension  There is no tenderness  COURTNEY drains in the abdomen are clean, dry, and intact  Musculoskeletal: He exhibits edema  Patient has bilateral lower extremity edema as well as mild bilateral upper extremity edema   Neurological: He is alert and oriented to person, place, and time  Skin: He is not diaphoretic  Additional Data:     Labs:    Results from last 7 days   Lab Units 08/07/19  0534   WBC Thousand/uL 17 14*   HEMOGLOBIN g/dL 10 6*   HEMATOCRIT % 33 1*   PLATELETS Thousands/uL 670*   NEUTROS PCT % 86*   LYMPHS PCT % 5*   MONOS PCT % 7   EOS PCT % 0     Results from last 7 days   Lab Units 08/07/19  0529  08/04/19  0033   SODIUM mmol/L 141   < > 135*   POTASSIUM mmol/L 4 1   < > 3 8   CHLORIDE mmol/L 104   < > 101   CO2 mmol/L 28   < > 26   BUN mg/dL 5   < > 11   CREATININE mg/dL 0 59*   < > 0 49*   ANION GAP mmol/L 9   < > 8   CALCIUM mg/dL 7 7*   < > 7 5*   ALBUMIN g/dL  --   --  1 7*   TOTAL BILIRUBIN mg/dL  --   --  1 43*   ALK PHOS U/L  --   --  106   ALT U/L  --   --  17   AST U/L  --   --  34   GLUCOSE RANDOM mg/dL 101   < > 101    < > = values in this interval not displayed  Results from last 7 days   Lab Units 08/04/19  0852 08/03/19  1322   POC GLUCOSE mg/dl 101 77         Results from last 7 days   Lab Units 08/06/19  0546 08/04/19  0438 08/03/19  0709 08/02/19  0700 08/01/19  1846   PROCALCITONIN ng/ml 0 68* 0 87* 1 46* 1 88* 2 75*           * I Have Reviewed All Lab Data Listed Above  * Additional Pertinent Lab Tests Reviewed:  [facility] 67 Admission Reviewed    Imaging:    Imaging Reports Reviewed Today Include:  None available  Imaging Personally Reviewed by Myself Includes:  None    Recent Cultures (last 7 days):     Results from last 7 days   Lab Units 08/05/19  0917 08/01/19  1845 08/01/19  1609 08/01/19  1546   BLOOD CULTURE   --  No Growth After 5 Days  No Growth After 5 Days    --   --    GRAM STAIN RESULT  Rare Disintegrating polys  No bacteria seen  --  2+ RBC's*  3+ Gram positive cocci in pairs and chains* Rare Polys  No bacteria seen   WOUND CULTURE  2 colonies Staphylococcus coagulase negative*  --   --   --    BODY FLUID CULTURE, STERILE   --   --  4+ Growth of Streptococcus mitis oralis group*  Few Colonies of Candida albicans* Few Colonies of Streptococcus mitis oralis group*       Last 24 Hours Medication List:     Current Facility-Administered Medications:  albuterol 2 puff Inhalation 4x Daily Vincent Martinez PA-C    aztreonam 2,000 mg Intravenous Q8H Brunilda Torres MD    diphenhydrAMINE 25 mg Intravenous Q6H PRN Vincent Martinez PA-C    heparin (porcine) 5,000 Units Subcutaneous [facility] Vincent Martinez PA-C    HYDROmorphone 1 mg Intravenous Q2H PRN Vincent Martinez PA-C    metroNIDAZOLE 500 mg Intravenous Q8H Siobhan Schultz MD Last Rate: 500 mg (08/07/19 1359)   multivitamin IVPB  Intravenous Daily Vincent Martinez PA-C Last Rate: 500 mL/hr at 08/07/19 0853   ondansetron 4 mg Intravenous Q6H PRN Vincent Martinez PA-C    oxyCODONE 10 mg Oral Q4H PRN Margot Mitchell MD    oxyCODONE 5 mg Oral Q4H PRN Margot Mitchell MD    pantoprazole 40 mg Intravenous Q12H [facility] Vincent Martinez PA-C    IV infusion builder  Intravenous Continuous Siobhan Schultz MD Last Rate: 50 mL/hr at 08/07/19 0901   promethazine 12 5 mg Intravenous Q6H PRN Vincent Martinez PA-C    saccharomyces boulardii 250 mg Oral BID Vincent Martinez PA-C    simethicone 80 mg Oral Q6H PRN Vincent Martinez PA-C    sucralfate 1,000 mg Oral 4x Daily (AC & HS) Vincent Martinez PA-C    vancomycin 20 mg/kg Intravenous Q8H Victorina Glass MD Last Rate: 1,500 mg (08/07/19 1135)        Today, Patient Was Seen By: Roxy Ramirez MD    ** Please Note: Dictation voice to text software may have been used in the creation of this document   **

## 2019-08-07 NOTE — PROGRESS NOTES
Progress Note - Bariatric Surgery   [facility] 63 y o  male MRN: [id number]  Unit/Bed#: E5 -01 Encounter: [id number]      Subjective/Objective     Subjective: This is a 63year old male s/p exploratory laparotomy with repair of perforated marginal ulcer, abdominal washout, G tube placement and GI assisted endoscopic stent placement on 7/28/19 by Dr Remy Hum also underwent a diagnostic laparoscopy  That was converted to open on 7/26/2019 with internal hernia repair  Patient NPO s/p undergoing EGD with stent adjustment with Monae Groves yesterday  Feeling better today however still Complains of some numbness and swelling in right leg  pain adequately controlled on oral pain medication, ambulating without assistance, voiding well, using incentive spirometer  Denies fevers, chills, sweats, SOB, CP  Objective:    /73 (BP Location: Right arm)   Pulse 100   Temp 97 9 °F (36 6 °C) (Temporal)   Resp 18   Ht 5' 8" (1 727 m)   Wt 75 3 kg (166 lb 0 1 oz)   SpO2 91%   BMI 25 24 kg/m²       Intake/Output Summary (Last 24 hours) at 8/7/2019 0830  Last data filed at 8/7/2019 8085  Gross per 24 hour   Intake 200 ml   Output 2077 ml   Net -1877 ml       Invasive Devices     Peripheral Intravenous Line            Peripheral IV 08/04/19 Left Antecubital 2 days    Peripheral IV 08/05/19 Left;Ventral (anterior) Forearm 2 days          Drain            Gastrostomy/Enterostomy Gastrostomy 22 Fr  LUQ 9 days    Closed/Suction Drain Left LUQ Bulb 8 Fr  5 days    Closed/Suction Drain Right RUQ Bulb 8 Fr  5 days                ROS: 10-point system completed  All negative except see HPI  Physical Exam    General Appearance:    Alert, cooperative, no distress, appears stated age   Head:    Normocephalic, without obvious abnormality, atraumatic   Lungs:     Respirations unlabored   Heart:    Regular rate and rhythm   Abdomen:     Soft, appropriate tenderness,  no masses, no organomegaly,   non distended  Left and right IR drains in place with minimal serosanguinous drainage  Elijah drain removed yesterday   Extremities:   Extremities normal, atraumatic, no cyanosis or edema   Neurologic:  Incision:      Psych:   Normal strength and sensation    Midline incision still with open wound at distal end that is packed with 4x4 andabdominal pad dressing  Continue twice daily dressing changes     Normal mood and affect       Lab, Imaging and other studies:I have personally reviewed pertinent lab results  VTE Mechanical Prophylaxis: sequential compression device, heparin    Assessment/Plan  1)  This is a 63year old male s/p exploratory laparotomy with repair of perforated marginal ulcer, abdominal washout, G tube placement and GI assisted endoscopic stent placement on 7/28/19 by Dr Saloni Hall also underwent a diagnostic laparoscopy  That was converted to open on 7/26/2019 with internal hernia repair       - s/p emergent EGD with stent adjustment by Dr DING [facility] yetserday- per OP note, successful placement of fully covered esophageal stent   - Continue NPO until repeat UGI tomorrow or Friday  Sips with meds and ice chips are ok     - Continue antibiotics, antifungal as per ID   - Pharmacy consult in place, appreciate continued input   - Abdominal wound was packed with 4x4 today and abdominal pad was applied, Continue twice daily dressing changes     - Continue IVF at 50 cc/hr  - Continue to trend electrolytes and WBC count  - Continue to monitor drain output   -drain flushing protocol  - Encourage IS, ambulation  - PPI and Carafate for GI prophylaxis  - SCDs and Heparin for DVT prophylaxis  - Case management on board for home tube feedings        Plan of care was discussed with patient and patient's nurse  Care plan discussed with Dr Tatyana Andersen to discharge to home today

## 2019-08-07 NOTE — PLAN OF CARE
Problem: Nutrition/Hydration-ADULT  Goal: Nutrient/Hydration intake appropriate for improving, restoring or maintaining nutritional needs  Description  Monitor and assess patient's nutrition/hydration status for malnutrition (ex- brittle hair, bruises, dry skin, pale skin and conjunctiva, muscle wasting, smooth red tongue, and disorientation)  Collaborate with interdisciplinary team and initiate plan and interventions as ordered  Monitor patient's weight and dietary intake as ordered or per policy  Utilize nutrition screening tool and intervene per policy  Determine patient's food preferences and provide high-protein, high-caloric foods as appropriate  INTERVENTIONS:  - Monitor oral intake, urinary output, labs, and treatment plans  - Assess nutrition and hydration status and recommend course of action  - Evaluate amount of meals eaten  - Assist patient with eating if necessary   - Allow adequate time for meals  - Recommend/ encourage appropriate diets, oral nutritional supplements, and vitamin/mineral supplements  - Order, calculate, and assess calorie counts as needed  - Recommend, monitor, and adjust tube feedings and TPN/PPN based on assessed needs  - Assess need for intravenous fluids  - Provide specific nutrition/hydration education as appropriate  - Include patient/family/caregiver in decisions related to nutrition  Outcome: Not Progressing   Pt continues with bloating, nausea, explained has had tiny bowel movements  Currently pt on Jevity [email or web address], 1 packet of prosource, 1 packet of banatrol, monitor tolerance of banatrol closely (typically used for patients with diarrhea)  If pt continues with discomfort and TF unable to be advanced, consider initiating standard TPN

## 2019-08-07 NOTE — MALNUTRITION/BMI
This medical record reflects one or more clinical indicators suggestive of malnutrition and/or morbid obesity  Malnutrition Findings:   Malnutrition type: Acute illness(acute severe pro, naya malnutrition d/t condition as evidence by <50% energy intake >5 days, 2-3+ edema LE's, presumed wt  loss is masked plans for nutrition support)  Degree of Malnutrition: Other severe protein calorie malnutrition  Malnutrition Characteristics: Inadequate energy, Fluid accumulation, Muscle loss    BMI Findings: Body mass index is 25 24 kg/m²  See Nutrition note dated 8/7/19 for additional details  Completed nutrition assessment is viewable in the nutrition documentation

## 2019-08-07 NOTE — PROGRESS NOTES
Vancomycin IV Pharmacy-to-Dose Consultation    Krystin Shaikh is a 63 y o  male who is currently receiving Vancomycin IV with management by the Pharmacy Consult service  Assessment/Plan:  The patient was reviewed  Renal function is stable and no signs or symptoms of nephrotoxicity and/or infusion reactions were documented in the chart  Based on todays assessment, continue current vancomycin dosing of 1500mg IV every 8 hours, with a plan for trough to be drawn at 1130 on 8/11/19    We will continue to follow the patients culture results and clinical progress daily      Taqueria Linda, Pharmacist

## 2019-08-07 NOTE — ASSESSMENT & PLAN NOTE
Body mass index is 25 24 kg/m²  Patient getting tube feeds but these have been held in anticipation of repeat upper GI series tomorrow 8/8

## 2019-08-07 NOTE — UTILIZATION REVIEW
Continued Stay Review    Date: 08-07-19                        Current Patient Class: INPATIENT   Current Level of Care: MEDICAL    HPI:58 y o  male initially admitted on *07/26/19    Assessment/Plan: This is a 63year old male s/p exploratory laparotomy with repair of perforated marginal ulcer, abdominal washout, G tube placement and GI assisted endoscopic stent placement on 7/28/19 by Dr Loida Goodson also underwent a diagnostic laparoscopy  That was converted to open on 7/26/2019 with internal hernia repair       - s/p emergent EGD with stent adjustment by Dr Cyrilla Schilder yesterday for leak seen on UGI- per OP note, successful placement of fully covered esophageal stent   - Continue NPO until repeat UGI vs CT scan on Friday   Sips with meds and ice chips are ok    - Continue antibiotics, antifungal as per ID, f/u cultures  - Pharmacy consult in place, appreciate continued input   - Abdominal wound was packed with 4x4 today and abdominal pad was applied, Continue twice daily dressing changes  - Continue TF on 12 hour cycle, f/u nutrition recommendations     - Continue IVF at 50 cc/hr  - Continue to trend electrolytes and WBC count  - Continue to monitor drain output and quality  -drain flushing protocol  - Encourage IS, ambulation  - PPI and Carafate for GI prophylaxis  - SCDs and Heparin for DVT prophylaxis  - Case management on board for home tube feedings        Pertinent Labs/Diagnostic Results:   Results from last 7 days   Lab Units 08/07/19  0534 08/06/19  0546 08/04/19  0438 08/03/19  0709 08/02/19  0700   WBC Thousand/uL 17 14* 19 08* 28 85* 29 92* 32 94*   HEMOGLOBIN g/dL 10 6* 10 5* 11 8* 13 0 12 5   HEMATOCRIT % 33 1* 32 3* 35 4* 39 6 37 3   PLATELETS Thousands/uL 670* 460* 250 195 174   NEUTROS ABS Thousands/µL 14 77* 16 51*  --  26 52* 29 16*         Results from last 7 days   Lab Units 08/07/19  0529 08/06/19  0546 08/05/19  0720 08/04/19  0438 08/04/19  0033 08/03/19  0709 08/02/19  0700 08/01/19  0719 08/01/19  0526   SODIUM mmol/L 141 138 134*  --  135* 136 139  --  135*   POTASSIUM mmol/L 4 1 3 3* 3 8  --  3 8 4 6 3 7  --  3 1*   CHLORIDE mmol/L 104 102 100  --  101 101 102  --  97*   CO2 mmol/L 28 31 27  --  26 28 29  --  29   ANION GAP mmol/L 9 5 7  --  8 7 8  --  9   BUN mg/dL 5 4* 8  --  11 13 15  --  17   CREATININE mg/dL 0 59* 0 49* 0 42*  --  0 49* 0 50* 0 61  --  0 64   EGFR ml/min/1 73sq m 112 121 129  --  121 120 111  --  109   CALCIUM mg/dL 7 7* 7 4* 7 4*  --  7 5* 7 6* 7 4*  --  7 3*   CALCIUM, IONIZED mmol/L  --   --   --  0 94*  --  1 00* 1 01* 1 02*  --    MAGNESIUM mg/dL  --  1 7 2 0  --   --  2 0 1 9  --  1 5*   PHOSPHORUS mg/dL  --  2 9 2 9 2 8  --  2 6* 2 3*  --  1 7*     Results from last 7 days   Lab Units 08/04/19  0033   AST U/L 34   ALT U/L 17   ALK PHOS U/L 106   TOTAL PROTEIN g/dL 4 7*   ALBUMIN g/dL 1 7*   TOTAL BILIRUBIN mg/dL 1 43*     Results from last 7 days   Lab Units 08/04/19  0852 08/03/19  1322   POC GLUCOSE mg/dl 101 77     Results from last 7 days   Lab Units 08/07/19  0529 08/06/19  0546 08/05/19  0720 08/04/19  0033 08/03/19  0709 08/02/19  0700 08/01/19  0526 07/31/19  1559   GLUCOSE RANDOM mg/dL 101 113 102 101 92 112 334* 102     Results from last 7 days   Lab Units 08/06/19  0546 08/04/19  0438 08/03/19  0709 08/02/19  0700 08/01/19  1846   PROCALCITONIN ng/ml 0 68* 0 87* 1 46* 1 88* 2 75*     Results from last 7 days   Lab Units 08/05/19  0917 08/01/19  1845 08/01/19  1609 08/01/19  1546   BLOOD CULTURE   --  No Growth After 5 Days  No Growth After 5 Days    --   --    GRAM STAIN RESULT  Rare Disintegrating polys  No bacteria seen  --  2+ RBC's*  3+ Gram positive cocci in pairs and chains* Rare Polys  No bacteria seen   WOUND CULTURE  2 colonies Staphylococcus coagulase negative*  --   --   --    BODY FLUID CULTURE, STERILE   --   --  4+ Growth of Streptococcus mitis oralis group*  Few Colonies of Candida albicans* Few Colonies of Streptococcus mitis oralis group*       Vital Signs:   Date/Time  Temp  Pulse  Resp  BP  SpO2  O2 Device  Cardiac (WDL)  Patient Position - Orthostatic VS   08/07/19 0723  97 9 °F (36 6 °C)  100  18  147/73  91 %  None (Room air)    Lying   08/06/19 2336  97 8 °F (36 6 °C)  91  18  135/63  100 %  None (Room air)    Lying   08/06/19 1900  97 3 °F (36 3 °C)Abnormal   101  14  149/55  100 %  Nasal cannula    Lying   08/06/19 1745    100  12  121/57  96 %  Nasal cannula       08/06/19 1730  98 6 °F (37 °C)  101  12  134/64  99 %  Nasal cannula       08/06/19 1715    103  12  130/67  96 %  Nasal cannula       08/06/19 1700    108Abnormal   9Abnormal   129/66  99 %  Simple mask       08/06/19 1645  98 6 °F (37 °C)  111Abnormal   12  126/68  95 %  Simple mask  X     08/06/19 1521  99 3 °F (37 4 °C)  88  18  153/67  92 %  None (Room air)       08/06/19 0700  97 °F (36 1 °C)Abnormal   85  18  132/66  100 %  Nasal cannula             Medications:   Scheduled Meds:   Current Facility-Administered Medications:  albuterol 2 puff Inhalation 4x Daily   aztreonam 2,000 mg Intravenous Q8H   diphenhydrAMINE 25 mg Intravenous Q6H PRN   heparin (porcine) 5,000 Units Subcutaneous Q8H [facility]   HYDROmorphone 1 mg Intravenous Q2H PRN   metroNIDAZOLE 500 mg Intravenous Q8H   multivitamin IVPB  Intravenous Daily   ondansetron 4 mg Intravenous Q6H PRN   oxyCODONE 10 mg Oral Q4H PRN   oxyCODONE 5 mg Oral Q4H PRN   pantoprazole 40 mg Intravenous Q12H RADHA   IV infusion builder  Intravenous Continuous   promethazine 12 5 mg Intravenous Q6H PRN   saccharomyces boulardii 250 mg Oral BID   simethicone 80 mg Oral Q6H PRN   sucralfate 1,000 mg Oral 4x Daily (AC & HS)   vancomycin 20 mg/kg Intravenous Q8H       Discharge Plan:     Network Utilization Review Department  Phone: [phone]; Fax [phone]  [email or web address]  ATTENTION: Please call with any questions or concerns to [phone]  and carefully listen to the prompts so that you are directed to the right person  Send all requests for admission clinical reviews, approved or denied determinations and any other requests to fax [phone]   All voicemails are confidential

## 2019-08-07 NOTE — PROGRESS NOTES
Progress Note - Bariatric Surgery   [facility] 63 y o  male MRN: [id number]  Unit/Bed#: E5 -01 Encounter: [id number]      Subjective/Objective     Subjective: Subjective: This is a 63year old male s/p exploratory laparotomy with repair of perforated marginal ulcer, abdominal washout, G tube placement and GI assisted endoscopic stent placement on 7/28/19 by Dr Remy Hum also underwent a diagnostic laparoscopy  That was converted to open on 7/26/2019 with internal hernia repair       Patient had UGI on 8/5 that demonstrated a leak from the proximal aspect of the gastrojejunal stent into LUQ collection  He subsequently underwent EGD with stent adjustment with Monae Groves yesterday  Feeling better today however still Complains of some numbness and swelling in right leg  pain adequately controlled on oral pain medication, ambulating without assistance, voiding well, using incentive spirometer  Denies fevers, chills, sweats, SOB, CP  Objective:    /73 (BP Location: Right arm)   Pulse 100   Temp 97 9 °F (36 6 °C) (Temporal)   Resp 18   Ht 5' 8" (1 727 m)   Wt 75 3 kg (166 lb 0 1 oz)   SpO2 91%   BMI 25 24 kg/m²       Intake/Output Summary (Last 24 hours) at 8/7/2019 0935  Last data filed at 8/7/2019 2894  Gross per 24 hour   Intake 200 ml   Output 2077 ml   Net -1877 ml       Invasive Devices     Peripheral Intravenous Line            Peripheral IV 08/04/19 Left Antecubital 2 days    Peripheral IV 08/05/19 Left;Ventral (anterior) Forearm 2 days          Drain            Gastrostomy/Enterostomy Gastrostomy 22 Fr  LUQ 9 days    Closed/Suction Drain Left LUQ Bulb 8 Fr  5 days    Closed/Suction Drain Right RUQ Bulb 8 Fr  5 days                ROS: 10-point system completed  All negative except see HPI        Physical Exam    General Appearance:    Alert, cooperative, no distress, appears stated age   Head:    Normocephalic, without obvious abnormality, atraumatic   Lungs:     Respirations unlabored   Heart:    Regular rate and rhythm   Abdomen:     Soft, appropriate tenderness,  Nondistended  Left and right IR drains in place with seropurulent output  Left drain with 195 cc output, Right drain with 107 cc output over 24 hours  Elijah drain removed  Extremities:   1+ LE edema   Neurologic:  Incision:  Psych:   Normal strength and sensation    Midline incision with lower aspect opened and packed with wet to dry dressing  Fascia appears intact with some overlying fibrinous exudate    Normal mood and affect       Lab, Imaging and other studies:  I have personally reviewed pertinent lab results  , CBC:   Lab Results   Component Value Date    WBC 17 14 (H) 08/07/2019    HGB 10 6 (L) 08/07/2019    HCT 33 1 (L) 08/07/2019    MCV 95 08/07/2019     (H) 08/07/2019    MCH 30 5 08/07/2019    MCHC 32 0 08/07/2019    RDW 15 4 (H) 08/07/2019    MPV 9 0 08/07/2019    NRBC 0 08/07/2019   , CMP:   Lab Results   Component Value Date    SODIUM 141 08/07/2019    K 4 1 08/07/2019     08/07/2019    CO2 28 08/07/2019    BUN 5 08/07/2019    CREATININE 0 59 (L) 08/07/2019    CALCIUM 7 7 (L) 08/07/2019    EGFR 112 08/07/2019        VTE Mechanical Prophylaxis: sequential compression device, heparin     Assessment/Plan  1)  This is a 63year old male s/p exploratory laparotomy with repair of perforated marginal ulcer, abdominal washout, G tube placement and GI assisted endoscopic stent placement on 7/28/19 by Dr Montenegro Francisco also underwent a diagnostic laparoscopy  That was converted to open on 7/26/2019 with internal hernia repair       - s/p emergent EGD with stent adjustment by Dr Colin Hanson yesterday for leak seen on UGI- per OP note, successful placement of fully covered esophageal stent   - Continue NPO until repeat UGI vs CT scan on Friday  Sips with meds and ice chips are ok     - Continue antibiotics, antifungal as per ID, f/u cultures  - Pharmacy consult in place, appreciate continued input - Abdominal wound was packed with 4x4 today and abdominal pad was applied, Continue twice daily dressing changes  - Continue TF on 12 hour cycle, f/u nutrition recommendations     - Continue IVF at 50 cc/hr  - Continue to trend electrolytes and WBC count  - Continue to monitor drain output and quality  -drain flushing protocol  - Encourage IS, ambulation  - PPI and Carafate for GI prophylaxis  - SCDs and Heparin for DVT prophylaxis  - Case management on board for home tube feedings         Plan of care was discussed with patient and patient's nurse  Care plan discussed with Dr Luis Juan to discharge to home today

## 2019-08-07 NOTE — ASSESSMENT & PLAN NOTE
Lab Results   Component Value Date    HGBA1C 5 1 07/13/2018     No results for input(s): POCGLU in the last 72 hours  Diet controlled diabetes  Accucheks within limits

## 2019-08-07 NOTE — ASSESSMENT & PLAN NOTE
Marginal ulcer with perforation  Patient initially admitted for open hernia repair but subsequently developed perforated marginal ulcer requiring laparotomy  Subsequently developed intra-abdominal abscesses requiring placement of drains  Currently on vancomycin and aztreonam metronidazole and fluconazole per ID  As per ID, will continue aztreonam for total of 7 days and then discontinue after today  Patient had abnormal upper GI series 8/5 which showed a leak in the proximal aspect of the gastrojejunal stent into the left upper quadrant collection with drainage  Patient had EGD performed on 08/06 with repair of leak  Continue to keep patient NPO with ice with sips of meds  Will have repeat upper GI series done tomorrow 8/8  Tube feeds currently held  Continue Protonix and Carafate for GI prophylaxis

## 2019-08-07 NOTE — SOCIAL WORK
Pt will have repeat UGI, likely Thursday or Friday  CM set up with [facility] and home tube feeds for when pt is ready to dc home and will follow for any additional dcp needs

## 2019-08-07 NOTE — PROGRESS NOTES
PT continues with bloating, nausea, explained has had tiny bowel movements  Currently pt on Jevity [email or web address], 1 packet of prosource, 1 packet of banatrol, monitor tolerance of banatrol closely (typically used for patients with diarrhea)  If pt continues with discomfort and TF unable to be advanced, consider initiating standard TPN

## 2019-08-07 NOTE — SOCIAL WORK
CM was notified that pt's wife had questions about tube feed deliveries  She had reported that now 2 cases of pt tube feed and supplies were delivered to home over the last few days and that one box sat out in the heat for an extended period of time and she was unsure if it would still be any good  Wife was also unsure how to store all of it  Pt will not be dc in the very near future  CM contacted [employer] through [facility] to inquire  They reported that yes, pt tube feed and syringes had been delivered  CM asked if [employer] could please call wife and forwarded her cell phone number  CM met with with pt and wife at bedside to discuss  They just didn't want the feeds to go to waste and didn't want any more delivered until pt was dc  LAUREL did tell them that [employer] should be calling, as LAUREL requested

## 2019-08-07 NOTE — PROGRESS NOTES
Progress Note - Infectious Disease   Fermin Nicholas 63 y o  male MRN: [id number]  Unit/Bed#: E5 -01 Encounter: [id number]      Impression/Plan:  1  Sepsis-leukocytosis and tachycardia   Etiology most likely intra-abdominal abscess, although lung consolidations may play a role   Patient is improving clinically slowly   He remains systemically well   Temperature is down  WBC continues to trend down   Procalcitonin continues decrease  Blood cultures remain negative  Atibiotic plan as in below  Monitor temperature/WBC  Monitor procalcitonin      2  Intra-abdominal abscesses-status post IR drainage with COURTNEY drains remaining in place   Abscess culture is growing strep mitis and Candida  Limited antibiotic choices based upon the patient's allergies  Continue vancomycin, Flagyl, fluconazole for now  Treat x 14 days total   Continue aztreonam for now   Likely discontinue aztreonam after 7 days, after today  Continue drainage per IR  Close surgical follow-up      3  Peritonitis-in the setting of a perforated marginal ulceration   The patient is now status post exploratory laparotomy with repair and stent placement, and now COURTNEY drain placement  Patient is status post placement of esophageal stent  Stent was readjusted yesterday  Antibiotic plan as in above  Continue drain as in above  Serial abdominal exams  Surgery follow-up      4  Abnormal CT of the chest-with some consolidation and pleural effusion noted on the right   Perhaps this is all atelectasis with reactive effusion   Less likely but also possible would be pneumonia with empyema   Patient's respiratory status improving slowly  Monitor closely for now  Antibiotic plan as in above  Monitor respiratory status  May need to repeat chest CT in thoracentesis if patient does not continue to improve      5   COPD-no current clinical evidence of an acute exacerbation at this time       6  Multiple antibiotic allergies, including PCN anaphylaxis   This limits antibiotic choice, as in above      Discussed with patient and his family in detail regarding the above plan      Antibiotics:  Vancomycin/aztreonam # 7  Flagyl # 13  High-dose fluconazole # 7     Subjective:  Patient is status post EGD with stent repositioning  He is comfortable  Dyspnea improved   Mild weak cough  Abdominal pain improving, minimal now  Temperature stays down   No chills  He is tolerating antibiotics well   No nausea, vomiting or diarrhea  Objective:  Vitals:  Temp:  [97 3 °F (36 3 °C)-99 3 °F (37 4 °C)] 97 9 °F (36 6 °C)  HR:  [] 100  Resp:  [9-18] 18  BP: (121-153)/(55-73) 147/73  SpO2:  [91 %-100 %] 91 %  Temp (24hrs), Av 3 °F (36 8 °C), Min:97 3 °F (36 3 °C), Max:99 3 °F (37 4 °C)  Current: Temperature: 97 9 °F (36 6 °C)    Physical Exam:     General: Awake, alert, cooperative, no distress  Neck:  Supple  No mass  No lymphadenopathy  Lungs: Expansion symmetric, no rales, no wheezing, respirations unlabored  Heart:  Regular rate and rhythm, S1 and S2 normal, no murmur  Abdomen: Soft, stable distension  Drains remain purulent  Mild tenderness at drain site  Extremities: Stable leg edema  No erythema/warmth  No ulcer  Nontender to palpation  Skin:  No rash  Neuro: Moves all extremities  Invasive Devices     Peripheral Intravenous Line            Peripheral IV 19 Left Antecubital 2 days    Peripheral IV 19 Left;Ventral (anterior) Forearm 2 days          Drain            Gastrostomy/Enterostomy Gastrostomy 22 Fr  LUQ 9 days    Closed/Suction Drain Left LUQ Bulb 8 Fr  5 days    Closed/Suction Drain Right RUQ Bulb 8 Fr  5 days                Labs studies:   I have personally reviewed pertinent labs    Results from last 7 days   Lab Units 19  0529 19  0546 19  0720 19  0033   POTASSIUM mmol/L 4 1 3 3* 3 8 3 8   CHLORIDE mmol/L 104 102 100 101   CO2 mmol/L 28 31 27 26   BUN mg/dL 5 4* 8 11   CREATININE mg/dL 0 59* 0 49* 0 42* 0 49*   EGFR ml/min/1 73sq m 112 121 129 121   CALCIUM mg/dL 7 7* 7 4* 7 4* 7 5*   AST U/L  --   --   --  34   ALT U/L  --   --   --  17   ALK PHOS U/L  --   --   --  106     Results from last 7 days   Lab Units 08/07/19  0534 08/06/19  0546 08/04/19  0438   WBC Thousand/uL 17 14* 19 08* 28 85*   HEMOGLOBIN g/dL 10 6* 10 5* 11 8*   PLATELETS Thousands/uL 670* 460* 250     Results from last 7 days   Lab Units 08/05/19  0917 08/01/19  1845 08/01/19  1609 08/01/19  1546   BLOOD CULTURE   --  No Growth After 5 Days  No Growth After 5 Days  --   --    GRAM STAIN RESULT  Rare Disintegrating polys  No bacteria seen  --  2+ RBC's*  3+ Gram positive cocci in pairs and chains* Rare Polys  No bacteria seen   WOUND CULTURE  2 colonies Staphylococcus coagulase negative*  --   --   --    BODY FLUID CULTURE, STERILE   --   --  4+ Growth of Streptococcus mitis oralis group*  Few Colonies of Candida albicans* Few Colonies of Streptococcus mitis oralis group*       Imaging Studies:   I have personally reviewed pertinent imaging study reports and images in PACS  EKG, Pathology, and Other Studies:   I have personally reviewed pertinent reports

## 2019-08-08 ENCOUNTER — TELEPHONE (OUTPATIENT)
Dept: RADIOLOGY | Facility: HOSPITAL | Age: 59
End: 2019-08-08

## 2019-08-08 ENCOUNTER — APPOINTMENT (INPATIENT)
Dept: CT IMAGING | Facility: HOSPITAL | Age: 59
DRG: 326 | End: 2019-08-08
Payer: COMMERCIAL

## 2019-08-08 LAB
ALBUMIN SERPL BCP-MCNC: 1.5 G/DL (ref 3.5–5)
ALP SERPL-CCNC: 97 U/L (ref 46–116)
ALT SERPL W P-5'-P-CCNC: 10 U/L (ref 12–78)
ANION GAP SERPL CALCULATED.3IONS-SCNC: 6 MMOL/L (ref 4–13)
AST SERPL W P-5'-P-CCNC: 24 U/L (ref 5–45)
BILIRUB SERPL-MCNC: 0.54 MG/DL (ref 0.2–1)
BUN SERPL-MCNC: 4 MG/DL (ref 5–25)
CALCIUM SERPL-MCNC: 7.6 MG/DL (ref 8.3–10.1)
CHLORIDE SERPL-SCNC: 104 MMOL/L (ref 100–108)
CO2 SERPL-SCNC: 30 MMOL/L (ref 21–32)
CREAT SERPL-MCNC: 0.56 MG/DL (ref 0.6–1.3)
CRP SERPL QL: >90 MG/L
ERYTHROCYTE [DISTWIDTH] IN BLOOD BY AUTOMATED COUNT: 15.4 % (ref 11.6–15.1)
GFR SERPL CREATININE-BSD FRML MDRD: 115 ML/MIN/1.73SQ M
GLUCOSE SERPL-MCNC: 107 MG/DL (ref 65–140)
HCT VFR BLD AUTO: 32 % (ref 36.5–49.3)
HGB BLD-MCNC: 10 G/DL (ref 12–17)
MAGNESIUM SERPL-MCNC: 1.9 MG/DL (ref 1.6–2.6)
MCH RBC QN AUTO: 29.9 PG (ref 26.8–34.3)
MCHC RBC AUTO-ENTMCNC: 31.3 G/DL (ref 31.4–37.4)
MCV RBC AUTO: 96 FL (ref 82–98)
PHOSPHATE SERPL-MCNC: 2.8 MG/DL (ref 2.7–4.5)
PLATELET # BLD AUTO: 825 THOUSANDS/UL (ref 149–390)
PMV BLD AUTO: 8.4 FL (ref 8.9–12.7)
POTASSIUM SERPL-SCNC: 3.8 MMOL/L (ref 3.5–5.3)
PREALB SERPL-MCNC: 3.3 MG/DL (ref 18–40)
PROT SERPL-MCNC: 5.4 G/DL (ref 6.4–8.2)
RBC # BLD AUTO: 3.34 MILLION/UL (ref 3.88–5.62)
SODIUM SERPL-SCNC: 140 MMOL/L (ref 136–145)
WBC # BLD AUTO: 14.11 THOUSAND/UL (ref 4.31–10.16)

## 2019-08-08 PROCEDURE — 99232 SBSQ HOSP IP/OBS MODERATE 35: CPT | Performed by: FAMILY MEDICINE

## 2019-08-08 PROCEDURE — 74177 CT ABD & PELVIS W/CONTRAST: CPT

## 2019-08-08 PROCEDURE — 84100 ASSAY OF PHOSPHORUS: CPT | Performed by: SURGERY

## 2019-08-08 PROCEDURE — 85027 COMPLETE CBC AUTOMATED: CPT | Performed by: SURGERY

## 2019-08-08 PROCEDURE — 84134 ASSAY OF PREALBUMIN: CPT | Performed by: PHYSICIAN ASSISTANT

## 2019-08-08 PROCEDURE — 80053 COMPREHEN METABOLIC PANEL: CPT | Performed by: FAMILY MEDICINE

## 2019-08-08 PROCEDURE — 86140 C-REACTIVE PROTEIN: CPT | Performed by: PHYSICIAN ASSISTANT

## 2019-08-08 PROCEDURE — 83735 ASSAY OF MAGNESIUM: CPT | Performed by: SURGERY

## 2019-08-08 PROCEDURE — 99024 POSTOP FOLLOW-UP VISIT: CPT | Performed by: SURGERY

## 2019-08-08 PROCEDURE — C9113 INJ PANTOPRAZOLE SODIUM, VIA: HCPCS | Performed by: PHYSICIAN ASSISTANT

## 2019-08-08 PROCEDURE — 99233 SBSQ HOSP IP/OBS HIGH 50: CPT | Performed by: INTERNAL MEDICINE

## 2019-08-08 PROCEDURE — 71260 CT THORAX DX C+: CPT

## 2019-08-08 RX ORDER — GINSENG 100 MG
1 CAPSULE ORAL 2 TIMES DAILY
Status: DISCONTINUED | OUTPATIENT
Start: 2019-08-08 | End: 2019-08-16 | Stop reason: HOSPADM

## 2019-08-08 RX ADMIN — OXYCODONE HYDROCHLORIDE 10 MG: 5 SOLUTION ORAL at 16:16

## 2019-08-08 RX ADMIN — SUCRALFATE 1000 MG: 1 SUSPENSION ORAL at 16:16

## 2019-08-08 RX ADMIN — METRONIDAZOLE 500 MG: 500 INJECTION, SOLUTION INTRAVENOUS at 17:35

## 2019-08-08 RX ADMIN — VANCOMYCIN HYDROCHLORIDE 1500 MG: 5 INJECTION, POWDER, LYOPHILIZED, FOR SOLUTION INTRAVENOUS at 15:08

## 2019-08-08 RX ADMIN — Medication 250 MG: at 10:19

## 2019-08-08 RX ADMIN — PANTOPRAZOLE SODIUM 40 MG: 40 INJECTION, POWDER, FOR SOLUTION INTRAVENOUS at 10:19

## 2019-08-08 RX ADMIN — ALBUTEROL SULFATE 2 PUFF: 90 AEROSOL, METERED RESPIRATORY (INHALATION) at 21:47

## 2019-08-08 RX ADMIN — Medication 250 MG: at 17:35

## 2019-08-08 RX ADMIN — HYDROMORPHONE HYDROCHLORIDE 1 MG: 1 INJECTION, SOLUTION INTRAMUSCULAR; INTRAVENOUS; SUBCUTANEOUS at 20:06

## 2019-08-08 RX ADMIN — SUCRALFATE 1000 MG: 1 SUSPENSION ORAL at 21:48

## 2019-08-08 RX ADMIN — Medication 1 SMALL APPLICATION: at 10:19

## 2019-08-08 RX ADMIN — VANCOMYCIN HYDROCHLORIDE 1500 MG: 5 INJECTION, POWDER, LYOPHILIZED, FOR SOLUTION INTRAVENOUS at 21:48

## 2019-08-08 RX ADMIN — HYDROMORPHONE HYDROCHLORIDE 1 MG: 1 INJECTION, SOLUTION INTRAMUSCULAR; INTRAVENOUS; SUBCUTANEOUS at 05:36

## 2019-08-08 RX ADMIN — SUCRALFATE 1000 MG: 1 SUSPENSION ORAL at 12:09

## 2019-08-08 RX ADMIN — SUCRALFATE 1000 MG: 1 SUSPENSION ORAL at 05:25

## 2019-08-08 RX ADMIN — IOHEXOL 100 ML: 350 INJECTION, SOLUTION INTRAVENOUS at 14:16

## 2019-08-08 RX ADMIN — ALBUTEROL SULFATE 2 PUFF: 90 AEROSOL, METERED RESPIRATORY (INHALATION) at 10:18

## 2019-08-08 RX ADMIN — Medication 1 SMALL APPLICATION: at 17:35

## 2019-08-08 RX ADMIN — OXYCODONE HYDROCHLORIDE 10 MG: 5 SOLUTION ORAL at 10:20

## 2019-08-08 RX ADMIN — IOHEXOL 50 ML: 240 INJECTION, SOLUTION INTRATHECAL; INTRAVASCULAR; INTRAVENOUS; ORAL at 14:16

## 2019-08-08 RX ADMIN — THIAMINE HYDROCHLORIDE: 100 INJECTION, SOLUTION INTRAMUSCULAR; INTRAVENOUS at 10:19

## 2019-08-08 RX ADMIN — OXYCODONE HYDROCHLORIDE 10 MG: 5 SOLUTION ORAL at 01:57

## 2019-08-08 RX ADMIN — VANCOMYCIN HYDROCHLORIDE 1500 MG: 5 INJECTION, POWDER, LYOPHILIZED, FOR SOLUTION INTRAVENOUS at 05:00

## 2019-08-08 RX ADMIN — HEPARIN SODIUM 5000 UNITS: 5000 INJECTION INTRAVENOUS; SUBCUTANEOUS at 21:48

## 2019-08-08 RX ADMIN — PANTOPRAZOLE SODIUM 40 MG: 40 INJECTION, POWDER, FOR SOLUTION INTRAVENOUS at 20:06

## 2019-08-08 RX ADMIN — HEPARIN SODIUM 5000 UNITS: 5000 INJECTION INTRAVENOUS; SUBCUTANEOUS at 13:11

## 2019-08-08 RX ADMIN — METRONIDAZOLE 500 MG: 500 INJECTION, SOLUTION INTRAVENOUS at 05:25

## 2019-08-08 RX ADMIN — ALBUTEROL SULFATE 2 PUFF: 90 AEROSOL, METERED RESPIRATORY (INHALATION) at 17:35

## 2019-08-08 RX ADMIN — ALBUTEROL SULFATE 2 PUFF: 90 AEROSOL, METERED RESPIRATORY (INHALATION) at 13:10

## 2019-08-08 NOTE — ASSESSMENT & PLAN NOTE
Body mass index is 25 24 kg/m²  Patient getting tube feeds but these have been held pending results of CT scan of abdomen and pelvis with IV and p o  Contrast   Patient has generalized anasarca due to being severely hypoalbuminemic

## 2019-08-08 NOTE — PROGRESS NOTES
Progress Note Blaine Carlson 1960, 63 y o  male MRN: [id number]    Unit/Bed#: E5 -01 Encounter: [id number]    Primary Care Provider: Cheyanne Ly DO   Date and time admitted to hospital: 7/26/2019  9:00 AM        * Chronic marginal ulcer with perforation ([facility] )  Assessment & Plan  Marginal ulcer with perforation  Patient initially admitted for open hernia repair but subsequently developed perforated marginal ulcer requiring laparotomy  Subsequently developed intra-abdominal abscesses requiring placement of drains  Continue patient on IV vancomycin, metronidazole, fluconazole  Patient had abnormal upper GI series 8/5 which showed a leak in the proximal aspect of the gastrojejunal stent into the left upper quadrant collection with drainage  Patient had EGD performed on 08/06 with adjustment of stent  Patient to have CT scan with IV and p o  Contrast today 8/8  Tube feeds currently held, will check nutritional labs, considering transition of tube feeds to TPN depending on nutritional status and results of CT scan  Continue to monitor drain output as per General surgery  Continue PPI and Carafate for GI prophylaxis  Continue heparin and SCDs for DVT prophylaxis  Continue Protonix and Carafate for GI prophylaxis  Severe protein-calorie malnutrition ([facility] )  Assessment & Plan  Body mass index is 25 24 kg/m²  Patient getting tube feeds but these have been held pending results of CT scan of abdomen and pelvis with IV and p o  Contrast   Patient has generalized anasarca due to being severely hypoalbuminemic  Electrolyte disturbance  Assessment & Plan  Resolved      Results from last 7 days   Lab Units 08/08/19  0646 08/07/19  0529 08/06/19  0546 08/05/19  0720 08/04/19  0438 08/04/19  0033 08/03/19  0709 08/02/19  0700   MAGNESIUM mg/dL 1 9  --  1 7 2 0  --   --  2 0 1 9   PHOSPHORUS mg/dL 2 8  --  2 9 2 9 2 8  --  2 6* 2 3*   POTASSIUM mmol/L 3 8 4 1 3 3* 3 8  --  3 8 4 6 3 7 Diet-controlled diabetes mellitus [facility])  Assessment & Plan  Lab Results   Component Value Date    HGBA1C 5 1 2018     No results for input(s): POCGLU in the last 72 hours  Diet controlled diabetes  Accucheks within limits  Abnormal CT scan, lung  Assessment & Plan  Right lower lobe opacity:  Differentials includes atelectasis versus loculated effusion  Patient already on broad-spectrum antibiotics for intra-abdominal abscess  No respiratory symptoms at this time  Leukocytosis improving  Consider pulmonary consultation or thoracentesis/sampling if clinically warranted    Coagulopathy [facility])  Assessment & Plan  Coagulopathy secondary to malnutrition  VTE Pharmacologic Prophylaxis:   Pharmacologic: Heparin  Mechanical VTE Prophylaxis in Place: No    Patient Centered Rounds: I have performed bedside rounds with nursing staff today  Discussions with Specialists or Other Care Team Provider:  Yes    Education and Discussions with Family / Patient:  Yes with wife at bedside    Time Spent for Care: 20 minutes  More than 50% of total time spent on counseling and coordination of care as described above  Current Length of Stay: 13 day(s)    Current Patient Status: Inpatient   Certification Statement: The patient will continue to require additional inpatient hospital stay due to As per bariatric surgery    Discharge Plan:  As per bariatric surgery    Code Status: Level 1 - Full Code      Subjective:   Patient feels extremely weak and nauseous  Objective:     Vitals:   Temp (24hrs), Av 4 °F (36 9 °C), Min:98 1 °F (36 7 °C), Max:98 8 °F (37 1 °C)    Temp:  [98 1 °F (36 7 °C)-98 8 °F (37 1 °C)] 98 1 °F (36 7 °C)  HR:  [] 99  Resp:  [20-22] 21  BP: (150-157)/(74-78) 152/77  SpO2:  [92 %-99 %] 99 %  Body mass index is 25 24 kg/m²  Input and Output Summary (last 24 hours):        Intake/Output Summary (Last 24 hours) at 2019 1659  Last data filed at 2019 1041  Gross per 24 hour   Intake    Output 985 ml   Net -985 ml       Physical Exam:     Physical Exam   Constitutional: He is oriented to person, place, and time  He appears well-developed  No distress  Malnourished   HENT:   Head: Normocephalic and atraumatic  Eyes: Pupils are equal, round, and reactive to light  EOM are normal    Neck: Normal range of motion  Neck supple  No JVD present  Cardiovascular: Normal rate, regular rhythm and normal heart sounds  No murmur heard  Pulmonary/Chest: Effort normal and breath sounds normal  He has no wheezes  He has no rales  Abdominal: Soft  He exhibits distension  There is tenderness  There is no guarding  COURTNEY drains present in abdominal wall   Musculoskeletal: He exhibits edema  Patient has generalized anasarca   Neurological: He is alert and oriented to person, place, and time  Skin: Skin is warm and dry  He is not diaphoretic  Additional Data:     Labs:    Results from last 7 days   Lab Units 08/08/19  0646 08/07/19  0534   WBC Thousand/uL 14 11* 17 14*   HEMOGLOBIN g/dL 10 0* 10 6*   HEMATOCRIT % 32 0* 33 1*   PLATELETS Thousands/uL 825* 670*   NEUTROS PCT %  --  86*   LYMPHS PCT %  --  5*   MONOS PCT %  --  7   EOS PCT %  --  0     Results from last 7 days   Lab Units 08/08/19  0646   SODIUM mmol/L 140   POTASSIUM mmol/L 3 8   CHLORIDE mmol/L 104   CO2 mmol/L 30   BUN mg/dL 4*   CREATININE mg/dL 0 56*   ANION GAP mmol/L 6   CALCIUM mg/dL 7 6*   ALBUMIN g/dL 1 5*   TOTAL BILIRUBIN mg/dL 0 54   ALK PHOS U/L 97   ALT U/L 10*   AST U/L 24   GLUCOSE RANDOM mg/dL 107         Results from last 7 days   Lab Units 08/04/19  0852 08/03/19  1322   POC GLUCOSE mg/dl 101 77         Results from last 7 days   Lab Units 08/06/19  0546 08/04/19  0438 08/03/19  0709 08/02/19  0700 08/01/19  1846   PROCALCITONIN ng/ml 0 68* 0 87* 1 46* 1 88* 2 75*           * I Have Reviewed All Lab Data Listed Above  * Additional Pertinent Lab Tests Reviewed:  Caitlin Pwoer Admission Reviewed    Imaging:    Imaging Reports Reviewed Today Include:  CT abdomen and pelvis with contrast  Imaging Personally Reviewed by Myself Includes:  None    Recent Cultures (last 7 days):     Results from last 7 days   Lab Units 08/05/19  0917 08/01/19  1845   BLOOD CULTURE   --  No Growth After 5 Days  No Growth After 5 Days     GRAM STAIN RESULT  Rare Disintegrating polys  No bacteria seen  --    WOUND CULTURE  2 colonies Staphylococcus coagulase negative*  --        Last 24 Hours Medication List:     Current Facility-Administered Medications:  albuterol 2 puff Inhalation 4x Daily Luis Almaraz PA-C    bacitracin 1 small application Topical BID Thereasa Goldmann, MD    diphenhydrAMINE 25 mg Intravenous Q6H PRN Luis Almaraz PA-C    heparin (porcine) 5,000 Units Subcutaneous [facility] Luis Almaraz PA-C    HYDROmorphone 1 mg Intravenous Q2H PRN Luis Almaraz PA-C    metroNIDAZOLE 500 mg Intravenous Q8H Harish Max MD Last Rate: 500 mg (08/08/19 0525)   multivitamin IVPB  Intravenous Daily Luis Almaraz PA-C Last Rate: 500 mL/hr at 08/08/19 1019   ondansetron 4 mg Intravenous Q6H PRN Luis Almaraz PA-C    oxyCODONE 10 mg Oral Q4H PRN Vickie Kong MD    oxyCODONE 5 mg Oral Q4H PRN Vickie Kong MD    pantoprazole 40 mg Intravenous Q12H [address], [place]    IV infusion builder  Intravenous Continuous Harish Max MD Last Rate: 50 mL/hr at 08/07/19 0901   promethazine 12 5 mg Intravenous Q6H PRN Luis Almaraz PA-C    saccharomyces boulardii 250 mg Oral BID Luis Almaraz PA-C    simethicone 80 mg Oral Q6H PRN Luis Almaraz PA-C    sucralfate 1,000 mg Oral 4x Daily (AC & HS) Luis Almaraz PA-C    vancomycin 20 mg/kg Intravenous Q8H Pamela Santacruz MD Last Rate: 1,500 mg (08/08/19 1508)        Today, Patient Was Seen By: Daquan Arevalo MD    ** Please Note: Dictation voice to text software may have been used in the creation of this document   **

## 2019-08-08 NOTE — ASSESSMENT & PLAN NOTE
Marginal ulcer with perforation  Patient initially admitted for open hernia repair but subsequently developed perforated marginal ulcer requiring laparotomy  Subsequently developed intra-abdominal abscesses requiring placement of drains  Continue patient on IV vancomycin, metronidazole, fluconazole  Patient had abnormal upper GI series 8/5 which showed a leak in the proximal aspect of the gastrojejunal stent into the left upper quadrant collection with drainage  Patient had EGD performed on 08/06 with adjustment of stent  Patient to have CT scan with IV and p o  Contrast today 8/8  Tube feeds currently held, will check nutritional labs, considering transition of tube feeds to TPN depending on nutritional status and results of CT scan  Continue to monitor drain output as per General surgery  Continue PPI and Carafate for GI prophylaxis  Continue heparin and SCDs for DVT prophylaxis  Continue Protonix and Carafate for GI prophylaxis

## 2019-08-08 NOTE — PROGRESS NOTES
Vancomycin IV Pharmacy-to-Dose Consultation    Anton Darnell is a 63 y o  male who is currently receiving Vancomycin IV with management by the Pharmacy Consult service  Assessment/Plan:  The patient was reviewed  Renal function is stable and no signs or symptoms of nephrotoxicity and/or infusion reactions were documented in the chart  Based on todays assessment, continue current vancomycin (day # 5) dosing of 1500mg q8h, with a plan for trough to be drawn at 1130  on 8/11       We will continue to follow the patients culture results and clinical progress daily      Erinn Uribe, Pharmacist

## 2019-08-08 NOTE — PROGRESS NOTES
Progress Note - Bariatric Surgery   [facility] 63 y o  male MRN: [id number]  Unit/Bed#: E5 -01 Encounter: [id number]      Subjective/Objective     Subjective: This is a 63year old male s/p exploratory laparotomy with repair of perforated marginal ulcer, abdominal washout, G tube placement and GI assisted endoscopic stent placement on 7/28/19 by Dr Mcgee Graves also underwent a diagnostic laparoscopy  That was converted to open on 7/26/2019 with internal hernia repair       Patient had UGI on 8/5 that demonstrated a leak from the proximal aspect of the gastrojejunal stent into LUQ collection  He subsequently underwent EGD with stent adjustment with Dr Terrance Mendez on 8/6  TF restarted yesterday at 5/hr  However felt bloated with some nausea and asked to have TF held  Still complains of some numbness and swelling in right leg  pain adequately controlled on oral pain medication, ambulating without assistance, voiding well,  Denies fevers, chills, sweats, SOB, CP  Objective:    /74 (BP Location: Right arm)   Pulse 100   Temp 98 2 °F (36 8 °C) (Temporal)   Resp 22   Ht 5' 8" (1 727 m)   Wt 75 3 kg (166 lb 0 1 oz)   SpO2 96%   BMI 25 24 kg/m²       Intake/Output Summary (Last 24 hours) at 8/8/2019 2765  Last data filed at 8/8/2019 0301  Gross per 24 hour   Intake    Output 1575 ml   Net -1575 ml       Invasive Devices     Peripheral Intravenous Line            Peripheral IV 08/04/19 Left Antecubital 3 days    Peripheral IV 08/05/19 Left;Ventral (anterior) Forearm 3 days          Drain            Gastrostomy/Enterostomy Gastrostomy 22 Fr  LUQ 10 days    Closed/Suction Drain Left LUQ Bulb 8 Fr  6 days    Closed/Suction Drain Right RUQ Bulb 8 Fr  6 days                ROS: 10-point system completed  All negative except see HPI        Physical Exam    General Appearance:    Alert, cooperative, no distress, appears stated age   Head:    Normocephalic, without obvious abnormality, atraumatic   Lungs: Respirations unlabored   Heart:    Regular rate and rhythm   Abdomen:     Soft, appropriate tenderness,  no masses, no organomegaly,   non distended   Extremities:   Extremities normal, atraumatic, no cyanosis or edema   Neurologic:  Incision:  Psych:   Normal strength and sensation    Clean, dry, and intact    Normal mood and affect       Lab, Imaging and other studies:  I have personally reviewed pertinent lab results    , CBC:   Lab Results   Component Value Date    WBC 14 11 (H) 08/08/2019    HGB 10 0 (L) 08/08/2019    HCT 32 0 (L) 08/08/2019    MCV 96 08/08/2019     (H) 08/08/2019    MCH 29 9 08/08/2019    MCHC 31 3 (L) 08/08/2019    RDW 15 4 (H) 08/08/2019    MPV 8 4 (L) 08/08/2019   , CMP:   Lab Results   Component Value Date    SODIUM 140 08/08/2019    K 3 8 08/08/2019     08/08/2019    CO2 30 08/08/2019    BUN 4 (L) 08/08/2019    CREATININE 0 56 (L) 08/08/2019    CALCIUM 7 6 (L) 08/08/2019    AST 24 08/08/2019    ALT 10 (L) 08/08/2019    ALKPHOS 97 08/08/2019    EGFR 115 08/08/2019        VTE Mechanical Prophylaxis: sequential compression device    Assessment/Plan  This is a 63year old male s/p exploratory laparotomy with repair of perforated marginal ulcer, abdominal washout, G tube placement and GI assisted endoscopic stent placement on 7/28/19 by Dr Vernia Goltz also underwent a diagnostic laparoscopy  That was converted to open on 7/26/2019 with internal hernia repair     Now s/p emergent EGD with stent repositioning on 8/6 for leak seen on UGI    - CT scan today with IV and PO contrast  - Continue antibiotics, antifungal as per ID, f/u cultures  - Pharmacy consult in place, appreciate continued input   - Abdominal wound was packed with 4x4 today and abdominal pad was applied, Continue twice daily dressing changes  - TF held for now, f/u nutrition labs, will consider resuming TF vs starting TPN depending on nutritional status and results of CT scan, f/u nutrition recommendations     - Continue IVF at 50 cc/hr  - Continue to trend electrolytes and WBC count  - Continue to monitor drain output and quality  -drain flushing protocol  - Encourage IS, ambulation  - PPI and Carafate for GI prophylaxis  - SCDs and Heparin for DVT prophylaxis  - Case management on board for home tube feedings         Plan of care was discussed with patient and patient's nurse  Care plan discussed with Dr Rachelle De La Rosa     Dispo:unable to discharge to home today

## 2019-08-08 NOTE — PROGRESS NOTES
Progress Note - Infectious Disease   Lino Sandhoff 63 y o  male MRN: [id number]  Unit/Bed#: E5 -01 Encounter: [id number]      Impression/Plan:  1  Sepsis-leukocytosis and tachycardia   Etiology most likely intra-abdominal abscess, although lung consolidations may play a role   Patient is improving clinically slowly   He remains systemically well   Temperature is down   WBC continues to trend down   Procalcitonin continues decrease  Blood cultures remain negative  Atibiotic plan as in below  Monitor temperature/WBC  Monitor procalcitonin      2  Intra-abdominal abscesses-status post IR drainage with COURTNEY drains remaining in place   Abscess culture is growing strep mitis and Candida  Limited antibiotic choices based upon the patient's allergies  Continue vancomycin, Flagyl, fluconazole for now  Continue drainage per IR  Close surgical follow-up  Treat x 14 days total, another 6 days      3  Peritonitis-in the setting of a perforated marginal ulceration   The patient is now status post exploratory laparotomy with repair and stent placement, and now COURTNEY drain placement  Patient is status post placement of esophageal stent  Stent was readjusted  Antibiotic plan as in above  Continue drain as in above  Serial abdominal exams  Surgery follow-up      4  Abnormal CT of the chest-with some consolidation and pleural effusion noted on the right   Perhaps this is all atelectasis with reactive effusion   Less likely but also possible would be pneumonia with empyema   Patient's respiratory status improving slowly   Monitor closely for now  Antibiotic plan as in above  Monitor respiratory status  May need to repeat chest CT in thoracentesis if patient does not continue to improve      5   COPD-no current clinical evidence of an acute exacerbation at this time       6  Multiple antibiotic allergies, including PCN anaphylaxis   This limits antibiotic choice, as in above      Discussed with patient and his family in detail regarding the above plan      Antibiotics:  Vancomycin # 8  Flagyl # 14  High-dose fluconazole # 8     Subjective:  Patient is comfortable  Dyspnea mild  Stable mild weak cough  Abdominal pain minimal   Temperature stays down   No chills  He is tolerating antibiotics well   No nausea, vomiting or diarrhea  Objective:  Vitals:  Temp:  [98 1 °F (36 7 °C)-98 8 °F (37 1 °C)] 98 2 °F (36 8 °C)  HR:  [] 100  Resp:  [18-22] 22  BP: (149-157)/(70-78) 157/74  SpO2:  [92 %-99 %] 96 %  Temp (24hrs), Av 4 °F (36 9 °C), Min:98 1 °F (36 7 °C), Max:98 8 °F (37 1 °C)  Current: Temperature: 98 2 °F (36 8 °C)    Physical Exam:     General: Awake, alert, cooperative, no distress  Neck:  Supple  No mass  No lymphadenopathy  Lungs: Expansion symmetric, no rales, no wheezing, respirations unlabored  Heart:  Regular rate and rhythm, S1 and S2 normal, no murmur  Abdomen: Soft, mild distention  Drains clearer  Mild tenderness at drain sites  Extremities: Stable mild leg edema  No erythema/warmth  No ulcer  Nontender to palpation  Skin:  No rash  Neuro: Moves all extremities  Invasive Devices     Peripheral Intravenous Line            Peripheral IV 19 Left Antecubital 3 days    Peripheral IV 19 Left;Ventral (anterior) Forearm 3 days          Drain            Gastrostomy/Enterostomy Gastrostomy 22 Fr  LUQ 10 days    Closed/Suction Drain Left LUQ Bulb 8 Fr  6 days    Closed/Suction Drain Right RUQ Bulb 8 Fr  6 days                Labs studies:   I have personally reviewed pertinent labs    Results from last 7 days   Lab Units 19  0646 19  0529 19  0546  19  0033   POTASSIUM mmol/L 3 8 4 1 3 3*   < > 3 8   CHLORIDE mmol/L 104 104 102   < > 101   CO2 mmol/L 30 28 31   < > 26   BUN mg/dL 4* 5 4*   < > 11   CREATININE mg/dL 0 56* 0 59* 0 49*   < > 0 49*   EGFR ml/min/1 73sq m 115 112 121   < > 121   CALCIUM mg/dL 7 6* 7 7* 7 4*   < > 7 5*   AST U/L 24  --   --   --  34 ALT U/L 10*  --   --   --  17   ALK PHOS U/L 97  --   --   --  106    < > = values in this interval not displayed  Results from last 7 days   Lab Units 08/08/19  0646 08/07/19  0534 08/06/19  0546   WBC Thousand/uL 14 11* 17 14* 19 08*   HEMOGLOBIN g/dL 10 0* 10 6* 10 5*   PLATELETS Thousands/uL 825* 670* 460*     Results from last 7 days   Lab Units 08/05/19  0917 08/01/19  1845 08/01/19  1609 08/01/19  1546   BLOOD CULTURE   --  No Growth After 5 Days  No Growth After 5 Days  --   --    GRAM STAIN RESULT  Rare Disintegrating polys  No bacteria seen  --  2+ RBC's*  3+ Gram positive cocci in pairs and chains* Rare Polys  No bacteria seen   WOUND CULTURE  2 colonies Staphylococcus coagulase negative*  --   --   --    BODY FLUID CULTURE, STERILE   --   --  4+ Growth of Streptococcus mitis oralis group*  Few Colonies of Candida albicans* Few Colonies of Streptococcus mitis oralis group*       Imaging Studies:   I have personally reviewed pertinent imaging study reports and images in PACS  EKG, Pathology, and Other Studies:   I have personally reviewed pertinent reports

## 2019-08-08 NOTE — ASSESSMENT & PLAN NOTE
Resolved      Results from last 7 days   Lab Units 08/08/19  0646 08/07/19  0529 08/06/19  0546 08/05/19  0720 08/04/19  0438 08/04/19  0033 08/03/19  0709 08/02/19  0700   MAGNESIUM mg/dL 1 9  --  1 7 2 0  --   --  2 0 1 9   PHOSPHORUS mg/dL 2 8  --  2 9 2 9 2 8  --  2 6* 2 3*   POTASSIUM mmol/L 3 8 4 1 3 3* 3 8  --  3 8 4 6 3 7

## 2019-08-09 ENCOUNTER — APPOINTMENT (INPATIENT)
Dept: RADIOLOGY | Facility: HOSPITAL | Age: 59
DRG: 326 | End: 2019-08-09
Payer: COMMERCIAL

## 2019-08-09 LAB
ANION GAP SERPL CALCULATED.3IONS-SCNC: 9 MMOL/L (ref 4–13)
APPEARANCE FLD: NORMAL
APTT PPP: 35 SECONDS (ref 23–37)
BASOPHILS # BLD AUTO: 0.05 THOUSANDS/ΜL (ref 0–0.1)
BASOPHILS NFR BLD AUTO: 0 % (ref 0–1)
BUN SERPL-MCNC: 4 MG/DL (ref 5–25)
CALCIUM SERPL-MCNC: 7.9 MG/DL (ref 8.3–10.1)
CHLORIDE SERPL-SCNC: 102 MMOL/L (ref 100–108)
CO2 SERPL-SCNC: 28 MMOL/L (ref 21–32)
COLOR FLD: YELLOW
CREAT SERPL-MCNC: 0.5 MG/DL (ref 0.6–1.3)
EOSINOPHIL # BLD AUTO: 0.04 THOUSAND/ΜL (ref 0–0.61)
EOSINOPHIL NFR BLD AUTO: 0 % (ref 0–6)
ERYTHROCYTE [DISTWIDTH] IN BLOOD BY AUTOMATED COUNT: 15.3 % (ref 11.6–15.1)
GFR SERPL CREATININE-BSD FRML MDRD: 120 ML/MIN/1.73SQ M
GLUCOSE FLD-MCNC: 86 MG/DL
GLUCOSE SERPL-MCNC: 96 MG/DL (ref 65–140)
HCT VFR BLD AUTO: 31.8 % (ref 36.5–49.3)
HGB BLD-MCNC: 10.1 G/DL (ref 12–17)
HISTIOCYTES NFR FLD: 4 %
IMM GRANULOCYTES # BLD AUTO: 0.09 THOUSAND/UL (ref 0–0.2)
IMM GRANULOCYTES NFR BLD AUTO: 1 % (ref 0–2)
INR PPP: 1.36 (ref 0.84–1.19)
LDH FLD L TO P-CCNC: 269 U/L
LYMPHOCYTES # BLD AUTO: 0.94 THOUSANDS/ΜL (ref 0.6–4.47)
LYMPHOCYTES NFR BLD AUTO: 8 % (ref 14–44)
LYMPHOCYTES NFR BLD AUTO: 9 %
MCH RBC QN AUTO: 30.1 PG (ref 26.8–34.3)
MCHC RBC AUTO-ENTMCNC: 31.8 G/DL (ref 31.4–37.4)
MCV RBC AUTO: 95 FL (ref 82–98)
MONOCYTES # BLD AUTO: 1.06 THOUSAND/ΜL (ref 0.17–1.22)
MONOCYTES NFR BLD AUTO: 6 %
MONOCYTES NFR BLD AUTO: 9 % (ref 4–12)
NEUTROPHILS # BLD AUTO: 9.52 THOUSANDS/ΜL (ref 1.85–7.62)
NEUTS SEG NFR BLD AUTO: 81 %
NEUTS SEG NFR BLD AUTO: 82 % (ref 43–75)
NRBC BLD AUTO-RTO: 0 /100 WBCS
PH BODY FLUID: 7.8
PLATELET # BLD AUTO: 963 THOUSANDS/UL (ref 149–390)
PMV BLD AUTO: 8.5 FL (ref 8.9–12.7)
POTASSIUM SERPL-SCNC: 3.6 MMOL/L (ref 3.5–5.3)
PROT FLD-MCNC: 1.6 G/DL
PROTHROMBIN TIME: 17 SECONDS (ref 11.6–14.5)
RBC # BLD AUTO: 3.36 MILLION/UL (ref 3.88–5.62)
SITE: NORMAL
SODIUM SERPL-SCNC: 139 MMOL/L (ref 136–145)
TOTAL CELLS COUNTED SPEC: 100
WBC # BLD AUTO: 11.7 THOUSAND/UL (ref 4.31–10.16)
WBC # FLD MANUAL: 2521 /UL

## 2019-08-09 PROCEDURE — 99024 POSTOP FOLLOW-UP VISIT: CPT | Performed by: SURGERY

## 2019-08-09 PROCEDURE — 75984 XRAY CONTROL CATHETER CHANGE: CPT | Performed by: RADIOLOGY

## 2019-08-09 PROCEDURE — C1769 GUIDE WIRE: HCPCS

## 2019-08-09 PROCEDURE — 88112 CYTOPATH CELL ENHANCE TECH: CPT | Performed by: PATHOLOGY

## 2019-08-09 PROCEDURE — 49423 EXCHANGE DRAINAGE CATHETER: CPT

## 2019-08-09 PROCEDURE — 83615 LACTATE (LD) (LDH) ENZYME: CPT | Performed by: FAMILY MEDICINE

## 2019-08-09 PROCEDURE — 89051 BODY FLUID CELL COUNT: CPT | Performed by: FAMILY MEDICINE

## 2019-08-09 PROCEDURE — 3E0436Z INTRODUCTION OF NUTRITIONAL SUBSTANCE INTO CENTRAL VEIN, PERCUTANEOUS APPROACH: ICD-10-PCS | Performed by: RADIOLOGY

## 2019-08-09 PROCEDURE — 71045 X-RAY EXAM CHEST 1 VIEW: CPT

## 2019-08-09 PROCEDURE — 32557 INSERT CATH PLEURA W/ IMAGE: CPT

## 2019-08-09 PROCEDURE — 87070 CULTURE OTHR SPECIMN AEROBIC: CPT | Performed by: FAMILY MEDICINE

## 2019-08-09 PROCEDURE — 75984 XRAY CONTROL CATHETER CHANGE: CPT

## 2019-08-09 PROCEDURE — 99233 SBSQ HOSP IP/OBS HIGH 50: CPT | Performed by: INTERNAL MEDICINE

## 2019-08-09 PROCEDURE — 87205 SMEAR GRAM STAIN: CPT | Performed by: FAMILY MEDICINE

## 2019-08-09 PROCEDURE — 80048 BASIC METABOLIC PNL TOTAL CA: CPT | Performed by: SURGERY

## 2019-08-09 PROCEDURE — C1729 CATH, DRAINAGE: HCPCS

## 2019-08-09 PROCEDURE — C1751 CATH, INF, PER/CENT/MIDLINE: HCPCS

## 2019-08-09 PROCEDURE — 36573 INSJ PICC RS&I 5 YR+: CPT | Performed by: RADIOLOGY

## 2019-08-09 PROCEDURE — 88305 TISSUE EXAM BY PATHOLOGIST: CPT | Performed by: PATHOLOGY

## 2019-08-09 PROCEDURE — 83986 ASSAY PH BODY FLUID NOS: CPT | Performed by: FAMILY MEDICINE

## 2019-08-09 PROCEDURE — 02HV33Z INSERTION OF INFUSION DEVICE INTO SUPERIOR VENA CAVA, PERCUTANEOUS APPROACH: ICD-10-PCS | Performed by: RADIOLOGY

## 2019-08-09 PROCEDURE — 84157 ASSAY OF PROTEIN OTHER: CPT | Performed by: FAMILY MEDICINE

## 2019-08-09 PROCEDURE — C9113 INJ PANTOPRAZOLE SODIUM, VIA: HCPCS | Performed by: PHYSICIAN ASSISTANT

## 2019-08-09 PROCEDURE — 85730 THROMBOPLASTIN TIME PARTIAL: CPT | Performed by: SURGERY

## 2019-08-09 PROCEDURE — 82945 GLUCOSE OTHER FLUID: CPT | Performed by: FAMILY MEDICINE

## 2019-08-09 PROCEDURE — 85025 COMPLETE CBC W/AUTO DIFF WBC: CPT | Performed by: SURGERY

## 2019-08-09 PROCEDURE — 36573 INSJ PICC RS&I 5 YR+: CPT

## 2019-08-09 PROCEDURE — 99152 MOD SED SAME PHYS/QHP 5/>YRS: CPT | Performed by: RADIOLOGY

## 2019-08-09 PROCEDURE — 85610 PROTHROMBIN TIME: CPT | Performed by: SURGERY

## 2019-08-09 PROCEDURE — 0W9930Z DRAINAGE OF RIGHT PLEURAL CAVITY WITH DRAINAGE DEVICE, PERCUTANEOUS APPROACH: ICD-10-PCS | Performed by: RADIOLOGY

## 2019-08-09 PROCEDURE — 49423 EXCHANGE DRAINAGE CATHETER: CPT | Performed by: RADIOLOGY

## 2019-08-09 PROCEDURE — 0W9F30Z DRAINAGE OF ABDOMINAL WALL WITH DRAINAGE DEVICE, PERCUTANEOUS APPROACH: ICD-10-PCS | Performed by: SURGERY

## 2019-08-09 PROCEDURE — 0W2FX0Z CHANGE DRAINAGE DEVICE IN ABDOMINAL WALL, EXTERNAL APPROACH: ICD-10-PCS | Performed by: RADIOLOGY

## 2019-08-09 PROCEDURE — 32557 INSERT CATH PLEURA W/ IMAGE: CPT | Performed by: RADIOLOGY

## 2019-08-09 RX ORDER — FUROSEMIDE 10 MG/ML
20 INJECTION INTRAMUSCULAR; INTRAVENOUS ONCE
Status: COMPLETED | OUTPATIENT
Start: 2019-08-09 | End: 2019-08-09

## 2019-08-09 RX ORDER — FENTANYL CITRATE 50 UG/ML
INJECTION, SOLUTION INTRAMUSCULAR; INTRAVENOUS CODE/TRAUMA/SEDATION MEDICATION
Status: COMPLETED | OUTPATIENT
Start: 2019-08-09 | End: 2019-08-09

## 2019-08-09 RX ORDER — MIDAZOLAM HYDROCHLORIDE 1 MG/ML
INJECTION INTRAMUSCULAR; INTRAVENOUS CODE/TRAUMA/SEDATION MEDICATION
Status: COMPLETED | OUTPATIENT
Start: 2019-08-09 | End: 2019-08-09

## 2019-08-09 RX ADMIN — METRONIDAZOLE 500 MG: 500 INJECTION, SOLUTION INTRAVENOUS at 00:51

## 2019-08-09 RX ADMIN — ALBUTEROL SULFATE 2 PUFF: 90 AEROSOL, METERED RESPIRATORY (INHALATION) at 21:11

## 2019-08-09 RX ADMIN — SUCRALFATE 1000 MG: 1 SUSPENSION ORAL at 21:11

## 2019-08-09 RX ADMIN — POTASSIUM CHLORIDE: 2 INJECTION, SOLUTION, CONCENTRATE INTRAVENOUS at 01:38

## 2019-08-09 RX ADMIN — SUCRALFATE 1000 MG: 1 SUSPENSION ORAL at 11:30

## 2019-08-09 RX ADMIN — Medication 250 MG: at 17:57

## 2019-08-09 RX ADMIN — FENTANYL CITRATE 50 MCG: 50 INJECTION, SOLUTION INTRAMUSCULAR; INTRAVENOUS at 09:18

## 2019-08-09 RX ADMIN — Medication 1 SMALL APPLICATION: at 08:23

## 2019-08-09 RX ADMIN — PANTOPRAZOLE SODIUM 40 MG: 40 INJECTION, POWDER, FOR SOLUTION INTRAVENOUS at 08:23

## 2019-08-09 RX ADMIN — CALCIUM GLUCONATE: 94 INJECTION, SOLUTION INTRAVENOUS at 21:12

## 2019-08-09 RX ADMIN — OXYCODONE HYDROCHLORIDE 10 MG: 5 SOLUTION ORAL at 17:57

## 2019-08-09 RX ADMIN — OXYCODONE HYDROCHLORIDE 10 MG: 5 SOLUTION ORAL at 00:51

## 2019-08-09 RX ADMIN — HYDROMORPHONE HYDROCHLORIDE 1 MG: 1 INJECTION, SOLUTION INTRAMUSCULAR; INTRAVENOUS; SUBCUTANEOUS at 06:43

## 2019-08-09 RX ADMIN — FUROSEMIDE 20 MG: 10 INJECTION, SOLUTION INTRAMUSCULAR; INTRAVENOUS at 19:38

## 2019-08-09 RX ADMIN — OXYCODONE HYDROCHLORIDE 10 MG: 5 SOLUTION ORAL at 05:55

## 2019-08-09 RX ADMIN — SUCRALFATE 1000 MG: 1 SUSPENSION ORAL at 16:35

## 2019-08-09 RX ADMIN — METRONIDAZOLE 500 MG: 500 INJECTION, SOLUTION INTRAVENOUS at 08:23

## 2019-08-09 RX ADMIN — VANCOMYCIN HYDROCHLORIDE 1500 MG: 5 INJECTION, POWDER, LYOPHILIZED, FOR SOLUTION INTRAVENOUS at 14:33

## 2019-08-09 RX ADMIN — ALBUTEROL SULFATE 2 PUFF: 90 AEROSOL, METERED RESPIRATORY (INHALATION) at 11:30

## 2019-08-09 RX ADMIN — METRONIDAZOLE 500 MG: 500 INJECTION, SOLUTION INTRAVENOUS at 23:31

## 2019-08-09 RX ADMIN — THIAMINE HYDROCHLORIDE: 100 INJECTION, SOLUTION INTRAMUSCULAR; INTRAVENOUS at 11:29

## 2019-08-09 RX ADMIN — Medication 250 MG: at 08:24

## 2019-08-09 RX ADMIN — HYDROMORPHONE HYDROCHLORIDE 1 MG: 1 INJECTION, SOLUTION INTRAMUSCULAR; INTRAVENOUS; SUBCUTANEOUS at 19:38

## 2019-08-09 RX ADMIN — Medication 1 SMALL APPLICATION: at 17:57

## 2019-08-09 RX ADMIN — ALBUTEROL SULFATE 2 PUFF: 90 AEROSOL, METERED RESPIRATORY (INHALATION) at 17:57

## 2019-08-09 RX ADMIN — MIDAZOLAM 1 MG: 1 INJECTION INTRAMUSCULAR; INTRAVENOUS at 09:18

## 2019-08-09 RX ADMIN — SUCRALFATE 1000 MG: 1 SUSPENSION ORAL at 06:00

## 2019-08-09 RX ADMIN — HYDROMORPHONE HYDROCHLORIDE 1 MG: 1 INJECTION, SOLUTION INTRAMUSCULAR; INTRAVENOUS; SUBCUTANEOUS at 11:30

## 2019-08-09 RX ADMIN — VANCOMYCIN HYDROCHLORIDE 1500 MG: 5 INJECTION, POWDER, LYOPHILIZED, FOR SOLUTION INTRAVENOUS at 21:23

## 2019-08-09 RX ADMIN — VANCOMYCIN HYDROCHLORIDE 1500 MG: 5 INJECTION, POWDER, LYOPHILIZED, FOR SOLUTION INTRAVENOUS at 05:56

## 2019-08-09 RX ADMIN — METRONIDAZOLE 500 MG: 500 INJECTION, SOLUTION INTRAVENOUS at 16:35

## 2019-08-09 RX ADMIN — IOHEXOL 10 ML: 300 INJECTION, SOLUTION INTRAVENOUS at 10:47

## 2019-08-09 RX ADMIN — PANTOPRAZOLE SODIUM 40 MG: 40 INJECTION, POWDER, FOR SOLUTION INTRAVENOUS at 21:11

## 2019-08-09 RX ADMIN — HEPARIN SODIUM 5000 UNITS: 5000 INJECTION INTRAVENOUS; SUBCUTANEOUS at 14:33

## 2019-08-09 RX ADMIN — HEPARIN SODIUM 5000 UNITS: 5000 INJECTION INTRAVENOUS; SUBCUTANEOUS at 21:11

## 2019-08-09 RX ADMIN — HEPARIN SODIUM 5000 UNITS: 5000 INJECTION INTRAVENOUS; SUBCUTANEOUS at 05:55

## 2019-08-09 RX ADMIN — ALBUTEROL SULFATE 2 PUFF: 90 AEROSOL, METERED RESPIRATORY (INHALATION) at 08:23

## 2019-08-09 NOTE — PROGRESS NOTES
Progress Note - Bariatric Surgery   [facility] 63 y o  male MRN: [id number]  Unit/Bed#: E5 -01 Encounter: [id number]      Subjective/Objective     Subjective: This is a 63year old male s/p exploratory laparotomy with repair of perforated marginal ulcer, abdominal washout, G tube placement and GI assisted endoscopic stent placement on 7/28/19 by Dr Remy Hum also underwent a diagnostic laparoscopy  That was converted to open on 7/26/2019 with internal hernia repair  Patient had UGI on 8/5 that demonstrated a leak from the proximal aspect of the gastrojejunal stent into LUQ collection  Henyr Calvert subsequently underwent EGD with stent adjustment with Dr Monae Ivy on 8/6    Tube feeds were restarted however patient complained of associated bloating and nausea and therefore tube feeds were held  On evaluation today, still complains of some abdominal discomfort and generalized swelling however pain is controlled on IV pain medications, ambulating without assistance, voiding well  SOB is still at baseline however denies fevers, chills, CP  Patient to IR today for PICC line insertion as well as right COURTNEY drain readjustment and chest tube placement  Objective:    /74   Pulse 95   Temp 97 7 °F (36 5 °C) (Temporal)   Resp 22   Ht 5' 8" (1 727 m)   Wt 75 3 kg (166 lb 0 1 oz)   SpO2 95%   BMI 25 24 kg/m²       Intake/Output Summary (Last 24 hours) at 8/9/2019 0931  Last data filed at 8/9/2019 3165  Gross per 24 hour   Intake 6172 5 ml   Output 1040 ml   Net 5132 5 ml       Invasive Devices     Peripheral Intravenous Line            Peripheral IV 08/05/19 Left;Ventral (anterior) Forearm 4 days    Peripheral IV 08/08/19 Right Arm less than 1 day          Drain            Gastrostomy/Enterostomy Gastrostomy 22 Fr  LUQ 11 days    Closed/Suction Drain Left LUQ Bulb 8 Fr  7 days    Closed/Suction Drain Right RUQ Bulb 8 Fr  7 days                ROS: 10-point system completed   All negative except see HPI       Physical Exam    General Appearance:    Alert, cooperative, no distress, appears stated age   Head:    Normocephalic, without obvious abnormality, atraumatic   Lungs:     Respirations unlabored   Heart:    Regular rate and rhythm   Abdomen:     Soft, appropriate tenderness,  no masses, no organomegaly  Nondistended  Left and right IR drains in place with seropurulent output  Right drain to be readjusted by IR today   Extremities:   1+ LE edema    Neurologic:  Incision:    Psych:   Normal strength and sensation    Midline incision with distal open wound that has been packed with wet to dry dressing and BID dressing changes  Normal mood and affect       Lab, Imaging and other studies:  I have personally reviewed pertinent lab results  , CBC:   Lab Results   Component Value Date    WBC 11 70 (H) 08/09/2019    HGB 10 1 (L) 08/09/2019    HCT 31 8 (L) 08/09/2019    MCV 95 08/09/2019     (H) 08/09/2019    MCH 30 1 08/09/2019    MCHC 31 8 08/09/2019    RDW 15 3 (H) 08/09/2019    MPV 8 5 (L) 08/09/2019    NRBC 0 08/09/2019   , CMP:   Lab Results   Component Value Date    SODIUM 139 08/09/2019    K 3 6 08/09/2019     08/09/2019    CO2 28 08/09/2019    BUN 4 (L) 08/09/2019    CREATININE 0 50 (L) 08/09/2019    CALCIUM 7 9 (L) 08/09/2019    EGFR 120 08/09/2019   , Coagulation:   Lab Results   Component Value Date    INR 1 36 (H) 08/09/2019      CT chest abdomen and pelvis 8/8/19    IMPRESSION:     Increased volume of loculated right pleural effusion compared with prior    Minimal left pleural fluid and bilateral pleural thickening also noted      There has been partial interval clearing of the lungs since the prior exam but there is still significant atelectasis in the right lower lobe      The esophageal stent has migrated proximally compared with the prior study but still bridges the GE junction and appears widely patent      Grossly stable amount of loculated right upper quadrant ascitic fluid with a percutaneous drain in place  Diminished volume of left upper quadrant loculated perisplenic fluid with drain in place      Small amount of free air in the abdomen      Loculated fluid collections adjacent to the stomach and in the pelvis  The fluid near the stomach has increased in volume  The fluid in the pelvis has decreased in volume      Thickening of small bowel wall in the left side of the abdomen      Thickening of the wall the gallbladder      Generalized edema        VTE Mechanical Prophylaxis: sequential compression device, Heparin    Assessment/Plan  1)   This is a 63year old male s/p exploratory laparotomy with repair of perforated marginal ulcer, abdominal washout, G tube placement and GI assisted endoscopic stent placement on 7/28/19 by Dr Teresita Salazar also underwent a diagnostic laparoscopy  That was converted to open on 7/26/2019 with internal hernia repair    - Patient to IR this morning for right drain adjustment, chest tube placement and PICC line   - Plan on initiating TPN pending successful PICC placement today; TF being held due to discomfort   - NPO  Sips with meds and ice chips are ok    - nutrition consult for TPN recommendations, appreciate input   - s,/p emergent EGD on 8/6 with stent adjustment by Dr Ayala Keating yesterday for leak seen on UGI- per OP note, successful placement of fully covered esophageal stent   - Continue antibiotics, antifungal as per ID, f/u cultures   Per ID, Treat x 14 days total, another 6 days   - Pharmacy consult in place, appreciate continued input   - Continue wound care and BID dressing changes : apply adaptic and wet to dry gauze dressing to inferior aspect of midline incision twice daily   - Continue IVF at 50 cc/hr  - Continue to trend electrolytes and WBC count  - Continue to monitor drain output and quality  -drain flushing protocol  - Encourage IS, ambulation  - PPI and Carafate for GI prophylaxis  - SCDs and Heparin for DVT prophylaxis  - Case management on board for home tube feedings     Plan of care was discussed with patient and patient's nurse  Care plan discussed with Dr Roman Kirkpatrick: unable to discharge to home today

## 2019-08-09 NOTE — BRIEF OP NOTE (RAD/CATH)
Right Chest tube insertion  Procedure Note    PATIENT NAME: Jean Claude Lopez  : 1960  MRN: [id number]     Pre-op Diagnosis:   1  Bariatric surgery status    2  Generalized abdominal pain    3  Internal hernia    4  G tube feedings ([address] )    5  Postsurgical malabsorption    6  Intra-abdominal abscess ([address] )    7  Pleural effusion    8  Aspiration pneumonia ([address] )    9  Severe protein-calorie malnutrition ([address] )    10  S/P repair of paraesophageal hernia    11  Abdominal pain      Post-op Diagnosis:   1  Bariatric surgery status    2  Generalized abdominal pain    3  Internal hernia    4  G tube feedings ([address] )    5  Postsurgical malabsorption    6  Intra-abdominal abscess ([address] )    7  Pleural effusion    8  Aspiration pneumonia ([address] )    9  Severe protein-calorie malnutrition ([address] )    10  S/P repair of paraesophageal hernia    11  Abdominal pain        Surgeon:   Kole uTrk MD  Assistants:     No qualified resident was available, Resident is only observing    Estimated Blood Loss: none  Findings: 10 Fr right ultrasound guided chest tube inserted with aspiration of cloudy, yellow fluid      Specimens: right pleural fluid    Complications:  none    Anesthesia: Conscious sedation and Local    Kole Turk MD     Date: 2019  Time: 9:47 AM

## 2019-08-09 NOTE — PROGRESS NOTES
Nutrition    please record current weight to assist dietitian with estimated nutrition needs / adequacy of nutrition support  Standard TPN for day 1 and day 2  Check TPN lab panel  increase to TPN goal:  750 D40  850 10AA  250 20% lipids  Goal TPN will provide:  1860 Kcal, 85 g protein, 1850 ml fluids

## 2019-08-09 NOTE — PROGRESS NOTES
Pt refused milk and molasses enema this evening  Pt states, "It's too late I wanted it at six " pt states it is too much of a hassle this late  Pt states he will take enema tomorrow

## 2019-08-09 NOTE — PROGRESS NOTES
Progress Note - Infectious Disease   Carlos Kaiser 63 y o  male MRN: [id number]  Unit/Bed#: E5 -01 Encounter: [id number]      Impression/Plan:  1  Sepsis-leukocytosis and tachycardia   Etiology most likely intra-abdominal abscess, although lung consolidations may play a role   Patient is improving slowly   He remains systemically well   Temperature is down   WBC continues to trend down, near normal   Procalcitonin continues decrease  Blood cultures remain negative  Atibiotic plan as in below  Monitor temperature/WBC  Monitor procalcitonin      2  Intra-abdominal abscesses-status post IR drainage with COURTNEY drains remaining in place   Abscess culture is growing strep mitis and Candida  Limited antibiotic choices based upon the patient's allergies  Continue vancomycin, Flagyl, fluconazole for now  Continue drainage per IR  Close surgical follow-up  Treat x 14 days total, another 5 days      3  Peritonitis-in the setting of a perforated marginal ulceration   The patient is now status post exploratory laparotomy with repair and stent placement, and now COURTNEY drain placement   Patient is status post placement of esophageal stent   Stent was readjusted  Antibiotic plan as in above  Continue drain as in above  Serial abdominal exams  Surgery follow-up      4  Enlarging right pleural effusion  Patient is status post thoracentesis with cloudy pleural fluid  However, pleural fluid parameters not consistent with empyema  Antibiotic plan as in above  Monitor respiratory status  Follow-up pleural fluid culture      5  COPD-no current clinical evidence of an acute exacerbation at this time       6  Multiple antibiotic allergies, including PCN anaphylaxis   This limits antibiotic choice, as in above      Discussed with patient and his family in detail regarding the above plan      Antibiotics:  Vancomycin # 9  Flagyl # 15  High-dose fluconazole # 9     Subjective:  Patient had chest tube placed earlier today    He remains comfortable  Dyspnea mild  Stable mild weak cough  Abdominal pain minimal   Drains remain in place  Temperature stays down   No chills  He is tolerating antibiotics well   No nausea, vomiting or diarrhea  Objective:  Vitals:  Temp:  [97 7 °F (36 5 °C)-98 1 °F (36 7 °C)] 97 7 °F (36 5 °C)  HR:  [] 98  Resp:  [20-22] 22  BP: (141-172)/(72-86) 156/80  SpO2:  [95 %-99 %] 96 %  Temp (24hrs), Av 8 °F (36 6 °C), Min:97 7 °F (36 5 °C), Max:98 1 °F (36 7 °C)  Current: Temperature: 97 7 °F (36 5 °C)    Physical Exam:     General: Awake, alert, cooperative, no distress  Neck:  Supple  No mass  No lymphadenopathy  Lungs: Decreased breath sounds, sparse basilar rales, no wheezing, respirations unlabored  Heart:  Tachycardic with regular rhythm, S1 and S2 normal, no murmur  Abdomen: Soft, stable mild distention  Drains in place, clearer  Mild tenderness at drain site  Extremities: Stable leg edema  No erythema/warmth  No ulcer  Nontender to palpation  Skin:  No rash  Neuro: Moves all extremities  Invasive Devices     Peripherally Inserted Central Catheter Line            PICC Line 19 Right Brachial less than 1 day          Peripheral Intravenous Line            Peripheral IV 19 Left;Ventral (anterior) Forearm 4 days    Peripheral IV 19 Right Arm 1 day          Drain            Gastrostomy/Enterostomy Gastrostomy 22 Fr  LUQ 11 days    Closed/Suction Drain Left LUQ Bulb 8 Fr  7 days    Closed/Suction Drain Right;Lateral Abdomen Bulb 12 Fr  less than 1 day    Closed/Suction Drain Right;Lateral Chest Other (Comment) 10 Fr  less than 1 day                Labs studies:   I have personally reviewed pertinent labs    Results from last 7 days   Lab Units 19  0546 19  0646 19  0529  19  0033   POTASSIUM mmol/L 3 6 3 8 4 1   < > 3 8   CHLORIDE mmol/L 102 104 104   < > 101   CO2 mmol/L 28 30 28   < > 26   BUN mg/dL 4* 4* 5   < > 11   CREATININE mg/dL 0 50* 0 56* 0 59*   < > 0 49*   EGFR ml/min/1 73sq m 120 115 112   < > 121   CALCIUM mg/dL 7 9* 7 6* 7 7*   < > 7 5*   AST U/L  --  24  --   --  34   ALT U/L  --  10*  --   --  17   ALK PHOS U/L  --  97  --   --  106    < > = values in this interval not displayed  Results from last 7 days   Lab Units 08/09/19  0546 08/08/19  0646 08/07/19  0534   WBC Thousand/uL 11 70* 14 11* 17 14*   HEMOGLOBIN g/dL 10 1* 10 0* 10 6*   PLATELETS Thousands/uL 963* 825* 670*     Results from last 7 days   Lab Units 08/05/19  0917   GRAM STAIN RESULT  Rare Disintegrating polys  No bacteria seen   WOUND CULTURE  2 colonies Staphylococcus coagulase negative*       Imaging Studies:   I have personally reviewed pertinent imaging study reports and images in PACS  Chest/abdomen/pelvis CT reviewed personally  Enlarging right pleural effusion  Right basilar atelectasis  Esophageal stent migrated proximally  Stable loculated RUQ abdominal collection with drain in place  Stable loculated fluid collection adjacent to stomach  EKG, Pathology, and Other Studies:   I have personally reviewed pertinent reports

## 2019-08-09 NOTE — PROGRESS NOTES
picc consult: chart reviewed, pt currently in IR for multiple procedures and picc line to be inserted in IR  Plan of care discussed with Renee smallwood RN

## 2019-08-09 NOTE — BRIEF OP NOTE (RAD/CATH)
Tube change Procedure Note    PATIENT NAME: Edward Hussein  : 1960  MRN: [id number]     Pre-op Diagnosis:   1  Bariatric surgery status    2  Generalized abdominal pain    3  Internal hernia    4  G tube feedings ([address] )    5  Postsurgical malabsorption    6  Intra-abdominal abscess ([address] )    7  Pleural effusion    8  Aspiration pneumonia ([address] )    9  Severe protein-calorie malnutrition ([address] )    10  S/P repair of paraesophageal hernia    11  Abdominal pain      Post-op Diagnosis:   1  Bariatric surgery status    2  Generalized abdominal pain    3  Internal hernia    4  G tube feedings ([address] )    5  Postsurgical malabsorption    6  Intra-abdominal abscess ([address] )    7  Pleural effusion    8  Aspiration pneumonia ([address] )    9  Severe protein-calorie malnutrition ([address] )    10  S/P repair of paraesophageal hernia    11  Abdominal pain        Surgeon:   Percy Granger MD  Assistants:     No qualified resident was available, Resident is only observing    Estimated Blood Loss: none  Findings: Right upper quadrant 8 fr catheter exchange for a 12 Fr catheter repostioned more centrally  Approximately, 150 ml of purulent yellow fluid with debris aspirated  Would consider tPA if drainage decreases      Specimens: none    Complications:  none    Anesthesia: Conscious sedation and Local    Percy Granger MD     Date: 2019  Time: 9:51 AM

## 2019-08-10 LAB
ALBUMIN SERPL BCP-MCNC: 1.3 G/DL (ref 3.5–5)
ALP SERPL-CCNC: 82 U/L (ref 46–116)
ALT SERPL W P-5'-P-CCNC: 8 U/L (ref 12–78)
ANION GAP SERPL CALCULATED.3IONS-SCNC: 5 MMOL/L (ref 4–13)
AST SERPL W P-5'-P-CCNC: 20 U/L (ref 5–45)
BASOPHILS # BLD AUTO: 0.04 THOUSANDS/ΜL (ref 0–0.1)
BASOPHILS NFR BLD AUTO: 0 % (ref 0–1)
BILIRUB SERPL-MCNC: 0.47 MG/DL (ref 0.2–1)
BUN SERPL-MCNC: 4 MG/DL (ref 5–25)
CALCIUM SERPL-MCNC: 7.5 MG/DL (ref 8.3–10.1)
CHLORIDE SERPL-SCNC: 101 MMOL/L (ref 100–108)
CO2 SERPL-SCNC: 34 MMOL/L (ref 21–32)
CREAT SERPL-MCNC: 0.49 MG/DL (ref 0.6–1.3)
EOSINOPHIL # BLD AUTO: 0.04 THOUSAND/ΜL (ref 0–0.61)
EOSINOPHIL NFR BLD AUTO: 0 % (ref 0–6)
ERYTHROCYTE [DISTWIDTH] IN BLOOD BY AUTOMATED COUNT: 14.7 % (ref 11.6–15.1)
GFR SERPL CREATININE-BSD FRML MDRD: 121 ML/MIN/1.73SQ M
GLUCOSE SERPL-MCNC: 134 MG/DL (ref 65–140)
HCT VFR BLD AUTO: 30.1 % (ref 36.5–49.3)
HGB BLD-MCNC: 9.5 G/DL (ref 12–17)
IMM GRANULOCYTES # BLD AUTO: 0.04 THOUSAND/UL (ref 0–0.2)
IMM GRANULOCYTES NFR BLD AUTO: 0 % (ref 0–2)
LYMPHOCYTES # BLD AUTO: 0.81 THOUSANDS/ΜL (ref 0.6–4.47)
LYMPHOCYTES NFR BLD AUTO: 8 % (ref 14–44)
MAGNESIUM SERPL-MCNC: 1.6 MG/DL (ref 1.6–2.6)
MCH RBC QN AUTO: 29.6 PG (ref 26.8–34.3)
MCHC RBC AUTO-ENTMCNC: 31.6 G/DL (ref 31.4–37.4)
MCV RBC AUTO: 94 FL (ref 82–98)
MONOCYTES # BLD AUTO: 1.07 THOUSAND/ΜL (ref 0.17–1.22)
MONOCYTES NFR BLD AUTO: 11 % (ref 4–12)
NEUTROPHILS # BLD AUTO: 8.22 THOUSANDS/ΜL (ref 1.85–7.62)
NEUTS SEG NFR BLD AUTO: 81 % (ref 43–75)
NRBC BLD AUTO-RTO: 0 /100 WBCS
PHOSPHATE SERPL-MCNC: 2.5 MG/DL (ref 2.7–4.5)
PLATELET # BLD AUTO: 878 THOUSANDS/UL (ref 149–390)
PMV BLD AUTO: 8.4 FL (ref 8.9–12.7)
POTASSIUM SERPL-SCNC: 3.2 MMOL/L (ref 3.5–5.3)
PROT SERPL-MCNC: 5 G/DL (ref 6.4–8.2)
RBC # BLD AUTO: 3.21 MILLION/UL (ref 3.88–5.62)
SODIUM SERPL-SCNC: 140 MMOL/L (ref 136–145)
WBC # BLD AUTO: 10.22 THOUSAND/UL (ref 4.31–10.16)

## 2019-08-10 PROCEDURE — 99232 SBSQ HOSP IP/OBS MODERATE 35: CPT | Performed by: INTERNAL MEDICINE

## 2019-08-10 PROCEDURE — 83735 ASSAY OF MAGNESIUM: CPT | Performed by: SURGERY

## 2019-08-10 PROCEDURE — C9113 INJ PANTOPRAZOLE SODIUM, VIA: HCPCS | Performed by: PHYSICIAN ASSISTANT

## 2019-08-10 PROCEDURE — 99024 POSTOP FOLLOW-UP VISIT: CPT | Performed by: SURGERY

## 2019-08-10 PROCEDURE — 84100 ASSAY OF PHOSPHORUS: CPT | Performed by: SURGERY

## 2019-08-10 PROCEDURE — 85025 COMPLETE CBC W/AUTO DIFF WBC: CPT | Performed by: SURGERY

## 2019-08-10 PROCEDURE — 80053 COMPREHEN METABOLIC PANEL: CPT | Performed by: SURGERY

## 2019-08-10 PROCEDURE — 99232 SBSQ HOSP IP/OBS MODERATE 35: CPT | Performed by: FAMILY MEDICINE

## 2019-08-10 RX ORDER — MAGNESIUM SULFATE HEPTAHYDRATE 40 MG/ML
2 INJECTION, SOLUTION INTRAVENOUS ONCE
Status: COMPLETED | OUTPATIENT
Start: 2019-08-10 | End: 2019-08-10

## 2019-08-10 RX ORDER — POTASSIUM CHLORIDE 14.9 MG/ML
20 INJECTION INTRAVENOUS ONCE
Status: COMPLETED | OUTPATIENT
Start: 2019-08-10 | End: 2019-08-10

## 2019-08-10 RX ORDER — POTASSIUM CHLORIDE 29.8 MG/ML
40 INJECTION INTRAVENOUS EVERY 4 HOURS
Status: DISCONTINUED | OUTPATIENT
Start: 2019-08-10 | End: 2019-08-10 | Stop reason: SDUPTHER

## 2019-08-10 RX ORDER — POTASSIUM CHLORIDE 14.9 MG/ML
20 INJECTION INTRAVENOUS
Status: DISPENSED | OUTPATIENT
Start: 2019-08-10 | End: 2019-08-10

## 2019-08-10 RX ADMIN — HYDROMORPHONE HYDROCHLORIDE 1 MG: 1 INJECTION, SOLUTION INTRAMUSCULAR; INTRAVENOUS; SUBCUTANEOUS at 21:43

## 2019-08-10 RX ADMIN — CALCIUM GLUCONATE 3 G: 98 INJECTION, SOLUTION INTRAVENOUS at 18:50

## 2019-08-10 RX ADMIN — CALCIUM GLUCONATE: 94 INJECTION, SOLUTION INTRAVENOUS at 21:17

## 2019-08-10 RX ADMIN — ONDANSETRON 4 MG: 2 INJECTION INTRAMUSCULAR; INTRAVENOUS at 04:51

## 2019-08-10 RX ADMIN — Medication 250 MG: at 08:06

## 2019-08-10 RX ADMIN — PANTOPRAZOLE SODIUM 40 MG: 40 INJECTION, POWDER, FOR SOLUTION INTRAVENOUS at 08:01

## 2019-08-10 RX ADMIN — HEPARIN SODIUM 5000 UNITS: 5000 INJECTION INTRAVENOUS; SUBCUTANEOUS at 05:33

## 2019-08-10 RX ADMIN — POTASSIUM CHLORIDE 20 MEQ: 200 INJECTION, SOLUTION INTRAVENOUS at 21:18

## 2019-08-10 RX ADMIN — METRONIDAZOLE 500 MG: 500 INJECTION, SOLUTION INTRAVENOUS at 08:07

## 2019-08-10 RX ADMIN — POTASSIUM CHLORIDE 20 MEQ: 200 INJECTION, SOLUTION INTRAVENOUS at 11:27

## 2019-08-10 RX ADMIN — Medication 1 SMALL APPLICATION: at 08:17

## 2019-08-10 RX ADMIN — VANCOMYCIN HYDROCHLORIDE 1500 MG: 5 INJECTION, POWDER, LYOPHILIZED, FOR SOLUTION INTRAVENOUS at 05:33

## 2019-08-10 RX ADMIN — Medication 250 MG: at 17:19

## 2019-08-10 RX ADMIN — HYDROMORPHONE HYDROCHLORIDE 1 MG: 1 INJECTION, SOLUTION INTRAMUSCULAR; INTRAVENOUS; SUBCUTANEOUS at 04:51

## 2019-08-10 RX ADMIN — ALBUTEROL SULFATE 2 PUFF: 90 AEROSOL, METERED RESPIRATORY (INHALATION) at 21:18

## 2019-08-10 RX ADMIN — HYDROMORPHONE HYDROCHLORIDE 1 MG: 1 INJECTION, SOLUTION INTRAMUSCULAR; INTRAVENOUS; SUBCUTANEOUS at 08:17

## 2019-08-10 RX ADMIN — SUCRALFATE 1000 MG: 1 SUSPENSION ORAL at 21:16

## 2019-08-10 RX ADMIN — HYDROMORPHONE HYDROCHLORIDE 1 MG: 1 INJECTION, SOLUTION INTRAMUSCULAR; INTRAVENOUS; SUBCUTANEOUS at 16:04

## 2019-08-10 RX ADMIN — VANCOMYCIN HYDROCHLORIDE 1500 MG: 5 INJECTION, POWDER, LYOPHILIZED, FOR SOLUTION INTRAVENOUS at 21:23

## 2019-08-10 RX ADMIN — HEPARIN SODIUM 5000 UNITS: 5000 INJECTION INTRAVENOUS; SUBCUTANEOUS at 21:16

## 2019-08-10 RX ADMIN — POTASSIUM CHLORIDE 40 MEQ: 400 INJECTION, SOLUTION INTRAVENOUS at 10:49

## 2019-08-10 RX ADMIN — HYDROMORPHONE HYDROCHLORIDE 1 MG: 1 INJECTION, SOLUTION INTRAMUSCULAR; INTRAVENOUS; SUBCUTANEOUS at 19:11

## 2019-08-10 RX ADMIN — ONDANSETRON 4 MG: 2 INJECTION INTRAMUSCULAR; INTRAVENOUS at 13:36

## 2019-08-10 RX ADMIN — ALBUTEROL SULFATE 2 PUFF: 90 AEROSOL, METERED RESPIRATORY (INHALATION) at 17:19

## 2019-08-10 RX ADMIN — POTASSIUM PHOSPHATE, MONOBASIC AND POTASSIUM PHOSPHATE, DIBASIC 30 MMOL: 224; 236 INJECTION, SOLUTION INTRAVENOUS at 09:08

## 2019-08-10 RX ADMIN — METRONIDAZOLE 500 MG: 500 INJECTION, SOLUTION INTRAVENOUS at 17:53

## 2019-08-10 RX ADMIN — MAGNESIUM SULFATE HEPTAHYDRATE 2 G: 40 INJECTION, SOLUTION INTRAVENOUS at 09:00

## 2019-08-10 RX ADMIN — HEPARIN SODIUM 5000 UNITS: 5000 INJECTION INTRAVENOUS; SUBCUTANEOUS at 13:41

## 2019-08-10 RX ADMIN — SUCRALFATE 1000 MG: 1 SUSPENSION ORAL at 06:02

## 2019-08-10 RX ADMIN — OXYCODONE HYDROCHLORIDE 10 MG: 5 SOLUTION ORAL at 04:10

## 2019-08-10 RX ADMIN — VANCOMYCIN HYDROCHLORIDE 1500 MG: 5 INJECTION, POWDER, LYOPHILIZED, FOR SOLUTION INTRAVENOUS at 15:56

## 2019-08-10 RX ADMIN — HYDROMORPHONE HYDROCHLORIDE 1 MG: 1 INJECTION, SOLUTION INTRAMUSCULAR; INTRAVENOUS; SUBCUTANEOUS at 13:36

## 2019-08-10 RX ADMIN — METRONIDAZOLE 500 MG: 500 INJECTION, SOLUTION INTRAVENOUS at 23:59

## 2019-08-10 RX ADMIN — ALBUTEROL SULFATE 2 PUFF: 90 AEROSOL, METERED RESPIRATORY (INHALATION) at 08:05

## 2019-08-10 RX ADMIN — POTASSIUM CHLORIDE 20 MEQ: 200 INJECTION, SOLUTION INTRAVENOUS at 14:30

## 2019-08-10 RX ADMIN — SUCRALFATE 1000 MG: 1 SUSPENSION ORAL at 10:50

## 2019-08-10 RX ADMIN — PANTOPRAZOLE SODIUM 40 MG: 40 INJECTION, POWDER, FOR SOLUTION INTRAVENOUS at 21:16

## 2019-08-10 RX ADMIN — POTASSIUM CHLORIDE 20 MEQ: 200 INJECTION, SOLUTION INTRAVENOUS at 19:07

## 2019-08-10 RX ADMIN — Medication 1 SMALL APPLICATION: at 17:19

## 2019-08-10 RX ADMIN — SUCRALFATE 1000 MG: 1 SUSPENSION ORAL at 16:06

## 2019-08-10 RX ADMIN — ALBUTEROL SULFATE 2 PUFF: 90 AEROSOL, METERED RESPIRATORY (INHALATION) at 11:27

## 2019-08-10 NOTE — ASSESSMENT & PLAN NOTE
Coagulopathy secondary to malnutrition      Results from last 7 days   Lab Units 08/09/19  0546   INR  1 36*

## 2019-08-10 NOTE — PROGRESS NOTES
Progress Note - Infectious Disease   Dee Najjar 63 y o  male MRN: [id number]  Unit/Bed#: E5 -01 Encounter: [id number]      Impression/Plan:  1  Sepsis-leukocytosis and tachycardia   Etiology most likely intra-abdominal abscess, although lung consolidations may play a role   Patient is improving slowly   He remains systemically well   Temperature is down   WBC continues to trend down, near normal   Procalcitonin continues decrease  Blood cultures remain negative  Atibiotic plan as in below  Monitor temperature/WBC  Monitor procalcitonin      2  Intra-abdominal abscesses-status post IR drainage with COURTNEY drains remaining in place   Abscess culture is growing strep mitis and Candida  Limited antibiotic choices based upon the patient's allergies  Continue vancomycin, Flagyl, fluconazole for now  Continue drainage per IR  Close surgical follow-up  Treat x 14 days total, another 4 days      3  Peritonitis-in the setting of a perforated marginal ulceration   The patient is now status post exploratory laparotomy with repair and stent placement, and now COURTNEY drain placement   Patient is status post placement of esophageal stent   Stent was readjusted  Antibiotic plan as in above  Continue drain as in above  Serial abdominal exams  Surgery follow-up      4  Enlarging right pleural effusion  Patient is status post thoracentesis with cloudy pleural fluid  However, pleural fluid parameters not consistent with empyema  Pleural fluid culture negative so far  Antibiotic plan as in above  Monitor respiratory status  Follow-up pleural fluid culture      5   COPD-no current clinical evidence of an acute exacerbation at this time       6  Multiple antibiotic allergies, including PCN anaphylaxis   This limits antibiotic choice, as in above      Discussed with patient and his family in detail regarding the above plan      Antibiotics:  Vancomycin # 10  Flagyl # 16  High-dose fluconazole # 10     Subjective:  Patient is  comfortable  Dyspnea mild   Stable mild weak cough  Abdominal pain minimal   Drains remain in place  Temperature stays down   No chills  He is tolerating antibiotics well   No nausea, vomiting or diarrhea  Objective:  Vitals:  Temp:  [97 5 °F (36 4 °C)-98 7 °F (37 1 °C)] 98 °F (36 7 °C)  HR:  [74-90] 74  Resp:  [18-20] 18  BP: (139-167)/(62-83) 139/64  SpO2:  [96 %-99 %] 97 %  Temp (24hrs), Av 1 °F (36 7 °C), Min:97 5 °F (36 4 °C), Max:98 7 °F (37 1 °C)  Current: Temperature: 98 °F (36 7 °C)    Physical Exam:     General: Awake, alert, cooperative, no distress  Neck:  Supple  No mass  No lymphadenopathy  Lungs: Decreased breath sounds on right, sparse right basilar rales, no wheezing, respirations unlabored  Heart:  Regular rate and rhythm, S1 and S2 normal, no murmur  Abdomen: Soft, stable distension  Drains clear with sediments  Mild tenderness at drain sites  Extremities: Stable leg edema  No erythema/warmth  No ulcer  Nontender to palpation  Skin:  No rash  Neuro: Moves all extremities  Invasive Devices     Peripherally Inserted Central Catheter Line            PICC Line 19 Right Brachial 1 day          Peripheral Intravenous Line            Peripheral IV 08/10/19 Left Wrist less than 1 day          Drain            Gastrostomy/Enterostomy Gastrostomy 22 Fr  LUQ 12 days    Closed/Suction Drain Left LUQ Bulb 8 Fr  8 days    Closed/Suction Drain Right;Lateral Abdomen Bulb 12 Fr  1 day    Closed/Suction Drain Right;Lateral Chest Other (Comment) 10 Fr  1 day                Labs studies:   I have personally reviewed pertinent labs    Results from last 7 days   Lab Units 08/10/19  0524 19  0546 19  0646  19  0033   POTASSIUM mmol/L 3 2* 3 6 3 8   < > 3 8   CHLORIDE mmol/L 101 102 104   < > 101   CO2 mmol/L 34* 28 30   < > 26   BUN mg/dL 4* 4* 4*   < > 11   CREATININE mg/dL 0 49* 0 50* 0 56*   < > 0 49*   EGFR ml/min/1 73sq m 121 120 115   < > 121   CALCIUM mg/dL 7 5* 7 9* 7 6*   < > 7 5*   AST U/L 20  --  24  --  34   ALT U/L 8*  --  10*  --  17   ALK PHOS U/L 82  --  97  --  106    < > = values in this interval not displayed  Results from last 7 days   Lab Units 08/10/19  0524 08/09/19  0546 08/08/19  0646   WBC Thousand/uL 10 22* 11 70* 14 11*   HEMOGLOBIN g/dL 9 5* 10 1* 10 0*   PLATELETS Thousands/uL 878* 963* 825*     Results from last 7 days   Lab Units 08/09/19  0934 08/05/19  0917   GRAM STAIN RESULT  2+ Polys  Rare Mononuclear Cells  No bacteria seen Rare Disintegrating polys  No bacteria seen   WOUND CULTURE   --  2 colonies Staphylococcus coagulase negative*   BODY FLUID CULTURE, STERILE  No growth  --        Imaging Studies:   I have personally reviewed pertinent imaging study reports and images in PACS  EKG, Pathology, and Other Studies:   I have personally reviewed pertinent reports

## 2019-08-10 NOTE — CONSULTS
Vancomycin Monitoring    Demographics   Name Maame Rodríguez   Age / Height / BMI 57 / 5-8   Weight / Dosing Weight 75 3 kg    Vanco Days of Therapy 10   Goal Trough Level 15-20 peritonitis   Consult Prescriber Henrique   Additional Notes            Assessment   Current Dose/Freq  1500 q8h   Last Through Level 16 1 on 8/4   Cultures    Creatinine (uncorrected)    Additional Notes            Plan   Dose Adjustments Continue 1500 q8h   Next Trough 8/11 1330   Additional Notes This is day 10 - per Bariatric note total course is planned for 14 days, consider putting a end date of 8/14 on the vancomycin order             Pharmacy will continue to follow closely for s/sx of nephrotoxicity, infusion reactions and appropriateness of therapy as well as patient's culture results and clinical progress daily until medication is d/c'ed  Labs will be ordered if needed per protocol     Ju Pettit, VivianD

## 2019-08-10 NOTE — ASSESSMENT & PLAN NOTE
Patient had low potassium and phosphorus and these are currently being replaced IV  Recheck labs in a m      Results from last 7 days   Lab Units 08/10/19  0524 08/09/19  0546 08/08/19  7008 08/07/19  0529 08/06/19  0546 08/05/19  0720 08/04/19  0438 08/04/19  0033   MAGNESIUM mg/dL 1 6  --  1 9  --  1 7 2 0  --   --    PHOSPHORUS mg/dL 2 5*  --  2 8  --  2 9 2 9 2 8  --    POTASSIUM mmol/L 3 2* 3 6 3 8 4 1 3 3* 3 8  --  3 8

## 2019-08-10 NOTE — PLAN OF CARE
Problem: Potential for Falls  Goal: Patient will remain free of falls  Description  INTERVENTIONS:  - Assess patient frequently for physical needs  -  Identify cognitive and physical deficits and behaviors that affect risk of falls  -  [place] fall precautions as indicated by assessment   - Educate patient/family on patient safety including physical limitations  - Instruct patient to call for assistance with activity based on assessment  - Modify environment to reduce risk of injury  - Consider OT/PT consult to assist with strengthening/mobility  Outcome: Progressing     Problem: Nutrition/Hydration-ADULT  Goal: Nutrient/Hydration intake appropriate for improving, restoring or maintaining nutritional needs  Description  Monitor and assess patient's nutrition/hydration status for malnutrition (ex- brittle hair, bruises, dry skin, pale skin and conjunctiva, muscle wasting, smooth red tongue, and disorientation)  Collaborate with interdisciplinary team and initiate plan and interventions as ordered  Monitor patient's weight and dietary intake as ordered or per policy  Utilize nutrition screening tool and intervene per policy  Determine patient's food preferences and provide high-protein, high-caloric foods as appropriate       INTERVENTIONS:  - Monitor oral intake, urinary output, labs, and treatment plans  - Assess nutrition and hydration status and recommend course of action  - Evaluate amount of meals eaten  - Assist patient with eating if necessary   - Allow adequate time for meals  - Recommend/ encourage appropriate diets, oral nutritional supplements, and vitamin/mineral supplements  - Order, calculate, and assess calorie counts as needed  - Recommend, monitor, and adjust tube feedings and TPN/PPN based on assessed needs  - Assess need for intravenous fluids  - Provide specific nutrition/hydration education as appropriate  - Include patient/family/caregiver in decisions related to nutrition  Outcome: Progressing     Problem: PAIN - ADULT  Goal: Verbalizes/displays adequate comfort level or baseline comfort level  Description  Interventions:  - Encourage patient to monitor pain and request assistance  - Assess pain using appropriate pain scale  - Administer analgesics based on type and severity of pain and evaluate response  - Implement non-pharmacological measures as appropriate and evaluate response  - Consider cultural and social influences on pain and pain management  - Notify physician/advanced practitioner if interventions unsuccessful or patient reports new pain  Outcome: Progressing     Problem: INFECTION - ADULT  Goal: Absence or prevention of progression during hospitalization  Description  INTERVENTIONS:  - Assess and monitor for signs and symptoms of infection  - Monitor lab/diagnostic results  - Monitor all insertion sites, i e  indwelling lines, tubes, and drains  - Monitor endotracheal (as able) and nasal secretions for changes in amount and color  - [place] appropriate cooling/warming therapies per order  - Administer medications as ordered  - Instruct and encourage patient and family to use good hand hygiene technique  - Identify and instruct in appropriate isolation precautions for identified infection/condition  Outcome: Progressing     Problem: DISCHARGE PLANNING  Goal: Discharge to home or other facility with appropriate resources  Description  INTERVENTIONS:  - Identify barriers to discharge w/patient and caregiver  - Arrange for needed discharge resources and transportation as appropriate  - Identify discharge learning needs (meds, wound care, etc )  - Arrange for interpretive services to assist at discharge as needed  - Refer to Case Management Department for coordinating discharge planning if the patient needs post-hospital services based on physician/advanced practitioner order or complex needs related to functional status, cognitive ability, or social support system  Outcome: Progressing     Problem: Knowledge Deficit  Goal: Patient/family/caregiver demonstrates understanding of disease process, treatment plan, medications, and discharge instructions  Description  Complete learning assessment and assess knowledge base    Interventions:  - Provide teaching at level of understanding  - Provide teaching via preferred learning methods  Outcome: Progressing     Problem: GASTROINTESTINAL - ADULT  Goal: Minimal or absence of nausea and/or vomiting  Description  INTERVENTIONS:  - Administer IV fluids as ordered to ensure adequate hydration  - Maintain NPO status until nausea and vomiting are resolved  - Nasogastric tube as ordered  - Administer ordered antiemetic medications as needed  - Provide nonpharmacologic comfort measures as appropriate  - Advance diet as tolerated, if ordered  - Nutrition services referral to assist patient with adequate nutrition and appropriate food choices  Outcome: Progressing  Goal: Maintains or returns to baseline bowel function  Description  INTERVENTIONS:  - Assess bowel function  - Encourage oral fluids to ensure adequate hydration  - Administer IV fluids as ordered to ensure adequate hydration  - Administer ordered medications as needed  - Encourage mobilization and activity  - Nutrition services referral to assist patient with appropriate food choices  Outcome: Progressing  Goal: Maintains adequate nutritional intake  Description  INTERVENTIONS:  - Monitor percentage of each meal consumed  - Identify factors contributing to decreased intake, treat as appropriate  - Assist with meals as needed  - Monitor I&O, WT and lab values  - Obtain nutrition services referral as needed  Outcome: Progressing     Problem: SKIN/TISSUE INTEGRITY - ADULT  Goal: Skin integrity remains intact  Description  INTERVENTIONS  - Identify patients at risk for skin breakdown  - Assess and monitor skin integrity  - Assess and monitor nutrition and hydration status  - Monitor labs (i e  albumin)  - Assess for incontinence   - Turn and reposition patient  - Assist with mobility/ambulation  - Relieve pressure over bony prominences  - Avoid friction and shearing  - Provide appropriate hygiene as needed including keeping skin clean and dry  - Evaluate need for skin moisturizer/barrier cream  - Collaborate with interdisciplinary team (i e  Nutrition, Rehabilitation, etc )   - Patient/family teaching  Outcome: Progressing  Goal: Incision(s), wounds(s) or drain site(s) healing without S/S of infection  Description  INTERVENTIONS  - Assess and document risk factors for skin impairment   - Assess and document dressing, incision, wound bed, drain sites and surrounding tissue  - Initiate Nutrition services consult and/or wound management as needed  Outcome: Progressing  Goal: Oral mucous membranes remain intact  Description  INTERVENTIONS  - Assess oral mucosa and hygiene practices  - Implement preventative oral hygiene regimen  - Implement oral medicated treatments as ordered  - Initiate Nutrition services referral as needed  Outcome: Progressing     Problem: Prexisting or High Potential for Compromised Skin Integrity  Goal: Skin integrity is maintained or improved  Description  INTERVENTIONS:  - Identify patients at risk for skin breakdown  - Assess and monitor skin integrity  - Assess and monitor nutrition and hydration status  - Monitor labs (i e  albumin)  - Assess for incontinence   - Turn and reposition patient  - Assist with mobility/ambulation  - Relieve pressure over bony prominences  - Avoid friction and shearing  - Provide appropriate hygiene as needed including keeping skin clean and dry  - Evaluate need for skin moisturizer/barrier cream  - Collaborate with interdisciplinary team (i e  Nutrition, Rehabilitation, etc )   - Patient/family teaching  Outcome: Progressing     Problem: SAFETY,RESTRAINT: NV/NON-SELF DESTRUCTIVE BEHAVIOR  Goal: Remains free of harm/injury (restraint for non violent/non self-detsructive behavior)  Description  INTERVENTIONS:  - Instruct patient/family regarding restraint use   - Assess and monitor physiologic and psychological status   - Provide interventions and comfort measures to meet assessed patient needs   - Identify and implement measures to help patient regain control  - Assess readiness for release of restraint   Outcome: Progressing  Goal: Returns to optimal restraint-free functioning  Description  INTERVENTIONS:  - Assess the patient's behavior and symptoms that indicate continued need for restraint  - Identify and implement measures to help patient regain control  - Assess readiness for release of restraint   Outcome: Progressing

## 2019-08-10 NOTE — ASSESSMENT & PLAN NOTE
Right lower lobe opacity:  Differentials includes atelectasis versus loculated effusion  Patient already on broad-spectrum antibiotics for intra-abdominal abscess  No respiratory symptoms at this time  Leukocytosis improving  Consider pulmonary consultation or thoracentesis/sampling if clinically warranted  Patient had right chest tube placed by IR on 08/09

## 2019-08-10 NOTE — ASSESSMENT & PLAN NOTE
Marginal ulcer with perforation  Patient initially admitted for open hernia repair but subsequently developed perforated marginal ulcer requiring laparotomy  Subsequently developed intra-abdominal abscesses requiring placement of drains  Continue patient on IV vancomycin, metronidazole, fluconazole  Patient had abnormal upper GI series 8/5 which showed a leak in the proximal aspect of the gastrojejunal stent into the left upper quadrant collection with drainage  Patient had EGD performed on 08/06 with adjustment of stent  Patient had CT of abdomen and pelvis with IV and PO contrast on 08/08 which was unrevealing  Due to worsening abdominal pain, patient had PICC line placed and initiation of TPN  Tube feeds were discontinued  Patient also had right chest tube inserted for enlarging right pleural effusion  Patient had right COURTNEY drain readjusted by IR  Continue SCDs and heparin for DVT prophylaxis  Continue Carafate and PPI for GI prophylaxis  Further management and plan as per bariatric surgery

## 2019-08-10 NOTE — PROGRESS NOTES
Progress Note - Bariatric Surgery   [facility] 63 y o  male MRN: [id number]  Unit/Bed#: E5 -01 Encounter: [id number]      Subjective/Objective     Subjective: This is a 63year old male s/p exploratory laparotomy with repair of perforated marginal ulcer, abdominal washout, G tube placement and GI assisted endoscopic stent placement on 7/28/19 by Dr Suresh Barone also underwent a diagnostic laparoscopy  That was converted to open on 7/26/2019 with internal hernia repair  Patient had UGI on 8/5 that demonstrated a leak from the proximal aspect of the gastrojejunal stent into LUQ collection  Nancy Roberts subsequently underwent EGD with stent adjustment with Dr Corazon Steele 8/6     Tube feeds have been held due to complaints of abdominal discomfort and distension by patient  PICC line, chest tube and right drain readjustment by IR yesterday with subsequent restarting of TPN last night  Patient overall tolerating well  On evaluation this morning, patient remains at baseline  Did have increased SOB yesterday however that improved after coughing up mucus  Chest tube with total output of about 1600cc since placement yesterday  Denies fevers, chills, chest pain  Pain adequately controlled on IV pain medication  Voiding well  Ambulating without assistance       Objective:    /83 (BP Location: Left arm)   Pulse 90   Temp 97 5 °F (36 4 °C) (Temporal)   Resp 20   Ht 5' 8" (1 727 m)   Wt 75 3 kg (166 lb 0 1 oz)   SpO2 99%   BMI 25 24 kg/m²       Intake/Output Summary (Last 24 hours) at 8/10/2019 0808  Last data filed at 8/10/2019 0538  Gross per 24 hour   Intake 987 5 ml   Output 4565 ml   Net -3577 5 ml       Invasive Devices     Peripherally Inserted Central Catheter Line            PICC Line 08/09/19 Right Brachial less than 1 day          Drain            Gastrostomy/Enterostomy Gastrostomy 22 Fr  LUQ 12 days    Closed/Suction Drain Left LUQ Bulb 8 Fr  8 days    Closed/Suction Drain Right;Lateral Abdomen Bulb 12 Fr  less than 1 day    Closed/Suction Drain Right;Lateral Chest Other (Comment) 10 Fr  less than 1 day                ROS: 10-point system completed  All negative except see HPI  Physical Exam    General Appearance:    Alert, cooperative, no distress, appears stated age   Head:    Normocephalic, without obvious abnormality, atraumatic   Lungs:     Respirations unlabored   Heart:    Regular rate and rhythm   Abdomen:     Soft, appropriate tenderness,  no masses, no organomegaly,   non distended  Right and left IR drains in place with seropurulent output  Extremities:   1+ edema    Neurologic:  Incision:      Psych:   Normal strength and sensation    Midline incision with open wound at distal end that is packed with adaptic and 4x4 gauze  Wound looks clean and appears to be slowly healing  Continue BID dressing changes     Normal mood and affect       Lab, Imaging and other studies:  I have personally reviewed pertinent lab results    , CBC:   Lab Results   Component Value Date    WBC 10 22 (H) 08/10/2019    HGB 9 5 (L) 08/10/2019    HCT 30 1 (L) 08/10/2019    MCV 94 08/10/2019     (H) 08/10/2019    MCH 29 6 08/10/2019    MCHC 31 6 08/10/2019    RDW 14 7 08/10/2019    MPV 8 4 (L) 08/10/2019    NRBC 0 08/10/2019   , CMP:   Lab Results   Component Value Date    SODIUM 140 08/10/2019    K 3 2 (L) 08/10/2019     08/10/2019    CO2 34 (H) 08/10/2019    BUN 4 (L) 08/10/2019    CREATININE 0 49 (L) 08/10/2019    CALCIUM 7 5 (L) 08/10/2019    AST 20 08/10/2019    ALT 8 (L) 08/10/2019    ALKPHOS 82 08/10/2019    EGFR 121 08/10/2019        VTE Mechanical Prophylaxis: sequential compression device + heparin    Assessment/Plan  This is a 63year old male s/p exploratory laparotomy with repair of perforated marginal ulcer, abdominal washout, G tube placement and GI assisted endoscopic stent placement on 7/28/19 by Dr Eilsabeth Ocampo also underwent diagnostic laparoscopy that was converted to open on 7/26/2019 with internal hernia repair       - IR readjusted right drain, placed chest tube and PICC yesterday  Chest tube with total output ~1600 cc since placement  IR drains with appropriate seropurulent output  - TPN initiated last night, patient tolerating well  Custom TPN for today based on nutrition reccs, appreciate input   - dose of IV lasix given yesterday due to SOB and fluid seen on CXR; SOB improving and back to patient's baseline this am   - NPO  Sips of clear liquids, sips with meds and ice chips are ok  - low potassium and phosphorous on am labs, will replete IV   - s,/p emergent EGD on 8/6 with stent adjustment by Dr Bear Ding for leak seen on UGI- per OP note, successful placement of fully covered esophageal stent   - Continue antibiotics, antifungal as per ID, f/u cultures   Per ID, Treat x 14 days total  - Pharmacy consult in place, appreciate continued input   - Continue wound care and BID dressing changes : apply adaptic and wet to dry gauze dressing to inferior aspect of midline incision twice daily   - IVF discontinued due to TPN initiation; continue to monitor renal function   - Continue to trend electrolytes and WBC count  - Continue to monitor drain output and quality  -drain flushing protocol  - Encourage IS, ambulation  - PPI and Carafate for GI prophylaxis  - SCDs and Heparin for DVT prophylaxis  - Case management on board for home tube feedings     Plan of care was discussed with patient and patient's nurse  Care plan discussed with Dr Peterson Postal: unable to discharge to home today

## 2019-08-10 NOTE — PROGRESS NOTES
Progress Note Jesika Figueroa 1960, 63 y o  male MRN: [id number]    Unit/Bed#: E5 -01 Encounter: [id number]    Primary Care Provider: Chanelle Navas DO   Date and time admitted to hospital: 7/26/2019  9:00 AM        * Chronic marginal ulcer with perforation ([facility] )  Assessment & Plan  Marginal ulcer with perforation  Patient initially admitted for open hernia repair but subsequently developed perforated marginal ulcer requiring laparotomy  Subsequently developed intra-abdominal abscesses requiring placement of drains  Continue patient on IV vancomycin, metronidazole, fluconazole  Patient had abnormal upper GI series 8/5 which showed a leak in the proximal aspect of the gastrojejunal stent into the left upper quadrant collection with drainage  Patient had EGD performed on 08/06 with adjustment of stent  Patient had CT of abdomen and pelvis with IV and PO contrast on 08/08 which was unrevealing  Due to worsening abdominal pain, patient had PICC line placed and initiation of TPN  Tube feeds were discontinued  Patient also had right chest tube inserted for enlarging right pleural effusion  Patient had right COURTNEY drain readjusted by IR  Continue SCDs and heparin for DVT prophylaxis  Continue Carafate and PPI for GI prophylaxis  Further management and plan as per bariatric surgery  Severe protein-calorie malnutrition ([facility] )  Assessment & Plan  Body mass index is 25 24 kg/m²  Patient currently on TPN via PICC line  Electrolyte disturbance  Assessment & Plan  Patient had low potassium and phosphorus and these are currently being replaced IV  Recheck labs in a m      Results from last 7 days   Lab Units 08/10/19  0524 08/09/19  0546 08/08/19  1935 08/07/19  0529 08/06/19  0546 08/05/19  0720 08/04/19  0438 08/04/19  0033   MAGNESIUM mg/dL 1 6  --  1 9  --  1 7 2 0  --   --    PHOSPHORUS mg/dL 2 5*  --  2 8  --  2 9 2 9 2 8  --    POTASSIUM mmol/L 3 2* 3 6 3 8 4 1 3 3* 3 8  --  3 8 Diet-controlled diabetes mellitus [facility])  Assessment & Plan  Lab Results   Component Value Date    HGBA1C 5 1 2018     No results for input(s): POCGLU in the last 72 hours  Diet controlled diabetes  Accucheks within limits  Abnormal CT scan, lung  Assessment & Plan  Right lower lobe opacity:  Differentials includes atelectasis versus loculated effusion  Patient already on broad-spectrum antibiotics for intra-abdominal abscess  No respiratory symptoms at this time  Leukocytosis improving  Consider pulmonary consultation or thoracentesis/sampling if clinically warranted  Patient had right chest tube placed by IR on   Coagulopathy ([address] )  Assessment & Plan  Coagulopathy secondary to malnutrition  Results from last 7 days   Lab Units 19  0546   INR  1 36*           VTE Pharmacologic Prophylaxis:   Pharmacologic: Heparin  Mechanical VTE Prophylaxis in Place: Yes    Patient Centered Rounds: I have performed bedside rounds with nursing staff today  Discussions with Specialists or Other Care Team Provider:  Yes    Education and Discussions with Family / Patient:  Yes    Time Spent for Care: 15 minutes  More than 50% of total time spent on counseling and coordination of care as described above  Current Length of Stay: 15 day(s)    Current Patient Status: Inpatient   Certification Statement: The patient will continue to require additional inpatient hospital stay due to As per bariatric surgery    Discharge Plan:  As per bariatric surgery    Code Status: Level 1 - Full Code      Subjective:   Patient complains of feeling tired  Objective:     Vitals:   Temp (24hrs), Av 1 °F (36 7 °C), Min:97 5 °F (36 4 °C), Max:98 7 °F (37 1 °C)    Temp:  [97 5 °F (36 4 °C)-98 7 °F (37 1 °C)] 98 °F (36 7 °C)  HR:  [74-90] 74  Resp:  [18-20] 18  BP: (139-167)/(62-83) 139/64  SpO2:  [96 %-99 %] 97 %  Body mass index is 25 24 kg/m²       Input and Output Summary (last 24 hours): Intake/Output Summary (Last 24 hours) at 8/10/2019 1311  Last data filed at 8/10/2019 1127  Gross per 24 hour   Intake 1087 5 ml   Output 4640 ml   Net -3552 5 ml       Physical Exam:     Physical Exam   Constitutional: He appears well-developed  No distress  Mildly malnourished appearing   HENT:   Head: Normocephalic and atraumatic  Eyes: Pupils are equal, round, and reactive to light  EOM are normal    Neck: No JVD present  Cardiovascular: Normal rate, regular rhythm and normal heart sounds  No murmur heard  Right chest wall tube present  Pulmonary/Chest: He has no wheezes  He has rales  Decreased breath sounds in right lower and middle lobe  Abdominal: Soft  He exhibits distension  There is no tenderness  There is no rebound and no guarding  Intact COURTNEY drains   Musculoskeletal: He exhibits edema  Neurological: He is alert  Skin: Skin is warm and dry  He is not diaphoretic  Additional Data:     Labs:    Results from last 7 days   Lab Units 08/10/19  0524   WBC Thousand/uL 10 22*   HEMOGLOBIN g/dL 9 5*   HEMATOCRIT % 30 1*   PLATELETS Thousands/uL 878*   NEUTROS PCT % 81*   LYMPHS PCT % 8*   MONOS PCT % 11   EOS PCT % 0     Results from last 7 days   Lab Units 08/10/19  0524   SODIUM mmol/L 140   POTASSIUM mmol/L 3 2*   CHLORIDE mmol/L 101   CO2 mmol/L 34*   BUN mg/dL 4*   CREATININE mg/dL 0 49*   ANION GAP mmol/L 5   CALCIUM mg/dL 7 5*   ALBUMIN g/dL 1 3*   TOTAL BILIRUBIN mg/dL 0 47   ALK PHOS U/L 82   ALT U/L 8*   AST U/L 20   GLUCOSE RANDOM mg/dL 134     Results from last 7 days   Lab Units 08/09/19  0546   INR  1 36*     Results from last 7 days   Lab Units 08/04/19  0852 08/03/19  1322   POC GLUCOSE mg/dl 101 77         Results from last 7 days   Lab Units 08/06/19  0546 08/04/19  0438   PROCALCITONIN ng/ml 0 68* 0 87*           * I Have Reviewed All Lab Data Listed Above  * Additional Pertinent Lab Tests Reviewed:  [facility] Admission Reviewed    Imaging:    Imaging Reports Reviewed Today Include:  None available  Imaging Personally Reviewed by Myself Includes:  None    Recent Cultures (last 7 days):     Results from last 7 days   Lab Units 08/09/19  0934 08/05/19  0917   GRAM STAIN RESULT  2+ Polys  Rare Mononuclear Cells  No bacteria seen Rare Disintegrating polys  No bacteria seen   WOUND CULTURE   --  2 colonies Staphylococcus coagulase negative*   BODY FLUID CULTURE, STERILE  No growth  --        Last 24 Hours Medication List:     Current Facility-Administered Medications:  Adult TPN (CUSTOM BASE/STANDARD ELECTROLYTE)  Intravenous Continuous Zita Busch MD    Adult TPN (STANDARD BASE/STANDARD ELECTROLYTE)  Intravenous Continuous Coretta Haynes MD Last Rate: 48 6 mL/hr at 08/09/19 2112   albuterol 2 puff Inhalation 4x Daily Ronit Gil PA-C    bacitracin 1 small application Topical BID Coretta Haynes MD    diphenhydrAMINE 25 mg Intravenous Q6H PRN Ronit Gil PA-C    heparin (porcine) 5,000 Units Subcutaneous [facility] Ronit Gil PA-C    HYDROmorphone 1 mg Intravenous Q2H PRN Ronit Gil PA-C    metroNIDAZOLE 500 mg Intravenous Q8H Zita Busch MD Last Rate: Stopped (08/10/19 0837)   ondansetron 4 mg Intravenous Q6H PRN Ronit Gil PA-C    oxyCODONE 10 mg Oral Q4H PRN Zita Busch MD    oxyCODONE 5 mg Oral Q4H PRN Zita Busch MD    pantoprazole 40 mg Intravenous Q12H [address] Ronit Gil PA-C    potassium chloride 20 mEq Intravenous Q2H Zita Busch MD Last Rate: 20 mEq (08/10/19 1127)   potassium phosphate 30 mmol Intravenous Once Zita Busch MD Last Rate: 30 mmol (08/10/19 0908)   promethazine 12 5 mg Intravenous Q6H PRN Ronit Gil PA-C    saccharomyces boulardii 250 mg Oral BID Ronit Gil PA-C    simethicone 80 mg Oral Q6H PRN Ronit Gil PA-C    sucralfate 1,000 mg Oral 4x Daily (AC & HS) Ronit Gil PA-C    vancomycin 20 mg/kg Intravenous Q8H Grecia Estrella MD Last Rate: 1,500 mg (08/10/19 0533)        Today, Patient Was Seen By: Bharath Rocha MD    ** Please Note: Dictation voice to text software may have been used in the creation of this document   **

## 2019-08-11 LAB
ANION GAP SERPL CALCULATED.3IONS-SCNC: 0 MMOL/L (ref 4–13)
BASOPHILS # BLD AUTO: 0.03 THOUSANDS/ΜL (ref 0–0.1)
BASOPHILS NFR BLD AUTO: 0 % (ref 0–1)
BUN SERPL-MCNC: 5 MG/DL (ref 5–25)
CA-I BLD-SCNC: 1.04 MMOL/L (ref 1.12–1.32)
CALCIUM SERPL-MCNC: 7.7 MG/DL (ref 8.3–10.1)
CHLORIDE SERPL-SCNC: 103 MMOL/L (ref 100–108)
CO2 SERPL-SCNC: 36 MMOL/L (ref 21–32)
CREAT SERPL-MCNC: 0.61 MG/DL (ref 0.6–1.3)
EOSINOPHIL # BLD AUTO: 0.08 THOUSAND/ΜL (ref 0–0.61)
EOSINOPHIL NFR BLD AUTO: 1 % (ref 0–6)
ERYTHROCYTE [DISTWIDTH] IN BLOOD BY AUTOMATED COUNT: 14.8 % (ref 11.6–15.1)
GFR SERPL CREATININE-BSD FRML MDRD: 111 ML/MIN/1.73SQ M
GLUCOSE SERPL-MCNC: 117 MG/DL (ref 65–140)
HCT VFR BLD AUTO: 31.2 % (ref 36.5–49.3)
HGB BLD-MCNC: 9.8 G/DL (ref 12–17)
IMM GRANULOCYTES # BLD AUTO: 0.07 THOUSAND/UL (ref 0–0.2)
IMM GRANULOCYTES NFR BLD AUTO: 1 % (ref 0–2)
LYMPHOCYTES # BLD AUTO: 1.13 THOUSANDS/ΜL (ref 0.6–4.47)
LYMPHOCYTES NFR BLD AUTO: 9 % (ref 14–44)
MAGNESIUM SERPL-MCNC: 1.8 MG/DL (ref 1.6–2.6)
MCH RBC QN AUTO: 29.3 PG (ref 26.8–34.3)
MCHC RBC AUTO-ENTMCNC: 31.4 G/DL (ref 31.4–37.4)
MCV RBC AUTO: 93 FL (ref 82–98)
MONOCYTES # BLD AUTO: 1.31 THOUSAND/ΜL (ref 0.17–1.22)
MONOCYTES NFR BLD AUTO: 10 % (ref 4–12)
NEUTROPHILS # BLD AUTO: 10.07 THOUSANDS/ΜL (ref 1.85–7.62)
NEUTS SEG NFR BLD AUTO: 79 % (ref 43–75)
NRBC BLD AUTO-RTO: 0 /100 WBCS
PHOSPHATE SERPL-MCNC: 3 MG/DL (ref 2.7–4.5)
PLATELET # BLD AUTO: 864 THOUSANDS/UL (ref 149–390)
PMV BLD AUTO: 8.3 FL (ref 8.9–12.7)
POTASSIUM SERPL-SCNC: 3.7 MMOL/L (ref 3.5–5.3)
RBC # BLD AUTO: 3.35 MILLION/UL (ref 3.88–5.62)
SODIUM SERPL-SCNC: 139 MMOL/L (ref 136–145)
VANCOMYCIN TROUGH SERPL-MCNC: 24.5 UG/ML (ref 10–20)
WBC # BLD AUTO: 12.69 THOUSAND/UL (ref 4.31–10.16)

## 2019-08-11 PROCEDURE — 99024 POSTOP FOLLOW-UP VISIT: CPT | Performed by: SURGERY

## 2019-08-11 PROCEDURE — 99232 SBSQ HOSP IP/OBS MODERATE 35: CPT | Performed by: FAMILY MEDICINE

## 2019-08-11 PROCEDURE — 99232 SBSQ HOSP IP/OBS MODERATE 35: CPT | Performed by: INTERNAL MEDICINE

## 2019-08-11 PROCEDURE — 83735 ASSAY OF MAGNESIUM: CPT | Performed by: SURGERY

## 2019-08-11 PROCEDURE — 80048 BASIC METABOLIC PNL TOTAL CA: CPT | Performed by: SURGERY

## 2019-08-11 PROCEDURE — C9113 INJ PANTOPRAZOLE SODIUM, VIA: HCPCS | Performed by: PHYSICIAN ASSISTANT

## 2019-08-11 PROCEDURE — 84100 ASSAY OF PHOSPHORUS: CPT | Performed by: SURGERY

## 2019-08-11 PROCEDURE — 82330 ASSAY OF CALCIUM: CPT | Performed by: SURGERY

## 2019-08-11 PROCEDURE — 80202 ASSAY OF VANCOMYCIN: CPT | Performed by: INTERNAL MEDICINE

## 2019-08-11 PROCEDURE — 85025 COMPLETE CBC W/AUTO DIFF WBC: CPT | Performed by: SURGERY

## 2019-08-11 RX ORDER — FLUCONAZOLE 2 MG/ML
400 INJECTION, SOLUTION INTRAVENOUS EVERY 24 HOURS
Status: COMPLETED | OUTPATIENT
Start: 2019-08-11 | End: 2019-08-16

## 2019-08-11 RX ADMIN — SUCRALFATE 1000 MG: 1 SUSPENSION ORAL at 10:55

## 2019-08-11 RX ADMIN — ALBUTEROL SULFATE 2 PUFF: 90 AEROSOL, METERED RESPIRATORY (INHALATION) at 11:03

## 2019-08-11 RX ADMIN — VANCOMYCIN HYDROCHLORIDE 1500 MG: 5 INJECTION, POWDER, LYOPHILIZED, FOR SOLUTION INTRAVENOUS at 13:45

## 2019-08-11 RX ADMIN — SUCRALFATE 1000 MG: 1 SUSPENSION ORAL at 17:26

## 2019-08-11 RX ADMIN — ALBUTEROL SULFATE 2 PUFF: 90 AEROSOL, METERED RESPIRATORY (INHALATION) at 21:45

## 2019-08-11 RX ADMIN — HYDROMORPHONE HYDROCHLORIDE 1 MG: 1 INJECTION, SOLUTION INTRAMUSCULAR; INTRAVENOUS; SUBCUTANEOUS at 21:58

## 2019-08-11 RX ADMIN — Medication 250 MG: at 17:26

## 2019-08-11 RX ADMIN — HYDROMORPHONE HYDROCHLORIDE 1 MG: 1 INJECTION, SOLUTION INTRAMUSCULAR; INTRAVENOUS; SUBCUTANEOUS at 14:45

## 2019-08-11 RX ADMIN — ONDANSETRON 4 MG: 2 INJECTION INTRAMUSCULAR; INTRAVENOUS at 18:57

## 2019-08-11 RX ADMIN — HYDROMORPHONE HYDROCHLORIDE 1 MG: 1 INJECTION, SOLUTION INTRAMUSCULAR; INTRAVENOUS; SUBCUTANEOUS at 19:01

## 2019-08-11 RX ADMIN — HEPARIN SODIUM 5000 UNITS: 5000 INJECTION INTRAVENOUS; SUBCUTANEOUS at 13:45

## 2019-08-11 RX ADMIN — FLUCONAZOLE, SODIUM CHLORIDE 400 MG: 2 INJECTION INTRAVENOUS at 11:11

## 2019-08-11 RX ADMIN — SUCRALFATE 1000 MG: 1 SUSPENSION ORAL at 05:40

## 2019-08-11 RX ADMIN — ONDANSETRON 4 MG: 2 INJECTION INTRAMUSCULAR; INTRAVENOUS at 10:54

## 2019-08-11 RX ADMIN — HYDROMORPHONE HYDROCHLORIDE 1 MG: 1 INJECTION, SOLUTION INTRAMUSCULAR; INTRAVENOUS; SUBCUTANEOUS at 01:21

## 2019-08-11 RX ADMIN — METRONIDAZOLE 500 MG: 500 INJECTION, SOLUTION INTRAVENOUS at 08:08

## 2019-08-11 RX ADMIN — HYDROMORPHONE HYDROCHLORIDE 1 MG: 1 INJECTION, SOLUTION INTRAMUSCULAR; INTRAVENOUS; SUBCUTANEOUS at 11:01

## 2019-08-11 RX ADMIN — SUCRALFATE 1000 MG: 1 SUSPENSION ORAL at 21:58

## 2019-08-11 RX ADMIN — Medication 250 MG: at 08:46

## 2019-08-11 RX ADMIN — METRONIDAZOLE 500 MG: 500 INJECTION, SOLUTION INTRAVENOUS at 17:27

## 2019-08-11 RX ADMIN — ONDANSETRON 4 MG: 2 INJECTION INTRAMUSCULAR; INTRAVENOUS at 04:38

## 2019-08-11 RX ADMIN — HEPARIN SODIUM 5000 UNITS: 5000 INJECTION INTRAVENOUS; SUBCUTANEOUS at 05:39

## 2019-08-11 RX ADMIN — ALBUTEROL SULFATE 2 PUFF: 90 AEROSOL, METERED RESPIRATORY (INHALATION) at 17:26

## 2019-08-11 RX ADMIN — PANTOPRAZOLE SODIUM 40 MG: 40 INJECTION, POWDER, FOR SOLUTION INTRAVENOUS at 08:47

## 2019-08-11 RX ADMIN — HYDROMORPHONE HYDROCHLORIDE 1 MG: 1 INJECTION, SOLUTION INTRAMUSCULAR; INTRAVENOUS; SUBCUTANEOUS at 07:33

## 2019-08-11 RX ADMIN — Medication 1 SMALL APPLICATION: at 08:47

## 2019-08-11 RX ADMIN — CALCIUM GLUCONATE: 94 INJECTION, SOLUTION INTRAVENOUS at 21:56

## 2019-08-11 RX ADMIN — PANTOPRAZOLE SODIUM 40 MG: 40 INJECTION, POWDER, FOR SOLUTION INTRAVENOUS at 21:58

## 2019-08-11 RX ADMIN — VANCOMYCIN HYDROCHLORIDE 1500 MG: 5 INJECTION, POWDER, LYOPHILIZED, FOR SOLUTION INTRAVENOUS at 05:39

## 2019-08-11 RX ADMIN — ALBUTEROL SULFATE 2 PUFF: 90 AEROSOL, METERED RESPIRATORY (INHALATION) at 08:46

## 2019-08-11 RX ADMIN — HEPARIN SODIUM 5000 UNITS: 5000 INJECTION INTRAVENOUS; SUBCUTANEOUS at 21:57

## 2019-08-11 NOTE — ASSESSMENT & PLAN NOTE
Body mass index is 25 24 kg/m²  Patient currently on TPN via PICC line  Started on full liquid bariatric diet

## 2019-08-11 NOTE — PROGRESS NOTES
Progress Note Janae Moctezuma 1960, 63 y o  male MRN: [id number]    Unit/Bed#: E5 -01 Encounter: [id number]    Primary Care Provider: Wendi Chavez DO   Date and time admitted to hospital: 7/26/2019  9:00 AM        * Chronic marginal ulcer with perforation ([facility] )  Assessment & Plan  Marginal ulcer with perforation  Patient initially admitted for open hernia repair but subsequently developed perforated marginal ulcer requiring laparotomy  Subsequently developed intra-abdominal abscesses requiring placement of drains  Continue patient on IV vancomycin, metronidazole, fluconazole  Patient had abnormal upper GI series 8/5 which showed a leak in the proximal aspect of the gastrojejunal stent into the left upper quadrant collection with drainage  Patient had EGD performed on 08/06 with adjustment of stent  Patient had CT of abdomen and pelvis with IV and PO contrast on 08/08 which was unrevealing  Due to worsening abdominal pain, patient had PICC line placed and initiation of TPN  Tube feeds were discontinued  Patient also had right chest tube inserted for enlarging right pleural effusion  Patient had right COURTNEY drain readjusted by IR  Patient started on full liquid bariatric diet in addition to TPN  Continue SCDs and heparin for DVT prophylaxis  Continue Carafate and PPI for GI prophylaxis  Further management and plan as per bariatric surgery  Severe protein-calorie malnutrition ([facility] 75 )  Assessment & Plan  Body mass index is 25 24 kg/m²  Patient currently on TPN via PICC line  Started on full liquid bariatric diet  Electrolyte disturbance  Assessment & Plan  Potassium and phosphorus as well as other electrolytes within normal limits  Recheck labs in a m      Results from last 7 days   Lab Units 08/11/19  1003 08/10/19  0524 08/09/19  0546 08/08/19  0646 08/07/19  0529 08/06/19  0546 08/05/19  0720   MAGNESIUM mg/dL 1 8 1 6  --  1 9  --  1 7 2 0   PHOSPHORUS mg/dL 3 0 2 5*  --  2 8  -- 2 9 2 9   POTASSIUM mmol/L 3 7 3 2* 3 6 3 8 4 1 3 3* 3 8       Diet-controlled diabetes mellitus ([facility] )  Assessment & Plan  Lab Results   Component Value Date    HGBA1C 5 1 2018     No results for input(s): POCGLU in the last 72 hours  Diet controlled diabetes  Accucheks within limits  Abnormal CT scan, lung  Assessment & Plan  Right lower lobe opacity:  Differentials includes atelectasis versus loculated effusion  Patient already on broad-spectrum antibiotics for intra-abdominal abscess  No respiratory symptoms at this time  Leukocytosis improving  Consider pulmonary consultation or thoracentesis/sampling if clinically warranted  Patient had right chest tube placed by IR on   Coagulopathy ([facility] )  Assessment & Plan  Coagulopathy secondary to malnutrition  Results from last 7 days   Lab Units 19  0546   INR  1 36*           VTE Pharmacologic Prophylaxis:   Pharmacologic: Heparin  Mechanical VTE Prophylaxis in Place: No    Patient Centered Rounds: I have performed bedside rounds with nursing staff today  Discussions with Specialists or Other Care Team Provider:  Yes    Education and Discussions with Family / Patient:  Yes    Time Spent for Care: 15 minutes  More than 50% of total time spent on counseling and coordination of care as described above  Current Length of Stay: 16 day(s)    Current Patient Status: Inpatient   Certification Statement: The patient will continue to require additional inpatient hospital stay due to As per bariatric surgery    Discharge Plan:  As per bariatric surgery    Code Status: Level 1 - Full Code      Subjective:   Patient states he feels tired, low on appetite, abdominal pain      Objective:     Vitals:   Temp (24hrs), Av 4 °F (36 3 °C), Min:96 2 °F (35 7 °C), Max:98 2 °F (36 8 °C)    Temp:  [96 2 °F (35 7 °C)-98 2 °F (36 8 °C)] 98 2 °F (36 8 °C)  HR:  [88-91] 91  Resp:  [18-22] 22  BP: (137-154)/(66-85) 137/66  SpO2:  [92 %-100 %] 99 %  Body mass index is 25 24 kg/m²  Input and Output Summary (last 24 hours): Intake/Output Summary (Last 24 hours) at 8/11/2019 1241  Last data filed at 8/11/2019 0853  Gross per 24 hour   Intake 50 ml   Output 3855 ml   Net -3805 ml       Physical Exam:     Physical Exam   Constitutional: He is oriented to person, place, and time  He appears well-developed  No distress  Moderately malnourished appearance   HENT:   Head: Normocephalic and atraumatic  Eyes: Pupils are equal, round, and reactive to light  EOM are normal    Neck: Normal range of motion  Neck supple  No JVD present  Cardiovascular: Normal rate, regular rhythm and normal heart sounds  No murmur heard  Right chest tube intact  Pulmonary/Chest: Effort normal  He has no wheezes  He has no rales  Decreased breath sounds in right lung   Abdominal: Soft  There is tenderness  Intact COURTNEY drains  Abdominal binder across abdomen  Musculoskeletal: He exhibits edema  He exhibits no tenderness  Patient has generalized anasarca in bilateral lower extremities as well as in the inguinal area  Swelling in left arm resolved  Right arm still mildly to moderately swollen  Neurological: He is alert and oriented to person, place, and time  Skin: Skin is warm and dry  He is not diaphoretic  Psychiatric: He has a normal mood and affect   His behavior is normal        Additional Data:     Labs:    Results from last 7 days   Lab Units 08/11/19  1003   WBC Thousand/uL 12 69*   HEMOGLOBIN g/dL 9 8*   HEMATOCRIT % 31 2*   PLATELETS Thousands/uL 864*   NEUTROS PCT % 79*   LYMPHS PCT % 9*   MONOS PCT % 10   EOS PCT % 1     Results from last 7 days   Lab Units 08/11/19  1003 08/10/19  0524   SODIUM mmol/L 139 140   POTASSIUM mmol/L 3 7 3 2*   CHLORIDE mmol/L 103 101   CO2 mmol/L 36* 34*   BUN mg/dL 5 4*   CREATININE mg/dL 0 61 0 49*   ANION GAP mmol/L 0* 5   CALCIUM mg/dL 7 7* 7 5*   ALBUMIN g/dL  --  1 3*   TOTAL BILIRUBIN mg/dL  --  0 47   ALK PHOS U/L  -- 82   ALT U/L  --  8*   AST U/L  --  20   GLUCOSE RANDOM mg/dL 117 134     Results from last 7 days   Lab Units 08/09/19  0546   INR  1 36*             Results from last 7 days   Lab Units 08/06/19  0546   PROCALCITONIN ng/ml 0 68*           * I Have Reviewed All Lab Data Listed Above  * Additional Pertinent Lab Tests Reviewed:  [facility] Admission Reviewed    Imaging:    Imaging Reports Reviewed Today Include:  None available  Imaging Personally Reviewed by Myself Includes:  None    Recent Cultures (last 7 days):     Results from last 7 days   Lab Units 08/09/19  0934 08/05/19  0917   GRAM STAIN RESULT  2+ Polys  Rare Mononuclear Cells  No bacteria seen Rare Disintegrating polys  No bacteria seen   WOUND CULTURE   --  2 colonies Staphylococcus coagulase negative*   BODY FLUID CULTURE, STERILE  No growth  --        Last 24 Hours Medication List:     Current Facility-Administered Medications:  Adult TPN (CUSTOM BASE/STANDARD ELECTROLYTE)  Intravenous Continuous Darwin Burk MD Last Rate: 79 9 mL/hr at 08/10/19 2117   Adult TPN (CUSTOM BASE/STANDARD ELECTROLYTE)  Intravenous Continuous Cami Aaron PA-C    albuterol 2 puff Inhalation 4x Daily Shiela ChandanaFLORIAN diaz    bacitracin 1 small application Topical BID Venice Cintron MD    diphenhydrAMINE 25 mg Intravenous Q6H PRN Shiela ChandanaFLORIAN diaz    fluconazole 400 mg Intravenous Q24H Darwin Burk MD Last Rate: 400 mg (08/11/19 1111)   heparin (porcine) 5,000 Units Subcutaneous [facility] Shiela ChandanaFLORIAN diaz    HYDROmorphone 1 mg Intravenous Q2H PRN Shiela ChandanaFLORIAN diaz    metroNIDAZOLE 500 mg Intravenous Q8H Darwin Burk MD Last Rate: 500 mg (08/11/19 0808)   ondansetron 4 mg Intravenous Q6H PRN Shiela FLORIAN [place]    oxyCODONE 10 mg Oral Q4H PRN Darwin Burk MD    oxyCODONE 5 mg Oral Q4H PRN Darwin Burk MD    pantoprazole 40 mg Intravenous Q12H [address]    promethazine 12 5 mg Intravenous Q6H PRN Lucy Tripp PA-C    saccharomyces boulardii 250 mg Oral BID Lucy Tripp PA-C    simethicone 80 mg Oral Q6H PRN Lucy Tripp PA-C    sucralfate 1,000 mg Oral 4x Daily (AC & HS) Lucy Tripp PA-C    vancomycin 20 mg/kg Intravenous Q8H Lupe Canchola MD Last Rate: 1,500 mg (08/11/19 0539)        Today, Patient Was Seen By: Sarina Palm MD    ** Please Note: Dictation voice to text software may have been used in the creation of this document   **

## 2019-08-11 NOTE — PROGRESS NOTES
Vancomycin Assessment    Lino Sandhoff is a 63 y o  male who is currently receiving vancomycin 1500mg IV every 8 hours for peritonitis   Relevant clinical data and objective history reviewed:  Creatinine   Date Value Ref Range Status   08/11/2019 0 61 0 60 - 1 30 mg/dL Final     Comment:     Standardized to IDMS reference method   08/10/2019 0 49 (L) 0 60 - 1 30 mg/dL Final     Comment:     Standardized to IDMS reference method   08/09/2019 0 50 (L) 0 60 - 1 30 mg/dL Final     Comment:     Standardized to IDMS reference method   01/04/2016 1 07 0 60 - 1 30 mg/dL Final     Comment:     Standardized to IDMS reference method   12/04/2015 0 95 0 60 - 1 30 mg/dL Final     Comment:     Standardized to IDMS reference method   06/11/2015 1 00 0 60 - 1 30 mg/dL Final     Comment:     Standardized to IDMS reference method     /66 (BP Location: Left arm)   Pulse 91   Temp 98 2 °F (36 8 °C) (Temporal)   Resp 22   Ht 5' 8" (1 727 m)   Wt 75 3 kg (166 lb 0 1 oz)   SpO2 99%   BMI 25 24 kg/m²   I/O last 3 completed shifts: In: 1137 5 [I V :977 5; NG/GT:60; IV Piggyback:100]  Out: 3415 [Urine:6275; Drains:805]  Lab Results   Component Value Date/Time    BUN 5 08/11/2019 10:03 AM    BUN 18 01/04/2016 08:24 AM    WBC 12 69 (H) 08/11/2019 10:03 AM    WBC 6 48 01/04/2016 08:24 AM    HGB 9 8 (L) 08/11/2019 10:03 AM    HGB 15 7 01/04/2016 08:24 AM    HCT 31 2 (L) 08/11/2019 10:03 AM    HCT 46 0 01/04/2016 08:24 AM    MCV 93 08/11/2019 10:03 AM    MCV 88 01/04/2016 08:24 AM     (H) 08/11/2019 10:03 AM     01/04/2016 08:24 AM     Temp Readings from Last 3 Encounters:   08/11/19 98 2 °F (36 8 °C) (Temporal)   07/26/19 (!) 97 1 °F (36 2 °C) (Temporal)   07/25/19 97 7 °F (36 5 °C)     Vancomycin Days of Therapy: 11    Assessment/Plan  The patient is currently on vancomycin utilizing scheduled dosing  Baseline risks associated with therapy include: concomitant nephrotoxic medications    The patient is receiving 1500mg IV every 8 hours with the most recent vancomycin level being at steady-state and supratherapeutic (24 5) based on a goal of 15-20 (appropriate for most indications) ; therefore, after clinical evaluation will be changed to 1250mg IV every 8 hours   Pharmacy will continue to follow closely for s/sx of nephrotoxicity, infusion reactions and appropriateness of therapy  BMP and CBC will be ordered per protocol  Plan for trough as patient approaches steady state, prior to the 4th  dose at approximately [phone] on 8/12  Pharmacy will continue to follow the patients culture results and clinical progress daily      Federico Ritchie, Pharmacist

## 2019-08-11 NOTE — PROGRESS NOTES
Progress Note - Bariatric Surgery   Carlos Kaiser 63 y o  male MRN: [id number]  Unit/Bed#: E5 -01 Encounter: [id number]      Subjective/Objective     Subjective: This is a 63year old male s/p exploratory laparotomy with repair of perforated marginal ulcer, abdominal washout, G tube placement and GI assisted endoscopic stent placement on 7/28/19 by Dr Monroe Civil also underwent a diagnostic laparoscopy  That was converted to open on 7/26/2019 with internal hernia repair  Patient had UGI on 8/5 that demonstrated a leak from the proximal aspect of the gastrojejunal stent into LUQ collection  Darryl Dessert subsequently underwent EGD with stent adjustment with Dr Cecilia Yao 8/6       PICC, chest tube and right drain readjustment by IR on Friday  Per nurse, chest tube with output of about 150 cc overnight  Patient reports SOB is improving  He complains of abdominal pain and nausea this morning which are controlled with IV medications; is due for his medications now  Tolerating TPN well  Denies fevers or chills  Voiding well  Ambulating without assistance  Objective:    /66 (BP Location: Left arm)   Pulse 91   Temp 98 2 °F (36 8 °C) (Temporal)   Resp 22   Ht 5' 8" (1 727 m)   Wt 75 3 kg (166 lb 0 1 oz)   SpO2 99%   BMI 25 24 kg/m²       Intake/Output Summary (Last 24 hours) at 8/11/2019 0906  Last data filed at 8/11/2019 0853  Gross per 24 hour   Intake 50 ml   Output 4185 ml   Net -4135 ml       Invasive Devices     Peripherally Inserted Central Catheter Line            PICC Line 08/09/19 Right Brachial 1 day          Drain            Gastrostomy/Enterostomy Gastrostomy 22 Fr  LUQ 13 days    Closed/Suction Drain Left LUQ Bulb 8 Fr  9 days    Closed/Suction Drain Right;Lateral Abdomen Bulb 12 Fr  1 day    Closed/Suction Drain Right;Lateral Chest Other (Comment) 10 Fr  1 day                ROS: 10-point system completed  All negative except see HPI        Physical Exam    General Appearance:    Alert, cooperative, no distress, appears stated age   Head:    Normocephalic, without obvious abnormality, atraumatic   Lungs:     Respirations unlabored   Heart:    Regular rate and rhythm   Abdomen:     Soft, appropriate tenderness,  no masses, no organomegaly,   non distended  Right and left IR drains in place with seropurulent output   Extremities:   1+ edema b/l LE   Neurologic:  Incision:      Psych:   Normal strength and sensation    Midline incision with open wound at distal end that is packed with adaptic and 4x4 gauze with overlying abdominal pad  Wound looks clean on exam  Continue BID dressing changes     Normal mood and affect       Lab, Imaging and other studies:I have personally reviewed pertinent lab results  VTE Mechanical Prophylaxis: sequential compression device    Assessment/Plan  This is a 63year old male s/p exploratory laparotomy with repair of perforated marginal ulcer, abdominal washout, G tube placement and GI assisted endoscopic stent placement on 7/28/19 by Dr Everardo Silva also underwent diagnostic laparoscopy that was converted to open on 7/26/2019 with internal hernia repair     - Chest tube with output of about 150 cc overnight  IR drains with appropriate seropurulent output  - continue TPN, patient tolerating well  Nutrition recommendations appreciated  - Patient may have slow sips of bariatric liquids today   - pending am labs, will continue to replete electrolytes if low   - s,/p emergent EGD on 8/6 with stent adjustment by Dr Terrance Mendez for leak seen on UGI- per OP note, successful placement of fully covered esophageal stent   - Continue antibiotics, antifungal as per ID, f/u cultures   Per ID, Treat x 14 days total  - Pharmacy consult in place, appreciate continued input   - Continue wound care and BID dressing changes : apply adaptic and wet to dry gauze dressing to inferior aspect of midline incision twice daily   - IVF discontinued due to TPN initiation; continue to monitor renal function   - Continue to trend electrolytes and WBC count  - Continue to monitor drain output and quality  -drain flushing protocol  - Encourage IS, ambulation  - PPI and Carafate for GI prophylaxis  - SCDs and Heparin for DVT prophylaxis  - Case management on board for home tube feedings        Plan of care was discussed with patient and patient's nurse  Care plan discussed with Dr Rosangela French: unable to discharge to home today

## 2019-08-11 NOTE — ASSESSMENT & PLAN NOTE
Potassium and phosphorus as well as other electrolytes within normal limits  Recheck labs in a m      Results from last 7 days   Lab Units 08/11/19  1003 08/10/19  0524 08/09/19  0546 08/08/19  0646 08/07/19  0529 08/06/19  0546 08/05/19  0720   MAGNESIUM mg/dL 1 8 1 6  --  1 9  --  1 7 2 0   PHOSPHORUS mg/dL 3 0 2 5*  --  2 8  --  2 9 2 9   POTASSIUM mmol/L 3 7 3 2* 3 6 3 8 4 1 3 3* 3 8

## 2019-08-11 NOTE — ASSESSMENT & PLAN NOTE
Marginal ulcer with perforation  Patient initially admitted for open hernia repair but subsequently developed perforated marginal ulcer requiring laparotomy  Subsequently developed intra-abdominal abscesses requiring placement of drains  Continue patient on IV vancomycin, metronidazole, fluconazole  Patient had abnormal upper GI series 8/5 which showed a leak in the proximal aspect of the gastrojejunal stent into the left upper quadrant collection with drainage  Patient had EGD performed on 08/06 with adjustment of stent  Patient had CT of abdomen and pelvis with IV and PO contrast on 08/08 which was unrevealing  Due to worsening abdominal pain, patient had PICC line placed and initiation of TPN  Tube feeds were discontinued  Patient also had right chest tube inserted for enlarging right pleural effusion  Patient had right COURTNEY drain readjusted by IR  Patient started on full liquid bariatric diet in addition to TPN  Continue SCDs and heparin for DVT prophylaxis  Continue Carafate and PPI for GI prophylaxis  Further management and plan as per bariatric surgery

## 2019-08-11 NOTE — PROGRESS NOTES
Progress Note - Infectious Disease   Rashid Johnson 63 y o  male MRN: [id number]  Unit/Bed#: E5 -01 Encounter: [id number]      Impression/Plan:  1  Sepsis-leukocytosis and tachycardia   Etiology most likely intra-abdominal abscess, although lung consolidations may play a role   Patient is improving slowly   He remains systemically well   Temperature is down   WBC continues to trend down, near normal   Procalcitonin continues decrease  Blood cultures remain negative  Atibiotic plan as in below  Monitor temperature/WBC  Monitor procalcitonin      2  Intra-abdominal abscesses-status post IR drainage with COURTNEY drains remaining in place   Abscess culture is growing strep mitis and Candida  Limited antibiotic choices based upon the patient's allergies  Continue vancomycin, Flagyl, fluconazole for now  Continue drainage per IR  Close surgical follow-up  Treat x 14 days total, another 3 days      3  Peritonitis-in the setting of a perforated marginal ulceration   The patient is now status post exploratory laparotomy with repair and stent placement, and now COURTNEY drain placement   Patient is status post placement of esophageal stent   Stent was readjusted  Antibiotic plan as in above  Continue drain as in above  Serial abdominal exams  Surgery follow-up      4  Enlarging right pleural effusion   Patient is status post thoracentesis with cloudy pleural fluid   However, pleural fluid parameters not consistent with empyema  Pleural fluid culture negative so far  Antibiotic plan as in above  Monitor respiratory status  Follow-up pleural fluid culture      5   COPD-no current clinical evidence of an acute exacerbation at this time       6  Multiple antibiotic allergies, including PCN anaphylaxis   This limits antibiotic choice, as in above      Discussed with patient in detail regarding the above plan      Antibiotics:  Vancomycin # 11  Flagyl # 17  High-dose fluconazole # 11     Subjective:  Patient is  comfortable  Dyspnea mild and improving   Stable mild weak cough  Abdominal pain minimal   Drains remain in place  Temperature stays down   No chills  He is tolerating antibiotics well   No nausea, vomiting or diarrhea  Objective:  Vitals:  Temp:  [96 2 °F (35 7 °C)-98 2 °F (36 8 °C)] 98 2 °F (36 8 °C)  HR:  [88-91] 91  Resp:  [18-22] 22  BP: (137-154)/(66-85) 137/66  SpO2:  [92 %-100 %] 99 %  Temp (24hrs), Av 4 °F (36 3 °C), Min:96 2 °F (35 7 °C), Max:98 2 °F (36 8 °C)  Current: Temperature: 98 2 °F (36 8 °C)    Physical Exam:     General: Awake, alert, cooperative, no distress  Neck:  Supple  No mass  No lymphadenopathy  Lungs: Decreased breath sounds on right, sparse basilar rales, no wheezing, respirations unlabored  Chest tube in place with clear pleural fluid  Heart:  Mildly tachycardic with regular rhythm, S1 and S2 normal, no murmur  Abdomen: Soft, mildly distended, nontender  Drains in place, clear with sediment  Extremities: Stable leg edema  No erythema/warmth  No ulcer  Nontender to palpation  Skin:  No rash  Neuro: Moves all extremities  Invasive Devices     Peripherally Inserted Central Catheter Line            PICC Line 19 Right Brachial 2 days          Drain            Gastrostomy/Enterostomy Gastrostomy 22 Fr  LUQ 13 days    Closed/Suction Drain Left LUQ Bulb 8 Fr  9 days    Closed/Suction Drain Right;Lateral Abdomen Bulb 12 Fr  2 days    Closed/Suction Drain Right;Lateral Chest Other (Comment) 10 Fr  2 days                Labs studies:   I have personally reviewed pertinent labs    Results from last 7 days   Lab Units 19  1003 08/10/19  0524 19  0546 19  0646   POTASSIUM mmol/L 3 7 3 2* 3 6 3 8   CHLORIDE mmol/L 103 101 102 104   CO2 mmol/L 36* 34* 28 30   BUN mg/dL 5 4* 4* 4*   CREATININE mg/dL 0 61 0 49* 0 50* 0 56*   EGFR ml/min/1 73sq m 111 121 120 115   CALCIUM mg/dL 7 7* 7 5* 7 9* 7 6*   AST U/L  --  20  --  24   ALT U/L  --  8*  --  10* ALK PHOS U/L  --  82  --  97     Results from last 7 days   Lab Units 08/11/19  1003 08/10/19  0524 08/09/19  0546   WBC Thousand/uL 12 69* 10 22* 11 70*   HEMOGLOBIN g/dL 9 8* 9 5* 10 1*   PLATELETS Thousands/uL 864* 878* 963*     Results from last 7 days   Lab Units 08/09/19  0934 08/05/19  0917   GRAM STAIN RESULT  2+ Polys  Rare Mononuclear Cells  No bacteria seen Rare Disintegrating polys  No bacteria seen   WOUND CULTURE   --  2 colonies Staphylococcus coagulase negative*   BODY FLUID CULTURE, STERILE  No growth  --        Imaging Studies:   I have personally reviewed pertinent imaging study reports and images in PACS  EKG, Pathology, and Other Studies:   I have personally reviewed pertinent reports

## 2019-08-12 ENCOUNTER — APPOINTMENT (INPATIENT)
Dept: RADIOLOGY | Facility: HOSPITAL | Age: 59
DRG: 326 | End: 2019-08-12
Payer: COMMERCIAL

## 2019-08-12 LAB
ANION GAP SERPL CALCULATED.3IONS-SCNC: 0 MMOL/L (ref 4–13)
BACTERIA SPEC BFLD CULT: NO GROWTH
BASOPHILS # BLD AUTO: 0.03 THOUSANDS/ΜL (ref 0–0.1)
BASOPHILS NFR BLD AUTO: 0 % (ref 0–1)
BUN SERPL-MCNC: 8 MG/DL (ref 5–25)
CA-I BLD-SCNC: 1.04 MMOL/L (ref 1.12–1.32)
CALCIUM SERPL-MCNC: 7.6 MG/DL (ref 8.3–10.1)
CHLORIDE SERPL-SCNC: 102 MMOL/L (ref 100–108)
CO2 SERPL-SCNC: 37 MMOL/L (ref 21–32)
CREAT SERPL-MCNC: 0.64 MG/DL (ref 0.6–1.3)
EOSINOPHIL # BLD AUTO: 0.11 THOUSAND/ΜL (ref 0–0.61)
EOSINOPHIL NFR BLD AUTO: 1 % (ref 0–6)
ERYTHROCYTE [DISTWIDTH] IN BLOOD BY AUTOMATED COUNT: 14.8 % (ref 11.6–15.1)
GFR SERPL CREATININE-BSD FRML MDRD: 109 ML/MIN/1.73SQ M
GLUCOSE SERPL-MCNC: 115 MG/DL (ref 65–140)
GRAM STN SPEC: NORMAL
HCT VFR BLD AUTO: 29.5 % (ref 36.5–49.3)
HGB BLD-MCNC: 9.4 G/DL (ref 12–17)
IMM GRANULOCYTES # BLD AUTO: 0.08 THOUSAND/UL (ref 0–0.2)
IMM GRANULOCYTES NFR BLD AUTO: 1 % (ref 0–2)
LYMPHOCYTES # BLD AUTO: 0.99 THOUSANDS/ΜL (ref 0.6–4.47)
LYMPHOCYTES NFR BLD AUTO: 8 % (ref 14–44)
MAGNESIUM SERPL-MCNC: 1.8 MG/DL (ref 1.6–2.6)
MCH RBC QN AUTO: 29.7 PG (ref 26.8–34.3)
MCHC RBC AUTO-ENTMCNC: 31.9 G/DL (ref 31.4–37.4)
MCV RBC AUTO: 93 FL (ref 82–98)
MONOCYTES # BLD AUTO: 1.2 THOUSAND/ΜL (ref 0.17–1.22)
MONOCYTES NFR BLD AUTO: 10 % (ref 4–12)
NEUTROPHILS # BLD AUTO: 9.54 THOUSANDS/ΜL (ref 1.85–7.62)
NEUTS SEG NFR BLD AUTO: 80 % (ref 43–75)
NRBC BLD AUTO-RTO: 0 /100 WBCS
PHOSPHATE SERPL-MCNC: 3.3 MG/DL (ref 2.7–4.5)
PLATELET # BLD AUTO: 797 THOUSANDS/UL (ref 149–390)
PMV BLD AUTO: 8.4 FL (ref 8.9–12.7)
POTASSIUM SERPL-SCNC: 3.6 MMOL/L (ref 3.5–5.3)
PROCALCITONIN SERPL-MCNC: 0.21 NG/ML
RBC # BLD AUTO: 3.16 MILLION/UL (ref 3.88–5.62)
SODIUM SERPL-SCNC: 139 MMOL/L (ref 136–145)
WBC # BLD AUTO: 11.95 THOUSAND/UL (ref 4.31–10.16)

## 2019-08-12 PROCEDURE — C9113 INJ PANTOPRAZOLE SODIUM, VIA: HCPCS | Performed by: PHYSICIAN ASSISTANT

## 2019-08-12 PROCEDURE — 85025 COMPLETE CBC W/AUTO DIFF WBC: CPT | Performed by: SURGERY

## 2019-08-12 PROCEDURE — 80202 ASSAY OF VANCOMYCIN: CPT | Performed by: INTERNAL MEDICINE

## 2019-08-12 PROCEDURE — 83735 ASSAY OF MAGNESIUM: CPT | Performed by: SURGERY

## 2019-08-12 PROCEDURE — 99232 SBSQ HOSP IP/OBS MODERATE 35: CPT | Performed by: INTERNAL MEDICINE

## 2019-08-12 PROCEDURE — 84145 PROCALCITONIN (PCT): CPT | Performed by: SURGERY

## 2019-08-12 PROCEDURE — 84100 ASSAY OF PHOSPHORUS: CPT | Performed by: SURGERY

## 2019-08-12 PROCEDURE — 82330 ASSAY OF CALCIUM: CPT | Performed by: SURGERY

## 2019-08-12 PROCEDURE — 74022 RADEX COMPL AQT ABD SERIES: CPT

## 2019-08-12 PROCEDURE — 80048 BASIC METABOLIC PNL TOTAL CA: CPT | Performed by: SURGERY

## 2019-08-12 RX ORDER — HYDROMORPHONE HCL/PF 1 MG/ML
0.5 SYRINGE (ML) INJECTION EVERY 2 HOUR PRN
Status: DISCONTINUED | OUTPATIENT
Start: 2019-08-12 | End: 2019-08-13

## 2019-08-12 RX ORDER — FUROSEMIDE 10 MG/ML
20 INJECTION INTRAMUSCULAR; INTRAVENOUS ONCE
Status: COMPLETED | OUTPATIENT
Start: 2019-08-12 | End: 2019-08-12

## 2019-08-12 RX ORDER — MINERAL OIL 100 G/100G
1 OIL RECTAL ONCE
Status: COMPLETED | OUTPATIENT
Start: 2019-08-12 | End: 2019-08-12

## 2019-08-12 RX ADMIN — HYDROMORPHONE HYDROCHLORIDE 1 MG: 1 INJECTION, SOLUTION INTRAMUSCULAR; INTRAVENOUS; SUBCUTANEOUS at 14:02

## 2019-08-12 RX ADMIN — HEPARIN SODIUM 5000 UNITS: 5000 INJECTION INTRAVENOUS; SUBCUTANEOUS at 14:02

## 2019-08-12 RX ADMIN — PANTOPRAZOLE SODIUM 40 MG: 40 INJECTION, POWDER, FOR SOLUTION INTRAVENOUS at 09:00

## 2019-08-12 RX ADMIN — PANTOPRAZOLE SODIUM 40 MG: 40 INJECTION, POWDER, FOR SOLUTION INTRAVENOUS at 21:16

## 2019-08-12 RX ADMIN — METRONIDAZOLE 500 MG: 500 INJECTION, SOLUTION INTRAVENOUS at 09:00

## 2019-08-12 RX ADMIN — Medication 250 MG: at 09:00

## 2019-08-12 RX ADMIN — ALBUTEROL SULFATE 2 PUFF: 90 AEROSOL, METERED RESPIRATORY (INHALATION) at 21:18

## 2019-08-12 RX ADMIN — ONDANSETRON 4 MG: 2 INJECTION INTRAMUSCULAR; INTRAVENOUS at 01:06

## 2019-08-12 RX ADMIN — CALCIUM GLUCONATE: 94 INJECTION, SOLUTION INTRAVENOUS at 21:16

## 2019-08-12 RX ADMIN — METRONIDAZOLE 500 MG: 500 INJECTION, SOLUTION INTRAVENOUS at 00:17

## 2019-08-12 RX ADMIN — HYDROMORPHONE HYDROCHLORIDE 0.5 MG: 1 INJECTION, SOLUTION INTRAMUSCULAR; INTRAVENOUS; SUBCUTANEOUS at 18:31

## 2019-08-12 RX ADMIN — HYDROMORPHONE HYDROCHLORIDE 1 MG: 1 INJECTION, SOLUTION INTRAMUSCULAR; INTRAVENOUS; SUBCUTANEOUS at 04:47

## 2019-08-12 RX ADMIN — FLUCONAZOLE, SODIUM CHLORIDE 400 MG: 2 INJECTION INTRAVENOUS at 12:12

## 2019-08-12 RX ADMIN — PROMETHAZINE HYDROCHLORIDE 12.5 MG: 25 INJECTION INTRAMUSCULAR; INTRAVENOUS at 20:02

## 2019-08-12 RX ADMIN — Medication 250 MG: at 17:28

## 2019-08-12 RX ADMIN — VANCOMYCIN HYDROCHLORIDE 1250 MG: 5 INJECTION, POWDER, LYOPHILIZED, FOR SOLUTION INTRAVENOUS at 16:40

## 2019-08-12 RX ADMIN — ALBUTEROL SULFATE 2 PUFF: 90 AEROSOL, METERED RESPIRATORY (INHALATION) at 17:28

## 2019-08-12 RX ADMIN — HYDROMORPHONE HYDROCHLORIDE 0.5 MG: 1 INJECTION, SOLUTION INTRAMUSCULAR; INTRAVENOUS; SUBCUTANEOUS at 21:38

## 2019-08-12 RX ADMIN — Medication 1 SMALL APPLICATION: at 17:28

## 2019-08-12 RX ADMIN — HEPARIN SODIUM 5000 UNITS: 5000 INJECTION INTRAVENOUS; SUBCUTANEOUS at 21:16

## 2019-08-12 RX ADMIN — ONDANSETRON 4 MG: 2 INJECTION INTRAMUSCULAR; INTRAVENOUS at 10:12

## 2019-08-12 RX ADMIN — VANCOMYCIN HYDROCHLORIDE 1250 MG: 5 INJECTION, POWDER, LYOPHILIZED, FOR SOLUTION INTRAVENOUS at 01:09

## 2019-08-12 RX ADMIN — MINERAL OIL 1 ENEMA: 100 ENEMA RECTAL at 17:28

## 2019-08-12 RX ADMIN — ALBUTEROL SULFATE 2 PUFF: 90 AEROSOL, METERED RESPIRATORY (INHALATION) at 12:12

## 2019-08-12 RX ADMIN — OXYCODONE HYDROCHLORIDE 10 MG: 5 SOLUTION ORAL at 09:17

## 2019-08-12 RX ADMIN — ALBUTEROL SULFATE 2 PUFF: 90 AEROSOL, METERED RESPIRATORY (INHALATION) at 09:00

## 2019-08-12 RX ADMIN — METRONIDAZOLE 500 MG: 500 INJECTION, SOLUTION INTRAVENOUS at 15:35

## 2019-08-12 RX ADMIN — FUROSEMIDE 20 MG: 10 INJECTION, SOLUTION INTRAMUSCULAR; INTRAVENOUS at 12:12

## 2019-08-12 RX ADMIN — Medication 1 SMALL APPLICATION: at 09:00

## 2019-08-12 RX ADMIN — METRONIDAZOLE 500 MG: 500 INJECTION, SOLUTION INTRAVENOUS at 23:40

## 2019-08-12 RX ADMIN — HEPARIN SODIUM 5000 UNITS: 5000 INJECTION INTRAVENOUS; SUBCUTANEOUS at 05:01

## 2019-08-12 RX ADMIN — VANCOMYCIN HYDROCHLORIDE 1250 MG: 5 INJECTION, POWDER, LYOPHILIZED, FOR SOLUTION INTRAVENOUS at 10:08

## 2019-08-12 RX ADMIN — FUROSEMIDE 20 MG: 10 INJECTION, SOLUTION INTRAMUSCULAR; INTRAVENOUS at 17:37

## 2019-08-12 RX ADMIN — SUCRALFATE 1000 MG: 1 SUSPENSION ORAL at 21:18

## 2019-08-12 NOTE — PROGRESS NOTES
Progress Note - Infectious Disease   Dianelys Byers 63 y o  male MRN: [id number]  Unit/Bed#: E5 -01 Encounter: [id number]      Impression/Plan:  1  Sepsis-leukocytosis and tachycardia   Etiology most likely intra-abdominal abscess, although lung consolidations may play a role   Patient is improving slowly   He remains systemically well   Temperature is down   WBC decreased   Procalcitonin decreased  Blood cultures remain negative  Atibiotic plan as in below  Monitor temperature/WBC  Monitor procalcitonin      2  Intra-abdominal abscesses-status post IR drainage with COURTNEY drains remaining in place   Abscess culture is growing strep mitis and Candida  Limited antibiotic choices based upon the patient's allergies  Continue vancomycin, Flagyl, fluconazole for now  Continue drainage per IR  Close surgical follow-up  Treat x 14 days total, another 2 days      3  Peritonitis-in the setting of a perforated marginal ulceration   The patient is now status post exploratory laparotomy with repair and stent placement, and now COURTNEY drain placement   Patient is status post placement of esophageal stent   Stent was readjusted  Antibiotic plan as in above  Continue drain as in above  Serial abdominal exams  Surgery follow-up      4  Enlarging right pleural effusion   Patient is status post thoracentesis with cloudy pleural fluid   However, pleural fluid parameters not consistent with empyema   Pleural fluid culture with no growth  Antibiotic plan as in above  Monitor respiratory status      5  COPD-no current clinical evidence of an acute exacerbation at this time       6  Multiple antibiotic allergies, including PCN anaphylaxis   This limits antibiotic choice, as in above      Discussed with patient and family in detail regarding the above plan      Antibiotics:  Vancomycin # 12  Flagyl # 18  High-dose fluconazole # 12     Subjective:  Patient with stable mild abdominal pain    Dyspnea mild and improving   Stable mild weak cough  Temperature stays down   No chills  He is tolerating antibiotics well   No nausea, vomiting or diarrhea  Objective:  Vitals:  Temp:  [98 °F (36 7 °C)-98 2 °F (36 8 °C)] 98 2 °F (36 8 °C)  HR:  [80-94] 80  Resp:  [18-20] 18  BP: (137-147)/(71-82) 140/71  SpO2:  [98 %-100 %] 100 %  Temp (24hrs), Av 1 °F (36 7 °C), Min:98 °F (36 7 °C), Max:98 2 °F (36 8 °C)  Current: Temperature: 98 2 °F (36 8 °C)    Physical Exam:     General: Awake, alert, cooperative, no distress  Neck:  Supple  No mass  No lymphadenopathy  Lungs: Expansion symmetric, no rales, no wheezing, respirations unlabored  Heart:  Regular rate and rhythm, S1 and S2 normal, no murmur  Abdomen: Soft, mildly distended  Drains mostly clear  Mild and stable left-sided tenderness  Extremities: Stable leg edema  No erythema/warmth  No ulcer  Nontender to palpation  Skin:  No rash  Neuro: Moves all extremities  Invasive Devices     Peripherally Inserted Central Catheter Line            PICC Line 19 Right Brachial 2 days          Drain            Gastrostomy/Enterostomy Gastrostomy 22 Fr  LUQ 14 days    Closed/Suction Drain Left LUQ Bulb 8 Fr  10 days    Closed/Suction Drain Right;Lateral Abdomen Bulb 12 Fr  3 days    Closed/Suction Drain Right;Lateral Chest Other (Comment) 10 Fr  3 days                Labs studies:   I have personally reviewed pertinent labs  Results from last 7 days   Lab Units 19  0503 19  1003 08/10/19  0524  19  0646   POTASSIUM mmol/L 3 6 3 7 3 2*   < > 3 8   CHLORIDE mmol/L 102 103 101   < > 104   CO2 mmol/L 37* 36* 34*   < > 30   BUN mg/dL 8 5 4*   < > 4*   CREATININE mg/dL 0 64 0 61 0 49*   < > 0 56*   EGFR ml/min/1 73sq m 109 111 121   < > 115   CALCIUM mg/dL 7 6* 7 7* 7 5*   < > 7 6*   AST U/L  --   --  20  --  24   ALT U/L  --   --  8*  --  10*   ALK PHOS U/L  --   --  82  --  97    < > = values in this interval not displayed       Results from last 7 days   Lab Units 08/12/19  0503 08/11/19  1003 08/10/19  0524   WBC Thousand/uL 11 95* 12 69* 10 22*   HEMOGLOBIN g/dL 9 4* 9 8* 9 5*   PLATELETS Thousands/uL 797* 864* 878*     Results from last 7 days   Lab Units 08/09/19  0934   GRAM STAIN RESULT  2+ Polys  Rare Mononuclear Cells  No bacteria seen   BODY FLUID CULTURE, STERILE  No growth       Imaging Studies:   I have personally reviewed pertinent imaging study reports and images in PACS  EKG, Pathology, and Other Studies:   I have personally reviewed pertinent reports

## 2019-08-12 NOTE — PROGRESS NOTES
Vancomycin IV Pharmacy-to-Dose Consultation    Edward Hussein is a 63 y o  male who is currently receiving Vancomycin IV with management by the Pharmacy Consult service  Assessment/Plan:  The patient was reviewed  Renal function is stable and no signs or symptoms of nephrotoxicity and/or infusion reactions were documented in the chart  Based on todays assessment, continue current vancomycin (day # 12) dosing of 1250mg IV every 8 hours, with a plan for trough to be drawn at 2345 on 8/12  We will continue to follow the patients culture results and clinical progress daily      Jean Marie Cassidy, Pharmacist

## 2019-08-12 NOTE — SOCIAL WORK
LTAC:     Dr Leanne Pizarro and Dr Allyn Garcia met with CM and asked for LTAC referral d/t pt's needs  Pt has the following:   IV Vanco and Flagyl  TPN via PICC  Capped J tube  2 JPs  CT    CM met with the patient and his wife Flora Quiroz at bedside to discuss LTAC rehab and offer to make a referral  Pt and wife agreeable and asked for list d/t residence in [place]  CM provided pt's wife a list for  LTAC and a [employer]  CM will follow

## 2019-08-12 NOTE — PROGRESS NOTES
Pt states he has been having nausea since he started eating this weekend  Wife is concerned that the dairy products may be "too much" for him

## 2019-08-12 NOTE — PROGRESS NOTES
Progress Note - Bariatric Surgery   [facility] 63 y o  male MRN: [id number]  Unit/Bed#: E5 -01 Encounter: [id number]      Subjective/Objective     Subjective: This is a 63year old male s/p exploratory laparotomy with repair of perforated marginal ulcer, abdominal washout, G tube placement and GI assisted endoscopic stent placement on 7/28/19 by Dr Latif Carls also underwent a diagnostic laparoscopy  That was converted to open on 7/26/2019 with internal hernia repair  Patient had UGI on 8/5 that demonstrated a leak from the proximal aspect of the gastrojejunal stent into LUQ collection  [facility] subsequently underwent EGD with stent adjustment with Dr Joel Bolaños 8/6  Repeat CT scan demonstrated increased volume of loculated right pleural effusion, proximal migration of esophageal stent, persistent loculated right upper quadrant ascitic fluid with a percutaneous drain in place, and diminished volume of left upper quadrant loculated perisplenic fluid with drain in place            PICC, Right chest tube and right drain readjustment by IR on 8/9  Chest tube with output of about 250 cc over last 24 hours  Patient reports SOB is improving  He complains of abdominal pain and nausea this morning which are controlled with IV medications  Tolerating TPN well  Denies fevers or chills  Voiding well  Reports some difficulty ambulating due to anasarca      Objective:    /81 (BP Location: Left arm)   Pulse 93   Temp 98 °F (36 7 °C) (Temporal)   Resp 18   Ht 5' 8" (1 727 m)   Wt 75 3 kg (166 lb 0 1 oz)   SpO2 98%   BMI 25 24 kg/m²       Intake/Output Summary (Last 24 hours) at 8/12/2019 0650  Last data filed at 8/12/2019 0553  Gross per 24 hour   Intake 310 ml   Output 2365 ml   Net -2055 ml       Invasive Devices     Peripherally Inserted Central Catheter Line            PICC Line 08/09/19 Right Brachial 2 days          Drain            Gastrostomy/Enterostomy Gastrostomy 22 Fr  LUQ 14 days    Closed/Suction Drain Left LUQ Bulb 8 Fr  10 days    Closed/Suction Drain Right;Lateral Abdomen Bulb 12 Fr  2 days    Closed/Suction Drain Right;Lateral Chest Other (Comment) 10 Fr  2 days                ROS: 10-point system completed  All negative except see HPI  Physical Exam    General Appearance:    Alert, cooperative, no distress, appears stated age   Head:    Normocephalic, without obvious abnormality, atraumatic   Lungs:     Decreased breath sounds on R  R chest tube in place with serous output   Heart:    Regular rate and rhythm   Abdomen:     Soft, nondistended, appropriately tender  Right and left IR drains in place with seropurulent output   Extremities:   2+ edema b/l LE, generalized anasarca    Neurologic:  Incision:        Psych:   Normal strength and sensation    Midline incision with open wound at inferior aspect that is packed with adaptic and 4x4 gauze with overlying abdominal pad  Wound with fibrounous exudate and some serupurulent drainage    Normal mood and affect                                       Lab, Imaging and other studies:  I have personally reviewed pertinent lab results    , CBC:   Lab Results   Component Value Date    WBC 11 95 (H) 08/12/2019    HGB 9 4 (L) 08/12/2019    HCT 29 5 (L) 08/12/2019    MCV 93 08/12/2019     (H) 08/12/2019    MCH 29 7 08/12/2019    MCHC 31 9 08/12/2019    RDW 14 8 08/12/2019    MPV 8 4 (L) 08/12/2019    NRBC 0 08/12/2019   , CMP:   Lab Results   Component Value Date    SODIUM 139 08/12/2019    K 3 6 08/12/2019     08/12/2019    CO2 37 (H) 08/12/2019    BUN 8 08/12/2019    CREATININE 0 64 08/12/2019    CALCIUM 7 6 (L) 08/12/2019    EGFR 109 08/12/2019        VTE Mechanical Prophylaxis: sequential compression device    Assessment/Plan  This is a 63year old male s/p exploratory laparotomy with repair of perforated marginal ulcer, abdominal washout, G tube placement and GI assisted endoscopic stent placement on 7/28/19 by Dr Tino Fregoso also underwent diagnostic laparoscopy that was converted to open on 7/26/2019 with internal hernia repair       - Chest tube with output of about 250 cc over last 24 hours  IR drains with appropriate seropurulent output  - continue TPN, patient tolerating well  Nutrition recommendations appreciated  - Patient may have slow sips of bariatric liquids today   - pending am labs, will continue to replete electrolytes if low   - s,/p emergent EGD on 8/6 with stent adjustment by Dr Cyrilla Schilder for leak seen on UGI- per OP note, successful placement of fully covered esophageal stent   - Continue antibiotics, antifungal as per ID, f/u cultures   Per ID, Treat x 14 days total  - Pharmacy consult in place, appreciate continued input   - Continue wound care and BID dressing changes : apply adaptic and wet to dry gauze dressing to inferior aspect of midline incision twice daily    - Continue to trend electrolytes and WBC count  - consider IV lasix for anasarcca  - Continue to monitor drain output and quality  -drain flushing protocol  - Encourage IS, ambulation  - PPI and Carafate for GI prophylaxis  - SCDs and Heparin for DVT prophylaxis  - Case management on board for home tube feedings         Plan of care was discussed with patient and patient's nurse  Care plan discussed with Dr Jacquelyn Lance: unable to discharge to home today

## 2019-08-13 ENCOUNTER — APPOINTMENT (INPATIENT)
Dept: RADIOLOGY | Facility: HOSPITAL | Age: 59
DRG: 326 | End: 2019-08-13
Payer: COMMERCIAL

## 2019-08-13 LAB
ALBUMIN SERPL BCP-MCNC: 1.4 G/DL (ref 3.5–5)
ALP SERPL-CCNC: 81 U/L (ref 46–116)
ALT SERPL W P-5'-P-CCNC: 12 U/L (ref 12–78)
ANION GAP SERPL CALCULATED.3IONS-SCNC: 4 MMOL/L (ref 4–13)
AST SERPL W P-5'-P-CCNC: 19 U/L (ref 5–45)
BASOPHILS # BLD AUTO: 0.05 THOUSANDS/ΜL (ref 0–0.1)
BASOPHILS NFR BLD AUTO: 0 % (ref 0–1)
BILIRUB SERPL-MCNC: 0.3 MG/DL (ref 0.2–1)
BUN SERPL-MCNC: 10 MG/DL (ref 5–25)
CALCIUM SERPL-MCNC: 7.8 MG/DL (ref 8.3–10.1)
CHLORIDE SERPL-SCNC: 100 MMOL/L (ref 100–108)
CO2 SERPL-SCNC: 35 MMOL/L (ref 21–32)
CREAT SERPL-MCNC: 0.68 MG/DL (ref 0.6–1.3)
EOSINOPHIL # BLD AUTO: 0.12 THOUSAND/ΜL (ref 0–0.61)
EOSINOPHIL NFR BLD AUTO: 1 % (ref 0–6)
ERYTHROCYTE [DISTWIDTH] IN BLOOD BY AUTOMATED COUNT: 15 % (ref 11.6–15.1)
GFR SERPL CREATININE-BSD FRML MDRD: 106 ML/MIN/1.73SQ M
GLUCOSE SERPL-MCNC: 111 MG/DL (ref 65–140)
HCT VFR BLD AUTO: 29.3 % (ref 36.5–49.3)
HGB BLD-MCNC: 9.3 G/DL (ref 12–17)
IMM GRANULOCYTES # BLD AUTO: 0.07 THOUSAND/UL (ref 0–0.2)
IMM GRANULOCYTES NFR BLD AUTO: 1 % (ref 0–2)
LYMPHOCYTES # BLD AUTO: 1.22 THOUSANDS/ΜL (ref 0.6–4.47)
LYMPHOCYTES NFR BLD AUTO: 11 % (ref 14–44)
MAGNESIUM SERPL-MCNC: 1.8 MG/DL (ref 1.6–2.6)
MCH RBC QN AUTO: 29.5 PG (ref 26.8–34.3)
MCHC RBC AUTO-ENTMCNC: 31.7 G/DL (ref 31.4–37.4)
MCV RBC AUTO: 93 FL (ref 82–98)
MONOCYTES # BLD AUTO: 1.25 THOUSAND/ΜL (ref 0.17–1.22)
MONOCYTES NFR BLD AUTO: 11 % (ref 4–12)
NEUTROPHILS # BLD AUTO: 8.85 THOUSANDS/ΜL (ref 1.85–7.62)
NEUTS SEG NFR BLD AUTO: 76 % (ref 43–75)
NRBC BLD AUTO-RTO: 0 /100 WBCS
PHOSPHATE SERPL-MCNC: 3.4 MG/DL (ref 2.7–4.5)
PLATELET # BLD AUTO: 753 THOUSANDS/UL (ref 149–390)
PMV BLD AUTO: 8.5 FL (ref 8.9–12.7)
POTASSIUM SERPL-SCNC: 3.6 MMOL/L (ref 3.5–5.3)
PROT SERPL-MCNC: 5.8 G/DL (ref 6.4–8.2)
RBC # BLD AUTO: 3.15 MILLION/UL (ref 3.88–5.62)
SODIUM SERPL-SCNC: 139 MMOL/L (ref 136–145)
VANCOMYCIN TROUGH SERPL-MCNC: 24.7 UG/ML (ref 10–20)
WBC # BLD AUTO: 11.56 THOUSAND/UL (ref 4.31–10.16)

## 2019-08-13 PROCEDURE — 85025 COMPLETE CBC W/AUTO DIFF WBC: CPT | Performed by: SURGERY

## 2019-08-13 PROCEDURE — 99232 SBSQ HOSP IP/OBS MODERATE 35: CPT | Performed by: INTERNAL MEDICINE

## 2019-08-13 PROCEDURE — 80053 COMPREHEN METABOLIC PANEL: CPT | Performed by: SURGERY

## 2019-08-13 PROCEDURE — 99024 POSTOP FOLLOW-UP VISIT: CPT | Performed by: SURGERY

## 2019-08-13 PROCEDURE — C9113 INJ PANTOPRAZOLE SODIUM, VIA: HCPCS | Performed by: PHYSICIAN ASSISTANT

## 2019-08-13 PROCEDURE — 71045 X-RAY EXAM CHEST 1 VIEW: CPT

## 2019-08-13 PROCEDURE — 84100 ASSAY OF PHOSPHORUS: CPT | Performed by: SURGERY

## 2019-08-13 PROCEDURE — 83735 ASSAY OF MAGNESIUM: CPT | Performed by: SURGERY

## 2019-08-13 RX ORDER — VANCOMYCIN HYDROCHLORIDE 1 G/200ML
1000 INJECTION, SOLUTION INTRAVENOUS EVERY 8 HOURS
Status: COMPLETED | OUTPATIENT
Start: 2019-08-13 | End: 2019-08-15

## 2019-08-13 RX ORDER — FUROSEMIDE 10 MG/ML
20 INJECTION INTRAMUSCULAR; INTRAVENOUS ONCE
Status: COMPLETED | OUTPATIENT
Start: 2019-08-13 | End: 2019-08-13

## 2019-08-13 RX ORDER — HYDROMORPHONE HCL/PF 1 MG/ML
0.2 SYRINGE (ML) INJECTION EVERY 2 HOUR PRN
Status: DISCONTINUED | OUTPATIENT
Start: 2019-08-13 | End: 2019-08-16 | Stop reason: HOSPADM

## 2019-08-13 RX ADMIN — PROMETHAZINE HYDROCHLORIDE 12.5 MG: 25 INJECTION INTRAMUSCULAR; INTRAVENOUS at 17:30

## 2019-08-13 RX ADMIN — HYDROMORPHONE HYDROCHLORIDE 0.5 MG: 1 INJECTION, SOLUTION INTRAMUSCULAR; INTRAVENOUS; SUBCUTANEOUS at 17:20

## 2019-08-13 RX ADMIN — HEPARIN SODIUM 5000 UNITS: 5000 INJECTION INTRAVENOUS; SUBCUTANEOUS at 05:23

## 2019-08-13 RX ADMIN — FLUCONAZOLE, SODIUM CHLORIDE 400 MG: 2 INJECTION INTRAVENOUS at 11:02

## 2019-08-13 RX ADMIN — SUCRALFATE 1000 MG: 1 SUSPENSION ORAL at 06:02

## 2019-08-13 RX ADMIN — ONDANSETRON 4 MG: 2 INJECTION INTRAMUSCULAR; INTRAVENOUS at 10:02

## 2019-08-13 RX ADMIN — SUCRALFATE 1000 MG: 1 SUSPENSION ORAL at 21:33

## 2019-08-13 RX ADMIN — PANTOPRAZOLE SODIUM 40 MG: 40 INJECTION, POWDER, FOR SOLUTION INTRAVENOUS at 21:33

## 2019-08-13 RX ADMIN — ONDANSETRON 4 MG: 2 INJECTION INTRAMUSCULAR; INTRAVENOUS at 02:28

## 2019-08-13 RX ADMIN — ALBUTEROL SULFATE 2 PUFF: 90 AEROSOL, METERED RESPIRATORY (INHALATION) at 08:45

## 2019-08-13 RX ADMIN — ALBUTEROL SULFATE 2 PUFF: 90 AEROSOL, METERED RESPIRATORY (INHALATION) at 17:20

## 2019-08-13 RX ADMIN — HYDROMORPHONE HYDROCHLORIDE 0.5 MG: 1 INJECTION, SOLUTION INTRAMUSCULAR; INTRAVENOUS; SUBCUTANEOUS at 12:23

## 2019-08-13 RX ADMIN — Medication 1 SMALL APPLICATION: at 10:01

## 2019-08-13 RX ADMIN — PROMETHAZINE HYDROCHLORIDE 12.5 MG: 25 INJECTION INTRAMUSCULAR; INTRAVENOUS at 05:42

## 2019-08-13 RX ADMIN — FUROSEMIDE 20 MG: 10 INJECTION, SOLUTION INTRAMUSCULAR; INTRAVENOUS at 10:02

## 2019-08-13 RX ADMIN — VANCOMYCIN HYDROCHLORIDE 1250 MG: 5 INJECTION, POWDER, LYOPHILIZED, FOR SOLUTION INTRAVENOUS at 00:35

## 2019-08-13 RX ADMIN — Medication 250 MG: at 17:20

## 2019-08-13 RX ADMIN — VANCOMYCIN HYDROCHLORIDE 1000 MG: 1 INJECTION, SOLUTION INTRAVENOUS at 09:37

## 2019-08-13 RX ADMIN — SUCRALFATE 1000 MG: 1 SUSPENSION ORAL at 15:59

## 2019-08-13 RX ADMIN — HEPARIN SODIUM 5000 UNITS: 5000 INJECTION INTRAVENOUS; SUBCUTANEOUS at 21:33

## 2019-08-13 RX ADMIN — CALCIUM GLUCONATE: 94 INJECTION, SOLUTION INTRAVENOUS at 21:33

## 2019-08-13 RX ADMIN — METRONIDAZOLE 500 MG: 500 INJECTION, SOLUTION INTRAVENOUS at 15:59

## 2019-08-13 RX ADMIN — METRONIDAZOLE 500 MG: 500 INJECTION, SOLUTION INTRAVENOUS at 08:45

## 2019-08-13 RX ADMIN — HYDROMORPHONE HYDROCHLORIDE 0.2 MG: 1 INJECTION, SOLUTION INTRAMUSCULAR; INTRAVENOUS; SUBCUTANEOUS at 21:56

## 2019-08-13 RX ADMIN — VANCOMYCIN HYDROCHLORIDE 1000 MG: 1 INJECTION, SOLUTION INTRAVENOUS at 17:20

## 2019-08-13 RX ADMIN — HYDROMORPHONE HYDROCHLORIDE 0.5 MG: 1 INJECTION, SOLUTION INTRAMUSCULAR; INTRAVENOUS; SUBCUTANEOUS at 02:28

## 2019-08-13 RX ADMIN — HEPARIN SODIUM 5000 UNITS: 5000 INJECTION INTRAVENOUS; SUBCUTANEOUS at 15:00

## 2019-08-13 RX ADMIN — Medication 1 SMALL APPLICATION: at 17:20

## 2019-08-13 RX ADMIN — Medication 250 MG: at 08:45

## 2019-08-13 RX ADMIN — SUCRALFATE 1000 MG: 1 SUSPENSION ORAL at 12:16

## 2019-08-13 RX ADMIN — ALBUTEROL SULFATE 2 PUFF: 90 AEROSOL, METERED RESPIRATORY (INHALATION) at 21:35

## 2019-08-13 RX ADMIN — HYDROMORPHONE HYDROCHLORIDE 0.5 MG: 1 INJECTION, SOLUTION INTRAMUSCULAR; INTRAVENOUS; SUBCUTANEOUS at 05:42

## 2019-08-13 RX ADMIN — PANTOPRAZOLE SODIUM 40 MG: 40 INJECTION, POWDER, FOR SOLUTION INTRAVENOUS at 08:45

## 2019-08-13 RX ADMIN — ALBUTEROL SULFATE 2 PUFF: 90 AEROSOL, METERED RESPIRATORY (INHALATION) at 12:16

## 2019-08-13 RX ADMIN — ONDANSETRON 4 MG: 2 INJECTION INTRAMUSCULAR; INTRAVENOUS at 21:56

## 2019-08-13 RX ADMIN — HYDROMORPHONE HYDROCHLORIDE 0.5 MG: 1 INJECTION, SOLUTION INTRAMUSCULAR; INTRAVENOUS; SUBCUTANEOUS at 10:02

## 2019-08-13 NOTE — PROGRESS NOTES
Pt stated he did not want liquid oxycodone because he feels that is what is making him nauseous  He only wanted IV dilaudid today

## 2019-08-13 NOTE — SOCIAL WORK
JV m/w the pt and his wife to discuss LTAC choice  Pt's prefers  LTAC  Pt may be cleared for discharge tomorrow  JV notified  LT[facility] via [facility]  Authorization was started, await determination

## 2019-08-13 NOTE — PROGRESS NOTES
PT reported taking it slow on bariatric full liquids, taking sips of ensure and broth  PT reported no nausea or vomiting, but continues with bloating, PT has had bowel movements  Consider prokinetic agent  Can consider transitioning to TF as tolerated Vital 1 2 @10ml/hr advance by 10ml q 8hrs to goal of 55ml/hr, water flushes 100ml q 4hrs provides total of 1584cal, 99g pro, 1669ml, continue bariatric fulls with supplement  Will monitor TF tolerance and provide wean TPN recommendations prn  PT currently meeting needs via TPN, electrolytes wnl  Will continue to monitor POC

## 2019-08-13 NOTE — PROGRESS NOTES
Vancomycin Assessment    Carlos Kaiser is a 63 y o  male who is currently receiving vancomycin 1250 mg IV every 8 hours for other peritonitis   Relevant clinical data and objective history reviewed:  Creatinine   Date Value Ref Range Status   08/12/2019 0 64 0 60 - 1 30 mg/dL Final     Comment:     Standardized to IDMS reference method   08/11/2019 0 61 0 60 - 1 30 mg/dL Final     Comment:     Standardized to IDMS reference method   08/10/2019 0 49 (L) 0 60 - 1 30 mg/dL Final     Comment:     Standardized to IDMS reference method   01/04/2016 1 07 0 60 - 1 30 mg/dL Final     Comment:     Standardized to IDMS reference method   12/04/2015 0 95 0 60 - 1 30 mg/dL Final     Comment:     Standardized to IDMS reference method   06/11/2015 1 00 0 60 - 1 30 mg/dL Final     Comment:     Standardized to IDMS reference method     /88 (BP Location: Left arm)   Pulse 86   Temp 98 3 °F (36 8 °C) (Temporal)   Resp 20   Ht 5' 8" (1 727 m)   Wt 75 3 kg (166 lb 0 1 oz)   SpO2 99%   BMI 25 24 kg/m²   I/O last 3 completed shifts: In: 560 [P O :240; NG/GT:20; IV Piggyback:300]  Out: 4416 [Urine:3700; Emesis/NG output:1; Drains:715]  Lab Results   Component Value Date/Time    BUN 8 08/12/2019 05:03 AM    BUN 18 01/04/2016 08:24 AM    WBC 11 95 (H) 08/12/2019 05:03 AM    WBC 6 48 01/04/2016 08:24 AM    HGB 9 4 (L) 08/12/2019 05:03 AM    HGB 15 7 01/04/2016 08:24 AM    HCT 29 5 (L) 08/12/2019 05:03 AM    HCT 46 0 01/04/2016 08:24 AM    MCV 93 08/12/2019 05:03 AM    MCV 88 01/04/2016 08:24 AM     (H) 08/12/2019 05:03 AM     01/04/2016 08:24 AM     Temp Readings from Last 3 Encounters:   08/13/19 98 3 °F (36 8 °C) (Temporal)   07/26/19 (!) 97 1 °F (36 2 °C) (Temporal)   07/25/19 97 7 °F (36 5 °C)     Vancomycin Days of Therapy: 13    Assessment/Plan  The patient is currently on vancomycin utilizing scheduled dosing  Baseline risks associated with therapy include: concomitant nephrotoxic medications    The patient is receiving 1250 mg IV every 8 hours with the most recent vancomycin level being at steady-state and supratherapeutic based on a goal of 15-20 (appropriate for most indications) ; therefore, after clinical evaluation will be changed to 1000 mg IV every 8 hours   Pharmacy will continue to follow closely for s/sx of nephrotoxicity, infusion reactions and appropriateness of therapy  BMP and CBC will be ordered per protocol  Plan for trough as patient approaches steady state, prior to the 4th  dose at approximately 0745 on 8/14/19  Pharmacy will continue to follow the patients culture results and clinical progress daily      Jerry Meyers, Pharmacist

## 2019-08-13 NOTE — UTILIZATION REVIEW
Continued Stay Review    Date:     8/13/2019                          Current Patient Class:     IP  Current Level of Care:    MED  SURG    HPI:58 y o  male initially admitted on      7/26/2019     Assessment/Plan: This is a 63year old male s/p exploratory laparotomy with repair of perforated marginal ulcer, abdominal washout, G tube placement and GI assisted endoscopic stent placement on 7/28/19 by Dr Edd Scales also underwent a diagnostic laparoscopy  That was converted to open on 7/26/2019 with internal hernia repair    Subsequently  Developed  Sepsis, intra abd  Abscess  Cont to req     Ip acute  LOC  D/T  Slow  Progress cont  DEBRA, monitor  Labs and  Temps          Pertinent Labs/Diagnostic Results:   Results from last 7 days   Lab Units 08/13/19  0531 08/12/19  0503 08/11/19  1003 08/10/19  0524 08/09/19  0546   WBC Thousand/uL 11 56* 11 95* 12 69* 10 22* 11 70*   HEMOGLOBIN g/dL 9 3* 9 4* 9 8* 9 5* 10 1*   HEMATOCRIT % 29 3* 29 5* 31 2* 30 1* 31 8*   PLATELETS Thousands/uL 753* 797* 864* 878* 963*   NEUTROS ABS Thousands/µL 8 85* 9 54* 10 07* 8 22* 9 52*         Results from last 7 days   Lab Units 08/13/19  0531 08/12/19  0503 08/11/19  1003 08/10/19  0524 08/09/19  0546 08/08/19  0646   SODIUM mmol/L 139 139 139 140 139 140   POTASSIUM mmol/L 3 6 3 6 3 7 3 2* 3 6 3 8   CHLORIDE mmol/L 100 102 103 101 102 104   CO2 mmol/L 35* 37* 36* 34* 28 30   ANION GAP mmol/L 4 0* 0* 5 9 6   BUN mg/dL 10 8 5 4* 4* 4*   CREATININE mg/dL 0 68 0 64 0 61 0 49* 0 50* 0 56*   EGFR ml/min/1 73sq m 106 109 111 121 120 115   CALCIUM mg/dL 7 8* 7 6* 7 7* 7 5* 7 9* 7 6*   CALCIUM, IONIZED mmol/L  --  1 04* 1 04*  --   --   --    MAGNESIUM mg/dL 1 8 1 8 1 8 1 6  --  1 9   PHOSPHORUS mg/dL 3 4 3 3 3 0 2 5*  --  2 8     Results from last 7 days   Lab Units 08/13/19  0531 08/10/19  0524 08/08/19  0646   AST U/L 19 20 24   ALT U/L 12 8* 10*   ALK PHOS U/L 81 82 97   TOTAL PROTEIN g/dL 5 8* 5 0* 5 4*   ALBUMIN g/dL 1 4* 1 3* 1 5*   TOTAL BILIRUBIN mg/dL 0 30 0 47 0 54         Results from last 7 days   Lab Units 08/13/19  0531 08/12/19  0503 08/11/19  1003 08/10/19  0524 08/09/19  0546 08/08/19  0646 08/07/19  0529   GLUCOSE RANDOM mg/dL 111 115 117 134 96 107 101           Results from last 7 days   Lab Units 08/09/19  0546   PROTIME seconds 17 0*   INR  1 36*   PTT seconds 35         Results from last 7 days   Lab Units 08/12/19  0503   PROCALCITONIN ng/ml 0 21           Results from last 7 days   Lab Units 08/08/19  0646   CRP mg/L >90 0*           Results from last 7 days   Lab Units 08/09/19  0934   GRAM STAIN RESULT  2+ Polys  Rare Mononuclear Cells  No bacteria seen   BODY FLUID CULTURE, STERILE  No growth     Results from last 7 days   Lab Units 08/09/19  0934   TOTAL COUNTED  100   WBC FLUID /ul 2,521           Vital Signs:   08/13/19 0802  98 4 °F (36 9 °C)  81  18  141/80  98 %    Lying   08/13/19 0037  98 3 °F (36 8 °C)  86  20  158/88  99 %    Lying         Medications:   Scheduled Meds:   Current Facility-Administered Medications:  Adult TPN (CUSTOM BASE/STANDARD ELECTROLYTE)  Intravenous Continuous   Adult TPN (CUSTOM BASE/STANDARD ELECTROLYTE)  Intravenous Continuous   albuterol 2 puff Inhalation 4x Daily   bacitracin 1 small application Topical BID   diphenhydrAMINE 25 mg Intravenous Q6H PRN   fluconazole 400 mg Intravenous Q24H   heparin (porcine) 5,000 Units Subcutaneous Q8H [facility]   HYDROmorphone 0 5 mg Intravenous Q2H PRN             X 4   8/13  Thus far   metroNIDAZOLE 500 mg Intravenous Q8H   ondansetron 4 mg Intravenous Q6H PRN   oxyCODONE 10 mg Oral Q4H PRN   oxyCODONE 5 mg Oral Q4H PRN   pantoprazole 40 mg Intravenous Q12H RADHA   promethazine 12 5 mg Intravenous Q6H PRN   saccharomyces boulardii 250 mg Oral BID   simethicone 80 mg Oral Q6H PRN   sucralfate 1,000 mg Oral 4x Daily (AC & HS)   vancomycin 1,000 mg Intravenous Q8H       Discharge Plan:   [address] Utilization Review Department  Phone: [phone]; Fax [phone]  [email or web address]  ATTENTION: Please call with any questions or concerns to [phone]  and carefully listen to the prompts so that you are directed to the right person  Send all requests for admission clinical reviews, approved or denied determinations and any other requests to fax [phone]   All voicemails are confidential

## 2019-08-13 NOTE — PROGRESS NOTES
Progress Note - Bariatric Surgery   Chary Peers 63 y o  male MRN: [id number]  Unit/Bed#: E5 -01 Encounter: [id number]      Subjective/Objective   This is a 63year old male s/p exploratory laparotomy with repair of perforated marginal ulcer, abdominal washout, G tube placement and GI assisted endoscopic stent placement on 7/28/19 by Dr Vernia Goltz also underwent a diagnostic laparoscopy  That was converted to open on 7/26/2019 with internal hernia repair  Patient had UGI on 8/5 that demonstrated a leak from the proximal aspect of the gastrojejunal stent into LUQ collection  Art Gleason subsequently underwent EGD with stent adjustment with Dr Sofia Mis 8/6  Repeat CT scan demonstrated increased volume of loculated right pleural effusion, proximal migration of esophageal stent, persistent loculated right upper quadrant ascitic fluid with a percutaneous drain in place, and diminished volume of left upper quadrant loculated perisplenic fluid with drain in place             PICC, Right chest tube and right drain readjustment by IR on 8/9  Chest tube with output of about 200 cc over last 24 hours  Decreased output since placed to water seal  Patient reports SOB is improving  He complains of abdominal pain and nausea this morning which are controlled with IV medications  On TPN    Denies fevers or chills  Voiding well  States swelling in extremities is improving  Ambulating yesterday      Objective:    /77 (BP Location: Left arm)   Pulse 82   Temp 98 5 °F (36 9 °C) (Temporal)   Resp 18   Ht 5' 8" (1 727 m)   Wt 75 3 kg (166 lb 0 1 oz)   SpO2 98%   BMI 25 24 kg/m²       Intake/Output Summary (Last 24 hours) at 8/13/2019 1518  Last data filed at 8/13/2019 1502  Gross per 24 hour   Intake 20 ml   Output 6410 ml   Net -6390 ml       Invasive Devices     Peripherally Inserted Central Catheter Line            PICC Line 08/09/19 Right Brachial 4 days          Drain            Gastrostomy/Enterostomy Gastrostomy 22 Fr  LUQ 15 days    Closed/Suction Drain Left LUQ Bulb 8 Fr  11 days    Closed/Suction Drain Right;Lateral Abdomen Bulb 12 Fr  4 days    Closed/Suction Drain Right;Lateral Chest Other (Comment) 10 Fr  4 days                ROS: 10-point system completed  All negative except see HPI  Physical Exam  General Appearance:    Alert, cooperative, no distress, appears stated age   Head:    Normocephalic, without obvious abnormality, atraumatic   Lungs:     Decreased breath sounds on R  R chest tube in place with serous output   Heart:    Regular rate and rhythm   Abdomen:     Soft, nondistended, appropriately tender  Right and left IR drains in place with seropurulent output   Extremities:   1+ edema b/l LE, generalized anasarca improving    Neurologic:  Incision:        Psych:   Normal strength and sensation    Midline incision with open wound at inferior aspect that is packed with adaptic and 4x4 gauze with overlying abdominal pad  Wound with fibrounous exudate and some serupurulent drainage     Normal mood and affect         Lab, Imaging and other studies:  I have personally reviewed pertinent lab results  , CBC:   Lab Results   Component Value Date    WBC 11 56 (H) 08/13/2019    HGB 9 3 (L) 08/13/2019    HCT 29 3 (L) 08/13/2019    MCV 93 08/13/2019     (H) 08/13/2019    MCH 29 5 08/13/2019    MCHC 31 7 08/13/2019    RDW 15 0 08/13/2019    MPV 8 5 (L) 08/13/2019    NRBC 0 08/13/2019   , CMP:   Lab Results   Component Value Date    SODIUM 139 08/13/2019    K 3 6 08/13/2019     08/13/2019    CO2 35 (H) 08/13/2019    BUN 10 08/13/2019    CREATININE 0 68 08/13/2019    CALCIUM 7 8 (L) 08/13/2019    AST 19 08/13/2019    ALT 12 08/13/2019    ALKPHOS 81 08/13/2019    EGFR 106 08/13/2019      CHEST      INDICATION:   pleural effusion, chest tube, stent      COMPARISON:  8/12/2019 x-rays     EXAM PERFORMED/VIEWS:  XR CHEST PORTABLE  Images: 2     FINDINGS:  Persistent small right pleural effusion    Probable consolidation at right lung base  Dual pigtail drainage catheter is noted at the right lung base laterally  Single pigtail catheter noted at the left lung base  Probable small left pleural effusion   remains  Right-sided PICC line, typical course, tip projecting at cavoatrial junction, unchanged  Distal esophageal stent again noted, unchanged      Cardiomediastinal silhouette appears unremarkable      The lungs are otherwise clear  No pneumothorax      Osseous structures appear within normal limits for patient age  Lower cervical plate and screw ACDF again noted      IMPRESSION:  Stable small right pleural effusion and adjacent consolidation  No evidence of pneumothorax  Persistent small left pleural effusion  Bilateral drainage catheters unchanged      VTE Mechanical Prophylaxis: sequential compression device    Assessment/Plan    This is a 63year old male s/p exploratory laparotomy with repair of perforated marginal ulcer, abdominal washout, G tube placement and GI assisted endoscopic stent placement on 7/28/19 by Dr Tess Madison also underwent diagnostic laparoscopy that was converted to open on 7/26/2019 with internal hernia repair  s/p emergent EGD with stent repositioning on 8/6 for leak seen on UGI     - Chest tube with output of about 250 cc over last 24 hours  IR drains with continued seropurulent output  - Will place chest tube back to suction and recheck CXR in am, cultures negative from pleural fluid  - continue TPN, patient tolerating well  Nutrition recommendations appreciated  - Continue full liquids with ensures as tolerated  - Labs reviewed and TPN adjusted  - stent grossly unchanged per discussion with radiology  - Continue antibiotics, antifungal as per ID, f/u cultures   Per ID, Treat x 14 days total  - Pharmacy consult in place, appreciate continued input    - Continue wound care and BID dressing changes : apply adaptic and wet to dry gauze dressing to inferior aspect of midline incision twice daily    Will order VAC and apply white sponge and black sponge to inferior aspect of midline wound   - Continue to trend electrolytes and WBC count  - Continue diuresis with IV lasix  - Continue to monitor drain output and quality  -drain flushing protocol  - Encourage IS, ambulation  - PPI and Carafate for GI prophylaxis  - SCDs and Heparin for DVT prophylaxis  - Case management on board        Plan of care was discussed with patient and patient's nurse  Care plan discussed with Dr Sami Woodard     Dispo: unable to discharge to home today, plan for rehab within next few days

## 2019-08-13 NOTE — PROGRESS NOTES
Progress Note - Infectious Disease   [facility] 63 y o  male MRN: [id number]  Unit/Bed#: E5 -01 Encounter: [id number]      Impression/Plan:  1  Sepsis-leukocytosis and tachycardia   Etiology most likely intra-abdominal abscess, although lung consolidations may play a role   Patient is improving slowly   He remains systemically well   Temperature is down   WBC decreased   Procalcitonin decreased  Blood cultures remain negative  Atibiotic plan as in below  Monitor temperature/WBC  Monitor procalcitonin      2  Intra-abdominal abscesses-status post IR drainage with COURTNEY drains remaining in place   Abscess culture is growing strep mitis and Candida  Limited antibiotic choices based upon the patient's allergies  Continue vancomycin, Flagyl, fluconazole for now  Continue drainage per IR  Close surgical follow-up  Treat x 14 days total, through tomorrow      3  Peritonitis-in the setting of a perforated marginal ulceration   The patient is now status post exploratory laparotomy with repair and stent placement, and now COURTNEY drain placement   Patient is status post placement of esophageal stent   Stent was readjusted  Antibiotic plan as in above  Continue drain as in above  Serial abdominal exams  Surgery follow-up      4  Enlarging right pleural effusion   Patient is status post thoracentesis with cloudy pleural fluid   However, pleural fluid parameters not consistent with empyema   Pleural fluid culture with no growth  Antibiotic plan as in above  Monitor respiratory status      5  COPD-no current clinical evidence of an acute exacerbation at this time       6  Multiple antibiotic allergies, including PCN anaphylaxis   This limits antibiotic choice, as in above      Discussed with patient and family in detail regarding the above plan      Antibiotics:  Vancomycin # 13  Flagyl # 19  High-dose fluconazole # 13     Subjective:  Patient is comfortable    Abdominal pain mild and controlled  Dyspnea mild and improving   Stable mild weak cough  Temperature stays down   No chills  He is tolerating antibiotics well   No nausea, vomiting or diarrhea  Objective:  Vitals:  Temp:  [98 3 °F (36 8 °C)-99 9 °F (37 7 °C)] 98 4 °F (36 9 °C)  HR:  [] 81  Resp:  [18-20] 18  BP: (141-158)/(80-88) 141/80  SpO2:  [98 %-100 %] 98 %  Temp (24hrs), Av 9 °F (37 2 °C), Min:98 3 °F (36 8 °C), Max:99 9 °F (37 7 °C)  Current: Temperature: 98 4 °F (36 9 °C)    Physical Exam:     General: Awake, alert, cooperative, no distress  Neck:  Supple  No mass  No lymphadenopathy  Lungs: Decreased breath sounds, sparse basilar rales, no wheezing, respirations unlabored  Heart:  Regular rate and rhythm, S1 and S2 normal, no murmur  Abdomen: Soft, stable mild distention  Drains clear with sediment  Stable mild left-sided tenderness  Extremities: Stable leg edema  No erythema/warmth  No ulcer  Nontender to palpation  Skin:  No rash  Neuro: Moves all extremities  Invasive Devices     Peripherally Inserted Central Catheter Line            PICC Line 19 Right Brachial 4 days          Drain            Gastrostomy/Enterostomy Gastrostomy 22 Fr  LUQ 15 days    Closed/Suction Drain Left LUQ Bulb 8 Fr  11 days    Closed/Suction Drain Right;Lateral Abdomen Bulb 12 Fr  4 days    Closed/Suction Drain Right;Lateral Chest Other (Comment) 10 Fr  4 days                Labs studies:   I have personally reviewed pertinent labs    Results from last 7 days   Lab Units 19  0531 19  0503 19  1003 08/10/19  0524  19  0646   POTASSIUM mmol/L 3 6 3 6 3 7 3 2*   < > 3 8   CHLORIDE mmol/L 100 102 103 101   < > 104   CO2 mmol/L 35* 37* 36* 34*   < > 30   BUN mg/dL 10 8 5 4*   < > 4*   CREATININE mg/dL 0 68 0 64 0 61 0 49*   < > 0 56*   EGFR ml/min/1 73sq m 106 109 111 121   < > 115   CALCIUM mg/dL 7 8* 7 6* 7 7* 7 5*   < > 7 6*   AST U/L   --   --  20  --  24   ALT U/L 12  --   --  8*  --  10*   ALK PHOS U/L 81  --   -- 82  --  97    < > = values in this interval not displayed  Results from last 7 days   Lab Units 08/13/19  0531 08/12/19  0503 08/11/19  1003   WBC Thousand/uL 11 56* 11 95* 12 69*   HEMOGLOBIN g/dL 9 3* 9 4* 9 8*   PLATELETS Thousands/uL 753* 797* 864*     Results from last 7 days   Lab Units 08/09/19  0934   GRAM STAIN RESULT  2+ Polys  Rare Mononuclear Cells  No bacteria seen   BODY FLUID CULTURE, STERILE  No growth       Imaging Studies:   I have personally reviewed pertinent imaging study reports and images in PACS  EKG, Pathology, and Other Studies:   I have personally reviewed pertinent reports

## 2019-08-14 ENCOUNTER — APPOINTMENT (INPATIENT)
Dept: RADIOLOGY | Facility: HOSPITAL | Age: 59
DRG: 326 | End: 2019-08-14
Payer: COMMERCIAL

## 2019-08-14 PROBLEM — R55 NEAR SYNCOPE: Status: ACTIVE | Noted: 2019-08-14

## 2019-08-14 LAB
ALBUMIN SERPL BCP-MCNC: 1.4 G/DL (ref 3.5–5)
ALP SERPL-CCNC: 99 U/L (ref 46–116)
ALT SERPL W P-5'-P-CCNC: 8 U/L (ref 12–78)
ANION GAP SERPL CALCULATED.3IONS-SCNC: 3 MMOL/L (ref 4–13)
AST SERPL W P-5'-P-CCNC: 15 U/L (ref 5–45)
BASOPHILS # BLD AUTO: 0.04 THOUSANDS/ΜL (ref 0–0.1)
BASOPHILS NFR BLD AUTO: 0 % (ref 0–1)
BILIRUB SERPL-MCNC: 0.33 MG/DL (ref 0.2–1)
BUN SERPL-MCNC: 11 MG/DL (ref 5–25)
CA-I BLD-SCNC: 1.08 MMOL/L (ref 1.12–1.32)
CALCIUM SERPL-MCNC: 7.8 MG/DL (ref 8.3–10.1)
CHLORIDE SERPL-SCNC: 100 MMOL/L (ref 100–108)
CO2 SERPL-SCNC: 35 MMOL/L (ref 21–32)
CREAT SERPL-MCNC: 0.68 MG/DL (ref 0.6–1.3)
EOSINOPHIL # BLD AUTO: 0.07 THOUSAND/ΜL (ref 0–0.61)
EOSINOPHIL NFR BLD AUTO: 1 % (ref 0–6)
ERYTHROCYTE [DISTWIDTH] IN BLOOD BY AUTOMATED COUNT: 14.8 % (ref 11.6–15.1)
GFR SERPL CREATININE-BSD FRML MDRD: 106 ML/MIN/1.73SQ M
GLUCOSE SERPL-MCNC: 116 MG/DL (ref 65–140)
GLUCOSE SERPL-MCNC: 141 MG/DL (ref 65–140)
HCT VFR BLD AUTO: 29.6 % (ref 36.5–49.3)
HGB BLD-MCNC: 9.4 G/DL (ref 12–17)
IMM GRANULOCYTES # BLD AUTO: 0.08 THOUSAND/UL (ref 0–0.2)
IMM GRANULOCYTES NFR BLD AUTO: 1 % (ref 0–2)
LYMPHOCYTES # BLD AUTO: 1.09 THOUSANDS/ΜL (ref 0.6–4.47)
LYMPHOCYTES NFR BLD AUTO: 9 % (ref 14–44)
MAGNESIUM SERPL-MCNC: 1.8 MG/DL (ref 1.6–2.6)
MCH RBC QN AUTO: 29.3 PG (ref 26.8–34.3)
MCHC RBC AUTO-ENTMCNC: 31.8 G/DL (ref 31.4–37.4)
MCV RBC AUTO: 92 FL (ref 82–98)
MONOCYTES # BLD AUTO: 1.17 THOUSAND/ΜL (ref 0.17–1.22)
MONOCYTES NFR BLD AUTO: 10 % (ref 4–12)
NEUTROPHILS # BLD AUTO: 9.58 THOUSANDS/ΜL (ref 1.85–7.62)
NEUTS SEG NFR BLD AUTO: 79 % (ref 43–75)
NRBC BLD AUTO-RTO: 0 /100 WBCS
PHOSPHATE SERPL-MCNC: 2.9 MG/DL (ref 2.7–4.5)
PLATELET # BLD AUTO: 715 THOUSANDS/UL (ref 149–390)
PMV BLD AUTO: 8.8 FL (ref 8.9–12.7)
POTASSIUM SERPL-SCNC: 3.5 MMOL/L (ref 3.5–5.3)
PROCALCITONIN SERPL-MCNC: 0.15 NG/ML
PROT SERPL-MCNC: 6.1 G/DL (ref 6.4–8.2)
RBC # BLD AUTO: 3.21 MILLION/UL (ref 3.88–5.62)
SODIUM SERPL-SCNC: 138 MMOL/L (ref 136–145)
VANCOMYCIN TROUGH SERPL-MCNC: 21 UG/ML (ref 10–20)
WBC # BLD AUTO: 12.03 THOUSAND/UL (ref 4.31–10.16)

## 2019-08-14 PROCEDURE — 84100 ASSAY OF PHOSPHORUS: CPT | Performed by: PHYSICIAN ASSISTANT

## 2019-08-14 PROCEDURE — 99232 SBSQ HOSP IP/OBS MODERATE 35: CPT | Performed by: NURSE PRACTITIONER

## 2019-08-14 PROCEDURE — 82330 ASSAY OF CALCIUM: CPT | Performed by: PHYSICIAN ASSISTANT

## 2019-08-14 PROCEDURE — 71045 X-RAY EXAM CHEST 1 VIEW: CPT

## 2019-08-14 PROCEDURE — 99024 POSTOP FOLLOW-UP VISIT: CPT | Performed by: SURGERY

## 2019-08-14 PROCEDURE — 85025 COMPLETE CBC W/AUTO DIFF WBC: CPT | Performed by: PHYSICIAN ASSISTANT

## 2019-08-14 PROCEDURE — 84145 PROCALCITONIN (PCT): CPT | Performed by: PHYSICIAN ASSISTANT

## 2019-08-14 PROCEDURE — C9113 INJ PANTOPRAZOLE SODIUM, VIA: HCPCS | Performed by: PHYSICIAN ASSISTANT

## 2019-08-14 PROCEDURE — 80202 ASSAY OF VANCOMYCIN: CPT | Performed by: SURGERY

## 2019-08-14 PROCEDURE — 99232 SBSQ HOSP IP/OBS MODERATE 35: CPT | Performed by: INTERNAL MEDICINE

## 2019-08-14 PROCEDURE — 93005 ELECTROCARDIOGRAM TRACING: CPT

## 2019-08-14 PROCEDURE — 80053 COMPREHEN METABOLIC PANEL: CPT | Performed by: PHYSICIAN ASSISTANT

## 2019-08-14 PROCEDURE — 82948 REAGENT STRIP/BLOOD GLUCOSE: CPT

## 2019-08-14 PROCEDURE — 74018 RADEX ABDOMEN 1 VIEW: CPT

## 2019-08-14 PROCEDURE — 83735 ASSAY OF MAGNESIUM: CPT | Performed by: PHYSICIAN ASSISTANT

## 2019-08-14 PROCEDURE — 74240 X-RAY XM UPR GI TRC 1CNTRST: CPT

## 2019-08-14 RX ORDER — POTASSIUM CHLORIDE 14.9 MG/ML
20 INJECTION INTRAVENOUS ONCE
Status: COMPLETED | OUTPATIENT
Start: 2019-08-14 | End: 2019-08-15

## 2019-08-14 RX ORDER — ONDANSETRON 2 MG/ML
4 INJECTION INTRAMUSCULAR; INTRAVENOUS EVERY 8 HOURS
Status: DISCONTINUED | OUTPATIENT
Start: 2019-08-14 | End: 2019-08-16 | Stop reason: HOSPADM

## 2019-08-14 RX ORDER — MAGNESIUM SULFATE HEPTAHYDRATE 40 MG/ML
2 INJECTION, SOLUTION INTRAVENOUS ONCE
Status: COMPLETED | OUTPATIENT
Start: 2019-08-14 | End: 2019-08-15

## 2019-08-14 RX ADMIN — PANTOPRAZOLE SODIUM 40 MG: 40 INJECTION, POWDER, FOR SOLUTION INTRAVENOUS at 21:24

## 2019-08-14 RX ADMIN — PROMETHAZINE HYDROCHLORIDE 12.5 MG: 25 INJECTION INTRAMUSCULAR; INTRAVENOUS at 00:53

## 2019-08-14 RX ADMIN — ALBUTEROL SULFATE 2 PUFF: 90 AEROSOL, METERED RESPIRATORY (INHALATION) at 21:38

## 2019-08-14 RX ADMIN — Medication 250 MG: at 08:53

## 2019-08-14 RX ADMIN — Medication 1 SMALL APPLICATION: at 09:09

## 2019-08-14 RX ADMIN — SUCRALFATE 1000 MG: 1 SUSPENSION ORAL at 11:58

## 2019-08-14 RX ADMIN — HEPARIN SODIUM 5000 UNITS: 5000 INJECTION INTRAVENOUS; SUBCUTANEOUS at 14:13

## 2019-08-14 RX ADMIN — Medication 1 SMALL APPLICATION: at 18:14

## 2019-08-14 RX ADMIN — Medication 250 MG: at 18:10

## 2019-08-14 RX ADMIN — PROMETHAZINE HYDROCHLORIDE 12.5 MG: 25 INJECTION INTRAMUSCULAR; INTRAVENOUS at 07:57

## 2019-08-14 RX ADMIN — ALBUTEROL SULFATE 2 PUFF: 90 AEROSOL, METERED RESPIRATORY (INHALATION) at 08:53

## 2019-08-14 RX ADMIN — HEPARIN SODIUM 5000 UNITS: 5000 INJECTION INTRAVENOUS; SUBCUTANEOUS at 05:40

## 2019-08-14 RX ADMIN — HYDROMORPHONE HYDROCHLORIDE 0.2 MG: 1 INJECTION, SOLUTION INTRAMUSCULAR; INTRAVENOUS; SUBCUTANEOUS at 00:53

## 2019-08-14 RX ADMIN — HYDROMORPHONE HYDROCHLORIDE 0.2 MG: 1 INJECTION, SOLUTION INTRAMUSCULAR; INTRAVENOUS; SUBCUTANEOUS at 18:05

## 2019-08-14 RX ADMIN — SUCRALFATE 1000 MG: 1 SUSPENSION ORAL at 18:10

## 2019-08-14 RX ADMIN — METRONIDAZOLE 500 MG: 500 INJECTION, SOLUTION INTRAVENOUS at 00:08

## 2019-08-14 RX ADMIN — MAGNESIUM SULFATE HEPTAHYDRATE 2 G: 40 INJECTION, SOLUTION INTRAVENOUS at 15:32

## 2019-08-14 RX ADMIN — METRONIDAZOLE 500 MG: 500 INJECTION, SOLUTION INTRAVENOUS at 07:57

## 2019-08-14 RX ADMIN — ONDANSETRON 4 MG: 2 INJECTION INTRAMUSCULAR; INTRAVENOUS at 23:38

## 2019-08-14 RX ADMIN — HYDROMORPHONE HYDROCHLORIDE 0.2 MG: 1 INJECTION, SOLUTION INTRAMUSCULAR; INTRAVENOUS; SUBCUTANEOUS at 12:09

## 2019-08-14 RX ADMIN — HYDROMORPHONE HYDROCHLORIDE 0.2 MG: 1 INJECTION, SOLUTION INTRAMUSCULAR; INTRAVENOUS; SUBCUTANEOUS at 21:29

## 2019-08-14 RX ADMIN — VANCOMYCIN HYDROCHLORIDE 1000 MG: 1 INJECTION, SOLUTION INTRAVENOUS at 08:54

## 2019-08-14 RX ADMIN — HEPARIN SODIUM 5000 UNITS: 5000 INJECTION INTRAVENOUS; SUBCUTANEOUS at 21:23

## 2019-08-14 RX ADMIN — SUCRALFATE 1000 MG: 1 SUSPENSION ORAL at 06:29

## 2019-08-14 RX ADMIN — VANCOMYCIN HYDROCHLORIDE 1000 MG: 1 INJECTION, SOLUTION INTRAVENOUS at 18:51

## 2019-08-14 RX ADMIN — PANTOPRAZOLE SODIUM 40 MG: 40 INJECTION, POWDER, FOR SOLUTION INTRAVENOUS at 08:52

## 2019-08-14 RX ADMIN — HYDROMORPHONE HYDROCHLORIDE 0.2 MG: 1 INJECTION, SOLUTION INTRAMUSCULAR; INTRAVENOUS; SUBCUTANEOUS at 23:57

## 2019-08-14 RX ADMIN — CALCIUM GLUCONATE: 94 INJECTION, SOLUTION INTRAVENOUS at 21:21

## 2019-08-14 RX ADMIN — IOHEXOL 20 ML: 350 INJECTION, SOLUTION INTRAVENOUS at 10:00

## 2019-08-14 RX ADMIN — POTASSIUM CHLORIDE 20 MEQ: 200 INJECTION, SOLUTION INTRAVENOUS at 13:20

## 2019-08-14 RX ADMIN — ALBUTEROL SULFATE 2 PUFF: 90 AEROSOL, METERED RESPIRATORY (INHALATION) at 18:11

## 2019-08-14 RX ADMIN — ONDANSETRON 4 MG: 2 INJECTION INTRAMUSCULAR; INTRAVENOUS at 15:45

## 2019-08-14 RX ADMIN — SUCRALFATE 1000 MG: 1 SUSPENSION ORAL at 21:23

## 2019-08-14 RX ADMIN — FLUCONAZOLE, SODIUM CHLORIDE 400 MG: 2 INJECTION INTRAVENOUS at 10:46

## 2019-08-14 RX ADMIN — VANCOMYCIN HYDROCHLORIDE 1000 MG: 1 INJECTION, SOLUTION INTRAVENOUS at 00:48

## 2019-08-14 RX ADMIN — ONDANSETRON 4 MG: 2 INJECTION INTRAMUSCULAR; INTRAVENOUS at 05:40

## 2019-08-14 RX ADMIN — ALBUTEROL SULFATE 2 PUFF: 90 AEROSOL, METERED RESPIRATORY (INHALATION) at 11:58

## 2019-08-14 RX ADMIN — METRONIDAZOLE 500 MG: 500 INJECTION, SOLUTION INTRAVENOUS at 18:07

## 2019-08-14 NOTE — ASSESSMENT & PLAN NOTE
·  History of stage IIA non-small cell lung carcinoma of left lower lobe with stable right middle lobe pulmonary nodule    · Treated in 2012 with lung resection and chemo, 3 rounds, then developed PE  · Has had surveillance and follow up with Cardiothoracic surgery / oncology

## 2019-08-14 NOTE — ASSESSMENT & PLAN NOTE
· Right lower lobe opacity:  Differentials includes atelectasis versus loculated effusion  · Patient already on broad-spectrum antibiotics for intra-abdominal abscess  · Patient had right chest tube placed by IR on 08/09  · Chest tube with minimal drainage  To be discontinued by Pulmonology per patient  · Today, patient with mild tachypnea, on supplemental oxygen  Denies feeling short of breath

## 2019-08-14 NOTE — ASSESSMENT & PLAN NOTE
· Patient with episode today upon return from radiology department of near syncope  When standing up to transfer from wheelchair to bed, patient states "everything went black"  His wife and nurse helped him to the bed  Wife reports blank stare of patient's face and returned to baseline once in bed  Vital signs stable at the time  · Denied preceding dizziness / lightheadedness  Had nausea after event  Denies headache, chest pain, numbness / weakness or other new symptoms  · Was out of bed after this episode without difficulty  · Recently has received IV phenergan  · Possibly event 2/2 to medication, orthostasis due to prolonged immobility / malnutrition / dehydration

## 2019-08-14 NOTE — ASSESSMENT & PLAN NOTE
· Coagulopathy secondary to malnutrition      Results from last 7 days   Lab Units 08/09/19  0546   INR  1 36*

## 2019-08-14 NOTE — PROGRESS NOTES
Progress Note - Infectious Disease   Selene Arana 63 y o  male MRN: [id number]  Unit/Bed#: E5 -01 Encounter: [id number]      Impression/Plan:  1  Sepsis-leukocytosis and tachycardia   Etiology most likely intra-abdominal abscess, although lung consolidations may play a role   Patient is improving slowly   He remains systemically well   Temperature is down   WBC decreased and stable   Procalcitonin decreased  Blood cultures remain negative  Atibiotic plan as in below  Monitor temperature/WBC  Monitor procalcitonin      2  Intra-abdominal abscesses-status post IR drainage with COURTNEY drains remaining in place   Abscess culture is growing strep mitis and Candida  Limited antibiotic choices based upon the patient's allergies  Patient is completing 14 days of antibiotic today  However, due to patient is in 4 days of fluconazole inadvertently, will continue fluconazole for another 4 days  Complete vancomycin/Flagyl today  Will continue fluconazole for another 4 days  Continue drainage per IR  Close surgical follow-up      3  Peritonitis-in the setting of a perforated marginal ulceration   The patient is now status post exploratory laparotomy with repair and stent placement, and now COURTNEY drain placement   Patient is status post placement of esophageal stent   Stent was readjusted  Antibiotic plan as in above  Continue drain as in above  Serial abdominal exams  Surgery follow-up      4  Enlarging right pleural effusion   Patient is status post thoracentesis with cloudy pleural fluid   However, pleural fluid parameters not consistent with empyema   Pleural fluid culture with no growth  Antibiotic plan as in above  Monitor respiratory status      5   COPD-no current clinical evidence of an acute exacerbation at this time       6  Multiple antibiotic allergies, including PCN anaphylaxis   This limits antibiotic choice, as in above      Discussed with patient and family in detail regarding the above plan      Antibiotics:  Vancomycin # 14  Flagyl # 20  High-dose fluconazole # 14     Subjective:  Patient is stable  Abdominal pain mild and controlled  Dyspnea mild and improving   Stable mild weak cough  Temperature stays down   No chills  He is tolerating antibiotics well   No nausea, vomiting or diarrhea  Objective:  Vitals:  Temp:  [98 °F (36 7 °C)-98 5 °F (36 9 °C)] 98 °F (36 7 °C)  HR:  [82-97] 92  Resp:  [17-18] 18  BP: (146-161)/(71-97) 155/80  SpO2:  [98 %-100 %] 99 %  Temp (24hrs), Av 2 °F (36 8 °C), Min:98 °F (36 7 °C), Max:98 5 °F (36 9 °C)  Current: Temperature: 98 °F (36 7 °C)    Physical Exam:     General: Awake, alert, cooperative, no distress  Neck:  Supple  No mass  No lymphadenopathy  Lungs: Decreased breath sounds, sparse and improving basilar rales, no wheezing, respirations unlabored  Heart:  Regular rate and rhythm, S1 and S2 normal, no murmur  Abdomen: Soft, stable mild distention  Drain clear with sediment  Stable mild diffuse tenderness  Extremities: Stable leg edema  No erythema/warmth  No ulcer  Nontender to palpation  Skin:  No rash  Neuro: Moves all extremities  Invasive Devices     Peripherally Inserted Central Catheter Line            PICC Line 19 Right Brachial 5 days          Drain            Gastrostomy/Enterostomy Gastrostomy 22 Fr  LUQ 16 days    Closed/Suction Drain Left LUQ Bulb 8 Fr  12 days    Closed/Suction Drain Right;Lateral Abdomen Bulb 12 Fr  5 days    Closed/Suction Drain Right;Lateral Chest Other (Comment) 10 Fr  5 days    Negative Pressure Wound Therapy (V A C ) Abdomen Mid less than 1 day                Labs studies:   I have personally reviewed pertinent labs    Results from last 7 days   Lab Units 19  0543 19  0531 19  0503  08/10/19  0524   POTASSIUM mmol/L 3 5 3 6 3 6   < > 3 2*   CHLORIDE mmol/L 100 100 102   < > 101   CO2 mmol/L 35* 35* 37*   < > 34*   BUN mg/dL 11 10 8   < > 4*   CREATININE mg/dL 0 68 0 68 0 64   < > 0 49*   EGFR ml/min/1 73sq m 106 106 109   < > 121   CALCIUM mg/dL 7 8* 7 8* 7 6*   < > 7 5*   AST U/L 15 19  --   --  20   ALT U/L 8* 12  --   --  8*   ALK PHOS U/L 99 81  --   --  82    < > = values in this interval not displayed  Results from last 7 days   Lab Units 08/14/19  0543 08/13/19  0531 08/12/19  0503   WBC Thousand/uL 12 03* 11 56* 11 95*   HEMOGLOBIN g/dL 9 4* 9 3* 9 4*   PLATELETS Thousands/uL 715* 753* 797*     Results from last 7 days   Lab Units 08/09/19  0934   GRAM STAIN RESULT  2+ Polys  Rare Mononuclear Cells  No bacteria seen   BODY FLUID CULTURE, STERILE  No growth       Imaging Studies:   I have personally reviewed pertinent imaging study reports and images in PACS  EKG, Pathology, and Other Studies:   I have personally reviewed pertinent reports

## 2019-08-14 NOTE — PROGRESS NOTES
[facility]  Progress Note Nohemi Black 1960, 63 y o  male MRN: [id number]    Unit/Bed#: E5 -01 Encounter: [id number]    Primary Care Provider: Harmony Jaurez DO   Date and time admitted to hospital: 7/26/2019  9:00 AM        * Chronic marginal ulcer with perforation ([address] )  Assessment & Plan  · Marginal ulcer with perforation  Patient initially admitted for open hernia repair but subsequently developed perforated marginal ulcer requiring laparotomy  Subsequently developed intra-abdominal abscesses requiring placement of drains  Has wound vac attached to lower midline surgical wound  · Continue patient on IV vancomycin, metronidazole which will be completed today, 8/14 and continue on fluconazole for 4 more days  · Patient had abnormal upper GI series 8/5 which showed a leak in the proximal aspect of the gastrojejunal stent into the left upper quadrant collection with drainage  · Patient had EGD performed on 08/06 with adjustment of stent  · Patient had CT of abdomen and pelvis with IV and PO contrast on 08/08 which was unrevealing  · Due to worsening abdominal pain, patient had PICC line placed and initiation of TPN  · Tube feeds were discontinued  · Patient also had right chest tube inserted for enlarging right pleural effusion  · Patient had right COURTNEY drain readjusted by IR  · Patient started on full liquid bariatric diet in addition to TPN  · Continue SCDs and heparin for DVT prophylaxis  · Continue Carafate and PPI for GI prophylaxis  · Further management and plan as per bariatric surgery  · Patient is not eating  He has had increased nausea and vomiting since last night  Has received IV phenergan in addition to Zofran  · Upper GI series today showed no evidence of a leak or obstruction with the gastroesophageal stent in place  · Obstruction series ordered  Patient with very tender abdomen  Bilateral COURTNEY's with scant serous drainage  G-tube clamped  Near syncope  Assessment & Plan  · Patient with episode today upon return from radiology department of near syncope  When standing up to transfer from wheelchair to bed, patient states "everything went black"  His wife and nurse helped him to the bed  Wife reports blank stare of patient's face and returned to baseline once in bed  Vital signs stable at the time  · Denied preceding dizziness / lightheadedness  Had nausea after event  Denies headache, chest pain, numbness / weakness or other new symptoms  · Was out of bed after this episode without difficulty  · Recently has received IV phenergan  · Possibly event 2/2 to medication, orthostasis due to prolonged immobility / malnutrition / dehydration  Severe protein-calorie malnutrition ([facility] [address] )  Assessment & Plan  Body mass index is 25 24 kg/m²  · Patient currently on TPN via PICC line  · Started on full liquid bariatric diet but is not eating due to nausea / vomiting  Electrolyte disturbance  Assessment & Plan  · Potassium and phosphorus as well as other electrolytes within normal limits  · Continue to monitor labs while on TPN      Results from last 7 days   Lab Units 08/14/19  0543 08/13/19  0531 08/12/19  0503 08/11/19  1003 08/10/19  0524 08/09/19  0546 08/08/19  0646   MAGNESIUM mg/dL 1 8 1 8 1 8 1 8 1 6  --  1 9   PHOSPHORUS mg/dL 2 9 3 4 3 3 3 0 2 5*  --  2 8   POTASSIUM mmol/L 3 5 3 6 3 6 3 7 3 2* 3 6 3 8       Diet-controlled diabetes mellitus [facility])  Assessment & Plan  Lab Results   Component Value Date    HGBA1C 5 1 07/13/2018     Recent Labs     08/14/19  1025   POCGLU 141*     · Diet controlled diabetes  · Accucheks within limits  · Monitor blood sugars while on TPN    Coagulopathy (HCC)  Assessment & Plan  · Coagulopathy secondary to malnutrition      Results from last 7 days   Lab Units 08/09/19  0546   INR  1 36*       Abnormal CT scan, lung  Assessment & Plan  · Right lower lobe opacity:  Differentials includes atelectasis versus loculated effusion  · Patient already on broad-spectrum antibiotics for intra-abdominal abscess  · Patient had right chest tube placed by IR on   · Chest tube with minimal drainage  To be discontinued by Pulmonology per patient  · Today, patient with mild tachypnea, on supplemental oxygen  Denies feeling short of breath  Adenocarcinoma of lung ([facility] [address] )  Assessment & Plan  ·  History of stage IIA non-small cell lung carcinoma of left lower lobe with stable right middle lobe pulmonary nodule  · Treated in  with lung resection and chemo, 3 rounds, then developed PE  · Has had surveillance and follow up with Cardiothoracic surgery / oncology         VTE Pharmacologic Prophylaxis:   Pharmacologic: Heparin  Mechanical VTE Prophylaxis in Place: Yes    Patient Centered Rounds: I have performed bedside rounds with nursing staff today  Discussions with Specialists or Other Care Team Provider: Provider notes reviewed    Education and Discussions with Family / Patient: Plan of care with patient and spouse    Time Spent for Care: 30 minutes  More than 50% of total time spent on counseling and coordination of care as described above  Current Length of Stay: 19 day(s)    Current Patient Status: Inpatient   Certification Statement: The patient will continue to require additional inpatient hospital stay due to IV antibiotic treatment / abdominal pain    Discharge Plan: Pending clinical improvement  To go to acute rehab  Code Status: Level 1 - Full Code      Subjective:   Patient with diffuse abdominal pain, nausea, and vomiting  No appetite  Denies shortness of breath, dizziness  Objective:     Vitals:   Temp (24hrs), Av °F (36 7 °C), Min:98 °F (36 7 °C), Max:98 °F (36 7 °C)    Temp:  [98 °F (36 7 °C)] 98 °F (36 7 °C)  HR:  [90-97] 90  Resp:  [17-18] 18  BP: (146-161)/(71-97) 150/79  SpO2:  [98 %-100 %] 100 %  Body mass index is 25 24 kg/m²       Input and Output Summary (last 24 hours): Intake/Output Summary (Last 24 hours) at 8/14/2019 1635  Last data filed at 8/14/2019 1549  Gross per 24 hour   Intake 10 ml   Output 5020 ml   Net -5010 ml       Physical Exam:     Physical Exam   Constitutional: He is oriented to person, place, and time  He appears listless  He appears cachectic  He has a sickly appearance  He appears ill  He appears distressed  HENT:   Head: Normocephalic and atraumatic  Eyes: Conjunctivae are normal  No scleral icterus  Cardiovascular: Regular rhythm and normal heart sounds  Tachycardia present  No murmur heard  Pulmonary/Chest: Breath sounds normal  Tachypnea noted  He has no wheezes  He has no rales  On supplemental oxygen  Right chest tube in place to pleuravac with yellow drainage in collection system  Abdominal: Soft  He exhibits no distension  There is tenderness  There is guarding  G-tube clamped  Lower midline surgical wound with wound vac  Bilateral upper abdominal COURTNEY's with serous drainage  PICC line with TPN  Musculoskeletal: Normal range of motion  He exhibits no edema or tenderness  Neurological: He is oriented to person, place, and time  He appears listless  Skin: Skin is warm and dry  He is not diaphoretic  There is pallor  Psychiatric: He exhibits a depressed mood  Nursing note and vitals reviewed          Additional Data:     Labs:    Results from last 7 days   Lab Units 08/14/19  0543   WBC Thousand/uL 12 03*   HEMOGLOBIN g/dL 9 4*   HEMATOCRIT % 29 6*   PLATELETS Thousands/uL 715*   NEUTROS PCT % 79*   LYMPHS PCT % 9*   MONOS PCT % 10   EOS PCT % 1     Results from last 7 days   Lab Units 08/14/19  0543   SODIUM mmol/L 138   POTASSIUM mmol/L 3 5   CHLORIDE mmol/L 100   CO2 mmol/L 35*   BUN mg/dL 11   CREATININE mg/dL 0 68   ANION GAP mmol/L 3*   CALCIUM mg/dL 7 8*   ALBUMIN g/dL 1 4*   TOTAL BILIRUBIN mg/dL 0 33   ALK PHOS U/L 99   ALT U/L 8*   AST U/L 15   GLUCOSE RANDOM mg/dL 116     Results from last 7 days   Lab Units 08/09/19  0546   INR  1 36*     Results from last 7 days   Lab Units 08/14/19  1025   POC GLUCOSE mg/dl 141*         Results from last 7 days   Lab Units 08/14/19  0543 08/12/19  0503   PROCALCITONIN ng/ml 0 15 0 21           * I Have Reviewed All Lab Data Listed Above  * Additional Pertinent Lab Tests Reviewed:  [facility] Admission Reviewed    Imaging:    Imaging Reports Reviewed Today Include: Upper GI series 8/14  Imaging Personally Reviewed by Myself Includes:  None    Recent Cultures (last 7 days):     Results from last 7 days   Lab Units 08/09/19  0934   GRAM STAIN RESULT  2+ Polys  Rare Mononuclear Cells  No bacteria seen   BODY FLUID CULTURE, STERILE  No growth       Last 24 Hours Medication List:     Current Facility-Administered Medications:  Adult TPN (CUSTOM BASE/STANDARD ELECTROLYTE)  Intravenous Continuous Cami Aaron PA-C Last Rate: 81 6 mL/hr at 08/13/19 2133   Adult TPN (CUSTOM BASE/STANDARD ELECTROLYTE)  Intravenous Continuous Cami Aaron PA-C    albuterol 2 puff Inhalation 4x Daily Earnest ButtonFLORIAN    bacitracin 1 small application Topical BID Mattie Bell MD    diphenhydrAMINE 25 mg Intravenous Q6H PRN Earnest ButtonFLORIAN    fluconazole 400 mg Intravenous Q24H Brenda Chisholm MD Last Rate: 400 mg (08/14/19 1046)   heparin (porcine) 5,000 Units Subcutaneous [facility] Earnest ButtonFLORIAN    HYDROmorphone 0 2 mg Intravenous Q2H PRN Mattie Bell MD    magnesium sulfate 2 g Intravenous Once Cami Aaron PA-C    metroNIDAZOLE 500 mg Intravenous Q8H Abimael Diggs MD Last Rate: 500 mg (08/14/19 0757)   ondansetron 4 mg Intravenous Q8H Cami Aaron PA-C    oxyCODONE 10 mg Oral Q4H PRN Brenda Chisholm MD    oxyCODONE 5 mg Oral Q4H PRN Brenda Chisholm MD    pantoprazole 40 mg Intravenous Q12H [address] [place] ButtonFLORIAN    promethazine 12 5 mg Intravenous Q6H PRN Earnest ButtonFLORIAN    saccharomyces boulardii 250 mg Oral BID [place] Button, FLORIAN simethicone 80 mg Oral Q6H PRN Lucy Tripp PA-C    sucralfate 1,000 mg Oral 4x Daily (AC & HS) Lucy Tripp PA-C    vancomycin 1,000 mg Intravenous Q8H Lala Zapien MD Last Rate: 1,000 mg (08/14/19 0854)        Today, Patient Was Seen By: CHEYANNE Merritt    ** Please Note: Dictation voice to text software may have been used in the creation of this document   **

## 2019-08-14 NOTE — ASSESSMENT & PLAN NOTE
· Marginal ulcer with perforation  Patient initially admitted for open hernia repair but subsequently developed perforated marginal ulcer requiring laparotomy  Subsequently developed intra-abdominal abscesses requiring placement of drains  Has wound vac attached to lower midline surgical wound  · Continue patient on IV vancomycin, metronidazole which will be completed today, 8/14 and continue on fluconazole for 4 more days  · Patient had abnormal upper GI series 8/5 which showed a leak in the proximal aspect of the gastrojejunal stent into the left upper quadrant collection with drainage  · Patient had EGD performed on 08/06 with adjustment of stent  · Patient had CT of abdomen and pelvis with IV and PO contrast on 08/08 which was unrevealing  · Due to worsening abdominal pain, patient had PICC line placed and initiation of TPN  · Tube feeds were discontinued  · Patient also had right chest tube inserted for enlarging right pleural effusion  · Patient had right COURTNEY drain readjusted by IR  · Patient started on full liquid bariatric diet in addition to TPN  · Continue SCDs and heparin for DVT prophylaxis  · Continue Carafate and PPI for GI prophylaxis  · Further management and plan as per bariatric surgery  · Patient is not eating  He has had increased nausea and vomiting since last night  Has received IV phenergan in addition to Zofran  · Upper GI series today showed no evidence of a leak or obstruction with the gastroesophageal stent in place  · Obstruction series ordered  Patient with very tender abdomen  Bilateral COURTNEY's with scant serous drainage  G-tube clamped

## 2019-08-14 NOTE — CASE MANAGEMENT
LAUREL received EC[employer] response from [facility] that the patient's insurance denied the authorization and that there is a peer to peer available until tomorrow 8/15 at 4pm  LAUREL sent tiger text to Kenzie Almanza PAC with the phone number of [phone] to call with the patient's name,  and insurance policy number  LAUREL called [facility] [place] [phone] [facility] that a clinician from bariatrics will be calling for the peer to peer  CM department will continue to follow through discharge

## 2019-08-14 NOTE — PROGRESS NOTES
Progress Note - Bariatric Surgery   Sherren Cue 63 y o  male MRN: [id number]  Unit/Bed#: E5 -01 Encounter: [id number]      Subjective/Objective     This is a 63year old male s/p exploratory laparotomy with repair of perforated marginal ulcer, abdominal washout, G tube placement and GI assisted endoscopic stent placement on 7/28/19 by Dr Gonzalez Oseguera also underwent a diagnostic laparoscopy  That was converted to open on 7/26/2019 with internal hernia repair  Patient had UGI on 8/5 that demonstrated a leak from the proximal aspect of the gastrojejunal stent into LUQ collection  [facility] subsequently underwent EGD with stent adjustment with Dr Zhu [place] 8/6  [facility] demonstrated increased volume of loculated right pleural effusion, proximal migration of esophageal stent, persistent loculated right upper quadrant ascitic fluid with a percutaneous drain in place, and diminished volume of left upper quadrant loculated perisplenic fluid with drain in place           PICC, Right chest tube and right drain readjustment by IR on 8/9  Chest tube with scant output over last 24 hours  Patient reports nausea and three episodes of emesis overnight  On TPN  Denies fevers but endorses chills  Voiding well  States swelling in extremities is improving    Ambulating yesterday        Objective:    /71 (BP Location: Left arm)   Pulse 90   Temp 98 °F (36 7 °C) (Temporal)   Resp 18   Ht 5' 8" (1 727 m)   Wt 75 3 kg (166 lb 0 1 oz)   SpO2 98%   BMI 25 24 kg/m²       Intake/Output Summary (Last 24 hours) at 8/14/2019 0825  Last data filed at 8/14/2019 0559  Gross per 24 hour   Intake 20 ml   Output 5750 ml   Net -5730 ml       Invasive Devices     Peripherally Inserted Central Catheter Line            PICC Line 08/09/19 Right Brachial 4 days          Drain            Gastrostomy/Enterostomy Gastrostomy 22 Fr  LUQ 16 days    Closed/Suction Drain Left LUQ Bulb 8 Fr  12 days    Closed/Suction Drain Right;Lateral Abdomen Bulb 12 Fr  4 days    Closed/Suction Drain Right;Lateral Chest Other (Comment) 10 Fr  4 days    Negative Pressure Wound Therapy (V A C ) Abdomen Mid less than 1 day                ROS: 10-point system completed  All negative except see HPI  Physical Exam    General Appearance:    Alert, cooperative, no acute distress, appears stated age   Head:    Normocephalic, without obvious abnormality, atraumatic   Lungs:     Decreased breath sounds at Right base  R chest tube in place with serous output  Heart:    Regular rate and rhythm   Abdomen:     Soft, nondistended, appropriately tender  Right and left IR drains in place with seropurulent output, vac in place to suction   Extremities:   1+ edema b/l LE, generalized anasarca improving    Neurologic:  Incision:        Psych:   Normal strength and sensation    Midline incision with open wound at inferior aspect with vac in place to suction       Normal mood and affect       Lab, Imaging and other studies:  I have personally reviewed pertinent lab results  , CBC:   Lab Results   Component Value Date    WBC 12 03 (H) 08/14/2019    HGB 9 4 (L) 08/14/2019    HCT 29 6 (L) 08/14/2019    MCV 92 08/14/2019     (H) 08/14/2019    MCH 29 3 08/14/2019    MCHC 31 8 08/14/2019    RDW 14 8 08/14/2019    MPV 8 8 (L) 08/14/2019    NRBC 0 08/14/2019   , CMP:   Lab Results   Component Value Date    SODIUM 138 08/14/2019    K 3 5 08/14/2019     08/14/2019    CO2 35 (H) 08/14/2019    BUN 11 08/14/2019    CREATININE 0 68 08/14/2019    CALCIUM 7 8 (L) 08/14/2019    AST 15 08/14/2019    ALT 8 (L) 08/14/2019    ALKPHOS 99 08/14/2019    EGFR 106 08/14/2019      CHEST      INDICATION:   pleural effusion, chest tube, stent      COMPARISON:  8/12/2019 x-rays     EXAM PERFORMED/VIEWS:  XR CHEST PORTABLE  Images: 2     FINDINGS:  Persistent small right pleural effusion  Probable consolidation at right lung base    Dual pigtail drainage catheter is noted at the right lung base laterally  Single pigtail catheter noted at the left lung base  Probable small left pleural effusion   remains  Right-sided PICC line, typical course, tip projecting at cavoatrial junction, unchanged  Distal esophageal stent again noted, unchanged      Cardiomediastinal silhouette appears unremarkable      The lungs are otherwise clear  No pneumothorax      Osseous structures appear within normal limits for patient age  Lower cervical plate and screw ACDF again noted      IMPRESSION:  Stable small right pleural effusion and adjacent consolidation  No evidence of pneumothorax  Persistent small left pleural effusion  Bilateral drainage catheters unchanged     Stable appearance distal esophageal stent    VTE Mechanical Prophylaxis: sequential compression device, sqh    Assessment/Plan    This is a 63year old male s/p exploratory laparotomy with repair of perforated marginal ulcer, abdominal washout, G tube placement and GI assisted endoscopic stent placement on 7/28/19 by Dr Nechama Curling also underwent diagnostic laparoscopy that was converted to open on 7/26/2019 with internal hernia repair  s/p emergent EGD with stent repositioning on 8/6 for leak seen on UGI  Worsening nausea and vomiting overnight    - Will get UGI to evaluate for stent migration and/or leak  - Pending UGI, will consider removing stent  - Chest tube minimal output over last 24 hours  IR drains with continued seropurulent output  - Will follow up CXR, if improving or stable effusion, will remove chest tube, pleural cultures negative  - continue TPN, patient tolerating well  Nutrition recommendations appreciated  - Labs reviewed and TPN adjusted  - Continue antibiotics, antifungal as per ID, f/u cultures   Per ID,  - Pharmacy consult in place, appreciate continued input    - Continue wound VAC, VAC change Friday  - Continue to trend electrolytes and WBC count  - Continue to monitor drain output and quality  -drain flushing protocol  - Encourage IS, ambulation  - PPI and Carafate for GI prophylaxis  - SCDs and Heparin for DVT prophylaxis  - Case management on board        Plan of care was discussed with patient and patient's nurse  Care plan discussed with Dr Farhad Degroot: unable to discharge to home today

## 2019-08-14 NOTE — PROGRESS NOTES
Right chest tube removed per order by Jaqui Lang PA-C and approved by Dr Harvey Hoffman  Patient tolerated well  Dressing applied and nurse, Jovan Julien, notified

## 2019-08-15 LAB
ALBUMIN SERPL BCP-MCNC: 1.5 G/DL (ref 3.5–5)
ALP SERPL-CCNC: 123 U/L (ref 46–116)
ALT SERPL W P-5'-P-CCNC: 8 U/L (ref 12–78)
ANION GAP SERPL CALCULATED.3IONS-SCNC: 4 MMOL/L (ref 4–13)
AST SERPL W P-5'-P-CCNC: 19 U/L (ref 5–45)
ATRIAL RATE: 88 BPM
BASOPHILS # BLD AUTO: 0.06 THOUSANDS/ΜL (ref 0–0.1)
BASOPHILS NFR BLD AUTO: 1 % (ref 0–1)
BILIRUB SERPL-MCNC: 0.35 MG/DL (ref 0.2–1)
BUN SERPL-MCNC: 11 MG/DL (ref 5–25)
CALCIUM SERPL-MCNC: 8.1 MG/DL (ref 8.3–10.1)
CHLORIDE SERPL-SCNC: 102 MMOL/L (ref 100–108)
CO2 SERPL-SCNC: 33 MMOL/L (ref 21–32)
CREAT SERPL-MCNC: 0.73 MG/DL (ref 0.6–1.3)
EOSINOPHIL # BLD AUTO: 0.15 THOUSAND/ΜL (ref 0–0.61)
EOSINOPHIL NFR BLD AUTO: 1 % (ref 0–6)
ERYTHROCYTE [DISTWIDTH] IN BLOOD BY AUTOMATED COUNT: 14.8 % (ref 11.6–15.1)
GFR SERPL CREATININE-BSD FRML MDRD: 103 ML/MIN/1.73SQ M
GLUCOSE SERPL-MCNC: 96 MG/DL (ref 65–140)
HCT VFR BLD AUTO: 30.2 % (ref 36.5–49.3)
HGB BLD-MCNC: 9.5 G/DL (ref 12–17)
IMM GRANULOCYTES # BLD AUTO: 0.07 THOUSAND/UL (ref 0–0.2)
IMM GRANULOCYTES NFR BLD AUTO: 1 % (ref 0–2)
LYMPHOCYTES # BLD AUTO: 1.34 THOUSANDS/ΜL (ref 0.6–4.47)
LYMPHOCYTES NFR BLD AUTO: 11 % (ref 14–44)
MAGNESIUM SERPL-MCNC: 2.1 MG/DL (ref 1.6–2.6)
MCH RBC QN AUTO: 29.3 PG (ref 26.8–34.3)
MCHC RBC AUTO-ENTMCNC: 31.5 G/DL (ref 31.4–37.4)
MCV RBC AUTO: 93 FL (ref 82–98)
MONOCYTES # BLD AUTO: 1.22 THOUSAND/ΜL (ref 0.17–1.22)
MONOCYTES NFR BLD AUTO: 10 % (ref 4–12)
NEUTROPHILS # BLD AUTO: 9.27 THOUSANDS/ΜL (ref 1.85–7.62)
NEUTS SEG NFR BLD AUTO: 76 % (ref 43–75)
NRBC BLD AUTO-RTO: 0 /100 WBCS
P AXIS: 73 DEGREES
PHOSPHATE SERPL-MCNC: 3.3 MG/DL (ref 2.7–4.5)
PLATELET # BLD AUTO: 689 THOUSANDS/UL (ref 149–390)
PMV BLD AUTO: 8.8 FL (ref 8.9–12.7)
POTASSIUM SERPL-SCNC: 4 MMOL/L (ref 3.5–5.3)
PR INTERVAL: 122 MS
PROT SERPL-MCNC: 6.4 G/DL (ref 6.4–8.2)
QRS AXIS: 96 DEGREES
QRSD INTERVAL: 128 MS
QT INTERVAL: 412 MS
QTC INTERVAL: 498 MS
RBC # BLD AUTO: 3.24 MILLION/UL (ref 3.88–5.62)
SODIUM SERPL-SCNC: 139 MMOL/L (ref 136–145)
T WAVE AXIS: 62 DEGREES
VENTRICULAR RATE: 88 BPM
WBC # BLD AUTO: 12.11 THOUSAND/UL (ref 4.31–10.16)

## 2019-08-15 PROCEDURE — 97167 OT EVAL HIGH COMPLEX 60 MIN: CPT

## 2019-08-15 PROCEDURE — 85025 COMPLETE CBC W/AUTO DIFF WBC: CPT | Performed by: PHYSICIAN ASSISTANT

## 2019-08-15 PROCEDURE — 80053 COMPREHEN METABOLIC PANEL: CPT | Performed by: PHYSICIAN ASSISTANT

## 2019-08-15 PROCEDURE — 99024 POSTOP FOLLOW-UP VISIT: CPT | Performed by: SURGERY

## 2019-08-15 PROCEDURE — 97163 PT EVAL HIGH COMPLEX 45 MIN: CPT

## 2019-08-15 PROCEDURE — G8987 SELF CARE CURRENT STATUS: HCPCS

## 2019-08-15 PROCEDURE — 84100 ASSAY OF PHOSPHORUS: CPT | Performed by: PHYSICIAN ASSISTANT

## 2019-08-15 PROCEDURE — 99232 SBSQ HOSP IP/OBS MODERATE 35: CPT | Performed by: INTERNAL MEDICINE

## 2019-08-15 PROCEDURE — 83735 ASSAY OF MAGNESIUM: CPT | Performed by: PHYSICIAN ASSISTANT

## 2019-08-15 PROCEDURE — G8988 SELF CARE GOAL STATUS: HCPCS

## 2019-08-15 PROCEDURE — G8978 MOBILITY CURRENT STATUS: HCPCS

## 2019-08-15 PROCEDURE — 93010 ELECTROCARDIOGRAM REPORT: CPT | Performed by: INTERNAL MEDICINE

## 2019-08-15 PROCEDURE — G8979 MOBILITY GOAL STATUS: HCPCS

## 2019-08-15 PROCEDURE — C9113 INJ PANTOPRAZOLE SODIUM, VIA: HCPCS | Performed by: PHYSICIAN ASSISTANT

## 2019-08-15 RX ORDER — FUROSEMIDE 10 MG/ML
20 INJECTION INTRAMUSCULAR; INTRAVENOUS ONCE
Status: COMPLETED | OUTPATIENT
Start: 2019-08-15 | End: 2019-08-15

## 2019-08-15 RX ADMIN — Medication 250 MG: at 17:35

## 2019-08-15 RX ADMIN — ONDANSETRON 4 MG: 2 INJECTION INTRAMUSCULAR; INTRAVENOUS at 09:33

## 2019-08-15 RX ADMIN — HYDROMORPHONE HYDROCHLORIDE 0.2 MG: 1 INJECTION, SOLUTION INTRAMUSCULAR; INTRAVENOUS; SUBCUTANEOUS at 05:58

## 2019-08-15 RX ADMIN — Medication 1 SMALL APPLICATION: at 10:05

## 2019-08-15 RX ADMIN — Medication 1 SMALL APPLICATION: at 17:35

## 2019-08-15 RX ADMIN — PROMETHAZINE HYDROCHLORIDE 12.5 MG: 25 INJECTION INTRAMUSCULAR; INTRAVENOUS at 12:16

## 2019-08-15 RX ADMIN — PANTOPRAZOLE SODIUM 40 MG: 40 INJECTION, POWDER, FOR SOLUTION INTRAVENOUS at 09:33

## 2019-08-15 RX ADMIN — HEPARIN SODIUM 5000 UNITS: 5000 INJECTION INTRAVENOUS; SUBCUTANEOUS at 05:58

## 2019-08-15 RX ADMIN — CALCIUM GLUCONATE: 94 INJECTION, SOLUTION INTRAVENOUS at 21:37

## 2019-08-15 RX ADMIN — SUCRALFATE 1000 MG: 1 SUSPENSION ORAL at 05:58

## 2019-08-15 RX ADMIN — HYDROMORPHONE HYDROCHLORIDE 0.2 MG: 1 INJECTION, SOLUTION INTRAMUSCULAR; INTRAVENOUS; SUBCUTANEOUS at 19:38

## 2019-08-15 RX ADMIN — HEPARIN SODIUM 5000 UNITS: 5000 INJECTION INTRAVENOUS; SUBCUTANEOUS at 14:15

## 2019-08-15 RX ADMIN — SUCRALFATE 1000 MG: 1 SUSPENSION ORAL at 21:37

## 2019-08-15 RX ADMIN — ALBUTEROL SULFATE 2 PUFF: 90 AEROSOL, METERED RESPIRATORY (INHALATION) at 21:37

## 2019-08-15 RX ADMIN — FLUCONAZOLE, SODIUM CHLORIDE 400 MG: 2 INJECTION INTRAVENOUS at 09:44

## 2019-08-15 RX ADMIN — PANTOPRAZOLE SODIUM 40 MG: 40 INJECTION, POWDER, FOR SOLUTION INTRAVENOUS at 21:37

## 2019-08-15 RX ADMIN — PROMETHAZINE HYDROCHLORIDE 12.5 MG: 25 INJECTION INTRAMUSCULAR; INTRAVENOUS at 19:37

## 2019-08-15 RX ADMIN — HYDROMORPHONE HYDROCHLORIDE 0.2 MG: 1 INJECTION, SOLUTION INTRAMUSCULAR; INTRAVENOUS; SUBCUTANEOUS at 16:20

## 2019-08-15 RX ADMIN — ALBUTEROL SULFATE 2 PUFF: 90 AEROSOL, METERED RESPIRATORY (INHALATION) at 12:07

## 2019-08-15 RX ADMIN — PROMETHAZINE HYDROCHLORIDE 12.5 MG: 25 INJECTION INTRAMUSCULAR; INTRAVENOUS at 01:27

## 2019-08-15 RX ADMIN — ONDANSETRON 4 MG: 2 INJECTION INTRAMUSCULAR; INTRAVENOUS at 16:19

## 2019-08-15 RX ADMIN — ALBUTEROL SULFATE 2 PUFF: 90 AEROSOL, METERED RESPIRATORY (INHALATION) at 17:35

## 2019-08-15 RX ADMIN — SUCRALFATE 1000 MG: 1 SUSPENSION ORAL at 12:07

## 2019-08-15 RX ADMIN — HYDROMORPHONE HYDROCHLORIDE 0.2 MG: 1 INJECTION, SOLUTION INTRAMUSCULAR; INTRAVENOUS; SUBCUTANEOUS at 12:32

## 2019-08-15 RX ADMIN — FUROSEMIDE 20 MG: 10 INJECTION, SOLUTION INTRAMUSCULAR; INTRAVENOUS at 09:34

## 2019-08-15 RX ADMIN — Medication 250 MG: at 09:48

## 2019-08-15 RX ADMIN — ALBUTEROL SULFATE 2 PUFF: 90 AEROSOL, METERED RESPIRATORY (INHALATION) at 09:48

## 2019-08-15 RX ADMIN — SUCRALFATE 1000 MG: 1 SUSPENSION ORAL at 16:19

## 2019-08-15 RX ADMIN — HEPARIN SODIUM 5000 UNITS: 5000 INJECTION INTRAVENOUS; SUBCUTANEOUS at 21:37

## 2019-08-15 NOTE — PROGRESS NOTES
Progress Note - Infectious Disease   Graham Gaston 63 y o  male MRN: [id number]  Unit/Bed#: E5 -01 Encounter: [id number]      Impression/Plan:  1  Sepsis-leukocytosis and tachycardia   Etiology most likely intra-abdominal abscess, although lung consolidations may play a role   Patient is improving slowly   He remains systemically well   Temperature is down   WBC decreased and stable   Procalcitonin decreased  Blood cultures remain negative  Atibiotic plan as in below  Monitor temperature/WBC  Monitor procalcitonin      2  Intra-abdominal abscesses-status post IR drainage with COURTNEY drains remaining in place   Abscess culture is growing strep mitis and Candida  Limited antibiotic choices based upon the patient's allergies  Patient completed 14 days of antibiotics  However, due to patient is in 4 days of fluconazole inadvertently, will continue fluconazole for 4 additional days  Continue fluconazole x 3 more days  Continue drainage per IR  Close surgical follow-up      3  Peritonitis-in the setting of a perforated marginal ulceration   The patient is now status post exploratory laparotomy with repair and stent placement, and now COURTNEY drain placement   Patient is status post placement of esophageal stent   Stent was readjusted  Antibiotic plan as in above  Continue drain as in above  Serial abdominal exams  Surgery follow-up      4  Enlarging right pleural effusion   Patient is status post thoracentesis with cloudy pleural fluid   However, pleural fluid parameters not consistent with empyema   Pleural fluid culture with no growth  Chest tube has been removed  Respiratory status stable  Antibiotic plan as in above  Monitor respiratory status      5   COPD-no current clinical evidence of an acute exacerbation at this time       6  Multiple antibiotic allergies, including PCN anaphylaxis       Discussed with patient and family in detail regarding the above plan      Antibiotics:  Vancomycin # 14  Flagyl # 20  High-dose fluconazole # 14     Subjective:  Chest tube is out  Patient is  comfortable   Abdominal pain mild and controlled  Dyspnea mild  Temperature stays down   No chills  He is tolerating antibiotics well   No nausea, vomiting or diarrhea  Objective:  Vitals:  Temp:  [98 °F (36 7 °C)-98 1 °F (36 7 °C)] 98 °F (36 7 °C)  HR:  [87-92] 88  Resp:  [18-19] 18  BP: (140-161)/(65-99) 161/99  SpO2:  [94 %-100 %] 99 %  Temp (24hrs), Av °F (36 7 °C), Min:98 °F (36 7 °C), Max:98 1 °F (36 7 °C)  Current: Temperature: 98 °F (36 7 °C)    Physical Exam:     General: Awake, alert, cooperative, no distress  Neck:  Supple  No mass  No lymphadenopathy  Lungs: Decreased breath sounds, no rales, no wheezing, respirations unlabored  Heart:  Regular rate and rhythm, S1 and S2 normal, no murmur  Abdomen: Soft, stable mild distension  Wound with VAC  Drain serial purulent, decreased in amount  Stable mild tenderness  Extremities: Stable leg edema  No erythema/warmth  No ulcer  Nontender to palpation  Skin:  No rash  Neuro: Moves all extremities  Invasive Devices     Peripherally Inserted Central Catheter Line            PICC Line 19 Right Brachial 5 days          Drain            Gastrostomy/Enterostomy Gastrostomy 22 Fr  LUQ 17 days    Closed/Suction Drain Left LUQ Bulb 8 Fr  13 days    Closed/Suction Drain Right;Lateral Abdomen Bulb 12 Fr  5 days    Negative Pressure Wound Therapy (V A C ) Abdomen Mid 1 day                Labs studies:   I have personally reviewed pertinent labs    Results from last 7 days   Lab Units 08/15/19  0615 19  0543 19  0531   POTASSIUM mmol/L 4 0 3 5 3 6   CHLORIDE mmol/L 102 100 100   CO2 mmol/L 33* 35* 35*   BUN mg/dL 11 11 10   CREATININE mg/dL 0 73 0 68 0 68   EGFR ml/min/1 73sq m 103 106 106   CALCIUM mg/dL 8 1* 7 8* 7 8*   AST U/L 19 15 19   ALT U/L 8* 8* 12   ALK PHOS U/L 123* 99 81     Results from last 7 days   Lab Units 08/15/19  0615 08/14/19  0543 08/13/19  0531   WBC Thousand/uL 12 11* 12 03* 11 56*   HEMOGLOBIN g/dL 9 5* 9 4* 9 3*   PLATELETS Thousands/uL 689* 715* 753*     Results from last 7 days   Lab Units 08/09/19  0934   GRAM STAIN RESULT  2+ Polys  Rare Mononuclear Cells  No bacteria seen   BODY FLUID CULTURE, STERILE  No growth       Imaging Studies:   I have personally reviewed pertinent imaging study reports and images in PACS  EKG, Pathology, and Other Studies:   I have personally reviewed pertinent reports

## 2019-08-15 NOTE — CASE MANAGEMENT
CM gave patient's insurance information to [facility] for peer to peer review call with insurance for [id number] when medically cleared for discharge  CM will follow-up with patient for additional choices if needed  CM department will continue to follow through discharge

## 2019-08-15 NOTE — CASE MANAGEMENT
CM received confirmation from Shobha Epps at [address] [place] [address] that the denial was overturned after the peer to peer and the finance department was able to negotiate a rate  CM informed that the authorization was for today or tomorrow and if patient unable to discharge tomorrow, the authorization process would have to be restarted  CM informed LT[facility] that the patient was not medically cleared today but would make the physician aware that the authorization was only good until tomorrow  CM informed bariatrics Cami [facility] and physician that did peer to peer review, Dr Mccracken Parents of same

## 2019-08-15 NOTE — PROGRESS NOTES
Progress Note - Bariatric Surgery   [facility] 63 y o  male MRN: [id number]  Unit/Bed#: E5 -01 Encounter: [id number]      Subjective/Objective     Subjective: This is a 63year old male s/p exploratory laparotomy with repair of perforated marginal ulcer, abdominal washout, G tube placement and GI assisted endoscopic stent placement on 7/28/19 by Dr Fuentes Alvarez also underwent a diagnostic laparoscopy  That was converted to open on 7/26/2019 with internal hernia repair  Patient had UGI on 8/5 that demonstrated a leak from the proximal aspect of the gastrojejunal stent into LUQ collection  Saba Schulz subsequently underwent EGD with stent adjustment with Dr Willian Healy 8/6  Mihir Chavez CT scan demonstrated increased volume of loculated right pleural effusion, proximal migration of esophageal stent, persistent loculated right upper quadrant ascitic fluid with a percutaneous drain in place, and diminished volume of left upper quadrant loculated perisplenic fluid with drain in place           PICC, Right chest tube and right drain readjustment by IR on 8/9  Chest tube removed yesterday   Patient reports nausea and vomiting improved overnight  On TPN  Denies fevers and chills  Voiding well  States swelling in extremities is improving   Was unable to ambulate yesterday  UGI yesterday showed no evidence of a leak or obstruction with the gastroesophageal stent in place      Objective:    /99 (BP Location: Left arm)   Pulse 88   Temp 98 °F (36 7 °C) (Temporal)   Resp 18   Ht 5' 8" (1 727 m)   Wt 75 3 kg (166 lb 0 1 oz)   SpO2 99%   BMI 25 24 kg/m²       Intake/Output Summary (Last 24 hours) at 8/15/2019 1200  Last data filed at 8/15/2019 1022  Gross per 24 hour   Intake 150 ml   Output 4650 ml   Net -4500 ml       Invasive Devices     Peripherally Inserted Central Catheter Line            PICC Line 08/09/19 Right Brachial 6 days          Drain            Gastrostomy/Enterostomy Gastrostomy 22 Fr  LUQ 17 days Closed/Suction Drain Left LUQ Bulb 8 Fr  13 days    Closed/Suction Drain Right;Lateral Abdomen Bulb 12 Fr  6 days    Negative Pressure Wound Therapy (V A C ) Abdomen Mid 1 day                ROS: 10-point system completed  All negative except see HPI  Physical Exam    General Appearance:    Alert, cooperative, no acute distress, appears stated age   Head:    Normocephalic, without obvious abnormality, atraumatic   Lungs:     Decreased breath sounds at Right base  Heart:    Regular rate and rhythm   Abdomen:     Soft, nondistended, appropriately tender  Right and left IR drains in place with seropurulent output, vac in place to suction   Extremities:   1+ edema b/l LE, generalized anasarca improving    Neurologic:  Incision:        Psych:   Normal strength and sensation    Midline incision with open wound at inferior aspect with vac in place to suction        Normal mood and affect                                       Lab, Imaging and other studies:      VTE Mechanical Prophylaxis: sequential compression device    Assessment/Plan    This is a 63year old male s/p exploratory laparotomy with repair of perforated marginal ulcer, abdominal washout, G tube placement and GI assisted endoscopic stent placement on 7/28/19 by Dr oMses President also underwent diagnostic laparoscopy that was converted to open on 7/26/2019 with internal hernia repair  s/p emergent EGD with stent repositioning on 8/6 for leak seen on UGI      Worsening nausea and vomiting overnight       - Continue Stent, will follow up with Dr DING [facility] as outpatient  - Chest tube removed  - IR drains with continued seropurulent output  - continue TPN, patient tolerating well  Nutrition recommendations appreciated    - Labs reviewed and TPN adjusted  - Continue antifungal as per ID,   - Pharmacy consult in place, appreciate continued input    - Continue wound VAC, VAC change Friday  - Continue to trend electrolytes and WBC count  - Continue to monitor drain output and quality  -drain flushing protocol  - Encourage IS, ambulation  - PPI and Carafate for GI prophylaxis  - SCDs and Heparin for DVT prophylaxis  - Case management on board        Plan of care was discussed with patient and patient's nurse  Care plan discussed with Dr Farhad Degroot: unable to discharge to home today, Potential plan for LTAC tomorrow

## 2019-08-15 NOTE — PLAN OF CARE
Problem: OCCUPATIONAL THERAPY ADULT  Goal: Performs self-care activities at highest level of function for planned discharge setting  See evaluation for individualized goals  Description  Treatment Interventions: ADL retraining, Functional transfer training, UE strengthening/ROM, Endurance training, Patient/family training, Equipment evaluation/education, Compensatory technique education, Energy conservation, Activityengagement          See flowsheet documentation for full assessment, interventions and recommendations  Note:   Limitation: Decreased ADL status, Decreased UE ROM, Decreased UE strength, Decreased endurance, Decreased self-care trans, Decreased high-level ADLs  Prognosis: Good  Assessment: Pt is a 63 y o  male seen for OT evaluation s/p adm to [address] on 7/26/2019 w/ lower back pain, radiating down B/L LEs and dx'd w/ Sepsis most likely due to intra-abdominal abscess  Pt now s/p diagnostic laparoscopy conversion to open, reduction and repair of internal hernia on 7/26/19, s/p exploratory laparotomy with repair of perforated marginal ulcer, abdominal washout, G tube placement and GI assisted endoscopic stent placement on 7/28/19, s/p EGD with stent adjustment on 8/6/19, and s/p PICC, R chset tube, and R drain readjustment on 8/9/19  Comorbidities affecting pts functional performance include a significant PMH of Arthritis, Asthma, Cervical radiculopathy, Chronic paid disorder, COPD, DM, Heart murmur, HLD, Migraine, and lung cancer  Pt with active OT orders and activity orders for Activity as tolerated  Pt lives with spouse in a bi-level house with 4-5 ENRIQUE and 6 steps to main living area  At baseline, pt was I w/ ADLs, IADLs, and functional mobility/transfers w/o use of AD, (+) , and reports 0 falls PTA   Upon evaluation, pt currently requires Min A for overall ADLs and Min A for functional mobility/transfers 2* the following deficits impacting occupational performance: weakness, decreased strength, decreased balance, decreased tolerance and increased pain  These impairments, as well at pts steps to enter environment, difficulty performing ADLS and difficulty performing IADLS  limit pts ability to safely engage in all baseline areas of occupation  Pt scored overall 55/100 on the Barthel Index  Pt to continue to benefit from continued acute OT services during hospital stay to address defined deficits and to maximize level of functional independence in the following Occupational Performance areas: grooming, bathing/shower, toilet hygiene, dressing, medication management, health maintenance, functional mobility, community mobility, clothing management, cleaning, meal prep, money management and social participation  From OT standpoint, recommend STR upon D/C  Pt may be good candidate for [facility]   OT will continue to follow pt 3-5x/wk to address the following goals to  w/in 10-14 days:     OT Discharge Recommendation: Short Term Rehab  OT - OK to Discharge: Yes(when medically cleared to rehab)

## 2019-08-15 NOTE — PLAN OF CARE
Problem: Potential for Falls  Goal: Patient will remain free of falls  Description  INTERVENTIONS:  - Assess patient frequently for physical needs  -  Identify cognitive and physical deficits and behaviors that affect risk of falls  -  [place] fall precautions as indicated by assessment   - Educate patient/family on patient safety including physical limitations  - Instruct patient to call for assistance with activity based on assessment  - Modify environment to reduce risk of injury  - Consider OT/PT consult to assist with strengthening/mobility  Outcome: Progressing     Problem: Nutrition/Hydration-ADULT  Goal: Nutrient/Hydration intake appropriate for improving, restoring or maintaining nutritional needs  Description  Monitor and assess patient's nutrition/hydration status for malnutrition (ex- brittle hair, bruises, dry skin, pale skin and conjunctiva, muscle wasting, smooth red tongue, and disorientation)  Collaborate with interdisciplinary team and initiate plan and interventions as ordered  Monitor patient's weight and dietary intake as ordered or per policy  Utilize nutrition screening tool and intervene per policy  Determine patient's food preferences and provide high-protein, high-caloric foods as appropriate       INTERVENTIONS:  - Monitor oral intake, urinary output, labs, and treatment plans  - Assess nutrition and hydration status and recommend course of action  - Evaluate amount of meals eaten  - Assist patient with eating if necessary   - Allow adequate time for meals  - Recommend/ encourage appropriate diets, oral nutritional supplements, and vitamin/mineral supplements  - Order, calculate, and assess calorie counts as needed  - Recommend, monitor, and adjust tube feedings and TPN/PPN based on assessed needs  - Assess need for intravenous fluids  - Provide specific nutrition/hydration education as appropriate  - Include patient/family/caregiver in decisions related to nutrition  Outcome: Progressing     Problem: PAIN - ADULT  Goal: Verbalizes/displays adequate comfort level or baseline comfort level  Description  Interventions:  - Encourage patient to monitor pain and request assistance  - Assess pain using appropriate pain scale  - Administer analgesics based on type and severity of pain and evaluate response  - Implement non-pharmacological measures as appropriate and evaluate response  - Consider cultural and social influences on pain and pain management  - Notify physician/advanced practitioner if interventions unsuccessful or patient reports new pain  Outcome: Progressing     Problem: INFECTION - ADULT  Goal: Absence or prevention of progression during hospitalization  Description  INTERVENTIONS:  - Assess and monitor for signs and symptoms of infection  - Monitor lab/diagnostic results  - Monitor all insertion sites, i e  indwelling lines, tubes, and drains  - Monitor endotracheal (as able) and nasal secretions for changes in amount and color  - [place] appropriate cooling/warming therapies per order  - Administer medications as ordered  - Instruct and encourage patient and family to use good hand hygiene technique  - Identify and instruct in appropriate isolation precautions for identified infection/condition  Outcome: Progressing     Problem: DISCHARGE PLANNING  Goal: Discharge to home or other facility with appropriate resources  Description  INTERVENTIONS:  - Identify barriers to discharge w/patient and caregiver  - Arrange for needed discharge resources and transportation as appropriate  - Identify discharge learning needs (meds, wound care, etc )  - Arrange for interpretive services to assist at discharge as needed  - Refer to Case Management Department for coordinating discharge planning if the patient needs post-hospital services based on physician/advanced practitioner order or complex needs related to functional status, cognitive ability, or social support system  Outcome: Progressing     Problem: Knowledge Deficit  Goal: Patient/family/caregiver demonstrates understanding of disease process, treatment plan, medications, and discharge instructions  Description  Complete learning assessment and assess knowledge base    Interventions:  - Provide teaching at level of understanding  - Provide teaching via preferred learning methods  Outcome: Progressing     Problem: GASTROINTESTINAL - ADULT  Goal: Minimal or absence of nausea and/or vomiting  Description  INTERVENTIONS:  - Administer IV fluids as ordered to ensure adequate hydration  - Maintain NPO status until nausea and vomiting are resolved  - Nasogastric tube as ordered  - Administer ordered antiemetic medications as needed  - Provide nonpharmacologic comfort measures as appropriate  - Advance diet as tolerated, if ordered  - Nutrition services referral to assist patient with adequate nutrition and appropriate food choices  Outcome: Progressing  Goal: Maintains or returns to baseline bowel function  Description  INTERVENTIONS:  - Assess bowel function  - Encourage oral fluids to ensure adequate hydration  - Administer IV fluids as ordered to ensure adequate hydration  - Administer ordered medications as needed  - Encourage mobilization and activity  - Nutrition services referral to assist patient with appropriate food choices  Outcome: Progressing  Goal: Maintains adequate nutritional intake  Description  INTERVENTIONS:  - Monitor percentage of each meal consumed  - Identify factors contributing to decreased intake, treat as appropriate  - Assist with meals as needed  - Monitor I&O, WT and lab values  - Obtain nutrition services referral as needed  Outcome: Progressing     Problem: SKIN/TISSUE INTEGRITY - ADULT  Goal: Skin integrity remains intact  Description  INTERVENTIONS  - Identify patients at risk for skin breakdown  - Assess and monitor skin integrity  - Assess and monitor nutrition and hydration status  - Monitor labs (i e  albumin)  - Assess for incontinence   - Turn and reposition patient  - Assist with mobility/ambulation  - Relieve pressure over bony prominences  - Avoid friction and shearing  - Provide appropriate hygiene as needed including keeping skin clean and dry  - Evaluate need for skin moisturizer/barrier cream  - Collaborate with interdisciplinary team (i e  Nutrition, Rehabilitation, etc )   - Patient/family teaching  Outcome: Progressing  Goal: Incision(s), wounds(s) or drain site(s) healing without S/S of infection  Description  INTERVENTIONS  - Assess and document risk factors for skin impairment   - Assess and document dressing, incision, wound bed, drain sites and surrounding tissue  - Initiate Nutrition services consult and/or wound management as needed  Outcome: Progressing  Goal: Oral mucous membranes remain intact  Description  INTERVENTIONS  - Assess oral mucosa and hygiene practices  - Implement preventative oral hygiene regimen  - Implement oral medicated treatments as ordered  - Initiate Nutrition services referral as needed  Outcome: Progressing     Problem: Prexisting or High Potential for Compromised Skin Integrity  Goal: Skin integrity is maintained or improved  Description  INTERVENTIONS:  - Identify patients at risk for skin breakdown  - Assess and monitor skin integrity  - Assess and monitor nutrition and hydration status  - Monitor labs (i e  albumin)  - Assess for incontinence   - Turn and reposition patient  - Assist with mobility/ambulation  - Relieve pressure over bony prominences  - Avoid friction and shearing  - Provide appropriate hygiene as needed including keeping skin clean and dry  - Evaluate need for skin moisturizer/barrier cream  - Collaborate with interdisciplinary team (i e  Nutrition, Rehabilitation, etc )   - Patient/family teaching  Outcome: Progressing     Problem: SAFETY,RESTRAINT: NV/NON-SELF DESTRUCTIVE BEHAVIOR  Goal: Remains free of harm/injury (restraint for non violent/non self-detsructive behavior)  Description  INTERVENTIONS:  - Instruct patient/family regarding restraint use   - Assess and monitor physiologic and psychological status   - Provide interventions and comfort measures to meet assessed patient needs   - Identify and implement measures to help patient regain control  - Assess readiness for release of restraint   Outcome: Progressing  Goal: Returns to optimal restraint-free functioning  Description  INTERVENTIONS:  - Assess the patient's behavior and symptoms that indicate continued need for restraint  - Identify and implement measures to help patient regain control  - Assess readiness for release of restraint   Outcome: Progressing

## 2019-08-15 NOTE — OCCUPATIONAL THERAPY NOTE
[address]  Evaluation     Patient Name: Carlos No  OZEDI'U Date: 8/15/2019  Problem List  Patient Active Problem List   Diagnosis    Right scapholunate ligament tear    History of pulmonary embolism    Saucedo's esophagus without dysplasia    Cervical disc disorder with radiculopathy of cervicothoracic region    Cervical radiculopathy    S/P repair of paraesophageal hernia    Bariatric surgery status    Postsurgical malabsorption    Adenocarcinoma of lung (HCC)    Dyslipidemia    Reactive hypoglycemia    Vitamin D deficiency    Low vitamin B12 level    Generalized abdominal pain    Constipation    Internal hernia    Chronic marginal ulcer with perforation ([address] )    Severe protein-calorie malnutrition (HCC)    Abnormal CT scan, lung    Coagulopathy (HCC)    Diet-controlled diabetes mellitus (HCC)    Electrolyte disturbance    Near syncope     Past Medical History  Past Medical History:   Diagnosis Date    Anesthesia     Pt wakes up during "almost every surgery"    Arthritis     Asthma     Bariatric surgery status     Cervical radiculopathy     Chemotherapy follow-up examination     Chronic pain     Chronic pain disorder     COPD (chronic obstructive pulmonary disease) ([address] )     Diabetes mellitus ([facility] )     borderline "years ago"    Food allergy     clams    GERD (gastroesophageal reflux disease)     Heart murmur     Hiatal hernia     History of malignant neoplasm     History of pneumonia 04/12/2016    History of pulmonary embolism     History of ulceration     Hyperlipidemia     Lung cancer (HCC)     left lung, radiation and chemo tx    Migraine     Pneumonia     Postgastrectomy malabsorption     Right scapholunate ligament tear     Skipped heart beats     Wears partial dentures     upper and lower     Past Surgical History  Past Surgical History:   Procedure Laterality Date    BRONCHOSCOPY      COLONOSCOPY      FRACTURE SURGERY      femur right 1985  GASTRIC BYPASS LAPAROSCOPIC N/A 7/12/2017    Procedure: GASTRIC DIVERSION WITH [place]-EN-Y RECONSTRUCTION WITH  SHORT 60 cm LIMB;  Surgeon: Gerhardt Ely, MD;  [facility];  Service: Miriam Brand IR CHEST TUBE  8/9/2019    IR IMAGE GUIDED ASPIRATION / DRAINAGE  8/1/2019    IR PICC LINE  8/9/2019    KNEE ARTHROSCOPY Right     right shoulder    LAPAROTOMY N/A 7/28/2019    Procedure: LAPAROTOMY EXPLORATORY, WASHOUT OF SUCUS, REPAIR OF MARGINAL ULCER PERFORATION, ABD WASHOUT, INTRAOPERATIVE EGD, GI ASSISTED ENDOSCOPIC STENT PLACEMENT;  Surgeon: Lauri Castañeda MD;  [facility];  Service: Domenica Aid Left     LYMPHADENECTOMY  05/22/2012    Dr Jignesh Garcias ANT INTERBODY MIN DISCECTOMY, CERVICAL BELOW C2 N/A 10/17/2017    Procedure: C5-6, C6-7 ACDF WITH ALLOGRAFT (NEURO MONITORING); Surgeon: Pat Calzada MD;  [facility];  Service: Orthopedics    IN COLONOSCOPY FLX DX W/COLLJ SPEC WHEN PFRMD N/A 4/14/2017    Procedure: COLONOSCOPY;  Surgeon: Bernadette Colon MD;  [facility];  Service: Gastroenterology    IN EGD TRANSORAL BIOPSY SINGLE/MULTIPLE N/A 1/9/2019    Procedure: ESOPHAGOGASTRODUODENOSCOPY (EGD); Surgeon: Gerhardt Ely, MD;  [facility]; Service: Bariatrics    IN ESOPHAGOGASTRODUODENOSCOPY TRANSORAL DIAGNOSTIC N/A 1/11/2017    Procedure: ESOPHAGOGASTRODUODENOSCOPY (EGD); Surgeon: Bernadette Colon MD;  [facility];   Service: Gastroenterology    IN INCISE FINGER TENDON SHEATH Right 11/27/2018    Procedure: RELEASE TRIGGER FINGER long and index finger;  Surgeon: Salomon Do MD;  [facility];  Service: Orthopedics    IN LAP, REPAIR PARAESOPHAGEAL HERNIA, INCL FUNDOPLASTY W/ MESH N/A 7/12/2017    Procedure: REPAIR HERNIA PARAESOPHAGEAL  LAPAROSCOPIC;  Surgeon: Gerhardt Ely, MD;  [facility];  Service: Bariatrics    IN [address] ABDOMEN N/A 7/26/2019    Procedure: LAPAROSCOPY DIAGNOSTIC CONVERSION TO OPEN, REDUCTION AND REPAIR OF INTERNAL HERNIA, REPAIR SEROSAL TEARS;  Surgeon: Armand Lin MD;  [facility];  Service: Leandra Wesley DC WRIST [place] LIG Right 6/9/2016    Procedure: RELEASE CARPAL TUNNEL ENDOSCOPIC;  Surgeon: Glendale Apley, MD;  [facility];  Service: Orthopedics    RECONSTRUCTION DISTAL RADIUS/ULNA JOINT (DRUJ) Right 9/6/2016    Procedure: WRIST SCAPHOLUNATE LIGAMENT RECONSTRUCTION WITH ECRB TENDON AUTOGRAPH , SPLINT APPLICATION ;  Surgeon: Glendale Apley, MD;  [facility];  Service:     SHOULDER SURGERY      TONSILLECTOMY             08/15/19 1141   Note Type   Note type Eval only   Restrictions/Precautions   Weight Bearing Precautions Per Order No   Other Precautions Multiple lines; Fall Risk;Pain  (COURTNEY drains, wound vac)   Pain Assessment   Pain Assessment 0-10   Pain Score Worst Possible Pain   Pain Location Abdomen   Pain Orientation Mid   Hospital Pain Intervention(s) Repositioned; Ambulation/increased activity; Emotional support; Rest   Home Living   Type of [address] Two level;Stairs to enter with rails  (bi-level; 4-5 ENRIQUE, 6 steps to main living area)   Bathroom Shower/Tub Tub/shower unit   Bathroom Toilet Standard   Bathroom Equipment Other (Comment)  (no DME)   [facility]   Additional Comments Pt lives with spouse in a bi-level house with 4-5 ENRIQUE and 6 steps to main living area     Prior Function   Level of [place] Independent with ADLs and functional mobility   Lives With Spouse   Receives Help From Family   ADL Assistance Independent   IADLs Independent   Falls in the last 6 months 0   Vocational On disability   Comments At baseline, pt was I w/ ADLs, IADLs, and functional mobility/transfers w/o use of AD, (+) , and reports 0 falls PTA   Lifestyle   Autonomy At baseline, pt was I w/ ADLs, IADLs, and functional mobility/transfers w/o use of AD, (+) , and reports 0 falls PTA   Reciprocal Relationships Lives with spouse   Service to Others On disability   Intrinsic Gratification Watching TV   Psychosocial   Psychosocial (WDL) WDL   ADL   Where Assessed Edge of bed   Eating Assistance 5  Supervision/Setup   Grooming Assistance 5  Supervision/Setup   UB Bathing Assistance 5  Supervision/Setup   LB Bathing Assistance 4  Minimal Assistance   [address] 5  Supervision/Setup   LB Dressing Assistance 4  [address] 4  Minimal Assistance   Bed Mobility   Supine to [address]   Additional items HOB elevated; Bedrails; Increased time required;Verbal cues   Sit to Supine 5  Supervision   Additional items Increased time required;Verbal cues   Additional Comments Pt lying supine at end of session with call bell and phone within reach  All needs met and pt reports no further questions for OT at this time   Transfers   Sit to Stand 4  Minimal assistance   Additional items Assist x 1; Increased time required;Verbal cues   Stand to Sit 4  Minimal assistance   Additional items Assist x 1; Increased time required;Verbal cues   Additional Comments Cues for safe technique and hand placement   Functional Mobility   Functional Mobility 4  Minimal assistance   Additional Comments Assist x1   Additional items Rolling walker   Balance   Static Sitting Fair +   Dynamic Sitting Fair   Static Standing Fair -   Dynamic Standing Poor +   Ambulatory Poor +   Activity Tolerance   Activity Tolerance Patient limited by fatigue;Patient limited by pain   Medical Staff Made Aware Hong, PT   Nurse Made Aware yes;  Kailyn Woo RN   RUE Assessment   RUE Assessment X  (decreased shldr flex; Elbow-distal=WFL)   RUE Overall AROM   R Shoulder Flexion 80-90*   RUE Strength   RUE Overall Strength Within Functional Limits - able to perform ADL tasks with strength  (3+/5 throughout)   LUE Assessment   LUE Assessment WFL   LUE Strength   LUE Overall Strength Within Functional Limits - able to perform ADL tasks with strength  (3+/5 throughout)   Hand Function   Gross Motor Coordination Functional   Fine Motor Coordination Functional   Sensation   Light Touch Partial deficits in the RUE   Proprioception   Proprioception No apparent deficits   Vision - Complex Assessment   Ocular Range of Motion WFL   Acuity Able to read clock/calendar on wall without difficulty   Perception   Inattention/Neglect Appears intact   Cognition   Overall Cognitive Status [facility]   Arousal/Participation Alert; Cooperative   Attention Within functional limits   Orientation Level Oriented X4   Memory Within functional limits   Following Commands Follows all commands and directions without difficulty   Assessment   Limitation Decreased ADL status; Decreased UE ROM; Decreased UE strength;Decreased endurance;Decreased self-care trans;Decreased high-level ADLs   Prognosis Good   Assessment Pt is a 63 y o  male seen for OT evaluation s/p adm to [address] on 7/26/2019 w/ lower back pain, radiating down B/L LEs and dx'd w/ Sepsis most likely due to intra-abdominal abscess  Pt now s/p diagnostic laparoscopy conversion to open, reduction and repair of internal hernia on 7/26/19, s/p exploratory laparotomy with repair of perforated marginal ulcer, abdominal washout, G tube placement and GI assisted endoscopic stent placement on 7/28/19, s/p EGD with stent adjustment on 8/6/19, and s/p PICC, R chset tube, and R drain readjustment on 8/9/19  Comorbidities affecting pts functional performance include a significant PMH of Arthritis, Asthma, Cervical radiculopathy, Chronic paid disorder, COPD, DM, Heart murmur, HLD, Migraine, and lung cancer  Pt with active OT orders and activity orders for Activity as tolerated  Pt lives with spouse in a bi-level house with 4-5 ENRIQUE and 6 steps to main living area   At baseline, pt was I w/ ADLs, IADLs, and functional mobility/transfers w/o use of AD, (+) , and reports 0 falls PTA  Upon evaluation, pt currently requires Min A for overall ADLs and Min A for functional mobility/transfers 2* the following deficits impacting occupational performance: weakness, decreased strength, decreased balance, decreased tolerance and increased pain  These impairments, as well at pts steps to enter environment, difficulty performing ADLS and difficulty performing IADLS  limit pts ability to safely engage in all baseline areas of occupation  Pt scored overall 55/100 on the Barthel Index  Pt to continue to benefit from continued acute OT services during hospital stay to address defined deficits and to maximize level of functional independence in the following Occupational Performance areas: grooming, bathing/shower, toilet hygiene, dressing, medication management, health maintenance, functional mobility, community mobility, clothing management, cleaning, meal prep, money management and social participation  From OT standpoint, recommend STR upon D/C  Pt may be good candidate for [employer]  OT will continue to follow pt 3-5x/wk to address the following goals to  w/in 10-14 days:   Goals   Patient Goals To have less pain   LTG Time Frame 10-14   Long Term Goal Please refer to LTGs listed below   Plan   Treatment Interventions ADL retraining;Functional transfer training;UE strengthening/ROM; Endurance training;Patient/family training;Equipment evaluation/education; Compensatory technique education; Energy conservation; Activityengagement   Goal Expiration Date 19   Treatment Day 0   OT Frequency 3-5x/wk   Recommendation   OT Discharge Recommendation Short Term Rehab   OT - OK to Discharge Yes  (when medically cleared to rehab)   Barthel Index   Feeding 10   Bathing 0   Grooming Score 5   Dressing Score 5   Bladder Score 10   Bowels Score 10   Toilet Use Score 5   Transfers (Bed/Chair) Score 10   Mobility (Level Surface) Score 0   Stairs Score 0   Barthel Index Score 55   Modified [place] Scale   Modified [place] Scale 4      GOALS    1) Pt will improve activity tolerance to G for min 30 min txment sessions for increase engagement in functional tasks    2) Pt will complete UB/LB dressing/self care w/ mod I using adaptive device and DME as needed    3) Pt will complete bathing w/ Mod I w/ use of AE and DME as needed    4) Pt will complete toileting w/ mod I w/ G hygiene/thoroughness using DME as needed    5) Pt will improve functional transfers to Mod I on/off all surfaces using DME as needed w/ G balance/safety     6) Pt will improve functional mobility during ADL/IADL/leisure tasks to Mod I using DME as needed w/ G balance/safety     7) Pt will participate in simulated IADL management task to increase independence to Mod I w/ G safety and endurance    8) Pt will be attentive 100% of the time during ongoing cognitive assessment w/ G participation to assist w/ safe d/c planning/recommendations    9) Pt will demonstrate G carryover of pt/caregiver education and training as appropriate w/o cues w/ good tolerance to increase safety during functional tasks    10) Pt will demonstrate 100% carryover of energy conservation techniques t/o functional I/ADL/leisure tasks w/o cues s/p skilled education to increase endurance during functional tasks     11) Pt will increase BUE strength by 1MM grade to increase independence in ADLs and transfers      Eloise Deleon OTR/L

## 2019-08-15 NOTE — CONSULTS
Consult Note - Wound   Gwynneth Pour 63 y o  male MRN: [id number]  Unit/Bed#: E5 -01 Encounter: [id number]      History and Present Illness:  63year old male presented to the hospital with lower back pain radiating to legs and abdominal pain  Patient is status post hernia repair 7/26/19 and exploratory laparotomy with gastric tube, endoscopic stent placement, perforated ulcer repair with washout on 7/28/19  Assessment Findings:   Patient seen per physician consult  Patient is pale, cachectic  Able to turn independently in bed for assessment  Nutrition team following--patient on TPN and bariatric diet with supplements  Skin generally intact--buttocks, heels, back, sacrum  Preventative foam dressing to sacrum  COURTNEY drain to bilateral upper quadrants of abdomen to bulb suction  Midline abdominal incision--proximal aspect is approximated with staples and open to air  Distal aspect with VAC dressing intact at 125 mmHG low continuous suction (next change due Friday)  Plan: Will follow-up with bariatric surgical team on Friday August 16th for [facility] change vs wet-to-dry dressing (if being transferred to rehab)  Preventative skin care orders placed  Negative Pressure Wound Therapy (V A C ) Abdomen Mid (Active)   Unit Type [place] 8/15/2019 11:54 AM   Black foam- # applied 2 8/14/2019  8:01 AM   White foam- # applied 1 8/14/2019  8:01 AM   Cycle Continuous; On 8/15/2019 11:54 AM   Target Pressure (mmHg) 125 8/15/2019 11:54 AM   Dressing Status Clean;Dry; Intact 8/15/2019 11:54 AM   Output (mL) 25 mL 8/14/2019  6:55 PM       Wound 07/26/19 Incision Abdomen N/A (Active)   Wound Description Clean;Dry 8/15/2019 11:54 AM   Maria Teresa-wound Assessment Clean;Dry; Intact 8/15/2019 11:54 AM   Closure Unapproximated; Approximated;Staples 8/15/2019 11:54 AM   Drainage Amount Scant 8/15/2019 11:54 AM   Drainage Description Serous 8/15/2019 11:54 AM   Treatments Cleansed;Site care 8/13/2019  7:28 AM   Dressing Open to air;Wound V A C  8/15/2019 11:54 AM   Wound packed? Yes 8/13/2019  7:28 AM   Packing- # removed 1 8/13/2019  7:28 AM   Packing- # inserted 1 8/13/2019  7:28 AM   Dressing Changed Changed by provider 8/13/2019  7:28 AM   Patient Tolerance Tolerated well 8/15/2019 11:54 AM   Dressing Status Clean; Intact;Dry 8/15/2019 11:54 AM         John GUAJARDON, RN, [facility] [place]

## 2019-08-15 NOTE — PHYSICAL THERAPY NOTE
PT EVALUATION    Pt  Name: [facility]  Pt  Age: 63 y o    MRN: [id number]  LENGTH OF STAY: 21    Patient Active Problem List   Diagnosis    Right scapholunate ligament tear    History of pulmonary embolism    Saucedo's esophagus without dysplasia    Cervical disc disorder with radiculopathy of cervicothoracic region    Cervical radiculopathy    S/P repair of paraesophageal hernia    Bariatric surgery status    Postsurgical malabsorption    Adenocarcinoma of lung (HCC)    Dyslipidemia    Reactive hypoglycemia    Vitamin D deficiency    Low vitamin B12 level    Generalized abdominal pain    Constipation    Internal hernia    Chronic marginal ulcer with perforation (HCC)    Severe protein-calorie malnutrition (HCC)    Abnormal CT scan, lung    Coagulopathy (HCC)    Diet-controlled diabetes mellitus (HCC)    Electrolyte disturbance    Near syncope       Admitting Diagnoses:   Hernia, internal [K45 8]  Generalized abdominal pain [R10 84]  Internal hernia [K45 8]  Bariatric surgery status [Z98 84]    Past Medical History:   Diagnosis Date    Anesthesia     Pt wakes up during "almost every surgery"    Arthritis     Asthma     Bariatric surgery status     Cervical radiculopathy     Chemotherapy follow-up examination     Chronic pain     Chronic pain disorder     COPD (chronic obstructive pulmonary disease) ([address] )     Diabetes mellitus ([address] )     borderline "years ago"    Food allergy     clams    GERD (gastroesophageal reflux disease)     Heart murmur     Hiatal hernia     History of malignant neoplasm     History of pneumonia 04/12/2016    History of pulmonary embolism     History of ulceration     Hyperlipidemia     Lung cancer (HCC)     left lung, radiation and chemo tx    Migraine     Pneumonia     Postgastrectomy malabsorption     Right scapholunate ligament tear     Skipped heart beats     Wears partial dentures     upper and lower       Past Surgical History: Procedure Laterality Date    BRONCHOSCOPY      COLONOSCOPY      FRACTURE SURGERY      femur right 1985    GASTRIC BYPASS LAPAROSCOPIC N/A 7/12/2017    Procedure: GASTRIC DIVERSION WITH BR[place]-EN-Y RECONSTRUCTION WITH  SHORT 60 cm LIMB;  Surgeon: Socorro Newell MD;  [facility];  Service: Gladis Ban IR CHEST TUBE  8/9/2019    IR IMAGE GUIDED ASPIRATION / DRAINAGE  8/1/2019    IR PICC LINE  8/9/2019    KNEE ARTHROSCOPY Right     right shoulder    LAPAROTOMY N/A 7/28/2019    Procedure: LAPAROTOMY EXPLORATORY, WASHOUT OF SUCUS, REPAIR OF MARGINAL ULCER PERFORATION, ABD WASHOUT, INTRAOPERATIVE EGD, GI ASSISTED ENDOSCOPIC STENT PLACEMENT;  Surgeon: Sonia Castro MD;  [facility];  Service: Svetlana Patience Left     LYMPHADENECTOMY  05/22/2012    Dr Noni Garza ANT INTERBODY MIN DISCECTOMY, CERVICAL BELOW C2 N/A 10/17/2017    Procedure: C5-6, C6-7 ACDF WITH ALLOGRAFT (NEURO MONITORING); Surgeon: June Webster MD;  [facility];  Service: Orthopedics    AL COLONOSCOPY FLX DX W/COLLJ SPEC WHEN PFRMD N/A 4/14/2017    Procedure: COLONOSCOPY;  Surgeon: Geoffrey Jiang MD;  [facility];  Service: Gastroenterology    AL EGD TRANSORAL BIOPSY SINGLE/MULTIPLE N/A 1/9/2019    Procedure: ESOPHAGOGASTRODUODENOSCOPY (EGD); Surgeon: Socorro Newell MD;  [facility]; Service: Bariatrics    AL ESOPHAGOGASTRODUODENOSCOPY TRANSORAL DIAGNOSTIC N/A 1/11/2017    Procedure: ESOPHAGOGASTRODUODENOSCOPY (EGD); Surgeon: Geoffrey Jiang MD;  [facility];   Service: Gastroenterology    AL INCISE FINGER TENDON SHEATH Right 11/27/2018    Procedure: RELEASE TRIGGER FINGER long and index finger;  Surgeon: Celestino Torres MD;  [facility];  Service: Orthopedics    AL LAP, REPAIR PARAESOPHAGEAL HERNIA, INCL FUNDOPLASTY W/ MESH N/A 7/12/2017    Procedure: REPAIR HERNIA PARAESOPHAGEAL LAPAROSCOPIC;  Surgeon: Rosa Maria Santiago MD;  [facility];  Service: Bariatrics    NV LAP,DIAGNOSTIC ABDOMEN N/A 7/26/2019    Procedure: LAPAROSCOPY DIAGNOSTIC CONVERSION TO OPEN, REDUCTION AND REPAIR OF INTERNAL HERNIA, REPAIR SEROSAL TEARS;  Surgeon: Alesha Quintanilla MD;  [facility];  Service: Bariatrics    NV WRIST Classie Heading LIG Right 6/9/2016    Procedure: RELEASE CARPAL TUNNEL ENDOSCOPIC;  Surgeon: Mayte Rivera MD;  [facility];  Service: Orthopedics    RECONSTRUCTION DISTAL RADIUS/ULNA JOINT (DRUJ) Right 9/6/2016    Procedure: WRIST SCAPHOLUNATE LIGAMENT RECONSTRUCTION WITH ECRB TENDON AUTOGRAPH , SPLINT APPLICATION ;  Surgeon: Mayte Rivera MD;  [facility];  Service:    [place] SHOULDER SURGERY      TONSILLECTOMY         Imaging Studies:  XR abdomen 1 view kub   Final Result by Aline Mariano MD (08/15 5039)      Unchanged position of gastroesophageal stent  Workstation performed: [id number]         FL upper GI UGI   Final Result by Tabatha Brand MD (08/14 1057)      No evidence of a leak or obstruction with the gastroesophageal stent in place  Workstation performed: [id number]         XR chest portable   Final Result by Mily Granados MD (08/14 1236)      Stable position of distal esophageal/GE junction stent      No change from yesterday  Continued right basilar opacity which could represent atelectasis and/or loculated pleural fluid            Workstation performed: [id number]         XR chest portable   Final Result by Mara John MD (08/13 1050)   Stable small right pleural effusion and adjacent consolidation  No evidence of pneumothorax  Persistent small left pleural effusion    Bilateral drainage catheters unchanged      Stable appearance distal esophageal stent            Workstation performed: [id number]         XR abdomen obstruction series   Final Result by Hanh Sampson MD (08/12 9515)      Unchanged retained contrast material in the colon, which may reflect ileus  No definite evidence for obstruction  Persistent small bilateral pleural effusions  Postsurgical changes, as above  Workstation performed: [id number]         XR chest portable   Final Result by Willard Wright MD (08/10 6625)      Relatively stable bilateral pleural effusions right greater than left with persistent right basilar airspace opacity most likely reflecting atelectasis  Workstation performed: [id number]         IR PICC line   Final Result by Loki Low MD (08/09 1500)   Impression: Technically successful placement of line  Workstation performed: [id number]         IR tube change   Final Result by Loki Low MD (08/09 1508)   Impression: Successful upsizing and repositioning of the abscess drainage catheter  If the patient experiences decreased output over the weekend, treatment with TPA can be considered  Workstation performed: [id number]         IR chest tube   Final Result by Loki Low MD (08/09 1317)      CT chest abdomen pelvis w contrast   Final Result by Ron Silva MD (08/08 1540)      Increased volume of loculated right pleural effusion compared with prior  Minimal left pleural fluid and bilateral pleural thickening also noted  There has been partial interval clearing of the lungs since the prior exam but there is still significant atelectasis in the right lower lobe  The esophageal stent has migrated proximally compared with the prior study but still bridges the GE junction and appears widely patent  Grossly stable amount of loculated right upper quadrant ascitic fluid with a percutaneous drain in place  Diminished volume of left upper quadrant loculated perisplenic fluid with drain in place  Small amount of free air in the abdomen  Loculated fluid collections adjacent to the stomach and in the pelvis    The fluid near the stomach has increased in volume  The fluid in the pelvis has decreased in volume  Thickening of small bowel wall in the left side of the abdomen  Thickening of the wall the gallbladder  Generalized edema  Workstation performed: [id number]         XR chest 1 view   Final Result by Florentin Mason MD (16/85 9782)   FINDINGS/IMPRESSION:      1  Intraoperative imaging guidance for endoscopic stent placement  2   Stent appears to have deployed at the lower esophagus      3  No acute findings  See operative report for further details  Workstation performed: [id number]         FL upper GI UGI   Final Result by Miguel Angel Mancera MD (08/05 1514)      Leak from the proximal aspect of the gastrojejunal stent into the left upper quadrant collection with drainage catheter in place  I personally discussed this study with [address] on 8/5/2019 at 3:14 PM                Workstation performed: [id number]         VAS lower limb venous duplex study, complete bilateral   Final Result by Ruma Plascencia MD (08/04 5827)      IR image guided aspiration / drainage   Final Result by Percy Granger MD (08/01 6557)   Impression:      Successful CT-guided drainage of the perihepatic and perisplenic fluid collection      Workstation performed: [id number]         CT chest abdomen pelvis w contrast   Final Result by Nabeel Delgado MD (08/01 3257)         1  Large rim-enhancing subcapsular fluid collection surrounding the liver and extending along the paracolic gutter, consistent with abscess, measuring approximately 25 3 cm craniocaudal by 1 7 cm anteroposterior by 2 9 cm transverse  There is a 2nd    loculated rim-enhancing collection surrounding the spleen, also consistent with abscess, measuring approximately 12 1 cm craniocaudal by 1 1 cm anterior posterior by 2 4 cm transverse  There is free fluid within the dependent portion of pelvis with    evidence of peritonitis           2   Scattered groundglass opacities within the lungs, concerning for pneumonia  There is a septated inspissated material within the trachea and right mainstem bronchus, which may indicate component of aspiration  Bibasilar atelectasis also present  3   Loculated moderate size right pleural effusion with trace component concerning for developing empyema as described above  Trace left effusion also present  4   Dilated small bowel loops throughout the abdomen without transition point, consistent with ileus  5   Postsurgical changes of Britney-en-Y gastric bypass surgery and placement of gastrostomy tube and stent as described above  I personally discussed this study with Tien Hendricks on 8/1/2019 at 2:16 PM                Workstation performed: [id number]         FL upper GI UGI   Final Result by Spencer Stout MD (07/30 0910)      No evidence of leak  Workstation performed: [id number]         XR chest 1 view   Final Result by Tabatha Brand MD (11/19 2806)      Fluoroscopic guidance provided for surgical procedure  Please refer to the separate procedure notes for additional details  Workstation performed: [id number]              08/15/19 1140   Note Type   Note type Eval only   Pain Assessment   Pain Score Worst Possible Pain   Pain Type Acute pain;Surgical pain   Pain Location Abdomen   Hospital Pain Intervention(s) Repositioned;Medication (See MAR); Ambulation/increased activity; Emotional support; Rest   Home Living   Type of [facility] Two level;Stairs to enter with rails  (bilevel; 4-5STE to porch + 6STE to main living area & bedroo)   Bathroom Shower/Tub Tub/shower unit   [address] [facility]   Prior Function   Level of [place] Independent with ADLs and functional mobility  (w/o AD)   Lives With Spouse  (who works)   Receives Help From   [address] in the last 6 months 0   Vocational On disability   Restrictions/Precautions Weight Bearing Precautions Per Order No   Other Precautions Multiple lines; Fall Risk;Pain  (COURTNEY drains; wound vac)   General   Family/Caregiver Present Yes  (wife)   Cognition   Overall Cognitive Status WFL   Arousal/Participation Alert   Orientation Level Oriented X4   Following Commands Follows all commands and directions without difficulty   RUE Assessment   RUE Assessment   (refer to OT)   LUE Assessment   LUE Assessment   (refer to OT)   RLE Assessment   RLE Assessment WFL   Strength RLE   RLE Overall Strength 4/5   LLE Assessment   LLE Assessment WFL   Strength LLE   LLE Overall Strength 4/5   Coordination   Movements are Fluid and Coordinated 1   Sensation WFL   Bed Mobility   Supine to Sit 5  Supervision   Additional items HOB elevated; Bedrails; Increased time required;Verbal cues   Sit to Supine 5  Supervision   Additional items Increased time required;Verbal cues   Additional Comments cues for techniques; log rolling   Transfers   Sit to Stand 4  Minimal assistance   Additional items Assist x 1; Increased time required;Verbal cues   Stand to Sit 4  Minimal assistance   Additional items Assist x 1; Increased time required;Verbal cues   Additional Comments cues for techniques   Ambulation/Elevation   Gait pattern Decreased foot clearance; Forward Flexion; Short stride; Excessively slow   Gait Assistance 4  Minimal assist   Additional items Assist x 1;Verbal cues; Tactile cues  (+ assist for lines)   Assistive Device Rolling walker   Distance 30'x1   Balance   Static Sitting Fair +   Static Standing Fair -  (w/ RW)   Ambulatory Poor +  (w/ RW)   Endurance Deficit   Endurance Deficit Yes   Endurance Deficit Description pain   Activity Tolerance   Activity Tolerance Patient limited by pain   Medical Staff Made Aware OT Pepito Herrera   Nurse Made Aware RN [facility]   Assessment   Prognosis Good   Problem List Decreased strength;Decreased endurance; Impaired balance;Decreased mobility;Pain;Decreased skin integrity   Assessment Pt   63 y o male w/o LRYGB & hiatal repair in 2017 by Dr Tana Morales w/ over 100lb wt loss  Pt presented to ED due to generalized abdominal pain, back pain with radiation bilaterally in posterior legs x 5 days  CT scan suggestive of internal hernia with twisting of mesenteric vessels and mesenteric edema causing bowel obstruction  Pt admitted for Sepsis mostly like due to intra-abdominal abscess &/or lung consolidations w/ Intra-abdominal abscesses-status post IR drainage with COURTNEY drains; Peritonitis in the setting of perforated marginal ulceration s/p exp  lap w/ repair & stent placement; R pleural effusion s/p thoracentesis w/ cloudy pleural fluid  R chest tube removed on 8/14  Pt s/p exploratory laparotomy with repair of perforated marginal ulcer, abdominal washout, G tube placement and GI assisted endoscopic stent placement on 7/28/19 by Dr Sushant Childers also underwent a diagnostic laparoscopy that was converted to open on 7/26/2019 with internal hernia repair  Patient had UGI on 8/5 that demonstrated a leak from the proximal aspect of the gastrojejunal stent into LUQ collection  Then he subsequently underwent EGD with stent adjustment with Dr Ashkan Escobar 8/6  Pt referred to PT for mobility assessment & D/C planning w/ orders of activity as tolerated  PTA, pt reports being I w/o AD  On eval, pt demonstrate dec mobility, balance, endurance & amb  Pt require minAx1 for most functional mobility + cues for techniques  Gait deviations as above, slow & guarded w/ dec foot clearance & strides but no gross LOB noted  Dec amb tolerance 2* to pain  No dizziness reported t/o session  Nsg staff most recent vital signs as follows: /99 (BP Location: Left arm)   Pulse 88   Temp 98 °F (36 7 °C) (Temporal)   Resp 18   Ht 5' 8" (1 727 m)   Wt 75 3 kg (166 lb 0 1 oz)   SpO2 99%   BMI 25 24 kg/m²   At end of session, pt back in bed w/o issues, call bell & phone in reach  All lines intact   Fall precautions reinforced w/ good understanding  Pt functioning below baseline hence will continue skilled PT to improve function & safety  At this time, due to above mentioned deficits & high risk for falls, pt will [facility] [employer]  CM to follow  Nsg staff to continue to mobilized pt (OOB in chair for all meals & ambulate in room/unit) as tolerated to prevent further decline in function  Nsg notified  Barriers to Discharge Inaccessible home environment;Decreased caregiver support   Barriers to Discharge Comments (+) ENRIQUE; home alone when wife works   Goals   Patient Goals to have less pain   STG Expiration Date 08/25/19   Short Term Goal #1 Goals to be met in 10 days; pt will be able to: 1) inc strength & balance by 1/2 grade to improve overall functional mobility & dec fall risk; 2) inc bed mobility to I for pt to be able to get in/OOB safely w/ proper techniques 100% of the time, to dec caregiver assistance & safely function at home; 3) inc transfers to modified I for pt to transition safely from one surface to another w/o % of the time, to dec caregiver assistance & safely function at home; 4) inc amb w/ RW approx  >80' w/ modified I for pt to ambulate household distances w/o any % of the time, to dec caregiver assistance & safely function at home; 5) inc barthel score to 75 to decrease overall risk for falls; 6) negotiate stairs w/ S for inc safety during stair mgt inside/outside of home & dec caregiver assistance; 7) pt/caregiver ed   Treatment Day 0   Plan   Treatment/Interventions Functional transfer training;LE strengthening/ROM; Elevations; Therapeutic exercise; Endurance training;Patient/family training;Bed mobility;Gait training;Spoke to nursing;OT;Spoke to MD;Family   PT Frequency Other (Comment)  (3-5x/wk)   Recommendation   Recommendation Short-term skilled PT; Other (Comment)  ([facility] per [employer])   Equipment Recommended Walker   PT - OK to Discharge Yes  (to inpt rehab when medically cleared)   Barthel Index   Feeding 10   Bathing 0   Grooming Score 5   Dressing Score 5   Bladder Score 10   Bowels Score 10   Toilet Use Score 5   Transfers (Bed/Chair) Score 10   Mobility (Level Surface) Score 0   Stairs Score 0   Barthel Index Score 55   Hx/personal factors: co-morbidities, inaccessible home, dec caregiver support, mutliple lines, use of AD, pain, fall risk and assist w/ ADL's; multiple lines  Examination: dec mobility, dec balance, dec endurance, dec amb, moderate fall risk, pain  Clinical: unpredictable (ongoing medical status, abnormal lab values, moderate fall risk; pain mgt)  Complexity: high     Arlean Romana, PT

## 2019-08-15 NOTE — PLAN OF CARE
Problem: PHYSICAL THERAPY ADULT  Goal: Performs mobility at highest level of function for planned discharge setting  See evaluation for individualized goals  Description  Treatment/Interventions: Functional transfer training, LE strengthening/ROM, Elevations, Therapeutic exercise, Endurance training, Patient/family training, Bed mobility, Gait training, Spoke to nursing, OT, Spoke to MD, Family  Equipment Recommended: Yamile Oconnell       See flowsheet documentation for full assessment, interventions and recommendations  Note:   Prognosis: Good  Problem List: Decreased strength, Decreased endurance, Impaired balance, Decreased mobility, Pain, Decreased skin integrity  Assessment: (P) Pt  63 y  o male w/o LRYGB & hiatal repair in 2017 by Dr Eri Bloom w/ over 100lb wt loss  Pt presented to ED due to generalized abdominal pain, back pain with radiation bilaterally in posterior legs x 5 days  CT scan suggestive of internal hernia with twisting of mesenteric vessels and mesenteric edema causing bowel obstruction  Pt admitted for Sepsis mostly like due to intra-abdominal abscess &/or lung consolidations w/ Intra-abdominal abscesses-status post IR drainage with COURTNEY drains; Peritonitis in the setting of perforated marginal ulceration s/p exp  lap w/ repair & stent placement; R pleural effusion s/p thoracentesis w/ cloudy pleural fluid  R chest tube removed on 8/14  Pt s/p exploratory laparotomy with repair of perforated marginal ulcer, abdominal washout, G tube placement and GI assisted endoscopic stent placement on 7/28/19 by Dr Caren Andres  Pt also underwent a diagnostic laparoscopy that was converted to open on 7/26/2019 with internal hernia repair  Patient had UGI on 8/5 that demonstrated a leak from the proximal aspect of the gastrojejunal stent into LUQ collection  Then he subsequently underwent EGD with stent adjustment with Dr Bear Ding on 8/6   Pt referred to PT for mobility assessment & D/C planning w/ orders of activity as tolerated  PTA, pt reports being I w/o AD  On eval, pt demonstrate dec mobility, balance, endurance & amb  Pt require minAx1 for most functional mobility + cues for techniques  Gait deviations as above, slow & guarded w/ dec foot clearance & strides but no gross LOB noted  Dec amb tolerance 2* to pain  No dizziness reported t/o session  Nsg staff most recent vital signs as follows: /99 (BP Location: Left arm)   Pulse 88   Temp 98 °F (36 7 °C) (Temporal)   Resp 18   Ht 5' 8" (1 727 m)   Wt 75 3 kg (166 lb 0 1 oz)   SpO2 99%   BMI 25 24 kg/m²   At end of session, pt back in bed w/o issues, call bell & phone in reach  All lines intact  Fall precautions reinforced w/ good understanding  Pt functioning below baseline hence will continue skilled PT to improve function & safety  At this time, due to above mentioned deficits & high risk for falls, pt will benefit from inpt rehab at D/C  [facility] per [employer]  CM to follow  Nsg staff to continue to mobilized pt (OOB in chair for all meals & ambulate in room/unit) as tolerated to prevent further decline in function  Nsg notified  Barriers to Discharge: Inaccessible home environment, Decreased caregiver support  Barriers to Discharge Comments: (+) ENRIQUE; home alone when wife works  Recommendation: Short-term skilled PT, Other (Comment)([facility] per [employer])     PT - OK to Discharge: Yes(to inpt rehab when medically cleared)    See flowsheet documentation for full assessment

## 2019-08-16 VITALS
OXYGEN SATURATION: 99 % | HEIGHT: 68 IN | SYSTOLIC BLOOD PRESSURE: 144 MMHG | RESPIRATION RATE: 18 BRPM | BODY MASS INDEX: 25.16 KG/M2 | HEART RATE: 83 BPM | WEIGHT: 166.01 LBS | DIASTOLIC BLOOD PRESSURE: 79 MMHG | TEMPERATURE: 97.9 F

## 2019-08-16 LAB
ALBUMIN SERPL BCP-MCNC: 1.6 G/DL (ref 3.5–5)
ALP SERPL-CCNC: 131 U/L (ref 46–116)
ALT SERPL W P-5'-P-CCNC: 10 U/L (ref 12–78)
ANION GAP SERPL CALCULATED.3IONS-SCNC: 5 MMOL/L (ref 4–13)
AST SERPL W P-5'-P-CCNC: 18 U/L (ref 5–45)
BASOPHILS # BLD AUTO: 0.06 THOUSANDS/ΜL (ref 0–0.1)
BASOPHILS NFR BLD AUTO: 1 % (ref 0–1)
BILIRUB SERPL-MCNC: 0.31 MG/DL (ref 0.2–1)
BUN SERPL-MCNC: 13 MG/DL (ref 5–25)
CALCIUM SERPL-MCNC: 7.8 MG/DL (ref 8.3–10.1)
CHLORIDE SERPL-SCNC: 102 MMOL/L (ref 100–108)
CO2 SERPL-SCNC: 31 MMOL/L (ref 21–32)
CREAT SERPL-MCNC: 0.74 MG/DL (ref 0.6–1.3)
EOSINOPHIL # BLD AUTO: 0.14 THOUSAND/ΜL (ref 0–0.61)
EOSINOPHIL NFR BLD AUTO: 1 % (ref 0–6)
ERYTHROCYTE [DISTWIDTH] IN BLOOD BY AUTOMATED COUNT: 14.8 % (ref 11.6–15.1)
GFR SERPL CREATININE-BSD FRML MDRD: 102 ML/MIN/1.73SQ M
GLUCOSE SERPL-MCNC: 115 MG/DL (ref 65–140)
HCT VFR BLD AUTO: 29.7 % (ref 36.5–49.3)
HGB BLD-MCNC: 9.5 G/DL (ref 12–17)
IMM GRANULOCYTES # BLD AUTO: 0.06 THOUSAND/UL (ref 0–0.2)
IMM GRANULOCYTES NFR BLD AUTO: 1 % (ref 0–2)
LYMPHOCYTES # BLD AUTO: 1.22 THOUSANDS/ΜL (ref 0.6–4.47)
LYMPHOCYTES NFR BLD AUTO: 10 % (ref 14–44)
MAGNESIUM SERPL-MCNC: 2 MG/DL (ref 1.6–2.6)
MCH RBC QN AUTO: 30.1 PG (ref 26.8–34.3)
MCHC RBC AUTO-ENTMCNC: 32 G/DL (ref 31.4–37.4)
MCV RBC AUTO: 94 FL (ref 82–98)
MONOCYTES # BLD AUTO: 1.23 THOUSAND/ΜL (ref 0.17–1.22)
MONOCYTES NFR BLD AUTO: 10 % (ref 4–12)
NEUTROPHILS # BLD AUTO: 9.3 THOUSANDS/ΜL (ref 1.85–7.62)
NEUTS SEG NFR BLD AUTO: 77 % (ref 43–75)
NRBC BLD AUTO-RTO: 0 /100 WBCS
PHOSPHATE SERPL-MCNC: 3.8 MG/DL (ref 2.7–4.5)
PLATELET # BLD AUTO: 588 THOUSANDS/UL (ref 149–390)
PMV BLD AUTO: 8.6 FL (ref 8.9–12.7)
POTASSIUM SERPL-SCNC: 4 MMOL/L (ref 3.5–5.3)
PROT SERPL-MCNC: 6.6 G/DL (ref 6.4–8.2)
RBC # BLD AUTO: 3.16 MILLION/UL (ref 3.88–5.62)
SODIUM SERPL-SCNC: 138 MMOL/L (ref 136–145)
WBC # BLD AUTO: 12.01 THOUSAND/UL (ref 4.31–10.16)

## 2019-08-16 PROCEDURE — 85025 COMPLETE CBC W/AUTO DIFF WBC: CPT | Performed by: SURGERY

## 2019-08-16 PROCEDURE — 80053 COMPREHEN METABOLIC PANEL: CPT | Performed by: SURGERY

## 2019-08-16 PROCEDURE — C9113 INJ PANTOPRAZOLE SODIUM, VIA: HCPCS | Performed by: PHYSICIAN ASSISTANT

## 2019-08-16 PROCEDURE — 99024 POSTOP FOLLOW-UP VISIT: CPT | Performed by: SURGERY

## 2019-08-16 PROCEDURE — 84100 ASSAY OF PHOSPHORUS: CPT | Performed by: SURGERY

## 2019-08-16 PROCEDURE — 83735 ASSAY OF MAGNESIUM: CPT | Performed by: SURGERY

## 2019-08-16 PROCEDURE — 99232 SBSQ HOSP IP/OBS MODERATE 35: CPT | Performed by: INTERNAL MEDICINE

## 2019-08-16 RX ORDER — FLUCONAZOLE 2 MG/ML
400 INJECTION, SOLUTION INTRAVENOUS EVERY 24 HOURS
Status: DISCONTINUED | OUTPATIENT
Start: 2019-08-17 | End: 2019-08-16 | Stop reason: HOSPADM

## 2019-08-16 RX ORDER — OXYCODONE HCL 5 MG/5 ML
5 SOLUTION, ORAL ORAL EVERY 4 HOURS PRN
Qty: 15 ML | Refills: 0 | Status: CANCELLED | OUTPATIENT
Start: 2019-08-16 | End: 2019-08-26

## 2019-08-16 RX ORDER — DEXTROSE MONOHYDRATE 100 MG/ML
50 INJECTION, SOLUTION INTRAVENOUS CONTINUOUS
Qty: 500 ML | Refills: 0
Start: 2019-08-16 | End: 2019-09-18 | Stop reason: ALTCHOICE

## 2019-08-16 RX ORDER — PANTOPRAZOLE SODIUM 40 MG/1
40 INJECTION, POWDER, FOR SOLUTION INTRAVENOUS EVERY 12 HOURS SCHEDULED
Qty: 1 EACH | Refills: 0 | Status: SHIPPED | OUTPATIENT
Start: 2019-08-16 | End: 2019-09-18 | Stop reason: ALTCHOICE

## 2019-08-16 RX ORDER — OXYCODONE HCL 5 MG/5 ML
10 SOLUTION, ORAL ORAL EVERY 4 HOURS PRN
Qty: 15 ML | Refills: 0 | Status: SHIPPED | OUTPATIENT
Start: 2019-08-16 | End: 2019-08-26

## 2019-08-16 RX ORDER — SIMETHICONE 80 MG
80 TABLET,CHEWABLE ORAL EVERY 6 HOURS PRN
Qty: 30 TABLET | Refills: 0 | Status: SHIPPED | OUTPATIENT
Start: 2019-08-16 | End: 2019-09-18 | Stop reason: ALTCHOICE

## 2019-08-16 RX ORDER — FLUCONAZOLE 2 MG/ML
400 INJECTION, SOLUTION INTRAVENOUS EVERY 24 HOURS
Qty: 400 ML | Refills: 0 | Status: SHIPPED | OUTPATIENT
Start: 2019-08-16 | End: 2019-08-18

## 2019-08-16 RX ORDER — DEXTROSE MONOHYDRATE 100 MG/ML
50 INJECTION, SOLUTION INTRAVENOUS CONTINUOUS
Status: DISCONTINUED | OUTPATIENT
Start: 2019-08-16 | End: 2019-08-16 | Stop reason: HOSPADM

## 2019-08-16 RX ORDER — DIPHENHYDRAMINE HYDROCHLORIDE 50 MG/ML
25 INJECTION INTRAMUSCULAR; INTRAVENOUS EVERY 6 HOURS PRN
Qty: 10 ML | Refills: 0 | Status: SHIPPED | OUTPATIENT
Start: 2019-08-16 | End: 2021-07-01

## 2019-08-16 RX ORDER — SUCRALFATE ORAL 1 G/10ML
1000 SUSPENSION ORAL
Qty: 420 ML | Refills: 0 | Status: SHIPPED | OUTPATIENT
Start: 2019-08-16 | End: 2019-10-03 | Stop reason: SDUPTHER

## 2019-08-16 RX ORDER — ONDANSETRON 2 MG/ML
4 INJECTION INTRAMUSCULAR; INTRAVENOUS EVERY 8 HOURS
Qty: 2 ML | Refills: 0 | Status: SHIPPED | OUTPATIENT
Start: 2019-08-16 | End: 2019-09-18 | Stop reason: ALTCHOICE

## 2019-08-16 RX ORDER — DEXTROSE MONOHYDRATE 100 MG/ML
50 INJECTION, SOLUTION INTRAVENOUS CONTINUOUS
Status: DISCONTINUED | OUTPATIENT
Start: 2019-08-16 | End: 2019-08-16

## 2019-08-16 RX ORDER — ALBUTEROL SULFATE 90 UG/1
2 AEROSOL, METERED RESPIRATORY (INHALATION) 4 TIMES DAILY
Refills: 0
Start: 2019-08-16 | End: 2021-07-01

## 2019-08-16 RX ORDER — SACCHAROMYCES BOULARDII 250 MG
250 CAPSULE ORAL 2 TIMES DAILY
Qty: 15 CAPSULE | Refills: 0 | Status: SHIPPED | OUTPATIENT
Start: 2019-08-16 | End: 2019-09-18 | Stop reason: ALTCHOICE

## 2019-08-16 RX ORDER — PROMETHAZINE HYDROCHLORIDE 25 MG/ML
12.5 INJECTION, SOLUTION INTRAMUSCULAR; INTRAVENOUS EVERY 6 HOURS PRN
Qty: 1 ML | Refills: 0 | Status: SHIPPED | OUTPATIENT
Start: 2019-08-16 | End: 2019-09-18 | Stop reason: ALTCHOICE

## 2019-08-16 RX ORDER — HEPARIN SODIUM 5000 [USP'U]/ML
5000 INJECTION, SOLUTION INTRAVENOUS; SUBCUTANEOUS EVERY 8 HOURS SCHEDULED
Qty: 1 ML | Refills: 0 | Status: SHIPPED | OUTPATIENT
Start: 2019-08-16 | End: 2019-09-18 | Stop reason: ALTCHOICE

## 2019-08-16 RX ADMIN — HYDROMORPHONE HYDROCHLORIDE 0.2 MG: 1 INJECTION, SOLUTION INTRAMUSCULAR; INTRAVENOUS; SUBCUTANEOUS at 10:56

## 2019-08-16 RX ADMIN — HYDROMORPHONE HYDROCHLORIDE 0.2 MG: 1 INJECTION, SOLUTION INTRAMUSCULAR; INTRAVENOUS; SUBCUTANEOUS at 04:52

## 2019-08-16 RX ADMIN — HEPARIN SODIUM 5000 UNITS: 5000 INJECTION INTRAVENOUS; SUBCUTANEOUS at 06:09

## 2019-08-16 RX ADMIN — SUCRALFATE 1000 MG: 1 SUSPENSION ORAL at 13:10

## 2019-08-16 RX ADMIN — SUCRALFATE 1000 MG: 1 SUSPENSION ORAL at 06:09

## 2019-08-16 RX ADMIN — ALBUTEROL SULFATE 2 PUFF: 90 AEROSOL, METERED RESPIRATORY (INHALATION) at 08:17

## 2019-08-16 RX ADMIN — PROMETHAZINE HYDROCHLORIDE 12.5 MG: 25 INJECTION INTRAMUSCULAR; INTRAVENOUS at 04:52

## 2019-08-16 RX ADMIN — ALBUTEROL SULFATE 2 PUFF: 90 AEROSOL, METERED RESPIRATORY (INHALATION) at 13:10

## 2019-08-16 RX ADMIN — HYDROMORPHONE HYDROCHLORIDE 0.2 MG: 1 INJECTION, SOLUTION INTRAMUSCULAR; INTRAVENOUS; SUBCUTANEOUS at 01:45

## 2019-08-16 RX ADMIN — PANTOPRAZOLE SODIUM 40 MG: 40 INJECTION, POWDER, FOR SOLUTION INTRAVENOUS at 08:02

## 2019-08-16 RX ADMIN — Medication 250 MG: at 08:03

## 2019-08-16 RX ADMIN — FLUCONAZOLE, SODIUM CHLORIDE 400 MG: 2 INJECTION INTRAVENOUS at 10:44

## 2019-08-16 RX ADMIN — HYDROMORPHONE HYDROCHLORIDE 0.2 MG: 1 INJECTION, SOLUTION INTRAMUSCULAR; INTRAVENOUS; SUBCUTANEOUS at 08:13

## 2019-08-16 RX ADMIN — ONDANSETRON 4 MG: 2 INJECTION INTRAMUSCULAR; INTRAVENOUS at 00:23

## 2019-08-16 RX ADMIN — HYDROMORPHONE HYDROCHLORIDE 0.2 MG: 1 INJECTION, SOLUTION INTRAMUSCULAR; INTRAVENOUS; SUBCUTANEOUS at 13:09

## 2019-08-16 RX ADMIN — ONDANSETRON 4 MG: 2 INJECTION INTRAMUSCULAR; INTRAVENOUS at 08:00

## 2019-08-16 NOTE — DISCHARGE INSTR - AVS FIRST PAGE
Nursing orders   1  Flush left and right drains with 10 ml of normal saline every 8 hours with output checks  Change tube dressings daily as needed  2  Put G tube to gravity if patient becomes bloated  3  NO NG TUBE ON THIS PATIENT AT ANY TIME  4  EHOB offloading (waffle) cushion to chair when OOB  Moisturize skin daily with lotion      Wound Care Instructions    Apply Allevyn Life foam dressing to midline sacrum (small sacral) for prevention   Mp with P   Peel back at least daily for skin assessment and re-apply   Change dressing every 3 days and PRN  Abdominal wound with wound VAC instructions as specified     Wound Vacuum (VAC)    Wound with less than 1 cm depth   Undermining is minimal   Would discontinue use of white sponge and simply use adaptic in wound bed covered by thin layer of black foam so as not to over pack wound   Create small bride to accommodate TRAC pad   Place at 125 mmHg low continuous suction   Change dressing Monday, Wednesday, Friday  Continue TPN with nutrition recommendations and Bariatric clear liquids     Continue 2 more days of Fluconazole  Continue Protonix IV BID/Carafate, Heparin, Pain and nausea control   Follow up in 2-3 weeks with Dr Dayana Oh and Dr Terrance Mendez

## 2019-08-16 NOTE — CASE MANAGEMENT
CM spoke to Mainor Mayen PA-C and patient may be medically cleared today  CM called patient's insurance and spoke to [employer] stating that authorization for BLS transport was not required because going to another facility for continued care, Call Reference [id number]  CM called [facility] and spoke to Brennen Alegre, arranged for BLS transport at 1300 today pending medical clearance  CM informed bariatrics of transport time and printed TPN order form for her to complete for [facility]  CM  Completed medical necessity form and placed on patient's sticker chart

## 2019-08-16 NOTE — PROGRESS NOTES
Progress Note - Infectious Disease   Graham Gaston 63 y o  male MRN: [id number]  Unit/Bed#: E5 -01 Encounter: [id number]      Impression/Plan:  1  Sepsis-leukocytosis and tachycardia   Etiology most likely intra-abdominal abscess, although lung consolidations may play a role   Patient is improving slowly   He remains systemically well   Temperature is down   WBC decreased and stable   Procalcitonin decreased  Blood cultures remain negative  Atibiotic plan as in below  Monitor temperature/WBC  Monitor procalcitonin      2  Intra-abdominal abscesses-status post IR drainage with COURTNEY drains remaining in place   Abscess culture is growing strep mitis and Candida  Limited antibiotic choices based upon the patient's allergies     Patient completed 14 days of antibiotics   However, due to patient is in 4 days of fluconazole inadvertently, will continue fluconazole for 2 additional days  Continue fluconazole x 2 more days  Continue drainage per IR  Close surgical follow-up      3  Peritonitis-in the setting of a perforated marginal ulceration   The patient is now status post exploratory laparotomy with repair and stent placement, and now COURTNEY drain placement   Patient is status post placement of esophageal stent   Stent was readjusted  Antibiotic plan as in above  Continue drain as in above  Serial abdominal exams  Surgery follow-up      4  Enlarging right pleural effusion   Patient is status post thoracentesis with cloudy pleural fluid   However, pleural fluid parameters not consistent with empyema   Pleural fluid culture with no growth  Chest tube has been removed  Respiratory status stable  Antibiotic plan as in above  Monitor respiratory status      5   COPD-no current clinical evidence of an acute exacerbation at this time       6  Multiple antibiotic allergies, including PCN anaphylaxis       Discussed with patient in detail regarding the above plan      Antibiotics:  High-dose fluconazole # 15     Subjective:  Patient is  comfortable   Abdominal pain mild and controlled  Dyspnea mild, stable  Temperature stays down   No chills  He is tolerating antibiotics well   No nausea, vomiting or diarrhea  Objective:  Vitals:  Temp:  [97 9 °F (36 6 °C)-98 6 °F (37 °C)] 97 9 °F (36 6 °C)  HR:  [83-97] 83  Resp:  [18] 18  BP: (139-168)/() 144/79  SpO2:  [95 %-100 %] 99 %  Temp (24hrs), Av 3 °F (36 8 °C), Min:97 9 °F (36 6 °C), Max:98 6 °F (37 °C)  Current: Temperature: 97 9 °F (36 6 °C)    Physical Exam:     General: Awake, alert, cooperative, no distress  Neck:  Supple  No mass  No lymphadenopathy  Lungs: Decreased breath sounds, no rales, no wheezing, respirations unlabored  Heart:  Regular rate and rhythm, S1 and S2 normal, no murmur  Abdomen: Soft, mildly distended  Wound with VAC, without purulence  Drain serial purulent  Improving incisional tenderness  Extremities: Stable leg edema  No erythema/warmth  No ulcer  Nontender to palpation  Skin:  No rash  Neuro: Moves all extremities  Invasive Devices     Peripherally Inserted Central Catheter Line            PICC Line 19 Right Brachial 7 days          Drain            Gastrostomy/Enterostomy Gastrostomy 22 Fr  LUQ 18 days    Closed/Suction Drain Left LUQ Bulb 8 Fr  14 days    Closed/Suction Drain Right;Lateral Abdomen Bulb 12 Fr  7 days    Negative Pressure Wound Therapy (V A C ) Abdomen Mid 2 days                Labs studies:   I have personally reviewed pertinent labs    Results from last 7 days   Lab Units 19  0606 08/15/19  0615 19  0543   POTASSIUM mmol/L 4 0 4 0 3 5   CHLORIDE mmol/L 102 102 100   CO2 mmol/L 31 33* 35*   BUN mg/dL 13 11 11   CREATININE mg/dL 0 74 0 73 0 68   EGFR ml/min/1 73sq m 102 103 106   CALCIUM mg/dL 7 8* 8 1* 7 8*   AST U/L 18 19 15   ALT U/L 10* 8* 8*   ALK PHOS U/L 131* 123* 99     Results from last 7 days   Lab Units 19  0606 08/15/19  0615 19  0543 WBC Thousand/uL 12 01* 12 11* 12 03*   HEMOGLOBIN g/dL 9 5* 9 5* 9 4*   PLATELETS Thousands/uL 588* 689* 715*           Imaging Studies:   I have personally reviewed pertinent imaging study reports and images in PACS  EKG, Pathology, and Other Studies:   I have personally reviewed pertinent reports

## 2019-08-16 NOTE — TRANSPORTATION MEDICAL NECESSITY
Section I - General Information    Name of Patient: Fermin Nicholas                 : 1960    Medicare #: [id number]  Transport Date: 19 (PCS is valid for round trips on this date and for all repetitive trips in the 60-day range as noted below )  Origin: [place]                                                         Destination: [address] [place] [address]; [facility]    Is the pt's stay covered under Medicare Part A (PPS/DRG)   []     Closest appropriate facility? If no, why is transport to more distant facility required? Yes  If hospice pt, is this transport related to pt's terminal illness? NA       Section II - Medical Necessity Questionnaire  Ambulance transportation is medically necessary only if other means of transport are contraindicated or would be potentially harmful to the patient  To meet this requirement, the patient must either be "bed confined" or suffer from a condition such that transport by means other than ambulance is contraindicated by the patient's condition  The following questions must be answered by the medical professional signing below for this form to be valid:    1)  Describe the MEDICAL CONDITION (physical and/or mental) of this patient AT [address] [place] [address] that requires the patient to be transported in an ambulance and why transport by other means is contraindicated by the patient's condition:oxygen    2) Is the patient "bed confined" as defined below? No  To be "be confined" the patient must satisfy all three of the following conditions: (1) unable to get up from bed without Assistance; AND (2) unable to ambulate; AND (3) unable to sit in a chair or wheelchair  3) Can this patient safely be transported by car or wheelchair van (i e , seated during transport without a medical attendant or monitoring)?    No    4) In addition to completing questions 1-3 above, please check any of the following conditions that apply*:   *Note: supporting documentation for any boxes checked must be maintained in the patient's medical records  If hosp-hosp transfer, describe services needed at 2nd facility not available at 1st facility? Medical attendant required   Requires oxygen-unable to self administer      Section III - Signature of Physician or Healthcare Professional  I certify that the above information is true and correct based on my evaluation of this patient, and represent that the patient requires transport by ambulance and that other forms of transport are contraindicated  I understand that this information will be used by the Centers for Medicare and Medicaid Services (CMS) to support the determination of medical necessity for ambulance services, and I represent that I have personal knowledge of the patient's condition at time of transport  []  If this box is checked, I also certify that the patient is physically or mentally incapable of signing the ambulance service's claim and that the institution with which I am affiliated has furnished care, services, or assistance to the patient  My signature below is made on behalf of the patient pursuant to 42 CFR §424 36(b)(4)  In accordance with 42 CFR §424 37, the specific reason(s) that the patient is physically or mentally incapable of signing the claim form is as follows: [place] Physician* or Healthcare Professional______NOBLE Blair ________________________________________________________  Signature Date 08/16/19 (For scheduled repetitive transports, this form is not valid for transports performed more than 60 days after this date)    Printed Name & Credentials of Physician or Healthcare Professional (MD, DO, RN, etc )______                   NOBLE Lee __________________________  *Form must be signed by patient's attending physician for scheduled, repetitive transports   For non-repetitive, unscheduled ambulance transports, if unable to obtain the signature of the attending physician, any of the following may sign (choose appropriate option below)  [] Physician Assistant []  Clinical Nurse Specialist []  Registered Nurse  []  Nurse Practitioner  [x] Discharge Planner

## 2019-08-16 NOTE — DISCHARGE SUMMARY
Discharge Summary - [facility] 63 y o  male MRN: [id number]    Unit/Bed#: E5 -01 Encounter: [id number]      Pre-Operative Diagnosis: Pre-Op Diagnosis Codes:     * Generalized abdominal pain [R10 84]    Post-Operative Diagnosis: Post-Op Diagnosis Codes:     * Generalized abdominal pain [R10 84]    Procedures Performed:  Procedure(s):  LAPAROTOMY EXPLORATORY, WASHOUT OF SUCUS, REPAIR OF MARGINAL ULCER PERFORATION, ABD WASHOUT, INTRAOPERATIVE EGD, GI ASSISTED ENDOSCOPIC STENT PLACEMENT    Surgeon: Vickie Kong MD    See H & P for full details of admission and Operative Note for full details of operations performed  [facility] is a 43-year-old male with a history of Britney-en-Y gastric bypass and hiatal hernia repair in 2017 by Dr Christianne Turner  Originally presented to the ED on July 26, 2019 with complaints of abdominal pain and back pain with radiation to bilateral posterior legs  CT scan done at the time was suggestive of internal hernia reflected by swirling and twisting of mesenteric vessels in the superior mesenteric artery region with profound mesenteric edema  Patient was sent to the OR that day with Dr Oswaldo Perez for diagnostic laparoscopy with internal hernia repair  Initially this was a laparoscopic procedure however the hernia was extremely tight and required open procedure  Patient originally doing well postoperatively however by postop day 2 was found to have worsening dyspnea, tachycardia, and abdominal pain  There was concern for abdominal compartment syndrome with small-bowel ischemia and possible necrosis, possible small bowel leak from serosal injuries during prior reduction of the internal hernia   At that time, the patient was sedated therefore consent was obtained by his wife and patient to the OR for exploratory laparotomy, washout of succus, repair of marginal ulcer perforation, abdominal washout, intraoperative EGD, GI assisted endoscopic stent placement, gastrostomy tube placement on 7/28/2019 by Dr Hany Bliss  Additionally GI was consulted to place a stent in the jejunum  Patient remained intubated and therefore was transferred to the intensive care unit under critical care service  Subsequently, he developed profound metabolic acidosis, he does also have respiratory acidosis due to his underlying COPD  He was treated supportively with IV fluid resuscitation and bicarbonate  The patient was hemodynamically stable off pressors without signs of distress  Patient was successfully extubated the following morning and transferred to [place] [address]  He was being maintained on levofloxacin, flagyl and fluconazole  Patient remained afebrile however was noted to have a large bump in his white count  Repeat CT of the chest, abdomen and pelvis revealed evidence of intra-abdominal abscess formation and loculated pleural effusion on the right  Patient subsequently taken to IR and underwent placement of 2 COURTNEY drains on 8/1/2019  ID also consulted with recommendations made to initiate Vancomycin, Aztreonam and continue Flagyl and high dose Fluconazole for 14 days total, with continuous monitoring of his white count and procalcitonin  Tube feeds had been started however patient reported difficulty tolerating them, noted abdominal distension and discomfort and lack of BMs for several days  Molasses enema administered and patient reported improvement; tube feeds restarted  Patient was slowly improving however midline incision began to appear more erythematous with mild drainage which was cultured, showing few colonies of streptococcus mitis oralis  Staples removed from the inferior aspect of the wound and open wound was packed with 4x4 gauze and overlying abdominal pad  White count continued to trend down  Repeat UGI on 8/5/2019 showed evidence of leak from the proximal aspect of the [address] stent into the left upper quadrant collection with drainage   Subsequently, patient underwent EGD with successful stent adjustment by Dr Jordan Nice; tube feeds held  Repeat CT on 8/8/2019 continued to show grossly stable amount of loculated fluid in RUQ, increased volume of loculated right pleural effusion and on 8/9/2049 patient to IR again for right drain adjustment, chest tube placement and PICC line  TPN was initiated given continued intolerance of tube feeds, with recommendations made by nutrition  SOB improved secondary to chest tube placement and doses of IV lasix  Wound dressings continued twice daily and incision continued to look clean and appearing to heal slowly  Electrolytes continued to be repleted as well  On 8/11/2019, diet was advanced to bariatric full liquids which patient tolerated well  He was also continued on TPN  Patient reported some vomiting of phlegm and mucus however repeat UGI showed stent position was grossly unchanged without need for stent removal or readjustment    Wound VAC was ordered with white and black sponges applied to inferior aspect of midline incisional wound with good patient response  Chest tube with minimal output and removed by IR on 8/14  Patient completed course of Vanc/Flagyl on 8/14 however to continue Fluconazole for an additional 4 days as per ID recommendations  Patient overall improving slowly however requires LTAC facility upon discharge for continued care/nutrition  Wound VAC was removed today and wound packed with adaptic and wet to dry dressing  He will continue on wound VAC upon admission into rehab facility  Wound has improved with healthy granulation tissue present  Patient also to continue bariatric clear liquids, TPN with nutrition on board for any TPN changes  To continue IV PPI BID/Carafate  Also to continue daily drain flushes and 2 more days of Fluconazole  Will follow up in the office with Dr Tawnya Padgett in 2-3 weeks as well as Dr Jordan Nice to discuss stent management and possible removal            Patient was seen and examined prior to discharge        Provisions for Follow-Up Care:  See After Visit Summary/Discharge Instructions for information related to follow-up care and home orders  Disposition: LTAC, in stable condition  Planned Readmission: No    Discharge Medications:  See After Visit Summary/Discharge Instructions for reconciled discharge medications provided to patient and family  Post Operative instructions: Reviewed with patient and/or family      Signature:   Corin Boyce PA-C  Date: 8/16/2019 Time: 9:51 AM

## 2019-08-16 NOTE — DISCHARGE INSTRUCTIONS
Nursing orders   1  Flush left and right drains with 10 ml of normal saline every 8 hours with output checks  Change tube dressings daily as needed  2  Put G tube to gravity if patient becomes bloated  3  NO NG TUBE ON THIS PATIENT AT ANY TIME  4  EHOB offloading (waffle) cushion to chair when OOB  Moisturize skin daily with lotion      Wound Care Instructions    Apply Allevyn Life foam dressing to midline sacrum (small sacral) for prevention   Mp with P   Peel back at least daily for skin assessment and re-apply   Change dressing every 3 days and PRN  Abdominal wound with wound VAC instructions as specified     Wound Vacuum (VAC)    Wound with less than 1 cm depth   Undermining is minimal   Would discontinue use of white sponge and simply use adaptic in wound bed covered by thin layer of black foam so as not to over pack wound   Create small bride to accommodate TRAC pad   The black sponge should never make direct contact with the skin when the bridge is created  Place at 125 mmHg low continuous suction   Change dressing Monday, Wednesday, Friday  Continue TPN with nutrition recommendations   Continue 2 more days of IV Fluconazole  Continue Protonix IV BID/Carafate QID, Heparin, Pain and nausea control   Follow up in 2 weeks with Dr Dominic Gamez and Dr Lia Stahl after your procedure:    Resume your normal diet  Small sips of flat soda will help with nausea  1  The properly functioning catheter should be forward flushed once (1x) daily with 10ml of normal saline using clean technique  You will be given a prescription for flushes  To flush the tube, clean both connections with alcohol swab  Twist off the drainage bag/ bulb  tubing and twist the saline syringe into the drainage tube and flush  Remove the syringe and twist the drainage bag / bulb tubing tubing back on     2  The drainage bag/bulb may be emptied as necessary   Keep a record of the amount of fluid you drain from your tube  This should be done with clean technique as well  3  A fresh dressing should be applied daily over the tube insertion site  4  As the tube is secured to the skin with only a suture,try not to pull on your tube  Tub baths are not permitted  Showers are permitted if the patient's skin entry site is prevented from getting wet  Similarly, washcloth "baths" are acceptable  Contact Interventional Radiology at [phone] [place] PATIENTS: Contact Interventional Radiology at [phone]) Sepideh Lynn PATIENTS: Contact Interventional Radiology at [phone]) if:    1  Leakage or large amounts of liquid around the catheter  2  Fever of 101 degrees lasting several hours without other obvious cause (such as sore throat, flu, etc)  3  Persistent nausea or vomiting  4  Diminished drainage, which may be associated with pressure or pain  Or when the     drainage from your tube is less than 10mls for 48 hours  5  Catheter pulled back or falls out  The following pharmacies carry the flush syringes  [facility]                     [facility]  [address]  Phone [phone]            Phone [phone] [address]             [place] [address]                                [phone] [address]                      [address] [facility]  Phone [phone]            Phone [phone]                      [facility]                                                                                                          [phone]  [facility]  [address] [facility]  Phone [phone]    Parenteral Nutrition   WHAT YOU NEED TO KNOW:   Parenteral nutrition (PN) gives your body nutrients when you are not able to eat or cannot absorb nutrition from the food you eat  PN is given through an IV catheter (thin tube) placed in a vein in your arm, upper chest, or neck  PN provides you with water, protein, sugar, fats, vitamins, minerals, and electrolytes  You may need PN for several days or longer  DISCHARGE INSTRUCTIONS:   Risks:   · You may get an infection at the catheter site, and it can spread throughout your body  A blood clot may form in your catheter, and it can travel through your bloodstream  These problems may become life-threatening  Your catheter may move out of place, or it may break  Your catheter may become blocked if it kinks, or if there is a buildup of PN solution  · You may become dehydrated  Your blood sugar level may rise too high  You may develop problems with your liver, gallbladder, or bones  When you leave the hospital:  If you need PN after you leave the hospital, healthcare providers will teach you or someone close to you how to give PN at home  You will learn how to take care of your catheter, and change the dressing that covers your catheter site  Healthcare providers will make sure the correct supplies are delivered to your home, and remind you to keep your PN solution in the refrigerator  Healthcare providers will visit you regularly to monitor your health while you are getting PN  Self-care while you are using PN:   · Weigh yourself 3 to 4 times a week to monitor any weight changes  · Check your catheter site for signs of infection such as redness, swelling, tenderness, warmth, and discharge  Take your temperature as directed  · Keep track of how much PN and other IV liquids you take in, and how much you urinate      · Test your blood sugar level  · Follow up with your healthcare provider or specialist as directed  You will need to have your blood tested regularly  Blood tests may show how your organs are working, or if you have an infection  Contact your healthcare provider or specialist if:   · You have pain or swelling in your chest     · You have pain or discomfort in your neck or shoulder  · You are urinating less than your healthcare provider says you should  · You weigh more or less than your healthcare provider says you should  · Your skin or the whites of your eyes turn yellow  · Veins appear on your chest that you did not have before  · You have questions or concerns about your condition, care, or PN equipment  Return to the emergency department if:   · You have a fever, or a fever with chills  · Your catheter site is red, swollen, tender, or warm, or there is a discharge from the catheter site  · Blood or PN solution leaks from your catheter  · You feel lightheaded, short of breath, and you have chest pain  · You cough up blood  · You have pain or swelling in your arm, neck, or face  · Your armpit feels tender  © 2017 [facility] Information is for End User's use only and may not be sold, redistributed or otherwise used for commercial purposes  All illustrations and images included in CareNotes® are the copyrighted property of A D A M , Inc  or Nicolás Peters  The above information is an  only  It is not intended as medical advice for individual conditions or treatments  Talk to your doctor, nurse or pharmacist before following any medical regimen to see if it is safe and effective for you

## 2019-08-16 NOTE — CASE MANAGEMENT
CM informed that patient would need IV fluid while TPN not infusing requiring higher level of transport  CM called and spoke to Omar Pichardo in [facility] transport cancelled and nurse truck was sent at same  time to transport patient to [facility]  CM department will continue to follow through discharge

## 2019-08-16 NOTE — CASE MANAGEMENT
CM received completed TPN orders from bariatrics, fax attached in [place] and put original copy in sticker chart  CM  Informed bariatrics PA-C via tiger text that Doc to doc report must be called in to Dr Lonnie Echavarria at [phone]  CM placed numbers for report: Charge nurse is [phone]  Fax is [phone], in facility contacts  CM informed RN of transfer time and requested UC to copy chart  CM department will continue to follow through discharge

## 2019-08-16 NOTE — CASE MANAGEMENT
CM mailed an IMM letter by certified mail to the patient's listed home address at 4:15 pm on 8/16/2019

## 2019-08-16 NOTE — PROGRESS NOTES
Instructed by ROSAURA Aaron to cap TPN once transport arrives and to start D10 at 50 ml/hr for transport  CM made aware

## 2019-08-16 NOTE — WOUND OSTOMY CARE
Progress Note - Wound   Anson Sutherland 63 y o  male MRN: [id number]  Unit/Bed#: E5 -01 Encounter: [id number]        Assessment:   Patient seen for wound care follow-up/VAC dressing removal along with bariatric PA  Patient with some abdominal pain  Midline abdominal incision with staples and sterri strips proximally (every other staple removed by PA)  Distal portion of incision is unapproximated with some undermining from 11-1 o'clock  Wound is pink with granulation tissue  Distal wound edge with small amount of yellow fibrinous tissue  VAC dressing removed (1 piece white foam in wound base with 2 pieces of black foam covering)  Wound re-dressed with adaptic in wound bed  Covered with normal saline wet-to-dry gauze  Patient tolerated well  Wound VAC recommendations:  Wound with less than 1 cm depth  Undermining is minimal   Would discontinue use of white sponge and simply use adaptic in wound bed covered by thin layer of black foam so as not to over pack wound  Create small bride to accommodate [facility] pad  Place at 125 mmHg low continuous suction  Change dressing Monday, Wednesday, Friday  See flowsheet for wound details  Plan:   Patient to be discharged to [address] [place] [address]  VAC dressing to be re-applied on arrival to [place]  Negative Pressure Wound Therapy (V A C ) Abdomen Mid (Active)   Unit Type Ulta 8/16/2019  9:41 AM   Black foam- # applied 0 8/16/2019  9:41 AM   White foam- # applied 0 8/16/2019  9:41 AM   Blue foam- # applied 0 8/16/2019  9:41 AM   Cycle Continuous; On 8/15/2019  9:53 PM   Target Pressure (mmHg) 125 8/15/2019  9:53 PM   Dressing Status Removed 8/16/2019  9:41 AM   Black foam- # removed 2 8/16/2019  9:41 AM   White foam- # removed 1 8/16/2019  9:41 AM   Blue foam- # removed 0 8/16/2019  9:41 AM   Nonadherent (Adaptic)- # removed 0 8/16/2019  9:41 AM   Other- # removed 0 8/16/2019  9:41 AM   Output (mL) 0 mL 8/16/2019  9:41 AM       Wound 07/26/19 Incision Abdomen N/A (Active)   Wound Description Clean;Pink;Granulation tissue 8/16/2019  9:41 AM   Maria Teresa-wound Assessment Clean;Dry; Intact 8/16/2019  9:41 AM   Wound Length (cm) 4 cm 8/16/2019  9:41 AM   Wound Width (cm) 3 cm 8/16/2019  9:41 AM   Wound Depth (cm) 0 7 8/16/2019  9:41 AM   Calculated Wound Area (cm^2) 12 cm^2 8/16/2019  9:41 AM   Calculated Wound Volume (cm^3) 8 4 cm^3 8/16/2019  9:41 AM   Tunneling 0 cm 8/16/2019  9:41 AM   Undermining 0 8 8/16/2019  9:41 AM   Closure Staples; Adhesive closure strips 8/16/2019  9:41 AM   Drainage Amount Scant 8/16/2019  9:41 AM   Drainage Description Serous 8/16/2019  9:41 AM   Non-staged Wound Description Full thickness 8/16/2019  9:41 AM   Treatments Cleansed;Irrigation with NSS 8/16/2019  9:41 AM   Dressing Packings;Non adherent;Gauze 8/16/2019  9:41 AM   Wound packed? Yes 8/16/2019  9:41 AM   Packing- # removed 1 8/13/2019  7:28 AM   Packing- # inserted 2 8/16/2019  9:41 AM   Dressing Changed Changed by provider 8/16/2019  9:41 AM   Patient Tolerance Tolerated well 8/16/2019  9:41 AM   Dressing Status Clean;Dry; Intact 8/16/2019  9:41 AM         Avery DICKEY, RN, [facility]

## 2019-08-19 ENCOUNTER — TRANSITIONAL CARE MANAGEMENT (OUTPATIENT)
Dept: FAMILY MEDICINE CLINIC | Facility: CLINIC | Age: 59
End: 2019-08-19

## 2019-08-23 ENCOUNTER — TELEPHONE (OUTPATIENT)
Dept: BARIATRICS | Facility: CLINIC | Age: 59
End: 2019-08-23

## 2019-08-23 NOTE — TELEPHONE ENCOUNTER
Per the PA-C, please schedule patient for a follow up with Dr Tawnya KING  LMZAHRAA for patient to call office and schedule for Wednesday, 8/28/19 as this is the last day that the physician is in the office before his surgical conference

## 2019-09-03 ENCOUNTER — TELEPHONE (OUTPATIENT)
Dept: BARIATRICS | Facility: CLINIC | Age: 59
End: 2019-09-03

## 2019-09-03 ENCOUNTER — TELEPHONE (OUTPATIENT)
Dept: SURGERY | Facility: HOSPITAL | Age: 59
End: 2019-09-03

## 2019-09-05 ENCOUNTER — OFFICE VISIT (OUTPATIENT)
Dept: BARIATRICS | Facility: CLINIC | Age: 59
End: 2019-09-05
Payer: COMMERCIAL

## 2019-09-05 ENCOUNTER — TELEPHONE (OUTPATIENT)
Dept: GASTROENTEROLOGY | Facility: MEDICAL CENTER | Age: 59
End: 2019-09-05

## 2019-09-05 VITALS
DIASTOLIC BLOOD PRESSURE: 80 MMHG | HEART RATE: 104 BPM | TEMPERATURE: 97.8 F | SYSTOLIC BLOOD PRESSURE: 134 MMHG | HEIGHT: 68 IN | BODY MASS INDEX: 25.24 KG/M2

## 2019-09-05 DIAGNOSIS — R18.8 ABDOMINAL FLUID COLLECTION: ICD-10-CM

## 2019-09-05 DIAGNOSIS — E43 SEVERE PROTEIN-CALORIE MALNUTRITION (HCC): ICD-10-CM

## 2019-09-05 DIAGNOSIS — K28.9 MARGINAL ULCER: Primary | ICD-10-CM

## 2019-09-05 DIAGNOSIS — Z98.84 BARIATRIC SURGERY STATUS: ICD-10-CM

## 2019-09-05 PROCEDURE — 99215 OFFICE O/P EST HI 40 MIN: CPT | Performed by: SURGERY

## 2019-09-05 NOTE — TELEPHONE ENCOUNTER
Carola/Uma the number listed below is not for this patient      Could either of you try to let up stent removal with Dr Derek Dacosta

## 2019-09-05 NOTE — PROGRESS NOTES
Assessment/Plan:     Diagnoses and all orders for this visit:    Marginal ulcer  - Continue BID PPI   - Continue TPN   - Start administering daily 60g protein shakes via G tube  - GI follow up appointment with Dr Rafat Santoyo for stent removal to prevent nausea and vomiting   - Discontinue all IV pain medications  - Pain medications/ oral medications  to be administered via G tube to prevent nausea  - Daily flushing's to prevent clogging of G tube  - Dietitian appointment next week to transition protein shakes to G tube feedings to stimulate bowel movement   - Continue to advance diet as tolerated   - Bariatric surgery follow up in 1 week    Abdominal fluid collection  -     IR consult; Future Monday 9/9/2019 at 10:15a  - Continue daily drain flushing  - Continue monitoring of drain output     Bariatric surgery status  -s/p Britney-En-Y Gastric Bypass with Dr Marcell Hanna on 7/12/2017 with recent complication of internal hernia repair and marginal ulcer perforation 7/28/2019 with Dr Stan Davis  Presents to the office today for follow up  Overall improving  · EWL is 101%, which places the patient ahead of schedule for expected post surgical weight loss at this time  · Ulcer Risk assessment:  · Discussion about strict AVOIDANCE of alcohol, NSAIDS, prednisone, and nicotine of all forms  Severe protein-calorie malnutrition ([address] )  - Continue daily TPN with nutrition labs  - Meet with dietitian next week   - Start administering additional protein shakes via G tube to stimulate bowels   - Will advance to G tube feedings with dietitian     [facility]  He believes that Malachi would benefit from being home  His wife is competent to handle his medical needs and has been completing daily PT with patient as well as flushing his drains daily  Have placed call to facility and spoke with  to arrange home TPN via [facility] as well as [facility]  FLORIAN at facility stated that she does not feel comfortable discharging Niesha Gordon to home today due to nausea and was offered to discuss plan with Dr Luis Barroso but refused and would like discharge to be Monday  Subjective:      Patient ID: Frances Rausch is a 63 y o  male  HPI: Patient presenting to clinic today for first post operative follow up status post Exploratory Laparotomy 7/28/2019 with Dr Luis Barroso and status post RNY Gastric Bypass 7/12/2017 with Dr Ferreira Signs  Patient's exploratory laparotomy complicated by perforated marginal ulcer and severe protein malnutrition  Overall improving  The following portions of the patient's history were reviewed and updated as appropriate: allergies, current medications, past family history, past medical history, past social history, past surgical history and problem list     Review of Systems   Constitutional: Positive for unexpected weight change (Complains of weight loss)  Negative for appetite change, chills and fever  HENT: Negative for trouble swallowing  Respiratory: Positive for shortness of breath  Negative for choking  Cardiovascular: Negative for leg swelling  Gastrointestinal: Positive for abdominal pain, nausea and vomiting  Negative for abdominal distention, constipation and diarrhea  Neurological: Positive for weakness  Psychiatric/Behavioral: Negative for agitation, behavioral problems and confusion  The patient is not nervous/anxious  Objective:    /80 (BP Location: Left arm, Patient Position: Sitting)   Pulse 104   Temp 97 8 °F (36 6 °C) (Tympanic)   Ht 5' 8" (1 727 m)   BMI 25 24 kg/m²      Physical Exam   Constitutional: He is oriented to person, place, and time  He appears well-developed  No distress  HENT:   Head: Normocephalic and atraumatic  Eyes: Conjunctivae and EOM are normal  No scleral icterus  Neck: Normal range of motion  Neck supple  Cardiovascular: Normal rate     Pulmonary/Chest: Effort normal  No respiratory distress  Abdominal: Soft  He exhibits no distension and no mass  There is no tenderness  There is no rebound and no guarding  No hernia  Midline incision still remains open 0 5cm width by 5cm length with no evidence of internal tract; 1 staple removed today in clinic; no redness, drainage, or signs of infection   G tube in place with no redness at site   2 IR drains remain in place: one in RUQ with minimal serosanguinous drainage and one in LUQ with minimal seropurulent drainage   Musculoskeletal: Normal range of motion  Neurological: He is alert and oriented to person, place, and time  Skin: Skin is warm and dry  He is not diaphoretic  Psychiatric: He has a normal mood and affect  His behavior is normal    Vitals reviewed

## 2019-09-05 NOTE — TELEPHONE ENCOUNTER
Dr Crystal Ely from bariatric surgery called for a sooner appt due to a possible stent removal      Dr Hany Catherine can be reached at [phone]

## 2019-09-06 DIAGNOSIS — Z71.89 COMPLEX CARE COORDINATION: Primary | ICD-10-CM

## 2019-09-06 NOTE — TELEPHONE ENCOUNTER
Dr Jayme Bird called again looking for an update on getting pt sched   Dr Jayme Bird would like to be called and can be reached at

## 2019-09-09 ENCOUNTER — HOSPITAL ENCOUNTER (OUTPATIENT)
Dept: RADIOLOGY | Facility: HOSPITAL | Age: 59
Discharge: HOME | End: 2019-09-09
Payer: COMMERCIAL

## 2019-09-09 ENCOUNTER — DOCUMENTATION (OUTPATIENT)
Dept: BARIATRICS | Facility: CLINIC | Age: 59
End: 2019-09-09

## 2019-09-09 DIAGNOSIS — R18.8 ABDOMINAL FLUID COLLECTION: ICD-10-CM

## 2019-09-09 DIAGNOSIS — E43 SEVERE PROTEIN-CALORIE MALNUTRITION (HCC): Primary | ICD-10-CM

## 2019-09-09 PROBLEM — Z78.9 ON TOTAL PARENTERAL NUTRITION (TPN): Status: ACTIVE | Noted: 2019-09-09

## 2019-09-09 PROCEDURE — C1769 GUIDE WIRE: HCPCS

## 2019-09-09 PROCEDURE — 76080 X-RAY EXAM OF FISTULA: CPT

## 2019-09-09 PROCEDURE — 49424 ASSESS CYST CONTRAST INJECT: CPT | Performed by: RADIOLOGY

## 2019-09-09 PROCEDURE — 49424 ASSESS CYST CONTRAST INJECT: CPT

## 2019-09-09 PROCEDURE — C1729 CATH, DRAINAGE: HCPCS

## 2019-09-09 PROCEDURE — 75984 XRAY CONTROL CATHETER CHANGE: CPT | Performed by: RADIOLOGY

## 2019-09-09 PROCEDURE — 76080 X-RAY EXAM OF FISTULA: CPT | Performed by: RADIOLOGY

## 2019-09-09 PROCEDURE — 49423 EXCHANGE DRAINAGE CATHETER: CPT | Performed by: RADIOLOGY

## 2019-09-09 PROCEDURE — 49423 EXCHANGE DRAINAGE CATHETER: CPT

## 2019-09-09 PROCEDURE — 75984 XRAY CONTROL CATHETER CHANGE: CPT

## 2019-09-09 RX ORDER — RIZATRIPTAN BENZOATE 10 MG/1
TABLET, ORALLY DISINTEGRATING ORAL
COMMUNITY
Start: 2017-12-15 | End: 2019-09-18 | Stop reason: ALTCHOICE

## 2019-09-09 RX ADMIN — IOHEXOL 30 ML: 300 INJECTION, SOLUTION INTRAVENOUS at 12:09

## 2019-09-09 NOTE — TELEPHONE ENCOUNTER
Hi Dr Bueno,    I spoke with Tabitha Dawson  States pt is needing EGD stent removal  Are you needing to see pt in the office or ok to schedule procedure? Please Advise

## 2019-09-09 NOTE — PROGRESS NOTES
Received TPN orders from rehab facility  Dex 40% 750 ml = 300 grams of dextrose = 1020 kcal   AA 10% 1000 ml = 100 grams AA = 400 kcal  Lipids 20% 175 ml = 350 kcal   Total calories = 1770 kcal per day  1900 ml infused volume  1 9 grams /kg body weight     Estimated calorie needs = 1312 kcal per day ( 25 kcal/kg body weight 0  Estimated calories for weight gain = 1757-8338 kcal per day ( additional 500-1000 kcal per day )  Estimated protein needs = 100-130 grams per day ( 2 0-2 5 grams /kg body weight )    As prealbumin level is low , will increase AA in TPN  Ionized calcium low- will monitor, may need to increase calcium in TPN     New orders:  Dex 40% 750 ml= 300 grams = 1020 kcal( 55%)   AA10% 1200 ml = 120 grams = 480 kcal (26%)  Lipids 20% 175 ml = 350 kcal (19%)  Total calories = 1850 kcal per day   120 grams protein ( 2 28 grams /kg body weight )     Reviewed labs :  PREALBUMIN   Order: [id number]   (suggestion)  Information displayed in this report will not trend or trigger automated decision support  Ref Range & Units 7d ago   Prealbumin 20 0 - 40 0 mg/dL 16 5Low     Specimen Collected: 09/02/19  5:45 AM Last Resulted: 09/02/19  8:14 AM   Received From: [facility]  Result Received: 09/05/19  7:37 AM     PHOSPHORUS   Order: [id number]   (suggestion)  Information displayed in this report will not trend or trigger automated decision support  Ref Range & Units 3d ago   Phosphorus 2 3 - 4 6 mg/dL 3 4    Specimen Collected: 09/06/19  6:25 AM Last Resulted: 09/06/19  8:11 AM   Received From: [facility]  Result Received: 09/07/19 11:31 AM    Received Information     MAGNESIUM   Order: [id number]   (suggestion)  Information displayed in this report will not trend or trigger automated decision support      Ref Range & Units 3d ago   Magnesium 1 4 - 2 2 mg/dL 1 8    Specimen Collected: 09/06/19  6:25 AM Last Resulted: 09/06/19  8:11 AM   Received From: [facility] Result Received: 09/07/19 11:31     CALCIUM, IONIZED   Order: [id number]   (suggestion)  Information displayed in this report will not trend or trigger automated decision support  Ref Range & Units 3d ago   Calcium, Ionized, Direct 1 18 - 1 32 mmol/L 1 14Low     Specimen Collected: 09/06/19  6:25 AM Last Resulted: 09/06/19  6:33 AM   Received From: [facility]  Result Received: 09/07/19 11:31 AM    Received Inform     BASIC METABOLIC PANEL   Order: [id number]   (suggestion)  Information displayed in this report will not trend or trigger automated decision support  Ref Range & Units 3d ago   Glucose 65 - 99 mg/dL 90    BUN 7 - 28 mg/dL 26    Creatinine 0 53 - 1 30 mg/dL 0 58    Sodium 135 - 145 mmol/L 135    Potassium 3 5 - 5 2 mmol/L 4 1    Chloride 100 - 109 mmol/L 103    Carbon Dioxide 23 - 31 mmol/L 25    Calcium 8 5 - 10 1 mg/dL 8 2Low     Anion Gap 3 - 11  7    GFR, Calculated >60 mL/min/1 73m2 113    Comment: mL/min per 1 73 square meters                                              Normal Function or Mild Renal     Disease (if clinically at risk):  >or=60   Moderately Decreased:                30-59   Severely Decreased:                  15-29   Renal Failure:                         <15                                              -American GFR: multiply reported GFR by 1 16     Please note that the eGFR is based on the CKD-EPI calculation, and is not intended to be used for drug dosing                                               Note: Calculated GFR may not be an accurate indicator of renal function if the patient's renal function is not in a steady state     Specimen Collected: 09/06/19  6:25 AM Last Resulted: 09/06/19  8:11 AM   Received From: [facility]  Result Received: 09/07/19 11:31 AM     Orders faxed to [facility]

## 2019-09-09 NOTE — DISCHARGE INSTRUCTIONS
TUBE CARE INSTRUCTIONS    Care after your procedure:    Resume your normal diet  Small sips of flat soda will help with nausea  1  The properly functioning catheter should be forward flushed once (1x) daily with 10ml of normal saline using clean technique  You will be given a prescription for flushes  To flush the tube, clean both connections with alcohol swab  Twist off the drainage bag/ bulb  tubing and twist the saline syringe into the drainage tube and flush  Remove the syringe and twist the drainage bag / bulb tubing tubing back on     2  The drainage bag/bulb may be emptied as necessary  Keep a record of the amount of fluid you drain from your tube  This should be done with clean technique as well  3  A fresh dressing should be applied daily over the tube insertion site  4  As the tube is secured to the skin with only a suture,try not to pull on your tube  Tub baths are not permitted  Showers are permitted if the patient's skin entry site is prevented from getting wet  Similarly, washcloth "baths" are acceptable  Contact Interventional Radiology at [phone] Keila PATIENTS: Contact Interventional Radiology at [phone]) Bri Colon PATIENTS: Contact Interventional Radiology at [phone]) if:    1  Leakage or large amounts of liquid around the catheter  2  Fever of 101 degrees lasting several hours without other obvious cause (such as sore throat, flu, etc)  3  Persistent nausea or vomiting  4  Diminished drainage, which may be associated with pressure or pain  Or when the     drainage from your tube is less than 10mls for 48 hours  5  Catheter pulled back or falls out  The following pharmacies carry the flush syringes         [facility]                     [facility]  [address]                         [address] [facility]  Phone [phone]            Phone [phone]   [place] [address]                                [phone]  [address] [facility] [address]                      [address] [facility]  Phone [phone]            Phone [phone]                      [place]                                                                                                          [phone]  [facility]  [facility] [address]    [address]  Phone [phone]

## 2019-09-10 ENCOUNTER — PATIENT OUTREACH (OUTPATIENT)
Dept: FAMILY MEDICINE CLINIC | Facility: CLINIC | Age: 59
End: 2019-09-10

## 2019-09-10 ENCOUNTER — DOCUMENTATION (OUTPATIENT)
Dept: BARIATRICS | Facility: CLINIC | Age: 59
End: 2019-09-10

## 2019-09-10 ENCOUNTER — TELEPHONE (OUTPATIENT)
Dept: BARIATRICS | Facility: CLINIC | Age: 59
End: 2019-09-10

## 2019-09-10 DIAGNOSIS — Z98.890 S/P REPAIR OF PARAESOPHAGEAL HERNIA: ICD-10-CM

## 2019-09-10 DIAGNOSIS — K91.2 POSTSURGICAL MALABSORPTION: ICD-10-CM

## 2019-09-10 DIAGNOSIS — Z87.19 S/P REPAIR OF PARAESOPHAGEAL HERNIA: ICD-10-CM

## 2019-09-10 DIAGNOSIS — Z78.9 ON TOTAL PARENTERAL NUTRITION (TPN): ICD-10-CM

## 2019-09-10 DIAGNOSIS — E43 SEVERE PROTEIN-CALORIE MALNUTRITION (HCC): ICD-10-CM

## 2019-09-10 DIAGNOSIS — K28.5 CHRONIC MARGINAL ULCER WITH PERFORATION (HCC): Primary | ICD-10-CM

## 2019-09-10 DIAGNOSIS — Z98.84 BARIATRIC SURGERY STATUS: ICD-10-CM

## 2019-09-10 NOTE — PROGRESS NOTES
Faxed orders for Jevity 1 2   Initiate at 1/2 carton QID  If patient tolerates will increase gradually to goal:  Bolus 240 ml q 4 hour for total of 6 bolus per day to provide:1728 kcal , 64 grams of protein

## 2019-09-10 NOTE — TELEPHONE ENCOUNTER
Called and spoke with patient's wife concerning plan for initiation of bolus tube feedings via G - tube  After surgery in August, patient had ileus and he was kept on TPN  Plan is for his stent to be removed, which should help with nausea and dry heaves and start more aggressive po intake, d/c TPN and supplement with enteral bolus feedings  Patient's wife understands plan, concerned as his stent removal has not been scheduled yet, but FLORIAN has been communicating with her and will let her know when it can be scheduled  Patient is scheduled to see RD on Thursday  Will send in orders to start bolus feedings at a low rate and gradually increase as tolerated  I informed his wife that  it sometimes takes a couple of days for approval and I will f/u with her on Thursday concerning status     His wife was appreciative of the call

## 2019-09-11 ENCOUNTER — TRANSCRIBE ORDERS (OUTPATIENT)
Dept: LAB | Facility: CLINIC | Age: 59
End: 2019-09-11

## 2019-09-11 ENCOUNTER — APPOINTMENT (OUTPATIENT)
Dept: LAB | Facility: CLINIC | Age: 59
End: 2019-09-11
Payer: COMMERCIAL

## 2019-09-11 DIAGNOSIS — E43 ALIMENTARY EDEMA (HCC): ICD-10-CM

## 2019-09-11 DIAGNOSIS — K90.2 POSTOPERATIVE BLIND LOOP SYNDROME: Primary | ICD-10-CM

## 2019-09-11 DIAGNOSIS — K90.2 POSTOPERATIVE BLIND LOOP SYNDROME: ICD-10-CM

## 2019-09-11 LAB
ALBUMIN SERPL BCP-MCNC: 2.9 G/DL (ref 3.5–5)
ALP SERPL-CCNC: 139 U/L (ref 46–116)
ALT SERPL W P-5'-P-CCNC: 28 U/L (ref 12–78)
ANION GAP SERPL CALCULATED.3IONS-SCNC: 8 MMOL/L (ref 4–13)
AST SERPL W P-5'-P-CCNC: 16 U/L (ref 5–45)
BILIRUB SERPL-MCNC: 0.73 MG/DL (ref 0.2–1)
BUN SERPL-MCNC: 22 MG/DL (ref 5–25)
CALCIUM SERPL-MCNC: 8.8 MG/DL (ref 8.3–10.1)
CHLORIDE SERPL-SCNC: 101 MMOL/L (ref 100–108)
CO2 SERPL-SCNC: 26 MMOL/L (ref 21–32)
CREAT SERPL-MCNC: 0.53 MG/DL (ref 0.6–1.3)
GFR SERPL CREATININE-BSD FRML MDRD: 117 ML/MIN/1.73SQ M
GLUCOSE P FAST SERPL-MCNC: 79 MG/DL (ref 65–99)
MAGNESIUM SERPL-MCNC: 2 MG/DL (ref 1.6–2.6)
PHOSPHATE SERPL-MCNC: 3.2 MG/DL (ref 2.7–4.5)
POTASSIUM SERPL-SCNC: 3.6 MMOL/L (ref 3.5–5.3)
PROT SERPL-MCNC: 7.2 G/DL (ref 6.4–8.2)
SODIUM SERPL-SCNC: 135 MMOL/L (ref 136–145)
TRIGL SERPL-MCNC: 31 MG/DL

## 2019-09-11 PROCEDURE — 36415 COLL VENOUS BLD VENIPUNCTURE: CPT

## 2019-09-11 PROCEDURE — 83735 ASSAY OF MAGNESIUM: CPT

## 2019-09-11 PROCEDURE — 84478 ASSAY OF TRIGLYCERIDES: CPT

## 2019-09-11 PROCEDURE — 84100 ASSAY OF PHOSPHORUS: CPT

## 2019-09-11 PROCEDURE — 80053 COMPREHEN METABOLIC PANEL: CPT

## 2019-09-12 ENCOUNTER — OFFICE VISIT (OUTPATIENT)
Dept: BARIATRICS | Facility: CLINIC | Age: 59
End: 2019-09-12

## 2019-09-12 VITALS
HEART RATE: 102 BPM | HEIGHT: 68 IN | TEMPERATURE: 98.5 F | DIASTOLIC BLOOD PRESSURE: 78 MMHG | SYSTOLIC BLOOD PRESSURE: 124 MMHG | WEIGHT: 111.5 LBS | BODY MASS INDEX: 16.9 KG/M2

## 2019-09-12 VITALS — WEIGHT: 111.5 LBS | BODY MASS INDEX: 16.9 KG/M2 | HEIGHT: 68 IN

## 2019-09-12 DIAGNOSIS — K91.2 POSTSURGICAL MALABSORPTION: ICD-10-CM

## 2019-09-12 DIAGNOSIS — Z93.1 G TUBE FEEDINGS (HCC): ICD-10-CM

## 2019-09-12 DIAGNOSIS — Z98.84 BARIATRIC SURGERY STATUS: ICD-10-CM

## 2019-09-12 DIAGNOSIS — E43 SEVERE PROTEIN-CALORIE MALNUTRITION (HCC): ICD-10-CM

## 2019-09-12 DIAGNOSIS — K28.9 MARGINAL ULCER: ICD-10-CM

## 2019-09-12 DIAGNOSIS — R18.8 ABDOMINAL FLUID COLLECTION: Primary | ICD-10-CM

## 2019-09-12 DIAGNOSIS — Z78.9 ON TOTAL PARENTERAL NUTRITION (TPN): Primary | ICD-10-CM

## 2019-09-12 DIAGNOSIS — Z78.9 ON TOTAL PARENTERAL NUTRITION (TPN): ICD-10-CM

## 2019-09-12 PROCEDURE — RECHECK: Performed by: DIETITIAN, REGISTERED

## 2019-09-12 PROCEDURE — 99214 OFFICE O/P EST MOD 30 MIN: CPT | Performed by: SURGERY

## 2019-09-12 NOTE — PROGRESS NOTES
Bariatric Follow Up Nutrition Note    Type of surgery   Paraesophageal hernia repair and a gastric diversion with Britney-en-Y reconstruction with a short 60 cm Britney limb  Surgery Date: 7/12/2017 7/28/2019 Internal hernia repair, GI perforation & G tube placement 7/28/2019  2 years  post-op  Surgeon: Dr Nomi Ritchie    Per Dr Radha Gavin last note:  Recent CT scan with residual collections (perihepatic larger than perisplenic)  Will f/u w/ IR for potential drainage of residual perihepatic collection  GI f/u for evaluation for stent removal  Dry heaving/nausea should greatly improve once stent has been removed  Cont TPN and begin protein supplementation via gastrostomy tube  WIll f/u w/ RD next week for tube feeding initiation and recommendations  Cont QD wet to dry dressing lower midline wound  Wean narcotic pain medication  F/u w/ Dr Priya Arguello next week        Nutrition Assessment   Eduardo Annad  63 y o   male  Ht 5' 8" (1 727 m)   Wt 50 6 kg (111 lb 8 oz)   BMI 16 95 kg/m²    Patient lost 4 pounds since last appointment     Estimated calorie needs = 1312 kcal per day ( 25 kcal/kg body weight 0  Estimated calories for weight gain = 9251-7367 kcal per day ( additional 500-1000 kcal per day )  Estimated protein needs = 100-130 grams per day ( 2 0-2 5 grams /kg body weight )    Weight on Day of Weight Loss Surgery: 227  Weight in (lb) to have BMI = 25: 164 9#  Pre-Op Excess Wt[de-identified]  62 1#  Post-Op Wt Loss: 101#/ 196% EBWL in 2 year(s)    Review of History and Medications   Past Medical History:   Diagnosis Date    Anesthesia     Pt wakes up during "almost every surgery"    Arthritis     Asthma     Bariatric surgery status     Cervical radiculopathy     Chemotherapy follow-up examination     Chronic pain     Chronic pain disorder     COPD (chronic obstructive pulmonary disease) ([address] )     Diabetes mellitus ([facility] [address] )     borderline "years ago"    Food allergy     clams    GERD (gastroesophageal reflux disease)     Heart murmur     Hiatal hernia     History of malignant neoplasm     History of pneumonia 04/12/2016    History of pulmonary embolism     History of ulceration     Hyperlipidemia     Lung cancer (HCC)     left lung, radiation and chemo tx    Migraine     Pneumonia     Postgastrectomy malabsorption     Right scapholunate ligament tear     Skipped heart beats     Wears partial dentures     upper and lower     Past Surgical History:   Procedure Laterality Date    BRONCHOSCOPY      COLONOSCOPY      FRACTURE SURGERY      femur right 1985    GASTRIC BYPASS LAPAROSCOPIC N/A 7/12/2017    Procedure: GASTRIC DIVERSION WITH BROOKLYN-EN-Y RECONSTRUCTION WITH  SHORT 60 cm LIMB;  Surgeon: Sheryl Vargas MD;  [facility];  Service: Bariatrics    IR CHEST TUBE  8/9/2019    IR IMAGE GUIDED ASPIRATION / DRAINAGE  8/1/2019    IR PICC LINE  8/9/2019    KNEE ARTHROSCOPY Right     right shoulder    LAPAROTOMY N/A 7/28/2019    Procedure: LAPAROTOMY EXPLORATORY, WASHOUT OF SUCUS, REPAIR OF MARGINAL ULCER PERFORATION, ABD WASHOUT, INTRAOPERATIVE EGD, GI ASSISTED ENDOSCOPIC STENT PLACEMENT;  Surgeon: Darwin Burk MD;  [facility];  Service: [place] Even Left     LYMPHADENECTOMY  05/22/2012    Dr Rea Sauceda ANT INTERBODY MIN DISCECTOMY, CERVICAL BELOW C2 N/A 10/17/2017    Procedure: C5-6, C6-7 ACDF WITH ALLOGRAFT (NEURO MONITORING); Surgeon: Goldy Bowen MD;  [facility];  Service: Orthopedics    NJ COLONOSCOPY FLX DX W/COLLJ SPEC WHEN PFRMD N/A 4/14/2017    Procedure: COLONOSCOPY;  Surgeon: Patrica Torrez MD;  [facility];  Service: Gastroenterology    NJ EGD TRANSORAL BIOPSY SINGLE/MULTIPLE N/A 1/9/2019    Procedure: ESOPHAGOGASTRODUODENOSCOPY (EGD); Surgeon: Sheryl Vargas MD;  [facility];   Service: Bariatrics    NJ ESOPHAGOGASTRODUODENOSCOPY TRANSORAL DIAGNOSTIC N/A 2017    Procedure: ESOPHAGOGASTRODUODENOSCOPY (EGD); Surgeon: Aiden Alanis MD;  [facility];   Service: Gastroenterology    AR INCISE FINGER TENDON SHEATH Right 2018    Procedure: RELEASE TRIGGER FINGER long and index finger;  Surgeon: Magdaleno Nelson MD;  [facility];  Service: Orthopedics    AR LAP, REPAIR PARAESOPHAGEAL HERNIA, INCL FUNDOPLASTY W/ MESH N/A 2017    Procedure: REPAIR HERNIA PARAESOPHAGEAL  LAPAROSCOPIC;  Surgeon: Antoni Perez MD;  [facility];  Service: Bariatrics    AR LAP,DIAGNOSTIC ABDOMEN N/A 2019    Procedure: LAPAROSCOPY DIAGNOSTIC CONVERSION TO OPEN, REDUCTION AND REPAIR OF INTERNAL HERNIA, REPAIR SEROSAL TEARS;  Surgeon: Aron Scruggs MD;  [facility];  Service: [place] AR WRIST [facility] LIG Right 2016    Procedure: RELEASE CARPAL TUNNEL ENDOSCOPIC;  Surgeon: Magdaleno Nelson MD;  [facility];  Service: Orthopedics    RECONSTRUCTION DISTAL RADIUS/ULNA JOINT (DRUJ) Right 2016    Procedure: WRIST SCAPHOLUNATE LIGAMENT RECONSTRUCTION WITH ECRB TENDON AUTOGRAPH , SPLINT APPLICATION ;  Surgeon: Magdaleno Nelson MD;  [facility];  Service:    [facility]erm[facility] Pre[place] SHOULDER SURGERY      TONSILLECTOMY       Social History     Socioeconomic History    Marital status: /Civil Union     Spouse name: Not on file    Number of children: Not on file    Years of education: Not on file    Highest education level: Not on file   Occupational History    Not on file   Social Needs    Financial resource strain: Not on file    Food insecurity:     Worry: Not on file     Inability: Not on file    Transportation needs:     Medical: Not on file     Non-medical: Not on file   Tobacco Use    Smoking status: Former Smoker     Packs/day: 0 50     Years: 48 00     Pack years: 24 00     Types: Cigarettes     Last attempt to quit: 2017     Years since quittin 3    Smokeless tobacco: Never Used Substance and Sexual Activity    Alcohol use: Not Currently     Frequency: Monthly or less     Drinks per session: 1 or 2     Binge frequency: Never    Drug use: No    Sexual activity: Not on file   Lifestyle    Physical activity:     Days per week: Not on file     Minutes per session: Not on file    Stress: Not on file   Relationships    Social connections:     Talks on phone: Not on file     Gets together: Not on file     Attends [employer] service: Not on file     Active member of club or organization: Not on file     Attends meetings of clubs or organizations: Not on file     Relationship status: Not on file    Intimate partner violence:     Fear of current or ex partner: Not on file     Emotionally abused: Not on file     Physically abused: Not on file     Forced sexual activity: Not on file   Other Topics Concern    Not on file   Social History Narrative    Not on file       Current Outpatient Medications:     acetaminophen (TYLENOL 8 HOUR) 650 mg CR tablet, Take 1 tablet (650 mg total) by mouth every 8 (eight) hours, Disp: 15 tablet, Rfl: 0    albuterol (PROVENTIL HFA,VENTOLIN HFA) 90 mcg/act inhaler, Inhale 2 puffs every 6 (six) hours as needed for wheezing, Disp: , Rfl:     albuterol (PROVENTIL HFA,VENTOLIN HFA) 90 mcg/act inhaler, Inhale 2 puffs 4 (four) times a day, Disp: , Rfl: 0    atorvastatin (LIPITOR) 40 mg tablet, Take 80 mg by mouth daily  , Disp: , Rfl:     dextrose 10 %, Infuse 50 mL into a venous catheter continuous, Disp: 500 mL, Rfl: 0    diclofenac sodium (VOLTAREN) 1 %, Place on the skin, Disp: , Rfl:     diphenhydrAMINE (BENADRYL) 50 mg/mL, Infuse 0 5 mL (25 mg total) into a venous catheter every 6 (six) hours as needed for itching or sleep, Disp: 10 mL, Rfl: 0    ergocalciferol (VITAMIN D2) 50,000 units, Take 1 capsule (50,000 Units total) by mouth once a week for 12 doses, Disp: 4 capsule, Rfl: 2    famotidine (PEPCID) 40 MG tablet, Take 0 5 tablets (20 mg total) by mouth 2 (two) times a day (Patient not taking: Reported on 3/12/2019), Disp: 30 tablet, Rfl: 0    Heparin Sodium, Porcine, (HEPARIN, PORCINE,) 5,000 units/mL, Inject 1 mL (5,000 Units total) under the skin every 8 (eight) hours, Disp: 1 mL, Rfl: 0    Magnesium 400 MG CAPS, Take 1 tablet by mouth daily, Disp: , Rfl:     methocarbamol (ROBAXIN) 500 mg tablet, Take 1 tablet (500 mg total) by mouth 3 (three) times a day, Disp: 60 tablet, Rfl: 2    omeprazole (PriLOSEC OTC) 20 MG tablet, Take 1 tablet (20 mg total) by mouth 2 (two) times a day for 30 days, Disp: 90 tablet, Rfl: 6    ondansetron (ZOFRAN) 4 mg/2 mL injection, Infuse 2 mL (4 mg total) into a venous catheter every 8 (eight) hours, Disp: 2 mL, Rfl: 0    ONETOUCH DELICA LANCETS FINE MISC, Use 2-3 times per day as needed, Disp: 100 each, Rfl: 2    ONETOUCH VERIO test strip, Test 2-3 times daily as instructed, Disp: 100 each, Rfl: 3    pantoprazole (PROTONIX) 40 mg injection, Infuse 40 mg into a venous catheter every 12 (twelve) hours, Disp: 1 each, Rfl: 0    promethazine (PHENERGAN) 25 mg/mL injection, Infuse 0 5 mL (12 5 mg total) into a venous catheter every 6 (six) hours as needed for nausea or vomiting, Disp: 1 mL, Rfl: 0    rizatriptan (MAXALT-MLT) 10 MG disintegrating tablet, Take one tablet by mouth at the onset of migraine  May repeat in 2 hours   2/24 hrs, 4/week, 8/month , Disp: , Rfl:     saccharomyces boulardii (FLORASTOR) 250 mg capsule, Take 1 capsule (250 mg total) by mouth 2 (two) times a day, Disp: 15 capsule, Rfl: 0    simethicone (MYLICON) 80 mg chewable tablet, Chew 1 tablet (80 mg total) every 6 (six) hours as needed for flatulence, Disp: 30 tablet, Rfl: 0    sucralfate (CARAFATE) 1 g/10 mL suspension, Take 10 mL (1,000 mg total) by mouth 4 (four) times a day (before meals and at bedtime), Disp: 420 mL, Rfl: 0    Food Intake and Lifestyle Assessment   Food Intake Assessment completed via usual diet recall  Patient only able to take sips of liquids     TPN orders:  Dex 40% 750 ml= 300 grams = 1020 kcal( 55%)   AA10% 1200 ml = 120 grams = 480 kcal (26%)  Lipids 20% 175 ml = 350 kcal (19%)  Total calories = 1850 kcal per day   120 grams protein ( 2 28 grams /kg body weight )     To start bolus feedings:  Initiate at 1/2 carton QID  If patient tolerates will increase gradually to goal:  Bolus 240 ml q 4 hour for total of 6 bolus per day to provide:1728 kcal , 64 grams of protein       Beverage intake: sips of liquids- does not like water   Diet texture/stage: liquid    Food allergies or intolerances: unable to tolerate solid foods   Cultural or Catholic considerations: n/a    Physical Assessment  Nutrition Related Findings  Altered Skin, Loss of Hunger and temporal wasting , muscle loss, cachexia     Physical Activity  Types of exercise: PT as [facility]  Current physical limitations: malnutrition     Psychosocial Assessment   Support systems: spouse  Socioeconomic factors: recently discharged from rehab facility     Nutrition Diagnosis  Diagnosis: Underweight (NC-3 1), Unintentional Weight Loss (NC-3 4) and severe protein calorie malnutrition   Related to: Altered GI function and s/p ulcer perforation and hernia repair  As Evidenced by: Unintentional weight loss and BMI 17 6     Malnutrition Findings:   Malnutrition type: Acute illness(acute severe pro, naya malnutrition d/t condition as evidence by <50% energy intake >5 days, 2-3+ edema LE's, presumed wt  loss is masked plans for nutrition support)  Degree of Malnutrition: Other severe protein calorie malnutrition  Malnutrition Characteristics: Inadequate energy, Fluid accumulation, Muscle loss    Interventions and Teaching   Patient educated on post-op nutrition guidelines  Patient educated and handouts provided    Reviewed plan to gradually increase bolus feeding and decrease TPN     Education provided to: patient and spouse     Barriers to learning: No barriers identified    Readiness to change: action    Comprehension: verbalizes understanding     Expected Compliance: good    Evaluation/Monitoring   Tolerance of nutrition prescription Body weight Lab values    Goals  Start Jevity 1 2 1/2 carton QID- increase to full carton QID and then to goal of 1 carton (237 ml) every 4 hours- 6 bolus per day  Cut TPN and wean down, once bolus feedings are tolerated        Time Spent:   15 Minutes

## 2019-09-12 NOTE — PROGRESS NOTES
POST OP UP VISIT - BARIATRIC SURGERY  Sherren Cue 63 y o  male MRN: [id number]  Unit/Bed#:  Encounter: [id number]      HPI:  Sherren Cue is a 63 y o  male w/remote h/o RYGB w/Dr Antonio Yeung; who on 7/28/2019 underwent repair of internal hernia an perforated marginal ulcer by Dr Jazmine Smalls  Subjective     Patient reports doing well, although continues to lose weight (4 5 lbs since last visit)  Patient is still NPO, and on TPN  Tube feeds have yet to be restarted, pending insurance clearance for delivery of containers  Currently on fentanyl patch and oxycodone for pain  Patient is having daily bowel movements  He is ambulating with assistance, but does not want to leave the house due to gravity bag attached to G tube  The wife reports the patient is not receiving his PPIs through the G-tube given that she has experienced obstruction of the tube with pills  IR studied COURTNEY drains, and repositioned LUQ one  Persistant collection in the RUQ, COURTNEY left in place  GI scheduled patient for stent removal next week  Review of Systems   Constitutional: Positive for appetite change, fatigue and unexpected weight change  HENT: Negative  Eyes: Negative  Respiratory: Negative  Cardiovascular: Negative  Gastrointestinal: Positive for abdominal pain and diarrhea  Endocrine: Negative  Genitourinary: Negative  Musculoskeletal: Negative  Skin: Negative  Allergic/Immunologic: Negative  Neurological: Negative  Hematological: Negative  Psychiatric/Behavioral: Negative  All other systems reviewed and are negative        Historical Information   Past Medical History:   Diagnosis Date    Anesthesia     Pt wakes up during "almost every surgery"    Arthritis     Asthma     Bariatric surgery status     Cervical radiculopathy     Chemotherapy follow-up examination     Chronic pain     Chronic pain disorder     COPD (chronic obstructive pulmonary disease) (HCC)     Diabetes mellitus ([facility] [address] )     borderline "years ago"    Food allergy     clams    GERD (gastroesophageal reflux disease)     Heart murmur     Hiatal hernia     History of malignant neoplasm     History of pneumonia 04/12/2016    History of pulmonary embolism     History of ulceration     Hyperlipidemia     Lung cancer (HCC)     left lung, radiation and chemo tx    Migraine     Pneumonia     Postgastrectomy malabsorption     Right scapholunate ligament tear     Skipped heart beats     Wears partial dentures     upper and lower     Past Surgical History:   Procedure Laterality Date    BRONCHOSCOPY      COLONOSCOPY      FRACTURE SURGERY      femur right 1985    GASTRIC BYPASS LAPAROSCOPIC N/A 7/12/2017    Procedure: GASTRIC DIVERSION WITH BROOKLYN-EN-Y RECONSTRUCTION WITH  SHORT 60 cm LIMB;  Surgeon: Thong Means MD;  [facility];  Service: Bariatrics    IR CHEST TUBE  8/9/2019    IR IMAGE GUIDED ASPIRATION / DRAINAGE  8/1/2019    IR PICC LINE  8/9/2019    KNEE ARTHROSCOPY Right     right shoulder    LAPAROTOMY N/A 7/28/2019    Procedure: LAPAROTOMY EXPLORATORY, WASHOUT OF SUCUS, REPAIR OF MARGINAL ULCER PERFORATION, ABD WASHOUT, INTRAOPERATIVE EGD, GI ASSISTED ENDOSCOPIC STENT PLACEMENT;  Surgeon: Yesy Hernandez MD;  [facility];  Service: Lauryn Garcia Left     LYMPHADENECTOMY  05/22/2012    Dr Greta Messina ANT INTERBODY MIN DISCECTOMY, CERVICAL BELOW C2 N/A 10/17/2017    Procedure: C5-6, C6-7 ACDF WITH ALLOGRAFT (NEURO MONITORING); Surgeon: Kacey Jimenez MD;  [facility];  Service: Orthopedics    OR COLONOSCOPY FLX DX W/COLLJ SPEC WHEN PFRMD N/A 4/14/2017    Procedure: COLONOSCOPY;  Surgeon: Brian White MD;  [facility];  Service: Gastroenterology    OR EGD TRANSORAL BIOPSY SINGLE/MULTIPLE N/A 1/9/2019    Procedure: ESOPHAGOGASTRODUODENOSCOPY (EGD);   Surgeon: Thong Means MD; [facility]; Service: Bariatrics    PA ESOPHAGOGASTRODUODENOSCOPY TRANSORAL DIAGNOSTIC N/A 2017    Procedure: ESOPHAGOGASTRODUODENOSCOPY (EGD); Surgeon: Ilsa Crawford MD;  [facility];   Service: Gastroenterology    PA INCISE FINGER TENDON SHEATH Right 2018    Procedure: RELEASE TRIGGER FINGER long and index finger;  Surgeon: Jaclyn Maya MD;  [facility];  Service: Orthopedics    PA LAP, REPAIR PARAESOPHAGEAL HERNIA, INCL FUNDOPLASTY W/ MESH N/A 2017    Procedure: REPAIR HERNIA PARAESOPHAGEAL  LAPAROSCOPIC;  Surgeon: Luis Ward MD;  [facility];  Service: Bariatrics    PA LAP,DIAGNOSTIC ABDOMEN N/A 2019    Procedure: LAPAROSCOPY DIAGNOSTIC CONVERSION TO OPEN, REDUCTION AND REPAIR OF INTERNAL HERNIA, REPAIR SEROSAL TEARS;  Surgeon: Ricardo Arevalo MD;  [facility];  Service: Larisa Cram PA WRIST [place] LIG Right 2016    Procedure: RELEASE CARPAL TUNNEL ENDOSCOPIC;  Surgeon: Jaclyn Maya MD;  [facility];  Service: Orthopedics    RECONSTRUCTION DISTAL RADIUS/ULNA JOINT (DRUJ) Right 2016    Procedure: WRIST SCAPHOLUNATE LIGAMENT RECONSTRUCTION WITH ECRB TENDON AUTOGRAPH , SPLINT APPLICATION ;  Surgeon: Jaclyn Maya MD;  [facility];  Service:    [facility] SHOULDER SURGERY      TONSILLECTOMY       Social History   Social History     Substance and Sexual Activity   Alcohol Use Not Currently    Frequency: Monthly or less    Drinks per session: 1 or 2    Binge frequency: Never     Social History     Substance and Sexual Activity   Drug Use No     Social History     Tobacco Use   Smoking Status Former Smoker    Packs/day: 0 50    Years: 48 00    Pack years: 24 00    Types: Cigarettes    Last attempt to quit: 2017    Years since quittin 3   Smokeless Tobacco Never Used       Objective       Current Vitals:   Blood Pressure: 124/78 (19 1450)  Pulse: 102 (19 1450)  Temperature: 98 5 °F (36 9 °C) (09/12/19 1450)  Temp Source: Tympanic (09/12/19 1450)  Height: 5' 8" (172 7 cm) (09/12/19 1450)  Weight - Scale: 50 6 kg (111 lb 8 oz) (09/12/19 1450)    Invasive Devices     Peripherally Inserted Central Catheter Line            PICC Line 08/09/19 Right Brachial 34 days          Drain            Gastrostomy/Enterostomy Gastrostomy 22 Fr  LUQ 46 days    Closed/Suction Drain Right;Lateral Abdomen Bulb 12 Fr  34 days    Negative Pressure Wound Therapy (V A C ) Abdomen Mid 29 days    Closed/Suction Drain Left LUQ Bulb 3 days                Physical Exam   Constitutional: He is oriented to person, place, and time  He appears well-developed and well-nourished  Emaciated  HENT:   Head: Normocephalic and atraumatic  Nose: Nose normal    Mouth/Throat: Oropharynx is clear and moist    Eyes: Conjunctivae and EOM are normal    Neck: Normal range of motion  No tracheal deviation present  Cardiovascular: Normal rate and regular rhythm  Pulmonary/Chest: Effort normal and breath sounds normal  No respiratory distress  Abdominal: Soft  Bowel sounds are normal  He exhibits no distension  There is no rebound and no guarding  No hernia  Left upper quadrant and right upper quadrant IR drains seen in place  Serous fluid in bulbs  G-tube in place with site clean dry and intact  G-tube attached to gravity bag with scant bilious drainage  Lower midline incision with exuberant granulation tissue noticed (silver nitrate was  applied)  Good healing  No signs of overinfection  Abdomen is soft and benign with no signs of peritoneal irritation  Musculoskeletal: Normal range of motion  He exhibits no edema  Neurological: He is alert and oriented to person, place, and time  Skin: Skin is warm and dry  Psychiatric: He has a normal mood and affect  His behavior is normal  Judgment and thought content normal    Vitals reviewed          Pathology, and Other Studies: I have personally reviewed pertinent reports  and I have personally reviewed pertinent films in PACS      Assessment/PLAN:    Lino Sandhoff is a 63 y o  male w/remote h/o RYGB w/Dr Eri Bloom; who on 7/28/2019 underwent repair of internal hernia an perforated marginal ulcer by Dr Caren Andres  Patient recovering from complicated postoperative course  Currently slowly recovering, with nutritional parameters slowly improving (Alb 2 9)  Interventions as follows:   - continue administration of b i d  PPIs through G-tube (advised on using more fluid to dilute the contents of the PPI capsule and to flush with fluid to avoid clogging)  Continue with sucralfate  Once tube feeds are tolerated we will have the TPN for 1 week before DC and completely  - continue daily wet to moist dressings to the abdomen   - discontinue gravity bag and clamped G-tube  Initiate bolus tube feedings through G-tube  - continue with IR drainage of collections  We will follow up in 1 week for tube checks  - will have stent removed by GI next week  We will order a CT scan with p o  And IV contrast to ensure no further leak after the stent is removed and follow-up in clinic to review results  Follow up with results of CT scan after stent is removed next week  Domenica Latham MD  Bariatric Surgery Fellow  9/12/2019  3:50 PM

## 2019-09-13 ENCOUNTER — PATIENT OUTREACH (OUTPATIENT)
Dept: FAMILY MEDICINE CLINIC | Facility: CLINIC | Age: 59
End: 2019-09-13

## 2019-09-13 ENCOUNTER — DOCUMENTATION (OUTPATIENT)
Dept: BARIATRICS | Facility: CLINIC | Age: 59
End: 2019-09-13

## 2019-09-13 DIAGNOSIS — E43 SEVERE PROTEIN-CALORIE MALNUTRITION (HCC): ICD-10-CM

## 2019-09-13 DIAGNOSIS — Z78.9 ON TOTAL PARENTERAL NUTRITION (TPN): Primary | ICD-10-CM

## 2019-09-13 PROBLEM — Z93.1 G TUBE FEEDINGS (HCC): Status: ACTIVE | Noted: 2019-09-13

## 2019-09-13 NOTE — TELEPHONE ENCOUNTER
Returned phone call to wife of patient  Procedure scheduled 9-18-19  She wanted to clarify TPN to be stopped (d/t NPO status) after midnight of procedure (EGD stent removal)  Made aware to stop TPN as above

## 2019-09-13 NOTE — TELEPHONE ENCOUNTER
Yes, although the risk of eating is for aspiration during procedure and he does not have that risk with TPN, it is still okay to hold at midnight and resume after procedure   Thank you

## 2019-09-13 NOTE — PROGRESS NOTES
Called [facility] concerning delivery of Jevity 1 2  Miscommunication, originally stated did not need updated order but now does  Delivery planned for Monday after receiving updated order form  Called [facility]   Nursing to coordinate training on bolus feedings  Left message on clinical coordinator voice mail

## 2019-09-13 NOTE — TELEPHONE ENCOUNTER
His wife Ritta Galeazzi called back to see if she needs to stop his PPN bag since he is to be NPO for procedure---please assist    Call back # [phone]

## 2019-09-13 NOTE — TELEPHONE ENCOUNTER
EGD stent removal scheduled with Dr Bueno in [place] on 9/18/2019  I gave Delfina Neighbours (spouse) verbal instructions/emailed to [email or web address]  [email or web address]

## 2019-09-13 NOTE — PROGRESS NOTES
Outpatient Care Management Note:  Reached out to Malachi to introduce myself and my role in his care  He feels that he is doing fine and does not require care management services  Provided my contact information and encouraged to call should his needs change

## 2019-09-16 ENCOUNTER — HOSPITAL ENCOUNTER (OUTPATIENT)
Dept: RADIOLOGY | Facility: HOSPITAL | Age: 59
Discharge: HOME | End: 2019-09-16
Admitting: RADIOLOGY
Payer: COMMERCIAL

## 2019-09-16 DIAGNOSIS — L02.91 ABSCESS: ICD-10-CM

## 2019-09-16 PROCEDURE — 49424 ASSESS CYST CONTRAST INJECT: CPT | Performed by: RADIOLOGY

## 2019-09-16 PROCEDURE — 49424 ASSESS CYST CONTRAST INJECT: CPT

## 2019-09-16 PROCEDURE — 76080 X-RAY EXAM OF FISTULA: CPT | Performed by: RADIOLOGY

## 2019-09-16 RX ADMIN — IOHEXOL 10 ML: 300 INJECTION, SOLUTION INTRAVENOUS at 08:57

## 2019-09-16 NOTE — DISCHARGE INSTRUCTIONS
Drainage Tube Removal    Your drainage tube was removed today  What you need know at home:   Keep a clean dry dressing at the tube site until the small opening closes  It will take twenty four to forty eight hours  Keep the site dry until it heals  A small amount of drainage on your dressing is normal  Resume your normal diet  Small sips of flat soda will help with any nausea  Contact Interventional Radiology for any of the following: You have pain, fever greater than 101, shaking chills  If you have increased redness or swelling at the site  Drainage Tube Removal    Your drainage tube was removed today  What you need know at home:   Keep a clean dry dressing at the tube site until the small opening closes  It will take twenty four to forty eight hours  Keep the site dry until it heals  A small amount of drainage on your dressing is normal  Resume your normal diet  Small sips of flat soda will help with any nausea  Contact Interventional Radiology for any of the following: You have pain, fever greater than 101, shaking chills  If you have increased redness or swelling at the site  I the drainage from your site does not stop  If the site drains pus or has a bad odor  Contact Interventional Radiology at [phone]   Keila PATIENTS: Contact Interventional Radiology at [phone]) Tayo Lu PATIENTS: Contact Interventional Radiology at [phone]) if:      I the drainage from your site does not stop  If the site drains pus or has a bad odor       Contact Interventional Radiology at [phone]   Keila PATIENTS: Contact Interventional Radiology at [phone]) Tayo Lu PATIENTS: Contact Interventional Radiology at [phone]) if:

## 2019-09-16 NOTE — SEDATION DOCUMENTATION
Bilateral abdominal drain tube check performed in IR by Dr Paris Vaughan without complication  Sutures intact to both sites, sites slightly reddened  LUQ drain removed, dry dressing applied to site  Site care instructions provided to patient and wife  RUQ drain to remain in place, back to bulb  Patient and wife instructed to continue flushing and monitoring drain output  Patient to return in one week for tube check  Patient instructed to present to ED or call IR in the event of fever, nausea, chills, vomiting, new onsite of site pain or purulent drainage from site

## 2019-09-17 ENCOUNTER — TRANSCRIBE ORDERS (OUTPATIENT)
Dept: LAB | Facility: CLINIC | Age: 59
End: 2019-09-17

## 2019-09-17 ENCOUNTER — TELEPHONE (OUTPATIENT)
Dept: BARIATRICS | Facility: CLINIC | Age: 59
End: 2019-09-17

## 2019-09-17 ENCOUNTER — APPOINTMENT (OUTPATIENT)
Dept: LAB | Facility: CLINIC | Age: 59
End: 2019-09-17
Payer: COMMERCIAL

## 2019-09-17 DIAGNOSIS — K90.2 POSTOPERATIVE BLIND LOOP SYNDROME: ICD-10-CM

## 2019-09-17 DIAGNOSIS — E11.9 TYPE 2 DIABETES MELLITUS WITHOUT COMPLICATION, UNSPECIFIED WHETHER LONG TERM INSULIN USE (HCC): ICD-10-CM

## 2019-09-17 DIAGNOSIS — E43 ALIMENTARY EDEMA (HCC): ICD-10-CM

## 2019-09-17 DIAGNOSIS — E43 ALIMENTARY EDEMA (HCC): Primary | ICD-10-CM

## 2019-09-17 LAB
ALBUMIN SERPL BCP-MCNC: 3.1 G/DL (ref 3.5–5)
ALP SERPL-CCNC: 152 U/L (ref 46–116)
ALT SERPL W P-5'-P-CCNC: 32 U/L (ref 12–78)
ANION GAP SERPL CALCULATED.3IONS-SCNC: 8 MMOL/L (ref 4–13)
AST SERPL W P-5'-P-CCNC: 14 U/L (ref 5–45)
BILIRUB SERPL-MCNC: 0.46 MG/DL (ref 0.2–1)
BUN SERPL-MCNC: 29 MG/DL (ref 5–25)
CALCIUM SERPL-MCNC: 8.7 MG/DL (ref 8.3–10.1)
CHLORIDE SERPL-SCNC: 104 MMOL/L (ref 100–108)
CO2 SERPL-SCNC: 24 MMOL/L (ref 21–32)
CREAT SERPL-MCNC: 0.43 MG/DL (ref 0.6–1.3)
GFR SERPL CREATININE-BSD FRML MDRD: 128 ML/MIN/1.73SQ M
GLUCOSE SERPL-MCNC: 98 MG/DL (ref 65–140)
MAGNESIUM SERPL-MCNC: 2.1 MG/DL (ref 1.6–2.6)
PHOSPHATE SERPL-MCNC: 2.8 MG/DL (ref 2.7–4.5)
POTASSIUM SERPL-SCNC: 3.9 MMOL/L (ref 3.5–5.3)
PREALB SERPL-MCNC: 12.9 MG/DL (ref 18–40)
PROT SERPL-MCNC: 7.4 G/DL (ref 6.4–8.2)
SODIUM SERPL-SCNC: 136 MMOL/L (ref 136–145)
TRIGL SERPL-MCNC: 42 MG/DL

## 2019-09-17 PROCEDURE — 83735 ASSAY OF MAGNESIUM: CPT

## 2019-09-17 PROCEDURE — 84134 ASSAY OF PREALBUMIN: CPT

## 2019-09-17 PROCEDURE — 80053 COMPREHEN METABOLIC PANEL: CPT

## 2019-09-17 PROCEDURE — 36415 COLL VENOUS BLD VENIPUNCTURE: CPT

## 2019-09-17 PROCEDURE — 84478 ASSAY OF TRIGLYCERIDES: CPT

## 2019-09-17 PROCEDURE — 84100 ASSAY OF PHOSPHORUS: CPT

## 2019-09-17 NOTE — TELEPHONE ENCOUNTER
Received message that Jevity 1 2 was not delivered as planned  Called [employer], Jevity will be delivered today  Called Mrs Alberto Powell , YAN is there now, will instruct on how to bolus  Once Jevity is delivered, she will start with 1/2 carton and will increase as tolerated      Appreciative of the call

## 2019-09-18 ENCOUNTER — HOSPITAL ENCOUNTER (OUTPATIENT)
Dept: GASTROENTEROLOGY | Facility: HOSPITAL | Age: 59
Setting detail: OUTPATIENT SURGERY
Discharge: HOME | End: 2019-09-18
Attending: INTERNAL MEDICINE | Admitting: INTERNAL MEDICINE
Payer: COMMERCIAL

## 2019-09-18 ENCOUNTER — DOCUMENTATION (OUTPATIENT)
Dept: BARIATRICS | Facility: CLINIC | Age: 59
End: 2019-09-18

## 2019-09-18 ENCOUNTER — ANESTHESIA EVENT (OUTPATIENT)
Dept: GASTROENTEROLOGY | Facility: HOSPITAL | Age: 59
End: 2019-09-18

## 2019-09-18 ENCOUNTER — ANESTHESIA (OUTPATIENT)
Dept: GASTROENTEROLOGY | Facility: HOSPITAL | Age: 59
End: 2019-09-18

## 2019-09-18 VITALS
BODY MASS INDEX: 16.82 KG/M2 | WEIGHT: 111 LBS | HEART RATE: 92 BPM | RESPIRATION RATE: 18 BRPM | DIASTOLIC BLOOD PRESSURE: 76 MMHG | SYSTOLIC BLOOD PRESSURE: 119 MMHG | OXYGEN SATURATION: 100 % | HEIGHT: 68 IN

## 2019-09-18 DIAGNOSIS — Z93.1 G TUBE FEEDINGS (HCC): ICD-10-CM

## 2019-09-18 DIAGNOSIS — Z78.9 ON TOTAL PARENTERAL NUTRITION (TPN): Primary | ICD-10-CM

## 2019-09-18 DIAGNOSIS — E43 SEVERE PROTEIN-CALORIE MALNUTRITION (HCC): ICD-10-CM

## 2019-09-18 DIAGNOSIS — K21.9 GASTROESOPHAGEAL REFLUX DISEASE WITHOUT ESOPHAGITIS: ICD-10-CM

## 2019-09-18 DIAGNOSIS — K28.5 CHRONIC MARGINAL ULCER WITH PERFORATION (HCC): ICD-10-CM

## 2019-09-18 DIAGNOSIS — K91.2 POSTSURGICAL MALABSORPTION: ICD-10-CM

## 2019-09-18 PROCEDURE — 43247 EGD REMOVE FOREIGN BODY: CPT | Performed by: INTERNAL MEDICINE

## 2019-09-18 RX ORDER — LANSOPRAZOLE 30 MG/1
30 CAPSULE, DELAYED RELEASE ORAL DAILY
COMMUNITY
End: 2019-10-03 | Stop reason: SDUPTHER

## 2019-09-18 RX ORDER — PROPOFOL 10 MG/ML
INJECTION, EMULSION INTRAVENOUS AS NEEDED
Status: DISCONTINUED | OUTPATIENT
Start: 2019-09-18 | End: 2019-09-18 | Stop reason: SURG

## 2019-09-18 RX ORDER — SODIUM CHLORIDE 9 MG/ML
125 INJECTION, SOLUTION INTRAVENOUS CONTINUOUS
Status: DISCONTINUED | OUTPATIENT
Start: 2019-09-18 | End: 2019-09-22 | Stop reason: HOSPADM

## 2019-09-18 RX ORDER — PROCHLORPERAZINE MALEATE 5 MG/1
5 TABLET ORAL EVERY 6 HOURS PRN
COMMUNITY
End: 2019-10-03 | Stop reason: SDUPTHER

## 2019-09-18 RX ORDER — OXYCODONE HYDROCHLORIDE 5 MG/1
5 CAPSULE ORAL EVERY 4 HOURS PRN
COMMUNITY
End: 2020-07-20

## 2019-09-18 RX ORDER — LIDOCAINE HYDROCHLORIDE 10 MG/ML
INJECTION, SOLUTION INFILTRATION; PERINEURAL AS NEEDED
Status: DISCONTINUED | OUTPATIENT
Start: 2019-09-18 | End: 2019-09-18 | Stop reason: SURG

## 2019-09-18 RX ORDER — METOCLOPRAMIDE 10 MG/1
10 TABLET ORAL 4 TIMES DAILY
COMMUNITY
End: 2021-07-01

## 2019-09-18 RX ADMIN — LIDOCAINE HYDROCHLORIDE 100 MG: 10 INJECTION, SOLUTION INFILTRATION; PERINEURAL at 08:10

## 2019-09-18 RX ADMIN — SODIUM CHLORIDE 125 ML/HR: 0.9 INJECTION, SOLUTION INTRAVENOUS at 07:45

## 2019-09-18 RX ADMIN — PROPOFOL 10 MG: 10 INJECTION, EMULSION INTRAVENOUS at 08:12

## 2019-09-18 RX ADMIN — PROPOFOL 70 MG: 10 INJECTION, EMULSION INTRAVENOUS at 08:10

## 2019-09-18 RX ADMIN — PROPOFOL 10 MG: 10 INJECTION, EMULSION INTRAVENOUS at 08:14

## 2019-09-18 NOTE — ANESTHESIA POSTPROCEDURE EVALUATION
Post-Op Assessment Note    CV Status:  Stable       Mental Status:  Sleepy   Hydration Status:  Euvolemic   PONV Controlled:  Controlled   Airway Patency:  Patent   Post Op Vitals Reviewed: Yes      Staff: CRNA, Anesthesiologist           BP 96/64 (09/18/19 0822)    Temp      Pulse 90 (09/18/19 0822)   Resp 16 (09/18/19 0822)    SpO2 99 % (09/18/19 0884)

## 2019-09-18 NOTE — H&P (VIEW-ONLY)
History and Physical - [facility]  Yenifer Flores 63 y o  male MRN: [id number]    HPI: Yenifer Flores is a 63y o  year old male who presents with history of esophageal stent placed after leak found post gastric bypas  He has been having dry heaving  Discussed with surgery to have the stent removed  We are planning to do this today          Review of Systems    Historical Information   Past Medical History:   Diagnosis Date    Anesthesia     Pt wakes up during "almost every surgery"    Arthritis     Asthma     Bariatric surgery status     Cervical radiculopathy     Chemotherapy follow-up examination     Chronic pain     Chronic pain disorder     [facility] [address] [facility]     Diabetes mellitus ([facility] [address] [facility]     borderline "years ago"    Food allergy     clams    GERD (gastroesophageal reflux disease)     Heart murmur     Hiatal hernia     History of malignant neoplasm     History of pneumonia 04/12/2016    History of pulmonary embolism     History of pulmonary embolus (PE)     History of ulceration     Hyperlipidemia     Lung cancer (HCC)     left lung, radiation and chemo tx    Migraine     Pneumonia     Postgastrectomy malabsorption     Right leg DVT (HCC)     Right scapholunate ligament tear     Skipped heart beats     Wears partial dentures     upper and lower     Past Surgical History:   Procedure Laterality Date    BRONCHOSCOPY      COLONOSCOPY      FRACTURE SURGERY      femur right 1985    GASTRIC BYPASS LAPAROSCOPIC N/A 7/12/2017    Procedure: GASTRIC DIVERSION WITH BROOKLYN-EN-Y RECONSTRUCTION WITH  SHORT 60 cm LIMB;  Surgeon: Samantha Kim MD;  [facility];  Service: Bariatrics    IR CHEST TUBE  8/9/2019    IR IMAGE GUIDED ASPIRATION / DRAINAGE  8/1/2019    IR PICC LINE  8/9/2019    KNEE ARTHROSCOPY Right     right shoulder    LAPAROTOMY N/A 7/28/2019    Procedure: LAPAROTOMY EXPLORATORY, WASHOUT OF SUCUS, REPAIR OF MARGINAL ULCER PERFORATION, ABD WASHOUT, INTRAOPERATIVE EGD, GI ASSISTED ENDOSCOPIC STENT PLACEMENT;  Surgeon: Chava Garcia MD;  [facility];  Service: David Reynoso Left     LYMPHADENECTOMY  05/22/2012    Dr Ellie Correia ANT INTERBODY MIN DISCECTOMY, CERVICAL BELOW C2 N/A 10/17/2017    Procedure: C5-6, C6-7 ACDF WITH ALLOGRAFT (NEURO MONITORING); Surgeon: Elda Smith MD;  [facility];  Service: Orthopedics    MD COLONOSCOPY FLX DX W/COLLJ SPEC WHEN PFRMD N/A 4/14/2017    Procedure: COLONOSCOPY;  Surgeon: Adriana Eugene MD;  [facility];  Service: Gastroenterology    MD EGD TRANSORAL BIOPSY SINGLE/MULTIPLE N/A 1/9/2019    Procedure: ESOPHAGOGASTRODUODENOSCOPY (EGD); Surgeon: Jolanta Graff MD;  [facility]; Service: Bariatrics    MD ESOPHAGOGASTRODUODENOSCOPY TRANSORAL DIAGNOSTIC N/A 1/11/2017    Procedure: ESOPHAGOGASTRODUODENOSCOPY (EGD); Surgeon: Adriana Eugene MD;  [facility];   Service: Gastroenterology    MD INCISE FINGER TENDON SHEATH Right 11/27/2018    Procedure: RELEASE TRIGGER FINGER long and index finger;  Surgeon: Jesus Stark MD;  [facility];  Service: Orthopedics    MD LAP, REPAIR PARAESOPHAGEAL HERNIA, INCL FUNDOPLASTY W/ MESH N/A 7/12/2017    Procedure: REPAIR HERNIA PARAESOPHAGEAL  LAPAROSCOPIC;  Surgeon: Jolanta Graff MD;  [facility];  Service: Bariatrics    MD LAP,DIAGNOSTIC ABDOMEN N/A 7/26/2019    Procedure: LAPAROSCOPY DIAGNOSTIC CONVERSION TO OPEN, REDUCTION AND REPAIR OF INTERNAL HERNIA, REPAIR SEROSAL TEARS;  Surgeon: Chava Garcia MD;  [facility];  Service: Bariatrics    MD WRIST Villagomez Fam LIG Right 6/9/2016    Procedure: RELEASE CARPAL TUNNEL ENDOSCOPIC;  Surgeon: Jesus Stark MD;  [facility];  Service: Orthopedics    RECONSTRUCTION DISTAL RADIUS/ULNA JOINT (DRUJ) Right 2016    Procedure: WRIST SCAPHOLUNATE LIGAMENT RECONSTRUCTION WITH ECRB TENDON AUTOGRAPH , SPLINT APPLICATION ;  Surgeon: Claudia Baker MD;  [facility];  Service:    Vesna Motley SHOULDER SURGERY      TONSILLECTOMY       Social History   Social History     Substance and Sexual Activity   Alcohol Use Not Currently    Frequency: Monthly or less    Drinks per session: 1 or 2    Binge frequency: Never    Comment: rarely     Social History     Substance and Sexual Activity   Drug Use No     Social History     Tobacco Use   Smoking Status Former Smoker    Packs/day: 0 50    Years: 48 00    Pack years: 24 00    Types: Cigarettes    Last attempt to quit: 2017    Years since quittin 3   Smokeless Tobacco Never Used     Family History   Problem Relation Age of Onset    Other Mother         Back disorder    Diabetes Mother         Diabetes Mellitus    Hypertension Mother     Heart disease Father     Hypertension Father     Breast cancer Sister     Skin cancer Sister     Breast cancer Paternal Grandmother     Skin cancer Paternal Grandmother        Meds/Allergies       (Not in a hospital admission)    Allergies   Allergen Reactions    Codeine Hives, Itching, Nausea Only, GI Intolerance and Vomiting    Hydrocodone Hives and GI Intolerance    Penicillins Anaphylaxis and Throat Swelling    Shellfish-Derived Products Nausea Only and Vomiting       Objective     /76   Pulse 94   Resp 18   Ht 5' 8" (1 727 m)   Wt 50 3 kg (111 lb)   SpO2 100%   BMI 16 88 kg/m²       PHYSICAL EXAM    Gen: NAD  CV: RRR  CHEST: Clear  ABD: soft, NT/ND  EXT: no edema  Neuro: AAO      ASSESSMENT/PLAN:  This is a 63y o  year old male here for EGD for esophageal stent removal     PLAN:   Procedure:  EGD

## 2019-09-18 NOTE — PROGRESS NOTES
Jevity 1 2 was delivered to [place] [id number] instead of patient's home in error  Jevity 1 2 should be delivered today and patients and wife will initiate bolus feedings today     Will start 1/2 carton QID- if tolerated will advance to goal of 1 carton every 4 hours  Initiation of bolus feedings will provide 570 kcal, 26 4 grams of protein and 80 4 grams of carbohydrate    Will decrease AA in TPN to decrease renal solute load  TPN adjusted to the following  Dex 40% 750 ml  AA 10% 1000 ml   Lipids 20% 200 ml  Total calories 1820 kcal, 56% carb, 22% protein and 22% fat  Plus enteral = total of 2390 kcal, 126 4 grams of protein       Wt Readings from Last 3 Encounters:   09/18/19 50 3 kg (111 lb)   09/12/19 50 6 kg (111 lb 8 oz)   09/12/19 50 6 kg (111 lb 8 oz)     Estimated calories needs for maintenance = 8506-4628 kcal per day (25 kcal/kg body weight )  Estimated calories for weight gain = 2265 -2509 kcal per day ( additional of 1000 kcal per day )  Estimated protein needs = 100 6-126 grams per day (2 0-2 5 grams /kg body weight )    Patient still with J P  drain with serous drainage- may need additional protein  Concern with increased BUN    Current prealbumin level 12 9 indicating moderate protein depletion  Patient with severe calorie malnutrition and significant weight loss   Patient withsevere protein and calorie malnutrition       Increased needs with healing     Orders faxed to [facility]

## 2019-09-18 NOTE — H&P
History and Physical - [facility]  Graham Gaston 63 y o  male MRN: [id number]    HPI: Grhaam Gaston is a 63y o  year old male who presents with history of esophageal stent placed after leak found post gastric bypas  He has been having dry heaving  Discussed with surgery to have the stent removed  We are planning to do this today          Review of Systems    Historical Information   Past Medical History:   Diagnosis Date    Anesthesia     Pt wakes up during "almost every surgery"    Arthritis     Asthma     Bariatric surgery status     Cervical radiculopathy     Chemotherapy follow-up examination     Chronic pain     Chronic pain disorder     [facility] [address] [facility]     Diabetes mellitus ([facility] [address] [facility]     borderline "years ago"    Food allergy     clams    GERD (gastroesophageal reflux disease)     Heart murmur     Hiatal hernia     History of malignant neoplasm     History of pneumonia 04/12/2016    History of pulmonary embolism     History of pulmonary embolus (PE)     History of ulceration     Hyperlipidemia     Lung cancer (HCC)     left lung, radiation and chemo tx    Migraine     Pneumonia     Postgastrectomy malabsorption     Right leg DVT (HCC)     Right scapholunate ligament tear     Skipped heart beats     Wears partial dentures     upper and lower     Past Surgical History:   Procedure Laterality Date    BRONCHOSCOPY      COLONOSCOPY      FRACTURE SURGERY      femur right 1985    GASTRIC BYPASS LAPAROSCOPIC N/A 7/12/2017    Procedure: GASTRIC DIVERSION WITH BR[place]-EN-Y RECONSTRUCTION WITH  SHORT 60 cm LIMB;  Surgeon: Maurisio Lewis MD;  [facility];  Service: Bariatrics    IR CHEST TUBE  8/9/2019    IR IMAGE GUIDED ASPIRATION / DRAINAGE  8/1/2019    IR PICC LINE  8/9/2019    KNEE ARTHROSCOPY Right     right shoulder    LAPAROTOMY N/A 7/28/2019    Procedure: LAPAROTOMY EXPLORATORY, WASHOUT OF SUCUS, REPAIR OF MARGINAL ULCER PERFORATION, ABD WASHOUT, INTRAOPERATIVE EGD, GI ASSISTED ENDOSCOPIC STENT PLACEMENT;  Surgeon: Ursula Chisholm MD;  [facility];  Service: Nirali Salaam Left     LYMPHADENECTOMY  05/22/2012    Dr Reginald Henley ANT INTERBODY MIN DISCECTOMY, CERVICAL BELOW C2 N/A 10/17/2017    Procedure: C5-6, C6-7 ACDF WITH ALLOGRAFT (NEURO MONITORING); Surgeon: Rosa Maria Lopez MD;  [facility];  Service: Orthopedics    GA COLONOSCOPY FLX DX W/COLLJ SPEC WHEN PFRMD N/A 4/14/2017    Procedure: COLONOSCOPY;  Surgeon: Larry Quiles MD;  [facility];  Service: Gastroenterology    GA EGD TRANSORAL BIOPSY SINGLE/MULTIPLE N/A 1/9/2019    Procedure: ESOPHAGOGASTRODUODENOSCOPY (EGD); Surgeon: Vesna Kaur MD;  [facility]; Service: Bariatrics    GA ESOPHAGOGASTRODUODENOSCOPY TRANSORAL DIAGNOSTIC N/A 1/11/2017    Procedure: ESOPHAGOGASTRODUODENOSCOPY (EGD); Surgeon: Larry Quiles MD;  [facility];   Service: Gastroenterology    GA INCISE FINGER TENDON SHEATH Right 11/27/2018    Procedure: RELEASE TRIGGER FINGER long and index finger;  Surgeon: Zenobia Olivo MD;  [facility];  Service: Orthopedics    GA LAP, REPAIR PARAESOPHAGEAL HERNIA, INCL FUNDOPLASTY W/ MESH N/A 7/12/2017    Procedure: REPAIR HERNIA PARAESOPHAGEAL  LAPAROSCOPIC;  Surgeon: Vesna Kaur MD;  [facility];  Service: Bariatrics    GA LAP,DIAGNOSTIC ABDOMEN N/A 7/26/2019    Procedure: LAPAROSCOPY DIAGNOSTIC CONVERSION TO OPEN, REDUCTION AND REPAIR OF INTERNAL HERNIA, REPAIR SEROSAL TEARS;  Surgeon: Ursula Chisholm MD;  [facility];  Service: Bariatrics    GA WRIST Cheron Herd LIG Right 6/9/2016    Procedure: RELEASE CARPAL TUNNEL ENDOSCOPIC;  Surgeon: Zenobia Olivo MD;  [facility];  Service: Orthopedics    RECONSTRUCTION DISTAL RADIUS/ULNA JOINT (DRUJ) Right 2016    Procedure: WRIST SCAPHOLUNATE LIGAMENT RECONSTRUCTION WITH ECRB TENDON AUTOGRAPH , SPLINT APPLICATION ;  Surgeon: Zenobia Olivo MD;  [facility];  Service:    Caty Swain SHOULDER SURGERY      TONSILLECTOMY       Social History   Social History     Substance and Sexual Activity   Alcohol Use Not Currently    Frequency: Monthly or less    Drinks per session: 1 or 2    Binge frequency: Never    Comment: rarely     Social History     Substance and Sexual Activity   Drug Use No     Social History     Tobacco Use   Smoking Status Former Smoker    Packs/day: 0 50    Years: 48 00    Pack years: 24 00    Types: Cigarettes    Last attempt to quit: 2017    Years since quittin 3   Smokeless Tobacco Never Used     Family History   Problem Relation Age of Onset    Other Mother         Back disorder    Diabetes Mother         Diabetes Mellitus    Hypertension Mother     Heart disease Father     Hypertension Father     Breast cancer Sister     Skin cancer Sister     Breast cancer Paternal Grandmother     Skin cancer Paternal Grandmother        Meds/Allergies       (Not in a hospital admission)    Allergies   Allergen Reactions    Codeine Hives, Itching, Nausea Only, GI Intolerance and Vomiting    Hydrocodone Hives and GI Intolerance    Penicillins Anaphylaxis and Throat Swelling    Shellfish-Derived Products Nausea Only and Vomiting       Objective     /76   Pulse 94   Resp 18   Ht 5' 8" (1 727 m)   Wt 50 3 kg (111 lb)   SpO2 100%   BMI 16 88 kg/m²       PHYSICAL EXAM    Gen: NAD  CV: RRR  CHEST: Clear  ABD: soft, NT/ND  EXT: no edema  Neuro: AAO      ASSESSMENT/PLAN:  This is a 63y o  year old male here for EGD for esophageal stent removal     PLAN:   Procedure:  EGD

## 2019-09-18 NOTE — ANESTHESIA PREPROCEDURE EVALUATION
Review of Systems/Medical History  Patient summary reviewed  Chart reviewed  History of anesthetic complications (awareness)     Cardiovascular  Hyperlipidemia,    Pulmonary  COPD mild- PRN medication , Asthma , well controlled/ stable Last rescue: > 1 year ago Asthma type of rescue: PRN inhaler,   Comment: Remote hx of pneumonia    history of stage IIA left lower lobe adenocarcinoma status post 2012 left VATS and left lower lobectomy as well as adjuvant chemotherapy yearly CT imaging     GI/Hepatic    No GERD ,  No hiatal hernia, Bariatric surgery,   Comment: Paraesophageal hernia repair 2017    Post Britney-en-Y gastric bypass with 100 pound weight loss    NPO on TPN       Negative  ROS        Endo/Other  Negative endo/other ROS No diabetes ,   Comment: Hx of pre diabetes last HGb A1C 5 1    GYN       Hematology  Negative hematology ROS      Musculoskeletal    Arthritis     Neurology    Headaches,   Comment: Cervical radiculopathy? Psychology   Negative psychology ROS   Chronic opioid dependence (Currently on fentanyl patch and oxycodone for pain )            Physical Exam    Airway    Mallampati score: III  TM Distance: >3 FB  Neck ROM: limited     Dental   upper dentures and lower dentures,     Cardiovascular  Rhythm: regular, Rate: normal, Cardiovascular exam normal    Pulmonary  Comment: Diminished breath sounds in bases, Decreased breath sounds,     Other Findings        Anesthesia Plan  ASA Score- 3     Anesthesia Type- IV sedation with anesthesia with ASA Monitors  Additional Monitors:   Airway Plan:     Comment: Patient reportedly has had multiple episodes of awareness under anesthesia        Plan Factors-Patient not instructed to abstain from smoking on day of procedure  Patient did not smoke on day of surgery  Induction- intravenous  Postoperative Plan-     Informed Consent- Anesthetic plan and risks discussed with patient and spouse

## 2019-09-19 ENCOUNTER — HOSPITAL ENCOUNTER (OUTPATIENT)
Dept: CT IMAGING | Facility: HOSPITAL | Age: 59
Discharge: HOME | End: 2019-09-19
Payer: COMMERCIAL

## 2019-09-19 DIAGNOSIS — K28.9 MARGINAL ULCER: ICD-10-CM

## 2019-09-19 DIAGNOSIS — R18.8 ABDOMINAL FLUID COLLECTION: ICD-10-CM

## 2019-09-19 DIAGNOSIS — Z78.9 ON TOTAL PARENTERAL NUTRITION (TPN): ICD-10-CM

## 2019-09-19 DIAGNOSIS — Z98.84 BARIATRIC SURGERY STATUS: ICD-10-CM

## 2019-09-19 PROCEDURE — 71260 CT THORAX DX C+: CPT

## 2019-09-19 PROCEDURE — 74177 CT ABD & PELVIS W/CONTRAST: CPT

## 2019-09-19 RX ADMIN — IOHEXOL 100 ML: 350 INJECTION, SOLUTION INTRAVENOUS at 12:59

## 2019-09-19 RX ADMIN — IOHEXOL 50 ML: 240 INJECTION, SOLUTION INTRATHECAL; INTRAVASCULAR; INTRAVENOUS; ORAL at 12:59

## 2019-09-21 ENCOUNTER — APPOINTMENT (EMERGENCY)
Dept: RADIOLOGY | Facility: HOSPITAL | Age: 59
DRG: 446 | End: 2019-09-21
Payer: COMMERCIAL

## 2019-09-21 ENCOUNTER — HOSPITAL ENCOUNTER (INPATIENT)
Facility: HOSPITAL | Age: 59
LOS: 3 days | Discharge: HOME HEALTH | DRG: 446 | End: 2019-09-24
Attending: EMERGENCY MEDICINE | Admitting: SURGERY
Payer: COMMERCIAL

## 2019-09-21 ENCOUNTER — APPOINTMENT (EMERGENCY)
Dept: CT IMAGING | Facility: HOSPITAL | Age: 59
DRG: 446 | End: 2019-09-21
Payer: COMMERCIAL

## 2019-09-21 DIAGNOSIS — Z93.1 G TUBE FEEDINGS (HCC): Primary | ICD-10-CM

## 2019-09-21 DIAGNOSIS — K81.9 CHOLECYSTITIS: ICD-10-CM

## 2019-09-21 DIAGNOSIS — K81.0 ACUTE ACALCULOUS CHOLECYSTITIS: ICD-10-CM

## 2019-09-21 DIAGNOSIS — R10.9 ABDOMINAL PAIN: ICD-10-CM

## 2019-09-21 DIAGNOSIS — Z43.4 CHOLECYSTOSTOMY CARE (HCC): ICD-10-CM

## 2019-09-21 LAB
ALBUMIN SERPL BCP-MCNC: 2.3 G/DL (ref 3.5–5)
ALP SERPL-CCNC: 133 U/L (ref 46–116)
ALT SERPL W P-5'-P-CCNC: 26 U/L (ref 12–78)
ANION GAP SERPL CALCULATED.3IONS-SCNC: 8 MMOL/L (ref 4–13)
AST SERPL W P-5'-P-CCNC: 18 U/L (ref 5–45)
BACTERIA UR QL AUTO: ABNORMAL /HPF
BASOPHILS # BLD AUTO: 0.06 THOUSANDS/ΜL (ref 0–0.1)
BASOPHILS NFR BLD AUTO: 0 % (ref 0–1)
BILIRUB DIRECT SERPL-MCNC: 0.24 MG/DL (ref 0–0.2)
BILIRUB SERPL-MCNC: 0.53 MG/DL (ref 0.2–1)
BILIRUB UR QL STRIP: NEGATIVE
BUN SERPL-MCNC: 21 MG/DL (ref 5–25)
CALCIUM SERPL-MCNC: 8.7 MG/DL (ref 8.3–10.1)
CHLORIDE SERPL-SCNC: 100 MMOL/L (ref 100–108)
CLARITY UR: CLEAR
CO2 SERPL-SCNC: 26 MMOL/L (ref 21–32)
COLOR UR: YELLOW
CREAT SERPL-MCNC: 0.65 MG/DL (ref 0.6–1.3)
EOSINOPHIL # BLD AUTO: 0 THOUSAND/ΜL (ref 0–0.61)
EOSINOPHIL NFR BLD AUTO: 0 % (ref 0–6)
ERYTHROCYTE [DISTWIDTH] IN BLOOD BY AUTOMATED COUNT: 14.5 % (ref 11.6–15.1)
GFR SERPL CREATININE-BSD FRML MDRD: 108 ML/MIN/1.73SQ M
GLUCOSE SERPL-MCNC: 111 MG/DL (ref 65–140)
GLUCOSE UR STRIP-MCNC: NEGATIVE MG/DL
HCT VFR BLD AUTO: 28.8 % (ref 36.5–49.3)
HGB BLD-MCNC: 9.3 G/DL (ref 12–17)
HGB UR QL STRIP.AUTO: ABNORMAL
IMM GRANULOCYTES # BLD AUTO: 0.43 THOUSAND/UL (ref 0–0.2)
IMM GRANULOCYTES NFR BLD AUTO: 1 % (ref 0–2)
KETONES UR STRIP-MCNC: NEGATIVE MG/DL
LACTATE SERPL-SCNC: 1.2 MMOL/L (ref 0.5–2)
LEUKOCYTE ESTERASE UR QL STRIP: NEGATIVE
LIPASE SERPL-CCNC: 268 U/L (ref 73–393)
LYMPHOCYTES # BLD AUTO: 1 THOUSANDS/ΜL (ref 0.6–4.47)
LYMPHOCYTES NFR BLD AUTO: 3 % (ref 14–44)
MAGNESIUM SERPL-MCNC: 1.7 MG/DL (ref 1.6–2.6)
MCH RBC QN AUTO: 30.1 PG (ref 26.8–34.3)
MCHC RBC AUTO-ENTMCNC: 32.3 G/DL (ref 31.4–37.4)
MCV RBC AUTO: 93 FL (ref 82–98)
MONOCYTES # BLD AUTO: 1.37 THOUSAND/ΜL (ref 0.17–1.22)
MONOCYTES NFR BLD AUTO: 4 % (ref 4–12)
NEUTROPHILS # BLD AUTO: 30.05 THOUSANDS/ΜL (ref 1.85–7.62)
NEUTS SEG NFR BLD AUTO: 92 % (ref 43–75)
NITRITE UR QL STRIP: POSITIVE
NON-SQ EPI CELLS URNS QL MICRO: ABNORMAL /HPF
NRBC BLD AUTO-RTO: 0 /100 WBCS
PH UR STRIP.AUTO: 5.5 [PH]
PLATELET # BLD AUTO: 394 THOUSANDS/UL (ref 149–390)
PMV BLD AUTO: 9.6 FL (ref 8.9–12.7)
POTASSIUM SERPL-SCNC: 3.8 MMOL/L (ref 3.5–5.3)
PROT SERPL-MCNC: 6.6 G/DL (ref 6.4–8.2)
PROT UR STRIP-MCNC: ABNORMAL MG/DL
RBC # BLD AUTO: 3.09 MILLION/UL (ref 3.88–5.62)
RBC #/AREA URNS AUTO: ABNORMAL /HPF
SODIUM SERPL-SCNC: 134 MMOL/L (ref 136–145)
SP GR UR STRIP.AUTO: 1.02 (ref 1–1.03)
UROBILINOGEN UR QL STRIP.AUTO: 0.2 E.U./DL
WBC # BLD AUTO: 32.91 THOUSAND/UL (ref 4.31–10.16)
WBC #/AREA URNS AUTO: ABNORMAL /HPF

## 2019-09-21 PROCEDURE — 96367 TX/PROPH/DG ADDL SEQ IV INF: CPT

## 2019-09-21 PROCEDURE — 99285 EMERGENCY DEPT VISIT HI MDM: CPT

## 2019-09-21 PROCEDURE — 87186 SC STD MICRODIL/AGAR DIL: CPT | Performed by: EMERGENCY MEDICINE

## 2019-09-21 PROCEDURE — 96365 THER/PROPH/DIAG IV INF INIT: CPT

## 2019-09-21 PROCEDURE — 80048 BASIC METABOLIC PNL TOTAL CA: CPT | Performed by: EMERGENCY MEDICINE

## 2019-09-21 PROCEDURE — 74177 CT ABD & PELVIS W/CONTRAST: CPT

## 2019-09-21 PROCEDURE — 71045 X-RAY EXAM CHEST 1 VIEW: CPT

## 2019-09-21 PROCEDURE — 96375 TX/PRO/DX INJ NEW DRUG ADDON: CPT

## 2019-09-21 PROCEDURE — 36415 COLL VENOUS BLD VENIPUNCTURE: CPT | Performed by: EMERGENCY MEDICINE

## 2019-09-21 PROCEDURE — 83605 ASSAY OF LACTIC ACID: CPT | Performed by: EMERGENCY MEDICINE

## 2019-09-21 PROCEDURE — 83690 ASSAY OF LIPASE: CPT | Performed by: EMERGENCY MEDICINE

## 2019-09-21 PROCEDURE — 87077 CULTURE AEROBIC IDENTIFY: CPT | Performed by: EMERGENCY MEDICINE

## 2019-09-21 PROCEDURE — 87040 BLOOD CULTURE FOR BACTERIA: CPT | Performed by: EMERGENCY MEDICINE

## 2019-09-21 PROCEDURE — 85025 COMPLETE CBC W/AUTO DIFF WBC: CPT | Performed by: EMERGENCY MEDICINE

## 2019-09-21 PROCEDURE — 99024 POSTOP FOLLOW-UP VISIT: CPT | Performed by: SURGERY

## 2019-09-21 PROCEDURE — 80076 HEPATIC FUNCTION PANEL: CPT | Performed by: EMERGENCY MEDICINE

## 2019-09-21 PROCEDURE — 83735 ASSAY OF MAGNESIUM: CPT | Performed by: EMERGENCY MEDICINE

## 2019-09-21 PROCEDURE — 81001 URINALYSIS AUTO W/SCOPE: CPT | Performed by: EMERGENCY MEDICINE

## 2019-09-21 PROCEDURE — 99285 EMERGENCY DEPT VISIT HI MDM: CPT | Performed by: EMERGENCY MEDICINE

## 2019-09-21 PROCEDURE — 96361 HYDRATE IV INFUSION ADD-ON: CPT

## 2019-09-21 PROCEDURE — 96360 HYDRATION IV INFUSION INIT: CPT

## 2019-09-21 RX ORDER — ONDANSETRON 2 MG/ML
4 INJECTION INTRAMUSCULAR; INTRAVENOUS EVERY 6 HOURS PRN
Status: DISCONTINUED | OUTPATIENT
Start: 2019-09-21 | End: 2019-09-24 | Stop reason: HOSPADM

## 2019-09-21 RX ORDER — ONDANSETRON 2 MG/ML
4 INJECTION INTRAMUSCULAR; INTRAVENOUS ONCE
Status: COMPLETED | OUTPATIENT
Start: 2019-09-21 | End: 2019-09-21

## 2019-09-21 RX ORDER — MORPHINE SULFATE 4 MG/ML
4 INJECTION, SOLUTION INTRAMUSCULAR; INTRAVENOUS EVERY 2 HOUR PRN
Status: DISCONTINUED | OUTPATIENT
Start: 2019-09-21 | End: 2019-09-24 | Stop reason: HOSPADM

## 2019-09-21 RX ORDER — DIPHENHYDRAMINE HCL 25 MG
25 TABLET ORAL
Status: DISCONTINUED | OUTPATIENT
Start: 2019-09-21 | End: 2019-09-24 | Stop reason: HOSPADM

## 2019-09-21 RX ORDER — ALBUTEROL SULFATE 90 UG/1
2 AEROSOL, METERED RESPIRATORY (INHALATION) 4 TIMES DAILY
Status: DISCONTINUED | OUTPATIENT
Start: 2019-09-21 | End: 2019-09-24 | Stop reason: HOSPADM

## 2019-09-21 RX ORDER — PANTOPRAZOLE SODIUM 40 MG/1
40 INJECTION, POWDER, FOR SOLUTION INTRAVENOUS
Status: DISCONTINUED | OUTPATIENT
Start: 2019-09-22 | End: 2019-09-24 | Stop reason: HOSPADM

## 2019-09-21 RX ORDER — SODIUM CHLORIDE, SODIUM LACTATE, POTASSIUM CHLORIDE, CALCIUM CHLORIDE 600; 310; 30; 20 MG/100ML; MG/100ML; MG/100ML; MG/100ML
100 INJECTION, SOLUTION INTRAVENOUS CONTINUOUS
Status: DISCONTINUED | OUTPATIENT
Start: 2019-09-21 | End: 2019-09-22

## 2019-09-21 RX ORDER — HEPARIN SODIUM 5000 [USP'U]/ML
5000 INJECTION, SOLUTION INTRAVENOUS; SUBCUTANEOUS EVERY 8 HOURS SCHEDULED
Status: DISCONTINUED | OUTPATIENT
Start: 2019-09-22 | End: 2019-09-22

## 2019-09-21 RX ORDER — HYDROMORPHONE HCL/PF 1 MG/ML
0.5 SYRINGE (ML) INJECTION EVERY 4 HOURS PRN
Status: DISCONTINUED | OUTPATIENT
Start: 2019-09-21 | End: 2019-09-24 | Stop reason: HOSPADM

## 2019-09-21 RX ORDER — HYDROMORPHONE HCL/PF 1 MG/ML
0.5 SYRINGE (ML) INJECTION ONCE
Status: COMPLETED | OUTPATIENT
Start: 2019-09-21 | End: 2019-09-21

## 2019-09-21 RX ADMIN — CEFEPIME HYDROCHLORIDE 2000 MG: 2 INJECTION, POWDER, FOR SOLUTION INTRAVENOUS at 19:30

## 2019-09-21 RX ADMIN — IOHEXOL 100 ML: 350 INJECTION, SOLUTION INTRAVENOUS at 19:43

## 2019-09-21 RX ADMIN — METRONIDAZOLE 500 MG: 500 INJECTION, SOLUTION INTRAVENOUS at 20:12

## 2019-09-21 RX ADMIN — SODIUM CHLORIDE 1000 ML: 0.9 INJECTION, SOLUTION INTRAVENOUS at 18:16

## 2019-09-21 RX ADMIN — IOHEXOL 50 ML: 240 INJECTION, SOLUTION INTRATHECAL; INTRAVASCULAR; INTRAVENOUS; ORAL at 19:43

## 2019-09-21 RX ADMIN — HYDROMORPHONE HYDROCHLORIDE 0.5 MG: 1 INJECTION, SOLUTION INTRAMUSCULAR; INTRAVENOUS; SUBCUTANEOUS at 18:16

## 2019-09-21 RX ADMIN — VANCOMYCIN HYDROCHLORIDE 750 MG: 750 INJECTION, SOLUTION INTRAVENOUS at 20:53

## 2019-09-21 RX ADMIN — ONDANSETRON 4 MG: 2 INJECTION INTRAMUSCULAR; INTRAVENOUS at 18:16

## 2019-09-21 RX ADMIN — MORPHINE SULFATE 4 MG: 4 INJECTION INTRAVENOUS at 21:10

## 2019-09-21 RX ADMIN — SODIUM CHLORIDE 1000 ML: 0.9 INJECTION, SOLUTION INTRAVENOUS at 19:21

## 2019-09-21 RX ADMIN — MORPHINE SULFATE 4 MG: 4 INJECTION INTRAVENOUS at 23:20

## 2019-09-21 RX ADMIN — SODIUM CHLORIDE, SODIUM LACTATE, POTASSIUM CHLORIDE, AND CALCIUM CHLORIDE 100 ML/HR: .6; .31; .03; .02 INJECTION, SOLUTION INTRAVENOUS at 21:48

## 2019-09-22 ENCOUNTER — APPOINTMENT (INPATIENT)
Dept: RADIOLOGY | Facility: HOSPITAL | Age: 59
DRG: 446 | End: 2019-09-22
Payer: COMMERCIAL

## 2019-09-22 LAB
ABO GROUP BLD: NORMAL
ALBUMIN SERPL BCP-MCNC: 1.9 G/DL (ref 3.5–5)
ALP SERPL-CCNC: 119 U/L (ref 46–116)
ALT SERPL W P-5'-P-CCNC: 20 U/L (ref 12–78)
ANION GAP SERPL CALCULATED.3IONS-SCNC: 7 MMOL/L (ref 4–13)
AST SERPL W P-5'-P-CCNC: 20 U/L (ref 5–45)
BILIRUB SERPL-MCNC: 0.44 MG/DL (ref 0.2–1)
BLD GP AB SCN SERPL QL: NEGATIVE
BUN SERPL-MCNC: 17 MG/DL (ref 5–25)
CALCIUM SERPL-MCNC: 8.2 MG/DL (ref 8.3–10.1)
CHLORIDE SERPL-SCNC: 102 MMOL/L (ref 100–108)
CO2 SERPL-SCNC: 25 MMOL/L (ref 21–32)
CREAT SERPL-MCNC: 0.54 MG/DL (ref 0.6–1.3)
ERYTHROCYTE [DISTWIDTH] IN BLOOD BY AUTOMATED COUNT: 14.5 % (ref 11.6–15.1)
GFR SERPL CREATININE-BSD FRML MDRD: 117 ML/MIN/1.73SQ M
GLUCOSE SERPL-MCNC: 105 MG/DL (ref 65–140)
GLUCOSE SERPL-MCNC: 128 MG/DL (ref 65–140)
HCT VFR BLD AUTO: 23.1 % (ref 36.5–49.3)
HGB BLD-MCNC: 7.3 G/DL (ref 12–17)
INR PPP: 1.57 (ref 0.84–1.19)
MCH RBC QN AUTO: 29.9 PG (ref 26.8–34.3)
MCHC RBC AUTO-ENTMCNC: 31.6 G/DL (ref 31.4–37.4)
MCV RBC AUTO: 95 FL (ref 82–98)
PLATELET # BLD AUTO: 310 THOUSANDS/UL (ref 149–390)
PMV BLD AUTO: 9.2 FL (ref 8.9–12.7)
POTASSIUM SERPL-SCNC: 3.7 MMOL/L (ref 3.5–5.3)
PREALB SERPL-MCNC: 6 MG/DL (ref 18–40)
PROT SERPL-MCNC: 5.6 G/DL (ref 6.4–8.2)
PROTHROMBIN TIME: 19 SECONDS (ref 11.6–14.5)
RBC # BLD AUTO: 2.44 MILLION/UL (ref 3.88–5.62)
RH BLD: POSITIVE
SODIUM SERPL-SCNC: 134 MMOL/L (ref 136–145)
SPECIMEN EXPIRATION DATE: NORMAL
WBC # BLD AUTO: 30.32 THOUSAND/UL (ref 4.31–10.16)

## 2019-09-22 PROCEDURE — C9113 INJ PANTOPRAZOLE SODIUM, VIA: HCPCS | Performed by: SURGERY

## 2019-09-22 PROCEDURE — 87077 CULTURE AEROBIC IDENTIFY: CPT | Performed by: SURGERY

## 2019-09-22 PROCEDURE — 99152 MOD SED SAME PHYS/QHP 5/>YRS: CPT | Performed by: RADIOLOGY

## 2019-09-22 PROCEDURE — 85610 PROTHROMBIN TIME: CPT | Performed by: SURGERY

## 2019-09-22 PROCEDURE — 85027 COMPLETE CBC AUTOMATED: CPT | Performed by: SURGERY

## 2019-09-22 PROCEDURE — 86900 BLOOD TYPING SEROLOGIC ABO: CPT | Performed by: SURGERY

## 2019-09-22 PROCEDURE — 86850 RBC ANTIBODY SCREEN: CPT | Performed by: SURGERY

## 2019-09-22 PROCEDURE — 99024 POSTOP FOLLOW-UP VISIT: CPT | Performed by: SURGERY

## 2019-09-22 PROCEDURE — 82948 REAGENT STRIP/BLOOD GLUCOSE: CPT

## 2019-09-22 PROCEDURE — 80053 COMPREHEN METABOLIC PANEL: CPT | Performed by: SURGERY

## 2019-09-22 PROCEDURE — 99152 MOD SED SAME PHYS/QHP 5/>YRS: CPT

## 2019-09-22 PROCEDURE — C1729 CATH, DRAINAGE: HCPCS

## 2019-09-22 PROCEDURE — 47490 INCISION OF GALLBLADDER: CPT | Performed by: RADIOLOGY

## 2019-09-22 PROCEDURE — 87205 SMEAR GRAM STAIN: CPT | Performed by: SURGERY

## 2019-09-22 PROCEDURE — 87070 CULTURE OTHR SPECIMN AEROBIC: CPT | Performed by: SURGERY

## 2019-09-22 PROCEDURE — 86901 BLOOD TYPING SEROLOGIC RH(D): CPT | Performed by: SURGERY

## 2019-09-22 PROCEDURE — 87186 SC STD MICRODIL/AGAR DIL: CPT | Performed by: SURGERY

## 2019-09-22 PROCEDURE — 47490 INCISION OF GALLBLADDER: CPT

## 2019-09-22 PROCEDURE — 0F9430Z DRAINAGE OF GALLBLADDER WITH DRAINAGE DEVICE, PERCUTANEOUS APPROACH: ICD-10-PCS | Performed by: RADIOLOGY

## 2019-09-22 PROCEDURE — 84134 ASSAY OF PREALBUMIN: CPT | Performed by: SURGERY

## 2019-09-22 RX ORDER — FENTANYL CITRATE 50 UG/ML
INJECTION, SOLUTION INTRAMUSCULAR; INTRAVENOUS CODE/TRAUMA/SEDATION MEDICATION
Status: COMPLETED | OUTPATIENT
Start: 2019-09-22 | End: 2019-09-22

## 2019-09-22 RX ORDER — MIDAZOLAM HYDROCHLORIDE 1 MG/ML
INJECTION INTRAMUSCULAR; INTRAVENOUS CODE/TRAUMA/SEDATION MEDICATION
Status: COMPLETED | OUTPATIENT
Start: 2019-09-22 | End: 2019-09-22

## 2019-09-22 RX ORDER — HEPARIN SODIUM 5000 [USP'U]/ML
5000 INJECTION, SOLUTION INTRAVENOUS; SUBCUTANEOUS EVERY 8 HOURS SCHEDULED
Status: DISCONTINUED | OUTPATIENT
Start: 2019-09-22 | End: 2019-09-24 | Stop reason: HOSPADM

## 2019-09-22 RX ORDER — DEXTROSE AND SODIUM CHLORIDE 5; .9 G/100ML; G/100ML
10 INJECTION, SOLUTION INTRAVENOUS CONTINUOUS
Status: DISCONTINUED | OUTPATIENT
Start: 2019-09-22 | End: 2019-09-24 | Stop reason: HOSPADM

## 2019-09-22 RX ORDER — ASCORBIC ACID, VITAMIN A PALMITATE, CHOLECALCIFEROL, THIAMINE HYDROCHLORIDE, RIBOFLAVIN-5 PHOSPHATE SODIUM, PYRIDOXINE HYDROCHLORIDE, NIACINAMIDE, DEXPANTHENOL, ALPHA-TOCOPHEROL ACETATE, VITAMIN K1, FOLIC ACID, BIOTIN, CYANOCOBALAMIN 200; 3300; 200; 6; 3.6; 6; 40; 15; 10; 150; 600; 60; 5 MG/10ML; [IU]/10ML; [IU]/10ML; MG/10ML; MG/10ML; MG/10ML; MG/10ML; MG/10ML; [IU]/10ML; UG/10ML; UG/10ML; UG/10ML; UG/10ML
10 INJECTION, SOLUTION INTRAVENOUS ONCE
Status: DISCONTINUED | OUTPATIENT
Start: 2019-09-22 | End: 2019-09-22 | Stop reason: CLARIF

## 2019-09-22 RX ADMIN — MORPHINE SULFATE 2 MG: 2 INJECTION, SOLUTION INTRAMUSCULAR; INTRAVENOUS at 19:11

## 2019-09-22 RX ADMIN — HEPARIN SODIUM 5000 UNITS: 5000 INJECTION INTRAVENOUS; SUBCUTANEOUS at 14:44

## 2019-09-22 RX ADMIN — CEFEPIME HYDROCHLORIDE 2000 MG: 2 INJECTION, POWDER, FOR SOLUTION INTRAVENOUS at 10:49

## 2019-09-22 RX ADMIN — HEPARIN SODIUM 5000 UNITS: 5000 INJECTION INTRAVENOUS; SUBCUTANEOUS at 21:16

## 2019-09-22 RX ADMIN — MIDAZOLAM 1 MG: 1 INJECTION INTRAMUSCULAR; INTRAVENOUS at 11:04

## 2019-09-22 RX ADMIN — MORPHINE SULFATE 4 MG: 4 INJECTION INTRAVENOUS at 03:48

## 2019-09-22 RX ADMIN — METRONIDAZOLE 500 MG: 500 INJECTION, SOLUTION INTRAVENOUS at 21:16

## 2019-09-22 RX ADMIN — DEXTROSE AND SODIUM CHLORIDE 125 ML/HR: 5; .9 INJECTION, SOLUTION INTRAVENOUS at 17:24

## 2019-09-22 RX ADMIN — FENTANYL CITRATE 50 MCG: 50 INJECTION, SOLUTION INTRAMUSCULAR; INTRAVENOUS at 11:15

## 2019-09-22 RX ADMIN — ASCORBIC ACID, VITAMIN A PALMITATE, CHOLECALCIFEROL, THIAMINE HYDROCHLORIDE, RIBOFLAVIN-5 PHOSPHATE SODIUM, PYRIDOXINE HYDROCHLORIDE, NIACINAMIDE, DEXPANTHENOL, ALPHA-TOCOPHEROL ACETATE, VITAMIN K1, FOLIC ACID, BIOTIN, CYANOCOBALAMIN: 200; 3300; 200; 6; 3.6; 6; 40; 15; 10; 150; 600; 60; 5 INJECTION, SOLUTION INTRAVENOUS at 11:52

## 2019-09-22 RX ADMIN — DEXTROSE AND SODIUM CHLORIDE 125 ML/HR: 5; .9 INJECTION, SOLUTION INTRAVENOUS at 09:29

## 2019-09-22 RX ADMIN — ALBUTEROL SULFATE 2 PUFF: 90 AEROSOL, METERED RESPIRATORY (INHALATION) at 17:17

## 2019-09-22 RX ADMIN — CEFEPIME HYDROCHLORIDE 2000 MG: 2 INJECTION, POWDER, FOR SOLUTION INTRAVENOUS at 03:48

## 2019-09-22 RX ADMIN — MIDAZOLAM 1 MG: 1 INJECTION INTRAMUSCULAR; INTRAVENOUS at 11:22

## 2019-09-22 RX ADMIN — MORPHINE SULFATE 4 MG: 4 INJECTION INTRAVENOUS at 16:07

## 2019-09-22 RX ADMIN — ONDANSETRON 4 MG: 2 INJECTION INTRAMUSCULAR; INTRAVENOUS at 16:07

## 2019-09-22 RX ADMIN — VANCOMYCIN HYDROCHLORIDE 750 MG: 750 INJECTION, SOLUTION INTRAVENOUS at 09:29

## 2019-09-22 RX ADMIN — HYDROMORPHONE HYDROCHLORIDE 0.5 MG: 1 INJECTION, SOLUTION INTRAMUSCULAR; INTRAVENOUS; SUBCUTANEOUS at 01:39

## 2019-09-22 RX ADMIN — ALBUTEROL SULFATE 2 PUFF: 90 AEROSOL, METERED RESPIRATORY (INHALATION) at 09:21

## 2019-09-22 RX ADMIN — VANCOMYCIN HYDROCHLORIDE 750 MG: 750 INJECTION, SOLUTION INTRAVENOUS at 21:16

## 2019-09-22 RX ADMIN — ALBUTEROL SULFATE 2 PUFF: 90 AEROSOL, METERED RESPIRATORY (INHALATION) at 01:34

## 2019-09-22 RX ADMIN — METRONIDAZOLE 500 MG: 500 INJECTION, SOLUTION INTRAVENOUS at 12:46

## 2019-09-22 RX ADMIN — ONDANSETRON 4 MG: 2 INJECTION INTRAMUSCULAR; INTRAVENOUS at 22:10

## 2019-09-22 RX ADMIN — PHYTONADIONE 10 MG: 10 INJECTION, EMULSION INTRAMUSCULAR; INTRAVENOUS; SUBCUTANEOUS at 11:45

## 2019-09-22 RX ADMIN — PANTOPRAZOLE SODIUM 40 MG: 40 INJECTION, POWDER, FOR SOLUTION INTRAVENOUS at 09:03

## 2019-09-22 RX ADMIN — FENTANYL CITRATE 50 MCG: 50 INJECTION, SOLUTION INTRAMUSCULAR; INTRAVENOUS at 11:03

## 2019-09-22 RX ADMIN — ALBUTEROL SULFATE 2 PUFF: 90 AEROSOL, METERED RESPIRATORY (INHALATION) at 22:10

## 2019-09-22 RX ADMIN — MORPHINE SULFATE 4 MG: 4 INJECTION INTRAVENOUS at 09:03

## 2019-09-22 RX ADMIN — METRONIDAZOLE 500 MG: 500 INJECTION, SOLUTION INTRAVENOUS at 04:26

## 2019-09-22 RX ADMIN — CEFEPIME HYDROCHLORIDE 2000 MG: 2 INJECTION, POWDER, FOR SOLUTION INTRAVENOUS at 18:30

## 2019-09-22 RX ADMIN — MORPHINE SULFATE 4 MG: 4 INJECTION INTRAVENOUS at 06:29

## 2019-09-22 RX ADMIN — ALBUTEROL SULFATE 2 PUFF: 90 AEROSOL, METERED RESPIRATORY (INHALATION) at 11:58

## 2019-09-22 NOTE — UTILIZATION REVIEW
Initial Clinical Review    Admission: Date/Time/Statement: Inpatient Admission Orders (From admission, onward)     Ordered        09/21/19 2058  Inpatient Admission  Once                   Orders Placed This Encounter   Procedures    Inpatient Admission     Standing Status:   Standing     Number of Occurrences:   1     Order Specific Question:   Admitting Physician     Answer:   LINDSAY Barrientos [[id number]]     Order Specific Question:   Level of Care     Answer:   Level 1 Stepdown [13]     Order Specific Question:   Bed Type     Answer:   Bariatric [1]     Order Specific Question:   Estimated length of stay     Answer:   More than 2 Midnights     Order Specific Question:   Certification     Answer:   I certify that inpatient services are medically necessary for this patient for a duration of greater than two midnights  See H&P and MD Progress Notes for additional information about the patient's course of treatment  ED Arrival Information     Expected Arrival Acuity Means of Arrival Escorted By Service Admission Type    - 9/21/2019 17:13 Emergent Walk-In Family Member Bariatrics Emergency    Arrival Complaint    abdominal pain,back pain        Chief Complaint   Patient presents with    Back Pain     Reports back and abdominal pain similar to when perforated ulcer  Reports pain started yesterday  Reports nausea   Abdominal Pain     Assessment/Plan: 64 yo male to ED from home  Admitted Inpatient d/t acalculous cholecystitis  Presented with increasing pain in the epigastrium radiating to the RUQ pain and chills  Of note the patient is well known to the surgical service after having undergone an exploratory laparotomy due to a large perforated marginal ulcer approximately 2 months ago  Three days ago he underwent endoscopic removal of the stent and IR removed his left percutaneous drain due to resolution of the intra-abdominal cavity  The right drain remained in place due to a small residual cavity  Scheduled for IR for percutaneous cholecystostomy tube  IVF, NPO, IV antibiotics      ED Triage Vitals [09/21/19 1731]   Temperature Pulse Respirations Blood Pressure SpO2   98 4 °F (36 9 °C) (!) 117 16 124/71 98 %      Temp Source Heart Rate Source Patient Position - Orthostatic VS BP Location FiO2 (%)   Oral Monitor Sitting Right arm --      Pain Score       8        Wt Readings from Last 1 Encounters:   09/18/19 50 3 kg (111 lb)     Additional Vital Signs:   09/22/19 11:13:30  --  104  15  109/67  --  97 %  --  --   09/22/19 11:03:21  --  102  18  122/70  --  95 %  --  --   09/22/19 1000  --  102  10Abnormal   --  --  98 %  --  --   09/22/19 0900  --  102  14  128/65  86  98 %  None (Room air)  Lying   09/22/19 0807  99 2 °F (37 3 °C)  --  --  --  --  --  --  --   09/22/19 0504  --  100  12  127/70  85  99 %  --  --   09/22/19 0310  98 5 °F (36 9 °C)  --  --  --  --  --  --  --   09/22/19 0300  --  98  11Abnormal   115/67  82  96 %  --  --   09/22/19 0036  --  100  12  123/68  84  98 %  --  --   09/22/19 0000  --  100  12  138/75  113  98 %  --  --   09/21/19 2325  98 9 °F (37 2 °C)  --  --  --  --  --  --  --   09/21/19 2214  --  100  18  116/70  --  98 %  None (Room air)  Lying   09/21/19 2100  --  98  18  126/77  --  100 %  None (Room air)  Lying   09/21/19 1956  --  101  18  134/74  --  100 %  None (Room air)  Lying   09/21/19 1847  --  107Abnormal   14  127/75  --  98 %  None (Room air)  Sitting   09/21/19 1731  98 4 °F (36 9 °C)  117Abnormal   16  124/71  --  98 %  None (Room air)  Sitting     09/21/19 0700 - 09/22/19 0659 09/22/19 0700 - 09/23/19 0659    Intake (ml) 2621 7 --   Output (ml) 250 450   Net (ml) 2371 7 -450         Pertinent Labs/Diagnostic Test Results:   Results from last 7 days   Lab Units 09/22/19  0451 09/21/19  1818   WBC Thousand/uL 30 32* 32 91*   HEMOGLOBIN g/dL 7 3* 9 3*   HEMATOCRIT % 23 1* 28 8*   PLATELETS Thousands/uL 310 394*   NEUTROS ABS Thousands/µL  --  30 05*         Results from last 7 days Lab Units 09/22/19  0451 09/21/19  1818 09/17/19  1030   SODIUM mmol/L 134* 134* 136   POTASSIUM mmol/L 3 7 3 8 3 9   CHLORIDE mmol/L 102 100 104   CO2 mmol/L 25 26 24   ANION GAP mmol/L 7 8 8   BUN mg/dL 17 21 29*   CREATININE mg/dL 0 54* 0 65 0 43*   EGFR ml/min/1 73sq m 117 108 128   CALCIUM mg/dL 8 2* 8 7 8 7   MAGNESIUM mg/dL  --  1 7 2 1   PHOSPHORUS mg/dL  --   --  2 8     Results from last 7 days   Lab Units 09/22/19  0451 09/21/19  1818 09/17/19  1030   AST U/L 20 18 14   ALT U/L 20 26 32   ALK PHOS U/L 119* 133* 152*   TOTAL PROTEIN g/dL 5 6* 6 6 7 4   ALBUMIN g/dL 1 9* 2 3* 3 1*   TOTAL BILIRUBIN mg/dL 0 44 0 53 0 46   BILIRUBIN DIRECT mg/dL  --  0 24*  --      Results from last 7 days   Lab Units 09/22/19  0442   POC GLUCOSE mg/dl 128     Results from last 7 days   Lab Units 09/22/19  0451 09/21/19  1818 09/17/19  1030   GLUCOSE RANDOM mg/dL 105 111 98       Results from last 7 days   Lab Units 09/22/19  0451   PROTIME seconds 19 0*   INR  1 57*       Results from last 7 days   Lab Units 09/21/19  1917   LACTIC ACID mmol/L 1 2     Results from last 7 days   Lab Units 09/21/19  1818   LIPASE u/L 268       Results from last 7 days   Lab Units 09/21/19  2051   CLARITY UA  Clear   COLOR UA  Yellow   SPEC GRAV UA  1 020   PH UA  5 5   GLUCOSE UA mg/dl Negative   KETONES UA mg/dl Negative   BLOOD UA  Trace-Intact*   PROTEIN UA mg/dl 30 (1+)*   NITRITE UA  Positive*   BILIRUBIN UA  Negative   UROBILINOGEN UA E U /dl 0 2   LEUKOCYTES UA  Negative   WBC UA /hpf 1-2*   RBC UA /hpf 2-4*   BACTERIA UA /hpf Occasional   EPITHELIAL CELLS WET PREP /hpf Occasional       Results from last 7 days   Lab Units 09/21/19  1928   GRAM STAIN RESULT  Gram negative rods*     9/21 CXR: pending    9/21 CT abd:1   Gallbladder is hydropic with increasing wall thickening  Findings may be reactive, due to worsening of 3rd spacing, or related to acute or chronic cholecystitis    Correlate for focal right upper quadrant pain, fever, or leukocytosis      2  Diffuse ileus      3   Mild ascites, greatest in the right upper quadrant  No significant residual intra-abdominal encapsulated collections, though  Right perihepatic abscess has almost entirely resolved with unchanged drainage catheter in place      4   Pleural thickening with split pleura sign at the right posterior costophrenic sulcus which may represent sliver-like empyema, though pleural thickening could also be residual reaction to perihepatic infectious process      ED Treatment:   Medication Administration from 09/21/2019 1713 to 09/21/2019 2307       Date/Time Order Dose Route Action Action by Comments                09/21/2019 1816 sodium chloride 0 9 % bolus 1,000 mL 1,000 mL Intravenous New Bag       09/21/2019 1816 ondansetron (ZOFRAN) injection 4 mg 4 mg Intravenous Given       09/21/2019 1816 HYDROmorphone (DILAUDID) injection 0 5 mg 0 5 mg Intravenous Given                  09/21/2019 1930 cefepime (MAXIPIME) 2 g/50 mL dextrose IVPB 2,000 mg Intravenous New Bag                  09/21/2019 2053 vancomycin (VANCOCIN) IVPB (premix) 750 mg 750 mg Intravenous New Bag                  09/21/2019 2012 metroNIDAZOLE (FLAGYL) IVPB (premix) 500 mg 500 mg Intravenous New Bag                  09/21/2019 1921 sodium chloride 0 9 % bolus 1,000 mL 1,000 mL Intravenous New Bag       09/21/2019 1943 iohexol (OMNIPAQUE) 350 MG/ML injection (MULTI-DOSE) 100 mL 100 mL Intravenous Given       09/21/2019 1943 iohexol (OMNIPAQUE) 240 MG/ML solution 50 mL 50 mL Oral Given       09/21/2019 2148 lactated ringers infusion 100 mL/hr Intravenous New Bag       09/21/2019 2110 morphine (PF) 4 mg/mL injection 4 mg 4 mg Intravenous Given          Past Medical History:   Diagnosis Date    Anesthesia     Pt wakes up during "almost every surgery"    Arthritis     Asthma     Bariatric surgery status     Cervical radiculopathy     Chemotherapy follow-up examination     Chronic pain     Chronic pain disorder  COPD (chronic obstructive pulmonary disease) (HCC)     Diabetes mellitus ([facility] [address] )     borderline "years ago"    Food allergy     clams    GERD (gastroesophageal reflux disease)     Heart murmur     Hiatal hernia     History of malignant neoplasm     History of pneumonia 04/12/2016    History of pulmonary embolism     History of pulmonary embolus (PE)     History of ulceration     Hyperlipidemia     Lung cancer (HCC)     left lung, radiation and chemo tx    Migraine     Pneumonia     Postgastrectomy malabsorption     Right leg DVT (HCC)     Right scapholunate ligament tear     Skipped heart beats     Wears partial dentures     upper and lower       Admitting Diagnosis: Back pain [M54 9]  Cholecystitis [K81 9]  Abdominal pain [R10 9]  Age/Sex: 63 y o  male  Admission Orders:    Current Facility-Administered Medications:  albuterol 2 puff Inhalation 4x Daily     cefepime 2,000 mg Intravenous Q8H     dextrose 5 % and sodium chloride 0 9 % 125 mL/hr Intravenous Continuous     diphenhydrAMINE 25 mg Oral HS PRN     heparin (porcine) 5,000 Units Subcutaneous Q8H [address]     HYDROmorphone 0 5 mg Intravenous Q4H PRN 9/22 x 1    metroNIDAZOLE 500 mg Intravenous Q8H     morphine injection 4 mg Intravenous Q2H PRN 9/21 x 2, 9/22 x 3    morphine injection 2 mg Intravenous Q2H PRN     IVPB builder  Intravenous Once     ondansetron 4 mg Intravenous Q6H PRN     pantoprazole 40 mg Intravenous Q24H [address]     phytonadione 10 mg Intravenous Once     vancomycin 15 mg/kg Intravenous Q12H       Npo  Seq comp device  PEG tube  oob  I&O  Tele    IP CONSULT TO [address] [place] [address] Utilization Review Department  Phone: [phone]; Fax [phone]  [email or web address]  ATTENTION: Please call with any questions or concerns to [phone]  and carefully listen to the prompts so that you are directed to the right person     Send all requests for admission clinical reviews, approved or denied determinations and any other requests to fax [phone]   All voicemails are confidential

## 2019-09-22 NOTE — DISCHARGE INSTRUCTIONS
Percutaneous Cristal Tube (gall bladder tube)    *if flushing is required at home please make sure you have a prescription for flushes prior to leaving the hospital   WHAT Mally:   Layla7 Darlene [place]:   Medicines:   · Medicines Take your medicine as directed  · Diet Resume your everyday diet  Small sips of flat soda will help with mild nausea  TUBE CARE INSTRUCTIONS    1  The properly functioning catheter should be forward flushed once (1x) daily with 10ml of normal saline using clean technique  You will be given a prescription for flushes  To flush the tube, clean both connections with alcohol swab  Twist off the drainage bag  Tubing  Twist the saline syringe into the drainage tube and flush  Remove the syringe and twist the drainage bag tubing tubing back on     2  The drainage bag may be emptied as necessary  Bile is a dark greenish liquid  Keep a record of the amount of bile you drain from your bag   3  A fresh dressing should be applied daily over the tube insertion site  4  As the tube is secured to the skin with only a suture,try not to pull on your tube  Tub baths are not permitted  Showers are permitted if the patient's skin entry site is prevented from getting wet  Similarly, washcloth "baths" are acceptable  · Contact Interventional Radiology at [phone]  · Your skin or the whites of your eyes look more yellow than usual    · You have a fever  · You have nausea, or you are vomiting  · Your bowel movements have changed color and are very light or dark  · Your skin around your tube itches or is red, swollen, or painful  · You have questions or concerns about your condition or care  ·  You have a drainage bag, and there is little or no fluid draining into the bag  · You are coughing or vomiting blood  · You are dizzy, or you feel too weak to stand up  · You have new trouble breathing     · Your abdomen is hard, swollen, or painful  · Your tube falls out  Your stools look red, or you see blood in the toilet  You have  leakage or large amounts of liquid around the catheter  You have a fever of 101 degrees lasting several hours           The following pharmacies carry the flush syringes  [facility]                     [facility]                     [address] [facility]  Phone [phone]            Phone [phone]                                                                                                    [facility] [phone]  [address] [place] [address]                      [address] [facility]  Phone [phone]            Phone [phone]    [facility]                    [address]   Phone [phone]           [facility]                                                [phone]

## 2019-09-22 NOTE — SEDATION DOCUMENTATION
8 5 F PERC JOSAFAT tube placed , patient tolerated well  Done at bedside  Report to patients RN Whit Dubois   Patient fully monitored in ICU at time of procedure

## 2019-09-22 NOTE — INTERVAL H&P NOTE
Update:     58M with RUQ tenderness and thickwalled Gallbladder that was distended but thin walled on CT 2 days ago  WBC to 30K  Long set of interventions related to gastric bypass and ulcer s/p laparotomy and IR drains, recent removal of left drain  Perc zac placement is indicated for decompression    abx running  On my bedside US:  On exam marked ibrahim sign positive  Sludge in GB    Will attempt bedside small bore placement    Will not place through liver due to presence of RUQ abscess with drainage catheter, would prefer to avoid placing this through abscess    MP2 ASA4    Patient re-evaluated   Accept as history and physical     Armstrong MD Elizabeth/September 22, 2019/10:55 AM

## 2019-09-22 NOTE — H&P
BARIATRIC HISTORY AND PHYSICAL - BARIATRIC SURGERY  Frances Rausch 63 y o  male MRN: [id number]  Unit/Bed#: ED 29 Encounter: [id number]      HPI:  Frances Rausch is a 63 y o  male , well known to the bariatric surgery service after having undergone a prolonged hospital stay due to an internal hernia and a perforated marginal ulcer with peritonitis  He comes to the hospital today complaining of right upper quadrant pain of 1 day duration as well as chills  The patient reports that yesterday afternoon he started to experience increasing pain in the epigastrium radiating to the right upper quadrant  It has gotten progressively worse to the point of bringing him to the emergency department  The patient's wife reports that he has been experiencing chills at home  He had 1 episode of loose bowel movement this morning  He has not have any episodes of nausea or vomiting  Of note the patient is well known to the surgical service after having undergone an exploratory laparotomy due to a large perforated marginal ulcer approximately 2 months ago  At that time an intraluminal stent was placed endoscopically by GI and the G-tube into the remnant was inserted  He would have a slow recovery and was eventually discharged NPO/TPN approximately 6 weeks ago  He underwent percutaneous placement of intra-abdominal drains by an interventional radiology due to intra-abdominal abscesses after the exploratory laparotomy  Three days ago he underwent endoscopic removal of the stent and IR removed his left percutaneous drain due to resolution of the intra-abdominal cavity  The right drain remained in place due to a small residual cavity  The patient is receiving tube feeds via his G-tube at home  Review of Systems   Constitutional: Positive for chills, fatigue and fever  HENT: Negative  Eyes: Negative  Respiratory: Negative  Cardiovascular: Negative      Gastrointestinal: Positive for abdominal pain and diarrhea  Endocrine: Negative  Genitourinary: Negative  Musculoskeletal: Negative  Skin: Negative  Allergic/Immunologic: Negative  Neurological: Negative  Hematological: Negative  Psychiatric/Behavioral: Negative  All other systems reviewed and are negative        Historical Information   Past Medical History:   Diagnosis Date    Anesthesia     Pt wakes up during "almost every surgery"    Arthritis     Asthma     Bariatric surgery status     Cervical radiculopathy     Chemotherapy follow-up examination     Chronic pain     Chronic pain disorder     COPD (chronic obstructive pulmonary disease) (HCC)     Diabetes mellitus (HCC)     borderline "years ago"    Food allergy     clams    GERD (gastroesophageal reflux disease)     Heart murmur     Hiatal hernia     History of malignant neoplasm     History of pneumonia 04/12/2016    History of pulmonary embolism     History of pulmonary embolus (PE)     History of ulceration     Hyperlipidemia     Lung cancer (HCC)     left lung, radiation and chemo tx    Migraine     Pneumonia     Postgastrectomy malabsorption     Right leg DVT (HCC)     Right scapholunate ligament tear     Skipped heart beats     Wears partial dentures     upper and lower     Past Surgical History:   Procedure Laterality Date    BRONCHOSCOPY      COLONOSCOPY      FRACTURE SURGERY      femur right 1985    GASTRIC BYPASS LAPAROSCOPIC N/A 7/12/2017    Procedure: GASTRIC DIVERSION WITH [place]-EN-Y RECONSTRUCTION WITH  SHORT 60 cm LIMB;  Surgeon: Delia Gonzalez MD;  [facility];  Service: Bariatrics    IR CHEST TUBE  8/9/2019    IR IMAGE GUIDED ASPIRATION / DRAINAGE  8/1/2019    IR PICC LINE  8/9/2019    KNEE ARTHROSCOPY Right     right shoulder    LAPAROTOMY N/A 7/28/2019    Procedure: LAPAROTOMY EXPLORATORY, WASHOUT OF SUCUS, REPAIR OF MARGINAL ULCER PERFORATION, ABD WASHOUT, INTRAOPERATIVE EGD, GI ASSISTED ENDOSCOPIC STENT PLACEMENT; Surgeon: Brenda Chisholm MD;  [facility];  Service: Erik Night Left     LYMPHADENECTOMY  05/22/2012    Dr Danielle Simon ANT INTERBODY MIN DISCECTOMY, CERVICAL BELOW C2 N/A 10/17/2017    Procedure: C5-6, C6-7 ACDF WITH ALLOGRAFT (NEURO MONITORING); Surgeon: Matthew Tejada MD;  [facility];  Service: Orthopedics    MO COLONOSCOPY FLX DX W/COLLJ SPEC WHEN PFRMD N/A 4/14/2017    Procedure: COLONOSCOPY;  Surgeon: Sharri Melendrez MD;  [facility];  Service: Gastroenterology    MO EGD TRANSORAL BIOPSY SINGLE/MULTIPLE N/A 1/9/2019    Procedure: ESOPHAGOGASTRODUODENOSCOPY (EGD); Surgeon: Letty Thomas MD;  [facility]; Service: Bariatrics    MO ESOPHAGOGASTRODUODENOSCOPY TRANSORAL DIAGNOSTIC N/A 1/11/2017    Procedure: ESOPHAGOGASTRODUODENOSCOPY (EGD); Surgeon: Sharri Melendrez MD;  [facility];   Service: Gastroenterology    MO INCISE FINGER TENDON SHEATH Right 11/27/2018    Procedure: RELEASE TRIGGER FINGER long and index finger;  Surgeon: Richy Parada MD;  [facility];  Service: Orthopedics    MO LAP, REPAIR PARAESOPHAGEAL HERNIA, INCL FUNDOPLASTY W/ MESH N/A 7/12/2017    Procedure: REPAIR HERNIA PARAESOPHAGEAL  LAPAROSCOPIC;  Surgeon: Letty Thomas MD;  [facility];  Service: Bariatrics    MO LAP,DIAGNOSTIC ABDOMEN N/A 7/26/2019    Procedure: LAPAROSCOPY DIAGNOSTIC CONVERSION TO OPEN, REDUCTION AND REPAIR OF INTERNAL HERNIA, REPAIR SEROSAL TEARS;  Surgeon: Brenda Chisholm MD;  [facility];  Service: Bariatrics    MO WRIST Manuelita Hilding LIG Right 6/9/2016    Procedure: RELEASE CARPAL TUNNEL ENDOSCOPIC;  Surgeon: Richy Parada MD;  [facility];  Service: Orthopedics    RECONSTRUCTION DISTAL RADIUS/ULNA JOINT (DRUJ) Right 9/6/2016    Procedure: WRIST SCAPHOLUNATE LIGAMENT RECONSTRUCTION WITH ECRB TENDON AUTOGRAPH , SPLINT APPLICATION ;  Surgeon: Magdaleno Nelson MD;  [facility];  [facility]      TONSILLECTOMY       Social History   Social History     Substance and Sexual Activity   Alcohol Use Not Currently    Frequency: Monthly or less    Drinks per session: 1 or 2    Binge frequency: Never    Comment: rarely     Social History     Substance and Sexual Activity   Drug Use No     Social History     Tobacco Use   Smoking Status Former Smoker    Packs/day: 0 50    Years: 48 00    Pack years: 24 00    Types: Cigarettes    Last attempt to quit: 2017    Years since quittin 3   Smokeless Tobacco Never Used     Family History: non-contributory    Meds/Allergies   all medications and allergies reviewed  Allergies   Allergen Reactions    Codeine Hives, Itching, Nausea Only, GI Intolerance and Vomiting    Hydrocodone Hives and GI Intolerance    Penicillins Anaphylaxis and Throat Swelling    Shellfish-Derived Products Nausea Only and Vomiting       Objective     Current Vitals:   Blood Pressure: 134/74 (19)  Pulse: 101 (19)  Temperature: 98 4 °F (36 9 °C) (19)  Temp Source: Oral (19)  Respirations: 18 (19)  SpO2: 100 % (19)  bowel movement    Intake/Output Summary (Last 24 hours) at 2019  Last data filed at 2019  Gross per 24 hour   Intake 2250 ml   Output --   Net 2250 ml       Invasive Devices     Peripherally Inserted Central Catheter Line            PICC Line 19 Right Brachial 43 days          Peripheral Intravenous Line            Peripheral IV 19 Left Arm less than 1 day          Drain            Gastrostomy/Enterostomy Gastrostomy 22 Fr  LUQ 55 days    Closed/Suction Drain Right;Lateral Abdomen Bulb 12 Fr  43 days    Negative Pressure Wound Therapy (V A C ) Abdomen Mid 39 days                Physical Exam   Constitutional: He is oriented to person, place, and time   He appears well-developed and well-nourished  No jaundice   HENT:   Head: Normocephalic and atraumatic  Nose: Nose normal    Mouth/Throat: Oropharynx is clear and moist    Eyes: Conjunctivae and EOM are normal    Neck: Normal range of motion  No tracheal deviation present  Cardiovascular: Normal rate and regular rhythm  Pulmonary/Chest: Effort normal and breath sounds normal  No respiratory distress  Abdominal: There is tenderness  There is rebound  No hernia  The abdomen appears nondistended but there is voluntary guarding and positive rebound over the right upper quadrant  There is severe tenderness to deep palpation over the right upper quadrant and moderate diffuse tenderness throughout the abdomen  Garlan Vaz sign is significantly positive  The exploratory laparotomy incision has completely healed  Musculoskeletal: Normal range of motion  He exhibits no edema  Neurological: He is alert and oriented to person, place, and time  Skin: Skin is warm and dry  Psychiatric: He has a normal mood and affect  His behavior is normal  Judgment and thought content normal    Vitals reviewed  Lab Results:   I have personally reviewed pertinent lab results  , CBC:   Lab Results   Component Value Date    WBC 32 91 (HH) 09/21/2019    HGB 9 3 (L) 09/21/2019    HCT 28 8 (L) 09/21/2019    MCV 93 09/21/2019     (H) 09/21/2019    MCH 30 1 09/21/2019    MCHC 32 3 09/21/2019    RDW 14 5 09/21/2019    MPV 9 6 09/21/2019    NRBC 0 09/21/2019   , CMP:   Lab Results   Component Value Date    SODIUM 134 (L) 09/21/2019    K 3 8 09/21/2019     09/21/2019    CO2 26 09/21/2019    BUN 21 09/21/2019    CREATININE 0 65 09/21/2019    CALCIUM 8 7 09/21/2019    AST 18 09/21/2019    ALT 26 09/21/2019    ALKPHOS 133 (H) 09/21/2019    EGFR 108 09/21/2019     Imaging: I have personally reviewed pertinent reports     and I have personally reviewed pertinent films in PACS  EKG, Pathology, and Other Studies: I have personally reviewed pertinent reports  Code Status: Prior     ASSESSMENT  63 y o  male with extensive surgical history admit to bariatric service for acalculous cholecystitis  Currently in stable clinical condition  PLAN    We will admit to step-down    1) NPO + IVF  2) GI/DVT prophylaxis  3) Pain and nausea management  4) Broad spec IV abx  5) IR for percutaneous cholecystostomy tube in the a m   6) repeat labwork in the a m  The results of the CT scan and diagnosis were reviewed with the patient and his wife    The case was discussed with Dr Tahmina Mark MD  Bariatric Surgery Fellow  9/21/2019  8:59 PM

## 2019-09-22 NOTE — PROGRESS NOTES
Progress Note - Bariatric Surgery   [facility] 63 y o  male MRN: [id number]  Unit/Bed#: ICU 15 Encounter: [id number]      Subjective/Objective     SUBJECTIVE     63 y o  male with extensive surgical history admit to the bariatric service for acalculous cholecystitis  Seen and examined at the bedside this AM   Continues to have severe LUQ abdominal pain, mildly controlled w/IV pain medication  Currently NPO for procedure  Denies any N/V since admission  Voiding adequately since admit  Of note, has positive blood cultures for GNR  OBJECTIVE    /70   Pulse 100   Temp 99 2 °F (37 3 °C) (Temporal)   Resp 12   SpO2 99%       Intake/Output Summary (Last 24 hours) at 9/22/2019 0909  Last data filed at 9/22/2019 0807  Gross per 24 hour   Intake 2621 67 ml   Output 500 ml   Net 2121 67 ml       Invasive Devices     Peripherally Inserted Central Catheter Line            PICC Line 08/09/19 Right Brachial 43 days          Peripheral Intravenous Line            Peripheral IV 09/21/19 Left Arm less than 1 day          Drain            Gastrostomy/Enterostomy Gastrostomy 22 Fr  LUQ 55 days    Closed/Suction Drain Right;Lateral Abdomen Bulb 12 Fr  43 days    Negative Pressure Wound Therapy (V A C ) Abdomen Mid 39 days                ROS: 10-point system completed  All negative except see HPI  Physical Exam    General Appearance:    Alert, cooperative, no distress, appears stated age   Head:    Normocephalic, without obvious abnormality, atraumatic   Lungs:     Respirations unlabored   Heart:    Regular rate and rhythm   Abdomen:     Soft, appropriate tenderness to palpation,  no masses, no organomegaly, mildly distended  Abdomen is peritonitic w/severe tenderness to mild palpation of the RUQ    Extremities:   Extremities normal, atraumatic, no cyanosis or edema  LUE PICC line in place, c/d/i     Neurologic:  Incision:  Psych:   Normal strength and sensation    Clean, dry, and intact    Normal mood and affect       Lab, Imaging and other studies:  I have personally reviewed pertinent lab results  , CBC:   Lab Results   Component Value Date    WBC 30 32 (HH) 09/22/2019    HGB 7 3 (L) 09/22/2019    HCT 23 1 (L) 09/22/2019    MCV 95 09/22/2019     09/22/2019    MCH 29 9 09/22/2019    MCHC 31 6 09/22/2019    RDW 14 5 09/22/2019    MPV 9 2 09/22/2019    NRBC 0 09/21/2019   , CMP:   Lab Results   Component Value Date    SODIUM 134 (L) 09/22/2019    K 3 7 09/22/2019     09/22/2019    CO2 25 09/22/2019    BUN 17 09/22/2019    CREATININE 0 54 (L) 09/22/2019    CALCIUM 8 2 (L) 09/22/2019    AST 20 09/22/2019    ALT 20 09/22/2019    ALKPHOS 119 (H) 09/22/2019    EGFR 117 09/22/2019        Latest radiology: Ir Tube Check    Result Date: 9/16/2019  Narrative: PROCEDURE: 1  Abscessogram through the right upper quadrant and and left upper quadrant drainage catheters  2   Left upper quadrant drainage catheter removal  CONTRAST: 10 mL of iohexol (OMNIPAQUE) FLUOROSCOPY TIME: 0 9 minutes NUMBER OF IMAGES: 8 COMPLICATIONS: None ANESTHESIA/ANESTHESIA: Local anesthesia INDICATION: Status post laparotomy with subsequent abdominal fluid collection status post abscess drainage catheter placement  PROCEDURE: The patient was brought to the interventional radiology suite and identified verbally and by wristband  The patient was placed supine on the table and a  view was obtained of the indwelling catheters within the right and left upper quadrants of the abdomen  After review of the patient's prior imaging studies, 1st the right upper quadrant drain was injected with a small amount of contrast   Multiple images were obtained from various projections  Based on the findings, the right upper quadrant drainage catheter was left in place  Attention was then turned to the left upper quadrant drain  The catheter was injected with diluted contrast and multiple images were taken from various projections    Based on the findings, at the left upper quadrant drainage catheter was removed    Patient tolerated the procedure well with no immediate post procedural complication  FINDINGS: 1  Right upper quadrant abscessogram showed interval decrease in size of abscess cavity  2   Left upper quadrant abscessogram showed no significant residual collection  Therefore, the left upper quadrant drain was removed  Impression: Right upper quadrant abscessogram Left upper quadrant abscessogram followed by removal of the catheter PLAN: Continue flushing the right upper quadrant drain with 5 mL of sterile normal saline once a day  Return to interventional radiology for catheter check in a week  Workstation performed: [id number]     Ir Tube Change    Result Date: 9/10/2019  Narrative: PROCEDURE: 1  Abscessogram through the right upper quadrant abdominal drainage catheter 2  Abscessogram through the left upper quadrant abdominal drainage catheter 3  Left upper quadrant drainage catheter repositioning and exchange CONTRAST: 30 mL of iohexol (OMNIPAQUE) FLUOROSCOPY TIME: 4 minutes NUMBER OF IMAGES: 704 COMPLICATIONS: None ANESTHESIA/ANESTHESIA: Local anesthesia INDICATION: Status post laparotomy with subsequent abdominal fluid collection status post drain placement R18 8: Other ascites PROCEDURE: The patient was brought to the interventional radiology suite and identified verbally and by wristband  The patient was placed supine on the table and a  view was obtained of the indwelling catheters within the right and left upper quadrants of the abdomen  After review of the patient's prior imaging studies, 1st the right upper quadrant drain was injected with a small amount of contrast   The abscess E  Waterman approximately 10 mL of contrast and approximately the same amount was able to be aspirated through the catheter  Attention was then turned to the left upper quadrant drain    The catheter was injected with 10 mL of diluted contrast and none of the injected fluid was able to be aspirated  Based on these findings, the right upper quadrant drain was left in place and decision was made to reposition and change the left upper quadrant catheter  The left upper quadrant catheter was cleaned prepped and draped in usual sterile fashion  Internal locking suture and the skin retention suture were cut   1% local lidocaine was used for local anesthesia  A Social IQ (Social Influence Quotient)son wire was placed through the indwelling tube which was subsequently removed  A Kumpe catheter was advanced and manipulated into the residual abscess cavity inferior to the locking loop of the pre-existing catheter  Decision was made to use a catheter with smaller locking loop to allow further healing of the abscess cavity  A new 8-Marshallese Rothman-Gandara catheter was advanced over the wire and positioned into the abscess cavity  The catheter was sutured in place, sterilely dressed, and connected tobulb suction  Patient tolerated the procedure well with no immediate post procedural complication  FINDINGS: 1  Right upper quadrant abscessogram demonstrated persistent small abscess cavity and the indwelling catheter was adequately decompressing this residual collection  Therefore, this drainage catheter was left in place  2   Left upper quadrant abscessogram demonstrated persistent abscess cavity inferior to the locking loop of the indwelling catheter  Contrast was able to be flushed but unable to be aspirated through the indwelling catheter  Following drainage catheter  repositioning and exchange, the newly placed catheter was adequately decompressing this collection  Impression: Right upper quadrant abscessogram  Left upper quadrant drainage catheter repositioning and exchange  PLAN: Decrease the amount of catheter flushing to 5 mL of sterile saline once a day for each catheter to allow further healing of the abscess cavities  Return to interventional radiology in 1 week for catheter check   Workstation performed: [id number]     Ct Abdomen Pelvis With Contrast    Result Date: 9/21/2019  Narrative: CT ABDOMEN AND PELVIS WITH IV CONTRAST INDICATION:   Abdominal pain  COMPARISON:  9/19/2019 and 8/8/2019  TECHNIQUE:  CT examination of the abdomen and pelvis was performed  Axial, sagittal, and coronal 2D reformatted images were created from the source data and submitted for interpretation  Radiation dose length product (DLP) for this visit:  307 mGy-cm   This examination, like all CT scans performed in the [facility], was performed utilizing techniques to minimize radiation dose exposure, including the use of iterative reconstruction and automated exposure control  IV Contrast:  50 mL of iohexol (OMNIPAQUE) 100 mL of iohexol (OMNIPAQUE) Enteric Contrast:  Enteric contrast was administered  Note: Image numbers reported for findings (if any) are taken from axial series 2 unless otherwise indicated  FINDINGS: ABDOMEN LOWER CHEST:  Unchanged right basilar pleural thickening with splitting of the pleura by small effusion  Milder chronic left pleural thickening without effusion  Chronic atelectasis posteriorly in both lower lobes  No change from prior  LIVER/BILIARY TREE:  Minimal residual perihepatic fluid, even smaller in volume than on recent prior  Pigtail catheter remains unchanged in location in the right perihepatic space  Mild periportal edema  No suspicious masses or biliary ductal dilatation  GALLBLADDER:  Gallbladder is hydropic with worsening gallbladder wall thickening and pericholecystic inflammatory stranding  No calcified stones demonstrated  SPLEEN:  Unremarkable  PANCREAS:  Mildly atrophic in appearance  No mass or pancreatic ductal dilatation  ADRENAL GLANDS:  Unremarkable  KIDNEYS/URETERS:  Small right lower pole renal cyst  No suspicious parenchymal masses, perinephric collections, urolithiasis, or hydronephrosis  STOMACH AND BOWEL:  Prior gastric bypass    No inflammatory changes around the gastrojejunostomy or jejunojejunostomy  Percutaneous gastrostomy tube in the pancreaticobiliary limb  Oral contrast progresses to the proximal ileum  Small bowel loops are prominent in caliber but not frankly distended  No transition point  No pathologic fold or wall thickening  APPENDIX:  Question biopsy clips at the cecum  Normal appendix  ABDOMINOPELVIC CAVITY:  Mild diffuse haziness to the mesentery with most concentrated fluid in the right upper quadrant  No encapsulated collections  No free air  No mesenteric, retroperitoneal, or pelvic sidewall lymphadenopathy  VESSELS:  Unremarkable for patient's age  PELVIS REPRODUCTIVE ORGANS:  Patient is status post hysterectomy  No suspicious adnexal masses URINARY BLADDER:  Unremarkable  ABDOMINAL WALL/INGUINAL REGIONS:  Mild anasarca  OSSEOUS STRUCTURES: Tract from previously removed medullary nail at the right femur  No acute fracture or destructive osseous lesion  Impression: 1  Gallbladder is hydropic with increasing wall thickening  Findings may be reactive, due to worsening of 3rd spacing, or related to acute or chronic cholecystitis  Correlate for focal right upper quadrant pain, fever, or leukocytosis  2   Diffuse ileus  3   Mild ascites, greatest in the right upper quadrant  No significant residual intra-abdominal encapsulated collections, though  Right perihepatic abscess has almost entirely resolved with unchanged drainage catheter in place  4   Pleural thickening with split pleura sign at the right posterior costophrenic sulcus which may represent sliver-like empyema, though pleural thickening could also be residual reaction to perihepatic infectious process  The study was marked in EPIC for significant notification  Workstation performed: [id number]       VTE Mechanical Prophylaxis: sequential compression device + subq heparin    ASSESSMENT  63 y o  male, s/p ex lap for large perforate marginal ulcer 2 months ago   Now admitted w/acalculous cholecystitis due to prolongued NPO/TPN status  Status quo, awaiting perc zac drainage  PLAN  1) IR today for perc zac  2) GI/DVT prophylaxis  Will start subq heparin after IR procedure  3) Pain and nausea management  4) Broad spec iv abx for now  Vanc trough in AM   5) Will d/c PICC line due to positive blood cx  Apparently tolerating tube feeds so may not need TPN anymore  Dispo: Meets sepsis criteria but is not unstable, will keep in stepdown for today      Plan of care was discussed with patient and patient's nurse  Care plan discussed with Dr Traci Toscano MD  Bariatric Surgery Fellow  9/22/2019  9:09 AM

## 2019-09-22 NOTE — ED PROVIDER NOTES
History  Chief Complaint   Patient presents with    Back Pain     Reports back and abdominal pain similar to when perforated ulcer  Reports pain started yesterday  Reports nausea   Abdominal Pain     64 YO male with Hx of gastric bypass surgery, requiring TPN, previous ulcers with perforation presents with lower abdominal pain that began yesterday  Pt states this has been constant, aching and sharp, radiating to the back, worse with movement  This has been associated with nausea, no vomiting  Pt has had constipation  Pt states the pain has been gradually worsening since onset an is now severe  States concern as his gastric perforation felt similar to this and required surgery  Pt has had decreased PO intake, he is unsure regarding fevers or chills  Pt called the bariatric service and was instructed to come to the ED for evaluation  Pt denies CP/SOB/F/C, no dysuria, burning on urination or blood in urine  History provided by:  Patient   used: No    Abdominal Pain   Pain location: Lower  Pain quality: aching and sharp    Pain radiates to:  Back  Pain severity:  Severe  Onset quality:  Gradual  Duration:  1 day  Timing:  Constant  Progression:  Worsening  Chronicity:  Recurrent  Context: not medication withdrawal, not sick contacts and not suspicious food intake    Relieved by:  Nothing  Worsened by:  Nothing  Ineffective treatments:  None tried  Associated symptoms: constipation and nausea    Associated symptoms: no belching, no chest pain, no diarrhea, no dysuria, no fever, no shortness of breath and no vomiting    Nausea:     Severity:  Moderate    Onset quality:  Gradual    Duration:  1 day    Timing:  Constant    Progression:  Worsening      Prior to Admission Medications   Prescriptions Last Dose Informant Patient Reported?  Taking?   acetaminophen (TYLENOL 8 HOUR) 650 mg CR tablet More than a month at Unknown time Self No No   Sig: Take 1 tablet (650 mg total) by mouth every 8 (eight) hours   albuterol (PROVENTIL HFA,VENTOLIN HFA) 90 mcg/act inhaler 9/21/2019 at Unknown time  No Yes   Sig: Inhale 2 puffs 4 (four) times a day   diphenhydrAMINE (BENADRYL) 50 mg/mL Past Month at Unknown time  No Yes   Sig: Infuse 0 5 mL (25 mg total) into a venous catheter every 6 (six) hours as needed for itching or sleep   lansoprazole (PREVACID) 30 mg capsule 9/20/2019 at Unknown time  Yes Yes   Sig: Take 30 mg by mouth daily   metoclopramide (REGLAN) 10 mg tablet 9/20/2019 at Unknown time  Yes Yes   Sig: Take 10 mg by mouth 4 (four) times a day   oxyCODONE (OXY-IR) 5 MG capsule 9/20/2019 at Unknown time  Yes Yes   Sig: Take 5 mg by mouth every 4 (four) hours as needed for moderate pain   prochlorperazine (COMPAZINE) 5 mg tablet 9/20/2019 at Unknown time  Yes Yes   Sig: Take 5 mg by mouth every 6 (six) hours as needed for nausea or vomiting   sucralfate (CARAFATE) 1 g/10 mL suspension 9/21/2019 at Unknown time  No Yes   Sig: Take 10 mL (1,000 mg total) by mouth 4 (four) times a day (before meals and at bedtime)      Facility-Administered Medications: None       Past Medical History:   Diagnosis Date    Anesthesia     Pt wakes up during "almost every surgery"    Arthritis     Asthma     Bariatric surgery status     Cervical radiculopathy     Chemotherapy follow-up examination     Chronic pain     Chronic pain disorder     COPD (chronic obstructive pulmonary disease) ([address] )     Diabetes mellitus ([address] )     borderline "years ago"    Food allergy     clams    GERD (gastroesophageal reflux disease)     Heart murmur     Hiatal hernia     History of malignant neoplasm     History of pneumonia 04/12/2016    History of pulmonary embolism     History of pulmonary embolus (PE)     History of ulceration     Hyperlipidemia     Lung cancer ([address] )     left lung, radiation and chemo tx    Migraine     Pneumonia     Postgastrectomy malabsorption     Right leg DVT (HCC)     Right scapholunate ligament tear     Skipped heart beats     Wears partial dentures     upper and lower       Past Surgical History:   Procedure Laterality Date    BRONCHOSCOPY      COLONOSCOPY      FRACTURE SURGERY      femur right 1985    GASTRIC BYPASS LAPAROSCOPIC N/A 7/12/2017    Procedure: GASTRIC DIVERSION WITH [place]-EN-Y RECONSTRUCTION WITH  SHORT 60 cm LIMB;  Surgeon: Muriel Arroyo MD;  [facility];  Service: Griselda Huynh IR CHEST TUBE  8/9/2019    IR IMAGE GUIDED ASPIRATION / DRAINAGE  8/1/2019    IR PICC LINE  8/9/2019    KNEE ARTHROSCOPY Right     right shoulder    LAPAROTOMY N/A 7/28/2019    Procedure: LAPAROTOMY EXPLORATORY, WASHOUT OF SUCUS, REPAIR OF MARGINAL ULCER PERFORATION, ABD WASHOUT, INTRAOPERATIVE EGD, GI ASSISTED ENDOSCOPIC STENT PLACEMENT;  Surgeon: Vangie Gottlieb MD;  [facility];  Service: Dannielle Moore Left     LYMPHADENECTOMY  05/22/2012    Dr Kory Mcginnis ANT INTERBODY MIN DISCECTOMY, CERVICAL BELOW C2 N/A 10/17/2017    Procedure: C5-6, C6-7 ACDF WITH ALLOGRAFT (NEURO MONITORING); Surgeon: Aayush Crabtree MD;  [facility];  Service: Orthopedics    WI COLONOSCOPY FLX DX W/COLLJ SPEC WHEN PFRMD N/A 4/14/2017    Procedure: COLONOSCOPY;  Surgeon: Dolly Hinds MD;  [facility];  Service: Gastroenterology    WI EGD TRANSORAL BIOPSY SINGLE/MULTIPLE N/A 1/9/2019    Procedure: ESOPHAGOGASTRODUODENOSCOPY (EGD); Surgeon: Muriel Arroyo MD;  [facility]; Service: Bariatrics    WI ESOPHAGOGASTRODUODENOSCOPY TRANSORAL DIAGNOSTIC N/A 1/11/2017    Procedure: ESOPHAGOGASTRODUODENOSCOPY (EGD); Surgeon: Dolly Hinds MD;  [facility];   Service: Gastroenterology    WI INCISE FINGER TENDON SHEATH Right 11/27/2018    Procedure: RELEASE TRIGGER FINGER long and index finger;  Surgeon: Stone Royal MD;  [facility];  Service: Orthopedics    WI CURLY REPAIR PARAESOPHAGEAL HERNIA, INCL FUNDOPLASTY W/ MESH N/A 2017    Procedure: REPAIR HERNIA PARAESOPHAGEAL  LAPAROSCOPIC;  Surgeon: Luis Ward MD;  [facility];  Service: Bariatrics    MD LAP,DIAGNOSTIC ABDOMEN N/A 2019    Procedure: LAPAROSCOPY DIAGNOSTIC CONVERSION TO OPEN, REDUCTION AND REPAIR OF INTERNAL HERNIA, REPAIR SEROSAL TEARS;  Surgeon: Ricardo Arevalo MD;  [facility];  Service: Larisa Cram MD WRIST [place] LIG Right 2016    Procedure: RELEASE CARPAL TUNNEL ENDOSCOPIC;  Surgeon: Jaclyn Maya MD;  [facility];  Service: Orthopedics    RECONSTRUCTION DISTAL RADIUS/ULNA JOINT (DRUJ) Right 2016    Procedure: WRIST SCAPHOLUNATE LIGAMENT RECONSTRUCTION WITH ECRB TENDON AUTOGRAPH , SPLINT APPLICATION ;  Surgeon: Jaclyn Maya MD;  [facility];  Service:    [facility] SHOULDER SURGERY      TONSILLECTOMY         Family History   Problem Relation Age of Onset    Other Mother         Back disorder    Diabetes Mother         Diabetes Mellitus    Hypertension Mother     Heart disease Father     Hypertension Father     Breast cancer Sister     Skin cancer Sister     Breast cancer Paternal Grandmother     Skin cancer Paternal Grandmother      I have reviewed and agree with the history as documented  Social History     Tobacco Use    Smoking status: Former Smoker     Packs/day: 0 50     Years: 48 00     Pack years: 24 00     Types: Cigarettes     Last attempt to quit: 2017     Years since quittin 3    Smokeless tobacco: Never Used   Substance Use Topics    Alcohol use: Not Currently     Frequency: Monthly or less     Drinks per session: 1 or 2     Binge frequency: Never     Comment: rarely    Drug use: No        Review of Systems   Constitutional: Negative for fever  HENT: Negative for dental problem  Eyes: Negative for visual disturbance  Respiratory: Negative for shortness of breath      Cardiovascular: Negative for chest pain  Gastrointestinal: Positive for abdominal pain, constipation and nausea  Negative for diarrhea and vomiting  Genitourinary: Negative for dysuria and frequency  Musculoskeletal: Negative for neck pain and neck stiffness  Skin: Negative for rash  Neurological: Negative for dizziness, weakness and light-headedness  Psychiatric/Behavioral: Negative for agitation, behavioral problems and confusion  All other systems reviewed and are negative  Physical Exam  Physical Exam   Constitutional: He is oriented to person, place, and time  He appears well-developed and well-nourished  Uncomfortable  HENT:   Head: Normocephalic and atraumatic  Eyes: EOM are normal    Neck: Normal range of motion  Cardiovascular: Normal rate, regular rhythm and normal heart sounds  Pulmonary/Chest: Effort normal and breath sounds normal    Abdominal: Soft  There is generalized tenderness  There is rebound and guarding  Musculoskeletal: Normal range of motion  Neurological: He is alert and oriented to person, place, and time  Skin: Skin is warm and dry  Psychiatric: He has a normal mood and affect  His behavior is normal    Nursing note and vitals reviewed        Vital Signs  ED Triage Vitals [09/21/19 1731]   Temperature Pulse Respirations Blood Pressure SpO2   98 4 °F (36 9 °C) (!) 117 16 124/71 98 %      Temp Source Heart Rate Source Patient Position - Orthostatic VS BP Location FiO2 (%)   Oral Monitor Sitting Right arm --      Pain Score       8           Vitals:    09/22/19 1230 09/22/19 1245 09/22/19 1300 09/22/19 1400   BP: 96/55 98/59 104/64 93/62   Pulse: 96 96 96 94   Patient Position - Orthostatic VS:             Visual Acuity  Visual Acuity      Most Recent Value   L Pupil Size (mm)  3   R Pupil Size (mm)  3   L Pupil Shape  Round   R Pupil Shape  Round          ED Medications  Medications   morphine injection 2 mg (has no administration in time range)   morphine (PF) 4 mg/mL injection 4 mg (4 mg Intravenous Given 9/22/19 0903)   HYDROmorphone (DILAUDID) injection 0 5 mg (0 5 mg Intravenous Given 9/22/19 0139)   diphenhydrAMINE (BENADRYL) tablet 25 mg (has no administration in time range)   ondansetron (ZOFRAN) injection 4 mg (has no administration in time range)   pantoprazole (PROTONIX) injection 40 mg (40 mg Intravenous Given 9/22/19 0903)   vancomycin (VANCOCIN) IVPB (premix) 750 mg (0 mg Intravenous Stopped 9/22/19 1029)   cefepime (MAXIPIME) 2 g/50 mL dextrose IVPB (0 mg Intravenous Stopped 9/22/19 1119)   metroNIDAZOLE (FLAGYL) IVPB (premix) 500 mg (500 mg Intravenous New Bag 9/22/19 1246)   albuterol (PROVENTIL HFA,VENTOLIN HFA) inhaler 2 puff (2 puffs Inhalation Given 9/22/19 1158)   dextrose 5 % and sodium chloride 0 9 % infusion (125 mL/hr Intravenous New Bag 9/22/19 0929)   heparin (porcine) subcutaneous injection 5,000 Units (has no administration in time range)   multivitamin (INFUVITE ADULT) 10 mL in sodium chloride 0 9 % 500 mL IVPB ( Intravenous New Bag 9/22/19 1152)   sodium chloride 0 9 % bolus 1,000 mL (0 mL Intravenous Stopped 9/21/19 1920)   ondansetron (ZOFRAN) injection 4 mg (4 mg Intravenous Given 9/21/19 1816)   HYDROmorphone (DILAUDID) injection 0 5 mg (0 5 mg Intravenous Given 9/21/19 1816)   cefepime (MAXIPIME) 2 g/50 mL dextrose IVPB (0 mg Intravenous Stopped 9/21/19 1959)   vancomycin (VANCOCIN) IVPB (premix) 750 mg (0 mg/kg × 50 3 kg Intravenous Stopped 9/21/19 2154)   metroNIDAZOLE (FLAGYL) IVPB (premix) 500 mg (0 mg Intravenous Stopped 9/21/19 2047)   sodium chloride 0 9 % bolus 1,000 mL (0 mL Intravenous Stopped 9/21/19 2048)   iohexol (OMNIPAQUE) 350 MG/ML injection (MULTI-DOSE) 100 mL (100 mL Intravenous Given 9/21/19 1943)   iohexol (OMNIPAQUE) 240 MG/ML solution 50 mL (50 mL Oral Given 9/21/19 1943)   phytonadione (AQUA-MEPHYTON) 10 mg/mL 10 mg in sodium chloride 0 9 % 50 mL IVPB (0 mg Intravenous Stopped 9/22/19 1215)   fentanyl citrate (PF) 100 MCG/2ML (50 mcg Intravenous Given 9/22/19 1115)   midazolam (VERSED) injection (1 mg Intravenous Given 9/22/19 1122)       Diagnostic Studies  Results Reviewed     Procedure Component Value Units Date/Time    Prealbumin [[id number]]  (Abnormal) Collected:  09/22/19 0451    Lab Status:  Final result Specimen:  Blood from Line, Venous Updated:  09/22/19 1143     Prealbumin 6 0 mg/dL     Blood culture #1 [[id number]]  (Abnormal) Collected:  09/21/19 1928    Lab Status:  Preliminary result Specimen:  Blood from Central Venous Line Updated:  09/22/19 0902     Gram Stain Result Gram negative rods    Protime-INR [[id number]]  (Abnormal) Collected:  09/22/19 0451    Lab Status:  Final result Specimen:  Blood from Line, Venous Updated:  09/22/19 0554     Protime 19 0 seconds      INR 1 57    Comprehensive metabolic panel [[id number]]  (Abnormal) Collected:  09/22/19 0451    Lab Status:  Final result Specimen:  Blood from Line, Venous Updated:  09/22/19 0545     Sodium 134 mmol/L      Potassium 3 7 mmol/L      Chloride 102 mmol/L      CO2 25 mmol/L      ANION GAP 7 mmol/L      BUN 17 mg/dL      Creatinine 0 54 mg/dL      Glucose 105 mg/dL      Calcium 8 2 mg/dL      AST 20 U/L      ALT 20 U/L      Alkaline Phosphatase 119 U/L      Total Protein 5 6 g/dL      Albumin 1 9 g/dL      Total Bilirubin 0 44 mg/dL      eGFR 117 ml/min/1 73sq m     Narrative:       [place] guidelines for Chronic Kidney Disease (CKD):     Stage 1 with normal or high GFR (GFR > 90 mL/min/1 73 square meters)    Stage 2 Mild CKD (GFR = 60-89 mL/min/1 73 square meters)    Stage 3A Moderate CKD (GFR = 45-59 mL/min/1 73 square meters)    Stage 3B Moderate CKD (GFR = 30-44 mL/min/1 73 square meters)    Stage 4 Severe CKD (GFR = 15-29 mL/min/1 73 square meters)    Stage 5 End Stage CKD (GFR <15 mL/min/1 73 square meters)  Note: GFR calculation is accurate only with a steady state creatinine    CBC (With Platelets) [[id number]] (Abnormal) Collected:  09/22/19 0451    Lab Status:  Final result Specimen:  Blood from Line, Venous Updated:  09/22/19 0524     WBC 30 32 Thousand/uL      RBC 2 44 Million/uL      Hemoglobin 7 3 g/dL      Hematocrit 23 1 %      MCV 95 fL      MCH 29 9 pg      MCHC 31 6 g/dL      RDW 14 5 %      Platelets 694 Thousands/uL      MPV 9 2 fL     Urine Microscopic [[id number]]  (Abnormal) Collected:  09/21/19 2051    Lab Status:  Final result Specimen:  Urine, Clean Catch Updated:  09/21/19 2129     RBC, UA 2-4 /hpf      WBC, UA 1-2 /hpf      Epithelial Cells Occasional /hpf      Bacteria, UA Occasional /hpf     UA w Reflex to Microscopic [[id number]]  (Abnormal) Collected:  09/21/19 2051    Lab Status:  Final result Specimen:  Urine, Clean Catch Updated:  09/21/19 2117     Color, UA Yellow     Clarity, UA Clear     Specific Dix, UA 1 020     pH, UA 5 5     Leukocytes, UA Negative     Nitrite, UA Positive     Protein, UA 30 (1+) mg/dl      Glucose, UA Negative mg/dl      Ketones, UA Negative mg/dl      Urobilinogen, UA 0 2 E U /dl      Bilirubin, UA Negative     Blood, UA Trace-Intact    Lactic acid, plasma [[id number]]  (Normal) Collected:  09/21/19 1917    Lab Status:  Final result Specimen:  Blood from Arm, Left Updated:  09/21/19 1953     LACTIC ACID 1 2 mmol/L     Narrative:       Result may be elevated if tourniquet was used during collection  Blood culture #2 [[id number]] Collected:  09/21/19 1917    Lab Status:   In process Specimen:  Blood from Arm, Left Updated:  09/21/19 1933    CBC and differential [[id number]]  (Abnormal) Collected:  09/21/19 1818    Lab Status:  Final result Specimen:  Blood from Line, Venous Updated:  09/21/19 1900     WBC 32 91 Thousand/uL      RBC 3 09 Million/uL      Hemoglobin 9 3 g/dL      Hematocrit 28 8 %      MCV 93 fL      MCH 30 1 pg      MCHC 32 3 g/dL      RDW 14 5 %      MPV 9 6 fL      Platelets 116 Thousands/uL      nRBC 0 /100 WBCs      Neutrophils Relative 92 %      Immat GRANS % 1 %      Lymphocytes Relative 3 %      Monocytes Relative 4 %      Eosinophils Relative 0 %      Basophils Relative 0 %      Neutrophils Absolute 30 05 Thousands/µL      Immature Grans Absolute 0 43 Thousand/uL      Lymphocytes Absolute 1 00 Thousands/µL      Monocytes Absolute 1 37 Thousand/µL      Eosinophils Absolute 0 00 Thousand/µL      Basophils Absolute 0 06 Thousands/µL     Basic metabolic panel [[id number]]  (Abnormal) Collected:  09/21/19 1818    Lab Status:  Final result Specimen:  Blood from Line, Venous Updated:  09/21/19 1841     Sodium 134 mmol/L      Potassium 3 8 mmol/L      Chloride 100 mmol/L      CO2 26 mmol/L      ANION GAP 8 mmol/L      BUN 21 mg/dL      Creatinine 0 65 mg/dL      Glucose 111 mg/dL      Calcium 8 7 mg/dL      eGFR 108 ml/min/1 73sq m     Narrative:       [place] guidelines for Chronic Kidney Disease (CKD):     Stage 1 with normal or high GFR (GFR > 90 mL/min/1 73 square meters)    Stage 2 Mild CKD (GFR = 60-89 mL/min/1 73 square meters)    Stage 3A Moderate CKD (GFR = 45-59 mL/min/1 73 square meters)    Stage 3B Moderate CKD (GFR = 30-44 mL/min/1 73 square meters)    Stage 4 Severe CKD (GFR = 15-29 mL/min/1 73 square meters)    Stage 5 End Stage CKD (GFR <15 mL/min/1 73 square meters)  Note: GFR calculation is accurate only with a steady state creatinine    Hepatic function panel [[id number]]  (Abnormal) Collected:  09/21/19 1818    Lab Status:  Final result Specimen:  Blood from Line, Venous Updated:  09/21/19 1841     Total Bilirubin 0 53 mg/dL      Bilirubin, Direct 0 24 mg/dL      Alkaline Phosphatase 133 U/L      AST 18 U/L      ALT 26 U/L      Total Protein 6 6 g/dL      Albumin 2 3 g/dL     Lipase [[id number]]  (Normal) Collected:  09/21/19 1818    Lab Status:  Final result Specimen:  Blood from Line, Venous Updated:  09/21/19 1841     Lipase 268 u/L     Magnesium [[id number]]  (Normal) Collected:  09/21/19 1818    Lab Status:  Final result Specimen:  Blood from Line, Venous Updated:  09/21/19 1841     Magnesium 1 7 mg/dL                  CT abdomen pelvis with contrast   Final Result by  (09/22 1433)   Addendum 1 of 1 by Carlton Crawford MD (09/22 1203)   ADDENDUM:      Transcriptional error in the body of the initial report  "Reproductive    organs" section should read:      REPRODUCTIVE ORGANS:  The prostate is enlarged  Final      XR chest 1 view portable   Final Result by Selene Bell MD (09/22 1205)      No acute cardiopulmonary disease  Workstation performed: [id number]         IR tube placement zac    (Results Pending)              Procedures  Procedures       ED Course  ED Course as of Sep 22 1433   Sat Sep 21, 2019   1904 Page to bariatric surgery  MDM  Number of Diagnoses or Management Options  Abdominal pain: new and requires workup  Cholecystitis: new and requires workup  Diagnosis management comments: 1  Abdominal pain - Pt appears cachectic and currently very uncomfortable  He has diffuse abdominal tenderness with guarding and rebound  An upright CXR was immediately ordered which did not show any apparent air under the diaphragm  Will order a CBC, metabolic panel for electrolyte abnormalities and dehydration,  LFT's to assess GB dysfunction, lipase for pancreatitis, blood cultures and lactic acid  Will CT abdomen, speak with bariatric surgery         Amount and/or Complexity of Data Reviewed  Clinical lab tests: ordered and reviewed  Tests in the radiology section of CPT®: ordered and reviewed  Obtain history from someone other than the patient: yes  Review and summarize past medical records: yes  Discuss the patient with other providers: yes  Independent visualization of images, tracings, or specimens: yes    Patient Progress  Patient progress: stable      Disposition  Final diagnoses:   Cholecystitis   Abdominal pain     Time reflects when diagnosis was documented in both MDM as applicable and the Disposition within this note     Time User Action Codes Description Comment    9/21/2019  9:04 PM Gala Gum E Add [K81 9] Cholecystitis     9/21/2019  9:04 PM Gala Gum E Add [R10 9] Abdominal pain     9/22/2019  9:07 AM Georgina Dominguez Modify [K81 9] Cholecystitis     9/22/2019  9:07 AM Georgina Dominguez Add [Z93 1] G tube feedings [facility])       ED Disposition     ED Disposition Condition Date/Time Comment    Admit Stable Sat Sep 21, 2019  9:11 PM Case was discussed with Bariatric Surgery and the patient's admission status was agreed to be Admission Status: inpatient status to the service of Dr Neda Vaz   Follow-up Information    None         Current Discharge Medication List      CONTINUE these medications which have NOT CHANGED    Details   albuterol (PROVENTIL HFA,VENTOLIN HFA) 90 mcg/act inhaler Inhale 2 puffs 4 (four) times a day  Refills: 0    Comments: Substitution to a formulary equivalent within the same pharmaceutical class is authorized  Associated Diagnoses: Bariatric surgery status; Internal hernia; Intra-abdominal abscess ([address] ); Abdominal pain; Open wound of abdominal wall, subsequent encounter; Chronic marginal ulcer with perforation (HCC)      diphenhydrAMINE (BENADRYL) 50 mg/mL Infuse 0 5 mL (25 mg total) into a venous catheter every 6 (six) hours as needed for itching or sleep  Qty: 10 mL, Refills: 0    Associated Diagnoses: Bariatric surgery status; Internal hernia; Intra-abdominal abscess ([address] );  Abdominal pain; Open wound of abdominal wall, subsequent encounter; Chronic marginal ulcer with perforation (HCC)      lansoprazole (PREVACID) 30 mg capsule Take 30 mg by mouth daily      metoclopramide (REGLAN) 10 mg tablet Take 10 mg by mouth 4 (four) times a day      oxyCODONE (OXY-IR) 5 MG capsule Take 5 mg by mouth every 4 (four) hours as needed for moderate pain      prochlorperazine (COMPAZINE) 5 mg tablet Take 5 mg by mouth every 6 (six) hours as needed for nausea or vomiting      sucralfate (CARAFATE) 1 g/10 mL suspension Take 10 mL (1,000 mg total) by mouth 4 (four) times a day (before meals and at bedtime)  Qty: 420 mL, Refills: 0    Associated Diagnoses: Bariatric surgery status; Internal hernia; Intra-abdominal abscess ([address] ); Abdominal pain; Open wound of abdominal wall, subsequent encounter; Chronic marginal ulcer with perforation (HCC)      acetaminophen (TYLENOL 8 HOUR) 650 mg CR tablet Take 1 tablet (650 mg total) by mouth every 8 (eight) hours  Qty: 15 tablet, Refills: 0    Associated Diagnoses: Trigger finger, right middle finger; Trigger finger, right index finger           No discharge procedures on file      ED Provider  Electronically Signed by           Acacia Khalil MD  09/22/19 0867

## 2019-09-22 NOTE — PLAN OF CARE
Problem: Potential for Falls  Goal: Patient will remain free of falls  Description  INTERVENTIONS:  - Assess patient frequently for physical needs  -  Identify cognitive and physical deficits and behaviors that affect risk of falls  -  [place] fall precautions as indicated by assessment   - Educate patient/family on patient safety including physical limitations  - Instruct patient to call for assistance with activity based on assessment  - Modify environment to reduce risk of injury  - Consider OT/PT consult to assist with strengthening/mobility  Outcome: Progressing     Problem: Nutrition/Hydration-ADULT  Goal: Nutrient/Hydration intake appropriate for improving, restoring or maintaining nutritional needs  Description  Monitor and assess patient's nutrition/hydration status for malnutrition  Collaborate with interdisciplinary team and initiate plan and interventions as ordered  Monitor patient's weight and dietary intake as ordered or per policy  Utilize nutrition screening tool and intervene as necessary  Determine patient's food preferences and provide high-protein, high-caloric foods as appropriate       INTERVENTIONS:  - Monitor oral intake, urinary output, labs, and treatment plans  - Assess nutrition and hydration status and recommend course of action  - Evaluate amount of meals eaten  - Assist patient with eating if necessary   - Allow adequate time for meals  - Recommend/ encourage appropriate diets, oral nutritional supplements, and vitamin/mineral supplements  - Order, calculate, and assess calorie counts as needed  - Recommend, monitor, and adjust tube feedings and TPN/PPN based on assessed needs  - Assess need for intravenous fluids  - Provide specific nutrition/hydration education as appropriate  - Include patient/family/caregiver in decisions related to nutrition  Outcome: Progressing     Problem: Prexisting or High Potential for Compromised Skin Integrity  Goal: Skin integrity is maintained or improved  Description  INTERVENTIONS:  - Identify patients at risk for skin breakdown  - Assess and monitor skin integrity  - Assess and monitor nutrition and hydration status  - Monitor labs   - Assess for incontinence   - Turn and reposition patient  - Assist with mobility/ambulation  - Relieve pressure over bony prominences  - Avoid friction and shearing  - Provide appropriate hygiene as needed including keeping skin clean and dry  - Evaluate need for skin moisturizer/barrier cream  - Collaborate with interdisciplinary team   - Patient/family teaching  - Consider wound care consult   Outcome: Progressing     Problem: PAIN - ADULT  Goal: Verbalizes/displays adequate comfort level or baseline comfort level  Description  Interventions:  - Encourage patient to monitor pain and request assistance  - Assess pain using appropriate pain scale  - Administer analgesics based on type and severity of pain and evaluate response  - Implement non-pharmacological measures as appropriate and evaluate response  - Consider cultural and social influences on pain and pain management  - Notify physician/advanced practitioner if interventions unsuccessful or patient reports new pain  Outcome: Progressing     Problem: INFECTION - ADULT  Goal: Absence or prevention of progression during hospitalization  Description  INTERVENTIONS:  - Assess and monitor for signs and symptoms of infection  - Monitor lab/diagnostic results  - Monitor all insertion sites, i e  indwelling lines, tubes, and drains  - Monitor endotracheal if appropriate and nasal secretions for changes in amount and color  - [place] appropriate cooling/warming therapies per order  - Administer medications as ordered  - Instruct and encourage patient and family to use good hand hygiene technique  - Identify and instruct in appropriate isolation precautions for identified infection/condition  Outcome: Progressing     Problem: DISCHARGE PLANNING  Goal: Discharge to home or other facility with appropriate resources  Description  INTERVENTIONS:  - Identify barriers to discharge w/patient and caregiver  - Arrange for needed discharge resources and transportation as appropriate  - Identify discharge learning needs (meds, wound care, etc )  - Arrange for interpretive services to assist at discharge as needed  - Refer to Case Management Department for coordinating discharge planning if the patient needs post-hospital services based on physician/advanced practitioner order or complex needs related to functional status, cognitive ability, or social support system  Outcome: Progressing     Problem: Knowledge Deficit  Goal: Patient/family/caregiver demonstrates understanding of disease process, treatment plan, medications, and discharge instructions  Description  Complete learning assessment and assess knowledge base    Interventions:  - Provide teaching at level of understanding  - Provide teaching via preferred learning methods  Outcome: Progressing

## 2019-09-22 NOTE — BRIEF OP NOTE (RAD/CATH)
IR CHOLECYSTOSTOMY TUBE PLACMENT    PATIENT NAME: Lino Sandhoff  : 1960  MRN: [id number]     Pre-op Diagnosis:   1  G tube feedings ([address] )    2  Cholecystitis    3  Abdominal pain      Post-op Diagnosis:   1  G tube feedings ([address] )    2  Cholecystitis    3   Abdominal pain        Surgeon:   Montse Gutierrez MD  Assistants:     No qualified resident was available, Resident is only observing    Estimated Blood Loss: none  Findings: dark bile under pressure    8 [place] tube placed  Direct trocar, not transhepatic    Specimens: bile for culture    Complications:  None immediate    Anesthesia: Conscious sedation    Montse Gutierrez MD     Date: 2019  Time: 11:25 AM

## 2019-09-23 ENCOUNTER — DOCUMENTATION (OUTPATIENT)
Dept: BARIATRICS | Facility: CLINIC | Age: 59
End: 2019-09-23

## 2019-09-23 DIAGNOSIS — Z93.1 G TUBE FEEDINGS (HCC): Primary | ICD-10-CM

## 2019-09-23 PROBLEM — K81.0 ACUTE ACALCULOUS CHOLECYSTITIS: Status: ACTIVE | Noted: 2019-09-23

## 2019-09-23 LAB
ANION GAP SERPL CALCULATED.3IONS-SCNC: 5 MMOL/L (ref 4–13)
BASOPHILS # BLD AUTO: 0.03 THOUSANDS/ΜL (ref 0–0.1)
BASOPHILS NFR BLD AUTO: 0 % (ref 0–1)
BUN SERPL-MCNC: 10 MG/DL (ref 5–25)
CALCIUM SERPL-MCNC: 7.7 MG/DL (ref 8.3–10.1)
CHLORIDE SERPL-SCNC: 104 MMOL/L (ref 100–108)
CO2 SERPL-SCNC: 26 MMOL/L (ref 21–32)
CREAT SERPL-MCNC: 0.47 MG/DL (ref 0.6–1.3)
EOSINOPHIL # BLD AUTO: 0.04 THOUSAND/ΜL (ref 0–0.61)
EOSINOPHIL NFR BLD AUTO: 0 % (ref 0–6)
ERYTHROCYTE [DISTWIDTH] IN BLOOD BY AUTOMATED COUNT: 14.5 % (ref 11.6–15.1)
GFR SERPL CREATININE-BSD FRML MDRD: 123 ML/MIN/1.73SQ M
GLUCOSE SERPL-MCNC: 111 MG/DL (ref 65–140)
GLUCOSE SERPL-MCNC: 114 MG/DL (ref 65–140)
GLUCOSE SERPL-MCNC: 119 MG/DL (ref 65–140)
HCT VFR BLD AUTO: 20.1 % (ref 36.5–49.3)
HGB BLD-MCNC: 6.4 G/DL (ref 12–17)
IMM GRANULOCYTES # BLD AUTO: 0.13 THOUSAND/UL (ref 0–0.2)
IMM GRANULOCYTES NFR BLD AUTO: 1 % (ref 0–2)
LYMPHOCYTES # BLD AUTO: 1.1 THOUSANDS/ΜL (ref 0.6–4.47)
LYMPHOCYTES NFR BLD AUTO: 8 % (ref 14–44)
MAGNESIUM SERPL-MCNC: 1.7 MG/DL (ref 1.6–2.6)
MCH RBC QN AUTO: 30.2 PG (ref 26.8–34.3)
MCHC RBC AUTO-ENTMCNC: 31.8 G/DL (ref 31.4–37.4)
MCV RBC AUTO: 95 FL (ref 82–98)
MONOCYTES # BLD AUTO: 0.61 THOUSAND/ΜL (ref 0.17–1.22)
MONOCYTES NFR BLD AUTO: 4 % (ref 4–12)
NEUTROPHILS # BLD AUTO: 11.99 THOUSANDS/ΜL (ref 1.85–7.62)
NEUTS SEG NFR BLD AUTO: 87 % (ref 43–75)
NRBC BLD AUTO-RTO: 0 /100 WBCS
PHOSPHATE SERPL-MCNC: 1.9 MG/DL (ref 2.7–4.5)
PLATELET # BLD AUTO: 261 THOUSANDS/UL (ref 149–390)
PMV BLD AUTO: 9 FL (ref 8.9–12.7)
POTASSIUM SERPL-SCNC: 3 MMOL/L (ref 3.5–5.3)
RBC # BLD AUTO: 2.12 MILLION/UL (ref 3.88–5.62)
SODIUM SERPL-SCNC: 135 MMOL/L (ref 136–145)
VANCOMYCIN TROUGH SERPL-MCNC: 8.6 UG/ML (ref 10–20)
WBC # BLD AUTO: 13.9 THOUSAND/UL (ref 4.31–10.16)

## 2019-09-23 PROCEDURE — C9113 INJ PANTOPRAZOLE SODIUM, VIA: HCPCS | Performed by: SURGERY

## 2019-09-23 PROCEDURE — 80048 BASIC METABOLIC PNL TOTAL CA: CPT | Performed by: SURGERY

## 2019-09-23 PROCEDURE — 84100 ASSAY OF PHOSPHORUS: CPT | Performed by: SURGERY

## 2019-09-23 PROCEDURE — 80202 ASSAY OF VANCOMYCIN: CPT | Performed by: SURGERY

## 2019-09-23 PROCEDURE — 82948 REAGENT STRIP/BLOOD GLUCOSE: CPT

## 2019-09-23 PROCEDURE — 83735 ASSAY OF MAGNESIUM: CPT | Performed by: SURGERY

## 2019-09-23 PROCEDURE — 99024 POSTOP FOLLOW-UP VISIT: CPT | Performed by: SURGERY

## 2019-09-23 PROCEDURE — 85025 COMPLETE CBC W/AUTO DIFF WBC: CPT | Performed by: SURGERY

## 2019-09-23 RX ORDER — POTASSIUM CHLORIDE 14.9 MG/ML
20 INJECTION INTRAVENOUS
Status: DISCONTINUED | OUTPATIENT
Start: 2019-09-23 | End: 2019-09-23

## 2019-09-23 RX ORDER — POTASSIUM CHLORIDE 20MEQ/15ML
20 LIQUID (ML) ORAL ONCE
Status: COMPLETED | OUTPATIENT
Start: 2019-09-23 | End: 2019-09-23

## 2019-09-23 RX ORDER — MAGNESIUM SULFATE HEPTAHYDRATE 40 MG/ML
2 INJECTION, SOLUTION INTRAVENOUS ONCE
Status: COMPLETED | OUTPATIENT
Start: 2019-09-23 | End: 2019-09-23

## 2019-09-23 RX ADMIN — CEFEPIME HYDROCHLORIDE 2000 MG: 2 INJECTION, POWDER, FOR SOLUTION INTRAVENOUS at 04:28

## 2019-09-23 RX ADMIN — ALBUTEROL SULFATE 2 PUFF: 90 AEROSOL, METERED RESPIRATORY (INHALATION) at 12:32

## 2019-09-23 RX ADMIN — MORPHINE SULFATE 2 MG: 2 INJECTION, SOLUTION INTRAMUSCULAR; INTRAVENOUS at 08:43

## 2019-09-23 RX ADMIN — POTASSIUM CHLORIDE 20 MEQ: 14.9 INJECTION, SOLUTION INTRAVENOUS at 12:23

## 2019-09-23 RX ADMIN — POTASSIUM PHOSPHATE, MONOBASIC AND POTASSIUM PHOSPHATE, DIBASIC 30 MMOL: 224; 236 INJECTION, SOLUTION INTRAVENOUS at 15:00

## 2019-09-23 RX ADMIN — CEFEPIME HYDROCHLORIDE 2000 MG: 2 INJECTION, POWDER, FOR SOLUTION INTRAVENOUS at 21:40

## 2019-09-23 RX ADMIN — MORPHINE SULFATE 2 MG: 2 INJECTION, SOLUTION INTRAMUSCULAR; INTRAVENOUS at 18:00

## 2019-09-23 RX ADMIN — METRONIDAZOLE 500 MG: 500 INJECTION, SOLUTION INTRAVENOUS at 04:28

## 2019-09-23 RX ADMIN — ONDANSETRON 4 MG: 2 INJECTION INTRAMUSCULAR; INTRAVENOUS at 13:05

## 2019-09-23 RX ADMIN — MAGNESIUM SULFATE HEPTAHYDRATE 2 G: 40 INJECTION, SOLUTION INTRAVENOUS at 11:54

## 2019-09-23 RX ADMIN — CEFEPIME HYDROCHLORIDE 2000 MG: 2 INJECTION, POWDER, FOR SOLUTION INTRAVENOUS at 11:58

## 2019-09-23 RX ADMIN — PANTOPRAZOLE SODIUM 40 MG: 40 INJECTION, POWDER, FOR SOLUTION INTRAVENOUS at 08:42

## 2019-09-23 RX ADMIN — MORPHINE SULFATE 4 MG: 4 INJECTION INTRAVENOUS at 20:52

## 2019-09-23 RX ADMIN — ALBUTEROL SULFATE 2 PUFF: 90 AEROSOL, METERED RESPIRATORY (INHALATION) at 21:47

## 2019-09-23 RX ADMIN — POTASSIUM CHLORIDE 20 MEQ: 20 SOLUTION ORAL at 14:52

## 2019-09-23 RX ADMIN — MORPHINE SULFATE 2 MG: 2 INJECTION, SOLUTION INTRAMUSCULAR; INTRAVENOUS at 13:05

## 2019-09-23 RX ADMIN — DEXTROSE AND SODIUM CHLORIDE 125 ML/HR: 5; .9 INJECTION, SOLUTION INTRAVENOUS at 02:09

## 2019-09-23 RX ADMIN — VANCOMYCIN HYDROCHLORIDE 750 MG: 750 INJECTION, SOLUTION INTRAVENOUS at 08:38

## 2019-09-23 RX ADMIN — ALBUTEROL SULFATE 2 PUFF: 90 AEROSOL, METERED RESPIRATORY (INHALATION) at 08:38

## 2019-09-23 RX ADMIN — ALBUTEROL SULFATE 2 PUFF: 90 AEROSOL, METERED RESPIRATORY (INHALATION) at 18:00

## 2019-09-23 RX ADMIN — HEPARIN SODIUM 5000 UNITS: 5000 INJECTION INTRAVENOUS; SUBCUTANEOUS at 05:13

## 2019-09-23 RX ADMIN — MORPHINE SULFATE 2 MG: 2 INJECTION, SOLUTION INTRAMUSCULAR; INTRAVENOUS at 02:11

## 2019-09-23 RX ADMIN — ONDANSETRON 4 MG: 2 INJECTION INTRAMUSCULAR; INTRAVENOUS at 18:00

## 2019-09-23 RX ADMIN — METRONIDAZOLE 500 MG: 500 INJECTION, SOLUTION INTRAVENOUS at 23:28

## 2019-09-23 RX ADMIN — HEPARIN SODIUM 5000 UNITS: 5000 INJECTION INTRAVENOUS; SUBCUTANEOUS at 14:41

## 2019-09-23 RX ADMIN — HEPARIN SODIUM 5000 UNITS: 5000 INJECTION INTRAVENOUS; SUBCUTANEOUS at 21:47

## 2019-09-23 RX ADMIN — ONDANSETRON 4 MG: 2 INJECTION INTRAMUSCULAR; INTRAVENOUS at 07:30

## 2019-09-23 RX ADMIN — VANCOMYCIN HYDROCHLORIDE 750 MG: 750 INJECTION, SOLUTION INTRAVENOUS at 22:28

## 2019-09-23 RX ADMIN — METRONIDAZOLE 500 MG: 500 INJECTION, SOLUTION INTRAVENOUS at 14:39

## 2019-09-23 NOTE — PROGRESS NOTES
Patient through out the night stated he was in 8/10 pain consistently  Pain medication given when BP's were tolerable, however, BP's drop significantly when given his prn pain meds  Patient asymptomatic during these times  BP's being monitored closely  [address] made aware  Will continue to monitor patient closely

## 2019-09-23 NOTE — PROGRESS NOTES
Progress Note - Bariatric Surgery   [facility] 63 y o  male MRN: [id number]  Unit/Bed#: ICU 15 Encounter: [id number]      Subjective/Objective     SUBJECTIVE     63 y  o  male with extensive surgical history admit to the bariatric service for acalculous cholecystitis, s/p IR drain  Seen and examined at the bedside this AM   Pain much improved, although still requiring IV pain medication  Tolerating sips  Voiding adequately postop  OBJECTIVE    BP 90/51 (BP Location: Left arm)   Pulse 72   Temp 99 °F (37 2 °C) (Temporal)   Resp (!) 9   SpO2 99%       Intake/Output Summary (Last 24 hours) at 9/23/2019 0726  Last data filed at 9/23/2019 8706  Gross per 24 hour   Intake 5158 75 ml   Output 1955 ml   Net 3203 75 ml       Invasive Devices     Peripheral Intravenous Line            Peripheral IV 09/21/19 Left Arm 1 day    Peripheral IV 09/22/19 Proximal;Right;Ventral (anterior) Forearm less than 1 day          Drain            Gastrostomy/Enterostomy Gastrostomy 22 Fr  LUQ 56 days    Closed/Suction Drain Right;Lateral Abdomen Bulb 12 Fr  44 days    Negative Pressure Wound Therapy (V A C ) Abdomen Mid 40 days    Closed/Suction Drain Right RUQ Other (Comment) 8 5 Fr  less than 1 day                ROS: 10-point system completed  All negative except see HPI  Physical Exam    General Appearance:    Alert, cooperative, no distress, appears stated age   Head:    Normocephalic, without obvious abnormality, atraumatic   Lungs:     Respirations unlabored   Heart:    Regular rate and rhythm   Abdomen:     Soft, appropriate,  no masses, no organomegaly, mildly distended  Abdomen is no longer peritonitis  Moderate pain on deep palpation of RUQ  Muprhy +      Extremities:   Extremities normal, atraumatic, no cyanosis or edema   Neurologic:  Incision:  Psych:   Normal strength and sensation    Clean, dry, and intact    Normal mood and affect       Lab, Imaging and other studies:  I have personally reviewed pertinent lab results  , CBC:   Lab Results   Component Value Date    WBC 13 90 (H) 09/23/2019    HGB 6 4 (LL) 09/23/2019    HCT 20 1 (L) 09/23/2019    MCV 95 09/23/2019     09/23/2019    MCH 30 2 09/23/2019    MCHC 31 8 09/23/2019    RDW 14 5 09/23/2019    MPV 9 0 09/23/2019    NRBC 0 09/23/2019   , CMP: No results found for: SODIUM, K, CL, CO2, ANIONGAP, BUN, CREATININE, GLUCOSE, CALCIUM, AST, ALT, ALKPHOS, PROT, BILITOT, EGFR       VTE Mechanical Prophylaxis: sequential compression device + lovenox    ASSESSMENT  63 y  o  male with extensive surgical history admit to the bariatric service for acalculous cholecystitis, s/p IR drain  Clinically improving  PLAN  1) Transfer to tele  2) GI/DVT ppx w/SCD + lovenox  3) Pain and nausea management  4) IV abx until cultures speciate  5) Start clears and tube feeds through G tube  6) Aggressive incentive spirometry  PT/OT for ambulation  7) Labwork reviewed  Hb <7, dilutional  Will decrease IV fluids and hold off on xfusion      Dispo: Xfer to floor    Plan of care was discussed with patient and patient's nurse  Care plan discussed with Dr Sonal Monte MD  Bariatric Surgery Fellow  9/23/2019  7:26 AM

## 2019-09-23 NOTE — PROGRESS NOTES
Patient complaining of "feeling bloated"  Discussed with provider  Tube feed stopped at 1530  Patient unable to eat dinner, stating still feeling full  Tube feed held at this time

## 2019-09-23 NOTE — CONSULTS
Consult TPN and G tube feeds  PT and wife reported PT was on TPN at night, TF Jevity 1 2 during day but only at 40mL bolus 4-5 x day, stated "TF just started Wednesday, cause there was delievery issues"  Pt was also eating pureed foods such as 1 pureed egg for B and pureed meals for L and D, small amount d/t getting full fast  PT also reported drinking Sprite and Gatorade through out the day  Appears since PT tolerating TF and pureed foods, will not need to continue TPN  Can increase TF to assist with meeting estimated needs  Per bariatric RD Margaret Gabriel recommendation 9/23: Jevity 1 2 240 ml every 4 hours (6  times per day ) Flush with 120 ml before and after feedings  Currently on Bariatric full liquid, Jevity 1 2 240mL q 4hrs, provides (1728cal, 80g pro, 1166mL), no water flushes ordered  Continue to replete K and Mg  Recommend to start off Jevity 1 2 120mL/hr q 4hrs to see if PT tolerates as PT was only bolusing 40mL 4-5 x day continue to advance as tolerated to goal of current TF order and consider advancing diet to bariatric pureed since PT was eating pureed foods at home and tolerating  Will continue to monitor TF tolerance, po intake, and weight

## 2019-09-23 NOTE — PROGRESS NOTES
As patient tolerating bolus feedings- advance to goal feedings:  Jevity 1 2   240 ml every 4 hours (6  times per day ) Flush with 120 ml before and after feedings  To provide : 1710 kcal per day, 80 grams protein   Estimated calories for weight maintenance = 1267 kcal per day( 25 kcal/kg body weight )   Estimated calories for weight gain = 1767 kcal per day ( 1 # per week weight gain )  Estimated protein needs = 75 grams per day ( 1 5 grams /kg BW)    As patient tolerates, will monitor weight    May need to switch to more calorically dense formula as patient with  Protein /calorie malnutrition

## 2019-09-23 NOTE — NURSING NOTE
Pt being transferred to [place] [address] room 537 on tele  Wife aware of transfer via pt  Belongings sent with pt  Report given to [facility]

## 2019-09-23 NOTE — UTILIZATION REVIEW
Notification of Inpatient Admission/Inpatient Authorization Request  This is a Notification of Inpatient Admission/Request for Inpatient Authorization for our facility [address]  Be advised that this patient was admitted to our facility under Inpatient Status  Please contact the Utilization Review Department where the patient is receiving care services for additional admission information  Place of Service Code: 24   Place of Service Name: Inpatient Hospital  Presentation Date & Time: 9/21/2019  5:31 PM  Inpatient Admission Date & Time: 9/21/19 2043  Discharge Date & Time: No discharge date for patient encounter  Discharge Disposition (if discharged): [address]  Attending Physician and NPI#: Gerson Ramirez [place] [[id number]]  Admission Orders (From admission, onward)     Ordered        09/21/19 2058  Inpatient Admission  Once                   Facility: [facility]  Utilization Review Department  Phone: [phone]; Fax [phone]  [email or web address]  ATTENTION: Please call with any questions or concerns to [phone]  and carefully listen to the prompts so that you are directed to the right person  Send all requests for admission clinical reviews, approved or denied determinations and any other requests to fax [phone]   All voicemails are confidential

## 2019-09-24 ENCOUNTER — APPOINTMENT (INPATIENT)
Dept: RADIOLOGY | Facility: HOSPITAL | Age: 59
DRG: 446 | End: 2019-09-24
Payer: COMMERCIAL

## 2019-09-24 VITALS
RESPIRATION RATE: 16 BRPM | OXYGEN SATURATION: 98 % | DIASTOLIC BLOOD PRESSURE: 57 MMHG | HEART RATE: 76 BPM | TEMPERATURE: 97.8 F | SYSTOLIC BLOOD PRESSURE: 105 MMHG

## 2019-09-24 PROBLEM — K81.0 ACUTE ACALCULOUS CHOLECYSTITIS: Status: RESOLVED | Noted: 2019-09-23 | Resolved: 2019-09-24

## 2019-09-24 LAB
ABO GROUP BLD: NORMAL
ANION GAP SERPL CALCULATED.3IONS-SCNC: 4 MMOL/L (ref 4–13)
BACTERIA BLD CULT: ABNORMAL
BACTERIA SPEC BFLD CULT: ABNORMAL
BASOPHILS # BLD AUTO: 0.01 THOUSANDS/ΜL (ref 0–0.1)
BASOPHILS NFR BLD AUTO: 0 % (ref 0–1)
BLD GP AB SCN SERPL QL: NEGATIVE
BUN SERPL-MCNC: 9 MG/DL (ref 5–25)
CALCIUM SERPL-MCNC: 7.7 MG/DL (ref 8.3–10.1)
CHLORIDE SERPL-SCNC: 105 MMOL/L (ref 100–108)
CO2 SERPL-SCNC: 27 MMOL/L (ref 21–32)
CREAT SERPL-MCNC: 0.48 MG/DL (ref 0.6–1.3)
EOSINOPHIL # BLD AUTO: 0.1 THOUSAND/ΜL (ref 0–0.61)
EOSINOPHIL NFR BLD AUTO: 1 % (ref 0–6)
ERYTHROCYTE [DISTWIDTH] IN BLOOD BY AUTOMATED COUNT: 14.1 % (ref 11.6–15.1)
GFR SERPL CREATININE-BSD FRML MDRD: 122 ML/MIN/1.73SQ M
GLUCOSE SERPL-MCNC: 98 MG/DL (ref 65–140)
GRAM STN SPEC: ABNORMAL
GRAM STN SPEC: ABNORMAL
HCT VFR BLD AUTO: 21.5 % (ref 36.5–49.3)
HGB BLD-MCNC: 6.8 G/DL (ref 12–17)
IMM GRANULOCYTES # BLD AUTO: 0.03 THOUSAND/UL (ref 0–0.2)
IMM GRANULOCYTES NFR BLD AUTO: 0 % (ref 0–2)
LYMPHOCYTES # BLD AUTO: 1.33 THOUSANDS/ΜL (ref 0.6–4.47)
LYMPHOCYTES NFR BLD AUTO: 19 % (ref 14–44)
MAGNESIUM SERPL-MCNC: 1.9 MG/DL (ref 1.6–2.6)
MCH RBC QN AUTO: 29.8 PG (ref 26.8–34.3)
MCHC RBC AUTO-ENTMCNC: 31.6 G/DL (ref 31.4–37.4)
MCV RBC AUTO: 94 FL (ref 82–98)
MONOCYTES # BLD AUTO: 0.4 THOUSAND/ΜL (ref 0.17–1.22)
MONOCYTES NFR BLD AUTO: 6 % (ref 4–12)
NEUTROPHILS # BLD AUTO: 5.03 THOUSANDS/ΜL (ref 1.85–7.62)
NEUTS SEG NFR BLD AUTO: 74 % (ref 43–75)
NRBC BLD AUTO-RTO: 0 /100 WBCS
PHOSPHATE SERPL-MCNC: 2.3 MG/DL (ref 2.7–4.5)
PLATELET # BLD AUTO: 307 THOUSANDS/UL (ref 149–390)
PMV BLD AUTO: 9.5 FL (ref 8.9–12.7)
POTASSIUM SERPL-SCNC: 3.1 MMOL/L (ref 3.5–5.3)
RBC # BLD AUTO: 2.28 MILLION/UL (ref 3.88–5.62)
RH BLD: POSITIVE
SODIUM SERPL-SCNC: 136 MMOL/L (ref 136–145)
SPECIMEN EXPIRATION DATE: NORMAL
WBC # BLD AUTO: 6.9 THOUSAND/UL (ref 4.31–10.16)

## 2019-09-24 PROCEDURE — NC001 PR NO CHARGE: Performed by: SURGERY

## 2019-09-24 PROCEDURE — 49424 ASSESS CYST CONTRAST INJECT: CPT | Performed by: RADIOLOGY

## 2019-09-24 PROCEDURE — 99254 IP/OBS CNSLTJ NEW/EST MOD 60: CPT | Performed by: INTERNAL MEDICINE

## 2019-09-24 PROCEDURE — 86850 RBC ANTIBODY SCREEN: CPT | Performed by: SURGERY

## 2019-09-24 PROCEDURE — NC001 PR NO CHARGE: Performed by: RADIOLOGY

## 2019-09-24 PROCEDURE — 76080 X-RAY EXAM OF FISTULA: CPT | Performed by: RADIOLOGY

## 2019-09-24 PROCEDURE — 99024 POSTOP FOLLOW-UP VISIT: CPT | Performed by: SURGERY

## 2019-09-24 PROCEDURE — 86900 BLOOD TYPING SEROLOGIC ABO: CPT | Performed by: SURGERY

## 2019-09-24 PROCEDURE — 80048 BASIC METABOLIC PNL TOTAL CA: CPT | Performed by: SURGERY

## 2019-09-24 PROCEDURE — 84100 ASSAY OF PHOSPHORUS: CPT | Performed by: SURGERY

## 2019-09-24 PROCEDURE — 86901 BLOOD TYPING SEROLOGIC RH(D): CPT | Performed by: SURGERY

## 2019-09-24 PROCEDURE — 74018 RADEX ABDOMEN 1 VIEW: CPT

## 2019-09-24 PROCEDURE — C9113 INJ PANTOPRAZOLE SODIUM, VIA: HCPCS | Performed by: PHYSICIAN ASSISTANT

## 2019-09-24 PROCEDURE — 76080 X-RAY EXAM OF FISTULA: CPT

## 2019-09-24 PROCEDURE — 85025 COMPLETE CBC W/AUTO DIFF WBC: CPT | Performed by: SURGERY

## 2019-09-24 PROCEDURE — 93005 ELECTROCARDIOGRAM TRACING: CPT

## 2019-09-24 PROCEDURE — BF121ZZ FLUOROSCOPY OF GALLBLADDER USING LOW OSMOLAR CONTRAST: ICD-10-PCS | Performed by: RADIOLOGY

## 2019-09-24 PROCEDURE — 83735 ASSAY OF MAGNESIUM: CPT | Performed by: SURGERY

## 2019-09-24 PROCEDURE — 49424 ASSESS CYST CONTRAST INJECT: CPT

## 2019-09-24 RX ORDER — MAGNESIUM SULFATE HEPTAHYDRATE 40 MG/ML
2 INJECTION, SOLUTION INTRAVENOUS ONCE
Status: COMPLETED | OUTPATIENT
Start: 2019-09-24 | End: 2019-09-24

## 2019-09-24 RX ORDER — CEFUROXIME AXETIL 500 MG/1
500 TABLET ORAL EVERY 12 HOURS SCHEDULED
Qty: 8 TABLET | Refills: 0 | Status: SHIPPED | OUTPATIENT
Start: 2019-09-24 | End: 2019-09-28

## 2019-09-24 RX ORDER — CEFDINIR 300 MG/1
300 CAPSULE ORAL EVERY 12 HOURS SCHEDULED
Status: DISCONTINUED | OUTPATIENT
Start: 2019-09-24 | End: 2019-09-24

## 2019-09-24 RX ORDER — CEFUROXIME AXETIL 250 MG/1
500 TABLET ORAL EVERY 12 HOURS SCHEDULED
Status: DISCONTINUED | OUTPATIENT
Start: 2019-09-24 | End: 2019-09-24 | Stop reason: HOSPADM

## 2019-09-24 RX ORDER — CEFUROXIME AXETIL 250 MG/1
500 TABLET ORAL EVERY 12 HOURS SCHEDULED
Status: DISCONTINUED | OUTPATIENT
Start: 2019-09-24 | End: 2019-09-24

## 2019-09-24 RX ORDER — CEFDINIR 250 MG/5ML
300 POWDER, FOR SUSPENSION ORAL EVERY 12 HOURS SCHEDULED
Status: DISCONTINUED | OUTPATIENT
Start: 2019-09-24 | End: 2019-09-24

## 2019-09-24 RX ORDER — POTASSIUM CHLORIDE 20MEQ/15ML
40 LIQUID (ML) ORAL 2 TIMES DAILY
Status: DISCONTINUED | OUTPATIENT
Start: 2019-09-24 | End: 2019-09-24

## 2019-09-24 RX ORDER — OXYCODONE HYDROCHLORIDE 5 MG/1
5 TABLET ORAL EVERY 4 HOURS PRN
Qty: 10 TABLET | Refills: 0 | Status: SHIPPED | OUTPATIENT
Start: 2019-09-24 | End: 2020-07-20

## 2019-09-24 RX ADMIN — HEPARIN SODIUM 5000 UNITS: 5000 INJECTION INTRAVENOUS; SUBCUTANEOUS at 05:07

## 2019-09-24 RX ADMIN — HYDROMORPHONE HYDROCHLORIDE 0.5 MG: 1 INJECTION, SOLUTION INTRAMUSCULAR; INTRAVENOUS; SUBCUTANEOUS at 15:28

## 2019-09-24 RX ADMIN — POTASSIUM & SODIUM PHOSPHATES POWDER PACK 280-160-250 MG 2 PACKET: 280-160-250 PACK at 07:46

## 2019-09-24 RX ADMIN — CEFUROXIME AXETIL 500 MG: 250 TABLET ORAL at 14:15

## 2019-09-24 RX ADMIN — MAGNESIUM SULFATE HEPTAHYDRATE 2 G: 40 INJECTION, SOLUTION INTRAVENOUS at 06:53

## 2019-09-24 RX ADMIN — ALBUTEROL SULFATE 2 PUFF: 90 AEROSOL, METERED RESPIRATORY (INHALATION) at 12:06

## 2019-09-24 RX ADMIN — IOHEXOL 3 ML: 300 INJECTION, SOLUTION INTRAVENOUS at 11:43

## 2019-09-24 RX ADMIN — HEPARIN SODIUM 5000 UNITS: 5000 INJECTION INTRAVENOUS; SUBCUTANEOUS at 14:15

## 2019-09-24 RX ADMIN — METRONIDAZOLE 500 MG: 500 INJECTION, SOLUTION INTRAVENOUS at 06:19

## 2019-09-24 RX ADMIN — PANTOPRAZOLE SODIUM 40 MG: 40 INJECTION, POWDER, FOR SOLUTION INTRAVENOUS at 08:04

## 2019-09-24 RX ADMIN — ONDANSETRON 4 MG: 2 INJECTION INTRAMUSCULAR; INTRAVENOUS at 08:04

## 2019-09-24 RX ADMIN — MORPHINE SULFATE 4 MG: 4 INJECTION INTRAVENOUS at 08:04

## 2019-09-24 RX ADMIN — POTASSIUM & SODIUM PHOSPHATES POWDER PACK 280-160-250 MG 2 PACKET: 280-160-250 PACK at 12:06

## 2019-09-24 RX ADMIN — ALBUTEROL SULFATE 2 PUFF: 90 AEROSOL, METERED RESPIRATORY (INHALATION) at 08:03

## 2019-09-24 RX ADMIN — VANCOMYCIN HYDROCHLORIDE 750 MG: 750 INJECTION, SOLUTION INTRAVENOUS at 10:18

## 2019-09-24 RX ADMIN — DEXTROSE AND SODIUM CHLORIDE 10 ML/HR: 5; .9 INJECTION, SOLUTION INTRAVENOUS at 10:22

## 2019-09-24 RX ADMIN — ONDANSETRON 4 MG: 2 INJECTION INTRAMUSCULAR; INTRAVENOUS at 00:27

## 2019-09-24 RX ADMIN — MORPHINE SULFATE 4 MG: 4 INJECTION INTRAVENOUS at 03:40

## 2019-09-24 RX ADMIN — CEFEPIME HYDROCHLORIDE 2000 MG: 2 INJECTION, POWDER, FOR SOLUTION INTRAVENOUS at 06:49

## 2019-09-24 RX ADMIN — HYDROMORPHONE HYDROCHLORIDE 0.5 MG: 1 INJECTION, SOLUTION INTRAMUSCULAR; INTRAVENOUS; SUBCUTANEOUS at 10:41

## 2019-09-24 NOTE — CONSULTS
Consultation - Infectious Disease   [place] Fabien 63 y o  male MRN: [id number]  Unit/Bed#: E5 -01 Encounter: [id number]      IMPRESSION & RECOMMENDATIONS:   1  SIRS syndrome, POA  Patient presented on admission with leukocytosis along with right upper quadrant pain which is likely due to problem 2 and 3  He is status post cholecystostomy tube in culture data reviewed  He has clinically improved  Antibiotics as below  Continue to trend fever curve/vitals  Monitor CBC and chemistry while admitted  Additional care as per primary     2  Acalculous cholecystitis  Patient on admission noted to have distended gallbladder without any stones  He is status post cholecystostomy tube placement  He has had improvement in his pain and normalization of his white count  Continue antibiotics as below  Continue to trend fever curve/vitals  Continue to monitor labs while admitted  Cholecystostomy tube as per IR/bariatrics  Additional care as per primary    3  E coli bacteremia  Patient's blood cultures later returned positive  Same organism isolated from biliary drain  Suspect source of problem 2  Patient has clinically improved  No other ectopic foci seen on imaging of the abdomen and patient's other percutaneous drains have been removed  Will switch patient to Ceftin 500mg Q12H  Continue to trend fever curve/vitals  Continue to monitor labs while admitted  Drain management as per IR/bariatric  Plan for total of 7 days of therapy through 9/28  Recommend hold tube feeds and hour after and before on ceftin  Recommend providing 1 dose while admitted and monitoring for tolerance  Discharge planning otherwise as per primary    4  Penicillin allergy  Patient reported penicillin allergy when he was a child and then again at 22  He has not been tried on the drug since  Suspect that this allergy may not be active  He has been tolerating cefepime    Reviewed with pharmacy and there is very minimal cross-reactivity with ceftin  Discussed with patient and his family at bedside and they are willing to trial drug  Will update antibiotic allergies  Continue therapy otherwise as above    5  Recent perforated ulcer with peritonitis and intra-abdominal abscess  Patient had recent prolonged hospitalization and is now status post removal of all his previous percutaneous drains  No residual collections noted on imaging  He now has all cystostomy tube as above  Above plan discussed in detail with the patient, his family member and primary service  ID consult service will continue to follow while the patient is admitted  HISTORY OF PRESENT ILLNESS:  Reason for Consult:  E coli bacteremia with acalculous cholecystitis    HPI: Kg Singleton is a 63y o  year old male with complicated past medical history as below who was most recently in the hospital due to perforated marginal ulcer with peritonitis  He presented this time with right upper quadrant pain for 1 day along with chills  On prior admission he had undergone exploratory laparotomy  He ultimately had intraluminal stenting performed to deal with an ongoing leak  Given the expected slow recovery the patient was discharged NPO and on TPN for approximately 6 weeks  He required percutaneous drain placement by interventional Radiology 2 to intra-abdominal abscesses found after exploratory laparotomy  Three days prior to admission he underwent endoscopic removal of his stent along with left percutaneous drain removal given resolution of this collection  His right drain remained in place due to small residual cavity  He had also been receiving tube feeds via G-tube recently  He was noted on admission to have a white count to 32  Platelets were elevated  Creatinine elevated at 0 65  Patient was placed on broad-spectrum antibiotics and admitted  He was felt on surgical evaluation to have acalculous cholecystitis  IR was consulted for percutaneous drain placement    Blood cultures later returned positive and so PICC line was removed  Cholecystostomy tube was placed on 09/22  Patient appeared to be clinically improving over the course of yesterday  Patient was also seen by Nutrition given his tolerance of tube feeds he was not recommended for further TPN  Patient also underwent IR tube check for the right upper quadrant drain which was removed as there was no residual collection seen  At this time he remains afebrile and without leukocytosis  He remains on cefepime, vancomycin, Flagyl  Body fluid cultures and blood cultures with pan susceptible E coli  Imaging of the abdomen noted from today  Improvement in previously seen ileus on CT scan  Gallbladder on initial CT scan had shown increased wall thickening  There is residual pleural thickening noted on the CAT scan as well  Mild ascites was also noted  Previously seen perihepatic abscess had nearly resolved  Chest x-ray unremarkable on admission  Patient's other vitals are stable  Previous ID evaluations noted  Patient seems to have tolerated fluoroquinolone in the past   He is currently tolerating cefepime  He is documented as having anaphylaxis to penicillin  We are consulted at this time given this patient's presentation of acalculous cholecystitis with gram-negative bacteremia for further assistance in management  On evaluation,He denies having any nausea, vomiting, chest pain or shortness of breath  He is currently tolerating cefepime without acute issue  He reports significant improvement in his right upper quadrant pain  He now remains on tube feeds at goals  He has no other new symptoms to report  His family member is present and they will discuss potential penicillin desensitization as an outpatient  We reviewed the antibiotic choice that I am making and that cross-reactivity for this current antibiotic and penicillin is very low    The patient reports to me that his penicillin allergy was from when he was a child that he was rechallenge with a drug when he was 22 and he had similar symptoms  Since that time he has not tried to penicillin again  REVIEW OF SYSTEMS:  A complete 12 point system-based review of systems is negative other than that noted in the HPI      PAST MEDICAL HISTORY:  Past Medical History:   Diagnosis Date    Anesthesia     Pt wakes up during "almost every surgery"    Arthritis     Asthma     Bariatric surgery status     Cervical radiculopathy     Chemotherapy follow-up examination     Chronic pain     Chronic pain disorder     COPD (chronic obstructive pulmonary disease) (HCC)     Diabetes mellitus (HCC)     borderline "years ago"    Food allergy     clams    GERD (gastroesophageal reflux disease)     Heart murmur     Hiatal hernia     History of malignant neoplasm     History of pneumonia 04/12/2016    History of pulmonary embolism     History of pulmonary embolus (PE)     History of ulceration     Hyperlipidemia     Lung cancer (HCC)     left lung, radiation and chemo tx    Migraine     Pneumonia     Postgastrectomy malabsorption     Right leg DVT (HCC)     Right scapholunate ligament tear     Skipped heart beats     Wears partial dentures     upper and lower     Past Surgical History:   Procedure Laterality Date    BRONCHOSCOPY      COLONOSCOPY      FRACTURE SURGERY      femur right 1985    GASTRIC BYPASS LAPAROSCOPIC N/A 7/12/2017    Procedure: GASTRIC DIVERSION WITH [place]-EN-Y RECONSTRUCTION WITH  SHORT 60 cm LIMB;  Surgeon: Kimber Moeller MD;  [facility];  Service: Bariatrics    IR CHEST TUBE  8/9/2019    IR IMAGE GUIDED ASPIRATION / DRAINAGE  8/1/2019    IR PICC LINE  8/9/2019    IR TUBE PLACEMENT JOSAFAT  9/22/2019    KNEE ARTHROSCOPY Right     right shoulder    LAPAROTOMY N/A 7/28/2019    Procedure: LAPAROTOMY EXPLORATORY, WASHOUT OF SUCUS, REPAIR OF MARGINAL ULCER PERFORATION, ABD WASHOUT, INTRAOPERATIVE EGD, GI ASSISTED ENDOSCOPIC STENT PLACEMENT;  Surgeon: Ricardo Arevalo MD;  [facility];  Service: Tamea  Left     LYMPHADENECTOMY  05/22/2012    Dr Khai Hester ANT INTERBODY MIN DISCECTOMY, CERVICAL BELOW C2 N/A 10/17/2017    Procedure: C5-6, C6-7 ACDF WITH ALLOGRAFT (NEURO MONITORING); Surgeon: Daleen Bence, MD;  [facility];  Service: Orthopedics    OH COLONOSCOPY FLX DX W/COLLJ SPEC WHEN PFRMD N/A 4/14/2017    Procedure: COLONOSCOPY;  Surgeon: Ilsa Crawford MD;  [facility]: [facility];  Service: Gastroenterology    OH EGD TRANSORAL BIOPSY SINGLE/MULTIPLE N/A 1/9/2019    Procedure: ESOPHAGOGASTRODUODENOSCOPY (EGD); Surgeon: Luis Ward MD;  [facility]; Service: Bariatrics    OH ESOPHAGOGASTRODUODENOSCOPY TRANSORAL DIAGNOSTIC N/A 1/11/2017    Procedure: ESOPHAGOGASTRODUODENOSCOPY (EGD); Surgeon: Ilsa Crawford MD;  [facility];   Service: Gastroenterology    OH INCISE FINGER TENDON SHEATH Right 11/27/2018    Procedure: RELEASE TRIGGER FINGER long and index finger;  Surgeon: Jaclyn Maya MD;  [facility];  Service: Orthopedics    OH LAP, REPAIR PARAESOPHAGEAL HERNIA, INCL FUNDOPLASTY W/ MESH N/A 7/12/2017    Procedure: REPAIR HERNIA PARAESOPHAGEAL  LAPAROSCOPIC;  Surgeon: Luis Ward MD;  [facility];  Service: Bariatrics    OH LAP,DIAGNOSTIC ABDOMEN N/A 7/26/2019    Procedure: LAPAROSCOPY DIAGNOSTIC CONVERSION TO OPEN, REDUCTION AND REPAIR OF INTERNAL HERNIA, REPAIR SEROSAL TEARS;  Surgeon: Ricardo Arevalo MD;  [facility];  Service: Bariatrics    OH WRIST [place] Dine LIG Right 6/9/2016    Procedure: RELEASE CARPAL TUNNEL ENDOSCOPIC;  Surgeon: Jaclyn Maya MD;  [facility];  Service: Orthopedics    RECONSTRUCTION DISTAL RADIUS/ULNA JOINT (DRUJ) Right 9/6/2016    Procedure: WRIST SCAPHOLUNATE LIGAMENT RECONSTRUCTION WITH ECRB TENDON AUTOGRAPH , SPLINT APPLICATION ;  Surgeon: Claudia Baker MD;  [facility];  Service:     SHOULDER SURGERY      TONSILLECTOMY         FAMILY HISTORY:  Non-contributory    SOCIAL HISTORY:  Social History   Social History     Substance and Sexual Activity   Alcohol Use Not Currently    Frequency: Monthly or less    Drinks per session: 1 or 2    Binge frequency: Never    Comment: rarely     Social History     Substance and Sexual Activity   Drug Use No     Social History     Tobacco Use   Smoking Status Former Smoker    Packs/day: 0 50    Years: 48 00    Pack years: 24 00    Types: Cigarettes    Last attempt to quit: 2017    Years since quittin 4   Smokeless Tobacco Never Used       ALLERGIES:  Allergies   Allergen Reactions    Codeine Hives, Itching, Nausea Only, GI Intolerance and Vomiting    Hydrocodone Hives and GI Intolerance    Penicillins Anaphylaxis and Throat Swelling    Shellfish-Derived Products Nausea Only and Vomiting       MEDICATIONS:  All current active medications have been reviewed  PHYSICAL EXAM:  Temp:  [97 4 °F (36 3 °C)-97 9 °F (36 6 °C)] 97 9 °F (36 6 °C)  HR:  [68-88] 68  Resp:  [16-18] 16  BP: ()/(52-54) 110/53  SpO2:  [97 %-99 %] 98 %  Temp (24hrs), Av 7 °F (36 5 °C), Min:97 4 °F (36 3 °C), Max:97 9 °F (36 6 °C)  Current: Temperature: 97 9 °F (36 6 °C)    Intake/Output Summary (Last 24 hours) at 2019 1139  Last data filed at 2019 1008  Gross per 24 hour   Intake 900 ml   Output 125 ml   Net 775 ml       General Appearance:  Chronically ill-appearing, nontoxic, and in no distress; cachectic, patient is extremely underweight     Head:  Normocephalic, without obvious abnormality, atraumatic   Eyes:  Conjunctiva pink and sclera anicteric, both eyes   Nose: Nares normal, mucosa normal, no drainage   Throat: Oropharynx moist without lesions   Neck: Supple, symmetrical, no adenopathy, no tenderness/mass/nodules   Back:   Symmetric, no curvature, ROM normal, no CVA tenderness; no spinal or paraspinal muscle tenderness to palpation  Lungs:   Clear to auscultation bilaterally, respirations unlabored on room   Chest Wall:  No tenderness or deformity   Heart:  RRR; no murmur, rub or gallop   Abdomen:   Soft, non-tender, non-distended, positive bowel sounds; feeding tube in place without acute issues  Patient has some mild discomfort around his colostomy tube     Extremities: No cyanosis, clubbing or edema   Skin: No rashes or lesions  No draining wounds noted  Lymph nodes: Cervical, supraclavicular nodes normal   Neurologic: Alert and oriented times 3, extremity strength 5/5 and symmetric       LABS, IMAGING, & OTHER STUDIES:  Lab Results:  I have personally reviewed pertinent labs  Results from last 7 days   Lab Units 09/24/19  0435 09/23/19  0514 09/22/19  0451   WBC Thousand/uL 6 90 13 90* 30 32*   HEMOGLOBIN g/dL 6 8* 6 4* 7 3*   PLATELETS Thousands/uL 307 261 310     Results from last 7 days   Lab Units 09/24/19  0435  09/22/19  0451 09/21/19  1818   POTASSIUM mmol/L 3 1*   < > 3 7 3 8   CHLORIDE mmol/L 105   < > 102 100   CO2 mmol/L 27   < > 25 26   BUN mg/dL 9   < > 17 21   CREATININE mg/dL 0 48*   < > 0 54* 0 65   EGFR ml/min/1 73sq m 122   < > 117 108   CALCIUM mg/dL 7 7*   < > 8 2* 8 7   AST U/L  --   --  20 18   ALT U/L  --   --  20 26   ALK PHOS U/L  --   --  119* 133*    < > = values in this interval not displayed  Results from last 7 days   Lab Units 09/22/19  1155 09/21/19  1928 09/21/19  1917   BLOOD CULTURE   --  Escherichia coli* No Growth at 48 hrs  GRAM STAIN RESULT  No Polys or Bacteria seen Gram negative rods*  --    BODY FLUID CULTURE, STERILE  4+ Growth of Escherichia coli*  --   --        Imaging Studies:   I have personally reviewed pertinent imaging study reports and images in PACS  Other Studies:   I have personally reviewed pertinent reports

## 2019-09-24 NOTE — PROGRESS NOTES
Update on previous dietitian's note from today  Pt now discharging home today  He states he does not like Ensure shakes, including the Ensure max and won't drink them because they make him feel sick  He is eating more but doubtful this will meet his increased nutrition needs alone due to malnourished state  Pt have f/u outpatient appointment with bariatrics next week  I asked him to please record everything he eats and drinks via po and his bolus tube feeding amounts for 3-4 days and provide to Nissa Anguiano, bariatric outpatient dietitian, in order to determine how much tube feeding is needed  If tube feeding formula needs to be changed d/t intolerance please try Twocal HN  The above information was shared with the outpatient bariatric dietitian

## 2019-09-24 NOTE — DISCHARGE INSTR - AVS FIRST PAGE
your antibiotics from [address] in [address]   Continue bolus tube feeds at previous rate and continue management of bolus feedings with Lincoln Cervantes  Continue Antibiotics as prescribed  Crush the pill and hold tube feeds one hour before and one hour each dose of Ceftin     Flush your biliary tube with 10 cc of NS daily   Mild nausea is ok as long as you can drink fluids  Follow up with Dr Kellie Velázquez and your PCP within the next week

## 2019-09-24 NOTE — PROCEDURES
Procedures  Interventional Radiology Procedure Note    PATIENT NAME: Jus Pyle  : 1960  MRN: [id number]    Procedure:  Right upper quadrant drain check    Estimated Blood Loss:  None     Findings:  Resolved abscess cavity right upper quadrant  He has had minimal output  No residual collection seen on CT yesterday surrounding drain  Drain was removed      Specimens:  None    Complications:  None    Anesthesia:  None    Bandar Velasquez MD     Date: 2019  Time: 11:35 AM

## 2019-09-24 NOTE — DISCHARGE SUMMARY
Discharge Summary - Rashid Johnson 63 y o  male MRN: [id number]    Unit/Bed#: E5 -01 Encounter: [id number]      Admission Diagnosis: Acalculous cholecystitis     Post-Operative Diagnosis: Acalculous cholecystitis, s/p perc zac      See H & P for full details of admission    63year old male with history of gastric bypass well known to the bariatric surgery service  About 2 months he underwent a prolonged hospital stay due to an internal hernia and perforated marginal ulcer with peritonitis  At that time an intraluminal stent was placed endoscopically by GI and the G tube into the remnant was inserted  Patient was seen and examined prior to discharge  He also underwent percutaneous placement of intra-abdominal drains by an interventional radiology due to intra-abdominal abscesses after the exploratory laparotomy  Last week, patient underwent endoscopic removal of the stent and IR removed his left percutaneous drain due to resolution of the intra-abdominal cavity  The right drain remained in place due to a small residual cavity  Kye Campos presented to the ED on 9/21/19  due to increasing pain in the epigastrium which radiated to the RUQ as well as one episode of loose stool  Underwent full workup and admitted to bariatric service for acalculous cholecystitis  Broad spectrum abx were initiated and IR was consulted for percutaneous cholecystostomy tube  Infectious disease was consulted as blood cultures grew out E coli and blood and biliary drainage  Per ID, started patient on Ceftin 500 mg Q12h per peg tube, to be continued for another 4 days with last dose on the 28th, to give a total of 7 days on abx  Patient to hold tube feeds for one hour before and one hour after  Also continue daily 10 cc NS flushes of biliary tube  He will follow up in the office in 1 week  Provisions for Follow-Up Care:  See After Visit Summary/Discharge Instructions for information related to follow-up care and home orders  Disposition: Home, in stable condition  Planned Readmission: No    Discharge Medications:  See After Visit Summary/Discharge Instructions for reconciled discharge medications provided to patient and family  Post Operative instructions: Reviewed with patient and/or family      Signature:   Desiree Bynum PA-C  Date: 9/24/2019 Time: 3:28 PM

## 2019-09-24 NOTE — PROGRESS NOTES
PT's TF Jevity 1 2 was decreased today d/t bloating and distention  Pt currently receiving Jevity 1 2 @ 40ml q 4hrs (provides 288cal, 13g pro)  Pt tolerating pureed bariatrics but not consuming much of proteins for lunch and dinner meals, PT tolerating eggs in the morning  Will initiate calorie count, can trial ensure max, will order for lunch and dinner  Can consider switching TF to high dense calorie formula 2calNH @40ml q 4hrs (480cal, 20g pro)  Will continue to monitor po intake and TF tolerance

## 2019-09-24 NOTE — PROGRESS NOTES
Progress Note - Bariatric Surgery   [facility] 63 y o  male MRN: [id number]  Unit/Bed#: E5 -01 Encounter: [id number]      Subjective/Objective     SUBJECTIVE     Patient admitted with acute acalculous cholecystitis + bacteremia in the setting of prolonged NPO/TPN  Seen and examined at the bedside this AM   Pain in the right upper quadrant is now gone  Patient reports feeling much better  Tolerating full liquid diet well  Tube feeds were advanced to goal yesterday but were not well tolerated due to bloating and distention  Patient is having several loose bowel movements, likely related to tube feeds  Ambulating without assistance and using the incentive spirometer  OBJECTIVE    /53 (BP Location: Left arm)   Pulse 68   Temp 97 9 °F (36 6 °C) (Temporal)   Resp 16   SpO2 98%       Intake/Output Summary (Last 24 hours) at 9/24/2019 0806  Last data filed at 9/23/2019 1736  Gross per 24 hour   Intake 720 ml   Output 375 ml   Net 345 ml       Invasive Devices     Peripheral Intravenous Line            Peripheral IV 09/21/19 Left Arm 2 days    Peripheral IV 09/22/19 Proximal;Right;Ventral (anterior) Forearm 1 day          Drain            Gastrostomy/Enterostomy Gastrostomy 22 Fr  LUQ 57 days    Closed/Suction Drain Right;Lateral Abdomen Bulb 12 Fr  45 days    Closed/Suction Drain Right RUQ Other (Comment) 8 5 Fr  1 day                ROS: 10-point system completed  All negative except see HPI  Physical Exam    General Appearance:    Alert, cooperative, no distress, appears stated age   Head:    Normocephalic, without obvious abnormality, atraumatic   Lungs:     Respirations unlabored   Heart:    Regular rate and rhythm   Abdomen:    Soft nondistended  Benign abdomen with no peritoneal findings  Griggs sign negative  Right upper quadrant IR drain with scant serous output and COURTNEY bulb  IR perc zac drain in place draining scant bile     Extremities:   Extremities normal, atraumatic, no cyanosis or edema   Neurologic:  Incision:  Psych:   Normal strength and sensation    Clean, dry, and intact    Normal mood and affect       Lab, Imaging and other studies:  I have personally reviewed pertinent lab results  , CBC:   Lab Results   Component Value Date    WBC 6 90 09/24/2019    HGB 6 8 (LL) 09/24/2019    HCT 21 5 (L) 09/24/2019    MCV 94 09/24/2019     09/24/2019    MCH 29 8 09/24/2019    MCHC 31 6 09/24/2019    RDW 14 1 09/24/2019    MPV 9 5 09/24/2019    NRBC 0 09/24/2019   , CMP:   Lab Results   Component Value Date    SODIUM 136 09/24/2019    K 3 1 (L) 09/24/2019     09/24/2019    CO2 27 09/24/2019    BUN 9 09/24/2019    CREATININE 0 48 (L) 09/24/2019    CALCIUM 7 7 (L) 09/24/2019    EGFR 122 09/24/2019        VTE Mechanical Prophylaxis: sequential compression device + subq heparin    ASSESSMENT  63 y o  male, status post percutaneous cholecystostomy tube for acalculous cholecystitis with bacteremia  Clinically much improved  Due for IR tube check this morning from previous intra-abdominal collection  PLAN  1) KUB ordered and reviewed, no signs of small bowel distention  Advance to pureed  Will attempt tube boluses at 80 cc every 4 hours  2) GI/DVT prophylaxis w/SCDs + heparin  Encourage ambulation  3) Pain and nausea management  4) IV abx  Growing E coli and blood and biliary drainage  Will consult Infectious Disease for duration of antibiotics  5) IR tube check of previous right upper quadrant drain  6) Aggressive incentive spirometry  PT/OT for ambulation    Dispo: Anticipate discharge home this afternoon if oral antibiotic recommendations can be made        Plan of care was discussed with patient and patient's nurse  Care plan discussed with Dr Audie Aaron MD  Bariatric Surgery Fellow  9/24/2019  8:06 AM

## 2019-09-25 ENCOUNTER — TELEPHONE (OUTPATIENT)
Dept: BARIATRICS | Facility: CLINIC | Age: 59
End: 2019-09-25

## 2019-09-25 ENCOUNTER — TRANSITIONAL CARE MANAGEMENT (OUTPATIENT)
Dept: FAMILY MEDICINE CLINIC | Facility: CLINIC | Age: 59
End: 2019-09-25

## 2019-09-25 NOTE — TELEPHONE ENCOUNTER
Post d/c follow up phone call attempted  No answer obtained on  listed phone number  Generic message left requesting call back with an update on progress

## 2019-09-26 LAB — BACTERIA BLD CULT: NORMAL

## 2019-09-27 ENCOUNTER — OFFICE VISIT (OUTPATIENT)
Dept: FAMILY MEDICINE CLINIC | Facility: CLINIC | Age: 59
End: 2019-09-27
Payer: COMMERCIAL

## 2019-09-27 VITALS
WEIGHT: 118.4 LBS | OXYGEN SATURATION: 100 % | SYSTOLIC BLOOD PRESSURE: 108 MMHG | HEIGHT: 68 IN | HEART RATE: 85 BPM | DIASTOLIC BLOOD PRESSURE: 68 MMHG | TEMPERATURE: 98.2 F | BODY MASS INDEX: 17.95 KG/M2

## 2019-09-27 DIAGNOSIS — K80.00 ACUTE CALCULOUS CHOLECYSTITIS: Primary | ICD-10-CM

## 2019-09-27 DIAGNOSIS — R63.6 UNDERWEIGHT: ICD-10-CM

## 2019-09-27 DIAGNOSIS — Z00.00 ANNUAL PHYSICAL EXAM: Primary | ICD-10-CM

## 2019-09-27 DIAGNOSIS — Z00.00 ROUTINE ADULT HEALTH MAINTENANCE: Primary | ICD-10-CM

## 2019-09-27 LAB
ATRIAL RATE: 89 BPM
P AXIS: 61 DEGREES
PR INTERVAL: 106 MS
QRS AXIS: 75 DEGREES
QRSD INTERVAL: 128 MS
QT INTERVAL: 380 MS
QTC INTERVAL: 462 MS
T WAVE AXIS: 54 DEGREES
VENTRICULAR RATE: 89 BPM

## 2019-09-27 PROCEDURE — 93010 ELECTROCARDIOGRAM REPORT: CPT | Performed by: INTERNAL MEDICINE

## 2019-09-27 PROCEDURE — 99496 TRANSJ CARE MGMT HIGH F2F 7D: CPT | Performed by: FAMILY MEDICINE

## 2019-09-27 NOTE — PROGRESS NOTES
Assessment/Plan:    No problem-specific Assessment & Plan notes found for this encounter  Diagnoses and all orders for this visit:    Acute calculous cholecystitis  Comments:  follow up with surgeon    Underweight          PHQ-9 Depression Screening    PHQ-9:    Frequency of the following problems over the past two weeks:             TCM Call (since 8/27/2019)     Date and time call was made  9/25/2019  9:14 AM    Hospital care reviewed  Records reviewed    Patient was hospitialized at  [facility]     Date of Admission  09/21/19    Date of discharge  09/24/19    Diagnosis  Acute acalculous cholecystitis    Disposition  Home; Home health services    Were the patients medications reviewed and updated  Yes    Current Symptoms  None      TCM Call (since 8/27/2019)     Post hospital issues  None    Should patient be enrolled in anticoag monitoring? No    Scheduled for follow up? Yes    Did you obtain your prescribed medications  Yes    Do you need help managing your prescriptions or medications  No    Is transportation to your appointment needed  No    I have advised the patient to call PCP with any new or worsening symptoms  AS        BMI Counseling: Body mass index is 18 kg/m²  The BMI is below normal  Patient was advised to gain weight  Subjective:      Patient ID: Krystin Shaikh is a 63 y o  male  Hospital follow up for acute cholecystitis, pt had drains placed and is on abx, he is following up with his bariatric surgeon, pt states that he feels good, pt has an indwelling drain still      The following portions of the patient's history were reviewed and updated as appropriate: allergies, current medications, past family history, past medical history, past social history, past surgical history and problem list     Review of Systems   Constitutional: Negative for chills and fever  Respiratory: Negative for shortness of breath and wheezing      Cardiovascular: Negative for chest pain and palpitations  Objective:    /68 (BP Location: Left arm, Patient Position: Sitting, Cuff Size: Adult)   Pulse 85   Temp 98 2 °F (36 8 °C) (Tympanic)   Ht 5' 8" (1 727 m)   Wt 53 7 kg (118 lb 6 4 oz)   SpO2 100%   BMI 18 00 kg/m²      Physical Exam   Constitutional: He is oriented to person, place, and time  He appears well-developed and well-nourished  No distress  HENT:   Head: Normocephalic and atraumatic  Eyes: Pupils are equal, round, and reactive to light  Conjunctivae and EOM are normal  No scleral icterus  Neck: Normal range of motion  Neck supple  Cardiovascular: Normal rate, regular rhythm and normal heart sounds  No murmur heard  Pulmonary/Chest: Effort normal and breath sounds normal  No respiratory distress  He has no wheezes  He has no rales  Abdominal: Soft  Bowel sounds are normal  He exhibits no distension and no mass  There is no tenderness  There is no rebound and no guarding  Musculoskeletal: He exhibits no edema  Lymphadenopathy:     He has no cervical adenopathy  Neurological: He is alert and oriented to person, place, and time  Skin: Skin is warm and dry  He is not diaphoretic  Psychiatric: He has a normal mood and affect  His behavior is normal  Judgment and thought content normal    Nursing note and vitals reviewed

## 2019-10-01 ENCOUNTER — TELEPHONE (OUTPATIENT)
Dept: BARIATRICS | Facility: CLINIC | Age: 59
End: 2019-10-01

## 2019-10-01 ENCOUNTER — DOCUMENTATION (OUTPATIENT)
Dept: BARIATRICS | Facility: CLINIC | Age: 59
End: 2019-10-01

## 2019-10-01 DIAGNOSIS — Z93.1 G TUBE FEEDINGS (HCC): Primary | ICD-10-CM

## 2019-10-01 DIAGNOSIS — K91.2 POSTSURGICAL MALABSORPTION: ICD-10-CM

## 2019-10-01 DIAGNOSIS — E43 SEVERE PROTEIN-CALORIE MALNUTRITION (HCC): ICD-10-CM

## 2019-10-01 NOTE — PROGRESS NOTES
Called to assess tolerance of Jevity 1 2 and po intake  Patient had experienced some bloating inpatient  Recommendation if for Jevity 1 2 6 cartons per day  Jevity 1 2 provides 26 grams of fiber, which may be contributing to bloating  If patient is not tolerating Jevity 1 2 bolus, would recommend switching to TwoCal HN  Bolus of 4 cartons per day to provide 1900 kcal and 79 2 grams of protein - flush with 150 ml before and after each bolus  TwoCal HN plus recommended water flushes will provide 1872 ml water per day (35 ml/kg actual body weight )   Last weight documented in Epic 118 4 # ( 53 8 kg)  Provides 35 kcal /kg actual body weight   Provides 1 5 grams /kg actual body weight  Will consider adding prosource for additional protein, once to twice per day, depending on lab values  Waiting call back from wife to determine recommendations

## 2019-10-01 NOTE — TELEPHONE ENCOUNTER
Called and left message on both phone numbers   Called to assess patient tolerance of Jevity 1 2 bolus feeding  Is he able to tolerate 1 whole carton per bolus? Is he able to get 6 cartons in per day? Is he able to tolerate anything by mouth? Name and contact information left

## 2019-10-03 ENCOUNTER — OFFICE VISIT (OUTPATIENT)
Dept: BARIATRICS | Facility: CLINIC | Age: 59
End: 2019-10-03
Payer: COMMERCIAL

## 2019-10-03 VITALS
DIASTOLIC BLOOD PRESSURE: 58 MMHG | WEIGHT: 106 LBS | HEART RATE: 92 BPM | TEMPERATURE: 97.9 F | SYSTOLIC BLOOD PRESSURE: 110 MMHG | HEIGHT: 68 IN | BODY MASS INDEX: 16.06 KG/M2

## 2019-10-03 DIAGNOSIS — Z98.84 BARIATRIC SURGERY STATUS: ICD-10-CM

## 2019-10-03 DIAGNOSIS — S31.109D OPEN WOUND OF ABDOMINAL WALL, SUBSEQUENT ENCOUNTER: ICD-10-CM

## 2019-10-03 DIAGNOSIS — K45.8 INTERNAL HERNIA: ICD-10-CM

## 2019-10-03 DIAGNOSIS — K28.5 CHRONIC MARGINAL ULCER WITH PERFORATION (HCC): ICD-10-CM

## 2019-10-03 DIAGNOSIS — R10.9 ABDOMINAL PAIN: ICD-10-CM

## 2019-10-03 DIAGNOSIS — K65.1 INTRA-ABDOMINAL ABSCESS (HCC): ICD-10-CM

## 2019-10-03 PROCEDURE — 99215 OFFICE O/P EST HI 40 MIN: CPT | Performed by: SURGERY

## 2019-10-03 RX ORDER — PROCHLORPERAZINE MALEATE 5 MG/1
5 TABLET ORAL EVERY 6 HOURS PRN
Qty: 30 TABLET | Refills: 0 | Status: SHIPPED | OUTPATIENT
Start: 2019-10-03 | End: 2020-07-20

## 2019-10-03 RX ORDER — LANSOPRAZOLE 30 MG/1
30 CAPSULE, DELAYED RELEASE ORAL 2 TIMES DAILY
Qty: 180 CAPSULE | Refills: 0 | Status: SHIPPED | OUTPATIENT
Start: 2019-10-03 | End: 2020-08-05 | Stop reason: SDUPTHER

## 2019-10-03 RX ORDER — SUCRALFATE ORAL 1 G/10ML
1000 SUSPENSION ORAL
Qty: 420 ML | Refills: 0 | Status: SHIPPED | OUTPATIENT
Start: 2019-10-03 | End: 2019-10-15 | Stop reason: SDUPTHER

## 2019-10-03 NOTE — PROGRESS NOTES
Follow Up - Bariatric Surgery   Nanette Davis 63 y o  male MRN: [id number]  Unit/Bed#:  Encounter: [id number]            Subjective: doing well no complaints    Objective:         Lab, Imaging and other studies: I have personally reviewed pertinent labs  Family History: non-contributory    Meds/Allergies   all medications and allergies reviewed    Vitals: Blood pressure 110/58, pulse 92, temperature 97 9 °F (36 6 °C), temperature source Tympanic, height 5' 8" (1 727 m), weight 48 1 kg (106 lb)  ,Body mass index is 16 12 kg/m²  [unfilled]    Invasive Devices     Drain            Gastrostomy/Enterostomy Gastrostomy 22 Fr  LUQ 67 days    Closed/Suction Drain Right RUQ Other (Comment) 8 5 Fr  11 days                Physical Exam:     NAD  Abdomen soft non-tender, incisions well healed  No palpable hernias    Assessment/Plan:    patient is s/p percutaneous cholecystostomy tube  1  Follow-up in IR for tube study  2  Continue soft diet by mouth  3  Switch Carafate to p o  Liquid Carafate  4  Change Prevacid to twice daily  5  Refill on Compazine  6  Follow up in 3 month  7   Continue G-tube and increase calories to 2500 calories per day              Tessa Prado MD

## 2019-10-07 DIAGNOSIS — Z93.1 G TUBE FEEDINGS (HCC): Primary | ICD-10-CM

## 2019-10-07 DIAGNOSIS — K65.1 INTRA-ABDOMINAL ABSCESS (HCC): ICD-10-CM

## 2019-10-08 ENCOUNTER — TELEPHONE (OUTPATIENT)
Dept: BARIATRICS | Facility: CLINIC | Age: 59
End: 2019-10-08

## 2019-10-08 ENCOUNTER — DOCUMENTATION (OUTPATIENT)
Dept: BARIATRICS | Facility: CLINIC | Age: 59
End: 2019-10-08

## 2019-10-08 DIAGNOSIS — K28.5 CHRONIC MARGINAL ULCER WITH PERFORATION (HCC): ICD-10-CM

## 2019-10-08 DIAGNOSIS — Z93.1 G TUBE FEEDINGS (HCC): Primary | ICD-10-CM

## 2019-10-08 DIAGNOSIS — E43 SEVERE PROTEIN-CALORIE MALNUTRITION (HCC): ICD-10-CM

## 2019-10-08 DIAGNOSIS — K91.2 POSTSURGICAL MALABSORPTION: ICD-10-CM

## 2019-10-08 DIAGNOSIS — Z98.84 BARIATRIC SURGERY STATUS: ICD-10-CM

## 2019-10-08 NOTE — TELEPHONE ENCOUNTER
Called and spoke with patient  He is only tolerating the Jevity 1 2 ( 240 ml) three to four times per day  He is eating small amounts ( about 1/4 cup ) frequently throughout the day  Examples of what he is eating in small amounts:  Pizza, pasta, rice, mashed potatoes, chicken beef, pork , pretzels  He is eating about 1/4 cup four times per day  Reports increased bloating with eating and bolusing more than 4 cartons per day  Patient does not like any protein shakes  Reports that his wife is keeping a food log for him but is not home  I had previously left a message for his wife to call me to discuss on her cell phone  Patient agreed to try 2 Tigre HN for increased caloric intake, and will bolus four times per day      I will place order to [facility] and then follow up next Thursday or Friday concerning tolerance of new formula and po intake

## 2019-10-08 NOTE — PROGRESS NOTES
After discussion with Doris Reyes this morning, faxed over orders to change tube feeding to the following:  TwoCal HN bolus 4 times per day , flush with 150 ml before and after each bolus  Provides 1900 calories, 79 6 grams of protein and 1872 ml of water per day ( include water in formula and flushes ) 40 ml/kg body weight   Prosource No carb twice per day to provide 120 calories and 30 grams of protein ( 60 ml total )  For a total of 2020 kcal and 109 6 grams of protein 42 kcal /kg body weight, 2 28 grams protein per kg actual body weight  Last documented weight = 106 pounds 10/3/2019    Will continue to monitor    Patient is keeping written food log ( kept by his wife)

## 2019-10-09 ENCOUNTER — OFFICE VISIT (OUTPATIENT)
Dept: FAMILY MEDICINE CLINIC | Facility: CLINIC | Age: 59
End: 2019-10-09
Payer: COMMERCIAL

## 2019-10-09 VITALS
TEMPERATURE: 96.9 F | SYSTOLIC BLOOD PRESSURE: 112 MMHG | HEART RATE: 102 BPM | HEIGHT: 68 IN | OXYGEN SATURATION: 94 % | DIASTOLIC BLOOD PRESSURE: 78 MMHG | BODY MASS INDEX: 15.82 KG/M2 | WEIGHT: 104.4 LBS

## 2019-10-09 DIAGNOSIS — J01.00 ACUTE NON-RECURRENT MAXILLARY SINUSITIS: Primary | ICD-10-CM

## 2019-10-09 DIAGNOSIS — J20.9 ACUTE BRONCHITIS, UNSPECIFIED ORGANISM: ICD-10-CM

## 2019-10-09 PROCEDURE — 99213 OFFICE O/P EST LOW 20 MIN: CPT | Performed by: FAMILY MEDICINE

## 2019-10-09 PROCEDURE — 3008F BODY MASS INDEX DOCD: CPT | Performed by: FAMILY MEDICINE

## 2019-10-09 RX ORDER — AZITHROMYCIN 250 MG/1
TABLET, FILM COATED ORAL
Qty: 6 TABLET | Refills: 0 | Status: SHIPPED | OUTPATIENT
Start: 2019-10-09 | End: 2019-10-14

## 2019-10-09 NOTE — PROGRESS NOTES
Assessment/Plan:    No problem-specific Assessment & Plan notes found for this encounter  Diagnoses and all orders for this visit:    Acute non-recurrent maxillary sinusitis  -     azithromycin (ZITHROMAX) 250 mg tablet; Take 2 tablets day 1 and 1 tablet daily the next 4 days    Acute bronchitis, unspecified organism  -     azithromycin (ZITHROMAX) 250 mg tablet; Take 2 tablets day 1 and 1 tablet daily the next 4 days          PHQ-9 Depression Screening    PHQ-9:    Frequency of the following problems over the past two weeks:       Little interest or pleasure in doing things:  0 - not at all  Feeling down, depressed, or hopeless:  0 - not at all  PHQ-2 Score:  0            Subjective:      Patient ID: Yajaira Lozano is a 63 y o  male  Cough   This is a new problem  The current episode started in the past 7 days  The problem has been unchanged  The cough is productive of purulent sputum  Associated symptoms include nasal congestion, postnasal drip, rhinorrhea, shortness of breath and wheezing  Pertinent negatives include no chills or fever  He has tried nothing for the symptoms  The treatment provided no relief  The following portions of the patient's history were reviewed and updated as appropriate: allergies, current medications, past family history, past medical history, past social history, past surgical history and problem list     Review of Systems   Constitutional: Negative for chills and fever  HENT: Positive for postnasal drip and rhinorrhea  Respiratory: Positive for cough, shortness of breath and wheezing  Objective:    /78   Pulse 102   Temp (!) 96 9 °F (36 1 °C) (Tympanic)   Ht 5' 8" (1 727 m)   Wt 47 4 kg (104 lb 6 4 oz)   SpO2 94%   BMI 15 87 kg/m²      Physical Exam   Constitutional: He is oriented to person, place, and time  He appears well-developed and well-nourished  No distress  HENT:   Head: Normocephalic and atraumatic     Right Ear: Tympanic membrane, external ear and ear canal normal    Left Ear: Tympanic membrane, external ear and ear canal normal    Nose: Mucosal edema and rhinorrhea present  Mouth/Throat: Mucous membranes are normal  No oropharyngeal exudate  [place] OP   Eyes: Right eye exhibits no discharge  Left eye exhibits no discharge  Neck: Normal range of motion  Neck supple  Cardiovascular: Normal rate, regular rhythm and normal heart sounds  No murmur heard  Pulmonary/Chest: Effort normal  No stridor  No respiratory distress  He has no wheezes  He has rhonchi  He has no rales  He exhibits no tenderness  Lymphadenopathy:     He has no cervical adenopathy  Neurological: He is alert and oriented to person, place, and time  Skin: Skin is warm and dry  No rash noted  He is not diaphoretic  Psychiatric: He has a normal mood and affect  His behavior is normal  Judgment and thought content normal    Nursing note and vitals reviewed

## 2019-10-15 DIAGNOSIS — S31.109D OPEN WOUND OF ABDOMINAL WALL, SUBSEQUENT ENCOUNTER: ICD-10-CM

## 2019-10-15 DIAGNOSIS — R10.9 ABDOMINAL PAIN: ICD-10-CM

## 2019-10-15 DIAGNOSIS — K65.1 INTRA-ABDOMINAL ABSCESS (HCC): ICD-10-CM

## 2019-10-15 DIAGNOSIS — K45.8 INTERNAL HERNIA: ICD-10-CM

## 2019-10-15 DIAGNOSIS — Z98.84 BARIATRIC SURGERY STATUS: ICD-10-CM

## 2019-10-15 DIAGNOSIS — K28.5 CHRONIC MARGINAL ULCER WITH PERFORATION (HCC): ICD-10-CM

## 2019-10-15 RX ORDER — SUCRALFATE ORAL 1 G/10ML
1000 SUSPENSION ORAL
Qty: 420 ML | Refills: 1 | Status: SHIPPED | OUTPATIENT
Start: 2019-10-15 | End: 2020-01-03 | Stop reason: SDUPTHER

## 2019-10-17 ENCOUNTER — TELEPHONE (OUTPATIENT)
Dept: SURGERY | Facility: CLINIC | Age: 59
End: 2019-10-17

## 2019-10-17 ENCOUNTER — HOSPITAL ENCOUNTER (OUTPATIENT)
Dept: RADIOLOGY | Facility: HOSPITAL | Age: 59
Discharge: HOME | End: 2019-10-17
Admitting: PHYSICIAN ASSISTANT
Payer: COMMERCIAL

## 2019-10-17 ENCOUNTER — TELEPHONE (OUTPATIENT)
Dept: BARIATRICS | Facility: CLINIC | Age: 59
End: 2019-10-17

## 2019-10-17 DIAGNOSIS — K65.1 INTRA-ABDOMINAL ABSCESS (HCC): ICD-10-CM

## 2019-10-17 DIAGNOSIS — Z93.1 G TUBE FEEDINGS (HCC): ICD-10-CM

## 2019-10-17 PROCEDURE — 49424 ASSESS CYST CONTRAST INJECT: CPT

## 2019-10-17 PROCEDURE — 76080 X-RAY EXAM OF FISTULA: CPT

## 2019-10-17 PROCEDURE — 47531 INJECTION FOR CHOLANGIOGRAM: CPT | Performed by: STUDENT IN AN ORGANIZED HEALTH CARE EDUCATION/TRAINING PROGRAM

## 2019-10-17 RX ADMIN — IOHEXOL 5 ML: 350 INJECTION, SOLUTION INTRAVENOUS at 12:41

## 2019-10-17 NOTE — PROGRESS NOTES
Interventional Radiology Preprocedure Note    History/Indication for procedure:   Sebastian Duran is a 63 y o  male with a PMH of acute cholecystitis s/p 8 Fr zac tube placement on 9/22/19 who presents for zac tube check  Has been to gravity drainage since that time  No complaints  There were no vitals taken for this visit      Relevant past medical history:    Past Medical History:   Diagnosis Date    Anesthesia     Pt wakes up during "almost every surgery"    Arthritis     Asthma     Bariatric surgery status     Cervical radiculopathy     Chemotherapy follow-up examination     Chronic pain     Chronic pain disorder     COPD (chronic obstructive pulmonary disease) ([address] )     Diabetes mellitus ([address] )     borderline "years ago"    Food allergy     clams    GERD (gastroesophageal reflux disease)     Heart murmur     Hiatal hernia     History of malignant neoplasm     History of pneumonia 04/12/2016    History of pulmonary embolism     History of pulmonary embolus (PE)     History of ulceration     Hyperlipidemia     Lung cancer (HCC)     left lung, radiation and chemo tx    Migraine     Pneumonia     Postgastrectomy malabsorption     Right leg DVT (HCC)     Right scapholunate ligament tear     Skipped heart beats     Wears partial dentures     upper and lower     Patient Active Problem List   Diagnosis    Right scapholunate ligament tear    History of pulmonary embolism    Saucedo's esophagus without dysplasia    Cervical disc disorder with radiculopathy of cervicothoracic region    Cervical radiculopathy    S/P repair of paraesophageal hernia    Bariatric surgery status    Postsurgical malabsorption    Adenocarcinoma of lung ([address] )    Dyslipidemia    Reactive hypoglycemia    Vitamin D deficiency    Low vitamin B12 level    Generalized abdominal pain    Constipation    Internal hernia    Chronic marginal ulcer with perforation (HCC)    Severe protein-calorie malnutrition ([facility] )    Abnormal CT scan, lung    Coagulopathy ([facility])    Diet-controlled diabetes mellitus ([facility])    Electrolyte disturbance    Near syncope    On total parenteral nutrition (TPN)    G tube feedings ([facility])       Medications:    Inpatient Medications:     Scheduled Medications:      Infusions:    No current facility-administered medications for this encounter  PRN:      Outpatient Medications:  Current Outpatient Medications on File Prior to Encounter   Medication Sig Dispense Refill    acetaminophen (TYLENOL 8 HOUR) 650 mg CR tablet Take 1 tablet (650 mg total) by mouth every 8 (eight) hours 15 tablet 0    albuterol (PROVENTIL HFA,VENTOLIN HFA) 90 mcg/act inhaler Inhale 2 puffs 4 (four) times a day  0    diphenhydrAMINE (BENADRYL) 50 mg/mL Infuse 0 5 mL (25 mg total) into a venous catheter every 6 (six) hours as needed for itching or sleep 10 mL 0    lansoprazole (PREVACID) 30 mg capsule Take 1 capsule (30 mg total) by mouth 2 (two) times a day 180 capsule 0    metoclopramide (REGLAN) 10 mg tablet Take 10 mg by mouth 4 (four) times a day      oxyCODONE (OXY-IR) 5 MG capsule Take 5 mg by mouth every 4 (four) hours as needed for moderate pain      oxyCODONE (ROXICODONE) 5 mg immediate release tablet Take 1 tablet (5 mg total) by mouth every 4 (four) hours as needed for moderate painMax Daily Amount: 30 mg (Patient not taking: Reported on 10/3/2019) 10 tablet 0    prochlorperazine (COMPAZINE) 5 mg tablet Take 1 tablet (5 mg total) by mouth every 6 (six) hours as needed for nausea or vomiting 30 tablet 0    sucralfate (CARAFATE) 1 g/10 mL suspension Take 10 mL (1,000 mg total) by mouth 4 (four) times a day (before meals and at bedtime) 420 mL 1     No current facility-administered medications on file prior to encounter          Allergies   Allergen Reactions    Codeine Hives, Itching, Nausea Only, GI Intolerance and Vomiting    Hydrocodone Hives and GI Intolerance    Penicillins Anaphylaxis and Throat Swelling     Patient's allergy is from when he was 22years old  He has tolerated cefepime on 09/2019 admission  Given ceftin on discharge   Shellfish-Derived Products Nausea Only and Vomiting       Anticoagulants: none    Labs:   CBC with diff: Lab Results   Component Value Date    WBC 6 90 09/24/2019    HGB 6 8 (LL) 09/24/2019    HCT 21 5 (L) 09/24/2019    MCV 94 09/24/2019     09/24/2019    MCH 29 8 09/24/2019    MCHC 31 6 09/24/2019    RDW 14 1 09/24/2019    MPV 9 5 09/24/2019    NRBC 0 09/24/2019     BMP/CMP:  Lab Results   Component Value Date     01/04/2016    K 3 1 (L) 09/24/2019    K 4 4 01/04/2016     09/24/2019     01/04/2016    CO2 27 09/24/2019    CO2 29 5 01/04/2016    ANIONGAP 9 01/04/2016    BUN 9 09/24/2019    BUN 18 01/04/2016    CREATININE 0 48 (L) 09/24/2019    CREATININE 1 07 01/04/2016    GLUCOSE 90 01/04/2016    CALCIUM 7 7 (L) 09/24/2019    CALCIUM 8 7 01/04/2016    AST 20 09/22/2019    AST 16 01/04/2016    ALT 20 09/22/2019    ALT 25 01/04/2016    ALKPHOS 119 (H) 09/22/2019    ALKPHOS 81 01/04/2016    PROT 7 7 01/04/2016    BILITOT 0 29 01/04/2016    EGFR 122 09/24/2019   ,     Coags:   Lab Results   Component Value Date    PTT 35 08/09/2019    PTT 28 11/25/2013    INR 1 57 (H) 09/22/2019    INR 1 55 (H) 02/21/2014   ,          Relevant imaging studies:   Reviewed  Directed physical examination:  I agree with the physical exam performed on 10/9/19 and there are no additional changes  Assessment/Plan: For tube check and/or change  Sedation/Anesthesia plan:  Local sedation will be used as needed for procedure  Consent with alternatives to the procedure, risks and benefits have been explained and discussed with the patient/patient's family: Continuation of care

## 2019-10-17 NOTE — TELEPHONE ENCOUNTER
Received a call from IR Department asking to set up an appointment with our office (General Surgery)patient had a drained placed at [facility] and wanted him to f/u w/ us  I just received a call from from his spouse Art Garcia and she stated that Dr Laurel Smith does not want him to have the surgery

## 2019-10-17 NOTE — SEDATION DOCUMENTATION
RUQ drainage tube check completed in IR by Dr Albin Steen without complication  Tube to remain in and be capped  Continue with 10ml NSS flush daily  Patient to see surgeon in next two weeks  (RN to contact office )  Patient to return to IR for tube check in two months if tube still in  (Emailed [employer] schedulers )  Patient and wife verbalized understanding  Clinical references provided

## 2019-10-17 NOTE — DISCHARGE INSTRUCTIONS
TUBE CARE INSTRUCTIONS    Care after your procedure:    Resume your normal diet  Small sips of flat soda will help with nausea  1  The properly functioning catheter should be forward flushed once (1x) daily with 10ml of normal saline using clean technique  You will be given a prescription for flushes  To flush the tube, clean both connections with alcohol swab  Twist off the drainage bag/ bulb  tubing and twist the saline syringe into the drainage tube and flush  Remove the syringe and twist the drainage bag / bulb tubing tubing back on     2  The drainage bag/bulb may be emptied as necessary  Keep a record of the amount of fluid you drain from your tube  This should be done with clean technique as well  3  A fresh dressing should be applied daily over the tube insertion site  4  As the tube is secured to the skin with only a suture,try not to pull on your tube  Tub baths are not permitted  Showers are permitted if the patient's skin entry site is prevented from getting wet  Similarly, washcloth "baths" are acceptable  Contact Interventional Radiology at [phone] [place] PATIENTS: Contact Interventional Radiology at [phone]) Raynard Gilford PATIENTS: Contact Interventional Radiology at [phone]) if:    1  Leakage or large amounts of liquid around the catheter  2  Fever of 101 degrees lasting several hours without other obvious cause (such as sore throat, flu, etc)  3  Persistent nausea or vomiting  4  Diminished drainage, which may be associated with pressure or pain  Or when the     drainage from your tube is less than 10mls for 48 hours  5  Catheter pulled back or falls out  The following pharmacies carry the flush syringes         [facility]                     [facility]  [address]                         [address] [facility]  Phone [phone]            Phone [phone]   [place] [address]                                [phone]  [address] [facility] [address]                      [address] [place]  Phone [phone]            Phone [phone]                      Luciana Boggs                                                                                                          [phone]  [facility]  [facility]    [address]  Phone [phone]

## 2019-10-17 NOTE — TELEPHONE ENCOUNTER
I/R called  Pt was supposed to get gallbladder surgery over the Summer, but because pt had other surgeries, it was decided to wait  A drain was placed by I/R  I/R is now requested a consult for gallbladder surgery for pt  [facility] to set up appt with Dr Shannan Troy in [facility]

## 2019-10-17 NOTE — TELEPHONE ENCOUNTER
Patient's wife called and said he had his drain study done today and they got a call from a general surgeon to set a consult to have his gallbladder removed  Patient and his wife were both concerned as they thought Dr Theron Zurita did not plan on having the gallbladder taken out at this time  Spoke to Dr Theron Zurita and he said absolutely not  He said the patient is high risk and should not be having it removed at this time  Patient's wife reported the tube was capped today and the plan was to remove the tube when the gallbladder surgery was done  Dr Theron Zurita said they will do a tube trail and if the patient tolerates the tube being capped they will be able to remove the tube  If not they will reattach the drain  I explained all that to the wife  She understood and said she they have a drain bag incase he develops stomach pain  She also said they have a follow up appointment in 2 months

## 2019-10-17 NOTE — BRIEF OP NOTE (RAD/CATH)
IR TUBE CHECK  Procedure Note    PATIENT NAME: Carol Ann Pierson  : 1960  MRN: [id number]     Pre-op Diagnosis:   1  G tube feedings ([address] )    2  Intra-abdominal abscess (HCC)      Post-op Diagnosis:   1  G tube feedings ([address] )    2  Intra-abdominal abscess [facility])        Surgeon:   Nicola Espitia MD  Assistants:     No qualified resident was available, Resident is only observing    Estimated Blood Loss: 0 ml  Findings:   Cholecystogram showed the transperitoneal zac tube to be within the [address]  The biliary tree was patent  Small subocclusive cystic duct stone  Tube capped  Pt instructed to follow up with surgery for elective cholecystectomy  Specimens: None  Complications:  None      Anesthesia: None    Nicola Espitia MD     Date: 10/17/2019  Time: 12:52 PM

## 2019-10-24 ENCOUNTER — TELEPHONE (OUTPATIENT)
Dept: BARIATRICS | Facility: CLINIC | Age: 59
End: 2019-10-24

## 2019-10-24 NOTE — TELEPHONE ENCOUNTER
Called to follow up concerning tolerance of 2 Tigre HN and spoke with patient's wife, Herson Moeller  Delivery of 2 Tigre HN never occurred, instead received 15 cases of Prosource  Patient has been bolusing 4 cartons of Jevity 1 2 per day plus 30 ml of prosource twice per day  Provides 1260 kcal and 82 8 grams of protein  patient's wife was keeping a food log but stopped when she went away  Has left multiple phone calls for the Prosource to be picked up and 2 Tigre HN to be delivered  I told her that I would call and follow up to have the correct product delivered and the Prosource picked up      Scheduled f/u appointment for 11/20/2019 with RD for weight check    Patient's last weight 104#

## 2019-10-29 ENCOUNTER — TELEPHONE (OUTPATIENT)
Dept: BARIATRICS | Facility: CLINIC | Age: 59
End: 2019-10-29

## 2019-10-29 NOTE — TELEPHONE ENCOUNTER
Called and spoke with Eduardo's wife  She was previously only bolusing 45 ml of Jevity 1 2 4 times per day   2 Tigre HN came last Friday - she has been giving 30 ml 4 x per day without incident  Asked patient why she was only giving one ounce 4 times per day and she stated she was concerned he would have bloating/discomfort  Explained that the bolus is going into Eduardo's remnanat stomach and that his stomach can hold more than one ounce at a time  She is getting ready to bolus again at 4:30 pm - wanted to increase to 60 ml, instructed patient to bolus 120 ml and assess tolerance  Plan is to reach goal by end of the week - 240 ml QID with 30 ml Prosource No carb twice per day  Explained the importance of increased protein and calories for weight gain  His wife verbalized understanding of importance of increased intake of enteral feeding  Also discuss po  Intake, she stated he is able to tolerate about 1/4 cup of food, he prefers pasta  Will f/u to assess tolerance of 2 Tigre HN and continuing to increase to goal     Wife verbalized understanding

## 2019-10-30 ENCOUNTER — TELEPHONE (OUTPATIENT)
Dept: BARIATRICS | Facility: CLINIC | Age: 59
End: 2019-10-30

## 2019-10-30 NOTE — TELEPHONE ENCOUNTER
Spoke with Radha Cortés  His wife did bolus 120 ml ( 4 ounces ) of the 2 Tigre HN last night today  He experienced some bloating and nausea, took zofran and it did go away  Explained the importance of increasing his calories for healing and repletion of his malnutrition  Explained that I would like him to continue to take the 120 ml and see if his body begins to tolerate more  Patient was agreeable  Will continue with 120 ml 4 times per day  Patient reports that he does eat some po - but it still hurts and he gets nauseas  Will f/u on Monday  If patient tolerating 120 ml, will increase to 240 ml four times per day  If patient continues to find bloating and nausea, will switch to semi elemental formula to see if he tolerates better  Patient agreeable to above plan     Will contact the office with any issues or concerns

## 2019-11-04 ENCOUNTER — APPOINTMENT (OUTPATIENT)
Dept: LAB | Facility: CLINIC | Age: 59
End: 2019-11-04
Payer: COMMERCIAL

## 2019-11-04 ENCOUNTER — TELEPHONE (OUTPATIENT)
Dept: BARIATRICS | Facility: CLINIC | Age: 59
End: 2019-11-04

## 2019-11-04 DIAGNOSIS — Z00.00 ANNUAL PHYSICAL EXAM: ICD-10-CM

## 2019-11-04 LAB
ALBUMIN SERPL BCP-MCNC: 3.2 G/DL (ref 3.5–5)
ALP SERPL-CCNC: 127 U/L (ref 46–116)
ALT SERPL W P-5'-P-CCNC: 19 U/L (ref 12–78)
ANION GAP SERPL CALCULATED.3IONS-SCNC: 4 MMOL/L (ref 4–13)
AST SERPL W P-5'-P-CCNC: 17 U/L (ref 5–45)
BACTERIA UR QL AUTO: ABNORMAL /HPF
BASOPHILS # BLD AUTO: 0.09 THOUSANDS/ΜL (ref 0–0.1)
BASOPHILS NFR BLD AUTO: 1 % (ref 0–1)
BILIRUB SERPL-MCNC: 0.28 MG/DL (ref 0.2–1)
BILIRUB UR QL STRIP: ABNORMAL
BUN SERPL-MCNC: 13 MG/DL (ref 5–25)
CALCIUM SERPL-MCNC: 9.2 MG/DL (ref 8.3–10.1)
CAOX CRY URNS QL MICRO: ABNORMAL /HPF
CHLORIDE SERPL-SCNC: 103 MMOL/L (ref 100–108)
CHOLEST SERPL-MCNC: 220 MG/DL (ref 50–200)
CLARITY UR: ABNORMAL
CO2 SERPL-SCNC: 29 MMOL/L (ref 21–32)
COLOR UR: ABNORMAL
CREAT SERPL-MCNC: 0.66 MG/DL (ref 0.6–1.3)
EOSINOPHIL # BLD AUTO: 0.21 THOUSAND/ΜL (ref 0–0.61)
EOSINOPHIL NFR BLD AUTO: 3 % (ref 0–6)
ERYTHROCYTE [DISTWIDTH] IN BLOOD BY AUTOMATED COUNT: 14.4 % (ref 11.6–15.1)
EST. AVERAGE GLUCOSE BLD GHB EST-MCNC: 88 MG/DL
GFR SERPL CREATININE-BSD FRML MDRD: 107 ML/MIN/1.73SQ M
GLUCOSE P FAST SERPL-MCNC: 91 MG/DL (ref 65–99)
GLUCOSE UR STRIP-MCNC: ABNORMAL MG/DL
HBA1C MFR BLD: 4.7 % (ref 4.2–6.3)
HCT VFR BLD AUTO: 38.5 % (ref 36.5–49.3)
HDLC SERPL-MCNC: 50 MG/DL
HGB BLD-MCNC: 11.5 G/DL (ref 12–17)
HGB UR QL STRIP.AUTO: NEGATIVE
IMM GRANULOCYTES # BLD AUTO: 0.03 THOUSAND/UL (ref 0–0.2)
IMM GRANULOCYTES NFR BLD AUTO: 0 % (ref 0–2)
KETONES UR STRIP-MCNC: ABNORMAL MG/DL
LDLC SERPL CALC-MCNC: 152 MG/DL (ref 0–100)
LEUKOCYTE ESTERASE UR QL STRIP: ABNORMAL
LYMPHOCYTES # BLD AUTO: 2.8 THOUSANDS/ΜL (ref 0.6–4.47)
LYMPHOCYTES NFR BLD AUTO: 39 % (ref 14–44)
MCH RBC QN AUTO: 24.8 PG (ref 26.8–34.3)
MCHC RBC AUTO-ENTMCNC: 29.9 G/DL (ref 31.4–37.4)
MCV RBC AUTO: 83 FL (ref 82–98)
MONOCYTES # BLD AUTO: 0.6 THOUSAND/ΜL (ref 0.17–1.22)
MONOCYTES NFR BLD AUTO: 8 % (ref 4–12)
NEUTROPHILS # BLD AUTO: 3.51 THOUSANDS/ΜL (ref 1.85–7.62)
NEUTS SEG NFR BLD AUTO: 49 % (ref 43–75)
NITRITE UR QL STRIP: POSITIVE
NON-SQ EPI CELLS URNS QL MICRO: ABNORMAL /HPF
NONHDLC SERPL-MCNC: 170 MG/DL
NRBC BLD AUTO-RTO: 0 /100 WBCS
PH UR STRIP.AUTO: 6.5 [PH]
PLATELET # BLD AUTO: 530 THOUSANDS/UL (ref 149–390)
PMV BLD AUTO: 9.3 FL (ref 8.9–12.7)
POTASSIUM SERPL-SCNC: 4.3 MMOL/L (ref 3.5–5.3)
PROT SERPL-MCNC: 7.6 G/DL (ref 6.4–8.2)
PROT UR STRIP-MCNC: ABNORMAL MG/DL
PSA SERPL-MCNC: 0.9 NG/ML (ref 0–4)
RBC # BLD AUTO: 4.63 MILLION/UL (ref 3.88–5.62)
RBC #/AREA URNS AUTO: ABNORMAL /HPF
SODIUM SERPL-SCNC: 136 MMOL/L (ref 136–145)
SP GR UR STRIP.AUTO: 1.03 (ref 1–1.03)
TRIGL SERPL-MCNC: 90 MG/DL
TSH SERPL DL<=0.05 MIU/L-ACNC: 1.12 UIU/ML (ref 0.36–3.74)
UROBILINOGEN UR QL STRIP.AUTO: 1 E.U./DL
WBC # BLD AUTO: 7.24 THOUSAND/UL (ref 4.31–10.16)
WBC #/AREA URNS AUTO: ABNORMAL /HPF

## 2019-11-04 PROCEDURE — 81001 URINALYSIS AUTO W/SCOPE: CPT

## 2019-11-04 PROCEDURE — 85025 COMPLETE CBC W/AUTO DIFF WBC: CPT

## 2019-11-04 PROCEDURE — G0103 PSA SCREENING: HCPCS

## 2019-11-04 PROCEDURE — 80053 COMPREHEN METABOLIC PANEL: CPT

## 2019-11-04 PROCEDURE — 84443 ASSAY THYROID STIM HORMONE: CPT

## 2019-11-04 PROCEDURE — 36415 COLL VENOUS BLD VENIPUNCTURE: CPT

## 2019-11-04 PROCEDURE — 83036 HEMOGLOBIN GLYCOSYLATED A1C: CPT

## 2019-11-04 PROCEDURE — 80061 LIPID PANEL: CPT

## 2019-11-04 NOTE — TELEPHONE ENCOUNTER
Spoke with patient's wife  He has been taking 120 ml of the 2 Tigre HN four times per day  Reports increased pain/discomfort  Had a couple of episodes of vomiting, reports pain is better  Asked if he vomiting tube feeding ( not likely as fed into remnant stomach ) or the food that he eat, but patient did not remember  He took a pain pill and is feeling better  Patient thinks increased pain came with increasing bolus tube feedings  Asked patient to hold tube feeding for one day - continue with po  If pain goes away, will order elemental formula to see if better tolerated  Patient's wife verbalized understanding  Will f/u on Tuesday or Wednesday concerning intake      If patient gets worse, or continues even with holding enteral feedings, patient will call Dr Hunter Wells

## 2019-11-05 ENCOUNTER — TELEPHONE (OUTPATIENT)
Dept: FAMILY MEDICINE CLINIC | Facility: CLINIC | Age: 59
End: 2019-11-05

## 2019-11-06 ENCOUNTER — OFFICE VISIT (OUTPATIENT)
Dept: FAMILY MEDICINE CLINIC | Facility: CLINIC | Age: 59
End: 2019-11-06
Payer: COMMERCIAL

## 2019-11-06 ENCOUNTER — HOSPITAL ENCOUNTER (OUTPATIENT)
Dept: ULTRASOUND IMAGING | Facility: HOSPITAL | Age: 59
Discharge: HOME | End: 2019-11-06
Payer: COMMERCIAL

## 2019-11-06 VITALS
BODY MASS INDEX: 16.67 KG/M2 | WEIGHT: 110 LBS | DIASTOLIC BLOOD PRESSURE: 70 MMHG | HEIGHT: 68 IN | TEMPERATURE: 97.5 F | SYSTOLIC BLOOD PRESSURE: 102 MMHG

## 2019-11-06 DIAGNOSIS — R63.4 WEIGHT LOSS: ICD-10-CM

## 2019-11-06 DIAGNOSIS — R10.11 RIGHT UPPER QUADRANT ABDOMINAL PAIN: ICD-10-CM

## 2019-11-06 DIAGNOSIS — K22.70 BARRETT'S ESOPHAGUS WITHOUT DYSPLASIA: ICD-10-CM

## 2019-11-06 DIAGNOSIS — Z23 ENCOUNTER FOR IMMUNIZATION: ICD-10-CM

## 2019-11-06 DIAGNOSIS — Z00.00 MEDICARE ANNUAL WELLNESS VISIT, SUBSEQUENT: Primary | ICD-10-CM

## 2019-11-06 PROCEDURE — 99214 OFFICE O/P EST MOD 30 MIN: CPT | Performed by: FAMILY MEDICINE

## 2019-11-06 PROCEDURE — 76700 US EXAM ABDOM COMPLETE: CPT

## 2019-11-06 PROCEDURE — 90682 RIV4 VACC RECOMBINANT DNA IM: CPT

## 2019-11-06 PROCEDURE — G0008 ADMIN INFLUENZA VIRUS VAC: HCPCS

## 2019-11-06 PROCEDURE — G0439 PPPS, SUBSEQ VISIT: HCPCS | Performed by: FAMILY MEDICINE

## 2019-11-06 NOTE — PATIENT INSTRUCTIONS
Medicare Preventive Visit Patient Instructions  Thank you for completing your Welcome to Medicare Visit or Medicare Annual Wellness Visit today  Your next wellness visit will be due in one year (11/6/2020)  The screening/preventive services that you may require over the next 5-10 years are detailed below  Some tests may not apply to you based off risk factors and/or age  Screening tests ordered at today's visit but not completed yet may show as past due  Also, please note that scanned in results may not display below  Preventive Screenings:  Service Recommendations Previous Testing/Comments   Colorectal Cancer Screening  · Colonoscopy    · Fecal Occult Blood Test (FOBT)/Fecal Immunochemical Test (FIT)  · Fecal DNA/Cologuard Test  · Flexible Sigmoidoscopy Age: 54-65 years old   Colonoscopy: every 10 years (May be performed more frequently if at higher risk)  OR  FOBT/FIT: every 1 year  OR  Cologuard: every 3 years  OR  Sigmoidoscopy: every 5 years  Screening may be recommended earlier than age 48 if at higher risk for colorectal cancer  Also, an individualized decision between you and your healthcare provider will decide whether screening between the ages of 74-80 would be appropriate   Colonoscopy: 04/14/2017  FOBT/FIT: Not on file  Cologuard: Not on file  Sigmoidoscopy: Not on file    Screening Current     Prostate Cancer Screening Individualized decision between patient and health care provider in men between ages of 53-78   Medicare will cover every 12 months beginning on the day after your 50th birthday PSA: 0 9 ng/mL     Screening Current     Hepatitis C Screening Once for adults born between 1945 and 1965  More frequently in patients at high risk for Hepatitis C Hep C Antibody: Not on file       Diabetes Screening 1-2 times per year if you're at risk for diabetes or have pre-diabetes Fasting glucose: 91 mg/dL   A1C: 4 7 %    Screening Not Indicated  History Diabetes   Cholesterol Screening Once every 5 years if you don't have a lipid disorder  May order more often based on risk factors  Lipid panel: 11/04/2019    Screening Not Indicated  History Lipid Disorder      Other Preventive Screenings Covered by Medicare:  1  Abdominal Aortic Aneurysm (AAA) Screening: covered once if your at risk  You're considered to be at risk if you have a family history of AAA or a male between the age of 73-68 who smoking at least 100 cigarettes in your lifetime  2  Lung Cancer Screening: covers low dose CT scan once per year if you meet all of the following conditions: (1) Age 50-69; (2) No signs or symptoms of lung cancer; (3) Current smoker or have quit smoking within the last 15 years; (4) You have a tobacco smoking history of at least 30 pack years (packs per day x number of years you smoked); (5) You get a written order from a healthcare provider  3  Glaucoma Screening: covered annually if you're considered high risk: (1) You have diabetes OR (2) Family history of glaucoma OR (3)  aged 48 and older OR (3)  American aged 72 and older  3  Osteoporosis Screening: covered every 2 years if you meet one of the following conditions: (1) Have a vertebral abnormality; (2) On glucocorticoid therapy for more than 3 months; (3) Have primary hyperparathyroidism; (4) On osteoporosis medications and need to assess response to drug therapy  5  HIV Screening: covered annually if you're between the age of 12-76  Also covered annually if you are younger than 13 and older than 72 with risk factors for HIV infection  For pregnant patients, it is covered up to 3 times per pregnancy      Immunizations:  Immunization Recommendations   Influenza Vaccine Annual influenza vaccination during flu season is recommended for all persons aged >= 6 months who do not have contraindications   Pneumococcal Vaccine (Prevnar and Pneumovax)  * Prevnar = PCV13  * Pneumovax = PPSV23 Adults 25-60 years old: 1-3 doses may be recommended based on certain risk factors  Adults 72 years old: Prevnar (PCV13) vaccine recommended followed by Pneumovax (PPSV23) vaccine  If already received PPSV23 since turning 65, then PCV13 recommended at least one year after PPSV23 dose  Hepatitis B Vaccine 3 dose series if at intermediate or high risk (ex: diabetes, end stage renal disease, liver disease)   Tetanus (Td) Vaccine - COST NOT COVERED BY MEDICARE PART B Following completion of primary series, a booster dose should be given every 10 years to maintain immunity against tetanus  Td may also be given as tetanus wound prophylaxis  Tdap Vaccine - COST NOT COVERED BY MEDICARE PART B Recommended at least once for all adults  For pregnant patients, recommended with each pregnancy  Shingles Vaccine (Shingrix) - COST NOT COVERED BY MEDICARE PART B  2 shot series recommended in those aged 48 and above     Health Maintenance Due:      Topic Date Due    Hepatitis C Screening  1960    CRC Screening: Colonoscopy  04/14/2027     Immunizations Due:      Topic Date Due    DTaP,Tdap,and Td Vaccines (1 - Tdap) 11/05/1982    HEPATITIS B VACCINES (2 of 3 - Hep B Twinrix risk 3-dose series) 03/02/2004    Pneumococcal Vaccine: Pediatrics (0 to 5 Years) and At-Risk Patients (6 to 59 Years) (2 of 3 - PPSV23) 10/25/2018    INFLUENZA VACCINE  07/01/2019     Advance Directives   What are advance directives? Advance directives are legal documents that state your wishes and plans for medical care  These plans are made ahead of time in case you lose your ability to make decisions for yourself  Advance directives can apply to any medical decision, such as the treatments you want, and if you want to donate organs  What are the types of advance directives? There are many types of advance directives, and each state has rules about how to use them  You may choose a combination of any of the following:  · Living will: This is a written record of the treatment you want   You can also choose which treatments you do not want, which to limit, and which to stop at a certain time  This includes surgery, medicine, IV fluid, and tube feedings  · Durable power of  for healthcare [facility]): This is a written record that states who you want to make healthcare choices for you when you are unable to make them for yourself  This person, called a proxy, is usually a family member or a friend  You may choose more than 1 proxy  · Do not resuscitate (DNR) order:  A DNR order is used in case your heart stops beating or you stop breathing  It is a request not to have certain forms of treatment, such as CPR  A DNR order may be included in other types of advance directives  · Medical directive: This covers the care that you want if you are in a coma, near death, or unable to make decisions for yourself  You can list the treatments you want for each condition  Treatment may include pain medicine, surgery, blood transfusions, dialysis, IV or tube feedings, and a ventilator (breathing machine)  · Values history: This document has questions about your views, beliefs, and how you feel and think about life  This information can help others choose the care that you would choose  Why are advance directives important? An advance directive helps you control your care  Although spoken wishes may be used, it is better to have your wishes written down  Spoken wishes can be misunderstood, or not followed  Treatments may be given even if you do not want them  An advance directive may make it easier for your family to make difficult choices about your care  Underweight  Underweight is defined as having a body mass index (BMI) of less than 18 5 kg/m2   Anorexia  is a loss of appetite, decreased food intake, or both  Your appetite naturally decreases as you get older  You also get full faster than you used to  This occurs because your body needs less energy  Other body changes can also lead to a decreased appetite  Even though some appetite loss is normal, you still need to get enough calories and nutrients to keep you healthy  You can start to lose too much weight if you do not eat as much food as your body needs  Unwanted weight loss can cause health problems, or worsen health problems you already have  You can also become dehydrated if you do not drink enough liquid  How to eat healthy and get enough nutrients:   · Choose healthy foods  Eat a variety of fruits, vegetables, whole grains, low-fat dairy foods, lean meats, and other protein foods  Limit foods high in fat, sugar, and salt  Limit or avoid alcohol as directed  Work with a dietitian to help you plan your meals if you need to follow a special diet  A dietitian can also teach you how to modify foods if you have trouble chewing or swallowing  · Snack on healthy foods between meals  if you only eat a small amount during meals  Snacks provide extra healthy nutrients and calories between meals  Examples include fruit, cheese, and whole grain crackers  · Drink liquids as directed  to avoid dehydration  Drink liquids between meals if they cause you to get full too quickly during meals  Ask how much liquid to drink each day and which liquids are best for you  · Use herbs, spices, and flavor enhancers to add flavor to foods  Avoid using herbs and spice blends that also contain sodium  Ask your healthcare provider or dietitian about flavor enhancers  Flavor enhancers with ham, natural benavidez, and roast beef flavors can also be sprinkled on food to add flavor  · Share meals with others as often as you can  Eating with others may help you to eat better during meal time  Ask family members, neighbors, or friends to join you for lunch  There are also senior centers where you can meet people, and share meals with them  · Ask family and friends for help  with shopping or preparing foods  Ask for a ride to the grocery store, if needed           © Copyright [employer] 2018 Information is for [employer] use only and may not be sold, redistributed or otherwise used for commercial purposes   All illustrations and images included in CareNotes® are the copyrighted property of A D A M , [employer]  or [address] [place], [place] [address]gallo karma

## 2019-11-06 NOTE — PROGRESS NOTES
Assessment and Plan:     Problem List Items Addressed This Visit     None      Visit Diagnoses     Medicare annual wellness visit, subsequent    -  Primary           Preventive health issues were discussed with patient, and age appropriate screening tests were ordered as noted in patient's After Visit Summary  Personalized health advice and appropriate referrals for health education or preventive services given if needed, as noted in patient's After Visit Summary       History of Present Illness:     Patient presents for Medicare Annual Wellness visit    Patient Care Team:  Fidelina Landin DO as PCP - General (Family Medicine)  oJsesito Langley MD as PCP - Endocrinology (Endocrinology)  MD Jory Paz MD Bernell Lacks, MD Loyal Charnley, MD Frann Lava, MD Scottie Farrow, MD Laural Bourdon, MD Syed Lemon MD as Endoscopist     Problem List:     Patient Active Problem List   Diagnosis    Right scapholunate ligament tear    History of pulmonary embolism    Saucedo's esophagus without dysplasia    Cervical disc disorder with radiculopathy of cervicothoracic region    Cervical radiculopathy    S/P repair of paraesophageal hernia    Bariatric surgery status    Postsurgical malabsorption    Adenocarcinoma of lung ([address] )    Dyslipidemia    Reactive hypoglycemia    Vitamin D deficiency    Low vitamin B12 level    Generalized abdominal pain    Constipation    Internal hernia    Chronic marginal ulcer with perforation ([address]    Severe protein-calorie malnutrition ([address] )    Abnormal CT scan, lung    Coagulopathy ([address]    Diet-controlled diabetes mellitus ([address] )    Electrolyte disturbance    Near syncope    On total parenteral nutrition (TPN)    G tube feedings ([address] )      Past Medical and Surgical History:     Past Medical History:   Diagnosis Date    Anesthesia     Pt wakes up during "almost every surgery"    Arthritis     Asthma     Bariatric surgery status     Cervical radiculopathy     Chemotherapy follow-up examination     Chronic pain     Chronic pain disorder     COPD (chronic obstructive pulmonary disease) (HCC)     Diabetes mellitus (HCC)     borderline "years ago"    Food allergy     clams    GERD (gastroesophageal reflux disease)     Heart murmur     Hiatal hernia     History of malignant neoplasm     History of pneumonia 04/12/2016    History of pulmonary embolism     History of pulmonary embolus (PE)     History of ulceration     Hyperlipidemia     Lung cancer (HCC)     left lung, radiation and chemo tx    Migraine     Pneumonia     Postgastrectomy malabsorption     Right leg DVT (HCC)     Right scapholunate ligament tear     Skipped heart beats     Wears partial dentures     upper and lower     Past Surgical History:   Procedure Laterality Date    BRONCHOSCOPY      COLONOSCOPY      FRACTURE SURGERY      femur right 1985    GASTRIC BYPASS LAPAROSCOPIC N/A 7/12/2017    Procedure: GASTRIC DIVERSION WITH [place]-EN-Y RECONSTRUCTION WITH  SHORT 60 cm LIMB;  Surgeon: Shyann Patino MD;  [facility];  Service: Bariatrics    IR CHEST TUBE  8/9/2019    IR IMAGE GUIDED ASPIRATION / DRAINAGE  8/1/2019    IR PICC LINE  8/9/2019    IR TUBE PLACEMENT JOSAFAT  9/22/2019    KNEE ARTHROSCOPY Right     right shoulder    LAPAROTOMY N/A 7/28/2019    Procedure: LAPAROTOMY EXPLORATORY, WASHOUT OF SUCUS, REPAIR OF MARGINAL ULCER PERFORATION, ABD WASHOUT, INTRAOPERATIVE EGD, GI ASSISTED ENDOSCOPIC STENT PLACEMENT;  Surgeon: Reza Lauren MD;  [facility];  Service: Bariatrics    LUNG LOBECTOMY Left     LYMPHADENECTOMY  05/22/2012    Dr Mario Verdugo ANT INTERBODY MIN DISCECTOMY, CERVICAL BELOW C2 N/A 10/17/2017    Procedure: C5-6, C6-7 ACDF WITH ALLOGRAFT (NEURO MONITORING);   Surgeon: Alan Richter MD;  [facility];  Service: Orthopedics    VA COLONOSCOPY FLX DX W/COLLJ SPEC WHEN PFRMD N/A 4/14/2017    Procedure: COLONOSCOPY;  Surgeon: Javier Gordillo MD;  [facility]: [facility];  Service: Gastroenterology    FL EGD TRANSORAL BIOPSY SINGLE/MULTIPLE N/A 1/9/2019    Procedure: ESOPHAGOGASTRODUODENOSCOPY (EGD); Surgeon: Gordy Morris MD;  [facility]; Service: Bariatrics    FL ESOPHAGOGASTRODUODENOSCOPY TRANSORAL DIAGNOSTIC N/A 1/11/2017    Procedure: ESOPHAGOGASTRODUODENOSCOPY (EGD); Surgeon: Javier Gordillo MD;  [facility];   Service: Gastroenterology    FL INCISE FINGER TENDON SHEATH Right 11/27/2018    Procedure: RELEASE TRIGGER FINGER long and index finger;  Surgeon: Khai Gaytan MD;  [facility];  Service: Orthopedics    FL LAP, REPAIR PARAESOPHAGEAL HERNIA, INCL FUNDOPLASTY W/ MESH N/A 7/12/2017    Procedure: REPAIR HERNIA PARAESOPHAGEAL  LAPAROSCOPIC;  Surgeon: Gordy Morris MD;  [facility];  Service: Bariatrics    FL LAP,DIAGNOSTIC ABDOMEN N/A 7/26/2019    Procedure: LAPAROSCOPY DIAGNOSTIC CONVERSION TO OPEN, REDUCTION AND REPAIR OF INTERNAL HERNIA, REPAIR SEROSAL TEARS;  Surgeon: Dano Maya MD;  [facility];  Service: Rudolpho Quivers FL WRIST Ebbie Aw LIG Right 6/9/2016    Procedure: RELEASE CARPAL TUNNEL ENDOSCOPIC;  Surgeon: Khai Gaytan MD;  [facility];  Service: Orthopedics    RECONSTRUCTION DISTAL RADIUS/ULNA JOINT (DRUJ) Right 9/6/2016    Procedure: WRIST SCAPHOLUNATE LIGAMENT RECONSTRUCTION WITH ECRB TENDON AUTOGRAPH , SPLINT APPLICATION ;  Surgeon: Khai Gaytan MD;  [facility];  Service:    [facility] SHOULDER SURGERY      TONSILLECTOMY        Family History:     Family History   Problem Relation Age of Onset    Other Mother         Back disorder    Diabetes Mother         Diabetes Mellitus    Hypertension Mother     Heart disease Father     Hypertension Father     Breast cancer Sister     Skin cancer Sister     Breast cancer Paternal Grandmother  Skin cancer Paternal Grandmother       Social History:     Social History     Socioeconomic History    Marital status: /Civil Union     Spouse name: None    Number of children: None    Years of education: None    Highest education level: None   Occupational History    None   Social Needs    Financial resource strain: None    Food insecurity:     Worry: None     Inability: None    Transportation needs:     Medical: None     Non-medical: None   Tobacco Use    Smoking status: Former Smoker     Packs/day: 0 50     Years: 48 00     Pack years: 24 00     Types: Cigarettes     Last attempt to quit: 2017     Years since quittin 5    Smokeless tobacco: Never Used   Substance and Sexual Activity    Alcohol use: Not Currently     Frequency: Monthly or less     Drinks per session: 1 or 2     Binge frequency: Never     Comment: rarely    Drug use: No    Sexual activity: None   Lifestyle    Physical activity:     Days per week: None     Minutes per session: None    Stress: None   Relationships    Social connections:     Talks on phone: None     Gets together: None     Attends [employer] service: None     Active member of club or organization: None     Attends meetings of clubs or organizations: None     Relationship status: None    Intimate partner violence:     Fear of current or ex partner: None     Emotionally abused: None     Physically abused: None     Forced sexual activity: None   Other Topics Concern    None   Social History Narrative    None       Medications and Allergies:     Current Outpatient Medications   Medication Sig Dispense Refill    acetaminophen (TYLENOL 8 HOUR) 650 mg CR tablet Take 1 tablet (650 mg total) by mouth every 8 (eight) hours 15 tablet 0    albuterol (PROVENTIL HFA,VENTOLIN HFA) 90 mcg/act inhaler Inhale 2 puffs 4 (four) times a day  0    diphenhydrAMINE (BENADRYL) 50 mg/mL Infuse 0 5 mL (25 mg total) into a venous catheter every 6 (six) hours as needed for itching or sleep 10 mL 0    lansoprazole (PREVACID) 30 mg capsule Take 1 capsule (30 mg total) by mouth 2 (two) times a day 180 capsule 0    oxyCODONE (OXY-IR) 5 MG capsule Take 5 mg by mouth every 4 (four) hours as needed for moderate pain      oxyCODONE (ROXICODONE) 5 mg immediate release tablet Take 1 tablet (5 mg total) by mouth every 4 (four) hours as needed for moderate painMax Daily Amount: 30 mg 10 tablet 0    sucralfate (CARAFATE) 1 g/10 mL suspension Take 10 mL (1,000 mg total) by mouth 4 (four) times a day (before meals and at bedtime) 420 mL 1    metoclopramide (REGLAN) 10 mg tablet Take 10 mg by mouth 4 (four) times a day      prochlorperazine (COMPAZINE) 5 mg tablet Take 1 tablet (5 mg total) by mouth every 6 (six) hours as needed for nausea or vomiting (Patient not taking: Reported on 11/6/2019) 30 tablet 0     No current facility-administered medications for this visit  Allergies   Allergen Reactions    Codeine Hives, Itching, Nausea Only, GI Intolerance and Vomiting    Hydrocodone Hives and GI Intolerance    Penicillins Anaphylaxis and Throat Swelling     Patient's allergy is from when he was 22years old  He has tolerated cefepime on 09/2019 admission  Given ceftin on discharge      Shellfish-Derived Products Nausea Only and Vomiting      Immunizations:     Immunization History   Administered Date(s) Administered    Hep A / Hep B 02/03/2004    INFLUENZA 11/12/2015, 10/20/2016, 10/29/2018    Influenza Quadrivalent Preservative Free 3 years and older IM 10/18/2017    Influenza TIV (IM) 11/12/2015, 10/20/2016    Influenza, injectable, quadrivalent, preservative free 0 5 mL 10/29/2018    Pneumococcal Conjugate 13-Valent 08/30/2018      Health Maintenance:         Topic Date Due    Hepatitis C Screening  1960    CRC Screening: Colonoscopy  04/14/2027         Topic Date Due    DTaP,Tdap,and Td Vaccines (1 - Tdap) 11/05/1982    HEPATITIS B VACCINES (2 of 3 - Hep B Twinrix risk 3-dose series) 03/02/2004    Pneumococcal Vaccine: Pediatrics (0 to 5 Years) and At-Risk Patients (6 to 59 Years) (2 of 3 - PPSV23) 10/25/2018    INFLUENZA VACCINE  07/01/2019      Medicare Health Risk Assessment:     /70   Temp 97 5 °F (36 4 °C)   Ht 5' 8" (1 727 m)   Wt 49 9 kg (110 lb)   BMI 16 73 kg/m²      Lianne Cadena is here for his Subsequent Wellness visit  Last Medicare Wellness visit information reviewed, patient interviewed and updates made to the record today  Health Risk Assessment:   Patient rates overall health as fair  Patient feels that their physical health rating is same  Eyesight was rated as same  Hearing was rated as same  Patient feels that their emotional and mental health rating is same  Pain experienced in the last 7 days has been a lot  Patient's pain rating has been 9/10  Patient states that he has experienced no weight loss or gain in last 6 months  Depression Screening:   PHQ-2 Score: 0      Fall Risk Screening: In the past year, patient has experienced: no history of falling in past year      Home Safety:  Patient does not have trouble with stairs inside or outside of their home  Patient has working smoke alarms and has no working carbon monoxide detector  Home safety hazards include: none  Nutrition:   Current diet is Regular  Medications:   Patient is not currently taking any over-the-counter supplements  Patient is able to manage medications  Activities of Daily Living (ADLs)/Instrumental Activities of Daily Living (IADLs):   Walk and transfer into and out of bed and chair?: Yes  Dress and groom yourself?: Yes    Bathe or shower yourself?: Yes    Feed yourself?  Yes  Do your laundry/housekeeping?: Yes  Manage your money, pay your bills and track your expenses?: Yes  Make your own meals?: Yes    Do your own shopping?: Yes    Previous Hospitalizations:   Any hospitalizations or ED visits within the last 12 months?: Yes    How many hospitalizations have you had in the last year?: 3-4    Advance Care Planning:   Living will: Yes    Durable POA for healthcare:  Yes    Advanced directive: Yes    Advanced directive counseling given: No    Five wishes given: No    End of Life Decisions reviewed with patient: Yes    Provider agrees with end of life decisions: Yes      Cognitive Screening:   Provider or family/friend/caregiver concerned regarding cognition?: No    PREVENTIVE SCREENINGS      Cardiovascular Screening:    General: Screening Not Indicated and History Lipid Disorder      Diabetes Screening:     General: Screening Not Indicated and History Diabetes      Colorectal Cancer Screening:     General: Screening Current      Prostate Cancer Screening:    General: Screening Current      Osteoporosis Screening:    General: Screening Not Indicated      Abdominal Aortic Aneurysm (AAA) Screening:    Risk factors include: tobacco use        Lung Cancer Screening:     General: Screening Not Indicated and History Lung Cancer      Hepatitis C Screening:    General: Screening Not Indicated      Alice White DO

## 2019-11-06 NOTE — PROGRESS NOTES
Assessment/Plan:    No problem-specific Assessment & Plan notes found for this encounter  Diagnoses and all orders for this visit:    Medicare annual wellness visit, subsequent    Saucedo's esophagus without dysplasia  Comments:  no change in the medication    Right upper quadrant abdominal pain    Weight loss  Comments:  pt is gaining weight with tube feed supplementation          PHQ-9 Depression Screening    PHQ-9:    Frequency of the following problems over the past two weeks:       Little interest or pleasure in doing things:  0 - not at all  Feeling down, depressed, or hopeless:  0 - not at all  PHQ-2 Score:  0            Subjective:      Patient ID: Kennedy Irby is a 63 y o  male  Follow up for difficulty gaining weight pt is seeing the bariatric surgeon and is supplementing with tube feeds    Abdominal Pain   This is a new problem  The current episode started in the past 7 days  The onset quality is sudden  The problem occurs constantly  The problem has been unchanged  The pain is located in the RUQ and RLQ  The pain is at a severity of 9/10  The pain is severe  The quality of the pain is sharp  The abdominal pain does not radiate  Associated symptoms include arthralgias, nausea and vomiting  Pertinent negatives include no constipation, diarrhea, dysuria, fever or myalgias  Nothing aggravates the pain  The pain is relieved by nothing  He has tried oral narcotic analgesics for the symptoms  The treatment provided mild relief  The following portions of the patient's history were reviewed and updated as appropriate: allergies, current medications, past family history, past medical history, past social history, past surgical history and problem list     Review of Systems   Constitutional: Negative  Negative for chills, fatigue and fever  HENT: Negative  Eyes: Negative  Respiratory: Negative for shortness of breath and wheezing  Cardiovascular: Negative for chest pain and palpitations  Gastrointestinal: Positive for nausea and vomiting  Negative for abdominal pain, blood in stool, constipation and diarrhea  Endocrine: Negative  Genitourinary: Negative for difficulty urinating and dysuria  Musculoskeletal: Positive for arthralgias  Negative for myalgias  Skin: Negative  Allergic/Immunologic: Negative  Neurological: Negative for seizures and syncope  Hematological: Negative for adenopathy  Psychiatric/Behavioral: Negative  Objective:    /70   Temp 97 5 °F (36 4 °C)   Ht 5' 8" (1 727 m)   Wt 49 9 kg (110 lb)   BMI 16 73 kg/m²      Physical Exam   Constitutional: He is oriented to person, place, and time  He appears well-developed and well-nourished  No distress  HENT:   Head: Normocephalic and atraumatic  Right Ear: External ear normal    Left Ear: External ear normal    Nose: Nose normal    Mouth/Throat: Oropharynx is clear and moist    Eyes: Pupils are equal, round, and reactive to light  Conjunctivae and EOM are normal  No scleral icterus  Neck: Normal range of motion  Neck supple  Cardiovascular: Normal rate, regular rhythm and normal heart sounds  Exam reveals no gallop and no friction rub  No murmur heard  Pulmonary/Chest: Effort normal and breath sounds normal  No respiratory distress  He has no wheezes  He has no rales  Abdominal: Soft  Bowel sounds are normal  He exhibits no distension and no mass  There is tenderness  There is no rebound and no guarding  Musculoskeletal: Normal range of motion  He exhibits no edema  Lymphadenopathy:     He has no cervical adenopathy  Neurological: He is alert and oriented to person, place, and time  He has normal reflexes  Skin: Skin is warm and dry  He is not diaphoretic  Psychiatric: He has a normal mood and affect  His behavior is normal  Judgment and thought content normal    Nursing note and vitals reviewed

## 2019-11-07 ENCOUNTER — TELEPHONE (OUTPATIENT)
Dept: BARIATRICS | Facility: CLINIC | Age: 59
End: 2019-11-07

## 2019-11-07 NOTE — TELEPHONE ENCOUNTER
Left message to schedule appointment to see me after Eduardo's appointment with Dr Wilfrido Payton to discuss his nutritional intake and possibly switching to a pump to increase tolerance  Name and contact information left

## 2019-11-08 ENCOUNTER — OFFICE VISIT (OUTPATIENT)
Dept: BARIATRICS | Facility: CLINIC | Age: 59
End: 2019-11-08
Payer: COMMERCIAL

## 2019-11-08 ENCOUNTER — TELEPHONE (OUTPATIENT)
Dept: RADIOLOGY | Facility: HOSPITAL | Age: 59
End: 2019-11-08

## 2019-11-08 VITALS
TEMPERATURE: 96.7 F | WEIGHT: 110.5 LBS | HEIGHT: 68 IN | DIASTOLIC BLOOD PRESSURE: 60 MMHG | SYSTOLIC BLOOD PRESSURE: 110 MMHG | HEART RATE: 88 BPM | BODY MASS INDEX: 16.75 KG/M2

## 2019-11-08 DIAGNOSIS — E43 SEVERE PROTEIN-CALORIE MALNUTRITION (HCC): ICD-10-CM

## 2019-11-08 DIAGNOSIS — K91.2 POSTSURGICAL MALABSORPTION: Primary | ICD-10-CM

## 2019-11-08 DIAGNOSIS — K28.9 MARGINAL ULCER: ICD-10-CM

## 2019-11-08 PROCEDURE — 99213 OFFICE O/P EST LOW 20 MIN: CPT | Performed by: SURGERY

## 2019-11-08 NOTE — TELEPHONE ENCOUNTER
Dr Lindsey Saucedo called regarding the cholecystostomy catheter  He does not plan on performing a cholecystectomy at any point in the future  He as requested that the catheter be removed if the patient continues to tolerate his capping trial since 10/17  Review of the most recent tube check showed a shrunken gallbladder without stones or obstruction  Removal of the catheter after a trial of capping is reasonable  He is scheduled to see IR in December

## 2019-11-08 NOTE — PROGRESS NOTES
OFFICE VISIT - BARIATRIC SURGERY  Shaylee Haynes 63 y o  male MRN: [id number]  Unit/Bed#:  Encounter: [id number]      HPI:  Shaylee Haynes is a 63 y o  male w/RYGB status post ex lap + marginal ulcer perf repair and G tube, (7/2019) and perc zac for acalculous cholecystitis (9/2019)  Subjective     Patient reports doing well  Now tolerating regular diet in small meals due to bloating and early satiety  Denies N/V  Minimally using tube feed pushes through G tube due to bloating with tube feeds  Patient has gained 6 5 lbs  Denies any fever or chills at home  Ambulation not impaired, on PPIs  Recent IR perc zac tube check on 10/17/19  Cystic duct open so tube was capped  PCP ordered recent ab US for abdominal pain at tube skin suture site  Results reviewed and normal      Review of Systems   Constitutional: Negative  HENT: Negative  Eyes: Negative  Respiratory: Negative  Cardiovascular: Negative  Gastrointestinal: Negative  Endocrine: Negative  Genitourinary: Negative  Musculoskeletal: Negative  Skin: Negative  Allergic/Immunologic: Negative  Neurological: Negative  Hematological: Negative  Psychiatric/Behavioral: Negative  All other systems reviewed and are negative        Historical Information   Past Medical History:   Diagnosis Date    Anesthesia     Pt wakes up during "almost every surgery"    Arthritis     Asthma     Bariatric surgery status     Cervical radiculopathy     Chemotherapy follow-up examination     Chronic pain     Chronic pain disorder     COPD (chronic obstructive pulmonary disease) ([address]     Diabetes mellitus ([address] )     borderline "years ago"    Food allergy     clams    GERD (gastroesophageal reflux disease)     Heart murmur     Hiatal hernia     History of malignant neoplasm     History of pneumonia 04/12/2016    History of pulmonary embolism     History of pulmonary embolus (PE)     History of ulceration     Hyperlipidemia  Lung cancer ([address] )     left lung, radiation and chemo tx    Migraine     Pneumonia     Postgastrectomy malabsorption     Right leg DVT (HCC)     Right scapholunate ligament tear     Skipped heart beats     Wears partial dentures     upper and lower     Past Surgical History:   Procedure Laterality Date    BRONCHOSCOPY      COLONOSCOPY      FRACTURE SURGERY      femur right 1985    GASTRIC BYPASS LAPAROSCOPIC N/A 7/12/2017    Procedure: GASTRIC DIVERSION WITH [place]-EN-Y RECONSTRUCTION WITH  SHORT 60 cm LIMB;  Surgeon: Nancy Bowden MD;  [facility];  Service: Bariatrics    IR CHEST TUBE  8/9/2019    IR IMAGE GUIDED ASPIRATION / DRAINAGE  8/1/2019    IR PICC LINE  8/9/2019    IR TUBE PLACEMENT JOSAFAT  9/22/2019    KNEE ARTHROSCOPY Right     right shoulder    LAPAROTOMY N/A 7/28/2019    Procedure: LAPAROTOMY EXPLORATORY, WASHOUT OF SUCUS, REPAIR OF MARGINAL ULCER PERFORATION, ABD WASHOUT, INTRAOPERATIVE EGD, GI ASSISTED ENDOSCOPIC STENT PLACEMENT;  Surgeon: Vidhya Duenas MD;  [facility];  Service: [place] Left     LYMPHADENECTOMY  05/22/2012    Dr Jesse Lewis ANT INTERBODY MIN DISCECTOMY, CERVICAL BELOW C2 N/A 10/17/2017    Procedure: C5-6, C6-7 ACDF WITH ALLOGRAFT (NEURO MONITORING); Surgeon: Michelle Marcum MD;  [facility];  Service: Orthopedics    ID COLONOSCOPY FLX DX W/COLLJ SPEC WHEN PFRMD N/A 4/14/2017    Procedure: COLONOSCOPY;  Surgeon: Niecy Nguyen MD;  [facility];  Service: Gastroenterology    ID EGD TRANSORAL BIOPSY SINGLE/MULTIPLE N/A 1/9/2019    Procedure: ESOPHAGOGASTRODUODENOSCOPY (EGD); Surgeon: Nancy Bowden MD;  [facility]; Service: Bariatrics    ID ESOPHAGOGASTRODUODENOSCOPY TRANSORAL DIAGNOSTIC N/A 1/11/2017    Procedure: ESOPHAGOGASTRODUODENOSCOPY (EGD); Surgeon: Niecy Nguyen MD;  [facility];   Service: Gastroenterology    WY INCISE FINGER TENDON SHEATH Right 2018    Procedure: RELEASE TRIGGER FINGER long and index finger;  Surgeon: Maureen Uribe MD;  [facility];  Service: Orthopedics    WY LAP, REPAIR PARAESOPHAGEAL HERNIA, INCL FUNDOPLASTY W/ MESH N/A 2017    Procedure: REPAIR HERNIA PARAESOPHAGEAL  LAPAROSCOPIC;  Surgeon: Darron Gar MD;  [facility];  Service: Bariatrics    WY LAP,DIAGNOSTIC ABDOMEN N/A 2019    Procedure: LAPAROSCOPY DIAGNOSTIC CONVERSION TO OPEN, REDUCTION AND REPAIR OF INTERNAL HERNIA, REPAIR SEROSAL TEARS;  Surgeon: Dragan Mcarthur MD;  [facility];  Service: Danielito Minus W[place] WRIST Sidonie Shelling LIG Right 2016    Procedure: RELEASE CARPAL TUNNEL ENDOSCOPIC;  Surgeon: Maureen Uribe MD;  [facility];  Service: Orthopedics    RECONSTRUCTION DISTAL RADIUS/ULNA JOINT (DRUJ) Right 2016    Procedure: WRIST SCAPHOLUNATE LIGAMENT RECONSTRUCTION WITH ECRB TENDON AUTOGRAPH , SPLINT APPLICATION ;  Surgeon: Maureen Uribe MD;  [facility];  Service:    [facility] SURGERY      TONSILLECTOMY       Social History   Social History     Substance and Sexual Activity   Alcohol Use Not Currently    Frequency: Monthly or less    Drinks per session: 1 or 2    Binge frequency: Never    Comment: rarely     Social History     Substance and Sexual Activity   Drug Use No     Social History     Tobacco Use   Smoking Status Former Smoker    Packs/day: 0 50    Years: 48 00    Pack years: 24 00    Types: Cigarettes    Last attempt to quit: 2017    Years since quittin 5   Smokeless Tobacco Never Used       Objective       Current Vitals:   Blood Pressure: 110/60 (19)  Pulse: 88 (19)  Temperature: (!) 96 7 °F (35 9 °C) (19)  Temp Source: Tympanic (19)  Height: 5' 8" (172 7 cm) (19)  Weight - Scale: 50 1 kg (110 lb 8 oz) (19)    Invasive Devices     Drain            Gastrostomy/Enterostomy Gastrostomy 22 Fr   days    Closed/Suction Drain Right RUQ Other (Comment) 8 5 Fr  47 days                Physical Exam   Constitutional: He is oriented to person, place, and time  He appears well-developed and well-nourished  HENT:   Head: Normocephalic and atraumatic  Nose: Nose normal    Mouth/Throat: Oropharynx is clear and moist    Eyes: Conjunctivae and EOM are normal    Neck: Normal range of motion  No tracheal deviation present  Cardiovascular: Normal rate and regular rhythm  Pulmonary/Chest: Effort normal and breath sounds normal  No respiratory distress  Abdominal: Soft  Bowel sounds are normal  He exhibits no distension  There is no rebound and no guarding  No hernia  No peritoneal findings  Voluntary guarding but no rebound tenderness  Ex lap incision completely healed  Perc zac tube and G tube in place  Musculoskeletal: Normal range of motion  He exhibits no edema  Neurological: He is alert and oriented to person, place, and time  Skin: Skin is warm and dry  Psychiatric: He has a normal mood and affect  His behavior is normal  Judgment and thought content normal    Vitals reviewed  Pathology, and Other Studies: I have personally reviewed pertinent films in PACS      Assessment/PLAN:    Shaylee Haynes is a 63 y o  male w/RYGB status post ex lap + marginal ulcer perf repair and G tube, (7/2019) and perc zac for acalculous cholecystitis (9/2019)  Doing well post op  No major issues  Removed G tube in office due to adequate PO tolerance and weight gain, with minimal use of tube feeds  Labs reviewed and albumin up to >3  Dry dressings over site  Discussed case w/IR  Tube to remain capped and tube check in 1 month before removal  Patient is not a candidate for cholecystectomy due to hostile abdomen 2/2 to previous surgeries  Instructed patient to have several small protein-rich meals a day   If early satiety and bloating continue will plan for EGD to r/o recurrent ulceration and stricturing  Continue PPI  Follow up in 2 months           Violeta Cifuentes MD  Bariatric Surgery Fellow  11/8/2019  3:23 PM

## 2019-11-20 ENCOUNTER — HOSPITAL ENCOUNTER (OUTPATIENT)
Dept: RADIOLOGY | Facility: HOSPITAL | Age: 59
Discharge: HOME | End: 2019-11-20
Admitting: RADIOLOGY
Payer: COMMERCIAL

## 2019-11-20 DIAGNOSIS — K65.1 INTRA-ABDOMINAL ABSCESS (HCC): ICD-10-CM

## 2019-11-20 DIAGNOSIS — Z93.1 G TUBE FEEDINGS (HCC): ICD-10-CM

## 2019-11-20 PROCEDURE — 47531 INJECTION FOR CHOLANGIOGRAM: CPT | Performed by: RADIOLOGY

## 2019-11-20 PROCEDURE — 49424 ASSESS CYST CONTRAST INJECT: CPT

## 2019-11-20 PROCEDURE — 76080 X-RAY EXAM OF FISTULA: CPT

## 2019-11-20 RX ADMIN — IOHEXOL 5 ML: 350 INJECTION, SOLUTION INTRAVENOUS at 12:04

## 2019-12-06 ENCOUNTER — OFFICE VISIT (OUTPATIENT)
Dept: FAMILY MEDICINE CLINIC | Facility: CLINIC | Age: 59
End: 2019-12-06
Payer: COMMERCIAL

## 2019-12-06 VITALS
HEIGHT: 68 IN | BODY MASS INDEX: 17.55 KG/M2 | OXYGEN SATURATION: 100 % | TEMPERATURE: 96.4 F | WEIGHT: 115.8 LBS | DIASTOLIC BLOOD PRESSURE: 72 MMHG | HEART RATE: 94 BPM | SYSTOLIC BLOOD PRESSURE: 118 MMHG

## 2019-12-06 DIAGNOSIS — R63.4 WEIGHT LOSS: Primary | ICD-10-CM

## 2019-12-06 PROBLEM — E11.9 DIET-CONTROLLED DIABETES MELLITUS (HCC): Status: RESOLVED | Noted: 2019-08-04 | Resolved: 2019-12-06

## 2019-12-06 PROCEDURE — 1036F TOBACCO NON-USER: CPT | Performed by: FAMILY MEDICINE

## 2019-12-06 PROCEDURE — 99213 OFFICE O/P EST LOW 20 MIN: CPT | Performed by: FAMILY MEDICINE

## 2019-12-06 PROCEDURE — 3008F BODY MASS INDEX DOCD: CPT | Performed by: FAMILY MEDICINE

## 2019-12-06 NOTE — PROGRESS NOTES
Assessment/Plan:    No problem-specific Assessment & Plan notes found for this encounter  Diagnoses and all orders for this visit:    Weight loss          PHQ-9 Depression Screening    PHQ-9:    Frequency of the following problems over the past two weeks:       Little interest or pleasure in doing things:  0 - not at all  Feeling down, depressed, or hopeless:  0 - not at all  PHQ-2 Score:  0            Subjective:      Patient ID: Yajaira Lozano is a 63 y o  male  Follow up for unintentional weight loss pt gained 5 lbs since last visit, pt stopped tube feeds and is eating well now      The following portions of the patient's history were reviewed and updated as appropriate: allergies, current medications, past family history, past medical history, past social history, past surgical history and problem list     Review of Systems   Constitutional: Negative for chills, fever and unexpected weight change  Gastrointestinal: Positive for abdominal pain, nausea and vomiting  Negative for blood in stool and diarrhea           Objective:    /72   Pulse 94   Temp (!) 96 4 °F (35 8 °C) (Tympanic)   Ht 5' 8" (1 727 m)   Wt 52 5 kg (115 lb 12 8 oz)   SpO2 100%   BMI 17 61 kg/m²      Physical Exam

## 2020-01-03 ENCOUNTER — OFFICE VISIT (OUTPATIENT)
Dept: FAMILY MEDICINE CLINIC | Facility: CLINIC | Age: 60
End: 2020-01-03
Payer: COMMERCIAL

## 2020-01-03 VITALS
BODY MASS INDEX: 17.73 KG/M2 | HEIGHT: 68 IN | DIASTOLIC BLOOD PRESSURE: 70 MMHG | SYSTOLIC BLOOD PRESSURE: 108 MMHG | TEMPERATURE: 97.6 F | OXYGEN SATURATION: 98 % | HEART RATE: 84 BPM | WEIGHT: 117 LBS

## 2020-01-03 DIAGNOSIS — K28.5 CHRONIC MARGINAL ULCER WITH PERFORATION (HCC): ICD-10-CM

## 2020-01-03 DIAGNOSIS — R10.9 ABDOMINAL PAIN: ICD-10-CM

## 2020-01-03 DIAGNOSIS — K45.8 INTERNAL HERNIA: ICD-10-CM

## 2020-01-03 DIAGNOSIS — K65.1 INTRA-ABDOMINAL ABSCESS (HCC): ICD-10-CM

## 2020-01-03 DIAGNOSIS — R63.4 WEIGHT LOSS: Primary | ICD-10-CM

## 2020-01-03 DIAGNOSIS — S31.109D OPEN WOUND OF ABDOMINAL WALL, SUBSEQUENT ENCOUNTER: ICD-10-CM

## 2020-01-03 DIAGNOSIS — R63.6 UNDERWEIGHT: ICD-10-CM

## 2020-01-03 DIAGNOSIS — Z13.31 NEGATIVE DEPRESSION SCREENING: ICD-10-CM

## 2020-01-03 DIAGNOSIS — Z98.84 BARIATRIC SURGERY STATUS: ICD-10-CM

## 2020-01-03 PROCEDURE — 99214 OFFICE O/P EST MOD 30 MIN: CPT | Performed by: FAMILY MEDICINE

## 2020-01-03 RX ORDER — SUCRALFATE ORAL 1 G/10ML
1000 SUSPENSION ORAL 2 TIMES DAILY
Qty: 420 ML | Refills: 1 | Status: SHIPPED | OUTPATIENT
Start: 2020-01-03 | End: 2020-02-06

## 2020-01-03 NOTE — PROGRESS NOTES
Assessment/Plan:    No problem-specific Assessment & Plan notes found for this encounter  Diagnoses and all orders for this visit:    Weight loss  Comments:  pt is successfully gaining weight    Underweight    Negative depression screening          PHQ-9 Depression Screening    PHQ-9:    Frequency of the following problems over the past two weeks:       Little interest or pleasure in doing things:  0 - not at all  Feeling down, depressed, or hopeless:  0 - not at all  PHQ-2 Score:  0        BMI Counseling: Body mass index is 17 79 kg/m²  The BMI is below normal  Patient was advised to gain weight  Subjective:      Patient ID: Gregorio Foster is a 63 y o  male  Follow up for weight check because of chronic weight loss, pt is continuing to gain weight at this point and is eating well at this point, pt is set to see the bariatric surgeon this month      The following portions of the patient's history were reviewed and updated as appropriate: allergies, current medications, past family history, past medical history, past social history, past surgical history and problem list     Review of Systems   Constitutional: Negative for chills, fatigue and fever  Gastrointestinal: Positive for abdominal pain and nausea  Negative for blood in stool, diarrhea and vomiting  Objective:    /70   Pulse 84   Temp 97 6 °F (36 4 °C) (Tympanic)   Ht 5' 8" (1 727 m)   Wt 53 1 kg (117 lb)   SpO2 98%   BMI 17 79 kg/m²      Physical Exam   Constitutional: He is oriented to person, place, and time  He appears well-developed and well-nourished  No distress  HENT:   Head: Normocephalic and atraumatic  Eyes: Pupils are equal, round, and reactive to light  Conjunctivae and EOM are normal  No scleral icterus  Neck: Normal range of motion  Neck supple  Cardiovascular: Normal rate, regular rhythm and normal heart sounds  No murmur heard    Pulmonary/Chest: Effort normal and breath sounds normal  No respiratory distress  He has no wheezes  He has no rales  Abdominal: Soft  Bowel sounds are normal  He exhibits no distension and no mass  There is no tenderness  There is no rebound and no guarding  Musculoskeletal: He exhibits no edema  Lymphadenopathy:     He has no cervical adenopathy  Neurological: He is alert and oriented to person, place, and time  Skin: Skin is warm and dry  He is not diaphoretic  Psychiatric: He has a normal mood and affect  His behavior is normal  Judgment and thought content normal    Nursing note and vitals reviewed

## 2020-01-09 ENCOUNTER — OFFICE VISIT (OUTPATIENT)
Dept: BARIATRICS | Facility: CLINIC | Age: 60
End: 2020-01-09

## 2020-01-09 ENCOUNTER — OFFICE VISIT (OUTPATIENT)
Dept: BARIATRICS | Facility: CLINIC | Age: 60
End: 2020-01-09
Payer: COMMERCIAL

## 2020-01-09 VITALS — HEIGHT: 68 IN | BODY MASS INDEX: 17.5 KG/M2 | WEIGHT: 115.5 LBS

## 2020-01-09 VITALS
HEART RATE: 74 BPM | WEIGHT: 115.5 LBS | SYSTOLIC BLOOD PRESSURE: 102 MMHG | DIASTOLIC BLOOD PRESSURE: 54 MMHG | TEMPERATURE: 96.6 F | BODY MASS INDEX: 17.5 KG/M2 | HEIGHT: 68 IN

## 2020-01-09 DIAGNOSIS — Z98.84 BARIATRIC SURGERY STATUS: Primary | ICD-10-CM

## 2020-01-09 DIAGNOSIS — K28.9 MARGINAL ULCER: Primary | ICD-10-CM

## 2020-01-09 DIAGNOSIS — K91.2 POSTSURGICAL MALABSORPTION: ICD-10-CM

## 2020-01-09 DIAGNOSIS — K28.5 CHRONIC MARGINAL ULCER WITH PERFORATION (HCC): ICD-10-CM

## 2020-01-09 PROCEDURE — 99213 OFFICE O/P EST LOW 20 MIN: CPT | Performed by: SURGERY

## 2020-01-09 PROCEDURE — RECHECK: Performed by: DIETITIAN, REGISTERED

## 2020-01-09 RX ORDER — LANSOPRAZOLE 30 MG/1
30 CAPSULE, DELAYED RELEASE ORAL 2 TIMES DAILY
Qty: 240 CAPSULE | Refills: 4 | Status: SHIPPED | OUTPATIENT
Start: 2020-01-09 | End: 2020-02-06

## 2020-01-09 RX ORDER — SUCRALFATE ORAL 1 G/10ML
1 SUSPENSION ORAL 4 TIMES DAILY
Qty: 1200 ML | Refills: 4 | Status: SHIPPED | OUTPATIENT
Start: 2020-01-09 | End: 2020-06-17 | Stop reason: SDUPTHER

## 2020-01-09 NOTE — PROGRESS NOTES
OFFICE VISIT - BARIATRIC SURGERY  Lj Arroyo 63 y o  male MRN: [id number]  Unit/Bed#:  Encounter: [id number]      HPI:  Lj Arroyo is a 63 y o  male w/a h/o remote RYGB status post ex lap + marginal ulcer perf repair and G tube, (7/2019) and perc zac for acalculous cholecystitis (9/2019)  He is here for his routine 3 month follow-up  Subjective     Patient reports doing well  He is having intermittent dysphagia and vomiting, mostly with meat  He is however gaining weight, having gained 5 lb since his last visit 3 months ago  He continues to have intermittent epigastric pain  He stopped taking his high dose antacid regimen for couple of days, until this PCP reordered his medications  He is having regular bowel function  Is not smoking and not exposed to secondhand smoke  He is not using NSAIDs  His perc zac tube was interrogated by IR in Nov 2019, and the cystic duct was found to be patent  After tolerating a capping trial the tube was removed uneventfully  The G tube was removed during his last office visit with us  Review of Systems   Constitutional: Negative  HENT: Negative  Eyes: Negative  Respiratory: Negative  Cardiovascular: Negative  Gastrointestinal: Negative  Endocrine: Negative  Genitourinary: Negative  Musculoskeletal: Negative  Skin: Negative  Allergic/Immunologic: Negative  Neurological: Negative  Hematological: Negative  Psychiatric/Behavioral: Negative  All other systems reviewed and are negative        Historical Information   Past Medical History:   Diagnosis Date    Anesthesia     Pt wakes up during "almost every surgery"    Arthritis     Asthma     Bariatric surgery status     Cervical radiculopathy     Chemotherapy follow-up examination     Chronic pain     Chronic pain disorder     [facility]     Diabetes mellitus ([facility] )     borderline "years ago"    Food allergy     alison    GERD (gastroesophageal reflux disease)     Heart murmur     Hiatal hernia     History of malignant neoplasm     History of pneumonia 04/12/2016    History of pulmonary embolism     History of pulmonary embolus (PE)     History of ulceration     Hyperlipidemia     Lung cancer (HCC)     left lung, radiation and chemo tx    Migraine     Pneumonia     Postgastrectomy malabsorption     Right leg DVT (HCC)     Right scapholunate ligament tear     Skipped heart beats     Wears partial dentures     upper and lower     Past Surgical History:   Procedure Laterality Date    BRONCHOSCOPY      COLONOSCOPY      FRACTURE SURGERY      femur right 1985    GASTRIC BYPASS LAPAROSCOPIC N/A 7/12/2017    Procedure: GASTRIC DIVERSION WITH [place]-EN-Y RECONSTRUCTION WITH  SHORT 60 cm LIMB;  Surgeon: Ricardo Balbuena MD;  [facility];  Service: Bariatrics    IR CHEST TUBE  8/9/2019    IR IMAGE GUIDED ASPIRATION / DRAINAGE  8/1/2019    IR PICC LINE  8/9/2019    IR TUBE PLACEMENT JOSAFAT  9/22/2019    KNEE ARTHROSCOPY Right     right shoulder    LAPAROTOMY N/A 7/28/2019    Procedure: LAPAROTOMY EXPLORATORY, WASHOUT OF SUCUS, REPAIR OF MARGINAL ULCER PERFORATION, ABD WASHOUT, INTRAOPERATIVE EGD, GI ASSISTED ENDOSCOPIC STENT PLACEMENT;  Surgeon: Dru Rice MD;  [facility];  Service: Bariatrics    LUNG LOBECTOMY Left     LYMPHADENECTOMY  05/22/2012    Dr Gutierrez Early ANT INTERBODY MIN DISCECTOMY, CERVICAL BELOW C2 N/A 10/17/2017    Procedure: C5-6, C6-7 ACDF WITH ALLOGRAFT (NEURO MONITORING);   Surgeon: Davide Raygoza MD;  [facility];  Service: Orthopedics    KS COLONOSCOPY FLX DX W/COLLJ SPEC WHEN PFRMD N/A 4/14/2017    Procedure: COLONOSCOPY;  Surgeon: Sallye Olszewski, MD;  [facility];  Service: Gastroenterology    KS EGD TRANSORAL BIOPSY SINGLE/MULTIPLE N/A 1/9/2019    Procedure: ESOPHAGOGASTRODUODENOSCOPY (EGD); Surgeon: Norma Ivy MD;  [facility]; Service: Bariatrics    MS ESOPHAGOGASTRODUODENOSCOPY TRANSORAL DIAGNOSTIC N/A 2017    Procedure: ESOPHAGOGASTRODUODENOSCOPY (EGD); Surgeon: Keven Azevedo MD;  [facility];   Service: Gastroenterology    MS INCISE FINGER TENDON SHEATH Right 2018    Procedure: RELEASE TRIGGER FINGER long and index finger;  Surgeon: King Bhatia MD;  [facility];  Service: Orthopedics    MS LAP, REPAIR PARAESOPHAGEAL HERNIA, INCL FUNDOPLASTY W/ MESH N/A 2017    Procedure: REPAIR HERNIA PARAESOPHAGEAL  LAPAROSCOPIC;  Surgeon: Norma Ivy MD;  [facility];  Service: Bariatrics    MS LAP,DIAGNOSTIC ABDOMEN N/A 2019    Procedure: LAPAROSCOPY DIAGNOSTIC CONVERSION TO OPEN, REDUCTION AND REPAIR OF INTERNAL HERNIA, REPAIR SEROSAL TEARS;  Surgeon: Ruth Decker MD;  [facility];  Service: Beula Tabatha MS WRIST Pablo Ax LIG Right 2016    Procedure: RELEASE CARPAL TUNNEL ENDOSCOPIC;  Surgeon: King Bhatia MD;  [facility];  Service: Orthopedics    RECONSTRUCTION DISTAL RADIUS/ULNA JOINT (DRUJ) Right 2016    Procedure: WRIST SCAPHOLUNATE LIGAMENT RECONSTRUCTION WITH ECRB TENDON AUTOGRAPH , SPLINT APPLICATION ;  Surgeon: King Bhatia MD;  [facility];  Service:    [place] SHOULDER SURGERY      TONSILLECTOMY       Social History   Social History     Substance and Sexual Activity   Alcohol Use Not Currently    Frequency: Monthly or less    Drinks per session: 1 or 2    Binge frequency: Never    Comment: rarely     Social History     Substance and Sexual Activity   Drug Use No     Social History     Tobacco Use   Smoking Status Former Smoker    Packs/day: 0 50    Years: 48 00    Pack years: 24 00    Types: Cigarettes    Last attempt to quit: 2017    Years since quittin 6   Smokeless Tobacco Never Used       Objective       Current Vitals:   Blood Pressure: 102/54 (01/09/20 0928)  Pulse: 74 (01/09/20 0928)  Temperature: (!) 96 6 °F (35 9 °C) (01/09/20 0928)  Temp Source: Tympanic (01/09/20 0928)  Height: 5' 8" (172 7 cm) (01/09/20 7311)  Weight - Scale: 52 4 kg (115 lb 8 oz) (01/09/20 0928)    Invasive Devices     None                 Physical Exam   Constitutional: He is oriented to person, place, and time  He appears well-developed and well-nourished  HENT:   Head: Normocephalic and atraumatic  Nose: Nose normal    Mouth/Throat: Oropharynx is clear and moist    Eyes: Conjunctivae and EOM are normal    Neck: Normal range of motion  No tracheal deviation present  Cardiovascular: Normal rate and regular rhythm  Pulmonary/Chest: Effort normal and breath sounds normal  No respiratory distress  Abdominal: Soft  Bowel sounds are normal  He exhibits no distension  There is no rebound and no guarding  No hernia  The midline laparotomy incision has completely healed  There are no palpable underlying incisional hernias  The abdomen is soft and benign, with mild tenderness to deep epigastric palpation  Musculoskeletal: Normal range of motion  He exhibits no edema  Neurological: He is alert and oriented to person, place, and time  Skin: Skin is warm and dry  Psychiatric: He has a normal mood and affect  His behavior is normal  Judgment and thought content normal    Vitals reviewed  Assessment/PLAN:    Og Perales is a 63 y o  male status post w/RYGB status post ex lap + marginal ulcer perf repair and G tube, (7/2019) and perc zac for acalculous cholecystitis (9/2019)  Overall doing well, gaining weight  No major issues  His symptoms may be related to stricturing as previous perforation site  Given the fact that the patient is tolerating a diet and gaining weight we will elect to observe him at this time    If he were to need an endoscopy in the future we would refer to Dr Kacey Werner for potential intervention (dilation +/- stenting?), given that his hostile abdomen precludes any laparoscopic procedure  This was explained to the patient in great detail  It was insisted that the patient needs to remain on high-dose antacid therapy for now  Prevacid b i d  And Carafate q i d  Were reordered for the patient with appropriate refills  The patient has a trip to [facility] planned in June of this year  We will see the patient before he leaves with an upper GI to assess the anatomy of his GI tract      Madison Love MD  Bariatric Surgery Fellow  1/9/2020  9:56 AM

## 2020-01-09 NOTE — PROGRESS NOTES
Bariatric Follow Up Nutrition Note    Type of surgery  Gastric bypass: laparoscopic  Surgery Date: 7/12/2017  2 years  post-op  Surgeon: Dr Edis Steven  63 y o   male  Ht 5' 8" (1 727 m)   Wt 52 4 kg (115 lb 8 oz)   BMI 17 56 kg/m²     Estimated Needs  Energy  [employer] Needs:  BMR : 5   Maintain weight sedentary activity:  1582         Protein:75-90      (1-1 2g/kg IBW)  Fluid: 88oz     (35mL/kg IBW)      Weight on Day of Weight Loss Surgery: 220lbs  Weight in (lb) to have BMI = 25: 164 9 (75kg)  Pre-Op Excess Wt: 55 1  Post-Op Wt Loss: 101#/ 196% EBWL in 2 year(s)  5 5lbs increase x 3 months    Review of History and Medications   Past Medical History:   Diagnosis Date    Anesthesia     Pt wakes up during "almost every surgery"    Arthritis     Asthma     Bariatric surgery status     Cervical radiculopathy     Chemotherapy follow-up examination     Chronic pain     Chronic pain disorder     COPD (chronic obstructive pulmonary disease) ([address] )     Diabetes mellitus ([address] )     borderline "years ago"    Food allergy     clams    GERD (gastroesophageal reflux disease)     Heart murmur     Hiatal hernia     History of malignant neoplasm     History of pneumonia 04/12/2016    History of pulmonary embolism     History of pulmonary embolus (PE)     History of ulceration     Hyperlipidemia     Lung cancer (HCC)     left lung, radiation and chemo tx    Migraine     Pneumonia     Postgastrectomy malabsorption     Right leg DVT (HCC)     Right scapholunate ligament tear     Skipped heart beats     Wears partial dentures     upper and lower     Past Surgical History:   Procedure Laterality Date    BRONCHOSCOPY      COLONOSCOPY      FRACTURE SURGERY      femur right 1985    GASTRIC BYPASS LAPAROSCOPIC N/A 7/12/2017    Procedure: GASTRIC DIVERSION WITH [place]-EN-Y RECONSTRUCTION WITH  SHORT 60 cm LIMB;  Surgeon: Tsering Delacruz MD;  [facility];  Service: Bariatrics    IR CHEST TUBE  8/9/2019    IR IMAGE GUIDED ASPIRATION / DRAINAGE  8/1/2019    IR PICC LINE  8/9/2019    IR TUBE PLACEMENT JOSAFAT  9/22/2019    KNEE ARTHROSCOPY Right     right shoulder    LAPAROTOMY N/A 7/28/2019    Procedure: LAPAROTOMY EXPLORATORY, WASHOUT OF SUCUS, REPAIR OF MARGINAL ULCER PERFORATION, ABD WASHOUT, INTRAOPERATIVE EGD, GI ASSISTED ENDOSCOPIC STENT PLACEMENT;  Surgeon: Jacquie Rolle MD;  [facility];  Service: Hessmer Bellflower Left     LYMPHADENECTOMY  05/22/2012    Dr Milton Eaton ANT INTERBODY MIN DISCECTOMY, CERVICAL BELOW C2 N/A 10/17/2017    Procedure: C5-6, C6-7 ACDF WITH ALLOGRAFT (NEURO MONITORING); Surgeon: Shaye Shaw MD;  [facility];  Service: Orthopedics    OK COLONOSCOPY FLX DX W/COLLJ SPEC WHEN PFRMD N/A 4/14/2017    Procedure: COLONOSCOPY;  Surgeon: Gibson Pedro MD;  Location: [facility];  Service: Gastroenterology    OK EGD TRANSORAL BIOPSY SINGLE/MULTIPLE N/A 1/9/2019    Procedure: ESOPHAGOGASTRODUODENOSCOPY (EGD); Surgeon: Trung Duarte MD;  [facility]; Service: Bariatrics    OK ESOPHAGOGASTRODUODENOSCOPY TRANSORAL DIAGNOSTIC N/A 1/11/2017    Procedure: ESOPHAGOGASTRODUODENOSCOPY (EGD); Surgeon: Gibson Pedro MD;  [facility];   Service: Gastroenterology    OK INCISE FINGER TENDON SHEATH Right 11/27/2018    Procedure: RELEASE TRIGGER FINGER long and index finger;  Surgeon: Felicity Cranker, MD;  [facility];  Service: Orthopedics    OK LAP, REPAIR PARAESOPHAGEAL HERNIA, INCL FUNDOPLASTY W/ MESH N/A 7/12/2017    Procedure: REPAIR HERNIA PARAESOPHAGEAL  LAPAROSCOPIC;  Surgeon: Trung Duarte MD;  [facility];  Service: Bariatrics    OK LAP,DIAGNOSTIC ABDOMEN N/A 7/26/2019    Procedure: LAPAROSCOPY DIAGNOSTIC CONVERSION TO OPEN, REDUCTION AND REPAIR OF INTERNAL HERNIA, REPAIR SEROSAL TEARS;  Surgeon: Dragan Mcarthur MD;  [facility];  Service: Bariatrics    [place] WRIST Sidonie Shelling LIG Right 2016    Procedure: RELEASE CARPAL TUNNEL ENDOSCOPIC;  Surgeon: Maureen Uribe MD;  [facility];  Service: Orthopedics    RECONSTRUCTION DISTAL RADIUS/ULNA JOINT (DRUJ) Right 2016    Procedure: WRIST SCAPHOLUNATE LIGAMENT RECONSTRUCTION WITH ECRB TENDON AUTOGRAPH , SPLINT APPLICATION ;  Surgeon: Maureen Uribe MD;  [facility];  Service:    [address] [place] [address] SHOULDER SURGERY      TONSILLECTOMY       Social History     Socioeconomic History    Marital status: /Civil Union     Spouse name: Not on file    Number of children: Not on file    Years of education: Not on file    Highest education level: Not on file   Occupational History    Not on file   Social Needs    Financial resource strain: Not on file    Food insecurity:     Worry: Not on file     Inability: Not on file    Transportation needs:     Medical: Not on file     Non-medical: Not on file   Tobacco Use    Smoking status: Former Smoker     Packs/day: 0 50     Years: 48 00     Pack years: 24 00     Types: Cigarettes     Last attempt to quit: 2017     Years since quittin 6    Smokeless tobacco: Never Used   Substance and Sexual Activity    Alcohol use: Not Currently     Frequency: Monthly or less     Drinks per session: 1 or 2     Binge frequency: Never     Comment: rarely    Drug use: No    Sexual activity: Not on file   Lifestyle    Physical activity:     Days per week: Not on file     Minutes per session: Not on file    Stress: Not on file   Relationships    Social connections:     Talks on phone: Not on file     Gets together: Not on file     Attends [employer] service: Not on file     Active member of club or organization: Not on file     Attends meetings of clubs or organizations: Not on file     Relationship status: Not on file    Intimate partner violence:     Fear of current or ex partner: Not on file     Emotionally abused: Not on file     Physically abused: Not on file     Forced sexual activity: Not on file   Other Topics Concern    Not on file   Social History Narrative    Not on file       Current Outpatient Medications:     acetaminophen (TYLENOL 8 HOUR) 650 mg CR tablet, Take 1 tablet (650 mg total) by mouth every 8 (eight) hours, Disp: 15 tablet, Rfl: 0    albuterol (PROVENTIL HFA,VENTOLIN HFA) 90 mcg/act inhaler, Inhale 2 puffs 4 (four) times a day, Disp: , Rfl: 0    diphenhydrAMINE (BENADRYL) 50 mg/mL, Infuse 0 5 mL (25 mg total) into a venous catheter every 6 (six) hours as needed for itching or sleep (Patient not taking: Reported on 1/3/2020), Disp: 10 mL, Rfl: 0    lansoprazole (PREVACID) 30 mg capsule, Take 1 capsule (30 mg total) by mouth 2 (two) times a day, Disp: 180 capsule, Rfl: 0    lansoprazole (PREVACID) 30 mg capsule, Take 1 capsule (30 mg total) by mouth 2 (two) times a day, Disp: 240 capsule, Rfl: 4    metoclopramide (REGLAN) 10 mg tablet, Take 10 mg by mouth 4 (four) times a day, Disp: , Rfl:     oxyCODONE (OXY-IR) 5 MG capsule, Take 5 mg by mouth every 4 (four) hours as needed for moderate pain, Disp: , Rfl:     oxyCODONE (ROXICODONE) 5 mg immediate release tablet, Take 1 tablet (5 mg total) by mouth every 4 (four) hours as needed for moderate painMax Daily Amount: 30 mg (Patient not taking: Reported on 11/8/2019), Disp: 10 tablet, Rfl: 0    prochlorperazine (COMPAZINE) 5 mg tablet, Take 1 tablet (5 mg total) by mouth every 6 (six) hours as needed for nausea or vomiting (Patient not taking: Reported on 11/6/2019), Disp: 30 tablet, Rfl: 0    sucralfate (CARAFATE) 1 g/10 mL suspension, Take 10 mL (1,000 mg total) by mouth 2 (two) times a day, Disp: 420 mL, Rfl: 1    sucralfate (CARAFATE) 1 g/10 mL suspension, Take 10 mL (1 g total) by mouth 4 (four) times a day, Disp: 1200 mL, Rfl: 4    Food Intake and Lifestyle Assessment   Food Intake Assessment completed via usual diet recall  Wakes: 1-3am  Sips diet soda, G2- 4oz  6am: 16oz coffee, SF cream [employer] vanilla  9-10am:cereal or eggs or bagel  -yesterday: 3/4 cup cherrios, 2% milk (4oz)- states milk "goes through me", tried almond milk but did not like the taste  12:30: leftovers or sandwich  - sandwich: cheese, 1 oz deli turkey (2 slice slices), lettuce, 1 slice tomato, falcon or honey mustard, 2 slices toasted bread eats 3/4 sandwich  Naps  Is following 30/60 rule with drinking, otherwise does not tolerate his food  6pm: 1oz roasted chicken, 1/2 cup mashed sweet potatoes  Will have a snack: cookies, cakes- states no tolerance issues  Beverages: G2, diet soda, does not like plain water  Gets sick- vomits, 2 times a week, states this is an improvement  Diet texture/stage: regular  Protein supplement: none, patient states to RD that he will not take protein supplements, nor would he allow a feeding tube to be used again  Estimated protein intake per day: 30-40 grams  Estimated caloric intake per day: 800 calories  Estimated fluid intake per day: 48-64 oz  Meals eaten away from home: n/a  Typical meal pattern: 3 meals per day and 1 snacks per day  Eating Behaviors: Does not drink with meals and waits 30-minutes after meal before resuming drinking    Food allergies or intolerances: Tolerance issues with meats/chicken, pasta and rice occasionally  Cultural or Samaritan considerations: none disclosed    Physical Assessment  Nutrition Related Findings  Dumping, Diarrhea, Constipation and Vomiting    Physical Activity  Types of exercise: activities of daily living  Current physical limitations: none    Psychosocial Assessment   Support systems: spouse  Socioeconomic factors: none disclosed    Nutrition Diagnosis  Diagnosis: Disordered eating pattern (NB-1 5)  Related to:  Altered GI function  As Evidenced by: Unintentional weight loss     Interventions and Teaching   Patient educated on post-op nutrition guidelines  Patient educated and handouts provided  Surgical changes to stomach / GI  Protein supplements  Meal planning and preparation  Appropriate carbohydrate, protein, and fat intake, and food/fluid choices to maximize safe weight loss, nutrient intake, and tolerance   Possible problems with poor eating habits    Education provided to: patient    Barriers to learning: Desire/Motivation    Readiness to change: preparation and action    Comprehension: needs reinforcement and verbalizes understanding     Expected Compliance: fair    Evaluation/Monitoring   Eating pattern as discussed Tolerance of nutrition prescription Body weight Lab values Physical activity Bowel pattern    Goals  1  Consume 5-6 small meals daily as tolerated  2  Since patient will not consume protein supplements, suggested making his own high calorie/high protein shake, recipes discussed  Use as tolerated, patient does not need to drink all at once  - encouraged patient to use in AM between when he wakes up and eats to increase caloric intake  3  Use calorie boosters as tolerated- discussed liquid calories (full fat options as tolerated, juice, etc) along with adding calories to foods (ie butter, oil, jelly, etc as tolerated)  4  Food journal daily to track calorie and protein intake to promote weight gain and to assist with identifying foods or behaviors which cause intolerance     Time Spent:   30 Minutes   Patient to f/u with RD in 6 weeks

## 2020-02-06 ENCOUNTER — OFFICE VISIT (OUTPATIENT)
Dept: URGENT CARE | Facility: CLINIC | Age: 60
End: 2020-02-06
Payer: COMMERCIAL

## 2020-02-06 VITALS
HEIGHT: 68 IN | TEMPERATURE: 96.6 F | DIASTOLIC BLOOD PRESSURE: 78 MMHG | HEART RATE: 79 BPM | RESPIRATION RATE: 18 BRPM | OXYGEN SATURATION: 98 % | BODY MASS INDEX: 18.28 KG/M2 | WEIGHT: 120.6 LBS | SYSTOLIC BLOOD PRESSURE: 149 MMHG

## 2020-02-06 DIAGNOSIS — R06.2 WHEEZING: ICD-10-CM

## 2020-02-06 DIAGNOSIS — R68.89 FLU-LIKE SYMPTOMS: Primary | ICD-10-CM

## 2020-02-06 PROCEDURE — G0382 LEV 3 HOSP TYPE B ED VISIT: HCPCS | Performed by: PHYSICIAN ASSISTANT

## 2020-02-06 RX ORDER — ONDANSETRON 8 MG/1
8 TABLET, ORALLY DISINTEGRATING ORAL EVERY 8 HOURS PRN
Qty: 20 TABLET | Refills: 0 | Status: SHIPPED | OUTPATIENT
Start: 2020-02-06 | End: 2020-07-20

## 2020-02-06 RX ORDER — ALBUTEROL SULFATE 90 UG/1
2 AEROSOL, METERED RESPIRATORY (INHALATION) EVERY 6 HOURS PRN
Qty: 8.5 G | Refills: 0 | Status: SHIPPED | OUTPATIENT
Start: 2020-02-06 | End: 2021-12-22 | Stop reason: ALTCHOICE

## 2020-02-06 RX ORDER — OSELTAMIVIR PHOSPHATE 75 MG/1
75 CAPSULE ORAL EVERY 12 HOURS SCHEDULED
Qty: 10 CAPSULE | Refills: 0 | Status: SHIPPED | OUTPATIENT
Start: 2020-02-06 | End: 2020-02-11

## 2020-02-06 RX ORDER — PREDNISONE 10 MG/1
40 TABLET ORAL DAILY
Qty: 20 TABLET | Refills: 0 | Status: SHIPPED | OUTPATIENT
Start: 2020-02-06 | End: 2020-02-11

## 2020-02-06 NOTE — PATIENT INSTRUCTIONS
Take Tamiflu, Zofran, albuterol, prednisone as prescribed   Fluids and rest  Tylenol/Ibuprofen for pain/fever  Monitor for worsening symptoms   Follow up with PCP in 3-5 days  Proceed to  ER if symptoms worsen  Influenza   WHAT YOU NEED TO KNOW:   Influenza (the flu) is an infection caused by the influenza virus  The flu is easily spread when an infected person coughs, sneezes, or has close contact with others  You may be able to spread the flu to others for 1 week or longer after signs or symptoms appear  DISCHARGE INSTRUCTIONS:   Call 911 for any of the following:   · You have trouble breathing, and your lips look purple or blue  · You have a seizure  Return to the emergency department if:   · You are dizzy, or you are urinating less or not at all  · You have a headache with a stiff neck, and you feel tired or confused  · You have new pain or pressure in your chest     · Your symptoms, such as shortness of breath, vomiting, or diarrhea, get worse  · Your symptoms, such as fever and coughing, seem to get better, but then get worse  Contact your healthcare provider if:   · You have new muscle pain or weakness  · You have questions or concerns about your condition or care  Medicines: You may need any of the following:  · Acetaminophen  decreases pain and fever  It is available without a doctor's order  Ask how much to take and how often to take it  Follow directions  Acetaminophen can cause liver damage if not taken correctly  · NSAIDs , such as ibuprofen, help decrease swelling, pain, and fever  This medicine is available with or without a doctor's order  NSAIDs can cause stomach bleeding or kidney problems in certain people  If you take blood thinner medicine, always ask your healthcare provider if NSAIDs are safe for you  Always read the medicine label and follow directions  · Antivirals  help fight a viral infection  · Take your medicine as directed    Contact your healthcare provider if you think your medicine is not helping or if you have side effects  Tell him or her if you are allergic to any medicine  Keep a list of the medicines, vitamins, and herbs you take  Include the amounts, and when and why you take them  Bring the list or the pill bottles to follow-up visits  Carry your medicine list with you in case of an emergency  Rest  as much as you can to help you recover  Drink liquids as directed  to help prevent dehydration  Ask how much liquid to drink each day and which liquids are best for you  Prevent the spread of influenza:   · Wash your hands often  Use soap and water  Wash your hands after you use the bathroom, change a child's diapers, or sneeze  Wash your hands before you prepare or eat food  Use gel hand cleanser when soap and water are not available  Do not touch your eyes, nose, or mouth unless you have washed your hands first            · Cover your mouth when you sneeze or cough  Cough into a tissue or the bend of your arm  · Clean shared items with a germ-killing   Clean table surfaces, doorknobs, and light switches  Do not share towels, silverware, and dishes with people who are sick  Wash bed sheets, towels, silverware, and dishes with soap and water  · Wear a mask  over your mouth and nose if you are sick or are near anyone who is sick  · Stay away from others  if you are sick  · Influenza vaccine  helps prevent influenza (flu)  Everyone older than 6 months should get a yearly influenza vaccine  Get the vaccine as soon as it is available, usually in September or October each year  Follow up with your healthcare provider as directed:  Write down your questions so you remember to ask them during your visits  © 2017 [employer] Information is for End User's use only and may not be sold, redistributed or otherwise used for commercial purposes   All illustrations and images included in CareNotes® are the copyrighted property of [employer]  or Nicol[employer]  The above information is an  only  It is not intended as medical advice for individual conditions or treatments  Talk to your doctor, nurse or pharmacist before following any medical regimen to see if it is safe and effective for you

## 2020-02-06 NOTE — PROGRESS NOTES
[facility]        NAME: Esperanza Padilla is a 63 y o  male  : 1960    MRN: [id number]  DATE: 2020  TIME: 10:34 AM    Assessment and Plan   Flu-like symptoms [R68 89]  1  Flu-like symptoms  oseltamivir (TAMIFLU) 75 mg capsule    ondansetron (ZOFRAN-ODT) 8 mg disintegrating tablet   2  Wheezing  albuterol (PROAIR HFA) 90 mcg/act inhaler    predniSONE 10 mg tablet     In light of prior year's serious flu complications, emphasized symptoms in which to go to the ED  Patient verbalized understanding  Lung exam and oxygen are normal today  Instructed patient to take prednisone if symptoms of exacerbation continue with the use of albuterol  Patient verbalized understanding      Patient Instructions     Take Tamiflu, Zofran, albuterol, prednisone as prescribed   Fluids and rest  Tylenol/Ibuprofen for pain/fever  Monitor for worsening symptoms   Follow up with PCP in 3-5 days  Proceed to  ER if symptoms worsen  Chief Complaint     Chief Complaint   Patient presents with    Vomiting     c/o vomiting, stuffy nose, chest tightness, body aches since last night  History of Present Illness       Denies surgery in past 4 weeks, immobilization over 3 days, calf pain/swelling, hemoptysis, chest pain, clotting disorder, or current CA diagnosis/tx  PMH of COPD, lung CA, pneumonia, DVT/PE  Patient had influenza vaccine this year  Contacts with similar symptoms  URI    This is a new problem  The current episode started yesterday  Maximum temperature: subjective  Associated symptoms include congestion, coughing (productive), rhinorrhea, vomiting and wheezing  Pertinent negatives include no abdominal pain, chest pain, diarrhea, ear pain, headaches, nausea, rash, sinus pain, sneezing or sore throat  He has tried nothing for the symptoms  Review of Systems   Review of Systems   Constitutional: Positive for chills, fatigue and fever (subjective)   Negative for activity change and appetite change  HENT: Positive for congestion and rhinorrhea  Negative for dental problem, ear discharge, ear pain, facial swelling, postnasal drip, sinus pressure, sinus pain, sneezing, sore throat and trouble swallowing  Eyes: Negative for itching  Respiratory: Positive for cough (productive), chest tightness, shortness of breath and wheezing  Cardiovascular: Negative for chest pain and palpitations  Gastrointestinal: Positive for vomiting  Negative for abdominal pain, constipation, diarrhea and nausea  Musculoskeletal: Positive for myalgias  Skin: Negative for rash  Neurological: Negative for dizziness, weakness, light-headedness and headaches           Current Medications       Current Outpatient Medications:     acetaminophen (TYLENOL 8 HOUR) 650 mg CR tablet, Take 1 tablet (650 mg total) by mouth every 8 (eight) hours, Disp: 15 tablet, Rfl: 0    albuterol (PROVENTIL HFA,VENTOLIN HFA) 90 mcg/act inhaler, Inhale 2 puffs 4 (four) times a day, Disp: , Rfl: 0    lansoprazole (PREVACID) 30 mg capsule, Take 1 capsule (30 mg total) by mouth 2 (two) times a day, Disp: 180 capsule, Rfl: 0    sucralfate (CARAFATE) 1 g/10 mL suspension, Take 10 mL (1 g total) by mouth 4 (four) times a day, Disp: 1200 mL, Rfl: 4    albuterol (PROAIR HFA) 90 mcg/act inhaler, Inhale 2 puffs every 6 (six) hours as needed for wheezing, Disp: 8 5 g, Rfl: 0    diphenhydrAMINE (BENADRYL) 50 mg/mL, Infuse 0 5 mL (25 mg total) into a venous catheter every 6 (six) hours as needed for itching or sleep (Patient not taking: Reported on 1/3/2020), Disp: 10 mL, Rfl: 0    metoclopramide (REGLAN) 10 mg tablet, Take 10 mg by mouth 4 (four) times a day, Disp: , Rfl:     ondansetron (ZOFRAN-ODT) 8 mg disintegrating tablet, Take 1 tablet (8 mg total) by mouth every 8 (eight) hours as needed for nausea or vomiting, Disp: 20 tablet, Rfl: 0    oseltamivir (TAMIFLU) 75 mg capsule, Take 1 capsule (75 mg total) by mouth every 12 (twelve) hours for 5 days, Disp: 10 capsule, Rfl: 0    oxyCODONE (OXY-IR) 5 MG capsule, Take 5 mg by mouth every 4 (four) hours as needed for moderate pain, Disp: , Rfl:     oxyCODONE (ROXICODONE) 5 mg immediate release tablet, Take 1 tablet (5 mg total) by mouth every 4 (four) hours as needed for moderate painMax Daily Amount: 30 mg (Patient not taking: Reported on 11/8/2019), Disp: 10 tablet, Rfl: 0    predniSONE 10 mg tablet, Take 4 tablets (40 mg total) by mouth daily for 5 days, Disp: 20 tablet, Rfl: 0    prochlorperazine (COMPAZINE) 5 mg tablet, Take 1 tablet (5 mg total) by mouth every 6 (six) hours as needed for nausea or vomiting (Patient not taking: Reported on 11/6/2019), Disp: 30 tablet, Rfl: 0    Current Allergies     Allergies as of 02/06/2020 - Reviewed 02/06/2020   Allergen Reaction Noted    Codeine Hives, Itching, Nausea Only, GI Intolerance, and Vomiting 04/13/2016    Hydrocodone Hives and GI Intolerance 05/18/2016    Penicillins Anaphylaxis and Throat Swelling 06/26/2012    Shellfish-derived products Nausea Only and Vomiting 11/22/2016            The following portions of the patient's history were reviewed and updated as appropriate: allergies, current medications, past family history, past medical history, past social history, past surgical history and problem list      Past Medical History:   Diagnosis Date    Anesthesia     Pt wakes up during "almost every surgery"    Arthritis     Asthma     Bariatric surgery status     Cervical radiculopathy     Chemotherapy follow-up examination     Chronic pain     Chronic pain disorder     COPD [facility] obstructive [facility] disease) [facility] [address] [facility]     Diabetes mellitus ([facility] [address] )     borderline "years ago"    Food allergy     clams    GERD (gastroesophageal reflux disease)     Heart murmur     Hiatal hernia     History of malignant neoplasm     History of pneumonia 04/12/2016    History of pulmonary embolism     History of pulmonary embolus (PE)     History of ulceration     Hyperlipidemia     Lung cancer (HCC)     left lung, radiation and chemo tx    Migraine     Pneumonia     Postgastrectomy malabsorption     Right leg DVT (HCC)     Right scapholunate ligament tear     Skipped heart beats     Wears partial dentures     upper and lower       Past Surgical History:   Procedure Laterality Date    BRONCHOSCOPY      COLONOSCOPY      FRACTURE SURGERY      femur right 1985    GASTRIC BYPASS LAPAROSCOPIC N/A 7/12/2017    Procedure: GASTRIC DIVERSION WITH BROOKLYN-EN-Y RECONSTRUCTION WITH  SHORT 60 cm LIMB;  Surgeon: Silverio Mendoza MD;  [facility];  Service: Bariatrics    IR CHEST TUBE  8/9/2019    IR IMAGE GUIDED ASPIRATION / DRAINAGE  8/1/2019    IR PICC LINE  8/9/2019    IR TUBE PLACEMENT JOSAFAT  9/22/2019    KNEE ARTHROSCOPY Right     right shoulder    LAPAROTOMY N/A 7/28/2019    Procedure: LAPAROTOMY EXPLORATORY, WASHOUT OF SUCUS, REPAIR OF MARGINAL ULCER PERFORATION, ABD WASHOUT, INTRAOPERATIVE EGD, GI ASSISTED ENDOSCOPIC STENT PLACEMENT;  Surgeon: Payton Rivera MD;  [facility];  Service: Isa Nipper Left     LYMPHADENECTOMY  05/22/2012    Dr Sybil Zapata ANT INTERBODY MIN DISCECTOMY, CERVICAL BELOW C2 N/A 10/17/2017    Procedure: C5-6, C6-7 ACDF WITH ALLOGRAFT (NEURO MONITORING); Surgeon: Casie Gallegos MD;  [facility];  Service: Orthopedics    IL COLONOSCOPY FLX DX W/COLLJ SPEC WHEN PFRMD N/A 4/14/2017    Procedure: COLONOSCOPY;  Surgeon: Karlene Lilly MD;  [facility];  Service: Gastroenterology    IL EGD TRANSORAL BIOPSY SINGLE/MULTIPLE N/A 1/9/2019    Procedure: ESOPHAGOGASTRODUODENOSCOPY (EGD); Surgeon: Silverio Mendoza MD;  [facility]; Service: Bariatrics    IL ESOPHAGOGASTRODUODENOSCOPY TRANSORAL DIAGNOSTIC N/A 1/11/2017    Procedure: ESOPHAGOGASTRODUODENOSCOPY (EGD);   Surgeon: Karlene Lilly MD; [facility]; Service: Gastroenterology    AR INCISE FINGER TENDON SHEATH Right 11/27/2018    Procedure: RELEASE TRIGGER FINGER long and index finger;  Surgeon: Desmond Tucker MD;  [facility];  Service: Orthopedics    AR LAP, REPAIR PARAESOPHAGEAL HERNIA, INCL FUNDOPLASTY W/ MESH N/A 7/12/2017    Procedure: REPAIR HERNIA PARAESOPHAGEAL  LAPAROSCOPIC;  Surgeon: Shyam Wright MD;  [facility];  Service: Bariatrics    AR LAP,DIAGNOSTIC ABDOMEN N/A 7/26/2019    Procedure: LAPAROSCOPY DIAGNOSTIC CONVERSION TO OPEN, REDUCTION AND REPAIR OF INTERNAL HERNIA, REPAIR SEROSAL TEARS;  Surgeon: Gilford Cheers, MD;  [facility];  Service: Allie Nebo AR WRIST Lynn Hoes LIG Right 6/9/2016    Procedure: RELEASE CARPAL TUNNEL ENDOSCOPIC;  Surgeon: Desmond Tucker MD;  [facility];  Service: Orthopedics    RECONSTRUCTION DISTAL RADIUS/ULNA JOINT (DRUJ) Right 9/6/2016    Procedure: WRIST SCAPHOLUNATE LIGAMENT RECONSTRUCTION WITH ECRB TENDON AUTOGRAPH , SPLINT APPLICATION ;  Surgeon: Desmond Tucker MD;  [facility];  Service:    Bharti [place] SHOULDER SURGERY      TONSILLECTOMY         Family History   Problem Relation Age of Onset    Other Mother         Back disorder    Diabetes Mother         Diabetes Mellitus    Hypertension Mother     Heart disease Father     Hypertension Father     Breast cancer Sister     Skin cancer Sister     Breast cancer Paternal Grandmother     Skin cancer Paternal Grandmother          Medications have been verified  Objective   /78 (BP Location: Left arm, Patient Position: Sitting, Cuff Size: Adult)   Pulse 79   Temp (!) 96 6 °F (35 9 °C) (Tympanic)   Resp 18   Ht 5' 8" (1 727 m)   Wt 54 7 kg (120 lb 9 6 oz)   SpO2 98%   BMI 18 34 kg/m²        Physical Exam     Physical Exam   Constitutional: He is oriented to person, place, and time  He appears well-developed and well-nourished  No distress  HENT:   Head: Normocephalic  Right Ear: External ear normal    Left Ear: External ear normal    Nose: Nose normal    Mouth/Throat: Oropharynx is clear and moist  No oropharyngeal exudate  Eyes: Conjunctivae are normal    Cardiovascular: Normal rate, regular rhythm, normal heart sounds and intact distal pulses  Exam reveals no gallop and no friction rub  No murmur heard  Pulmonary/Chest: Effort normal and breath sounds normal  No respiratory distress  He has no wheezes  He has no rales  He exhibits no tenderness  Abdominal: Soft  There is no tenderness  There is no guarding  Lymphadenopathy:     He has no cervical adenopathy  Neurological: He is alert and oriented to person, place, and time  Skin: Skin is warm  He is not diaphoretic  Psychiatric: He has a normal mood and affect  His behavior is normal  Judgment and thought content normal    Vitals reviewed

## 2020-02-19 ENCOUNTER — TELEPHONE (OUTPATIENT)
Dept: OTHER | Facility: OTHER | Age: 60
End: 2020-02-19

## 2020-06-03 ENCOUNTER — HOSPITAL ENCOUNTER (OUTPATIENT)
Dept: RADIOLOGY | Facility: HOSPITAL | Age: 60
Discharge: HOME | End: 2020-06-03
Payer: MEDICARE

## 2020-06-03 DIAGNOSIS — K28.9 MARGINAL ULCER: ICD-10-CM

## 2020-06-03 PROCEDURE — 74240 X-RAY XM UPR GI TRC 1CNTRST: CPT

## 2020-06-05 ENCOUNTER — TELEPHONE (OUTPATIENT)
Dept: CARDIAC SURGERY | Facility: CLINIC | Age: 60
End: 2020-06-05

## 2020-06-09 ENCOUNTER — HOSPITAL ENCOUNTER (OUTPATIENT)
Dept: CT IMAGING | Facility: HOSPITAL | Age: 60
Discharge: HOME | End: 2020-06-09
Attending: THORACIC SURGERY (CARDIOTHORACIC VASCULAR SURGERY)
Payer: MEDICARE

## 2020-06-09 DIAGNOSIS — C34.92 ADENOCARCINOMA OF LEFT LUNG (HCC): ICD-10-CM

## 2020-06-09 PROCEDURE — 71250 CT THORAX DX C-: CPT

## 2020-06-12 ENCOUNTER — OFFICE VISIT (OUTPATIENT)
Dept: CARDIAC SURGERY | Facility: CLINIC | Age: 60
End: 2020-06-12
Payer: MEDICARE

## 2020-06-12 VITALS
SYSTOLIC BLOOD PRESSURE: 137 MMHG | WEIGHT: 124.4 LBS | OXYGEN SATURATION: 99 % | DIASTOLIC BLOOD PRESSURE: 64 MMHG | BODY MASS INDEX: 18.85 KG/M2 | TEMPERATURE: 96.6 F | HEIGHT: 68 IN | HEART RATE: 80 BPM

## 2020-06-12 DIAGNOSIS — Z85.118 HISTORY OF LUNG CANCER: Primary | ICD-10-CM

## 2020-06-12 PROCEDURE — 3008F BODY MASS INDEX DOCD: CPT | Performed by: PHYSICIAN ASSISTANT

## 2020-06-12 PROCEDURE — 3078F DIAST BP <80 MM HG: CPT | Performed by: PHYSICIAN ASSISTANT

## 2020-06-12 PROCEDURE — 99213 OFFICE O/P EST LOW 20 MIN: CPT | Performed by: PHYSICIAN ASSISTANT

## 2020-06-12 PROCEDURE — 1036F TOBACCO NON-USER: CPT | Performed by: PHYSICIAN ASSISTANT

## 2020-06-12 PROCEDURE — 3075F SYST BP GE 130 - 139MM HG: CPT | Performed by: PHYSICIAN ASSISTANT

## 2020-06-17 ENCOUNTER — OFFICE VISIT (OUTPATIENT)
Dept: BARIATRICS | Facility: CLINIC | Age: 60
End: 2020-06-17
Payer: MEDICARE

## 2020-06-17 VITALS
TEMPERATURE: 98.4 F | HEART RATE: 76 BPM | DIASTOLIC BLOOD PRESSURE: 68 MMHG | RESPIRATION RATE: 16 BRPM | WEIGHT: 121 LBS | BODY MASS INDEX: 18.34 KG/M2 | SYSTOLIC BLOOD PRESSURE: 112 MMHG | HEIGHT: 68 IN

## 2020-06-17 DIAGNOSIS — K28.5 CHRONIC MARGINAL ULCER WITH PERFORATION (HCC): ICD-10-CM

## 2020-06-17 DIAGNOSIS — K28.9 MARGINAL ULCER: ICD-10-CM

## 2020-06-17 DIAGNOSIS — Z98.84 BARIATRIC SURGERY STATUS: ICD-10-CM

## 2020-06-17 DIAGNOSIS — K91.2 POSTSURGICAL MALABSORPTION: Primary | ICD-10-CM

## 2020-06-17 PROCEDURE — 3008F BODY MASS INDEX DOCD: CPT | Performed by: SURGERY

## 2020-06-17 PROCEDURE — 3074F SYST BP LT 130 MM HG: CPT | Performed by: SURGERY

## 2020-06-17 PROCEDURE — 3078F DIAST BP <80 MM HG: CPT | Performed by: SURGERY

## 2020-06-17 PROCEDURE — 1036F TOBACCO NON-USER: CPT | Performed by: SURGERY

## 2020-06-17 PROCEDURE — 99213 OFFICE O/P EST LOW 20 MIN: CPT | Performed by: SURGERY

## 2020-06-17 RX ORDER — SUCRALFATE ORAL 1 G/10ML
1 SUSPENSION ORAL 4 TIMES DAILY
Qty: 1200 ML | Refills: 4 | Status: SHIPPED | OUTPATIENT
Start: 2020-06-17 | End: 2021-07-01

## 2020-06-22 ENCOUNTER — TELEPHONE (OUTPATIENT)
Dept: GASTROENTEROLOGY | Facility: AMBULARY SURGERY CENTER | Age: 60
End: 2020-06-22

## 2020-06-26 ENCOUNTER — TELEMEDICINE (OUTPATIENT)
Dept: GASTROENTEROLOGY | Facility: MEDICAL CENTER | Age: 60
End: 2020-06-26
Payer: MEDICARE

## 2020-06-26 DIAGNOSIS — K28.5 CHRONIC MARGINAL ULCER WITH PERFORATION (HCC): ICD-10-CM

## 2020-06-26 DIAGNOSIS — K91.89 ANASTOMOTIC STRICTURE OF GASTROJEJUNOSTOMY: Primary | ICD-10-CM

## 2020-06-26 PROCEDURE — 99213 OFFICE O/P EST LOW 20 MIN: CPT | Performed by: INTERNAL MEDICINE

## 2020-06-30 ENCOUNTER — TELEPHONE (OUTPATIENT)
Dept: GASTROENTEROLOGY | Facility: CLINIC | Age: 60
End: 2020-06-30

## 2020-06-30 PROBLEM — K91.89 ANASTOMOTIC STRICTURE OF GASTROJEJUNOSTOMY: Status: ACTIVE | Noted: 2020-06-30

## 2020-06-30 NOTE — H&P (VIEW-ONLY)
Virtual Regular Visit      Assessment/Plan:    Problem List Items Addressed This Visit        Digestive    Chronic marginal ulcer with perforation ([address] )    Anastomotic stricture of gastrojejunostomy - Primary    Relevant Orders    EGD Fluoro    PAT Covid Screening        1  Gastrojejunostomy stricture which has been complicated by a marginal ulcer and perforation which was repaired  Most recent gastrojejunostomy was reviewed and does show narrowing at the side of the gastrojejunostomy  He is unable to tolerate solid foods  At this time repeat surgery would be a challenge  I would recommend placement of an Axios stent to traverse the area of the stricture  This would open of the stricture to approximately 15 mm which showed allow him to eat solid food  I discussed with him that this can be associated with risk of bleeding and perforation  He is agreeable to proceed  Continue PPI for the time being including Carafate  Continue liquid diet as tolerated  His imaging was personally reviewed and reviewed with the patient as well  Reason for visit is   Chief Complaint   Patient presents with    Virtual Regular Visit        Encounter provider Glendy Lim MD    Provider located at [address] [id number]      Recent Visits  Date Type Provider Dept   06/26/20 Telemedicine MD Percy Johansen 98 recent visits within past 7 days and meeting all other requirements     Future Appointments  No visits were found meeting these conditions  Showing future appointments within next 150 days and meeting all other requirements        The patient was identified by name and date of birth  Davide Martinez was informed that this is a telemedicine visit and that the visit is being conducted through [employer]  My office door was closed  No one else was in the room    He acknowledged consent and understanding of privacy and security of the video platform  The patient has agreed to participate and understands they can discontinue the visit at any time  Patient is aware this is a billable service  Subjective  Javi Valencia is a 63 y o  male 59-year-old gentleman with a history of Britney-en-Y gastric bypass status post ex lap and marginal ulcer perforation repair NG tube placement in July of 2019  At that time an endoscopy was performed during which an area of leak was identified at the anastomosis  This area was traversed  A team mm by 12 cm esophageal stent was then placed across this area  The stent was subsequently removed  He also had a percutaneous cholecystostomy for a can not close cholecystitis in September 2019  He complains of severe persistent epigastric abdominal pain  He is unable to tolerate food but does state that the liquids go down okay but cause him pain  He does have nausea vomiting after eating solid foods  He has taken Prevacid but not been taking Carafate  He is not smoking  He had capping trial with this percutaneous cholecystostomy in November 2019 and was then removed  The G-tube was also removed  He then had a upper GI study which shows a stricture at the gastrojejunostomy site  I discussed this with him and showed him the images of his screen share      HPI     Past Medical History:   Diagnosis Date    Anesthesia     Pt wakes up during "almost every surgery"    Arthritis     Asthma     Bariatric surgery status     Cervical radiculopathy     Chemotherapy follow-up examination     Chronic pain     Chronic pain disorder     COPD (chronic obstructive pulmonary disease) ([address] )     Diabetes mellitus ([address] )     borderline "years ago"    Food allergy     clams    GERD (gastroesophageal reflux disease)     Heart murmur     Hiatal hernia     History of malignant neoplasm     History of pneumonia 04/12/2016    History of pulmonary embolism     History of pulmonary embolus (PE)     History of ulceration     Hyperlipidemia     Lung cancer (HCC)     left lung, radiation and chemo tx    Migraine     Pneumonia     Postgastrectomy malabsorption     Right leg DVT (HCC)     Right scapholunate ligament tear     Skipped heart beats     Wears partial dentures     upper and lower       Past Surgical History:   Procedure Laterality Date    BRONCHOSCOPY      COLONOSCOPY      FRACTURE SURGERY      femur right 1985    GASTRIC BYPASS LAPAROSCOPIC N/A 7/12/2017    Procedure: GASTRIC DIVERSION WITH BROOKLYN-EN-Y RECONSTRUCTION WITH  SHORT 60 cm LIMB;  Surgeon: Gaudencio Mauro MD;  [facility];  Service: Bariatrics    IR CHEST TUBE  8/9/2019    IR IMAGE GUIDED ASPIRATION / DRAINAGE  8/1/2019    IR PICC LINE  8/9/2019    IR TUBE PLACEMENT JOSAFAT  9/22/2019    KNEE ARTHROSCOPY Right     right shoulder    LAPAROTOMY N/A 7/28/2019    Procedure: LAPAROTOMY EXPLORATORY, WASHOUT OF SUCUS, REPAIR OF MARGINAL ULCER PERFORATION, ABD WASHOUT, INTRAOPERATIVE EGD, GI ASSISTED ENDOSCOPIC STENT PLACEMENT;  Surgeon: Kristen Vasquez MD;  [facility];  Service: Ann Shields Left     LYMPHADENECTOMY  05/22/2012    Dr Kateryna Cardoso ANT INTERBODY MIN DISCECTOMY, CERVICAL BELOW C2 N/A 10/17/2017    Procedure: C5-6, C6-7 ACDF WITH ALLOGRAFT (NEURO MONITORING); Surgeon: Simeon Patrick MD;  [facility];  Service: Orthopedics    MD COLONOSCOPY FLX DX W/COLLJ SPEC WHEN PFRMD N/A 4/14/2017    Procedure: COLONOSCOPY;  Surgeon: Elvia Millard MD;  [facility];  Service: Gastroenterology    MD EGD TRANSORAL BIOPSY SINGLE/MULTIPLE N/A 1/9/2019    Procedure: ESOPHAGOGASTRODUODENOSCOPY (EGD); Surgeon: Gaudencio Mauro MD;  [facility];   Service: Bariatrics    MD ESOPHAGOGASTRODUODENOSCOPY TRANSORAL DIAGNOSTIC N/A 1/11/2017    Procedure: ESOPHAGOGASTRODUODENOSCOPY (EGD); Surgeon: Rell Stockton MD;  [facility];   Service: Gastroenterology    MS INCISE FINGER TENDON SHEATH Right 11/27/2018    Procedure: RELEASE TRIGGER FINGER long and index finger;  Surgeon: Gracia Silver MD;  [facility];  Service: Orthopedics    MS LAP, REPAIR PARAESOPHAGEAL HERNIA, INCL FUNDOPLASTY W/ MESH N/A 7/12/2017    Procedure: REPAIR HERNIA PARAESOPHAGEAL  LAPAROSCOPIC;  Surgeon: Declan Anton MD;  [facility];  Service: Bariatrics    MS LAP,DIAGNOSTIC ABDOMEN N/A 7/26/2019    Procedure: LAPAROSCOPY DIAGNOSTIC CONVERSION TO OPEN, REDUCTION AND REPAIR OF INTERNAL HERNIA, REPAIR SEROSAL TEARS;  Surgeon: Papa March MD;  [facility];  Service: Fredis Charles MS WRIST Jinnie Footman LIG Right 6/9/2016    Procedure: RELEASE CARPAL TUNNEL ENDOSCOPIC;  Surgeon: Gracia Silver MD;  [facility];  Service: Orthopedics    RECONSTRUCTION DISTAL RADIUS/ULNA JOINT (DRUJ) Right 9/6/2016    Procedure: WRIST SCAPHOLUNATE LIGAMENT RECONSTRUCTION WITH ECRB TENDON AUTOGRAPH , SPLINT APPLICATION ;  Surgeon: Gracia Silver MD;  [facility];  Service:    Li Capone SHOULDER SURGERY      TONSILLECTOMY         Current Outpatient Medications   Medication Sig Dispense Refill    acetaminophen (TYLENOL 8 HOUR) 650 mg CR tablet Take 1 tablet (650 mg total) by mouth every 8 (eight) hours 15 tablet 0    albuterol (PROAIR HFA) 90 mcg/act inhaler Inhale 2 puffs every 6 (six) hours as needed for wheezing 8 5 g 0    albuterol (PROVENTIL HFA,VENTOLIN HFA) 90 mcg/act inhaler Inhale 2 puffs 4 (four) times a day  0    diphenhydrAMINE (BENADRYL) 50 mg/mL Infuse 0 5 mL (25 mg total) into a venous catheter every 6 (six) hours as needed for itching or sleep (Patient not taking: Reported on 6/12/2020) 10 mL 0    lansoprazole (PREVACID) 30 mg capsule Take 1 capsule (30 mg total) by mouth 2 (two) times a day 180 capsule 0    metoclopramide (REGLAN) 10 mg tablet Take 10 mg by mouth 4 (four) times a day      ondansetron (ZOFRAN-ODT) 8 mg disintegrating tablet Take 1 tablet (8 mg total) by mouth every 8 (eight) hours as needed for nausea or vomiting (Patient not taking: Reported on 6/12/2020) 20 tablet 0    oxyCODONE (OXY-IR) 5 MG capsule Take 5 mg by mouth every 4 (four) hours as needed for moderate pain      oxyCODONE (ROXICODONE) 5 mg immediate release tablet Take 1 tablet (5 mg total) by mouth every 4 (four) hours as needed for moderate painMax Daily Amount: 30 mg (Patient not taking: Reported on 6/12/2020) 10 tablet 0    prochlorperazine (COMPAZINE) 5 mg tablet Take 1 tablet (5 mg total) by mouth every 6 (six) hours as needed for nausea or vomiting (Patient not taking: Reported on 6/12/2020) 30 tablet 0    sucralfate (CARAFATE) 1 g/10 mL suspension Take 10 mL (1 g total) by mouth 4 (four) times a day 1200 mL 4     No current facility-administered medications for this visit  Allergies   Allergen Reactions    Codeine Hives, Itching, Nausea Only, GI Intolerance and Vomiting    Hydrocodone Hives and GI Intolerance    Shellfish-Derived Products Nausea Only and Vomiting       Review of Systems    Video Exam    There were no vitals filed for this visit  Physical Exam     REVIEW OF SYSTEMS:    CONSTITUTIONAL: Denies any fever, chills, rigors, and weight loss  HEENT: No earache or tinnitus  Denies hearing loss or visual disturbances  CARDIOVASCULAR: No chest pain or palpitations  RESPIRATORY: Denies any cough, hemoptysis, shortness of breath or dyspnea on exertion  GASTROINTESTINAL: As noted in the History of Present Illness  GENITOURINARY: No problems with urination  Denies any hematuria or dysuria  NEUROLOGIC: No dizziness or vertigo, denies headaches  MUSCULOSKELETAL: Denies any muscle or joint pain  SKIN: Denies skin rashes or itching  ENDOCRINE: Denies excessive thirst  Denies intolerance to heat or cold    PSYCHOSOCIAL: Denies depression or anxiety  Denies any recent memory loss  PHYSICAL EXAMINATION:  Appearance and vitals taken from home devices    General Appearance:   Alert, cooperative, no distress   HEENT:  Normocephalic, atraumatic, anicteric  Neck supple, symmetrical, trachea midline  Lungs:   Equal chest rise and unlabored breathing, normal effort, no coughing  Abdomen:   No abdominal distension  Skin:   No jaundice, rashes, or lesions on visualized areas  Musculoskeletal:   Normal range of motion visualized  Psych:  Normal affect and normal insight  Neuro:  Alert and appropriate  As a result of this visit, I have not referred the patient for further respiratory evaluation  I spent 15 minutes directly with the patient during this visit      VIRTUAL VISIT DISCLAIMER    Chantelle Pope acknowledges that he has consented to an online visit or consultation  He understands that the online visit is based solely on information provided by him, and that, in the absence of a face-to-face physical evaluation by the physician, the diagnosis he receives is both limited and provisional in terms of accuracy and completeness  This is not intended to replace a full medical face-to-face evaluation by the physician  Chantelle Pope understands and accepts these terms

## 2020-07-13 DIAGNOSIS — K91.89 ANASTOMOTIC STRICTURE OF GASTROJEJUNOSTOMY: ICD-10-CM

## 2020-07-13 PROCEDURE — U0003 INFECTIOUS AGENT DETECTION BY NUCLEIC ACID (DNA OR RNA); SEVERE ACUTE RESPIRATORY SYNDROME CORONAVIRUS 2 (SARS-COV-2) (CORONAVIRUS DISEASE [COVID-19]), AMPLIFIED PROBE TECHNIQUE, MAKING USE OF HIGH THROUGHPUT TECHNOLOGIES AS DESCRIBED BY CMS-2020-01-R: HCPCS

## 2020-07-15 LAB
INPATIENT: NORMAL
SARS-COV-2 RNA SPEC QL NAA+PROBE: NOT DETECTED

## 2020-07-19 ENCOUNTER — ANESTHESIA EVENT (OUTPATIENT)
Dept: GASTROENTEROLOGY | Facility: HOSPITAL | Age: 60
End: 2020-07-19

## 2020-07-20 ENCOUNTER — ANESTHESIA (OUTPATIENT)
Dept: GASTROENTEROLOGY | Facility: HOSPITAL | Age: 60
End: 2020-07-20

## 2020-07-20 ENCOUNTER — HOSPITAL ENCOUNTER (OUTPATIENT)
Dept: GASTROENTEROLOGY | Facility: HOSPITAL | Age: 60
Setting detail: OUTPATIENT SURGERY
Discharge: HOME | End: 2020-07-20
Attending: INTERNAL MEDICINE
Payer: MEDICARE

## 2020-07-20 ENCOUNTER — HOSPITAL ENCOUNTER (OUTPATIENT)
Dept: RADIOLOGY | Facility: HOSPITAL | Age: 60
Discharge: HOME | End: 2020-07-20
Payer: MEDICARE

## 2020-07-20 VITALS
BODY MASS INDEX: 18.34 KG/M2 | HEART RATE: 72 BPM | WEIGHT: 121 LBS | TEMPERATURE: 98.3 F | RESPIRATION RATE: 16 BRPM | HEIGHT: 68 IN | DIASTOLIC BLOOD PRESSURE: 60 MMHG | SYSTOLIC BLOOD PRESSURE: 98 MMHG | OXYGEN SATURATION: 100 %

## 2020-07-20 DIAGNOSIS — K91.89 ANASTOMOTIC STRICTURE OF GASTROJEJUNOSTOMY: ICD-10-CM

## 2020-07-20 DIAGNOSIS — T85.528A MIGRATION OF ESOPHAGEAL STENT, INITIAL ENCOUNTER: ICD-10-CM

## 2020-07-20 PROCEDURE — C1726 CATH, BAL DIL, NON-VASCULAR: HCPCS

## 2020-07-20 PROCEDURE — 88305 TISSUE EXAM BY PATHOLOGIST: CPT | Performed by: PATHOLOGY

## 2020-07-20 PROCEDURE — 43249 ESOPH EGD DILATION <30 MM: CPT | Performed by: INTERNAL MEDICINE

## 2020-07-20 PROCEDURE — 43239 EGD BIOPSY SINGLE/MULTIPLE: CPT | Performed by: INTERNAL MEDICINE

## 2020-07-20 PROCEDURE — 71045 X-RAY EXAM CHEST 1 VIEW: CPT

## 2020-07-20 RX ORDER — LIDOCAINE HYDROCHLORIDE 20 MG/ML
INJECTION, SOLUTION EPIDURAL; INFILTRATION; INTRACAUDAL; PERINEURAL AS NEEDED
Status: DISCONTINUED | OUTPATIENT
Start: 2020-07-20 | End: 2020-07-20 | Stop reason: SURG

## 2020-07-20 RX ORDER — PROPOFOL 10 MG/ML
INJECTION, EMULSION INTRAVENOUS CONTINUOUS PRN
Status: DISCONTINUED | OUTPATIENT
Start: 2020-07-20 | End: 2020-07-20 | Stop reason: SURG

## 2020-07-20 RX ORDER — PROPOFOL 10 MG/ML
INJECTION, EMULSION INTRAVENOUS AS NEEDED
Status: DISCONTINUED | OUTPATIENT
Start: 2020-07-20 | End: 2020-07-20 | Stop reason: SURG

## 2020-07-20 RX ORDER — SODIUM CHLORIDE 9 MG/ML
INJECTION, SOLUTION INTRAVENOUS CONTINUOUS PRN
Status: DISCONTINUED | OUTPATIENT
Start: 2020-07-20 | End: 2020-07-20 | Stop reason: SURG

## 2020-07-20 RX ORDER — GLYCOPYRROLATE 0.2 MG/ML
INJECTION INTRAMUSCULAR; INTRAVENOUS AS NEEDED
Status: DISCONTINUED | OUTPATIENT
Start: 2020-07-20 | End: 2020-07-20 | Stop reason: SURG

## 2020-07-20 RX ADMIN — LIDOCAINE HYDROCHLORIDE 80 MG: 20 INJECTION, SOLUTION EPIDURAL; INFILTRATION; INTRACAUDAL; PERINEURAL at 15:44

## 2020-07-20 RX ADMIN — PROPOFOL 100 MG: 10 INJECTION, EMULSION INTRAVENOUS at 15:44

## 2020-07-20 RX ADMIN — IOHEXOL 7 ML: 240 INJECTION, SOLUTION INTRATHECAL; INTRAVASCULAR; INTRAVENOUS; ORAL at 15:55

## 2020-07-20 RX ADMIN — SODIUM CHLORIDE: 0.9 INJECTION, SOLUTION INTRAVENOUS at 15:37

## 2020-07-20 RX ADMIN — PROPOFOL 150 MCG/KG/MIN: 10 INJECTION, EMULSION INTRAVENOUS at 15:44

## 2020-07-20 RX ADMIN — GLYCOPYRROLATE 0.1 MG: 0.2 INJECTION, SOLUTION INTRAMUSCULAR; INTRAVENOUS at 15:44

## 2020-07-20 NOTE — ANESTHESIA POSTPROCEDURE EVALUATION
Post-Op Assessment Note    CV Status:  Stable  Pain Score: 0    Pain management: adequate     Mental Status:  Alert and awake   Hydration Status:  Euvolemic   PONV Controlled:  Controlled   Airway Patency:  Patent   Post Op Vitals Reviewed: Yes      Staff: CRNA           BP 94/57 (07/20/20 1614)    Temp      Pulse 78 (07/20/20 1614)   Resp 16 (07/20/20 1614)    SpO2 100 % (07/20/20 1614)

## 2020-07-20 NOTE — INTERVAL H&P NOTE
H&P reviewed  After examining the patient I find no changes in the patients condition since the H&P had been written      Vitals:    07/20/20 1420   BP: 120/69   Pulse: 69   Resp: 18   Temp: 98 3 °F (36 8 °C)   SpO2: 99%

## 2020-07-20 NOTE — ANESTHESIA PREPROCEDURE EVALUATION
Review of Systems/Medical History  Patient summary reviewed        Cardiovascular  Hyperlipidemia,    Pulmonary  Pneumonia, COPD , Asthma ,   Comment: history of stage IIA left lower lobe adenocarcinoma status post 2012 left VATS and left lower lobectomy as well as adjuvant chemotherapy yearly CT imaging     GI/Hepatic    PUD, GERD ,  Hiatal hernia, Bariatric surgery,   Comment: Multiple exploratory laps          Endo/Other  Diabetes ,      GYN       Hematology   Musculoskeletal    Arthritis     Neurology   Psychology           Physical Exam    Airway    Mallampati score: II  TM Distance: >3 FB  Neck ROM: full     Dental       Cardiovascular      Pulmonary      Other Findings        Anesthesia Plan  ASA Score- 3     Anesthesia Type- IV sedation with anesthesia with ASA Monitors  Additional Monitors:   Airway Plan:         Plan Factors-    Induction- intravenous  Postoperative Plan-     Informed Consent- Anesthetic plan and risks discussed with patient            2b view with MAC 3 2019

## 2020-08-05 ENCOUNTER — OFFICE VISIT (OUTPATIENT)
Dept: GASTROENTEROLOGY | Facility: MEDICAL CENTER | Age: 60
End: 2020-08-05
Payer: MEDICARE

## 2020-08-05 VITALS
TEMPERATURE: 97 F | WEIGHT: 120 LBS | SYSTOLIC BLOOD PRESSURE: 110 MMHG | HEART RATE: 72 BPM | DIASTOLIC BLOOD PRESSURE: 60 MMHG | HEIGHT: 68 IN | BODY MASS INDEX: 18.19 KG/M2

## 2020-08-05 DIAGNOSIS — K22.70 BARRETT'S ESOPHAGUS WITHOUT DYSPLASIA: Primary | ICD-10-CM

## 2020-08-05 DIAGNOSIS — K65.1 INTRA-ABDOMINAL ABSCESS (HCC): ICD-10-CM

## 2020-08-05 DIAGNOSIS — S31.109D OPEN WOUND OF ABDOMINAL WALL, SUBSEQUENT ENCOUNTER: ICD-10-CM

## 2020-08-05 DIAGNOSIS — K45.8 INTERNAL HERNIA: ICD-10-CM

## 2020-08-05 DIAGNOSIS — K91.89 ANASTOMOTIC STRICTURE OF GASTROJEJUNOSTOMY: ICD-10-CM

## 2020-08-05 DIAGNOSIS — K28.5 CHRONIC MARGINAL ULCER WITH PERFORATION (HCC): ICD-10-CM

## 2020-08-05 DIAGNOSIS — R10.9 ABDOMINAL PAIN: ICD-10-CM

## 2020-08-05 DIAGNOSIS — Z98.84 BARIATRIC SURGERY STATUS: ICD-10-CM

## 2020-08-05 PROCEDURE — 3078F DIAST BP <80 MM HG: CPT | Performed by: INTERNAL MEDICINE

## 2020-08-05 PROCEDURE — 3008F BODY MASS INDEX DOCD: CPT | Performed by: INTERNAL MEDICINE

## 2020-08-05 PROCEDURE — 3074F SYST BP LT 130 MM HG: CPT | Performed by: INTERNAL MEDICINE

## 2020-08-05 PROCEDURE — 99214 OFFICE O/P EST MOD 30 MIN: CPT | Performed by: INTERNAL MEDICINE

## 2020-08-05 PROCEDURE — 1036F TOBACCO NON-USER: CPT | Performed by: INTERNAL MEDICINE

## 2020-08-05 RX ORDER — LANSOPRAZOLE 30 MG/1
30 CAPSULE, DELAYED RELEASE ORAL 2 TIMES DAILY
Qty: 180 CAPSULE | Refills: 1 | Status: SHIPPED | OUTPATIENT
Start: 2020-08-05 | End: 2021-07-01

## 2020-08-05 NOTE — PROGRESS NOTES
Outpatient Follow up  Inderjit Moise [address] [place] [id number]  Manish Lance MD  Ph : [phone]  Fax : [phone]  Mobile : [phone]  Email : [email or web address]  Also available on Bebo Delong 63 y o  male MRN: [id number]    PCP: Lucrecia Deleon DO  Referring: No referring provider defined for this encounter  Farooq Spangler presented for a follow up visit  My recommendations are included  Please do not hesitate to contact me with any questions you may have  ASSESSMENT AND PLAN:      No problem-specific Assessment & Plan notes found for this encounter  Diagnoses and all orders for this visit:    Saucedo's esophagus without dysplasia    Bariatric surgery status  -     lansoprazole (PREVACID) 30 mg capsule; Take 1 capsule (30 mg total) by mouth 2 (two) times a day    Internal hernia  -     lansoprazole (PREVACID) 30 mg capsule; Take 1 capsule (30 mg total) by mouth 2 (two) times a day    Intra-abdominal abscess (HCC)  -     lansoprazole (PREVACID) 30 mg capsule; Take 1 capsule (30 mg total) by mouth 2 (two) times a day    Abdominal pain  -     lansoprazole (PREVACID) 30 mg capsule; Take 1 capsule (30 mg total) by mouth 2 (two) times a day    Open wound of abdominal wall, subsequent encounter  -     lansoprazole (PREVACID) 30 mg capsule; Take 1 capsule (30 mg total) by mouth 2 (two) times a day    Chronic marginal ulcer with perforation (HCC)  -     lansoprazole (PREVACID) 30 mg capsule; Take 1 capsule (30 mg total) by mouth 2 (two) times a day    Anastomotic stricture of gastrojejunostomy    Other orders  -     Simethicone (GAS-X PO); Take by mouth  -     Probiotic Product (UP4 PROBIOTICS ADULT PO); Take by mouth      64 y/o with gastric bypass and now with dysphagia post healign of a marginal ulcer  EGD did not show any significant stricture and dilation was done  Doing well now with some bloating   Has GERD symptoms as well controlled on PPI  He has Saucedo's esophagus as well  1  Simethicone as needed   2  Probiotics - may stop after 2 months and see how you do  If symptoms come back (bloating, change in bowel habits) then restart  3  Prevacid twice daily for 2 months then once a day  If reflux comes back then increase back to twice daily  4  Stop the carafate  May use maalox instead  5  Follow up in a year  6  Endoscopy and Colonoscopy in 2022    ______________________________________________________________________    SUBJECTIVE:    He does eat hard candy - mints all the time and it helps with the pain  He has been eating well  He has had bloating - uses simethicone and probiotics  Notices that he has lactose intolerance  He has no significant reflux  He is on carafate and Prevacid BID  Weight is okay  EGD showed Barretts esophagus (non dysplastic and short segment)  The GJ was patent  Dilation was done and no stent placed  No ulcer was seen  He has had a colonoscopy done in 2017 with two polyps - repeat recommended in 5 years (2022)  REVIEW OF SYSTEMS IS OTHERWISE NEGATIVE        Historical Information   Past Medical History:   Diagnosis Date    Anesthesia     Pt wakes up during "almost every surgery"    Arthritis     Asthma     Bariatric surgery status     Cervical radiculopathy     Chemotherapy follow-up examination     Chronic pain     Chronic pain disorder     COPD [facility]ronic obstructive pulmonary disease) [facility] [address] [facility]     Diabetes mellitus ([facility] [address] )     borderline "years ago"    Food allergy     clams    GERD (gastroesophageal reflux disease)     Heart murmur     Hiatal hernia     History of malignant neoplasm     History of pneumonia 04/12/2016    History of pulmonary embolism     History of pulmonary embolus (PE)     History of ulceration     Hyperlipidemia     Lung cancer (HCC)     left lung, radiation and chemo tx    Migraine     Pneumonia     Postgastrectomy malabsorption     Right leg DVT (HCC)     Right scapholunate ligament tear     Skipped heart beats     Wears partial dentures     upper and lower     Past Surgical History:   Procedure Laterality Date    BRONCHOSCOPY      COLONOSCOPY      FRACTURE SURGERY      femur right 1985    GASTRIC BYPASS LAPAROSCOPIC N/A 7/12/2017    Procedure: GASTRIC DIVERSION WITH [place]-EN-Y RECONSTRUCTION WITH  SHORT 60 cm LIMB;  Surgeon: Raymundo Amezquita MD;  [facility];  Service: Bariatrics    IR CHEST TUBE  8/9/2019    IR IMAGE GUIDED ASPIRATION / DRAINAGE  8/1/2019    IR PICC LINE  8/9/2019    IR TUBE PLACEMENT JOSAFAT  9/22/2019    KNEE ARTHROSCOPY Right     right shoulder    LAPAROTOMY N/A 7/28/2019    Procedure: LAPAROTOMY EXPLORATORY, WASHOUT OF SUCUS, REPAIR OF MARGINAL ULCER PERFORATION, ABD WASHOUT, INTRAOPERATIVE EGD, GI ASSISTED ENDOSCOPIC STENT PLACEMENT;  Surgeon: David Zavala MD;  [facility];  Service: Wedron Maw Left     LYMPHADENECTOMY  05/22/2012    Dr Meeta Ortiz ANT INTERBODY MIN DISCECTOMY, CERVICAL BELOW C2 N/A 10/17/2017    Procedure: C5-6, C6-7 ACDF WITH ALLOGRAFT (NEURO MONITORING); Surgeon: Britt Rivas MD;  [facility];  Service: Orthopedics    [place] COLONOSCOPY FLX DX W/COLLJ SPEC WHEN PFRMD N/A 4/14/2017    Procedure: COLONOSCOPY;  Surgeon: Regina Barrett MD;  [facility];  Service: Gastroenterology    [place] EGD TRANSORAL BIOPSY SINGLE/MULTIPLE N/A 1/9/2019    Procedure: ESOPHAGOGASTRODUODENOSCOPY (EGD); Surgeon: Raymundo Amezquita MD;  [facility]; Service: Bariatrics    NY ESOPHAGOGASTRODUODENOSCOPY TRANSORAL DIAGNOSTIC N/A 1/11/2017    Procedure: ESOPHAGOGASTRODUODENOSCOPY (EGD); Surgeon: Regina Barrett MD;  [facility];   Service: Gastroenterology    NY INCISE FINGER TENDON SHEATH Right 11/27/2018    Procedure: RELEASE TRIGGER FINGER long and index finger;  Surgeon: Bronwyn Daniel MD;  [facility];  Service: Orthopedics    CT LAP, REPAIR PARAESOPHAGEAL HERNIA, INCL FUNDOPLASTY W/ MESH N/A 7/12/2017    Procedure: REPAIR HERNIA PARAESOPHAGEAL  LAPAROSCOPIC;  Surgeon: Carla Garcia MD;  [facility];  Service: Bariatrics    CT LAP,DIAGNOSTIC ABDOMEN N/A 7/26/2019    Procedure: LAPAROSCOPY DIAGNOSTIC CONVERSION TO OPEN, REDUCTION AND REPAIR OF INTERNAL HERNIA, REPAIR SEROSAL TEARS;  Surgeon: Sharri Flor MD;  [facility];  Service: Bariatrics    CT WRIST Sheryn Busman LIG Right 6/9/2016    Procedure: RELEASE CARPAL TUNNEL ENDOSCOPIC;  Surgeon: Bronwyn Daniel MD;  [facility];  Service: Orthopedics    RECONSTRUCTION DISTAL RADIUS/ULNA JOINT (DRUJ) Right 9/6/2016    Procedure: WRIST SCAPHOLUNATE LIGAMENT RECONSTRUCTION WITH ECRB TENDON AUTOGRAPH , SPLINT APPLICATION ;  Surgeon: Bronwyn Daniel MD;  [facility];  Service:    [facility] SHOULDER SURGERY      TONSILLECTOMY      UPPER GASTROINTESTINAL ENDOSCOPY       Social History   Social History     Substance and Sexual Activity   Alcohol Use Not Currently    Frequency: Monthly or less    Drinks per session: 1 or 2    Binge frequency: Never    Comment: rarely     Social History     Substance and Sexual Activity   Drug Use No     Social History     Tobacco Use   Smoking Status Former Smoker    Packs/day: 0 50    Years: 48 00    Pack years: 24 00    Types: Cigarettes    Last attempt to quit: 5/1/2017    Years since quitting: 3 2   Smokeless Tobacco Never Used     Family History   Problem Relation Age of Onset    Other Mother         Back disorder    Diabetes Mother         Diabetes Mellitus    Hypertension Mother     Heart disease Father     Hypertension Father     Breast cancer Sister     Skin cancer Sister     Breast cancer Paternal Grandmother     Skin cancer Paternal Grandmother        Meds/Allergies       Current Outpatient Medications:     acetaminophen (TYLENOL 8 HOUR) 650 mg CR tablet    albuterol (PROAIR HFA) 90 mcg/act inhaler    albuterol (PROVENTIL HFA,VENTOLIN HFA) 90 mcg/act inhaler    diphenhydrAMINE (BENADRYL) 50 mg/mL    lansoprazole (PREVACID) 30 mg capsule    metoclopramide (REGLAN) 10 mg tablet    Probiotic Product (UP4 PROBIOTICS ADULT PO)    Simethicone (GAS-X PO)    sucralfate (CARAFATE) 1 g/10 mL suspension    Allergies   Allergen Reactions    Codeine Hives, Itching, Nausea Only, GI Intolerance and Vomiting    Hydrocodone Hives and GI Intolerance    Shellfish-Derived Products Nausea Only and Vomiting           Objective     Blood pressure 110/60, pulse 72, temperature (!) 97 °F (36 1 °C), temperature source Tympanic, height 5' 8" (1 727 m), weight 54 4 kg (120 lb)  Body mass index is 18 25 kg/m²  PHYSICAL EXAM:      Physical Exam   Constitutional: He is oriented to person, place, and time  He appears well-developed  HENT:   Head: Normocephalic and atraumatic  Eyes: Pupils are equal, round, and reactive to light  Conjunctivae are normal  No scleral icterus  Neck: Normal range of motion  Cardiovascular: Normal rate, regular rhythm and normal heart sounds  Pulmonary/Chest: Effort normal and breath sounds normal  No respiratory distress  Abdominal: Soft  Normal appearance and bowel sounds are normal  He exhibits no distension and no mass  There is no abdominal tenderness  No hernia  Midline incision  Musculoskeletal: Normal range of motion  Lymphadenopathy:     He has no cervical adenopathy  Neurological: He is alert and oriented to person, place, and time  Skin: Skin is warm  Psychiatric: His behavior is normal  Thought content normal        Lab Results:   No visits with results within 1 Day(s) from this visit     Latest known visit with results is:   Hospital Outpatient Visit on 07/20/2020   Component Date Value    Case Report 07/20/2020                      Value:Surgical Pathology Report                         Case: [id number]                                   Authorizing Provider:  Mario Morejon MD             Collected:           07/20/2020 1609              Ordering Location:     [address] [place] [address]      Received:            07/20/2020 [address] [place] [address] Endoscopy                                                           Pathologist:           Denice Mahoney MD                                                         Specimen:    Esophagus, esophagus at 36                                                                 Final Diagnosis 07/20/2020                      Value: This result contains rich text formatting which cannot be displayed here   Additional Information 07/20/2020                      Value: This result contains rich text formatting which cannot be displayed here  Tracy Welch Gross Description 07/20/2020                      Value: This result contains rich text formatting which cannot be displayed here   Case Report 07/20/2020                      Value:Surgical Pathology Report                         Case: [id number]                                   Authorizing Provider:  Elizabeth Taylor MD  Collected:           07/20/2020 1604              Ordering Location:     [address] [place] [address]      Received:            07/20/2020 [address] [place] [address] Endoscopy                                                           Pathologist:           Denice Mahoney MD                                                         Specimen:    Esophagus, r/o Barretts  at 43                                                             Final Diagnosis 07/20/2020                      Value: This result contains rich text formatting which cannot be displayed here   Additional Information 07/20/2020                      Value: This result contains rich text formatting which cannot be displayed here     Sheela Bench Description 07/20/2020                      Value: This result contains rich text formatting which cannot be displayed here  Radiology Results:   Xr Chest 1 View    Result Date: 7/22/2020  Narrative: CHEST INDICATION:   T85 528A: Displacement of other gastrointestinal prosthetic devices, implants and grafts, initial encounter  Migration of esophageal stent  COMPARISON:  Chest radiograph from 9/21/2020  EXAM PERFORMED/VIEWS: 4 fluoroscopic images from endoscopy  FINDINGS: Limited imaging of the mediastinum shows an endoscope, guidewire, and dilator  Impression: 37 seconds of fluoroscopy   Workstation performed: [id number]

## 2020-08-05 NOTE — PATIENT INSTRUCTIONS
1  Simethicone as needed   2  Probiotics - may stop after 2 months and see how you do  If symptoms come back (bloating, change in bowel habits) then restart  3  Prevacid twice daily for 2 months then once a day  If reflux comes back then increase back to twice daily  4  Stop the carafate  May use maalox instead  5  Follow up in a year  6  Endoscopy and Colonoscopy in 2022

## 2020-09-17 NOTE — PROGRESS NOTES
History of Present Illness  Bariatric MNT [facility] Screening Preop [facility]:   His surgeon is Dr Dayana Oh  He has the following comorbidities: diabetes type pre, mixed hyperlipidemia, hypercholesterolemia and Saucedo's , DDD, paraeshophageal hernia, s/p lung cancer remission 5 years  Exercise Frequency:  He does not exercise  He does not know the difference between being comfortably full and uncomfortably full and does not know the difference between being stuffed and comfortably full  He did not complete a food journal 24-Hour Food Recall Breakfast wakes at 4am  eats first meal at 10 am  frosted flakes with milk, dallas charms with milk  scrambled eggs    Lunch: 3pm leftover pizza, leftovers from dinner  Dinner: 7pm protein, starch and vegetables  He drinks 6 cups of water daily He drinks 4-6 caffienated beverages daily His motivators are:wants to prevent cancer and resolve his severe GERD  His obesity/being overweight is related to excess energy intake due to severe GERD and is evidenced by: BMI 33  Knowledge Deficit Prior to Education  He is new to the diet  Barriers to education:  He has no barriers to education  Medical Nutrition Therapy Intervention: Lifestyle My Jumper Modification Techniques, Checklist of the Overview of Lesson Plans and Surgical changes to Stomach/GI  Area's Reviewed: Lifestyle My Jumper Modification Techniques, [employer] in [employer] ( hand out for [employer]) and Pre- surgery goals Alma oRsa Prater  Brief Review of Vitamins and diet progression for post-op  His comprehension of the presented material was good  His receptivity to the presented material was good  His motivation was good  Provided: Nutrition Guidelines for Gastric Surgery, Protein Supplement handout, Food Journaling Application handout, Bariatric Program Pre- Surgery Nutrition and Goals  Goals: His set goal for physical activity is walking , for 15-20 minutes, 3-5 days a week      Patient is a 46y old  Female who presents with a chief complaint of concern for ovarian malignancy (17 Sep 2020 11:34)      SUBJECTIVE / OVERNIGHT EVENTS: Pt had episode of reported suicidal thoughts last night which resolved with reassurance from nursing. Pt denies SI today and is comfortable.    MEDICATIONS  (STANDING):  enoxaparin Injectable 40 milliGRAM(s) SubCutaneous daily  FLUoxetine 40 milliGRAM(s) Oral daily  lactated ringers. 1000 milliLiter(s) (75 mL/Hr) IV Continuous <Continuous>  ondansetron Injectable 4 milliGRAM(s) IV Push every 6 hours  ondansetron Injectable 4 milliGRAM(s) IV Push every 6 hours  pantoprazole    Tablet 40 milliGRAM(s) Oral before breakfast  potassium phosphate / sodium phosphate Tablet (K-PHOS No. 2) 1 Tablet(s) Oral once  risperiDONE   Tablet 1 milliGRAM(s) Oral daily  sodium chloride 0.9% lock flush 3 milliLiter(s) IV Push every 8 hours    MEDICATIONS  (PRN):  oxyCODONE    IR 5 milliGRAM(s) Oral every 3 hours PRN Moderate Pain (4 - 6)  oxyCODONE    IR 10 milliGRAM(s) Oral every 3 hours PRN Severe Pain (7 - 10)  petrolatum white Ointment 1 Application(s) Topical four times a day PRN irritation      CAPILLARY BLOOD GLUCOSE        I&O's Summary    16 Sep 2020 07:01  -  17 Sep 2020 07:00  --------------------------------------------------------  IN: 450 mL / OUT: 1350 mL / NET: -900 mL    17 Sep 2020 07:01  -  17 Sep 2020 11:51  --------------------------------------------------------  IN: 0 mL / OUT: 200 mL / NET: -200 mL        PHYSICAL EXAM:  Vital Signs Last 24 Hrs  T(C): 36.9 (17 Sep 2020 08:58), Max: 37 (17 Sep 2020 05:13)  T(F): 98.5 (17 Sep 2020 08:58), Max: 98.6 (17 Sep 2020 05:13)  HR: 110 (17 Sep 2020 08:58) (110 - 120)  BP: 159/90 (17 Sep 2020 08:58) (133/65 - 164/83)  BP(mean): 81 (16 Sep 2020 23:32) (81 - 81)  RR: 18 (17 Sep 2020 08:58) (18 - 18)  SpO2: 94% (17 Sep 2020 08:58) (91% - 96%)  CONSTITUTIONAL: NAD, well-developed, well-groomed  EYES: PERRLA; conjunctiva and sclera clear  ENMT: Moist oral mucosa, no pharyngeal injection or exudates; normal dentition  RESPIRATORY: Normal respiratory effort; lungs are clear to auscultation bilaterally  CARDIOVASCULAR: Regular rate and rhythm, normal S1 and S2, no murmur/rub/gallop; No lower extremity edema; Peripheral pulses are 2+ bilaterally  ABDOMEN: Nontender to palpation, normoactive bowel sounds, no rebound/guarding; No hepatosplenomegaly  PSYCH: A+O to person, place, and time; affect limited    LABS:                        8.2    18.96 )-----------( 516      ( 17 Sep 2020 06:00 )             26.2     09-17    136  |  100  |  5<L>  ----------------------------<  127<H>  4.0   |  22  |  0.44<L>    Ca    8.1<L>      17 Sep 2020 06:00  Phos  2.1     09-17  Mg     2.0     09-17                  RADIOLOGY & ADDITIONAL TESTS:  Results Reviewed:   Imaging Personally Reviewed:  Electrocardiogram Personally Reviewed:    COORDINATION OF CARE:  Care Discussed with Consultants/Other Providers [Y/N]:  Prior or Outpatient Records Reviewed [Y/N]:   Review Lesson Plans in [employer] provided with watson list to purchase protein products  Post Surgery: He will adhere to the diet progression: remain hydrated, consume adequate protein; and take vitamins as outlined in guidelines  Patient is a 55year old who is here for nutritional evaluation for paraesophageal hernia repair and a gastric diversion with Britney-en-Y reconstruction with a short 60 cm Britney limb for his severe reflux and Saucedo's disease  I reviewed co-morbidities and medications prior to session  He first recalls having problems with weight gain as his reflux worsened  For his personal pre-op goals he will: practice chewing his food and taking 15 minutes to finish his meal, and he will get into a routine of drinking a shake for breakfast, and eating 2 meals and one snack during the day  He currently grazes throughout the day as a result of his job as a  and due to his severe reflux  He is currently home on disability so he will work towards developing a routine that he can follow when he returns to work  Discussed that he will need to pack foods while on the road  Patient anticipates that it will be several months before he can return to work  Instructed him to call for a dietary review when he is returning to work for options that he can pack or purchase while on the road  He will complete all 6 lesson plans in his bariatric booklet  He was instructed on the importance of consistent vitamin and mineral intake after his surgery to prevent deficiencies  He currently does not take any vitamins or minerals  Reviewed recommended vitamins and minerals for post op and provided with samples of products  Patient will be getting his surgery as soon as he quits smoking  Reviewed importance of support after surgery and discussed the web forum, pep rally and support group  Patient states adequate knowledge of nutrition, exercise and behavior modification required for long term success   Patient also understands that he needs to implement lifestyle changes in preparation for surgery  Patient provided with samples of Bariatric products to try prior to surgery  Recommendations: He was provided contact information for any questions  He is recommended for surgery  He will attend a Team Meeting approximately 2-3 weeks prior to surgery  Active Problems    1  Acute bronchitis (466 0) (J20 9)   2  Acute upper respiratory infection (465 9) (J06 9)   3  Adenocarcinoma of lung, unspecified laterality (162 9) (C34 90)   4  Aftercare following surgery of the musculoskeletal system (V58 78) (Z47 89)   5  Asthma (493 90) (J45 909)   6  Saucedo's esophagus without dysplasia (530 85) (K22 70)   7  Bicipital tendinitis of right shoulder (726 12) (M75 21)   8  Carpal tunnel syndrome on right (354 0) (G56 01)   9  Cervical disc disorder with radiculopathy (723 4) (M50 10)   10  Cervical spinal stenosis (723 0) (M48 02)   11  Chest congestion (786 9) (R09 89)   12  Complete tear of rotator cuff, unspecified laterality (727 61) (M75 120)   13  Cough (786 2) (R05)   14  DDD (degenerative disc disease), cervical (722 4) (M50 30)   15  Depression screening (V79 0) (Z13 89)   16  Dyslipidemia (272 4) (E78 5)   17  Encounter for prostate cancer screening (V76 44) (Z12 5)   18  GERD (gastroesophageal reflux disease) (530 81) (K21 9)   19  Glenoid labrum tear, right, initial encounter (840 8) (S43 431A)   20  Hypercholesterolemia (272 0) (E78 00)   21  Hyperglycemia (790 29) (R73 9)   22  Joint pain (719 40) (M25 50)   23  Lymphadenopathy (785 6) (R59 1)   24  Microscopic hematuria (599 72) (R31 29)   25  Morbid obesity (278 01) (E66 01)   26  Need for influenza vaccination (V04 81) (Z23)   27  Pain, wrist (719 43) (M25 539)   28  Paraesophageal hernia (553 3) (K44 9)   29  Paresthesias (782 0) (R20 2)   30  Patellofemoral arthralgia of right knee (719 46) (M25 561)   31  Personal history of colonic polyps (V12 72) (Z10 495)   32  Pleural Effusion (511 9)   33  Post Pulmonary Resection (V45 89)   34  Pre-op examination (V72 84) (Z01 818)   35  Preoperative cardiovascular examination (V72 81) (Z01 810)   36  Primary localized osteoarthritis of right knee (715 16) (M17 11)   37  Pulmonary embolism (415 19) (I26 99)   38  Pulmonary nodule (793 11) (R91 1)   39  Right bundle-branch block (426 4) (I45 10)   40  Rupture Of The Distal Bicipital Tendon Of The Left Arm (727 62)   41  Screening for colon cancer (V76 51) (Z12 11)   42  Sinus congestion (478 19) (R09 81)   43  Sprained Left Elbow (841 9)   44  Tear of right scapholunate ligament, subsequent encounter (V58 89,842 19) (S63 8X1D)   45  Tendinitis of right rotator cuff (726 10) (M75 81)   46  Tobacco use (305 1) (Z72 0)    Past Medical History    1  History of Borderline glaucoma (365 00) (H40 009)   2  History of hyperlipidemia (V12 29) (Z86 39)   3  History of nicotine dependence (V15 82) (Z87 891)   4  Pneumonia (486) (J18 9)   5  History of Pulmonary Embolism (V12 55)    Surgical History    1  History of Femur Repair   2  History of Flexible Bronchoscopy (Diagnostic)   3  History of Jaw Surgery   4  History of Knee Arthroscopy (Therapeutic)   5  History of Lymphadenectomy   6  History of Mediastinoscopy   7  History of Thoracoscopy (Therapeutic) W/ Lobectomy   8  History of Tonsillectomy    Family History  Mother    1  Family history of Diabetes Mellitus (V18 0)  Father    2  Family history of cardiac disorder (V17 49) (Z82 49)  Sister    3  Family history of Breast Cancer (V16 3)   4  Family history of Skin Cancer (V16 8)  Paternal Grandmother    11  Family history of Breast Cancer (V16 3)   6  Family history of Skin Cancer (V16 8)    Social History    · Being A Social Drinker   · Current some day smoker (305 1) (F17 200)   · Denied: History of Drug Use   · Tobacco use (305 1) (Z72 0)    Current Meds   1  Aspirin Low Dose 81 MG TABS; TAKE 1 TABLET DAILY;    Therapy: (Recorded:12Spi1514) to Recorded   2  Atorvastatin Calcium 80 MG Oral Tablet; TAKE 1 TABLET DAILY  Requested for:   48ZED0945; Last Rx:88Kuo3193 Ordered   3  Chantix Starting Month Chaka 0 5 MG X 11 & 1 MG X 42 Oral Tablet; TAKE ONE 0 5MG   TABLET DAILY ON DAYS 1-3, THEN ONE 0 5MG TABLET TWICE DAILY ON DAYS 4-7,   THEN ONE 1MG TABLET TWICE DAILY THEREAFTER; Therapy: 78PGU0057 to (Last Rx:58Amg3070)  Requested for: 75OAR2421 Ordered   4  Gabapentin 300 MG TABS; TAKE 1 TABLET 3 TIMES DAILY; Therapy: (Recorded:14Czs8555) to Recorded   5  Ibuprofen 600 MG Oral Tablet Recorded   6  Magnesium 400 MG Oral Tablet; Take 1 tablet daily; Therapy: 83TYJ8892 to Recorded   7  Nortriptyline HCl - 10 MG Oral Capsule; Therapy: (Sophia Knutson) to Recorded   8  Pantoprazole Sodium 40 MG Oral Tablet Delayed Release (Protonix); TAKE 1 TABLET   TWICE DAILY 30 MINUTES BEFORE BREAKFAST AND DINNER; Therapy: 97Fxm8944 to (Evaluate:66Slv9348)  Requested for: 46NOS3601; Last   Rx:[id number]; Status: ACTIVE - Transmit to Pharmacy - Awaiting Verification Ordered   9  RaNITidine HCl - 150 MG Oral Tablet (Zantac); TAKE 1 TABLET AT BEDTIME; Therapy: 99TDF2655 to (Evaluate:09Kjs8053)  Requested for: 37EYF1234; Last   Rx:[id number]; Status: ACTIVE - Transmit to [facility] Verification Ordered   10  Rizatriptan Benzoate 5 MG Oral Tablet; Therapy: (Sophia Knutson) to Recorded   11  TiZANidine HCl - 4 MG Oral Capsule; Therapy: (Sophia Knutson) to Recorded   12  Topiramate 25 MG Oral Capsule Sprinkle; Therapy: (Sophia Knutson) to Recorded   13  TraMADol HCl - 50 MG Oral Tablet; Therapy: 84FFG2127 to Recorded   14  Ventolin  (90 Base) MCG/ACT Inhalation Aerosol Solution; INHALE 1 PUFF    EVERY 4 HOURS AS NEEDED; Therapy: 02UDK2368 to (Last NW:32WBB0763)  Requested for: 11ZUK9054 Ordered   15  Voltaren 1 % Transdermal Gel (Diclofenac Sodium); APPLY TO UPPER EXTREMITIES, 2    GM OF GEL TO AFFECTED AREA 4 TIMES DAILY    DO NOT APPLY MORE    THAN 8 GM DAILY TO ANY ONE AFFECTED JOINT; Therapy: 16ORX8484 to (Last Rx:21Oct2016) Ordered    Allergies    1  Penicillins   2  Codeine Derivatives   3  Hydrocodone-Acetaminophen TABS    Future Appointments    Date/Time Provider Specialty Site   01/26/2018 09:00 AM Julius Shea DO [place] [id number]   06/12/2017 08:45 AM Maida Ibanez MD Thoracic Surgery CT SURGERY [facility] OFFICE     Signatures   Electronically signed by : MIA Hutton; May 15 2017  4:06PM EST                       (Author)    Electronically signed by :  SAM Chapa ; May 25 2017 10:43AM EST

## 2020-10-30 ENCOUNTER — TELEPHONE (OUTPATIENT)
Dept: CARDIAC SURGERY | Facility: CLINIC | Age: 60
End: 2020-10-30

## 2020-11-06 DIAGNOSIS — R50.9 FEVER, UNSPECIFIED FEVER CAUSE: ICD-10-CM

## 2020-11-06 DIAGNOSIS — R05.9 COUGH: Primary | ICD-10-CM

## 2020-11-06 DIAGNOSIS — R05.9 COUGH: ICD-10-CM

## 2020-11-06 PROCEDURE — U0003 INFECTIOUS AGENT DETECTION BY NUCLEIC ACID (DNA OR RNA); SEVERE ACUTE RESPIRATORY SYNDROME CORONAVIRUS 2 (SARS-COV-2) (CORONAVIRUS DISEASE [COVID-19]), AMPLIFIED PROBE TECHNIQUE, MAKING USE OF HIGH THROUGHPUT TECHNOLOGIES AS DESCRIBED BY CMS-2020-01-R: HCPCS | Performed by: FAMILY MEDICINE

## 2020-11-08 ENCOUNTER — NURSE TRIAGE (OUTPATIENT)
Dept: OTHER | Facility: OTHER | Age: 60
End: 2020-11-08

## 2020-11-08 LAB — SARS-COV-2 RNA SPEC QL NAA+PROBE: NOT DETECTED

## 2020-11-09 ENCOUNTER — TELEPHONE (OUTPATIENT)
Dept: OTHER | Facility: OTHER | Age: 60
End: 2020-11-09

## 2020-11-09 DIAGNOSIS — R50.9 FEVER, UNSPECIFIED FEVER CAUSE: Primary | ICD-10-CM

## 2020-11-09 RX ORDER — AZITHROMYCIN 250 MG/1
TABLET, FILM COATED ORAL
Qty: 6 TABLET | Refills: 0 | Status: SHIPPED | OUTPATIENT
Start: 2020-11-09 | End: 2020-11-14

## 2020-12-18 ENCOUNTER — TRANSCRIBE ORDERS (OUTPATIENT)
Dept: FAMILY MEDICINE CLINIC | Facility: CLINIC | Age: 60
End: 2020-12-18

## 2020-12-18 DIAGNOSIS — R05.9 COUGH: ICD-10-CM

## 2020-12-18 DIAGNOSIS — R53.83 FATIGUE, UNSPECIFIED TYPE: ICD-10-CM

## 2020-12-18 DIAGNOSIS — R05.9 COUGH: Primary | ICD-10-CM

## 2020-12-18 DIAGNOSIS — Z20.822 EXPOSURE TO COVID-19 VIRUS: ICD-10-CM

## 2020-12-18 PROCEDURE — 87637 SARSCOV2&INF A&B&RSV AMP PRB: CPT | Performed by: FAMILY MEDICINE

## 2020-12-22 LAB
FLUAV RNA NPH QL NAA+PROBE: NOT DETECTED
FLUBV RNA NPH QL NAA+PROBE: NOT DETECTED
RSV RNA NPH QL NAA+PROBE: NOT DETECTED
SARS-COV-2 RNA NPH QL NAA+PROBE: DETECTED

## 2021-04-12 DIAGNOSIS — Z23 ENCOUNTER FOR IMMUNIZATION: ICD-10-CM

## 2021-04-29 ENCOUNTER — IMMUNIZATIONS (OUTPATIENT)
Dept: FAMILY MEDICINE CLINIC | Facility: HOSPITAL | Age: 61
End: 2021-04-29

## 2021-04-29 DIAGNOSIS — Z23 ENCOUNTER FOR IMMUNIZATION: Primary | ICD-10-CM

## 2021-04-29 PROCEDURE — 0001A SARS-COV-2 / COVID-19 MRNA VACCINE (PFIZER-BIONTECH) 30 MCG: CPT

## 2021-04-29 PROCEDURE — 91300 SARS-COV-2 / COVID-19 MRNA VACCINE (PFIZER-BIONTECH) 30 MCG: CPT

## 2021-05-19 ENCOUNTER — IMMUNIZATIONS (OUTPATIENT)
Dept: FAMILY MEDICINE CLINIC | Facility: HOSPITAL | Age: 61
End: 2021-05-19

## 2021-05-19 DIAGNOSIS — Z23 ENCOUNTER FOR IMMUNIZATION: Primary | ICD-10-CM

## 2021-05-19 PROCEDURE — 0002A SARS-COV-2 / COVID-19 MRNA VACCINE (PFIZER-BIONTECH) 30 MCG: CPT

## 2021-05-19 PROCEDURE — 91300 SARS-COV-2 / COVID-19 MRNA VACCINE (PFIZER-BIONTECH) 30 MCG: CPT

## 2021-06-08 ENCOUNTER — HOSPITAL ENCOUNTER (OUTPATIENT)
Dept: CT IMAGING | Facility: HOSPITAL | Age: 61
Discharge: HOME | End: 2021-06-08
Payer: MEDICARE

## 2021-06-08 DIAGNOSIS — Z85.118 HISTORY OF LUNG CANCER: ICD-10-CM

## 2021-06-08 PROCEDURE — 71250 CT THORAX DX C-: CPT

## 2021-06-09 NOTE — PROGRESS NOTES
Preliminary Nursing Report                Patient Information    Initial Encounter Entry Date:   2016 1:10 PM EST (Automated Transmission Automated Transmission)       Last Modified:   {ParH  ModifiedOn}              Legal Name: Shea Perez        Social Security Number:        YOB: 1960        Age (years): 48        Gender: M        Body Mass Index (BMI): 31 kg/m2        Height: 69 in  Weight: 207 lbs (94 kgs)           Address:     [address] [place] [facility] [id number] [address] [facility]              Phone: [phone]   (consent to leave messages)        Email:        Ethnicity: Decline to State        Druze:        Marital Status:        Preferred Language: English        Race: Other Race                    Patient Insurance Information        Primary Insurance Information Carrier Name: {Primary  CarrierName}           Carrier Address:   {Primary  CarrierAddress}              Carrier Phone: {Primary  CarrierPhone}          Group Number: {Primary  GroupNumber}          Policy Number: {Primary  PolicyNumber}          Insured Name: {Primary  InsuredName}          Insured : {Primary  InsuredDOB}          Relationship to Insured: {Primary  RelationshiptoInsured}           Secondary Insurance Information Carrier Name: {Secondary  CarrierName}           Carrier Address:   {Secondary  CarrierAddress}              Carrier Phone: {Secondary  CarrierPhone}          Group Number: {Secondary  GroupNumber}          Policy Number: {Secondary  PolicyNumber}          Insured Name: {Secondary  InsuredName}          Insured : {Secondary  InsuredDOB}          Relationship to Insured: {Secondary  RelationshiptoInsured}                       Health Profile   Booking #:   Woody Class #: [id number]               DOS: 2016    Surgery : REPAIR/REVISE WRIST JOINT    Add'l Procedures/Notes:     Surgery Risk: Intermediate          Precautions     Diabetes                   Allergies    Codeine Derivatives       Clinical Comments: Reaction Type: , Reaction: , Severity:        Hydrocodone-Acetaminophen TABS       Clinical Comments: Reaction Type: , Reaction: , Severity:        Penicillins             Medications    Aspirin Low Dose 81 MG TABS       Atorvastatin Calcium 40 MG Oral Tablet       Ibuprofen 600 MG Oral Tablet       ProAir  (90 Base) MCG/ACT Inhalation Aerosol Solution       Topamax 100 MG Oral Tablet               Conditions    Acute upper respiratory infection       Adenocarcinoma of lung, unspecified laterality       Asthma       Bicipital tendinitis of right shoulder       Carpal tunnel syndrome on right       Chest congestion       Complete tear of rotator cuff, unspecified laterality       Cough       Depression screening       Diabetes       Dyslipidemia       Esophageal reflux       Glenoid labrum tear, right, initial encounter       Hypercholesterolemia       Joint pain       Lymphadenopathy       Paresthesias       Patellofemoral arthralgia of right knee       Pleural Effusion       Post Pulmonary Resection       Primary localized osteoarthritis of right knee       Pulmonary embolism       Pulmonary nodule       Rupture Of The Distal Bicipital Tendon Of The Left Arm       Screening for colon cancer       Sinus congestion       Sprained Left Elbow       Tear of right scapholunate ligament, initial encounter       Tear of right scapholunate ligament, subsequent encounter       Tendinitis of right rotator cuff       Tobacco use       Wrist pain               Family History    None             Surgical History    None             Social History    Being A Social Drinker       Current some day smoker       Denies Drug Use       Tobacco use                               Patient Instructions       ? NPO Instructions   The day before surgery it is recommended to have a light dinner at your usual time and you are allowed a light snack early in the evening   Do not eat anything heavy or eat a big meal after 7pm  Do not eat or drink anything after midnight prior to your surgery  If you are supposed to take any of your medications, do so with a sip of water  Failure to follow these instructions can lead to an increased risk of lung complications and may result in a delay or cancellation of your procedure  If you have any questions, contact your institution for further instructions  No candy, no gum, no mints, no chewing tobacco   Triggered by: Medical Procedure Risk         ? Aspirin (Blood Thinners) 112, 104, 105, 114, 113, 103, 110, 107, 108, 109, 111, 106  Please continue to take this medication on your normal schedule  If this is an oral medication and you take in the morning, you may do so with a sip of water  However, your surgeon may have you stop aspirin up to a week early if you are having intracranial, middle ear, posterior eye, spine surgery or prostate surgery  Triggered by: Aspirin Low Dose 81 MG TABS         ? Bronchodilator 18  Please continue the following medications up to and including the day of surgery  Please bring this medication with you on the day of surgery  Triggered by: ProAir  (90 Base) MCG/ACT Inhalation Aerosol Solution         ? Diabetic Medication   Please decrease your morning insulin dose to one-half of normal dose  If you are taking oral diabetes medications, the morning dose should be omitted  If taking metformin (Glucophage), discontinue the medication for 24 hours prior to surgery  If you have an insulin pump, continue at a basal rate only  Triggered by: Diabetes         ? Neurological Medication 8  Please continue to take this medication on your normal schedule  If this is an oral medication and you take in the morning, you may do so with a sip of water  Triggered by: Topamax 100 MG Oral Tablet         ? Reflux Disease   Please continue to take this medication on your normal schedule   If this is an oral medication and you take in the morning, you may do so with a sip of with patient water   Triggered by: Esophageal reflux         ? Smoking Cessation   Smoking before and after surgery can lead to complications  Patients who quit smoking at least eight weeks before surgery have complication rates almost as low as non-smokers  Smokers who can stop smoking 24 or 48 hours before surgery may also benefit from decreased amounts of nicotine and carbon monoxide in the body  For help quitting smoking, speak with your physician or contact the Yonatan Sterling or American [employer]  Please visit the following web address for assistance with quitting  [facility]  [facility]/Anesthesia-Topics/Hla-Cav-ng-Quit-Smoking  aspx  Triggered by: Tobacco use               Testing Considerations       ? Blood Glucose on Day of Surgery t  Please check the blood sugar on the morning of surgery  Triggered by: Diabetes         ? Chest X-ray (CXR) t  Consider a Chest X-Ray (CXR) if patient is having respiratory symptoms  Triggered by: Adenocarcinoma of lung, unspecified laterality, Pulmonary nodule         ? Comprehensive Metabolic Panel (CMP) t  If test was completed and normal within last six months, repeat test is not necessary  Triggered by: Adenocarcinoma of lung, unspecified laterality         ? Electrocardiogram (ECG) t  Patient does not need new test if normal ECG is present within the last six months and no change in clinical condition  Triggered by: Adenocarcinoma of lung, unspecified laterality, Diabetes, Pulmonary embolism         ? Hemoglobin A1c (HbA1c) client  If test was completed and normal within the last three months, repeat test is not necessary  Triggered by: Diabetes, Age or Facility Rec               Consultations       ? Primary Care Physician Evaluation   Primary care physician may need to evaluate patient prior to surgery  This is likely NOT necessary if the listed conditions are chronic and stable    Triggered by: Asthma, Cough, Pulmonary embolism, Pulmonary nodule               Miscellaneous Questions         Question: Are you able to walk up a flight of stairs, walk up a hill or do heavy housework WITHOUT having chest pain or shortness of breath? Answer: YES                   Allergies/Conditions/Medications Not Found        The following were not recognized by our system when generating the recommendations  Please consider if this would impact any preoperative protocols  ? Being A Social Drinker       ? Denies Drug Use       ? Pleural Effusion       ? Post Pulmonary Resection       ? Rupture Of The Distal Bicipital Tendon Of The Left Arm       ? Sprained Left Elbow       ? Tear of right scapholunate ligament, subsequent encounter       ? Atorvastatin Calcium 40 MG Oral Tablet       ? Ibuprofen 600 MG Oral Tablet                  Appointment Information         Date:    09/06/2016        Location:    [place]        Address:           Directions:                      Footnotes revision 14      ?? Denotes a free-text entry  Legal Disclaimer: Any and all recommendations and services provided herein are designed to assist in the preoperative care of the patient  Nothing contained herein is designed to replace, eliminate or alleviate the responsibility of the attending physician to supervise and determine the patient?s preoperative care and course of treatment  Failure to provide complete, accurate information may negatively impact the system?s ability to recommend the proper preoperative protocol  THE ATTENDING PHYSICIAN IS RESPONSIBLE TO REVIEW THE SUGGESTED PREOPERATIVE PROTOCOLS/COURSE OF TREATMENT AND PRESCRIBE THE FINAL COURSE OF PREOPERATIVE TREATMENT IN CONSULTATION WITH THE PATIENT  THE [employer] SYSTEM AND ITS MATERIALS ARE PROVIDED ? AS IS? WITHOUT WARRANTY OF ANY KIND, EXPRESS OR IMPLIED, INCLUDING, BUT NOT LIMITED TO, WARRANTIES OF PERFORMANCE OR MERCHANTABILITY OR FITNESS FOR A PARTICULAR PURPOSE   PATIENT AND PHYSICIANS HEREBY AGREE THAT THEIR USE OF THE MATERIALS AND RESOURCES ACT AS A CONSENT TO RELEASE AND WAIVE ePREOP FROM ANY AND ALL CLAIMS OF WARRANTY, TORT OR CONTRACT LAW OF ANY KIND  Electronically signed Amari TORREZ    Aug  5 2016  1:22PM EST

## 2021-06-14 ENCOUNTER — OFFICE VISIT (OUTPATIENT)
Dept: CARDIAC SURGERY | Facility: CLINIC | Age: 61
End: 2021-06-14
Payer: MEDICARE

## 2021-06-14 VITALS
WEIGHT: 115.96 LBS | SYSTOLIC BLOOD PRESSURE: 110 MMHG | OXYGEN SATURATION: 98 % | HEART RATE: 80 BPM | TEMPERATURE: 97.7 F | BODY MASS INDEX: 17.57 KG/M2 | RESPIRATION RATE: 16 BRPM | DIASTOLIC BLOOD PRESSURE: 65 MMHG | HEIGHT: 68 IN

## 2021-06-14 DIAGNOSIS — Z85.118 HISTORY OF LUNG CANCER: Primary | ICD-10-CM

## 2021-06-14 DIAGNOSIS — E43 SEVERE PROTEIN-CALORIE MALNUTRITION (HCC): ICD-10-CM

## 2021-06-14 DIAGNOSIS — C34.92 ADENOCARCINOMA OF LEFT LUNG (HCC): ICD-10-CM

## 2021-06-14 PROCEDURE — 99213 OFFICE O/P EST LOW 20 MIN: CPT | Performed by: PHYSICIAN ASSISTANT

## 2021-06-14 NOTE — ASSESSMENT & PLAN NOTE
Mr Wing Pierre presents 9 years out from left lower lobectomy and adjuvant chemotherapy for Stage IIA adenocarcinoma of the lung  He is experiencing stable shortness of breath on exertion since surgery, and notices increased bruising since chemotherapy years ago  His chest CT demonstrates no evidence of recurrent lung cancer  He has a stable right scar, and this was personally reviewed in PACs  We will continue yearly CT surveillance  He has been experiencing abdominal pain and lack of appetite resulting in some weight loss  He has a history of PUD, and will be seeing Dr Edgardo Sandoval for this  I recommended he take an OTC antacid medication like Pepcid in the interim  Given weight loss, I made some suggestions to optimize his nutrition such as ensure, boost or carnation instant breakfast  He is agreeable to trying these  All of his and his wife's questions were addressed

## 2021-06-14 NOTE — PROGRESS NOTES
Thoracic Follow-Up  Assessment/Plan:    History of lung cancer  Mr Kacey Keller presents 9 years out from left lower lobectomy and adjuvant chemotherapy for Stage IIA adenocarcinoma of the lung  He is experiencing stable shortness of breath on exertion since surgery, and notices increased bruising since chemotherapy years ago  His chest CT demonstrates no evidence of recurrent lung cancer  He has a stable right scar, and this was personally reviewed in PACs  We will continue yearly CT surveillance  He has been experiencing abdominal pain and lack of appetite resulting in some weight loss  He has a history of PUD, and will be seeing Dr Alvarado Man for this  I recommended he take an OTC antacid medication like Pepcid in the interim  Given weight loss, I made some suggestions to optimize his nutrition such as ensure, boost or carnation instant breakfast  He is agreeable to trying these  All of his and his wife's questions were addressed  Diagnoses and all orders for this visit:    History of lung cancer  -     CT chest wo contrast; Future    Adenocarcinoma of left lung (HCC)    Severe protein-calorie malnutrition ([address] )          Thoracic History   Problem:1  Stage IIA non-small cell lung carcinoma of left lower lobe  2  Stable right middle lobe pulmonary nodule  3  Left pleural effusion  4  GERD  Procedures: 1  Flexible bronchoscopy, cervical mediastinoscopy, left thoracoscopic lower lobectomy and mediastinal lymph node dissection performed on May 22, 2012   2  Left thoracentesis performed by interventional radiology on April 5, 2013  Pathology: 1  Left lower lobe consistent with poorly differentiated adenocarcinoma, tumor measures 6 8 cm in greatest dimension  Seventh edition AJCC tumor stage IIA (T2b, N0, M0)  2  Cytology from left pleural fluid is negative for malignancy  Adjuvant Therapy: The patient completed 4 cycles of adjuvant chemotherapy under the direction of Dr Leopoldo Prose   He was included in the ECOG 1505 trial and randomized to the Avastin arm  He received 3 cycles of cisplatin, Alimta, and Avastin and is fourth cycle was just Alimta and Cisplatin secondary to development of bilateral pulmonary emboli and discontinuation of his Avastin as recommended by the trial         Subjective:    Patient ID: Manjinder Bloom is a 62 y o  male  HPI   Mr Pasha Chong is a 62year old man with a history of Stage IIA adenocarcinoma of the left lower lobe status post left thoracoscopic lower lobectomy 5/22/12  He completed adjuvant chemotherapy under the direction of Dr Aisha Mckeon  He was last seen in the office 6/12/20 at which time chest CT was stable with bibasilar linear scarring  He returns today for continued lung cancer surveillance after obtaining a chest CT 6/8/21  This reveals no new pulmonary nodules or enlarged mediastinal/hilar lymph nodes  On discussion, he denies fevers, chills, worsening shortness of breath, coughing, chest pain  He has a history of PUD and experiences pain with eating, so he has lost 5 pounds  He does not experience dysphagia though  The following portions of the patient's history were reviewed and updated as appropriate: allergies, current medications, past family history, past medical history, past social history, past surgical history and problem list     Review of Systems   Constitutional: Positive for appetite change and unexpected weight change  Negative for activity change, chills, diaphoresis, fatigue and fever  HENT: Negative for trouble swallowing  Respiratory: Positive for shortness of breath (stable)  Negative for cough, chest tightness and wheezing  Cardiovascular: Negative for chest pain  Gastrointestinal: Positive for abdominal pain  Musculoskeletal: Negative for joint swelling and myalgias  Skin: Negative for color change and rash  Neurological: Negative for weakness and headaches  Hematological: Bruises/bleeds easily     Psychiatric/Behavioral: Negative for confusion  Objective:   Physical Exam  Constitutional:       Appearance: Normal appearance  Comments: thin   HENT:      Head: Normocephalic and atraumatic  Eyes:      General: No scleral icterus  Conjunctiva/sclera: Conjunctivae normal    Cardiovascular:      Rate and Rhythm: Normal rate and regular rhythm  Pulmonary:      Effort: Pulmonary effort is normal  No respiratory distress  Breath sounds: Normal breath sounds  Abdominal:      General: There is no distension  Palpations: Abdomen is soft  Musculoskeletal:      Cervical back: Neck supple  Lymphadenopathy:      Cervical: No cervical adenopathy  Skin:     General: Skin is warm and dry  Comments: Ecchymosis on forearms   Neurological:      General: No focal deficit present  Mental Status: He is alert and oriented to person, place, and time  Psychiatric:         Mood and Affect: Mood normal      /65   Pulse 80   Temp 97 7 °F (36 5 °C) (Temporal)   Resp 16   Ht 5' 8" (1 727 m)   Wt 52 6 kg (115 lb 15 4 oz)   SpO2 98%   BMI 17 63 kg/m²       CT chest wo contrast    Result Date: 6/11/2021  Narrative CT CHEST WITHOUT IV CONTRAST INDICATION:   Z85 [id number]: Personal history of other malignant neoplasm of bronchus and lung  Left lower lobectomy for adenocarcinoma on 5/22/2012  COMPARISON:  Chest CT from 6/9/2020 and 9/19/2019  TECHNIQUE: Chest CT without intravenous contrast   Axial, sagittal, coronal 2D reformats and coronal MIPS from source data  Radiation dose length product (DLP):  160 27 mGy-cm   Radiation dose exposure minimized using iterative reconstruction and automated exposure control  FINDINGS: LUNGS:  Nothing to indicate recurrent tumor  Mild emphysema  Stable scar in the right lung  Left lower lobectomy  AIRWAYS: Small amount of mucus in the trachea with no significant filling defects in the airways  PLEURA:  Mild benign chronic pleural thickening  HEART/GREAT VESSELS:  Normal heart size   Mild coronary artery calcification indicating atherosclerotic heart disease  MEDIASTINUM AND RORO:  Unremarkable  CHEST WALL AND LOWER NECK: Unremarkable  UPPER ABDOMEN:  Surgical changes in the left upper quadrant  Benign nonobstructing left renal calcification  OSSEOUS STRUCTURES: Mild degenerative disease in the spine  Cervicothoracic fusion  Impression Nothing to indicate recurrent tumor   Workstation performed: [id number]

## 2021-07-01 ENCOUNTER — OFFICE VISIT (OUTPATIENT)
Dept: INTERNAL MEDICINE CLINIC | Facility: CLINIC | Age: 61
End: 2021-07-01
Payer: MEDICARE

## 2021-07-01 VITALS
SYSTOLIC BLOOD PRESSURE: 98 MMHG | OXYGEN SATURATION: 97 % | DIASTOLIC BLOOD PRESSURE: 64 MMHG | TEMPERATURE: 98.7 F | HEART RATE: 78 BPM | WEIGHT: 113.6 LBS | RESPIRATION RATE: 14 BRPM | BODY MASS INDEX: 17.22 KG/M2 | HEIGHT: 68 IN

## 2021-07-01 DIAGNOSIS — Z13.29 SCREENING FOR THYROID DISORDER: ICD-10-CM

## 2021-07-01 DIAGNOSIS — Z93.1 G TUBE FEEDINGS (HCC): ICD-10-CM

## 2021-07-01 DIAGNOSIS — K28.9 MARGINAL ULCER: ICD-10-CM

## 2021-07-01 DIAGNOSIS — R63.6 UNDERWEIGHT: Primary | ICD-10-CM

## 2021-07-01 DIAGNOSIS — E78.5 DYSLIPIDEMIA: ICD-10-CM

## 2021-07-01 DIAGNOSIS — E55.9 VITAMIN D DEFICIENCY: ICD-10-CM

## 2021-07-01 DIAGNOSIS — J96.01 ACUTE RESPIRATORY FAILURE WITH HYPOXIA (HCC): ICD-10-CM

## 2021-07-01 DIAGNOSIS — E66.01 MORBID OBESITY (HCC): ICD-10-CM

## 2021-07-01 DIAGNOSIS — D68.9 COAGULATION DEFECT, UNSPECIFIED (HCC): ICD-10-CM

## 2021-07-01 DIAGNOSIS — I26.99 PULMONARY EMBOLISM, OTHER, UNSPECIFIED CHRONICITY, UNSPECIFIED WHETHER ACUTE COR PULMONALE PRESENT (HCC): ICD-10-CM

## 2021-07-01 DIAGNOSIS — Z13.220 SCREENING, LIPID: ICD-10-CM

## 2021-07-01 DIAGNOSIS — Z11.59 NEED FOR HEPATITIS C SCREENING TEST: ICD-10-CM

## 2021-07-01 DIAGNOSIS — Z13.0 SCREENING FOR DEFICIENCY ANEMIA: ICD-10-CM

## 2021-07-01 DIAGNOSIS — Z12.5 SCREENING PSA (PROSTATE SPECIFIC ANTIGEN): ICD-10-CM

## 2021-07-01 DIAGNOSIS — Z13.1 SCREENING FOR DIABETES MELLITUS: ICD-10-CM

## 2021-07-01 PROCEDURE — 99214 OFFICE O/P EST MOD 30 MIN: CPT | Performed by: PHYSICIAN ASSISTANT

## 2021-07-01 RX ORDER — SUCRALFATE ORAL 1 G/10ML
1 SUSPENSION ORAL 4 TIMES DAILY
Qty: 1200 ML | Refills: 4 | Status: SHIPPED | OUTPATIENT
Start: 2021-07-01 | End: 2021-12-03

## 2021-07-01 RX ORDER — MIRTAZAPINE 15 MG/1
15 TABLET, FILM COATED ORAL
Qty: 30 TABLET | Refills: 5 | Status: SHIPPED | OUTPATIENT
Start: 2021-07-01

## 2021-07-01 NOTE — PROGRESS NOTES
Assessment/Plan:  Problem List Items Addressed This Visit        Respiratory    Acute respiratory failure with hypoxia (HCC)       Cardiovascular and Mediastinum    Pulmonary embolism, other, unspecified chronicity, unspecified whether acute cor pulmonale present ([place] [address] )       Other    Dyslipidemia    Relevant Orders    Lipid panel    Vitamin D deficiency    Relevant Orders    Vitamin D 25 hydroxy    G tube feedings ([place] [address] )    Underweight - Primary     Start remeron to assist in weight gaining efforts  Directions for use and possible side effects discussed and patient verbalized understanding of these  Update labs  Relevant Medications    mirtazapine (REMERON) 15 mg tablet      Other Visit Diagnoses     Need for hepatitis C screening test        Relevant Orders    Hepatitis C antibody    Screening for diabetes mellitus        Relevant Orders    Comprehensive metabolic panel    Screening for thyroid disorder        Relevant Orders    TSH, 3rd generation with Free T4 reflex    Screening, lipid        Screening for deficiency anemia        Relevant Orders    CBC and differential    Screening PSA (prostate specific antigen)        Relevant Orders    PSA, Total Screen    Coagulation defect, unspecified ([place] [address] )        Morbid obesity ([place] [address] )        Marginal ulcer        Relevant Medications    sucralfate (CARAFATE) 1 g/10 mL suspension           Diagnoses and all orders for this visit:    Underweight  -     mirtazapine (REMERON) 15 mg tablet; Take 1 tablet (15 mg total) by mouth daily at bedtime    Need for hepatitis C screening test  -     Hepatitis C antibody    Vitamin D deficiency  -     Vitamin D 25 hydroxy; Future    Dyslipidemia  -     Lipid panel; Future    Screening for diabetes mellitus  -     Comprehensive metabolic panel; Future    Screening for thyroid disorder  -     TSH, 3rd generation with Free T4 reflex;  Future    Screening, lipid    Screening for deficiency anemia  -     CBC and differential; Future    Screening PSA (prostate specific antigen)  -     PSA, Total Screen; Future    Pulmonary embolism, other, unspecified chronicity, unspecified whether acute cor pulmonale present ([facility] [address] )    Acute respiratory failure with hypoxia (HCC)    G tube feedings (HCC)    Coagulation defect, unspecified ([facility] [address] )    Morbid obesity ([facility] [address] )    Marginal ulcer  -     sucralfate (CARAFATE) 1 g/10 mL suspension; Take 10 mL (1 g total) by mouth 4 (four) times a day        Underweight  Start remeron to assist in weight gaining efforts  Directions for use and possible side effects discussed and patient verbalized understanding of these  Update labs  Subjective:      Patient ID: Jean Claude Lopez is a 62 y o  male  Pt presents to establish care  PMHx includes barretts esophagus, lung CA, history of PE, protein/calorie malnutrition and neck pain  PSurgHX is complex and listed in chart  Allergies include codeine, hydrocodoen and shellfish  Medications noted in the chart  He is a former smoker  He is disabled and   His parents are   He is due for labs  CRC screening is up to date  He is covid vaccinated     He notes a decline in his appetite and a weight loss of 7lbs in the last year with a BMI of 17 27      The following portions of the patient's history were reviewed and updated as appropriate:   He has a past medical history of Anesthesia, Arthritis, Asthma, Bariatric surgery status, Cervical radiculopathy, Chemotherapy follow-up examination, Chronic pain, Chronic pain disorder, COPD (chronic obstructive pulmonary disease) ([facility] [address] ), Diabetes mellitus ([facility] [address] ), Food allergy, GERD (gastroesophageal reflux disease), Heart murmur, Hiatal hernia, History of malignant neoplasm, History of pneumonia (2016), History of pulmonary embolism, History of pulmonary embolus (PE), History of ulceration, Hyperlipidemia, Lung cancer ([address] ), Migraine, Pneumonia, Postgastrectomy malabsorption, Right leg DVT ([address] ), Right scapholunate ligament tear, Skipped heart beats, and Wears partial dentures  ,  does not have any pertinent problems on file  ,   has a past surgical history that includes Lung lobectomy (Left); Shoulder surgery; pr esophagogastroduodenoscopy transoral diagnostic (N/A, 1/11/2017); RECONSTRUCTION DISTAL RADIUS/ULNA JOINT (DRUJ) (Right, 9/6/2016); pr wrist arthroscop,release xvers lig (Right, 6/9/2016); Bronchoscopy; Mandible fracture surgery; Mediastinoscopy; Tonsillectomy; pr colonoscopy flx dx w/collj spec when pfrmd (N/A, 4/14/2017); Fracture surgery; pr lap, repair paraesophageal hernia, incl fundoplasty w/ mesh (N/A, 7/12/2017); GASTRIC BYPASS LAPAROSCOPIC (N/A, 7/12/2017); Colonoscopy; Knee arthroscopy (Right); pr arthrodesis ant interbody min discectomy, cervical below c2 (N/A, 10/17/2017); Lymphadenectomy (05/22/2012); pr incise finger tendon sheath (Right, 11/27/2018); pr egd transoral biopsy single/multiple (N/A, 1/9/2019); pr lap,diagnostic abdomen (N/A, 7/26/2019); LAPAROTOMY (N/A, 7/28/2019); IR image guided aspiration / drainage (8/1/2019); IR chest tube placement (8/9/2019); IR PICC line (8/9/2019); IR cholecystostomy tube placement (9/22/2019); and Upper gastrointestinal endoscopy  ,  family history includes Breast cancer in his paternal grandmother and sister; Diabetes in his mother; Heart disease in his father; Hypertension in his father and mother; Other in his mother; Skin cancer in his paternal grandmother and sister  ,   reports that he quit smoking about 4 years ago  His smoking use included cigarettes  He has a 24 00 pack-year smoking history  He has quit using smokeless tobacco   His smokeless tobacco use included snuff  He reports previous alcohol use  He reports that he does not use drugs  ,  is allergic to codeine, hydrocodone, and shellfish-derived products - food allergy     Current Outpatient Medications   Medication Sig Dispense Refill    acetaminophen (TYLENOL 8 HOUR) 650 mg CR tablet Take 1 tablet (650 mg total) by mouth every 8 (eight) hours 15 tablet 0    albuterol (PROAIR HFA) 90 mcg/act inhaler Inhale 2 puffs every 6 (six) hours as needed for wheezing (Patient not taking: Reported on 7/1/2021) 8 5 g 0    mirtazapine (REMERON) 15 mg tablet Take 1 tablet (15 mg total) by mouth daily at bedtime 30 tablet 5    sucralfate (CARAFATE) 1 g/10 mL suspension Take 10 mL (1 g total) by mouth 4 (four) times a day 1200 mL 4     No current facility-administered medications for this visit  Review of Systems   Constitutional: Positive for unexpected weight change  Negative for chills and fever  HENT: Negative for congestion, ear pain, hearing loss, postnasal drip, rhinorrhea, sinus pressure, sinus pain, sore throat and trouble swallowing  Eyes: Negative for pain and visual disturbance  Respiratory: Negative for cough, chest tightness, shortness of breath and wheezing  Cardiovascular: Negative  Negative for chest pain, palpitations and leg swelling  Gastrointestinal: Negative for abdominal pain, blood in stool, constipation, diarrhea, nausea and vomiting  Endocrine: Negative for cold intolerance, heat intolerance, polydipsia, polyphagia and polyuria  Genitourinary: Negative for difficulty urinating, dysuria, flank pain and urgency  Musculoskeletal: Negative for arthralgias, back pain, gait problem and myalgias  Skin: Negative for rash  Allergic/Immunologic: Negative  Neurological: Negative for dizziness, weakness, light-headedness and headaches  Hematological: Negative  Psychiatric/Behavioral: Negative for behavioral problems, dysphoric mood and sleep disturbance  The patient is not nervous/anxious            PHQ-9 Depression Screening    PHQ-9:   Frequency of the following problems over the past two weeks:      Little interest or pleasure in doing things: 0 - not at all  Feeling down, depressed, or hopeless: 0 - not at all  PHQ-2 Score: 0          Objective:  Vitals:    07/01/21 1430 BP: 98/64   BP Location: Right arm   Patient Position: Sitting   Cuff Size: Child   Pulse: 78   Resp: 14   Temp: 98 7 °F (37 1 °C)   SpO2: 97%   Weight: 51 5 kg (113 lb 9 6 oz)   Height: 5' 8" (1 727 m)     Body mass index is 17 27 kg/m²  Physical Exam  Constitutional:       General: He is not in acute distress  Appearance: He is well-developed and underweight  He is not diaphoretic  HENT:      Head: Normocephalic and atraumatic  Right Ear: External ear normal       Left Ear: External ear normal       Nose: Nose normal       Mouth/Throat:      Pharynx: No oropharyngeal exudate  Eyes:      General: No scleral icterus  Right eye: No discharge  Left eye: No discharge  Conjunctiva/sclera: Conjunctivae normal       Pupils: Pupils are equal, round, and reactive to light  Neck:      Thyroid: No thyromegaly  Cardiovascular:      Rate and Rhythm: Normal rate and regular rhythm  Heart sounds: Normal heart sounds  No murmur heard  No friction rub  No gallop  Pulmonary:      Effort: Pulmonary effort is normal  No respiratory distress  Breath sounds: Normal breath sounds  No wheezing or rales  Abdominal:      General: Bowel sounds are normal  There is no distension  Palpations: Abdomen is soft  Tenderness: There is no abdominal tenderness  Musculoskeletal:         General: No tenderness or deformity  Normal range of motion  Cervical back: Normal range of motion and neck supple  Skin:     General: Skin is warm and dry  Neurological:      Mental Status: He is alert and oriented to person, place, and time  Cranial Nerves: No cranial nerve deficit  Psychiatric:         Behavior: Behavior normal          Thought Content: Thought content normal          Judgment: Judgment normal        BMI Counseling: Body mass index is 17 27 kg/m²  The BMI is below normal  Patient advised to gain weight

## 2021-07-01 NOTE — ASSESSMENT & PLAN NOTE
Start remeron to assist in weight gaining efforts  Directions for use and possible side effects discussed and patient verbalized understanding of these  Update labs

## 2021-08-13 PROBLEM — Z01.818 PREOP EXAMINATION: Status: ACTIVE | Noted: 2021-08-13

## 2021-08-18 ENCOUNTER — CONSULT (OUTPATIENT)
Dept: INTERNAL MEDICINE CLINIC | Facility: CLINIC | Age: 61
End: 2021-08-18
Payer: OTHER MISCELLANEOUS

## 2021-08-18 VITALS
OXYGEN SATURATION: 98 % | WEIGHT: 116 LBS | TEMPERATURE: 97.8 F | SYSTOLIC BLOOD PRESSURE: 110 MMHG | DIASTOLIC BLOOD PRESSURE: 68 MMHG | BODY MASS INDEX: 17.58 KG/M2 | RESPIRATION RATE: 18 BRPM | HEART RATE: 86 BPM | HEIGHT: 68 IN

## 2021-08-18 DIAGNOSIS — Z01.818 PRE-OPERATIVE CLEARANCE: Primary | ICD-10-CM

## 2021-08-18 LAB — ACCELERATIONS > 10BPM: 72

## 2021-08-18 PROCEDURE — 93000 ELECTROCARDIOGRAM COMPLETE: CPT | Performed by: PHYSICIAN ASSISTANT

## 2021-08-18 PROCEDURE — 99243 OFF/OP CNSLTJ NEW/EST LOW 30: CPT | Performed by: PHYSICIAN ASSISTANT

## 2021-08-18 NOTE — PROGRESS NOTES
Subjective:     Enma Anders is a 62 y o  male who presents to the office today for a preoperative consultation at the request of surgeon Dr Dev Chung who plans on performing Right shoulder arthroscopy with possible rotator cuff repair, subacromial decompression and possible biceps tendon repair on August 24  Planned anesthesia: general  The patient has the following known anesthesia issues: pt notes he has awakened during previous surgeries  Patients bleeding risk: no recent abnormal bleeding  Patient does not have objections to receiving blood products if needed  The following portions of the patient's history were reviewed and updated as appropriate: He  has a past medical history of Anesthesia, Arthritis, Asthma, Bariatric surgery status, Cervical radiculopathy, Chemotherapy follow-up examination, Chronic pain, Chronic pain disorder, COPD (chronic obstructive pulmonary disease) ([place] [address] ), Diabetes mellitus ([place] [address] ), Food allergy, GERD (gastroesophageal reflux disease), Heart murmur, Hiatal hernia, History of malignant neoplasm, History of pneumonia (04/12/2016), History of pulmonary embolism, History of pulmonary embolus (PE), History of ulceration, Hyperlipidemia, Lung cancer ([facility] ), Migraine, Pneumonia, Postgastrectomy malabsorption, Right leg DVT ([facility] ), Right scapholunate ligament tear, Skipped heart beats, and Wears partial dentures    He   Patient Active Problem List    Diagnosis Date Noted    Preop examination 08/13/2021    Chronic deep vein thrombosis (DVT) of popliteal vein of right lower extremity (HCC)     Pulmonary embolism, other, unspecified chronicity, unspecified whether acute cor pulmonale present ([place] [address] ) 07/01/2021    Acute respiratory failure with hypoxia ([place] [address] ) 07/01/2021    Adenocarcinoma of left lung ([place] [address] ) 06/14/2021    Underweight 01/03/2020    Negative depression screening 01/03/2020    G tube feedings ([place] [address] ) 09/13/2019    On total parenteral nutrition (TPN) 09/09/2019    Near syncope 08/14/2019    Chronic marginal ulcer with perforation ([facility] ) 08/04/2019    Severe protein-calorie malnutrition (HCC) 08/04/2019    Abnormal CT scan, lung 08/04/2019    Coagulopathy ([facility] ) 08/04/2019    Electrolyte disturbance 08/04/2019    Internal hernia 07/26/2019    Constipation 07/19/2018    Generalized abdominal pain 07/12/2018    Reactive hypoglycemia 03/01/2018    Vitamin D deficiency 03/01/2018    Low vitamin B12 level 03/01/2018    S/P repair of paraesophageal hernia 02/07/2018    Bariatric surgery status 02/07/2018    Postsurgical malabsorption 02/07/2018    Cervical disc disorder with radiculopathy of cervicothoracic region 10/17/2017    Cervical radiculopathy     Saucedo's esophagus without dysplasia 07/12/2017    Right scapholunate ligament tear     History of pulmonary embolism     Dyslipidemia 05/09/2013    History of lung cancer 03/30/2013     He  has a past surgical history that includes Lung lobectomy (Left); Shoulder surgery; pr esophagogastroduodenoscopy transoral diagnostic (N/A, 1/11/2017); RECONSTRUCTION DISTAL RADIUS/ULNA JOINT (DRUJ) (Right, 9/6/2016); pr wrist arthroscop,release xvers lig (Right, 6/9/2016); Bronchoscopy; Mandible fracture surgery; Mediastinoscopy; Tonsillectomy; pr colonoscopy flx dx w/collj spec when pfrmd (N/A, 4/14/2017); Fracture surgery; pr lap, repair paraesophageal hernia, incl fundoplasty w/ mesh (N/A, 7/12/2017); GASTRIC BYPASS LAPAROSCOPIC (N/A, 7/12/2017); Colonoscopy; Knee arthroscopy (Right); pr arthrodesis ant interbody min discectomy, cervical below c2 (N/A, 10/17/2017); Lymphadenectomy (05/22/2012); pr incise finger tendon sheath (Right, 11/27/2018); pr egd transoral biopsy single/multiple (N/A, 1/9/2019); pr lap,diagnostic abdomen (N/A, 7/26/2019); LAPAROTOMY (N/A, 7/28/2019); IR image guided aspiration / drainage (8/1/2019); IR chest tube placement (8/9/2019); IR PICC line (8/9/2019);  IR cholecystostomy tube placement (9/22/2019); and Upper gastrointestinal endoscopy  His family history includes Breast cancer in his paternal grandmother and sister; Diabetes in his mother; Heart disease in his father; Hypertension in his father and mother; Other in his mother; Skin cancer in his paternal grandmother and sister  He  reports that he quit smoking about 4 years ago  His smoking use included cigarettes  He has a 24 00 pack-year smoking history  He has quit using smokeless tobacco   His smokeless tobacco use included snuff  He reports previous alcohol use  He reports that he does not use drugs  Current Outpatient Medications   Medication Sig Dispense Refill    acetaminophen (TYLENOL 8 HOUR) 650 mg CR tablet Take 1 tablet (650 mg total) by mouth every 8 (eight) hours 15 tablet 0    albuterol (PROAIR HFA) 90 mcg/act inhaler Inhale 2 puffs every 6 (six) hours as needed for wheezing 8 5 g 0    mirtazapine (REMERON) 15 mg tablet Take 1 tablet (15 mg total) by mouth daily at bedtime 30 tablet 5    sucralfate (CARAFATE) 1 g/10 mL suspension Take 10 mL (1 g total) by mouth 4 (four) times a day 1200 mL 4     No current facility-administered medications for this visit  Current Outpatient Medications on File Prior to Visit   Medication Sig    acetaminophen (TYLENOL 8 HOUR) 650 mg CR tablet Take 1 tablet (650 mg total) by mouth every 8 (eight) hours    albuterol (PROAIR HFA) 90 mcg/act inhaler Inhale 2 puffs every 6 (six) hours as needed for wheezing    mirtazapine (REMERON) 15 mg tablet Take 1 tablet (15 mg total) by mouth daily at bedtime    sucralfate (CARAFATE) 1 g/10 mL suspension Take 10 mL (1 g total) by mouth 4 (four) times a day     No current facility-administered medications on file prior to visit  He is allergic to codeine, hydrocodone, and shellfish-derived products - food allergy       Review of Systems  Constitutional: negative  Eyes: negative  Ears, nose, mouth, throat, and face: negative  Respiratory: negative  Cardiovascular: negative  Gastrointestinal: negative  Hematologic/lymphatic: negative  Musculoskeletal:negative  Neurological: negative  Behavioral/Psych: negative     Objective:     /68 (BP Location: Left arm, Patient Position: Sitting, Cuff Size: Adult)   Pulse 86   Temp 97 8 °F (36 6 °C) (Temporal)   Resp 18   Ht 5' 8" (1 727 m)   Wt 52 6 kg (116 lb)   SpO2 98%   BMI 17 64 kg/m²     General Appearance:    Alert, cooperative, no distress, appears stated age   Head:    Normocephalic, without obvious abnormality, atraumatic   Eyes:    PERRL, conjunctiva/corneas clear, EOM's intact, fundi     benign, both eyes        Ears:    Normal TM's and external ear canals, both ears   Nose:   Nares normal, septum midline, mucosa normal, no drainage    or sinus tenderness   Throat:   Lips, mucosa, and tongue normal; teeth and gums normal   Neck:   Supple, symmetrical, trachea midline, no adenopathy;        thyroid:  No enlargement/tenderness/nodules; no carotid    bruit or JVD   Back:     Symmetric, no curvature, ROM normal, no CVA tenderness   Lungs:     Clear to auscultation bilaterally, respirations unlabored   Chest wall:    No tenderness or deformity   Heart:    Regular rate and rhythm, S1 and S2 normal, no murmur, rub   or gallop   Abdomen:     Soft, non-tender, bowel sounds active all four quadrants,     no masses, no organomegaly   Genitalia:    Normal male without lesion, discharge or tenderness   Rectal:    Normal tone, normal prostate, no masses or tenderness;    guaiac negative stool   Extremities:   Extremities normal, atraumatic, no cyanosis or edema   Pulses:   2+ and symmetric all extremities   Skin:   Skin color, texture, turgor normal, no rashes or lesions   Lymph nodes:   Cervical, supraclavicular, and axillary nodes normal   Neurologic:   CNII-XII intact   Normal strength, sensation and reflexes       throughout         Cardiographics  ECG: no change since previous ECG dated 9/2019      Imaging  Chest x-ray: not required     Lab Review   Pt is getting labs done tomorrow morning      Assessment:     62 y o  male with planned surgery as above  Known risk factors for perioperative complications: Undernutrition    Difficulty with intubation is not anticipated  Cardiac Risk Estimation: low    Current medications which may produce withdrawal symptoms if withheld perioperatively: none      Plan:     1  Preoperative workup as follows ECG, hemoglobin, hematocrit, electrolytes, creatinine, glucose, liver function studies  2  Change in medication regimen before surgery: none, continue medication regimen including morning of surgery, with sip of water  3  Rosenberg risk Index Score: 0 giving pt a less than 1% chance of perioperative cardiac complications  Official clearance pending lab result review

## 2021-08-19 ENCOUNTER — APPOINTMENT (OUTPATIENT)
Dept: LAB | Facility: CLINIC | Age: 61
End: 2021-08-19
Payer: MEDICARE

## 2021-08-19 DIAGNOSIS — Z13.0 SCREENING FOR DEFICIENCY ANEMIA: ICD-10-CM

## 2021-08-19 DIAGNOSIS — Z13.29 SCREENING FOR THYROID DISORDER: ICD-10-CM

## 2021-08-19 DIAGNOSIS — E55.9 VITAMIN D DEFICIENCY: ICD-10-CM

## 2021-08-19 DIAGNOSIS — E78.5 DYSLIPIDEMIA: ICD-10-CM

## 2021-08-19 DIAGNOSIS — Z12.5 SCREENING PSA (PROSTATE SPECIFIC ANTIGEN): ICD-10-CM

## 2021-08-19 DIAGNOSIS — Z13.1 SCREENING FOR DIABETES MELLITUS: ICD-10-CM

## 2021-08-19 LAB
25(OH)D3 SERPL-MCNC: 26.6 NG/ML (ref 30–100)
CHOLEST SERPL-MCNC: 182 MG/DL (ref 50–200)
HDLC SERPL-MCNC: 55 MG/DL
LDLC SERPL CALC-MCNC: 116 MG/DL (ref 0–100)
NONHDLC SERPL-MCNC: 127 MG/DL
PSA SERPL-MCNC: 1.6 NG/ML (ref 0–4)
TRIGL SERPL-MCNC: 57 MG/DL
TSH SERPL DL<=0.05 MIU/L-ACNC: 0.7 UIU/ML (ref 0.36–3.74)

## 2021-08-19 PROCEDURE — G0103 PSA SCREENING: HCPCS

## 2021-08-19 PROCEDURE — 82306 VITAMIN D 25 HYDROXY: CPT

## 2021-08-19 PROCEDURE — 80061 LIPID PANEL: CPT

## 2021-08-19 PROCEDURE — 84443 ASSAY THYROID STIM HORMONE: CPT

## 2021-08-30 ENCOUNTER — EVALUATION (OUTPATIENT)
Dept: PHYSICAL THERAPY | Facility: CLINIC | Age: 61
End: 2021-08-30
Payer: OTHER MISCELLANEOUS

## 2021-08-30 VITALS — HEART RATE: 94 BPM | DIASTOLIC BLOOD PRESSURE: 57 MMHG | SYSTOLIC BLOOD PRESSURE: 88 MMHG

## 2021-08-30 DIAGNOSIS — G89.29 CHRONIC RIGHT SHOULDER PAIN: ICD-10-CM

## 2021-08-30 DIAGNOSIS — M25.511 CHRONIC RIGHT SHOULDER PAIN: ICD-10-CM

## 2021-08-30 DIAGNOSIS — Z98.890 S/P SHOULDER SURGERY: Primary | ICD-10-CM

## 2021-08-30 PROCEDURE — 97110 THERAPEUTIC EXERCISES: CPT | Performed by: PHYSICAL THERAPIST

## 2021-08-30 PROCEDURE — 97161 PT EVAL LOW COMPLEX 20 MIN: CPT | Performed by: PHYSICAL THERAPIST

## 2021-08-30 PROCEDURE — 97140 MANUAL THERAPY 1/> REGIONS: CPT | Performed by: PHYSICAL THERAPIST
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
## 2021-09-03 ENCOUNTER — OFFICE VISIT (OUTPATIENT)
Dept: PHYSICAL THERAPY | Facility: CLINIC | Age: 61
End: 2021-09-03
Payer: OTHER MISCELLANEOUS

## 2021-09-03 ENCOUNTER — APPOINTMENT (OUTPATIENT)
Dept: PHYSICAL THERAPY | Facility: CLINIC | Age: 61
End: 2021-09-03

## 2021-09-03 DIAGNOSIS — G89.29 CHRONIC RIGHT SHOULDER PAIN: ICD-10-CM

## 2021-09-03 DIAGNOSIS — Z98.890 S/P SHOULDER SURGERY: Primary | ICD-10-CM

## 2021-09-03 DIAGNOSIS — M25.511 CHRONIC RIGHT SHOULDER PAIN: ICD-10-CM

## 2021-09-03 PROCEDURE — 97140 MANUAL THERAPY 1/> REGIONS: CPT

## 2021-09-03 PROCEDURE — 97110 THERAPEUTIC EXERCISES: CPT

## 2021-09-03 NOTE — PROGRESS NOTES
Daily Note     Today's date: 9/3/2021  Patient name: Claude Branch  : 1960  MRN: [id number]  Referring provider: Luz Reagan MD  Dx:   Encounter Diagnosis     ICD-10-CM    1  S/P shoulder surgery  Z98 890    2  Chronic right shoulder pain  M25 511     G89 29                   Subjective: Pt reported that his shoulder is very sore today  Objective: See treatment diary below      Assessment: Pt amb to therapy w/o his sling  Initiated multiple exercises as outlines in pt POC  AAROM exercises performed to increase functional range  Tolerated treatment well  Patient demonstrated fatigue post treatment, exhibited good technique with therapeutic exercises and would benefit from continued PT      Plan: Continue per plan of care  Progress treatment as tolerated         Precautions: MD protocol DC sling when comfortable, 0-3 weeks passive and AAROM, 3-6 weeks PREs  POC exp 21  Manuals 8/30 9/3      R shoulder stretching  All directions  KG All directions  TM                              Neuro Re-Ed        Scap retractions  5" x20      Supine scap punches                                                Ther Ex        Pulleys  Flex/Scap   5" x10 ea      UT stretch 15"x4 15" x4      Levator scap stretch 15"x4 15" x4      Elbow AROM flex/ext x20 x20       Wrist AROM flex/ext x20  x20      Gripping x20 x20      AAROM finger ladder  Flex 5" x10              Ther Activity                                                Modalities        CP post prn

## 2021-09-07 ENCOUNTER — OFFICE VISIT (OUTPATIENT)
Dept: PHYSICAL THERAPY | Facility: CLINIC | Age: 61
End: 2021-09-07
Payer: OTHER MISCELLANEOUS

## 2021-09-07 DIAGNOSIS — Z98.890 S/P SHOULDER SURGERY: Primary | ICD-10-CM

## 2021-09-07 DIAGNOSIS — G89.29 CHRONIC RIGHT SHOULDER PAIN: ICD-10-CM

## 2021-09-07 DIAGNOSIS — M25.511 CHRONIC RIGHT SHOULDER PAIN: ICD-10-CM

## 2021-09-07 PROCEDURE — 97110 THERAPEUTIC EXERCISES: CPT | Performed by: PHYSICAL THERAPIST

## 2021-09-07 PROCEDURE — 97140 MANUAL THERAPY 1/> REGIONS: CPT | Performed by: PHYSICAL THERAPIST

## 2021-09-07 NOTE — PROGRESS NOTES
Daily Note     Today's date: 2021  Patient name: Krystin Shaikh  : 1960  MRN: [id number]  Referring provider: Malik Falcon MD  Dx:   Encounter Diagnosis     ICD-10-CM    1  S/P shoulder surgery  Z98 890    2  Chronic right shoulder pain  M25 511     G89 29                   Subjective: Patient reports his shoulder is no different this date       Objective: See treatment diary below      Assessment: Tolerated treatment well  Patient exhibited good technique with therapeutic exercises and would benefit from continued PT      Plan: Continue per plan of care        Precautions: MD protocol DC sling when comfortable, 0-3 weeks passive and AAROM, 3-6 weeks PREs  POC exp 21  Manuals 8/30 9/3 9/7      R shoulder stretching  All directions  KG All directions  TM All Directions RR                             Neuro Re-Ed        Scap retractions  5" x20 5''20x     Supine scap punches                                                Ther Ex        Pulleys  Flex/Scap   5" x10 ea Flex/Scap   5" x10 ea     UT stretch 15"x4 15" x4 15''x4     Levator scap stretch 15"x4 15" x4 15''x4     Elbow AROM flex/ext x20 x20  x20     Wrist AROM flex/ext x20  x20 x20     Gripping x20 x20 x20     AAROM finger ladder  Flex 5" x10 5''10x             Ther Activity                                                Modalities        CP post prn

## 2021-09-09 ENCOUNTER — OFFICE VISIT (OUTPATIENT)
Dept: PHYSICAL THERAPY | Facility: CLINIC | Age: 61
End: 2021-09-09
Payer: OTHER MISCELLANEOUS

## 2021-09-09 ENCOUNTER — APPOINTMENT (OUTPATIENT)
Dept: PHYSICAL THERAPY | Facility: CLINIC | Age: 61
End: 2021-09-09
Payer: OTHER MISCELLANEOUS

## 2021-09-09 DIAGNOSIS — M25.511 CHRONIC RIGHT SHOULDER PAIN: ICD-10-CM

## 2021-09-09 DIAGNOSIS — G89.29 CHRONIC RIGHT SHOULDER PAIN: ICD-10-CM

## 2021-09-09 DIAGNOSIS — Z98.890 S/P SHOULDER SURGERY: Primary | ICD-10-CM

## 2021-09-09 PROCEDURE — 97140 MANUAL THERAPY 1/> REGIONS: CPT | Performed by: PHYSICAL THERAPIST

## 2021-09-09 PROCEDURE — 97110 THERAPEUTIC EXERCISES: CPT

## 2021-09-09 NOTE — PROGRESS NOTES
Daily Note     Today's date: 2021  Patient name: Main Green  : 1960  MRN: [id number]  Referring provider: Rico Russell MD  Dx:   Encounter Diagnosis     ICD-10-CM    1  S/P shoulder surgery  Z98 890    2  Chronic right shoulder pain  M25 511     G89 29                   Subjective: Pt reported less pain than last visit  He does continue to have R shoulder pain at end range during AAROM and manual exercises  Objective: See treatment diary below      Assessment: Tolerated treatment well  Manuals performed by RR  Patient demonstrated fatigue post treatment, exhibited good technique with therapeutic exercises and would benefit from continued PT      Plan: Continue per plan of care  Progress treatment as tolerated         Precautions: MD protocol DC sling when comfortable, 0-3 weeks passive and AAROM, 3-6 weeks PREs  POC exp 21  Manuals 8/30 9/3 9/7  9/9    R shoulder stretching  All directions  KG All directions  TM All Directions RR All direction   RR                            Neuro Re-Ed        Scap retractions  5" x20 5''20x 5" x20     Supine scap punches                                                Ther Ex        Pulleys  Flex/Scap   5" x10 ea Flex/Scap   5" x10 ea Flex/Scap   5" x10 ea    UT stretch 15"x4 15" x4 15''x4 15" x4    Levator scap stretch 15"x4 15" x4 15''x4 15" x4    Elbow AROM flex/ext x20 x20  x20 x20     Wrist AROM flex/ext x20  x20 x20 x20    Gripping x20 x20 x20 3" x20 Blue     AAROM finger ladder  Flex 5" x10 5''10x 5" x10 Flex            Ther Activity                                                Modalities        CP post prn

## 2021-09-14 ENCOUNTER — OFFICE VISIT (OUTPATIENT)
Dept: PHYSICAL THERAPY | Facility: CLINIC | Age: 61
End: 2021-09-14
Payer: OTHER MISCELLANEOUS

## 2021-09-14 DIAGNOSIS — M25.511 CHRONIC RIGHT SHOULDER PAIN: ICD-10-CM

## 2021-09-14 DIAGNOSIS — G89.29 CHRONIC RIGHT SHOULDER PAIN: ICD-10-CM

## 2021-09-14 DIAGNOSIS — Z98.890 S/P SHOULDER SURGERY: Primary | ICD-10-CM

## 2021-09-14 PROCEDURE — 97140 MANUAL THERAPY 1/> REGIONS: CPT

## 2021-09-14 PROCEDURE — 97110 THERAPEUTIC EXERCISES: CPT

## 2021-09-14 NOTE — PROGRESS NOTES
Daily Note     Today's date: 2021  Patient name: Tessa Nielson  : 1960  MRN: [id number]  Referring provider: Pablo Caban MD  Dx:   Encounter Diagnosis     ICD-10-CM    1  S/P shoulder surgery  Z98 890    2  Chronic right shoulder pain  M25 511     G89 29                   Subjective: Pt offered no new comments  He reported that the pain he is having in the R shoulder is "the same "      Objective: See treatment diary below      Assessment: Tolerated treatment well  Patient demonstrated fatigue post treatment, exhibited good technique with therapeutic exercises and would benefit from continued PT      Plan: Continue per plan of care  Progress treament per protocol        Precautions: MD protocol DC sling when comfortable, 0-3 weeks passive and AAROM, 3-6 weeks PREs  POC exp 21  Manuals 8/30 9/3 9/7  9/9 9/14   R shoulder stretching  All directions  KG All directions  TM All Directions RR All direction   RR All directions  TM                           Neuro Re-Ed        Scap retractions  5" x20 5''20x 5" x20  5" x20   Supine scap punches                                                Ther Ex        Pulleys  Flex/Scap   5" x10 ea Flex/Scap   5" x10 ea Flex/Scap   5" x10 ea Flex/Scap   5" x10 ea   UT stretch 15"x4 15" x4 15''x4 15" x4 15" x4   Levator scap stretch 15"x4 15" x4 15''x4 15" x4 15" x4   Elbow AROM flex/ext x20 x20  x20 x20  x20   Wrist AROM flex/ext x20  x20 x20 x20 x20   Gripping x20 x20 x20 3" x20 Blue  3" x20 Blue   AAROM finger ladder  Flex 5" x10 5''10x 5" x10 Flex 5" x10 Flex           Ther Activity                                                Modalities        CP post prn

## 2021-09-16 ENCOUNTER — OFFICE VISIT (OUTPATIENT)
Dept: PHYSICAL THERAPY | Facility: CLINIC | Age: 61
End: 2021-09-16
Payer: OTHER MISCELLANEOUS

## 2021-09-16 DIAGNOSIS — Z98.890 S/P SHOULDER SURGERY: Primary | ICD-10-CM

## 2021-09-16 DIAGNOSIS — G89.29 CHRONIC RIGHT SHOULDER PAIN: ICD-10-CM

## 2021-09-16 DIAGNOSIS — M25.511 CHRONIC RIGHT SHOULDER PAIN: ICD-10-CM

## 2021-09-16 PROCEDURE — 97140 MANUAL THERAPY 1/> REGIONS: CPT

## 2021-09-16 PROCEDURE — 97110 THERAPEUTIC EXERCISES: CPT

## 2021-09-21 ENCOUNTER — OFFICE VISIT (OUTPATIENT)
Dept: PHYSICAL THERAPY | Facility: CLINIC | Age: 61
End: 2021-09-21
Payer: OTHER MISCELLANEOUS

## 2021-09-21 DIAGNOSIS — M25.511 CHRONIC RIGHT SHOULDER PAIN: ICD-10-CM

## 2021-09-21 DIAGNOSIS — Z98.890 S/P SHOULDER SURGERY: Primary | ICD-10-CM

## 2021-09-21 DIAGNOSIS — G89.29 CHRONIC RIGHT SHOULDER PAIN: ICD-10-CM

## 2021-09-21 PROCEDURE — 97140 MANUAL THERAPY 1/> REGIONS: CPT | Performed by: PHYSICAL THERAPIST

## 2021-09-21 PROCEDURE — 97110 THERAPEUTIC EXERCISES: CPT | Performed by: PHYSICAL THERAPIST

## 2021-09-21 NOTE — PROGRESS NOTES
Daily Note     Today's date: 2021  Patient name: Anson Sutherland  : 1960  MRN: [id number]  Referring provider: Uyen Munoz MD  Dx:   Encounter Diagnosis     ICD-10-CM    1  S/P shoulder surgery  Z98 890    2  Chronic right shoulder pain  M25 511     G89 29                   Subjective: Patient reports he is feeling better but does not think his shoulder will ever be completely recovered  Objective: See treatment diary below      Assessment: Tolerated treatment well  FOTO score improved from 32 to 43  Patient demonstrated fatigue post treatment, exhibited good technique with therapeutic exercises and would benefit from continued PT      Plan: Continue per plan of care  Progress treatment as tolerated         Precautions: MD protocol DC sling when comfortable, 0-3 weeks passive and AAROM (-), 3-6 weeks PREs (-10/5)  POC exp 21  Manuals    R shoulder stretching  All directions  TM All directions  KG All Directions RR All direction   RR All directions  TM                           Neuro Re-Ed        Scap retractions 5" x20 5" x20 5''20x 5" x20  5" x20   Supine scap punches                                                Ther Ex        Pulleys Flex/Scap   5" x10 ea Flex/Scap   5" x10 ea Flex/Scap   5" x10 ea Flex/Scap   5" x10 ea Flex/Scap   5" x10 ea   UT stretch 15"x4 15" x4 15''x4 15" x4 15" x4   Levator scap stretch 15"x4 15" x4 15''x4 15" x4 15" x4   Elbow AROM flex/ext x30 x30  x20 x20  x20   Wrist AROM flex/ext x30  x30 x20 x20 x20   Gripping 3" x20 Blue x30 blue x20 3" x20 Blue  3" x20 Blue   AAROM finger ladder 5" x10 Flex Flex 5" x10 5''10x 5" x10 Flex 5" x10 Flex   Prone Flex, HRZ Abd, and Ext x10 ea x10 ea      Isometrics all dir 5" x10 ea 5" x10 ea              Ther Activity                                                Modalities        CP post prn

## 2021-09-23 ENCOUNTER — OFFICE VISIT (OUTPATIENT)
Dept: PHYSICAL THERAPY | Facility: CLINIC | Age: 61
End: 2021-09-23
Payer: OTHER MISCELLANEOUS

## 2021-09-23 DIAGNOSIS — Z98.890 S/P SHOULDER SURGERY: Primary | ICD-10-CM

## 2021-09-23 DIAGNOSIS — G89.29 CHRONIC RIGHT SHOULDER PAIN: ICD-10-CM

## 2021-09-23 DIAGNOSIS — M25.511 CHRONIC RIGHT SHOULDER PAIN: ICD-10-CM

## 2021-09-23 PROCEDURE — 97110 THERAPEUTIC EXERCISES: CPT

## 2021-09-23 PROCEDURE — 97140 MANUAL THERAPY 1/> REGIONS: CPT

## 2021-09-23 NOTE — PROGRESS NOTES
Daily Note     Today's date: 2021  Patient name: Jazmin Kaiser  : 1960  MRN: [id number]  Referring provider: Nayely Anderson MD  Dx:   Encounter Diagnosis     ICD-10-CM    1  S/P shoulder surgery  Z98 890    2  Chronic right shoulder pain  M25 511     G89 29                   Subjective: Pt chief complaint is the tingling that he has from his shoulder, radiating into his R bicep and hand  This tingling happened contently when lowering his arm from a flexed position  It has been happening since his first surgery  Objective: See treatment diary below      Assessment: Added multiple PREs to facilitate strength of the RUE  Tolerated treatment well  Patient demonstrated fatigue post treatment, exhibited good technique with therapeutic exercises and would benefit from continued PT  Plan: Continue per plan of care  Progress treament per protocol        Precautions: MD protocol DC sling when comfortable, 0-3 weeks passive and AAROM (-), 3-6 weeks PREs (-10/5)  POC exp 21  Manuals    R shoulder stretching  All directions  TM All directions  KG All Directions TM All direction   RR All directions  TM                           Neuro Re-Ed        Scap retractions 5" x20 5" x20 5''20x 5" x20  5" x20   Supine scap punches                                                Ther Ex        Pulleys Flex/Scap   5" x10 ea Flex/Scap   5" x10 ea Flex/ABD   5" x10 ea Flex/Scap   5" x10 ea Flex/Scap   5" x10 ea   UT stretch 15"x4 15" x4 DC 15" x4 15" x4   Levator scap stretch 15"x4 15" x4 DC 15" x4 15" x4   Elbow AROM flex/ext x30 x30  DC x20  x20   Wrist AROM flex/ext x30  x30 DC x20 x20   Gripping 3" x20 Blue x30 blue DC 3" x20 Blue  3" x20 Blue   AAROM finger ladder 5" x10 Flex Flex 5" x10 Flex 5'' x10 5" x10 Flex 5" x10 Flex   Prone Flex, HRZ Abd, and Ext x10 ea x10 ea 1# x10 ea     Isometrics all dir 5" x10 ea 5" x10 ea 5" x10 ea     Bicep curls   2# 2x10     Tricep Ext Blue TB 2x10     Standing flex to 90*   2x10     Standing Scap to 90*   2x10     Ther Activity                        Modalities        CP post prn

## 2021-09-27 ENCOUNTER — EVALUATION (OUTPATIENT)
Dept: PHYSICAL THERAPY | Facility: CLINIC | Age: 61
End: 2021-09-27
Payer: OTHER MISCELLANEOUS

## 2021-09-27 DIAGNOSIS — G89.29 CHRONIC RIGHT SHOULDER PAIN: ICD-10-CM

## 2021-09-27 DIAGNOSIS — M25.511 CHRONIC RIGHT SHOULDER PAIN: ICD-10-CM

## 2021-09-27 DIAGNOSIS — Z98.890 S/P SHOULDER SURGERY: Primary | ICD-10-CM

## 2021-09-27 PROCEDURE — 97140 MANUAL THERAPY 1/> REGIONS: CPT | Performed by: PHYSICAL THERAPIST

## 2021-09-27 PROCEDURE — 97110 THERAPEUTIC EXERCISES: CPT | Performed by: PHYSICAL THERAPIST
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
## 2021-09-30 ENCOUNTER — APPOINTMENT (OUTPATIENT)
Dept: PHYSICAL THERAPY | Facility: CLINIC | Age: 61
End: 2021-09-30
Payer: OTHER MISCELLANEOUS

## 2021-10-01 ENCOUNTER — OFFICE VISIT (OUTPATIENT)
Dept: PHYSICAL THERAPY | Facility: CLINIC | Age: 61
End: 2021-10-01
Payer: OTHER MISCELLANEOUS

## 2021-10-01 DIAGNOSIS — G89.29 CHRONIC RIGHT SHOULDER PAIN: ICD-10-CM

## 2021-10-01 DIAGNOSIS — Z98.890 S/P SHOULDER SURGERY: Primary | ICD-10-CM

## 2021-10-01 DIAGNOSIS — M25.511 CHRONIC RIGHT SHOULDER PAIN: ICD-10-CM

## 2021-10-01 PROCEDURE — 97110 THERAPEUTIC EXERCISES: CPT

## 2021-10-01 PROCEDURE — 97112 NEUROMUSCULAR REEDUCATION: CPT

## 2021-10-01 PROCEDURE — 97140 MANUAL THERAPY 1/> REGIONS: CPT

## 2021-10-04 ENCOUNTER — OFFICE VISIT (OUTPATIENT)
Dept: PHYSICAL THERAPY | Facility: CLINIC | Age: 61
End: 2021-10-04
Payer: OTHER MISCELLANEOUS

## 2021-10-04 DIAGNOSIS — Z98.890 S/P SHOULDER SURGERY: Primary | ICD-10-CM

## 2021-10-04 DIAGNOSIS — G89.29 CHRONIC RIGHT SHOULDER PAIN: ICD-10-CM

## 2021-10-04 DIAGNOSIS — M25.511 CHRONIC RIGHT SHOULDER PAIN: ICD-10-CM

## 2021-10-04 PROCEDURE — 97110 THERAPEUTIC EXERCISES: CPT

## 2021-10-04 PROCEDURE — 97140 MANUAL THERAPY 1/> REGIONS: CPT

## 2021-10-07 ENCOUNTER — OFFICE VISIT (OUTPATIENT)
Dept: PHYSICAL THERAPY | Facility: CLINIC | Age: 61
End: 2021-10-07
Payer: OTHER MISCELLANEOUS

## 2021-10-07 DIAGNOSIS — M25.511 CHRONIC RIGHT SHOULDER PAIN: ICD-10-CM

## 2021-10-07 DIAGNOSIS — G89.29 CHRONIC RIGHT SHOULDER PAIN: ICD-10-CM

## 2021-10-07 DIAGNOSIS — Z98.890 S/P SHOULDER SURGERY: Primary | ICD-10-CM

## 2021-10-07 PROCEDURE — 97112 NEUROMUSCULAR REEDUCATION: CPT | Performed by: PHYSICAL THERAPIST

## 2021-10-08 ENCOUNTER — APPOINTMENT (OUTPATIENT)
Dept: PHYSICAL THERAPY | Facility: CLINIC | Age: 61
End: 2021-10-08
Payer: OTHER MISCELLANEOUS

## 2021-10-11 ENCOUNTER — OFFICE VISIT (OUTPATIENT)
Dept: PHYSICAL THERAPY | Facility: CLINIC | Age: 61
End: 2021-10-11
Payer: OTHER MISCELLANEOUS

## 2021-10-11 DIAGNOSIS — G89.29 CHRONIC RIGHT SHOULDER PAIN: ICD-10-CM

## 2021-10-11 DIAGNOSIS — M25.511 CHRONIC RIGHT SHOULDER PAIN: ICD-10-CM

## 2021-10-11 DIAGNOSIS — Z98.890 S/P SHOULDER SURGERY: Primary | ICD-10-CM

## 2021-10-11 PROCEDURE — 97110 THERAPEUTIC EXERCISES: CPT

## 2021-10-11 PROCEDURE — 97140 MANUAL THERAPY 1/> REGIONS: CPT

## 2021-10-14 ENCOUNTER — OFFICE VISIT (OUTPATIENT)
Dept: PHYSICAL THERAPY | Facility: CLINIC | Age: 61
End: 2021-10-14
Payer: OTHER MISCELLANEOUS

## 2021-10-14 DIAGNOSIS — M25.511 CHRONIC RIGHT SHOULDER PAIN: ICD-10-CM

## 2021-10-14 DIAGNOSIS — G89.29 CHRONIC RIGHT SHOULDER PAIN: ICD-10-CM

## 2021-10-14 DIAGNOSIS — Z98.890 S/P SHOULDER SURGERY: Primary | ICD-10-CM

## 2021-10-14 PROCEDURE — 97110 THERAPEUTIC EXERCISES: CPT

## 2021-10-14 PROCEDURE — 97140 MANUAL THERAPY 1/> REGIONS: CPT

## 2021-10-15 ENCOUNTER — APPOINTMENT (OUTPATIENT)
Dept: PHYSICAL THERAPY | Facility: CLINIC | Age: 61
End: 2021-10-15
Payer: OTHER MISCELLANEOUS

## 2021-10-18 ENCOUNTER — OFFICE VISIT (OUTPATIENT)
Dept: PHYSICAL THERAPY | Facility: CLINIC | Age: 61
End: 2021-10-18
Payer: OTHER MISCELLANEOUS

## 2021-10-18 DIAGNOSIS — M25.511 CHRONIC RIGHT SHOULDER PAIN: ICD-10-CM

## 2021-10-18 DIAGNOSIS — Z98.890 S/P SHOULDER SURGERY: Primary | ICD-10-CM

## 2021-10-18 DIAGNOSIS — G89.29 CHRONIC RIGHT SHOULDER PAIN: ICD-10-CM

## 2021-10-18 PROCEDURE — 97112 NEUROMUSCULAR REEDUCATION: CPT

## 2021-10-18 PROCEDURE — 97140 MANUAL THERAPY 1/> REGIONS: CPT

## 2021-10-18 PROCEDURE — 97110 THERAPEUTIC EXERCISES: CPT

## 2021-10-19 ENCOUNTER — OFFICE VISIT (OUTPATIENT)
Dept: PHYSICAL THERAPY | Facility: CLINIC | Age: 61
End: 2021-10-19
Payer: OTHER MISCELLANEOUS

## 2021-10-19 DIAGNOSIS — M25.511 CHRONIC RIGHT SHOULDER PAIN: ICD-10-CM

## 2021-10-19 DIAGNOSIS — Z98.890 S/P SHOULDER SURGERY: Primary | ICD-10-CM

## 2021-10-19 DIAGNOSIS — G89.29 CHRONIC RIGHT SHOULDER PAIN: ICD-10-CM

## 2021-10-19 PROCEDURE — 97110 THERAPEUTIC EXERCISES: CPT

## 2021-10-19 PROCEDURE — 97112 NEUROMUSCULAR REEDUCATION: CPT

## 2021-10-19 PROCEDURE — 97140 MANUAL THERAPY 1/> REGIONS: CPT

## 2021-10-22 ENCOUNTER — OFFICE VISIT (OUTPATIENT)
Dept: PHYSICAL THERAPY | Facility: CLINIC | Age: 61
End: 2021-10-22
Payer: OTHER MISCELLANEOUS

## 2021-10-22 DIAGNOSIS — G89.29 CHRONIC RIGHT SHOULDER PAIN: ICD-10-CM

## 2021-10-22 DIAGNOSIS — M25.511 CHRONIC RIGHT SHOULDER PAIN: ICD-10-CM

## 2021-10-22 DIAGNOSIS — Z98.890 S/P SHOULDER SURGERY: Primary | ICD-10-CM

## 2021-10-22 PROCEDURE — 97110 THERAPEUTIC EXERCISES: CPT

## 2021-10-22 PROCEDURE — 97112 NEUROMUSCULAR REEDUCATION: CPT

## 2021-10-28 ENCOUNTER — EVALUATION (OUTPATIENT)
Dept: PHYSICAL THERAPY | Facility: CLINIC | Age: 61
End: 2021-10-28
Payer: OTHER MISCELLANEOUS

## 2021-10-28 DIAGNOSIS — M25.511 CHRONIC RIGHT SHOULDER PAIN: ICD-10-CM

## 2021-10-28 DIAGNOSIS — Z98.890 S/P SHOULDER SURGERY: Primary | ICD-10-CM

## 2021-10-28 DIAGNOSIS — G89.29 CHRONIC RIGHT SHOULDER PAIN: ICD-10-CM

## 2021-10-28 PROCEDURE — 97112 NEUROMUSCULAR REEDUCATION: CPT | Performed by: PHYSICAL THERAPIST

## 2021-10-28 PROCEDURE — 97140 MANUAL THERAPY 1/> REGIONS: CPT | Performed by: PHYSICAL THERAPIST

## 2021-10-28 PROCEDURE — 97110 THERAPEUTIC EXERCISES: CPT | Performed by: PHYSICAL THERAPIST

## 2021-11-01 ENCOUNTER — OFFICE VISIT (OUTPATIENT)
Dept: PHYSICAL THERAPY | Facility: CLINIC | Age: 61
End: 2021-11-01
Payer: OTHER MISCELLANEOUS

## 2021-11-01 DIAGNOSIS — M25.511 CHRONIC RIGHT SHOULDER PAIN: ICD-10-CM

## 2021-11-01 DIAGNOSIS — Z98.890 S/P SHOULDER SURGERY: Primary | ICD-10-CM

## 2021-11-01 DIAGNOSIS — G89.29 CHRONIC RIGHT SHOULDER PAIN: ICD-10-CM

## 2021-11-01 PROCEDURE — 97140 MANUAL THERAPY 1/> REGIONS: CPT

## 2021-11-01 PROCEDURE — 97110 THERAPEUTIC EXERCISES: CPT

## 2021-11-01 PROCEDURE — 97112 NEUROMUSCULAR REEDUCATION: CPT

## 2021-11-03 ENCOUNTER — OFFICE VISIT (OUTPATIENT)
Dept: PHYSICAL THERAPY | Facility: CLINIC | Age: 61
End: 2021-11-03
Payer: OTHER MISCELLANEOUS

## 2021-11-03 DIAGNOSIS — Z98.890 S/P SHOULDER SURGERY: Primary | ICD-10-CM

## 2021-11-03 DIAGNOSIS — G89.29 CHRONIC RIGHT SHOULDER PAIN: ICD-10-CM

## 2021-11-03 DIAGNOSIS — M25.511 CHRONIC RIGHT SHOULDER PAIN: ICD-10-CM

## 2021-11-03 PROCEDURE — 97112 NEUROMUSCULAR REEDUCATION: CPT | Performed by: PHYSICAL THERAPIST

## 2021-11-03 PROCEDURE — 97110 THERAPEUTIC EXERCISES: CPT | Performed by: PHYSICAL THERAPIST

## 2021-11-03 PROCEDURE — 97140 MANUAL THERAPY 1/> REGIONS: CPT | Performed by: PHYSICAL THERAPIST

## 2021-11-05 ENCOUNTER — APPOINTMENT (OUTPATIENT)
Dept: PHYSICAL THERAPY | Facility: CLINIC | Age: 61
End: 2021-11-05
Payer: OTHER MISCELLANEOUS

## 2021-11-09 ENCOUNTER — APPOINTMENT (OUTPATIENT)
Dept: PHYSICAL THERAPY | Facility: CLINIC | Age: 61
End: 2021-11-09
Payer: OTHER MISCELLANEOUS

## 2021-11-11 ENCOUNTER — OFFICE VISIT (OUTPATIENT)
Dept: PHYSICAL THERAPY | Facility: CLINIC | Age: 61
End: 2021-11-11
Payer: OTHER MISCELLANEOUS

## 2021-11-11 DIAGNOSIS — G89.29 CHRONIC RIGHT SHOULDER PAIN: ICD-10-CM

## 2021-11-11 DIAGNOSIS — M25.511 CHRONIC RIGHT SHOULDER PAIN: ICD-10-CM

## 2021-11-11 DIAGNOSIS — Z98.890 S/P SHOULDER SURGERY: Primary | ICD-10-CM

## 2021-11-11 PROCEDURE — 97140 MANUAL THERAPY 1/> REGIONS: CPT

## 2021-11-11 PROCEDURE — 97110 THERAPEUTIC EXERCISES: CPT

## 2021-11-12 ENCOUNTER — OFFICE VISIT (OUTPATIENT)
Dept: PHYSICAL THERAPY | Facility: CLINIC | Age: 61
End: 2021-11-12
Payer: OTHER MISCELLANEOUS

## 2021-11-12 DIAGNOSIS — M25.511 CHRONIC RIGHT SHOULDER PAIN: ICD-10-CM

## 2021-11-12 DIAGNOSIS — Z98.890 S/P SHOULDER SURGERY: Primary | ICD-10-CM

## 2021-11-12 DIAGNOSIS — G89.29 CHRONIC RIGHT SHOULDER PAIN: ICD-10-CM

## 2021-11-12 PROCEDURE — 97110 THERAPEUTIC EXERCISES: CPT

## 2021-11-12 PROCEDURE — 97140 MANUAL THERAPY 1/> REGIONS: CPT

## 2021-11-15 ENCOUNTER — OFFICE VISIT (OUTPATIENT)
Dept: PHYSICAL THERAPY | Facility: CLINIC | Age: 61
End: 2021-11-15
Payer: OTHER MISCELLANEOUS

## 2021-11-15 DIAGNOSIS — M25.511 CHRONIC RIGHT SHOULDER PAIN: ICD-10-CM

## 2021-11-15 DIAGNOSIS — G89.29 CHRONIC RIGHT SHOULDER PAIN: ICD-10-CM

## 2021-11-15 DIAGNOSIS — Z98.890 S/P SHOULDER SURGERY: Primary | ICD-10-CM

## 2021-11-15 PROCEDURE — 97140 MANUAL THERAPY 1/> REGIONS: CPT | Performed by: PHYSICAL THERAPIST

## 2021-11-15 PROCEDURE — 97110 THERAPEUTIC EXERCISES: CPT | Performed by: PHYSICAL THERAPIST

## 2021-11-16 ENCOUNTER — APPOINTMENT (OUTPATIENT)
Dept: PHYSICAL THERAPY | Facility: CLINIC | Age: 61
End: 2021-11-16
Payer: OTHER MISCELLANEOUS

## 2021-11-18 ENCOUNTER — OFFICE VISIT (OUTPATIENT)
Dept: PHYSICAL THERAPY | Facility: CLINIC | Age: 61
End: 2021-11-18
Payer: OTHER MISCELLANEOUS

## 2021-11-18 DIAGNOSIS — G89.29 CHRONIC RIGHT SHOULDER PAIN: ICD-10-CM

## 2021-11-18 DIAGNOSIS — M25.511 CHRONIC RIGHT SHOULDER PAIN: ICD-10-CM

## 2021-11-18 DIAGNOSIS — Z98.890 S/P SHOULDER SURGERY: Primary | ICD-10-CM

## 2021-11-18 PROCEDURE — 97110 THERAPEUTIC EXERCISES: CPT

## 2021-11-18 PROCEDURE — 97112 NEUROMUSCULAR REEDUCATION: CPT

## 2021-11-18 PROCEDURE — 97140 MANUAL THERAPY 1/> REGIONS: CPT

## 2021-11-19 ENCOUNTER — OFFICE VISIT (OUTPATIENT)
Dept: PHYSICAL THERAPY | Facility: CLINIC | Age: 61
End: 2021-11-19
Payer: OTHER MISCELLANEOUS

## 2021-11-19 DIAGNOSIS — G89.29 CHRONIC RIGHT SHOULDER PAIN: ICD-10-CM

## 2021-11-19 DIAGNOSIS — M25.511 CHRONIC RIGHT SHOULDER PAIN: ICD-10-CM

## 2021-11-19 DIAGNOSIS — Z98.890 S/P SHOULDER SURGERY: Primary | ICD-10-CM

## 2021-11-19 PROCEDURE — 97140 MANUAL THERAPY 1/> REGIONS: CPT | Performed by: PHYSICAL THERAPIST

## 2021-11-19 PROCEDURE — 97112 NEUROMUSCULAR REEDUCATION: CPT | Performed by: PHYSICAL THERAPIST

## 2021-11-19 PROCEDURE — 97110 THERAPEUTIC EXERCISES: CPT | Performed by: PHYSICAL THERAPIST

## 2021-11-22 ENCOUNTER — OFFICE VISIT (OUTPATIENT)
Dept: PHYSICAL THERAPY | Facility: CLINIC | Age: 61
End: 2021-11-22
Payer: OTHER MISCELLANEOUS

## 2021-11-22 DIAGNOSIS — Z98.890 S/P SHOULDER SURGERY: Primary | ICD-10-CM

## 2021-11-22 DIAGNOSIS — G89.29 CHRONIC RIGHT SHOULDER PAIN: ICD-10-CM

## 2021-11-22 DIAGNOSIS — M25.511 CHRONIC RIGHT SHOULDER PAIN: ICD-10-CM

## 2021-11-22 PROCEDURE — 97112 NEUROMUSCULAR REEDUCATION: CPT

## 2021-11-22 PROCEDURE — 97110 THERAPEUTIC EXERCISES: CPT

## 2021-11-22 PROCEDURE — 97140 MANUAL THERAPY 1/> REGIONS: CPT

## 2021-11-24 ENCOUNTER — OFFICE VISIT (OUTPATIENT)
Dept: PHYSICAL THERAPY | Facility: CLINIC | Age: 61
End: 2021-11-24
Payer: OTHER MISCELLANEOUS

## 2021-11-24 DIAGNOSIS — G89.29 CHRONIC RIGHT SHOULDER PAIN: Primary | ICD-10-CM

## 2021-11-24 DIAGNOSIS — Z98.890 S/P SHOULDER SURGERY: ICD-10-CM

## 2021-11-24 DIAGNOSIS — M25.511 CHRONIC RIGHT SHOULDER PAIN: Primary | ICD-10-CM

## 2021-11-24 PROCEDURE — 97140 MANUAL THERAPY 1/> REGIONS: CPT | Performed by: PHYSICAL THERAPIST

## 2021-11-24 PROCEDURE — 97110 THERAPEUTIC EXERCISES: CPT | Performed by: PHYSICAL THERAPIST

## 2021-11-24 PROCEDURE — 97112 NEUROMUSCULAR REEDUCATION: CPT | Performed by: PHYSICAL THERAPIST

## 2021-11-29 ENCOUNTER — EVALUATION (OUTPATIENT)
Dept: PHYSICAL THERAPY | Facility: CLINIC | Age: 61
End: 2021-11-29
Payer: OTHER MISCELLANEOUS

## 2021-11-29 DIAGNOSIS — Z98.890 S/P SHOULDER SURGERY: ICD-10-CM

## 2021-11-29 DIAGNOSIS — M25.511 CHRONIC RIGHT SHOULDER PAIN: Primary | ICD-10-CM

## 2021-11-29 DIAGNOSIS — G89.29 CHRONIC RIGHT SHOULDER PAIN: Primary | ICD-10-CM

## 2021-11-29 PROCEDURE — 97110 THERAPEUTIC EXERCISES: CPT | Performed by: PHYSICAL THERAPIST

## 2021-11-29 PROCEDURE — 97140 MANUAL THERAPY 1/> REGIONS: CPT | Performed by: PHYSICAL THERAPIST

## 2021-12-01 ENCOUNTER — HOSPITAL ENCOUNTER (OUTPATIENT)
Dept: CT IMAGING | Facility: HOSPITAL | Age: 61
Discharge: HOME | End: 2021-12-01
Payer: MEDICARE

## 2021-12-01 ENCOUNTER — OFFICE VISIT (OUTPATIENT)
Dept: INTERNAL MEDICINE CLINIC | Facility: CLINIC | Age: 61
End: 2021-12-01
Payer: MEDICARE

## 2021-12-01 VITALS
SYSTOLIC BLOOD PRESSURE: 118 MMHG | HEART RATE: 77 BPM | WEIGHT: 119.5 LBS | OXYGEN SATURATION: 99 % | TEMPERATURE: 97.6 F | DIASTOLIC BLOOD PRESSURE: 60 MMHG | HEIGHT: 68 IN | BODY MASS INDEX: 18.11 KG/M2

## 2021-12-01 DIAGNOSIS — Z23 NEED FOR INFLUENZA VACCINATION: ICD-10-CM

## 2021-12-01 DIAGNOSIS — Z98.890 S/P REPAIR OF PARAESOPHAGEAL HERNIA: ICD-10-CM

## 2021-12-01 DIAGNOSIS — Z87.19 S/P REPAIR OF PARAESOPHAGEAL HERNIA: ICD-10-CM

## 2021-12-01 DIAGNOSIS — R11.2 NAUSEA AND VOMITING, INTRACTABILITY OF VOMITING NOT SPECIFIED, UNSPECIFIED VOMITING TYPE: ICD-10-CM

## 2021-12-01 DIAGNOSIS — K28.5 CHRONIC MARGINAL ULCER WITH PERFORATION (HCC): ICD-10-CM

## 2021-12-01 DIAGNOSIS — R10.9 ACUTE ABDOMINAL PAIN: ICD-10-CM

## 2021-12-01 DIAGNOSIS — R10.9 ACUTE ABDOMINAL PAIN: Primary | ICD-10-CM

## 2021-12-01 DIAGNOSIS — Z00.00 MEDICARE ANNUAL WELLNESS VISIT, SUBSEQUENT: ICD-10-CM

## 2021-12-01 PROCEDURE — 99214 OFFICE O/P EST MOD 30 MIN: CPT | Performed by: PHYSICIAN ASSISTANT

## 2021-12-01 PROCEDURE — 74177 CT ABD & PELVIS W/CONTRAST: CPT

## 2021-12-01 PROCEDURE — G0438 PPPS, INITIAL VISIT: HCPCS | Performed by: PHYSICIAN ASSISTANT

## 2021-12-01 PROCEDURE — G0008 ADMIN INFLUENZA VIRUS VAC: HCPCS | Performed by: PHYSICIAN ASSISTANT

## 2021-12-01 PROCEDURE — 90682 RIV4 VACC RECOMBINANT DNA IM: CPT | Performed by: PHYSICIAN ASSISTANT

## 2021-12-01 RX ADMIN — IOHEXOL 85 ML: 350 INJECTION, SOLUTION INTRAVENOUS at 19:09

## 2021-12-02 ENCOUNTER — OFFICE VISIT (OUTPATIENT)
Dept: PHYSICAL THERAPY | Facility: CLINIC | Age: 61
End: 2021-12-02
Payer: OTHER MISCELLANEOUS

## 2021-12-02 DIAGNOSIS — G89.29 CHRONIC RIGHT SHOULDER PAIN: Primary | ICD-10-CM

## 2021-12-02 DIAGNOSIS — M25.511 CHRONIC RIGHT SHOULDER PAIN: Primary | ICD-10-CM

## 2021-12-02 DIAGNOSIS — Z98.890 S/P SHOULDER SURGERY: ICD-10-CM

## 2021-12-02 PROCEDURE — 97110 THERAPEUTIC EXERCISES: CPT

## 2021-12-02 PROCEDURE — 97140 MANUAL THERAPY 1/> REGIONS: CPT

## 2021-12-03 ENCOUNTER — OFFICE VISIT (OUTPATIENT)
Dept: PHYSICAL THERAPY | Facility: CLINIC | Age: 61
End: 2021-12-03
Payer: OTHER MISCELLANEOUS

## 2021-12-03 ENCOUNTER — OFFICE VISIT (OUTPATIENT)
Dept: BARIATRICS | Facility: CLINIC | Age: 61
End: 2021-12-03
Payer: MEDICARE

## 2021-12-03 VITALS
DIASTOLIC BLOOD PRESSURE: 70 MMHG | HEART RATE: 74 BPM | WEIGHT: 119 LBS | SYSTOLIC BLOOD PRESSURE: 114 MMHG | BODY MASS INDEX: 18.04 KG/M2 | HEIGHT: 68 IN | TEMPERATURE: 98.6 F

## 2021-12-03 DIAGNOSIS — K22.70 BARRETT'S ESOPHAGUS WITHOUT DYSPLASIA: ICD-10-CM

## 2021-12-03 DIAGNOSIS — Z86.711 HISTORY OF PULMONARY EMBOLISM: ICD-10-CM

## 2021-12-03 DIAGNOSIS — K28.5 CHRONIC MARGINAL ULCER WITH PERFORATION (HCC): ICD-10-CM

## 2021-12-03 DIAGNOSIS — G89.29 CHRONIC RIGHT SHOULDER PAIN: Primary | ICD-10-CM

## 2021-12-03 DIAGNOSIS — Z98.890 S/P SHOULDER SURGERY: ICD-10-CM

## 2021-12-03 DIAGNOSIS — M25.511 CHRONIC RIGHT SHOULDER PAIN: Primary | ICD-10-CM

## 2021-12-03 DIAGNOSIS — Z48.815 ENCOUNTER FOR SURGICAL AFTERCARE FOLLOWING SURGERY OF DIGESTIVE SYSTEM: Primary | ICD-10-CM

## 2021-12-03 DIAGNOSIS — R10.9 ACUTE ABDOMINAL PAIN: ICD-10-CM

## 2021-12-03 DIAGNOSIS — K91.2 POSTSURGICAL MALABSORPTION: ICD-10-CM

## 2021-12-03 DIAGNOSIS — Z98.84 BARIATRIC SURGERY STATUS: ICD-10-CM

## 2021-12-03 DIAGNOSIS — J44.9 CHRONIC OBSTRUCTIVE PULMONARY DISEASE, UNSPECIFIED (HCC): ICD-10-CM

## 2021-12-03 PROCEDURE — 97140 MANUAL THERAPY 1/> REGIONS: CPT | Performed by: PHYSICAL THERAPIST

## 2021-12-03 PROCEDURE — 97110 THERAPEUTIC EXERCISES: CPT | Performed by: PHYSICAL THERAPIST

## 2021-12-03 PROCEDURE — 99214 OFFICE O/P EST MOD 30 MIN: CPT | Performed by: PHYSICIAN ASSISTANT

## 2021-12-03 RX ORDER — LANSOPRAZOLE 30 MG/1
30 CAPSULE, DELAYED RELEASE ORAL 2 TIMES DAILY
Qty: 60 CAPSULE | Refills: 2 | Status: SHIPPED | OUTPATIENT
Start: 2021-12-03 | End: 2022-01-11

## 2021-12-03 RX ORDER — SUCRALFATE 1 G/1
1 TABLET ORAL 4 TIMES DAILY
Qty: 120 TABLET | Refills: 2 | Status: SHIPPED | OUTPATIENT
Start: 2021-12-03 | End: 2022-01-24

## 2021-12-06 ENCOUNTER — OFFICE VISIT (OUTPATIENT)
Dept: PHYSICAL THERAPY | Facility: CLINIC | Age: 61
End: 2021-12-06
Payer: OTHER MISCELLANEOUS

## 2021-12-06 ENCOUNTER — PREP FOR PROCEDURE (OUTPATIENT)
Dept: BARIATRICS | Facility: CLINIC | Age: 61
End: 2021-12-06

## 2021-12-06 DIAGNOSIS — G89.29 CHRONIC RIGHT SHOULDER PAIN: Primary | ICD-10-CM

## 2021-12-06 DIAGNOSIS — Z98.84 BARIATRIC SURGERY STATUS: Primary | ICD-10-CM

## 2021-12-06 DIAGNOSIS — Z98.890 S/P SHOULDER SURGERY: ICD-10-CM

## 2021-12-06 DIAGNOSIS — M25.511 CHRONIC RIGHT SHOULDER PAIN: Primary | ICD-10-CM

## 2021-12-06 PROCEDURE — 97110 THERAPEUTIC EXERCISES: CPT

## 2021-12-06 PROCEDURE — 97140 MANUAL THERAPY 1/> REGIONS: CPT

## 2021-12-07 ENCOUNTER — TELEPHONE (OUTPATIENT)
Dept: GASTROENTEROLOGY | Facility: HOSPITAL | Age: 61
End: 2021-12-07

## 2021-12-08 ENCOUNTER — ANESTHESIA EVENT (OUTPATIENT)
Dept: GASTROENTEROLOGY | Facility: HOSPITAL | Age: 61
End: 2021-12-08

## 2021-12-08 ENCOUNTER — ANESTHESIA (OUTPATIENT)
Dept: GASTROENTEROLOGY | Facility: HOSPITAL | Age: 61
End: 2021-12-08

## 2021-12-08 ENCOUNTER — HOSPITAL ENCOUNTER (OUTPATIENT)
Dept: GASTROENTEROLOGY | Facility: HOSPITAL | Age: 61
Setting detail: OUTPATIENT SURGERY
Discharge: HOME | End: 2021-12-08
Attending: SURGERY
Payer: MEDICARE

## 2021-12-08 VITALS
TEMPERATURE: 97.7 F | WEIGHT: 119.05 LBS | SYSTOLIC BLOOD PRESSURE: 113 MMHG | BODY MASS INDEX: 18.04 KG/M2 | HEIGHT: 68 IN | RESPIRATION RATE: 16 BRPM | OXYGEN SATURATION: 95 % | DIASTOLIC BLOOD PRESSURE: 72 MMHG | HEART RATE: 63 BPM

## 2021-12-08 DIAGNOSIS — Z98.84 BARIATRIC SURGERY STATUS: ICD-10-CM

## 2021-12-08 PROCEDURE — 43235 EGD DIAGNOSTIC BRUSH WASH: CPT | Performed by: SURGERY

## 2021-12-08 RX ORDER — SODIUM CHLORIDE 9 MG/ML
125 INJECTION, SOLUTION INTRAVENOUS CONTINUOUS
Status: DISCONTINUED | OUTPATIENT
Start: 2021-12-08 | End: 2021-12-12 | Stop reason: HOSPADM

## 2021-12-08 RX ORDER — PROPOFOL 10 MG/ML
INJECTION, EMULSION INTRAVENOUS AS NEEDED
Status: DISCONTINUED | OUTPATIENT
Start: 2021-12-08 | End: 2021-12-08

## 2021-12-08 RX ORDER — LIDOCAINE HYDROCHLORIDE 20 MG/ML
INJECTION, SOLUTION EPIDURAL; INFILTRATION; INTRACAUDAL; PERINEURAL AS NEEDED
Status: DISCONTINUED | OUTPATIENT
Start: 2021-12-08 | End: 2021-12-08

## 2021-12-08 RX ADMIN — SODIUM CHLORIDE 125 ML/HR: 0.9 INJECTION, SOLUTION INTRAVENOUS at 12:16

## 2021-12-08 RX ADMIN — PROPOFOL 110 MG: 10 INJECTION, EMULSION INTRAVENOUS at 12:40

## 2021-12-08 RX ADMIN — LIDOCAINE HYDROCHLORIDE 50 MG: 20 INJECTION, SOLUTION EPIDURAL; INFILTRATION; INTRACAUDAL; PERINEURAL at 12:40

## 2021-12-09 ENCOUNTER — HOSPITAL ENCOUNTER (OUTPATIENT)
Dept: ULTRASOUND IMAGING | Facility: HOSPITAL | Age: 61
Discharge: HOME | End: 2021-12-09
Payer: MEDICARE

## 2021-12-09 ENCOUNTER — OFFICE VISIT (OUTPATIENT)
Dept: PHYSICAL THERAPY | Facility: CLINIC | Age: 61
End: 2021-12-09
Payer: OTHER MISCELLANEOUS

## 2021-12-09 DIAGNOSIS — R10.9 ACUTE ABDOMINAL PAIN: ICD-10-CM

## 2021-12-09 DIAGNOSIS — Z48.815 ENCOUNTER FOR SURGICAL AFTERCARE FOLLOWING SURGERY OF DIGESTIVE SYSTEM: ICD-10-CM

## 2021-12-09 DIAGNOSIS — Z98.84 BARIATRIC SURGERY STATUS: ICD-10-CM

## 2021-12-09 DIAGNOSIS — Z98.890 S/P SHOULDER SURGERY: ICD-10-CM

## 2021-12-09 DIAGNOSIS — M25.511 CHRONIC RIGHT SHOULDER PAIN: Primary | ICD-10-CM

## 2021-12-09 DIAGNOSIS — G89.29 CHRONIC RIGHT SHOULDER PAIN: Primary | ICD-10-CM

## 2021-12-09 PROCEDURE — 97140 MANUAL THERAPY 1/> REGIONS: CPT

## 2021-12-09 PROCEDURE — 97110 THERAPEUTIC EXERCISES: CPT

## 2021-12-09 PROCEDURE — 76700 US EXAM ABDOM COMPLETE: CPT

## 2021-12-10 ENCOUNTER — APPOINTMENT (OUTPATIENT)
Dept: LAB | Facility: CLINIC | Age: 61
End: 2021-12-10
Payer: MEDICARE

## 2021-12-10 DIAGNOSIS — K91.2 POSTSURGICAL MALABSORPTION: ICD-10-CM

## 2021-12-10 DIAGNOSIS — Z48.815 ENCOUNTER FOR SURGICAL AFTERCARE FOLLOWING SURGERY OF DIGESTIVE SYSTEM: ICD-10-CM

## 2021-12-10 DIAGNOSIS — Z98.84 BARIATRIC SURGERY STATUS: ICD-10-CM

## 2021-12-10 LAB
25(OH)D3 SERPL-MCNC: 14.1 NG/ML (ref 30–100)
ALBUMIN SERPL BCP-MCNC: 4.2 G/DL (ref 3.5–5)
ALP SERPL-CCNC: 94 U/L (ref 46–116)
ALT SERPL W P-5'-P-CCNC: 21 U/L (ref 12–78)
ANION GAP SERPL CALCULATED.3IONS-SCNC: 8 MMOL/L (ref 4–13)
AST SERPL W P-5'-P-CCNC: 17 U/L (ref 5–45)
BILIRUB SERPL-MCNC: 0.48 MG/DL (ref 0.2–1)
BUN SERPL-MCNC: 12 MG/DL (ref 5–25)
CALCIUM SERPL-MCNC: 10.4 MG/DL (ref 8.3–10.1)
CHLORIDE SERPL-SCNC: 107 MMOL/L (ref 100–108)
CO2 SERPL-SCNC: 24 MMOL/L (ref 21–32)
CREAT SERPL-MCNC: 0.87 MG/DL (ref 0.6–1.3)
ERYTHROCYTE [DISTWIDTH] IN BLOOD BY AUTOMATED COUNT: 16.9 % (ref 11.6–15.1)
FERRITIN SERPL-MCNC: 5 NG/ML (ref 8–388)
FOLATE SERPL-MCNC: 14.6 NG/ML (ref 3.1–17.5)
GFR SERPL CREATININE-BSD FRML MDRD: 94 ML/MIN/1.73SQ M
GLUCOSE P FAST SERPL-MCNC: 114 MG/DL (ref 65–99)
HCT VFR BLD AUTO: 38.9 % (ref 36.5–49.3)
HGB BLD-MCNC: 11.2 G/DL (ref 12–17)
IRON SATN MFR SERPL: 4 % (ref 20–50)
IRON SERPL-MCNC: 22 UG/DL (ref 65–175)
MCH RBC QN AUTO: 21.6 PG (ref 26.8–34.3)
MCHC RBC AUTO-ENTMCNC: 28.8 G/DL (ref 31.4–37.4)
MCV RBC AUTO: 75 FL (ref 82–98)
PLATELET # BLD AUTO: 444 THOUSANDS/UL (ref 149–390)
PMV BLD AUTO: 9.1 FL (ref 8.9–12.7)
POTASSIUM SERPL-SCNC: 3.7 MMOL/L (ref 3.5–5.3)
PROT SERPL-MCNC: 8.1 G/DL (ref 6.4–8.2)
PTH-INTACT SERPL-MCNC: 69.2 PG/ML (ref 18.4–80.1)
RBC # BLD AUTO: 5.18 MILLION/UL (ref 3.88–5.62)
SODIUM SERPL-SCNC: 139 MMOL/L (ref 136–145)
TIBC SERPL-MCNC: 513 UG/DL (ref 250–450)
VIT B12 SERPL-MCNC: 345 PG/ML (ref 100–900)
WBC # BLD AUTO: 6.85 THOUSAND/UL (ref 4.31–10.16)

## 2021-12-10 PROCEDURE — 82607 VITAMIN B-12: CPT

## 2021-12-10 PROCEDURE — 36415 COLL VENOUS BLD VENIPUNCTURE: CPT

## 2021-12-10 PROCEDURE — 83540 ASSAY OF IRON: CPT

## 2021-12-10 PROCEDURE — 84425 ASSAY OF VITAMIN B-1: CPT

## 2021-12-10 PROCEDURE — 84590 ASSAY OF VITAMIN A: CPT

## 2021-12-10 PROCEDURE — 82306 VITAMIN D 25 HYDROXY: CPT

## 2021-12-10 PROCEDURE — 83970 ASSAY OF PARATHORMONE: CPT

## 2021-12-10 PROCEDURE — 82746 ASSAY OF FOLIC ACID SERUM: CPT

## 2021-12-10 PROCEDURE — 83550 IRON BINDING TEST: CPT

## 2021-12-10 PROCEDURE — 84630 ASSAY OF ZINC: CPT

## 2021-12-10 PROCEDURE — 85027 COMPLETE CBC AUTOMATED: CPT

## 2021-12-10 PROCEDURE — 80053 COMPREHEN METABOLIC PANEL: CPT

## 2021-12-10 PROCEDURE — 82728 ASSAY OF FERRITIN: CPT

## 2021-12-13 ENCOUNTER — APPOINTMENT (OUTPATIENT)
Dept: PHYSICAL THERAPY | Facility: CLINIC | Age: 61
End: 2021-12-13
Payer: OTHER MISCELLANEOUS

## 2021-12-13 ENCOUNTER — CONSULT (OUTPATIENT)
Dept: SURGERY | Facility: CLINIC | Age: 61
End: 2021-12-13
Payer: MEDICARE

## 2021-12-13 VITALS
TEMPERATURE: 98.6 F | BODY MASS INDEX: 18.19 KG/M2 | RESPIRATION RATE: 18 BRPM | SYSTOLIC BLOOD PRESSURE: 120 MMHG | WEIGHT: 120 LBS | HEART RATE: 77 BPM | DIASTOLIC BLOOD PRESSURE: 60 MMHG | HEIGHT: 68 IN

## 2021-12-13 DIAGNOSIS — Z48.815 ENCOUNTER FOR SURGICAL AFTERCARE FOLLOWING SURGERY OF DIGESTIVE SYSTEM: ICD-10-CM

## 2021-12-13 DIAGNOSIS — R10.9 ACUTE ABDOMINAL PAIN: Primary | ICD-10-CM

## 2021-12-13 DIAGNOSIS — Z98.84 BARIATRIC SURGERY STATUS: ICD-10-CM

## 2021-12-13 PROBLEM — E11.9 DIABETES MELLITUS (HCC): Status: ACTIVE | Noted: 2021-12-13

## 2021-12-13 PROBLEM — J98.8 CONGESTION OF RESPIRATORY TRACT: Status: ACTIVE | Noted: 2021-12-13

## 2021-12-13 PROCEDURE — 99213 OFFICE O/P EST LOW 20 MIN: CPT | Performed by: SURGERY

## 2021-12-14 ENCOUNTER — OFFICE VISIT (OUTPATIENT)
Dept: PHYSICAL THERAPY | Facility: CLINIC | Age: 61
End: 2021-12-14
Payer: OTHER MISCELLANEOUS

## 2021-12-14 DIAGNOSIS — G89.29 CHRONIC RIGHT SHOULDER PAIN: Primary | ICD-10-CM

## 2021-12-14 DIAGNOSIS — M25.511 CHRONIC RIGHT SHOULDER PAIN: Primary | ICD-10-CM

## 2021-12-14 DIAGNOSIS — Z98.890 S/P SHOULDER SURGERY: ICD-10-CM

## 2021-12-14 LAB — ZINC SERPL-MCNC: 94 UG/DL (ref 44–115)

## 2021-12-14 PROCEDURE — 97110 THERAPEUTIC EXERCISES: CPT

## 2021-12-14 PROCEDURE — 97140 MANUAL THERAPY 1/> REGIONS: CPT

## 2021-12-15 LAB — VIT A SERPL-MCNC: 17 UG/DL (ref 22–69.5)

## 2021-12-16 ENCOUNTER — OFFICE VISIT (OUTPATIENT)
Dept: BARIATRICS | Facility: CLINIC | Age: 61
End: 2021-12-16
Payer: MEDICARE

## 2021-12-16 ENCOUNTER — APPOINTMENT (OUTPATIENT)
Dept: PHYSICAL THERAPY | Facility: CLINIC | Age: 61
End: 2021-12-16
Payer: OTHER MISCELLANEOUS

## 2021-12-16 VITALS
HEART RATE: 85 BPM | WEIGHT: 118.5 LBS | BODY MASS INDEX: 17.96 KG/M2 | DIASTOLIC BLOOD PRESSURE: 60 MMHG | SYSTOLIC BLOOD PRESSURE: 124 MMHG | TEMPERATURE: 97.4 F | HEIGHT: 68 IN

## 2021-12-16 DIAGNOSIS — K91.2 POSTSURGICAL MALABSORPTION: Primary | ICD-10-CM

## 2021-12-16 LAB — VIT B1 BLD-SCNC: 116.9 NMOL/L (ref 66.5–200)

## 2021-12-16 PROCEDURE — 99213 OFFICE O/P EST LOW 20 MIN: CPT | Performed by: SURGERY

## 2021-12-17 ENCOUNTER — OFFICE VISIT (OUTPATIENT)
Dept: BARIATRICS | Facility: CLINIC | Age: 61
End: 2021-12-17

## 2021-12-17 ENCOUNTER — OFFICE VISIT (OUTPATIENT)
Dept: PHYSICAL THERAPY | Facility: CLINIC | Age: 61
End: 2021-12-17
Payer: OTHER MISCELLANEOUS

## 2021-12-17 VITALS — BODY MASS INDEX: 17.96 KG/M2 | WEIGHT: 118.5 LBS | HEIGHT: 68 IN

## 2021-12-17 DIAGNOSIS — E50.9 VITAMIN A DEFICIENCY: ICD-10-CM

## 2021-12-17 DIAGNOSIS — G89.29 CHRONIC RIGHT SHOULDER PAIN: Primary | ICD-10-CM

## 2021-12-17 DIAGNOSIS — E44.0 MODERATE PROTEIN-CALORIE MALNUTRITION (HCC): ICD-10-CM

## 2021-12-17 DIAGNOSIS — Z98.84 S/P GASTRIC BYPASS: ICD-10-CM

## 2021-12-17 DIAGNOSIS — D50.8 IRON DEFICIENCY ANEMIA SECONDARY TO INADEQUATE DIETARY IRON INTAKE: ICD-10-CM

## 2021-12-17 DIAGNOSIS — R79.0 LOW FERRITIN: Primary | ICD-10-CM

## 2021-12-17 DIAGNOSIS — E55.9 VITAMIN D DEFICIENCY: ICD-10-CM

## 2021-12-17 DIAGNOSIS — K91.2 POSTSURGICAL MALABSORPTION: ICD-10-CM

## 2021-12-17 DIAGNOSIS — E55.9 VITAMIN D DEFICIENCY: Primary | ICD-10-CM

## 2021-12-17 DIAGNOSIS — E43 SEVERE PROTEIN-CALORIE MALNUTRITION (HCC): ICD-10-CM

## 2021-12-17 DIAGNOSIS — Z98.890 S/P SHOULDER SURGERY: ICD-10-CM

## 2021-12-17 DIAGNOSIS — M25.511 CHRONIC RIGHT SHOULDER PAIN: Primary | ICD-10-CM

## 2021-12-17 DIAGNOSIS — Z98.84 BARIATRIC SURGERY STATUS: Primary | ICD-10-CM

## 2021-12-17 DIAGNOSIS — E53.8 LOW VITAMIN B12 LEVEL: ICD-10-CM

## 2021-12-17 PROCEDURE — 97140 MANUAL THERAPY 1/> REGIONS: CPT

## 2021-12-17 PROCEDURE — 97110 THERAPEUTIC EXERCISES: CPT

## 2021-12-17 PROCEDURE — RECHECK: Performed by: DIETITIAN, REGISTERED

## 2021-12-17 RX ORDER — ERGOCALCIFEROL 1.25 MG/1
50000 CAPSULE ORAL
Qty: 24 CAPSULE | Refills: 0 | Status: SHIPPED | OUTPATIENT
Start: 2021-12-20 | End: 2022-03-14

## 2021-12-21 ENCOUNTER — HOSPITAL ENCOUNTER (OUTPATIENT)
Dept: NUCLEAR MEDICINE | Facility: HOSPITAL | Age: 61
Discharge: HOME | End: 2021-12-21
Attending: SURGERY
Payer: MEDICARE

## 2021-12-21 DIAGNOSIS — R10.9 ACUTE ABDOMINAL PAIN: ICD-10-CM

## 2021-12-21 PROCEDURE — 78227 HEPATOBIL SYST IMAGE W/DRUG: CPT

## 2021-12-21 PROCEDURE — G1004 CDSM NDSC: HCPCS

## 2021-12-21 PROCEDURE — A9537 TC99M MEBROFENIN: HCPCS

## 2021-12-21 RX ADMIN — SINCALIDE 1.1 MCG: 5 INJECTION, POWDER, LYOPHILIZED, FOR SOLUTION INTRAVENOUS at 13:16

## 2021-12-22 ENCOUNTER — TELEPHONE (OUTPATIENT)
Dept: SURGERY | Facility: CLINIC | Age: 61
End: 2021-12-22

## 2021-12-27 ENCOUNTER — EVALUATION (OUTPATIENT)
Dept: PHYSICAL THERAPY | Facility: CLINIC | Age: 61
End: 2021-12-27
Payer: OTHER MISCELLANEOUS

## 2021-12-27 ENCOUNTER — OFFICE VISIT (OUTPATIENT)
Dept: SURGERY | Facility: CLINIC | Age: 61
End: 2021-12-27
Payer: MEDICARE

## 2021-12-27 VITALS
TEMPERATURE: 100.5 F | SYSTOLIC BLOOD PRESSURE: 120 MMHG | BODY MASS INDEX: 18.34 KG/M2 | DIASTOLIC BLOOD PRESSURE: 60 MMHG | RESPIRATION RATE: 18 BRPM | HEIGHT: 68 IN | WEIGHT: 121 LBS | HEART RATE: 83 BPM

## 2021-12-27 DIAGNOSIS — R10.9 ACUTE ABDOMINAL PAIN: Primary | ICD-10-CM

## 2021-12-27 DIAGNOSIS — R11.2 NON-INTRACTABLE VOMITING WITH NAUSEA: ICD-10-CM

## 2021-12-27 DIAGNOSIS — E11.9 TYPE 2 DIABETES MELLITUS WITHOUT COMPLICATION, UNSPECIFIED WHETHER LONG TERM INSULIN USE (HCC): ICD-10-CM

## 2021-12-27 DIAGNOSIS — Z98.890 S/P SHOULDER SURGERY: ICD-10-CM

## 2021-12-27 DIAGNOSIS — G89.29 CHRONIC RIGHT SHOULDER PAIN: Primary | ICD-10-CM

## 2021-12-27 DIAGNOSIS — E43 SEVERE PROTEIN-CALORIE MALNUTRITION (HCC): ICD-10-CM

## 2021-12-27 DIAGNOSIS — M25.511 CHRONIC RIGHT SHOULDER PAIN: Primary | ICD-10-CM

## 2021-12-27 PROCEDURE — 99214 OFFICE O/P EST MOD 30 MIN: CPT | Performed by: SURGERY

## 2021-12-27 PROCEDURE — 97140 MANUAL THERAPY 1/> REGIONS: CPT | Performed by: PHYSICAL THERAPIST

## 2021-12-27 PROCEDURE — 97110 THERAPEUTIC EXERCISES: CPT | Performed by: PHYSICAL THERAPIST

## 2021-12-28 ENCOUNTER — CONSULT (OUTPATIENT)
Dept: SURGERY | Facility: CLINIC | Age: 61
End: 2021-12-28
Payer: MEDICARE

## 2021-12-28 VITALS
SYSTOLIC BLOOD PRESSURE: 120 MMHG | WEIGHT: 121 LBS | BODY MASS INDEX: 18.34 KG/M2 | DIASTOLIC BLOOD PRESSURE: 60 MMHG | HEART RATE: 89 BPM | HEIGHT: 68 IN | TEMPERATURE: 99.6 F | RESPIRATION RATE: 18 BRPM

## 2021-12-28 DIAGNOSIS — E43 SEVERE PROTEIN-CALORIE MALNUTRITION (HCC): ICD-10-CM

## 2021-12-28 DIAGNOSIS — R10.9 ACUTE ABDOMINAL PAIN: ICD-10-CM

## 2021-12-28 DIAGNOSIS — R11.2 NON-INTRACTABLE VOMITING WITH NAUSEA: ICD-10-CM

## 2021-12-28 PROCEDURE — 99213 OFFICE O/P EST LOW 20 MIN: CPT | Performed by: SPECIALIST

## 2022-01-03 ENCOUNTER — OFFICE VISIT (OUTPATIENT)
Dept: PHYSICAL THERAPY | Facility: CLINIC | Age: 62
End: 2022-01-03
Payer: OTHER MISCELLANEOUS

## 2022-01-03 DIAGNOSIS — M25.511 CHRONIC RIGHT SHOULDER PAIN: ICD-10-CM

## 2022-01-03 DIAGNOSIS — R79.0 LOW FERRITIN: Primary | ICD-10-CM

## 2022-01-03 DIAGNOSIS — D50.8 IRON DEFICIENCY ANEMIA SECONDARY TO INADEQUATE DIETARY IRON INTAKE: ICD-10-CM

## 2022-01-03 DIAGNOSIS — K91.2 POSTSURGICAL MALABSORPTION: ICD-10-CM

## 2022-01-03 DIAGNOSIS — Z98.84 S/P GASTRIC BYPASS: ICD-10-CM

## 2022-01-03 DIAGNOSIS — E53.8 LOW VITAMIN B12 LEVEL: ICD-10-CM

## 2022-01-03 DIAGNOSIS — Z98.890 S/P SHOULDER SURGERY: Primary | ICD-10-CM

## 2022-01-03 DIAGNOSIS — G89.29 CHRONIC RIGHT SHOULDER PAIN: ICD-10-CM

## 2022-01-03 DIAGNOSIS — R63.6 UNDERWEIGHT: ICD-10-CM

## 2022-01-03 DIAGNOSIS — E44.0 MODERATE PROTEIN-CALORIE MALNUTRITION (HCC): ICD-10-CM

## 2022-01-03 PROCEDURE — 97140 MANUAL THERAPY 1/> REGIONS: CPT | Performed by: PHYSICAL THERAPIST

## 2022-01-03 PROCEDURE — 97110 THERAPEUTIC EXERCISES: CPT | Performed by: PHYSICAL THERAPIST

## 2022-01-03 NOTE — PROGRESS NOTES
Daily Note     Today's date: 1/3/2022  Patient name: Konstantin West  : 1960  MRN: [id number]  Referring provider: Cristina Camejo MD  Dx:   Encounter Diagnosis     ICD-10-CM    1  S/P shoulder surgery  Z98 890    2  Chronic right shoulder pain  M25 511     G89 29                   Subjective: Patient reports his shoulder just hurts in the front and feels tender as well  His abdomen is feeling a little better today since starting the new medication  He has a f/u with GI next week  Objective: See treatment diary below      Assessment: Tolerated treatment well  Shoulder ROM has steadily improved  PROM WFL while AROM still limited  Patient exhibited good technique with therapeutic exercises and would benefit from continued PT      Plan: Continue per plan of care  Progress treatment as tolerated         Precautions: hold on weight lifting 2* to abdominal pain (possible hernia)  POC exp 21  Manuals 12/14 12/17 12/27 1/3 12/9   R shoulder stretching  TM TM KG KG TM                           Neuro Re-Ed        Supine scap punches   hold     TB rows   hold     TB pull downs   hold                             Ther Ex         rpm x5'fwd/5'retro 120 rpm x5'fwd/5' retro 120 rpm  x5'fwd/5'retro 120 rpm  x5'fwd/5'retro 120 rpm  x5'fwd/5'retro   Pulleys Flex/ABD   10" x10 ea Flex/Scap   5" x10 ea Flex/Scap   5" x10 ea Flex/Scap   10" x10 ea Flex/Scap   10" x10 ea   AAROM finger ladder 5" x10 Flex Flex 5" x10 Flex 5" x10 Flex 5" x10 Flex 5" x10   Prone Flex, HRZ Abd, and Ext   hold     Bicep curls   hold     Tricep Ext   hold     Standing flex to 90*   hold     Standing Scap to 90*   hold     Supine ABC   hold     TB IR   Green x30 Green x30    TB ER   Green x30 Green x30    Push up plus @ wall    hold                             Modalities        CP post prn

## 2022-01-04 ENCOUNTER — OFFICE VISIT (OUTPATIENT)
Dept: PHYSICAL THERAPY | Facility: CLINIC | Age: 62
End: 2022-01-04
Payer: OTHER MISCELLANEOUS

## 2022-01-04 DIAGNOSIS — M25.511 CHRONIC RIGHT SHOULDER PAIN: ICD-10-CM

## 2022-01-04 DIAGNOSIS — Z98.890 S/P SHOULDER SURGERY: Primary | ICD-10-CM

## 2022-01-04 DIAGNOSIS — G89.29 CHRONIC RIGHT SHOULDER PAIN: ICD-10-CM

## 2022-01-04 PROCEDURE — 97140 MANUAL THERAPY 1/> REGIONS: CPT

## 2022-01-04 PROCEDURE — 97110 THERAPEUTIC EXERCISES: CPT

## 2022-01-04 NOTE — PROGRESS NOTES
Daily Note     Today's date: 2022  Patient name: Bibi Eubanks  : 1960  MRN: [id number]  Referring provider: Zachary Cornelius MD  Dx:   Encounter Diagnosis     ICD-10-CM    1  S/P shoulder surgery  Z98 890    2  Chronic right shoulder pain  M25 511     G89 29                   Subjective: Pt reported that his abdomin is feeling a bit better since he started taking is new medication  His shoulder feels the same  Objective: See treatment diary below      Assessment: Tolerated treatment well  Patient demonstrated fatigue post treatment, exhibited good technique with therapeutic exercises and would benefit from continued PT      Plan: Continue per plan of care  Progress treatment as tolerated         Precautions: hold on weight lifting 2* to abdominal pain (possible hernia)  POC exp 21  Manuals 12/14 12/17 12/27 1/3 1/4   R shoulder stretching  TM TM KG KG TM                           Neuro Re-Ed        Supine scap punches   hold     TB rows   hold     TB pull downs   hold                             Ther Ex         rpm x5'fwd/5'retro 120 rpm x5'fwd/5' retro 120 rpm  x5'fwd/5'retro 120 rpm  x5'fwd/5'retro 120 rpm  x5'fwd/5'retro   Pulleys Flex/ABD   10" x10 ea Flex/Scap   5" x10 ea Flex/Scap   5" x10 ea Flex/Scap   10" x10 ea Flex/Scap   10" x10 ea   AAROM finger ladder 5" x10 Flex Flex 5" x10 Flex 5" x10 Flex 5" x10 Flex 5" x10   Prone Flex, HRZ Abd, and Ext   hold     Bicep curls   hold     Tricep Ext   hold     Standing flex to 90*   hold     Standing Scap to 90*   hold     Supine ABC   hold     TB IR   Green x30 Green x30 Green x30   TB ER   Green x30 Green x30 Green x30   Push up plus @ wall    hold                             Modalities        CP post prn

## 2022-01-05 ENCOUNTER — TELEPHONE (OUTPATIENT)
Dept: HEMATOLOGY ONCOLOGY | Facility: CLINIC | Age: 62
End: 2022-01-05

## 2022-01-06 ENCOUNTER — OFFICE VISIT (OUTPATIENT)
Dept: PHYSICAL THERAPY | Facility: CLINIC | Age: 62
End: 2022-01-06
Payer: OTHER MISCELLANEOUS

## 2022-01-06 DIAGNOSIS — G89.29 CHRONIC RIGHT SHOULDER PAIN: ICD-10-CM

## 2022-01-06 DIAGNOSIS — Z98.890 S/P SHOULDER SURGERY: Primary | ICD-10-CM

## 2022-01-06 DIAGNOSIS — M25.511 CHRONIC RIGHT SHOULDER PAIN: ICD-10-CM

## 2022-01-06 PROCEDURE — 97110 THERAPEUTIC EXERCISES: CPT

## 2022-01-06 NOTE — PROGRESS NOTES
Daily Note     Today's date: 2022  Patient name: Erwin London  : 1960  MRN: [id number]  Referring provider: Mary Cole MD  Dx:   Encounter Diagnosis     ICD-10-CM    1  S/P shoulder surgery  Z98 890    2  Chronic right shoulder pain  M25 511     G89 29        Start Time: 0815  Stop Time: 0900  Total time in clinic (min): 45 minutes    Subjective: Patient notes shoulder soreness prior to therapy  Objective: See treatment diary below    Assessment: Tolerated treatment well  Patient demonstrated fatigue post treatment, exhibited good technique with therapeutic exercises and would benefit from continued PT  PROM WNL  Only IR with minor limitations at 90° abd  Plan: Continue per plan of care  Progress treatment as tolerated         Precautions: hold on weight lifting 2* to abdominal pain (possible hernia)  POC exp 21  Manuals 1/6 12/17 12/27 1/3 1/4   R shoulder stretching  JM TM KG KG TM                           Neuro Re-Ed        Supine scap punches   hold     TB rows   hold     TB pull downs   hold                             Ther Ex         rpm  x5'fwd/5'retro 120 rpm x5'fwd/5' retro 120 rpm  x5'fwd/5'retro 120 rpm  x5'fwd/5'retro 120 rpm  x5'fwd/5'retro   Pulleys Flex/Scap   10" x10 ea Flex/Scap   5" x10 ea Flex/Scap   5" x10 ea Flex/Scap   10" x10 ea Flex/Scap   10" x10 ea   AAROM finger ladder Flex 5"x10 Flex 5" x10 Flex 5" x10 Flex 5" x10 Flex 5" x10   Prone Flex, HRZ Abd, and Ext   hold     Bicep curls   hold     Tricep Ext   hold     Standing flex to 90*   hold     Standing Scap to 90*   hold     Supine ABC   hold     TB IR Green 30x  Green x30 Green x30 Green x30   TB ER Green 30x  Green x30 Green x30 Green x30   Push up plus @ wall    hold                             Modalities        CP post prn

## 2022-01-10 ENCOUNTER — APPOINTMENT (OUTPATIENT)
Dept: PHYSICAL THERAPY | Facility: CLINIC | Age: 62
End: 2022-01-10
Payer: OTHER MISCELLANEOUS

## 2022-01-11 ENCOUNTER — OFFICE VISIT (OUTPATIENT)
Dept: PHYSICAL THERAPY | Facility: CLINIC | Age: 62
End: 2022-01-11
Payer: OTHER MISCELLANEOUS

## 2022-01-11 ENCOUNTER — OFFICE VISIT (OUTPATIENT)
Dept: GASTROENTEROLOGY | Facility: CLINIC | Age: 62
End: 2022-01-11
Payer: MEDICARE

## 2022-01-11 ENCOUNTER — APPOINTMENT (OUTPATIENT)
Dept: PHYSICAL THERAPY | Facility: CLINIC | Age: 62
End: 2022-01-11
Payer: OTHER MISCELLANEOUS

## 2022-01-11 VITALS
DIASTOLIC BLOOD PRESSURE: 62 MMHG | OXYGEN SATURATION: 98 % | RESPIRATION RATE: 16 BRPM | HEART RATE: 79 BPM | HEIGHT: 68 IN | WEIGHT: 123.4 LBS | SYSTOLIC BLOOD PRESSURE: 122 MMHG | BODY MASS INDEX: 18.7 KG/M2 | TEMPERATURE: 98.6 F

## 2022-01-11 DIAGNOSIS — Z98.890 S/P SHOULDER SURGERY: Primary | ICD-10-CM

## 2022-01-11 DIAGNOSIS — G89.29 CHRONIC RIGHT SHOULDER PAIN: ICD-10-CM

## 2022-01-11 DIAGNOSIS — M25.511 CHRONIC RIGHT SHOULDER PAIN: ICD-10-CM

## 2022-01-11 DIAGNOSIS — R11.2 NON-INTRACTABLE VOMITING WITH NAUSEA: ICD-10-CM

## 2022-01-11 DIAGNOSIS — E43 SEVERE PROTEIN-CALORIE MALNUTRITION (HCC): ICD-10-CM

## 2022-01-11 DIAGNOSIS — R10.9 ACUTE ABDOMINAL PAIN: ICD-10-CM

## 2022-01-11 PROCEDURE — 99213 OFFICE O/P EST LOW 20 MIN: CPT | Performed by: STUDENT IN AN ORGANIZED HEALTH CARE EDUCATION/TRAINING PROGRAM

## 2022-01-11 PROCEDURE — 97110 THERAPEUTIC EXERCISES: CPT

## 2022-01-11 RX ORDER — LANSOPRAZOLE 30 MG/1
30 TABLET, ORALLY DISINTEGRATING, DELAYED RELEASE ORAL DAILY
Qty: 60 TABLET | Refills: 5 | Status: SHIPPED | OUTPATIENT
Start: 2022-01-11

## 2022-01-11 NOTE — PROGRESS NOTES
[facility] - Outpatient Follow-up Note  Myriam Chamorro 62 y o  male MRN: [id number]  Encounter: [id number]          ASSESSMENT AND PLAN:    64yo M with h/o RYGB and paraesophageal hernia repair 2017 c/b perforated marginal ulcer 2019 with acalculous cholecystitis treated with perc cholecystostomy tube and GJ stricture requiring dilation now with 3 months of sharp RUQ pain  EGD without ulceration or stricture  Unclear etiology of the pain as patient is significantly tender even with light palpation  Pain is not localized to prior incision/drain site to suggest neuroma  Some improvement with acid suppression but not classical for peptic disorder and had reassuring EGD recently  Will trial PPI solutab for better absorption  Discussed ways to support nutrition with prioritizing high protein/calorie liquids given gastric remnant and likely dysmotility contributing to post prandial vomiting  Could consider low dose TCA as chronic pain control option but will try solutab for acid suppression first   1  Acute abdominal pain  2  Severe protein-calorie malnutrition ([address] )  3  Non-intractable vomiting with nausea  - Ambulatory referral to Gastroenterology  - lansoprazole (PREVACID SOLUTAB) 30 mg disintegrating tablet; Take 1 tablet (30 mg total) by mouth daily  Dispense: 60 tablet; Refill: 5    ______________________________________________________________________    SUBJECTIVE:    Gets RUQ sharp pain wrapping around to back, happens all the time  Has been a little better since starting the carafate and prevacid  Pain feels deep, not like on the skin but is very tender with palpation  When eating, feels like everything gets stuck in stomach and has to throw up  Has been going on for 3 months  Worse with solids  Weight has been fluctuating  Does not like nutrition shakes  Was going to try unflavored protein  Lactose intolerant   Has not tried making smoothies or milk shakes    EGD 12/8/21  · Previous gastric bypass surgery  Status post gastric bypass complicated by perforated marginal ulcer  Inflammation of the gastric pouch but no evidence of marginal ulceration or stricture  · The stomach and jejunum appeared normal   · The esophagus and jejunum appeared normal   · Irregular Z-line        REVIEW OF SYSTEMS IS OTHERWISE NEGATIVE        Historical Information   Past Medical History:   Diagnosis Date    Anesthesia     Pt wakes up during "almost every surgery"    Arthritis     Asthma     Bariatric surgery status     Cervical radiculopathy     Chemotherapy follow-up examination     Chronic pain     Chronic pain disorder     COPD (chronic obstructive pulmonary disease) (HCC)     Diabetes mellitus (HCC)     borderline "years ago"    Food allergy     clams    GERD (gastroesophageal reflux disease)     Heart murmur     Hiatal hernia     History of malignant neoplasm     History of pneumonia 04/12/2016    History of pulmonary embolism     History of pulmonary embolus (PE)     History of ulceration     Hyperlipidemia     Lung cancer (HCC)     left lung, radiation and chemo tx    Migraine     Pneumonia     Postgastrectomy malabsorption     Right leg DVT (HCC)     Right scapholunate ligament tear     Skipped heart beats     Wears partial dentures     upper and lower     Past Surgical History:   Procedure Laterality Date    BRONCHOSCOPY      COLONOSCOPY      FRACTURE SURGERY      femur right 1985    GASTRIC BYPASS LAPAROSCOPIC N/A 7/12/2017    Procedure: GASTRIC DIVERSION WITH [place]-EN-Y RECONSTRUCTION WITH  SHORT 60 cm LIMB;  Surgeon: Chrissy Jiménez MD;  [facility];  Service: Bariatrics    IR CHEST TUBE PLACEMENT  8/9/2019    IR CHOLECYSTOSTOMY TUBE PLACEMENT  9/22/2019    IR IMAGE GUIDED ASPIRATION / DRAINAGE  8/1/2019    IR PICC LINE  8/9/2019    KNEE ARTHROSCOPY Right     right shoulder    LAPAROTOMY N/A 7/28/2019    Procedure: LAPAROTOMY EXPLORATORY, WASHOUT OF SUCUS, REPAIR OF MARGINAL ULCER PERFORATION, ABD WASHOUT, INTRAOPERATIVE EGD, GI ASSISTED ENDOSCOPIC STENT PLACEMENT;  Surgeon: Papa March MD;  [facility];  Service: Sherra Redo Left     LYMPHADENECTOMY  05/22/2012    Dr Dontrell Burch ANT INTERBODY MIN DISCECTOMY, CERVICAL BELOW C2 N/A 10/17/2017    Procedure: C5-6, C6-7 ACDF WITH ALLOGRAFT (NEURO MONITORING); Surgeon: Zoraida Finn MD;  [facility];  Service: Orthopedics    IN COLONOSCOPY FLX DX W/COLLJ SPEC WHEN PFRMD N/A 4/14/2017    Procedure: COLONOSCOPY;  Surgeon: Rell Stockton MD;  Location: [facility];  Service: Gastroenterology    IN EGD TRANSORAL BIOPSY SINGLE/MULTIPLE N/A 1/9/2019    Procedure: ESOPHAGOGASTRODUODENOSCOPY (EGD); Surgeon: Declan Anton MD;  [facility]; Service: Bariatrics    IN ESOPHAGOGASTRODUODENOSCOPY TRANSORAL DIAGNOSTIC N/A 1/11/2017    Procedure: ESOPHAGOGASTRODUODENOSCOPY (EGD); Surgeon: Rell Stockton MD;  [facility];   Service: Gastroenterology    IN INCISE FINGER TENDON SHEATH Right 11/27/2018    Procedure: RELEASE TRIGGER FINGER long and index finger;  Surgeon: Gracia Silver MD;  [facility];  Service: Orthopedics    IN LAP, REPAIR PARAESOPHAGEAL HERNIA, INCL FUNDOPLASTY W/ MESH N/A 7/12/2017    Procedure: REPAIR HERNIA PARAESOPHAGEAL  LAPAROSCOPIC;  Surgeon: Declan Anton MD;  [facility];  Service: Bariatrics    IN LAP,DIAGNOSTIC ABDOMEN N/A 7/26/2019    Procedure: LAPAROSCOPY DIAGNOSTIC CONVERSION TO OPEN, REDUCTION AND REPAIR OF INTERNAL HERNIA, REPAIR SEROSAL TEARS;  Surgeon: Papa March MD;  [facility];  Service: Bariatrics    IN WRIST Jinnie Footman LIG Right 6/9/2016    Procedure: RELEASE CARPAL TUNNEL ENDOSCOPIC;  Surgeon: Gracia Silver MD;  [facility];  Service: [address] RADIUS/ULNA JOINT (DRUJ) Right 2016    Procedure: WRIST SCAPHOLUNATE LIGAMENT RECONSTRUCTION WITH ECRB TENDON AUTOGRAPH , SPLINT APPLICATION ;  Surgeon: Hans Yao MD;  [facility];  Service:     SHOULDER ARTHROSCOPY Right 2021    SAD with debridement    SHOULDER SURGERY      TONSILLECTOMY      UPPER GASTROINTESTINAL ENDOSCOPY       Social History   Social History     Substance and Sexual Activity   Alcohol Use Yes    Comment: rarely     Social History     Substance and Sexual Activity   Drug Use No     Social History     Tobacco Use   Smoking Status Former Smoker    Packs/day: 0 50    Years: 48 00    Pack years: 24 00    Types: Cigarettes    Quit date: 2017    Years since quittin 7   Smokeless Tobacco Former User    Types: Snuff     Family History   Problem Relation Age of Onset    Other Mother         Back disorder    Diabetes Mother         Diabetes Mellitus    Hypertension Mother     Heart disease Father     Hypertension Father     Breast cancer Sister     Skin cancer Sister     Breast cancer Paternal Grandmother     Skin cancer Paternal Grandmother        Meds/Allergies       Current Outpatient Medications:     acetaminophen (TYLENOL 8 HOUR) 650 mg CR tablet    ergocalciferol (VITAMIN D2) 50,000 units    lansoprazole (PREVACID) 30 mg capsule    mirtazapine (REMERON) 15 mg tablet    sucralfate (CARAFATE) 1 g tablet    Allergies   Allergen Reactions    Codeine Hives, Itching, Nausea Only, GI Intolerance and Vomiting    Hydrocodone Hives and GI Intolerance    Shellfish-Derived Products - Food Allergy Nausea Only and Vomiting           Objective   /62 (BP Location: Left arm, Patient Position: Sitting, Cuff Size: Adult)   Pulse 79   Temp 98 6 °F (37 °C) (Tympanic)   Resp 16   Ht 5' 8" (1 727 m)   Wt 56 kg (123 lb 6 4 oz)   SpO2 98%   BMI 18 76 kg/m²       PHYSICAL EXAM:      General Appearance:   Alert, cooperative, no distress   HEENT: Normocephalic, atraumatic, anicteric  Neck:  Supple, symmetrical, trachea midline   Lungs:   Clear to auscultation bilaterally; no rales, rhonchi or wheezing; respirations unlabored    Heart[de-identified]   Regular rate and rhythm; no murmur, rub, or gallop  Abdomen:   Thin, multiple well surgical scars and drain sites, tender to light touch in RUQ and right upper back, non-distended; normal bowel sounds; no masses, no organomegaly    Genitalia:   Deferred    Rectal:   Deferred    Extremities:  No cyanosis, clubbing or edema    Pulses:  2+ and symmetric    Skin:  No jaundice, rashes, or lesions    Lymph nodes:  No palpable cervical lymphadenopathy        Lab Results:   No visits with results within 1 Day(s) from this visit     Latest known visit with results is:   Appointment on 12/10/2021   Component Date Value    Zinc 12/10/2021 94     Vit D, 25-Hydroxy 12/10/2021 14 1*    Vitamin B-12 12/10/2021 345     Vitamin B1, Whole Blood 12/10/2021 116 9     Vitamin A 12/10/2021 17 0*    PTH 12/10/2021 69 2     WBC 12/10/2021 6 85     RBC 12/10/2021 5 18     Hemoglobin 12/10/2021 11 2*    Hematocrit 12/10/2021 38 9     MCV 12/10/2021 75*    MCH 12/10/2021 21 6*    MCHC 12/10/2021 28 8*    RDW 12/10/2021 16 9*    Platelets 82/78/6095 444*    MPV 12/10/2021 9 1     Sodium 12/10/2021 139     Potassium 12/10/2021 3 7     Chloride 12/10/2021 107     CO2 12/10/2021 24     ANION GAP 12/10/2021 8     BUN 12/10/2021 12     Creatinine 12/10/2021 0 87     Glucose, Fasting 12/10/2021 114*    Calcium 12/10/2021 10 4*    AST 12/10/2021 17     ALT 12/10/2021 21     Alkaline Phosphatase 12/10/2021 94     Total Protein 12/10/2021 8 1     Albumin 12/10/2021 4 2     Total Bilirubin 12/10/2021 0 48     eGFR 12/10/2021 94     Ferritin 12/10/2021 5*    Folate 12/10/2021 14 6     Iron Saturation 12/10/2021 4*    TIBC 12/10/2021 513*    Iron 12/10/2021 22*         Radiology Results:   NM hepatobiliary w rx    Result Date: 12/21/2021  Narrative: HEPATOBILIARY SCAN WITH CHOLECYSTOKININ ADMINISTRATION INDICATION: R10 9: Unspecified abdominal pain COMPARISON:  Ultrasound 12/9/2021, CT 12/1/2021 TECHNIQUE:   Following the intravenous administration of 5 4 mCi Tc-99m labeled mebrofenin, dynamic abdominal images were obtained over a 60 minute time period  Images were performed in AP projection  1 1 mcg sincalide was administered as well for gallbladder ejection fraction calculation  FINDINGS: There is prompt, uniform accumulation with normal clearance of the radiopharmaceutical by the liver  There is normal filling of the intrahepatic ducts, common bile duct with normal excretion of the radiopharmaceutical into the duodenum  Enterogastric reflux is noted  There is questionable visualization of the gallbladder, with small focus of activity extending to the right of the CBD  If this is in fact the gallbladder, and if there is no history of a cholecystectomy, it would be very contracted  On the post sincalide images, this focus does appear to empty  Nonetheless, given its contracted state, evaluation is very limited  Impression: 1  There is questionable visualization of the gallbladder, with small focus of activity extending to the right of the CBD  If this is in fact the gallbladder, and if there is no history of a cholecystectomy, it would be very contracted and consequently  limited in evaluation    Workstation performed: [id number]       Answers for HPI/ROS submitted by the patient on 1/11/2022  Chronicity: chronic  Onset: more than 1 month ago  Onset quality: sudden  Frequency: constantly  Episode duration: 3 months  Progression since onset: unchanged  Pain location: right flank  Pain - numeric: 8/10  Pain quality: sharp  Radiates to: right flank  anorexia: No  arthralgias: No  belching: No  constipation: No  diarrhea: No  dysuria: No  fever: No  flatus: Yes  frequency: No  headaches: Yes  hematochezia: No  hematuria: No  melena: No  myalgias: No  nausea: Yes  weight loss: No  vomiting: Yes  Aggravated by: eating, vomiting  Relieved by: nothing  Diagnostic workup: CT scan, ultrasound, upper endoscopy

## 2022-01-11 NOTE — PROGRESS NOTES
Daily Note     Today's date: 2022  Patient name: Mary López  : 1960  MRN: [id number]  Referring provider: Tony Melton MD  Dx:   Encounter Diagnosis     ICD-10-CM    1  S/P shoulder surgery  Z98 890    2  Chronic right shoulder pain  M25 511     G89 29                   Subjective: Pt reported that he is having the nerve endings in his neck burnt on 22  He is having the procedure performed unilateral  The second half of the procedure will be performed 22  Objective: See treatment diary below      Assessment: Continued to follow a modified program secondary to abdominal pain  Tolerated treatment well  Patient demonstrated fatigue post treatment, exhibited good technique with therapeutic exercises and would benefit from continued PT      Plan: Continue per plan of care        Precautions: hold on weight lifting 2* to abdominal pain (possible hernia)  POC exp 21  Manuals 1/6 1/11 12/27 1/3 1/4   R shoulder stretching  JM TM KG KG TM                           Neuro Re-Ed        Supine scap punches   hold     TB rows   hold     TB pull downs   hold                             Ther Ex         rpm  x5'fwd/5'retro 120 rpm x5'fwd/5' retro 120 rpm  x5'fwd/5'retro 120 rpm  x5'fwd/5'retro 120 rpm  x5'fwd/5'retro   Pulleys Flex/Scap   10" x10 ea Flex/Scap   10" x10 ea Flex/Scap   5" x10 ea Flex/Scap   10" x10 ea Flex/Scap   10" x10 ea   AAROM finger ladder Flex 5"x10 Flex 5" x10 Flex 5" x10 Flex 5" x10 Flex 5" x10   Prone Flex, HRZ Abd, and Ext   hold     Bicep curls   hold     Tricep Ext   hold     Standing flex to 90*   hold     Standing Scap to 90*   hold     Supine ABC   hold     TB IR Green 30x Green 30x Green x30 Green x30 Green x30   TB ER Green 30x Green 30x Green x30 Green x30 Green x30   Push up plus @ wall    hold                             Modalities        CP post prn

## 2022-01-12 ENCOUNTER — OFFICE VISIT (OUTPATIENT)
Dept: OBGYN CLINIC | Facility: HOSPITAL | Age: 62
End: 2022-01-12
Payer: OTHER MISCELLANEOUS

## 2022-01-12 ENCOUNTER — HOSPITAL ENCOUNTER (OUTPATIENT)
Dept: RADIOLOGY | Facility: HOSPITAL | Age: 62
Discharge: HOME | End: 2022-01-12
Attending: ORTHOPAEDIC SURGERY
Payer: MEDICARE

## 2022-01-12 VITALS
HEART RATE: 78 BPM | WEIGHT: 124.2 LBS | BODY MASS INDEX: 18.82 KG/M2 | SYSTOLIC BLOOD PRESSURE: 137 MMHG | DIASTOLIC BLOOD PRESSURE: 59 MMHG | HEIGHT: 68 IN

## 2022-01-12 DIAGNOSIS — M79.601 RIGHT ARM PAIN: ICD-10-CM

## 2022-01-12 DIAGNOSIS — R52 PAIN: ICD-10-CM

## 2022-01-12 DIAGNOSIS — E11.9 TYPE 2 DIABETES MELLITUS WITHOUT COMPLICATION, UNSPECIFIED WHETHER LONG TERM INSULIN USE (HCC): ICD-10-CM

## 2022-01-12 DIAGNOSIS — R52 PAIN: Primary | ICD-10-CM

## 2022-01-12 DIAGNOSIS — E66.01 MORBID OBESITY (HCC): ICD-10-CM

## 2022-01-12 PROCEDURE — 72040 X-RAY EXAM NECK SPINE 2-3 VW: CPT

## 2022-01-12 PROCEDURE — 99204 OFFICE O/P NEW MOD 45 MIN: CPT | Performed by: ORTHOPAEDIC SURGERY

## 2022-01-12 NOTE — PROGRESS NOTES
ASSESSMENT/PLAN:    Assessment:   Right recurrent carpal tunnel syndrome s/p prior right carpal tunnel release and right index finger trigger finger s/p injection  Right dorsal wrist pain and dorsal hand numbness in radial nerve distribution    Plan:   Patient has a complicated history of neck and prior carpal tunnel, prior wrist trauma with 1 year of new median and radial  nerve distribution of the  weakness and numbness in the hand  He is scheduled to undergo cervical sensory nerve ablation with spine and pain  EMG RUE script provided  Return to clinic once EMG is completed    Follow Up:  RTC once EMG RUE is complete for review    To Do Next Visit:  Repeat exam and imaging review    General Discussions:     Discussion was had with the patient regarding pathology  Given the physical exam and prior improvement in symptoms with cervical injections It was explained that the patient may have multiple etilogies of his symptoms, but his symptoms are likely related to nerve compression in the neck  He is scheduled for cervical nerve ablations with spine and pain  The patient was provided a script for EMG of the RUE and recommended to proceed with the EMG if his symptoms do not resolve after his cervical spine procedure  The patient agrees and understands the plan  Operative Discussions:      _____________________________________________________  CHIEF COMPLAINT:  Chief Complaint   Patient presents with    Right Wrist - Pain         SUBJECTIVE:  Samm Champion is a 62 y o  male who presents with right hand numbness and tingling of the thumb, index and long fingers dorsally and volarly  He states that he had a prior carpal tunnel release by Dr Mikie Hurley with resolution of his symptoms in 2018 and injection of his right index finger also with resolution of his symptoms  He also had history of trauma to the wrist in 2015 and underwent ligament reconstruction with Dr Mikie Hurley   Over the past year, he has noted the volar and dorsal numbness, tingling and weakness over the radial half of his hand and has been dropping objects more frequently  He had undergone injection in the cervical spine which completely resolved his symptoms  He states he has had a waxing and waning mass on the dorsum of the wrist that is associated with worsening numbness and burning of the dorsum of his right hand  The patient has significant past medical history including cervical radiculopathy for which he underwent C5-C6-C6-C7 ACDF with Dr Madeline Almanzar  He states that his hand and wrist symptoms are different than the normal shooting pain he has from his neck  He follows with [facility] spine and pain for injections in his neck and underwent right shoulder rotator cuff repair in 2021 at [facility]  He is RHD and on workers comp  PAST MEDICAL HISTORY:  Past Medical History:   Diagnosis Date    Anemia     Anesthesia     Pt wakes up during "almost every surgery"    Arthritis     Asthma     Bariatric surgery status     Saucedo esophagus     Cancer ([facility] [address] ) 2012    Had lobectomy, lower left lung      Cervical radiculopathy     Chemotherapy follow-up examination     Chronic pain     Chronic pain disorder     COPD (chronic obstructive pulmonary disease) (HCC)     Diabetes mellitus (HCC)     borderline "years ago"    Food allergy     clams    GERD (gastroesophageal reflux disease)     Heart murmur     Hernia     Hiatal hernia     History of malignant neoplasm     History of pneumonia 04/12/2016    History of pulmonary embolism     History of pulmonary embolus (PE)     History of ulceration     Hyperlipidemia     Lung cancer (HCC)     left lung, radiation and chemo tx    Migraine     Pneumonia     Postgastrectomy malabsorption     Right leg DVT (HCC)     Right scapholunate ligament tear     Skipped heart beats     Wears partial dentures     upper and lower       PAST SURGICAL HISTORY:  Past Surgical History:   Procedure Laterality Date  ABDOMINAL SURGERY      BRONCHOSCOPY      COLONOSCOPY      FRACTURE SURGERY      femur right 1985    GASTRIC BYPASS LAPAROSCOPIC N/A 7/12/2017    Procedure: GASTRIC DIVERSION WITH [place]-EN-Y RECONSTRUCTION WITH  SHORT 60 cm LIMB;  Surgeon: Jina Lugo MD;  [facility];  Service: Bariatrics    HEMORRHOID SURGERY      HERNIA REPAIR  2019    IR CHEST TUBE PLACEMENT  8/9/2019    IR CHOLECYSTOSTOMY TUBE PLACEMENT  9/22/2019    IR IMAGE GUIDED ASPIRATION / DRAINAGE  8/1/2019    IR PICC LINE  8/9/2019    KNEE ARTHROSCOPY Right     right shoulder    LAPAROTOMY N/A 7/28/2019    Procedure: LAPAROTOMY EXPLORATORY, WASHOUT OF SUCUS, REPAIR OF MARGINAL ULCER PERFORATION, ABD WASHOUT, INTRAOPERATIVE EGD, GI ASSISTED ENDOSCOPIC STENT PLACEMENT;  Surgeon: Sally Mcqueen MD;  [facility];  Service: Allenie Gerold Left     LYMPHADENECTOMY  05/22/2012    Dr Armand Garzon ANT INTERBODY MIN DISCECTOMY, CERVICAL BELOW C2 N/A 10/17/2017    Procedure: C5-6, C6-7 ACDF WITH ALLOGRAFT (NEURO MONITORING); Surgeon: Mercedes Mccray MD;  [facility];  Service: Orthopedics    NC COLONOSCOPY FLX DX W/COLLJ SPEC WHEN PFRMD N/A 4/14/2017    Procedure: COLONOSCOPY;  Surgeon: Amanda Quigley MD;  [facility]: [facility];  Service: Gastroenterology    NC EGD TRANSORAL BIOPSY SINGLE/MULTIPLE N/A 1/9/2019    Procedure: ESOPHAGOGASTRODUODENOSCOPY (EGD); Surgeon: Jina Lugo MD;  [facility]; Service: Bariatrics    NC ESOPHAGOGASTRODUODENOSCOPY TRANSORAL DIAGNOSTIC N/A 1/11/2017    Procedure: ESOPHAGOGASTRODUODENOSCOPY (EGD); Surgeon: Amanda Quigley MD;  [facility];   Service: Gastroenterology    NC INCISE FINGER TENDON SHEATH Right 11/27/2018    Procedure: RELEASE TRIGGER FINGER long and index finger;  Surgeon: Dixie Padilla MD;  [facility];  Service: Orthopedics    NC LAP, REPAIR PARAESOPHAGEAL HERNIA, INCL FUNDOPLASTY W/ MESH N/A 2017    Procedure: REPAIR HERNIA PARAESOPHAGEAL  LAPAROSCOPIC;  Surgeon: Carla Garcia MD;  [facility];  Service: Bariatrics    IL LAP,DIAGNOSTIC ABDOMEN N/A 2019    Procedure: LAPAROSCOPY DIAGNOSTIC CONVERSION TO OPEN, REDUCTION AND REPAIR OF INTERNAL HERNIA, REPAIR SEROSAL TEARS;  Surgeon: Sharri Flor MD;  [facility];  Service: Bariatrics    IL WRIST Sheryn Busman LIG Right 2016    Procedure: RELEASE CARPAL TUNNEL ENDOSCOPIC;  Surgeon: Bronwyn Daniel MD;  [facility];  Service: Orthopedics    RECONSTRUCTION DISTAL RADIUS/ULNA JOINT (DRUJ) Right 2016    Procedure: WRIST SCAPHOLUNATE LIGAMENT RECONSTRUCTION WITH ECRB TENDON AUTOGRAPH , SPLINT APPLICATION ;  Surgeon: Bronwyn Daniel MD;  [facility];  Service:     SHOULDER ARTHROSCOPY Right 2021    SAD with debridement    SHOULDER SURGERY      TONSILLECTOMY      UPPER GASTROINTESTINAL ENDOSCOPY         FAMILY HISTORY:  Family History   Problem Relation Age of Onset    Other Mother         Back disorder    Diabetes Mother         Insulin Dependent,     Kristen Carranza Hypertension Mother     Heart disease Father     Hypertension Father     Breast cancer Sister     Skin cancer Sister     Breast cancer Paternal Grandmother     Skin cancer Paternal Grandmother        SOCIAL HISTORY:  Social History     Tobacco Use    Smoking status: Current Some Day Smoker     Packs/day: 0 00     Years: 2 00     Pack years: 0 00     Types: Cigarettes     Last attempt to quit: 2017     Years since quittin 7    Smokeless tobacco: Never Used   Vaping Use    Vaping Use: Never used   Substance Use Topics    Alcohol use: Not Currently     Alcohol/week: 0 0 standard drinks     Comment: rarely    Drug use: No       MEDICATIONS:    Current Outpatient Medications:     acetaminophen (TYLENOL 8 HOUR) 650 mg CR tablet, Take 1 tablet (650 mg total) by mouth every 8 (eight) hours, Disp: 15 tablet, Rfl: 0    ergocalciferol (VITAMIN D2) 50,000 units, Take 1 capsule (50,000 Units total) by mouth 2 (two) times a week with meals, Disp: 24 capsule, Rfl: 0    lansoprazole (PREVACID SOLUTAB) 30 mg disintegrating tablet, Take 1 tablet (30 mg total) by mouth daily, Disp: 60 tablet, Rfl: 5    mirtazapine (REMERON) 15 mg tablet, Take 1 tablet (15 mg total) by mouth daily at bedtime, Disp: 30 tablet, Rfl: 5    sucralfate (CARAFATE) 1 g tablet, Take 1 tablet (1 g total) by mouth 4 (four) times a day, Disp: 120 tablet, Rfl: 2    ALLERGIES:  Allergies   Allergen Reactions    Codeine Hives, Itching, Nausea Only, GI Intolerance and Vomiting    Hydrocodone Hives and GI Intolerance    Shellfish-Derived Products - Food Allergy Nausea Only and Vomiting       REVIEW OF SYSTEMS:  Pertinent items are noted in HPI  A comprehensive review of systems was negative  LABS:  HgA1c:   Lab Results   Component Value Date    HGBA1C 4 7 11/04/2019     BMP:   Lab Results   Component Value Date    GLUCOSE 90 01/04/2016    CALCIUM 10 4 (H) 12/10/2021     01/04/2016    K 3 7 12/10/2021    CO2 24 12/10/2021     12/10/2021    BUN 12 12/10/2021    CREATININE 0 87 12/10/2021         _____________________________________________________  PHYSICAL EXAMINATION:  Vital signs: /59   Pulse 78   Ht 5' 8" (1 727 m)   Wt 56 3 kg (124 lb 3 2 oz)   BMI 18 88 kg/m²   General: well developed and well nourished, alert, oriented times 3 and appears comfortable  Psychiatric: Normal, full range of affect  HEENT: Trachea Midline, No torticollis  Cardiovascular: No discernable arrhythmia  Pulmonary: No wheezing or stridor  Abdomen: No rebound or guarding  Extremities: No peripheral edema  Skin: Well healed dorsal and volar scars of the right hand  Neurovascular:Decreased sensation over the right hand volar thumb, index and long fingers and in the radial distribution   4/5 strength right shoulder abduction, elbow flexion/extension, wrist flexion/extension, APB compared to 5/5 on the left  5/5 right FPL  + spurling and l'hermitte signs    MUSCULOSKELETAL EXAMINATION:    Right hand  - well healed scars to dorsum and volar hand  - Tinnel's sign reproduces symptoms when performed at the wrist, arm and neck  - mild atrophy of the thenar muscles compared to left  - No mass palpated over dorsal wrist  - TTP to dorsal wrist  - 2+ radial pulse       _____________________________________________________  STUDIES REVIEWED:  Images were reviewed in PACS by Dr Bishop Rivas and demonstrate: x-ray neck demonstrate intact hardware without migration  PROCEDURES PERFORMED:  Procedures  No Procedures performed today       I interviewed, took the history and examined the patient  I discussed the case with the Resident and reviewed the Resident's note  I supervised the Resident and I agree with the Resident management plan as it was presented to me  I ws present in the clinic and examined the patient

## 2022-01-13 ENCOUNTER — OFFICE VISIT (OUTPATIENT)
Dept: PHYSICAL THERAPY | Facility: CLINIC | Age: 62
End: 2022-01-13
Payer: OTHER MISCELLANEOUS

## 2022-01-13 DIAGNOSIS — M25.511 CHRONIC RIGHT SHOULDER PAIN: ICD-10-CM

## 2022-01-13 DIAGNOSIS — G89.29 CHRONIC RIGHT SHOULDER PAIN: ICD-10-CM

## 2022-01-13 DIAGNOSIS — Z98.890 S/P SHOULDER SURGERY: Primary | ICD-10-CM

## 2022-01-13 PROCEDURE — 97110 THERAPEUTIC EXERCISES: CPT

## 2022-01-13 PROCEDURE — 97140 MANUAL THERAPY 1/> REGIONS: CPT

## 2022-01-13 NOTE — PROGRESS NOTES
Daily Note     Today's date: 2022  Patient name: Doris Adrian  : 1960  MRN: [id number]  Referring provider: Vane Rizvi MD  Dx:   Encounter Diagnosis     ICD-10-CM    1  S/P shoulder surgery  Z98 890    2  Chronic right shoulder pain  M25 511     G89 29                   Subjective: Patient reports 6/10 R shoulder pain at beginning of session  He says his appointments to get the nerve endings in his neck burnt are 22 and 22  Objective: See treatment diary below      Assessment: Tolerated treatment well  Good technique throughout TE program  R shoulder ROM appears WFLs when performing R shoulder PROM and stretching  Patient would benefit from continued PT to increase R shoulder strength and mobility for improved function in ADLs  Plan: Continue per plan of care        Precautions: hold on weight lifting 2* to abdominal pain (possible hernia)  POC exp 21  Manuals 1/6 1/11 1/13 1/3 1/4   R shoulder stretching  JM TM AS KG TM                           Neuro Re-Ed        Supine scap punches        TB rows        TB pull downs                                Ther Ex         rpm  x5'fwd/5'retro 120 rpm x5'fwd/5' retro 120 rpm x5'fwd/5' retro 120 rpm  x5'fwd/5'retro 120 rpm  x5'fwd/5'retro   Pulleys Flex/Scap   10" x10 ea Flex/Scap   10" x10 ea Flex/Scap   10" x10 ea Flex/Scap   10" x10 ea Flex/Scap   10" x10 ea   AAROM finger ladder Flex 5"x10 Flex 5" x10 Flex 5" x10 Flex 5" x10 Flex 5" x10   Prone Flex, HRZ Abd, and Ext        Bicep curls        Tricep Ext        Standing flex to 90*        Standing Scap to 90*        Supine ABC        TB IR Green 30x Green 30x Green 30x Green x30 Green x30   TB ER Green 30x Green 30x Green 30x Green x30 Green x30   Push up plus @ wall                                 Modalities        CP post prn

## 2022-01-17 ENCOUNTER — TELEPHONE (OUTPATIENT)
Dept: INTERNAL MEDICINE CLINIC | Facility: CLINIC | Age: 62
End: 2022-01-17

## 2022-01-17 ENCOUNTER — APPOINTMENT (OUTPATIENT)
Dept: PHYSICAL THERAPY | Facility: CLINIC | Age: 62
End: 2022-01-17
Payer: OTHER MISCELLANEOUS

## 2022-01-17 PROBLEM — E11.9 DIABETES MELLITUS (HCC): Status: RESOLVED | Noted: 2021-12-13 | Resolved: 2022-01-17

## 2022-01-18 ENCOUNTER — APPOINTMENT (OUTPATIENT)
Dept: PHYSICAL THERAPY | Facility: CLINIC | Age: 62
End: 2022-01-18
Payer: OTHER MISCELLANEOUS

## 2022-01-20 ENCOUNTER — APPOINTMENT (OUTPATIENT)
Dept: PHYSICAL THERAPY | Facility: CLINIC | Age: 62
End: 2022-01-20
Payer: OTHER MISCELLANEOUS

## 2022-01-21 ENCOUNTER — APPOINTMENT (OUTPATIENT)
Dept: PHYSICAL THERAPY | Facility: CLINIC | Age: 62
End: 2022-01-21
Payer: OTHER MISCELLANEOUS

## 2022-01-24 ENCOUNTER — CONSULT (OUTPATIENT)
Dept: HEMATOLOGY ONCOLOGY | Facility: CLINIC | Age: 62
End: 2022-01-24
Payer: MEDICARE

## 2022-01-24 ENCOUNTER — APPOINTMENT (OUTPATIENT)
Dept: PHYSICAL THERAPY | Facility: CLINIC | Age: 62
End: 2022-01-24
Payer: OTHER MISCELLANEOUS

## 2022-01-24 VITALS
SYSTOLIC BLOOD PRESSURE: 124 MMHG | RESPIRATION RATE: 18 BRPM | TEMPERATURE: 98.3 F | OXYGEN SATURATION: 99 % | HEIGHT: 68 IN | HEART RATE: 91 BPM | DIASTOLIC BLOOD PRESSURE: 60 MMHG | WEIGHT: 124 LBS | BODY MASS INDEX: 18.79 KG/M2

## 2022-01-24 DIAGNOSIS — Z98.84 S/P GASTRIC BYPASS: ICD-10-CM

## 2022-01-24 DIAGNOSIS — E53.8 LOW VITAMIN B12 LEVEL: Primary | ICD-10-CM

## 2022-01-24 DIAGNOSIS — D50.8 IRON DEFICIENCY ANEMIA SECONDARY TO INADEQUATE DIETARY IRON INTAKE: ICD-10-CM

## 2022-01-24 DIAGNOSIS — C34.92 ADENOCARCINOMA OF LEFT LUNG (HCC): ICD-10-CM

## 2022-01-24 DIAGNOSIS — R79.0 LOW FERRITIN: ICD-10-CM

## 2022-01-24 DIAGNOSIS — R63.6 UNDERWEIGHT: ICD-10-CM

## 2022-01-24 DIAGNOSIS — R79.0 LOW FERRITIN: Primary | ICD-10-CM

## 2022-01-24 DIAGNOSIS — E44.0 MODERATE PROTEIN-CALORIE MALNUTRITION (HCC): ICD-10-CM

## 2022-01-24 DIAGNOSIS — K91.2 POSTSURGICAL MALABSORPTION: ICD-10-CM

## 2022-01-24 DIAGNOSIS — E53.8 LOW VITAMIN B12 LEVEL: ICD-10-CM

## 2022-01-24 PROCEDURE — 99204 OFFICE O/P NEW MOD 45 MIN: CPT | Performed by: NURSE PRACTITIONER

## 2022-01-24 RX ORDER — SODIUM CHLORIDE 9 MG/ML
20 INJECTION, SOLUTION INTRAVENOUS ONCE
Status: CANCELLED | OUTPATIENT
Start: 2022-02-09

## 2022-01-24 RX ORDER — CYANOCOBALAMIN 1000 UG/ML
1000 INJECTION INTRAMUSCULAR; SUBCUTANEOUS ONCE
Status: CANCELLED | OUTPATIENT
Start: 2022-02-09 | End: 2022-02-09

## 2022-01-25 ENCOUNTER — OFFICE VISIT (OUTPATIENT)
Dept: PHYSICAL THERAPY | Facility: CLINIC | Age: 62
End: 2022-01-25
Payer: OTHER MISCELLANEOUS

## 2022-01-25 ENCOUNTER — APPOINTMENT (OUTPATIENT)
Dept: PHYSICAL THERAPY | Facility: CLINIC | Age: 62
End: 2022-01-25
Payer: OTHER MISCELLANEOUS

## 2022-01-25 DIAGNOSIS — G89.29 CHRONIC RIGHT SHOULDER PAIN: ICD-10-CM

## 2022-01-25 DIAGNOSIS — Z98.890 S/P SHOULDER SURGERY: Primary | ICD-10-CM

## 2022-01-25 DIAGNOSIS — M25.511 CHRONIC RIGHT SHOULDER PAIN: ICD-10-CM

## 2022-01-25 PROCEDURE — 97110 THERAPEUTIC EXERCISES: CPT | Performed by: PHYSICAL THERAPIST

## 2022-01-25 PROCEDURE — 97140 MANUAL THERAPY 1/> REGIONS: CPT | Performed by: PHYSICAL THERAPIST

## 2022-01-25 PROCEDURE — 97110 THERAPEUTIC EXERCISES: CPT

## 2022-01-25 NOTE — PROGRESS NOTES
Daily Note     Today's date: 2022  Patient name: Vista Closs  : 1960  MRN: [id number]  Referring provider: Eloisa Garces MD  Dx:   Encounter Diagnosis     ICD-10-CM    1  S/P shoulder surgery  Z98 890    2  Chronic right shoulder pain  M25 511     G89 29                   Subjective: ***      Objective: See treatment diary below      Assessment: Tolerated treatment {Tolerated treatment :[id number]}   Patient {assessment:}      Plan: {PLAN:[id number]}     Precautions: hold on weight lifting 2* to abdominal pain (possible hernia)  POC exp 21  Manuals 1/6 1/11 1/13 1/3 1/4   R shoulder stretching  JM TM AS KG TM                           Neuro Re-Ed        Supine scap punches        TB rows        TB pull downs                                Ther Ex         rpm  x5'fwd/5'retro 120 rpm x5'fwd/5' retro 120 rpm x5'fwd/5' retro 120 rpm  x5'fwd/5'retro 120 rpm  x5'fwd/5'retro   Pulleys Flex/Scap   10" x10 ea Flex/Scap   10" x10 ea Flex/Scap   10" x10 ea Flex/Scap   10" x10 ea Flex/Scap   10" x10 ea   AAROM finger ladder Flex 5"x10 Flex 5" x10 Flex 5" x10 Flex 5" x10 Flex 5" x10   Prone Flex, HRZ Abd, and Ext        Bicep curls        Tricep Ext        Standing flex to 90*        Standing Scap to 90*        Supine ABC        TB IR Green 30x Green 30x Green 30x Green x30 Green x30   TB ER Green 30x Green 30x Green 30x Green x30 Green x30   Push up plus @ wall                                 Modalities        CP post prn
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
## 2022-01-26 NOTE — PROGRESS NOTES
[facility] [address] [id number] [phone]  Hematology Ambulatory Consult  Howard, 1960, [id number]  01/24/2022      Assessment and Plan   1  Low vitamin B12 level  2  Underweight  3  Postsurgical malabsorption  4  Low ferritin  5  Iron deficiency anemia secondary to inadequate dietary iron intake  6  S/P gastric bypass  7  Moderate protein-calorie malnutrition ([address] )  8  Adenocarcinoma of left lung [facility])    Patient is a 50-year-old male with a history of adenocarcinoma of the lung, pulmonary embolism, Saucedo's esophagus without dysplasia, status post Britney-en-Y bariatric surgery, postsurgical malabsorption with subsequent iron and B12 deficiency  He is referred to our office for management with iron infusions  Patient had a history of Britney-en-Y gastric bypass for paraesophageal hernia repair in 2017 not for weight loss  In 2019 he had a perforated marginal ulcer with acalculous cholecystitis treated with percutaneous cholecystectomy tube and GJ stricture requiring dilation  He follows with Gastroenterology for intermittent EGDs  He was found to have an iron saturation of 4% with a ferritin level of 5 in December 2021  Hemoglobin 11 2 with an MCV of 75, platelets 074  Metabolic panel was essentially stable however calcium is elevated at 10 4  Patient has had an EGD in December 2021 which did not reveal evidence of stricture were ulceration at the anastomosis site  Patient has not had iron infusions before  We discussed indications and adverse reactions including shortness of breath, chest heaviness, headache, and muscle cramping  I am inclined to use Venofer 300 mg IV x3 doses  Patient also has a B12 level of 345  We will add the vitamin B12 injection to the plan  We will plan to see him in 6 months with 3 month lab intervals with management as indicated    Patient verbalized understanding is in agreement with plan  - Ambulatory referral to Hematology / Oncology  - CBC; Standing  - Vitamin B12; Standing  - Comprehensive metabolic panel; Standing  - Iron Panel (Includes Ferritin, Iron Sat%, Iron, and TIBC); Standing  - CBC  - Vitamin B12  - Comprehensive metabolic panel  - Iron Panel (Includes Ferritin, Iron Sat%, Iron, and TIBC)    Barrier(s) to care: None  The patient is  able to self care  [address] [facility]    Subjective     Chief Complaint   Patient presents with   Ameya Corozal Consult       Referring provider    Berenice Barron MD  [address] [facility]  [facility],  [address]     History of present illness:  Patient is a 71-year-old male with a history of adenocarcinoma of the lung, pulmonary embolism, Saucedo's esophagus without dysplasia, status post Britney-en-Y bariatric surgery, postsurgical malabsorption with subsequent iron and B12 deficiency  He is referred to our office for management with iron infusions  01/26/22:  Clinically stable    Review of Systems   Constitutional: Positive for fatigue  Negative for activity change, appetite change, fever and unexpected weight change  Respiratory: Negative for cough and shortness of breath  Cardiovascular: Negative for chest pain and leg swelling  Gastrointestinal: Negative for abdominal pain, constipation, diarrhea and nausea  Endocrine: Negative for cold intolerance and heat intolerance  Musculoskeletal: Negative for arthralgias and myalgias  Skin: Negative  Neurological: Negative for dizziness, weakness and headaches  Hematological: Negative for adenopathy  Does not bruise/bleed easily  Past Medical History:   Diagnosis Date    Anemia     Anesthesia     Pt wakes up during "almost every surgery"    Arthritis     Asthma     Bariatric surgery status     Saucedo esophagus     Cancer ([facility] [address] ) 2012    Had lobectomy, lower left lung      Cervical radiculopathy  Chemotherapy follow-up examination     Chronic pain     Chronic pain disorder     COPD (chronic obstructive pulmonary disease) (HCC)     Diabetes mellitus (HCC)     borderline "years ago"    Food allergy     clams    GERD (gastroesophageal reflux disease)     Heart murmur     Hernia     Hiatal hernia     History of malignant neoplasm     History of pneumonia 04/12/2016    History of pulmonary embolism     History of pulmonary embolus (PE)     History of ulceration     Hyperlipidemia     Lung cancer (HCC)     left lung, radiation and chemo tx    Migraine     Pneumonia     Postgastrectomy malabsorption     Right leg DVT (HCC)     Right scapholunate ligament tear     Skipped heart beats     Wears partial dentures     upper and lower     Past Surgical History:   Procedure Laterality Date    ABDOMINAL SURGERY      BRONCHOSCOPY      COLONOSCOPY      FRACTURE SURGERY      femur right 1985    GASTRIC BYPASS LAPAROSCOPIC N/A 7/12/2017    Procedure: GASTRIC DIVERSION WITH [place]-EN-Y RECONSTRUCTION WITH  SHORT 60 cm LIMB;  Surgeon: Graham Stevenson MD;  [facility];  Service: Bariatrics    HEMORRHOID SURGERY      HERNIA REPAIR  2019    IR CHEST TUBE PLACEMENT  8/9/2019    IR CHOLECYSTOSTOMY TUBE PLACEMENT  9/22/2019    IR IMAGE GUIDED ASPIRATION / DRAINAGE  8/1/2019    IR PICC LINE  8/9/2019    KNEE ARTHROSCOPY Right     right shoulder    LAPAROTOMY N/A 7/28/2019    Procedure: LAPAROTOMY EXPLORATORY, WASHOUT OF SUCUS, REPAIR OF MARGINAL ULCER PERFORATION, ABD WASHOUT, INTRAOPERATIVE EGD, GI ASSISTED ENDOSCOPIC STENT PLACEMENT;  Surgeon: Min Rodríguez MD;  [facility];  Service: José Miguel Aragon Left     LYMPHADENECTOMY  05/22/2012    Dr Yumiko Jurado ANT INTERBODY MIN DISCECTOMY, CERVICAL BELOW C2 N/A 10/17/2017    Procedure: C5-6, C6-7 ACDF WITH ALLOGRAFT (NEURO MONITORING);   Surgeon: Marleny Tejada MD;  [facility];  Service: Orthopedics    TN COLONOSCOPY FLX DX W/COLLJ SPEC WHEN PFRMD N/A 4/14/2017    Procedure: COLONOSCOPY;  Surgeon: Karey Toledo MD;  Location: [facility];  Service: Gastroenterology    TN EGD TRANSORAL BIOPSY SINGLE/MULTIPLE N/A 1/9/2019    Procedure: ESOPHAGOGASTRODUODENOSCOPY (EGD); Surgeon: Mandy Jay MD;  [facility]; Service: Bariatrics    TN ESOPHAGOGASTRODUODENOSCOPY TRANSORAL DIAGNOSTIC N/A 1/11/2017    Procedure: ESOPHAGOGASTRODUODENOSCOPY (EGD); Surgeon: Karey Toledo MD;  [facility];   Service: Gastroenterology    TN INCISE FINGER TENDON SHEATH Right 11/27/2018    Procedure: RELEASE TRIGGER FINGER long and index finger;  Surgeon: Sania Cano MD;  [facility];  Service: Orthopedics    TN LAP, REPAIR PARAESOPHAGEAL HERNIA, INCL FUNDOPLASTY W/ MESH N/A 7/12/2017    Procedure: REPAIR HERNIA PARAESOPHAGEAL  LAPAROSCOPIC;  Surgeon: Mandy Jay MD;  [facility];  Service: Bariatrics    TN LAP,DIAGNOSTIC ABDOMEN N/A 7/26/2019    Procedure: LAPAROSCOPY DIAGNOSTIC CONVERSION TO OPEN, REDUCTION AND REPAIR OF INTERNAL HERNIA, REPAIR SEROSAL TEARS;  Surgeon: Travis Rouse MD;  [facility];  Service: Bariatrics    TN WRIST Joyce Paul LIG Right 6/9/2016    Procedure: RELEASE CARPAL TUNNEL ENDOSCOPIC;  Surgeon: Sania Cano MD;  [facility];  Service: Orthopedics    RECONSTRUCTION DISTAL RADIUS/ULNA JOINT (DRUJ) Right 9/6/2016    Procedure: WRIST SCAPHOLUNATE LIGAMENT RECONSTRUCTION WITH ECRB TENDON AUTOGRAPH , SPLINT APPLICATION ;  Surgeon: Sania Cano MD;  [facility];  Service:     SHOULDER ARTHROSCOPY Right 08/24/2021    SAD with debridement    SHOULDER SURGERY      TONSILLECTOMY      UPPER GASTROINTESTINAL ENDOSCOPY       Family History   Problem Relation Age of Onset    Other Mother         Back disorder    Diabetes Mother         Insulin Dependent,      Hypertension Mother     Heart disease Father     Hypertension Father     Breast cancer Sister     Skin cancer Sister     Breast cancer Paternal Grandmother     Skin cancer Paternal Grandmother      Social History     Socioeconomic History    Marital status: /Civil Union     Spouse name: None    Number of children: None    Years of education: None    Highest education level: None   Occupational History    Occupation: DISABLED   Tobacco Use    Smoking status: Current Some Day Smoker     Packs/day: 0 00     Years: 2 00     Pack years: 0 00     Types: Cigarettes     Last attempt to quit: 2017     Years since quittin 7    Smokeless tobacco: Never Used   Vaping Use    Vaping Use: Never used   Substance and Sexual Activity    Alcohol use: Not Currently     Alcohol/week: 0 0 standard drinks     Comment: rarely    Drug use: No    Sexual activity: None   Other Topics Concern    None   Social History Narrative    DISABLED         Social Determinants of Health     Financial Resource Strain: Not on file   Food Insecurity: Not on file   Transportation Needs: Not on file   Physical Activity: Not on file   Stress: Not on file   Social Connections: Not on file   Intimate Partner Violence: Not on file   Housing Stability: Not on file         Current Outpatient Medications:     acetaminophen (TYLENOL 8 HOUR) 650 mg CR tablet, Take 1 tablet (650 mg total) by mouth every 8 (eight) hours, Disp: 15 tablet, Rfl: 0    ergocalciferol (VITAMIN D2) 50,000 units, Take 1 capsule (50,000 Units total) by mouth 2 (two) times a week with meals, Disp: 24 capsule, Rfl: 0    lansoprazole (PREVACID SOLUTAB) 30 mg disintegrating tablet, Take 1 tablet (30 mg total) by mouth daily, Disp: 60 tablet, Rfl: 5    mirtazapine (REMERON) 15 mg tablet, Take 1 tablet (15 mg total) by mouth daily at bedtime, Disp: 30 tablet, Rfl: 5    sucralfate (CARAFATE) 1 g tablet, Take 1 tablet (1 g total) by mouth 4 (four) times a day, Disp: 120 tablet, Rfl: 2  Allergies   Allergen Reactions    Codeine Hives, Itching, Nausea Only, GI Intolerance and Vomiting    Hydrocodone Hives and GI Intolerance    Shellfish-Derived Products - Food Allergy Nausea Only and Vomiting       Objective   /60 (BP Location: Right arm, Patient Position: Sitting, Cuff Size: Adult)   Pulse 91   Temp 98 3 °F (36 8 °C)   Resp 18   Ht 5' 8" (1 727 m)   Wt 56 2 kg (124 lb)   SpO2 99%   BMI 18 85 kg/m²   Physical Exam  Vitals reviewed  Constitutional:       Appearance: Normal appearance  He is well-developed  Comments: Thin appearing   HENT:      Head: Normocephalic and atraumatic  Eyes:      Pupils: Pupils are equal, round, and reactive to light  Cardiovascular:      Rate and Rhythm: Normal rate and regular rhythm  Pulses: Normal pulses  Heart sounds: Normal heart sounds  Pulmonary:      Effort: Pulmonary effort is normal  No respiratory distress  Breath sounds: Normal breath sounds  Abdominal:      General: Bowel sounds are normal       Palpations: Abdomen is soft  Musculoskeletal:         General: Normal range of motion  Cervical back: Normal range of motion  Lymphadenopathy:      Cervical: No cervical adenopathy  Skin:     General: Skin is warm and dry  Capillary Refill: Capillary refill takes less than 2 seconds  Neurological:      Mental Status: He is alert and oriented to person, place, and time  Psychiatric:         Behavior: Behavior normal        Result Review  Labs:  No visits with results within 1 Month(s) from this visit     Latest known visit with results is:   Appointment on 12/10/2021   Component Date Value Ref Range Status    Zinc 12/10/2021 94  44 - 115 ug/dL Final                                    Detection Limit = 5    Vit D, 25-Hydroxy 12/10/2021 14 1* 30 0 - 100 0 ng/mL Final    Vitamin B-12 12/10/2021 345  100 - 900 pg/mL Final    Vitamin B1, Whole Blood 12/10/2021 116 9  66 5 - 200 0 nmol/L Final    Vitamin A 12/10/2021 17 0* 22 0 - 69 5 ug/dL Final    Reference intervals for vitamin A determined from [facility] internal  studies  Individuals with vitamin A less than 20 ug/dL are considered  vitamin A deficient and those with serum concentrations less than  10 ug/dL are considered severely deficient  This test was developed and its performance characteristics  determined by [facility]  It has not been cleared or approved  by the Food and Drug Administration   PTH 12/10/2021 69 2  18 4 - 80 1 pg/mL Final    WBC 12/10/2021 6 85  4 31 - 10 16 Thousand/uL Final    RBC 12/10/2021 5 18  3 88 - 5 62 Million/uL Final    Hemoglobin 12/10/2021 11 2* 12 0 - 17 0 g/dL Final    Hematocrit 12/10/2021 38 9  36 5 - 49 3 % Final    MCV 12/10/2021 75* 82 - 98 fL Final    MCH 12/10/2021 21 6* 26 8 - 34 3 pg Final    MCHC 12/10/2021 28 8* 31 4 - 37 4 g/dL Final    RDW 12/10/2021 16 9* 11 6 - 15 1 % Final    Platelets 56/33/5856 444* 149 - 390 Thousands/uL Final    MPV 12/10/2021 9 1  8 9 - 12 7 fL Final    Sodium 12/10/2021 139  136 - 145 mmol/L Final    Potassium 12/10/2021 3 7  3 5 - 5 3 mmol/L Final    Chloride 12/10/2021 107  100 - 108 mmol/L Final    CO2 12/10/2021 24  21 - 32 mmol/L Final    ANION GAP 12/10/2021 8  4 - 13 mmol/L Final    BUN 12/10/2021 12  5 - 25 mg/dL Final    Creatinine 12/10/2021 0 87  0 60 - 1 30 mg/dL Final    Standardized to IDMS reference method    Glucose, Fasting 12/10/2021 114* 65 - 99 mg/dL Final    Specimen collection should occur prior to Sulfasalazine administration due to the potential for falsely depressed results  Specimen collection should occur prior to Sulfapyridine administration due to the potential for falsely elevated results      Calcium 12/10/2021 10 4* 8 3 - 10 1 mg/dL Final    AST 12/10/2021 17  5 - 45 U/L Final    Specimen collection should occur prior to Sulfasalazine administration due to the potential for falsely depressed results   ALT 12/10/2021 21  12 - 78 U/L Final    Specimen collection should occur prior to Sulfasalazine and/or Sulfapyridine administration due to the potential for falsely depressed results   Alkaline Phosphatase 12/10/2021 94  46 - 116 U/L Final    Total Protein 12/10/2021 8 1  6 4 - 8 2 g/dL Final    Albumin 12/10/2021 4 2  3 5 - 5 0 g/dL Final    Total Bilirubin 12/10/2021 0 48  0 20 - 1 00 mg/dL Final    Use of this assay is not recommended for patients undergoing treatment with eltrombopag due to the potential for falsely elevated results   eGFR 12/10/2021 94  ml/min/1 73sq m Final    Ferritin 12/10/2021 5* 8 - 388 ng/mL Final    Folate 12/10/2021 14 6  3 1 - 17 5 ng/mL Final    Iron Saturation 12/10/2021 4* 20 - 50 % Final    TIBC 12/10/2021 513* 250 - 450 ug/dL Final    Iron 12/10/2021 22* 65 - 175 ug/dL Final    Patients treated with metal-binding drugs (ie  Deferoxamine) may have depressed iron values  Please note: This report has been generated by a voice recognition software system  Therefore there may be syntax, spelling, and/or grammatical errors  Please call if you have any questions

## 2022-01-27 ENCOUNTER — OFFICE VISIT (OUTPATIENT)
Dept: PHYSICAL THERAPY | Facility: CLINIC | Age: 62
End: 2022-01-27
Payer: OTHER MISCELLANEOUS

## 2022-01-27 DIAGNOSIS — G89.29 CHRONIC RIGHT SHOULDER PAIN: ICD-10-CM

## 2022-01-27 DIAGNOSIS — M25.511 CHRONIC RIGHT SHOULDER PAIN: ICD-10-CM

## 2022-01-27 DIAGNOSIS — Z98.890 S/P SHOULDER SURGERY: Primary | ICD-10-CM

## 2022-01-27 PROCEDURE — 97110 THERAPEUTIC EXERCISES: CPT

## 2022-01-27 PROCEDURE — 97112 NEUROMUSCULAR REEDUCATION: CPT

## 2022-01-27 PROCEDURE — 97140 MANUAL THERAPY 1/> REGIONS: CPT

## 2022-01-27 NOTE — PROGRESS NOTES
Daily Note     Today's date: 2022  Patient name: Keith Ballard  : 1960  MRN: [id number]  Referring provider: Roderick Isabel MD  Dx:   Encounter Diagnosis     ICD-10-CM    1  S/P shoulder surgery  Z98 890    2  Chronic right shoulder pain  M25 511     G89 29                   Subjective: Pt is anxious to have his F/u on Monday  His shoulder does feel better  Objective: See treatment diary below      Assessment: Tolerated treatment well  Patient demonstrated fatigue post treatment, exhibited good technique with therapeutic exercises and would benefit from continued PT      Plan: Continue per plan of care  Progress treatment as tolerated         Precautions: hold on weight lifting 2* to abdominal pain (possible hernia)  POC exp 21  Manuals    R shoulder stretching  JM TM AS KG TM                           Neuro Re-Ed        Supine scap punches        TB rows        TB pull downs                                Ther Ex         rpm  x5'fwd/5'retro 120 rpm x5'fwd/5' retro 120 rpm x5'fwd/5' retro 120 rpm  x5'fwd/5'retro 120 rpm  x5'fwd/5'retro   Pulleys Flex/Scap   10" x10 ea Flex/Scap   10" x10 ea Flex/Scap   10" x10 ea Flex/Scap   10" x10 ea Flex/Scap   10" x10 ea   AAROM finger ladder Flex 5"x10 Flex 5" x10 Flex 5" x10 Flex 5" x10 Flex 5" x10   TB IR Green 30x Green 30x Green 30x Green x30 Green x30   TB ER Green 30x Green 30x Green 30x Green x30 Green x30   Push up plus @ wall                                 Modalities        CP post prn

## 2022-01-28 ENCOUNTER — OFFICE VISIT (OUTPATIENT)
Dept: PHYSICAL THERAPY | Facility: CLINIC | Age: 62
End: 2022-01-28
Payer: OTHER MISCELLANEOUS

## 2022-01-28 DIAGNOSIS — G89.29 CHRONIC RIGHT SHOULDER PAIN: ICD-10-CM

## 2022-01-28 DIAGNOSIS — M25.511 CHRONIC RIGHT SHOULDER PAIN: ICD-10-CM

## 2022-01-28 DIAGNOSIS — Z98.890 S/P SHOULDER SURGERY: Primary | ICD-10-CM

## 2022-01-28 PROCEDURE — 97110 THERAPEUTIC EXERCISES: CPT

## 2022-01-28 PROCEDURE — 97112 NEUROMUSCULAR REEDUCATION: CPT

## 2022-01-28 PROCEDURE — 97140 MANUAL THERAPY 1/> REGIONS: CPT

## 2022-01-28 NOTE — PROGRESS NOTES
Daily Note     Today's date: 2022  Patient name: Bishop Small  : 1960  MRN: [id number]  Referring provider: Alesha Hair MD  Dx:   Encounter Diagnosis     ICD-10-CM    1  S/P shoulder surgery  Z98 890    2  Chronic right shoulder pain  M25 511     G89 29                   Subjective: Pt is to have a f/u with his ortho on Monday  He continues to have weakness of the RUE  Objective: See treatment diary below      Assessment: Continued to follow a modified program with no lifting secondary to abdominal symptoms  Tolerated treatment well  Patient demonstrated fatigue post treatment, exhibited good technique with therapeutic exercises and would benefit from continued PT  Plan: Continue per plan of care  Progress treatment as tolerated         Precautions: hold on weight lifting 2* to abdominal pain (possible hernia)  POC exp 21  Manuals    R shoulder stretching  TM TM AS KG TM                           Neuro Re-Ed        Supine scap punches        TB rows        TB pull downs                                Ther Ex         rpm  x5'fwd/5'retro 120 rpm x5'fwd/5' retro 120 rpm x5'fwd/5' retro 120 rpm  x5'fwd/5'retro 120 rpm  x5'fwd/5'retro   Pulleys Flex/Scap   10" x10 ea Flex/Scap   10" x10 ea Flex/Scap   10" x10 ea Flex/Scap   10" x10 ea Flex/Scap   10" x10 ea   AAROM finger ladder Flex 5"x10 Flex 5" x10 Flex 5" x10 Flex 5" x10 Flex 5" x10   TB IR Green 30x Green 30x Green 30x Green x30 Green x30   TB ER Green 30x Green 30x Green 30x Green x30 Green x30   Push up plus @ wall                                 Modalities        CP post prn

## 2022-02-09 ENCOUNTER — HOSPITAL ENCOUNTER (OUTPATIENT)
Dept: INFUSION CENTER | Facility: HOSPITAL | Age: 62
Discharge: HOME | End: 2022-02-09
Attending: INTERNAL MEDICINE
Payer: MEDICARE

## 2022-02-09 VITALS
OXYGEN SATURATION: 99 % | SYSTOLIC BLOOD PRESSURE: 147 MMHG | DIASTOLIC BLOOD PRESSURE: 73 MMHG | TEMPERATURE: 96.9 F | RESPIRATION RATE: 18 BRPM | HEART RATE: 69 BPM

## 2022-02-09 DIAGNOSIS — D50.8 IRON DEFICIENCY ANEMIA SECONDARY TO INADEQUATE DIETARY IRON INTAKE: Primary | ICD-10-CM

## 2022-02-09 DIAGNOSIS — E53.8 LOW VITAMIN B12 LEVEL: ICD-10-CM

## 2022-02-09 DIAGNOSIS — K91.2 POSTSURGICAL MALABSORPTION: ICD-10-CM

## 2022-02-09 DIAGNOSIS — R79.0 LOW FERRITIN: ICD-10-CM

## 2022-02-09 PROCEDURE — 96372 THER/PROPH/DIAG INJ SC/IM: CPT

## 2022-02-09 PROCEDURE — 96365 THER/PROPH/DIAG IV INF INIT: CPT

## 2022-02-09 RX ORDER — CYANOCOBALAMIN 1000 UG/ML
1000 INJECTION INTRAMUSCULAR; SUBCUTANEOUS ONCE
Status: COMPLETED | OUTPATIENT
Start: 2022-02-09 | End: 2022-02-09

## 2022-02-09 RX ORDER — CYANOCOBALAMIN 1000 UG/ML
1000 INJECTION INTRAMUSCULAR; SUBCUTANEOUS ONCE
Status: CANCELLED | OUTPATIENT
Start: 2022-02-16 | End: 2022-02-16

## 2022-02-09 RX ORDER — SODIUM CHLORIDE 9 MG/ML
20 INJECTION, SOLUTION INTRAVENOUS ONCE
Status: COMPLETED | OUTPATIENT
Start: 2022-02-09 | End: 2022-02-09

## 2022-02-09 RX ORDER — SODIUM CHLORIDE 9 MG/ML
20 INJECTION, SOLUTION INTRAVENOUS ONCE
Status: CANCELLED | OUTPATIENT
Start: 2022-02-16

## 2022-02-09 RX ADMIN — CYANOCOBALAMIN 1000 MCG: 1000 INJECTION, SOLUTION INTRAMUSCULAR at 10:41

## 2022-02-09 RX ADMIN — SODIUM CHLORIDE 20 ML/HR: 0.9 INJECTION, SOLUTION INTRAVENOUS at 08:56

## 2022-02-09 RX ADMIN — IRON SUCROSE 300 MG: 20 INJECTION, SOLUTION INTRAVENOUS at 09:11

## 2022-02-09 NOTE — PROGRESS NOTES
Pt offers no complaints  Tolerated vit b12 injection and venofer infusion well without incident  Discharged in stable condition and next infusion appointment confirmed  AVS declined

## 2022-02-16 ENCOUNTER — HOSPITAL ENCOUNTER (OUTPATIENT)
Dept: INFUSION CENTER | Facility: HOSPITAL | Age: 62
Discharge: HOME | End: 2022-02-16
Attending: INTERNAL MEDICINE
Payer: MEDICARE

## 2022-02-16 VITALS
TEMPERATURE: 97.6 F | RESPIRATION RATE: 18 BRPM | SYSTOLIC BLOOD PRESSURE: 145 MMHG | HEART RATE: 85 BPM | OXYGEN SATURATION: 99 % | DIASTOLIC BLOOD PRESSURE: 75 MMHG

## 2022-02-16 DIAGNOSIS — D50.8 IRON DEFICIENCY ANEMIA SECONDARY TO INADEQUATE DIETARY IRON INTAKE: Primary | ICD-10-CM

## 2022-02-16 DIAGNOSIS — E53.8 LOW VITAMIN B12 LEVEL: ICD-10-CM

## 2022-02-16 DIAGNOSIS — R79.0 LOW FERRITIN: ICD-10-CM

## 2022-02-16 DIAGNOSIS — K91.2 POSTSURGICAL MALABSORPTION: ICD-10-CM

## 2022-02-16 PROCEDURE — 96366 THER/PROPH/DIAG IV INF ADDON: CPT

## 2022-02-16 PROCEDURE — 96365 THER/PROPH/DIAG IV INF INIT: CPT

## 2022-02-16 PROCEDURE — 96372 THER/PROPH/DIAG INJ SC/IM: CPT

## 2022-02-16 RX ORDER — SODIUM CHLORIDE 9 MG/ML
20 INJECTION, SOLUTION INTRAVENOUS ONCE
Status: COMPLETED | OUTPATIENT
Start: 2022-02-16 | End: 2022-02-16

## 2022-02-16 RX ORDER — SODIUM CHLORIDE 9 MG/ML
20 INJECTION, SOLUTION INTRAVENOUS ONCE
Status: CANCELLED | OUTPATIENT
Start: 2022-02-23

## 2022-02-16 RX ORDER — CYANOCOBALAMIN 1000 UG/ML
1000 INJECTION INTRAMUSCULAR; SUBCUTANEOUS ONCE
Status: CANCELLED | OUTPATIENT
Start: 2022-02-23 | End: 2022-02-23

## 2022-02-16 RX ORDER — CYANOCOBALAMIN 1000 UG/ML
1000 INJECTION INTRAMUSCULAR; SUBCUTANEOUS ONCE
Status: COMPLETED | OUTPATIENT
Start: 2022-02-16 | End: 2022-02-16

## 2022-02-16 RX ADMIN — CYANOCOBALAMIN 1000 MCG: 1000 INJECTION, SOLUTION INTRAMUSCULAR at 11:48

## 2022-02-16 RX ADMIN — SODIUM CHLORIDE 20 ML/HR: 0.9 INJECTION, SOLUTION INTRAVENOUS at 09:40

## 2022-02-16 RX ADMIN — IRON SUCROSE 300 MG: 20 INJECTION, SOLUTION INTRAVENOUS at 10:03

## 2022-02-16 NOTE — PROGRESS NOTES
Patient tolerated venofer infusion without issue  B12 given in right deltoid   Patient discharged in stable condition with AVS

## 2022-02-23 ENCOUNTER — HOSPITAL ENCOUNTER (OUTPATIENT)
Dept: INFUSION CENTER | Facility: HOSPITAL | Age: 62
Discharge: HOME | End: 2022-02-23
Attending: INTERNAL MEDICINE
Payer: MEDICARE

## 2022-02-23 VITALS
TEMPERATURE: 96.3 F | RESPIRATION RATE: 16 BRPM | DIASTOLIC BLOOD PRESSURE: 67 MMHG | SYSTOLIC BLOOD PRESSURE: 100 MMHG | HEART RATE: 85 BPM

## 2022-02-23 DIAGNOSIS — D50.8 IRON DEFICIENCY ANEMIA SECONDARY TO INADEQUATE DIETARY IRON INTAKE: Primary | ICD-10-CM

## 2022-02-23 DIAGNOSIS — E53.8 LOW VITAMIN B12 LEVEL: ICD-10-CM

## 2022-02-23 DIAGNOSIS — R79.0 LOW FERRITIN: ICD-10-CM

## 2022-02-23 DIAGNOSIS — K91.2 POSTSURGICAL MALABSORPTION: ICD-10-CM

## 2022-02-23 PROCEDURE — 96365 THER/PROPH/DIAG IV INF INIT: CPT

## 2022-02-23 PROCEDURE — 96366 THER/PROPH/DIAG IV INF ADDON: CPT

## 2022-02-23 PROCEDURE — 96372 THER/PROPH/DIAG INJ SC/IM: CPT

## 2022-02-23 RX ORDER — CYANOCOBALAMIN 1000 UG/ML
1000 INJECTION INTRAMUSCULAR; SUBCUTANEOUS ONCE
Status: CANCELLED | OUTPATIENT
Start: 2022-03-02 | End: 2022-03-02

## 2022-02-23 RX ORDER — CYANOCOBALAMIN 1000 UG/ML
1000 INJECTION INTRAMUSCULAR; SUBCUTANEOUS ONCE
Status: COMPLETED | OUTPATIENT
Start: 2022-02-23 | End: 2022-02-23

## 2022-02-23 RX ORDER — SODIUM CHLORIDE 9 MG/ML
20 INJECTION, SOLUTION INTRAVENOUS ONCE
Status: COMPLETED | OUTPATIENT
Start: 2022-02-23 | End: 2022-02-23

## 2022-02-23 RX ORDER — SODIUM CHLORIDE 9 MG/ML
20 INJECTION, SOLUTION INTRAVENOUS ONCE
Status: CANCELLED | OUTPATIENT
Start: 2022-03-02

## 2022-02-23 RX ADMIN — IRON SUCROSE 300 MG: 20 INJECTION, SOLUTION INTRAVENOUS at 10:55

## 2022-02-23 RX ADMIN — CYANOCOBALAMIN 1000 MCG: 1000 INJECTION, SOLUTION INTRAMUSCULAR at 10:34

## 2022-02-23 RX ADMIN — SODIUM CHLORIDE 20 ML/HR: 0.9 INJECTION, SOLUTION INTRAVENOUS at 10:27

## 2022-02-23 NOTE — PROGRESS NOTES
Patient tolerated IV Venofer without reaction or issues  B12 injection given in right deltoid  Patient tolerated well  AVS declined  Patient ambulated off unit without incident  All personal belongings taken with patient

## 2022-06-08 ENCOUNTER — HOSPITAL ENCOUNTER (OUTPATIENT)
Dept: CT IMAGING | Facility: HOSPITAL | Age: 62
Discharge: HOME | End: 2022-06-08
Payer: MEDICARE

## 2022-06-08 DIAGNOSIS — Z85.118 HISTORY OF LUNG CANCER: ICD-10-CM

## 2022-06-08 PROCEDURE — 71250 CT THORAX DX C-: CPT

## 2022-06-20 ENCOUNTER — TELEPHONE (OUTPATIENT)
Dept: CARDIAC SURGERY | Facility: CLINIC | Age: 62
End: 2022-06-20

## 2022-06-20 NOTE — TELEPHONE ENCOUNTER
Spoke with patients wife about rescheduling his missed appt with Thoracic Surgery  appt time and date confirmed

## 2022-06-27 NOTE — ASSESSMENT & PLAN NOTE
Lab Results   Component Value Date    HGBA1C 5 1 07/13/2018     Recent Labs     08/14/19  1025   POCGLU 141*     · Diet controlled diabetes  · Accucheks within limits    · Monitor blood sugars while on TPN Verbal

## 2022-07-08 ENCOUNTER — TELEPHONE (OUTPATIENT)
Dept: INTERNAL MEDICINE CLINIC | Facility: CLINIC | Age: 62
End: 2022-07-08

## 2022-07-08 NOTE — TELEPHONE ENCOUNTER
PT's wife called  Both her and her son have tested positive for covid  Their symptoms started Wednesday and they tested positive this morning  The PT has no symptoms but the [place] [employer] Health advised the pt that due to his health conditions he should get a PCR test  The Pt's wife just wants to know what your recommendation is  Please advise

## 2022-07-10 NOTE — TELEPHONE ENCOUNTER
Since he is fully vaccinated he should only test if symptoms develop   Make sure mom and son quarantine from him

## 2022-07-11 ENCOUNTER — OFFICE VISIT (OUTPATIENT)
Dept: CARDIAC SURGERY | Facility: CLINIC | Age: 62
End: 2022-07-11
Payer: MEDICARE

## 2022-07-11 VITALS
DIASTOLIC BLOOD PRESSURE: 74 MMHG | BODY MASS INDEX: 17.68 KG/M2 | WEIGHT: 116.62 LBS | TEMPERATURE: 97.4 F | SYSTOLIC BLOOD PRESSURE: 120 MMHG | OXYGEN SATURATION: 99 % | HEIGHT: 68 IN | RESPIRATION RATE: 17 BRPM | HEART RATE: 78 BPM

## 2022-07-11 DIAGNOSIS — K91.850 POUCHITIS (HCC): ICD-10-CM

## 2022-07-11 DIAGNOSIS — C34.92 ADENOCARCINOMA OF LEFT LUNG (HCC): Primary | ICD-10-CM

## 2022-07-11 DIAGNOSIS — J43.8 OTHER EMPHYSEMA (HCC): ICD-10-CM

## 2022-07-11 PROCEDURE — 99213 OFFICE O/P EST LOW 20 MIN: CPT | Performed by: PHYSICIAN ASSISTANT

## 2022-07-11 NOTE — ASSESSMENT & PLAN NOTE
Mr Wing Pierre is 10 years out from left lower lobectomy and adjuvant chemotherapy for Stage IIA adenocarcinoma of the lung  He is doing very well and his CT scan was personally reviewed in PACs  There is no evidence of recurrent lung cancer  He will return in one year with a repeat CT scan for continued lung cancer surveillance

## 2022-07-11 NOTE — PROGRESS NOTES
Thoracic Follow-Up  Assessment/Plan:    Adenocarcinoma of left lung [facility])  Mr Mireya Quintana is 10 years out from left lower lobectomy and adjuvant chemotherapy for Stage IIA adenocarcinoma of the lung  He is doing very well and his CT scan was personally reviewed in PACs  There is no evidence of recurrent lung cancer  He will return in one year with a repeat CT scan for continued lung cancer surveillance  Diagnoses and all orders for this visit:    Adenocarcinoma of left lung ([facility] [address] )  -     CT chest wo contrast; Future    Pouchitis ([facility] [address] )    Other emphysema ([facility] [address] )          Thoracic History     Problem:1  Stage IIA non-small cell lung carcinoma of left lower lobe  2  Stable right middle lobe pulmonary nodule  3  Left pleural effusion  4  GERD  Procedures: 1  Flexible bronchoscopy, cervical mediastinoscopy, left thoracoscopic lower lobectomy and mediastinal lymph node dissection performed on May 22, 2012   2  Left thoracentesis performed by interventional radiology on April 5, 2013  Pathology: 1  Left lower lobe consistent with poorly differentiated adenocarcinoma, tumor measures 6 8 cm in greatest dimension  Seventh edition AJCC tumor stage IIA (T2b, N0, M0)  2  Cytology from left pleural fluid is negative for malignancy  Adjuvant Therapy: The patient completed 4 cycles of adjuvant chemotherapy under the direction of Dr Justin Aguilar  He was included in the ECOG 1505 trial and randomized to the Avastin arm  He received 3 cycles of cisplatin, Alimta, and Avastin and is fourth cycle was just Alimta and Cisplatin secondary to development of bilateral pulmonary emboli and discontinuation of his Avastin as recommended by the trial      Cancer Staging  Adenocarcinoma of left lung [facility])  Staging form: Lung, AJCC 8th Edition  - Pathologic stage from 5/22/2012: Stage IIA (ypT2b, pN0, cM0) - Signed by Ana Medina PA-C on 7/11/2022  Histopathologic type:  Adenocarcinoma in situ, NOS  Stage prefix: Post-therapy       Subjective: Patient ID: Konstantin West is a 62 y o  male  HPI   Mr Rashmi Ann is a 62year old man with a history of Stage IIA adenocarcinoma of the left lower lobe status post left thoracoscopic lower lobectomy 5/22/12  He completed adjuvant chemotherapy under the direction of Dr Melvin Lomeli  Recent chest CT 6/8/22 demonstrates mild to moderate emphysema with some scarring  There are no new pulmonary nodules or enlarged mediastinal or hilar lymph nodes  On discussion, he has some shortness of breath on exertion which is stable  His weight fluctuates  Otherwise ROS negative  His granddaughter's name is Wilmer Simpson, and he just had a pool put in at home  The following portions of the patient's history were reviewed and updated as appropriate: allergies, current medications, past family history, past medical history, past social history, past surgical history and problem list     Review of Systems   Constitutional: Negative  Eyes: Negative  Respiratory: Positive for shortness of breath  Negative for cough  Cardiovascular: Negative  Gastrointestinal: Negative  Musculoskeletal: Negative  Skin: Negative  Neurological: Negative  Hematological: Negative  Psychiatric/Behavioral: Negative  Objective:   Physical Exam  Vitals reviewed  Constitutional:       General: He is not in acute distress  Appearance: Normal appearance  He is well-developed  He is not diaphoretic  HENT:      Head: Normocephalic and atraumatic  Mouth/Throat:      Comments: Masked   Eyes:      General: No scleral icterus  Extraocular Movements: Extraocular movements intact  Neck:      Trachea: No tracheal deviation  Cardiovascular:      Rate and Rhythm: Normal rate and regular rhythm  Pulses: Normal pulses  Heart sounds: Normal heart sounds  No murmur heard  Pulmonary:      Effort: Pulmonary effort is normal  No respiratory distress  Breath sounds: Normal breath sounds  No wheezing     Abdominal: General: Bowel sounds are normal  There is no distension  Palpations: Abdomen is soft  Musculoskeletal:         General: Normal range of motion  Cervical back: Normal range of motion and neck supple  Right lower leg: No edema  Left lower leg: No edema  Lymphadenopathy:      Cervical: No cervical adenopathy  Skin:     General: Skin is warm and dry  Findings: No erythema  Neurological:      Mental Status: He is alert and oriented to person, place, and time  Cranial Nerves: No cranial nerve deficit  Psychiatric:         Mood and Affect: Mood normal          Behavior: Behavior normal          Thought Content: Thought content normal      /74 (BP Location: Left arm, Patient Position: Sitting, Cuff Size: Adult)   Pulse 78   Temp (!) 97 4 °F (36 3 °C)   Resp 17   Ht 5' 8" (1 727 m)   Wt 52 9 kg (116 lb 10 oz)   SpO2 99%   BMI 17 73 kg/m²        CT chest wo contrast    Result Date: 6/11/2022  Narrative CT CHEST WITHOUT IV CONTRAST INDICATION:   Z85 118: Personal history of other malignant neoplasm of bronchus and lung  History of lung cancer  COMPARISON:  CT dated 6/8/2021 TECHNIQUE: CT examination of the chest was performed without intravenous contrast  This examination was performed without intravenous contrast in the context of the critical nationwide Omnipaque shortage  Axial, sagittal, and coronal 2D reformatted images were created from the source data and submitted for interpretation  Radiation dose length product (DLP) for this visit:  164 3 mGy-cm   This examination, like all CT scans performed in the [facility], was performed utilizing techniques to minimize radiation dose exposure, including the use of iterative reconstruction and automated exposure control  FINDINGS: LUNGS:  Status post left lower lobectomy  Central airways are otherwise unremarkable  There is mild to moderate emphysema    There is subsegmental bibasilar atelectasis/scarring  There is biapical pleural parenchymal scarring  No suspicious nodules or  masses  PLEURA:  Unremarkable  HEART/GREAT VESSELS: Heart is unremarkable for patient's age  No thoracic aortic aneurysm  MEDIASTINUM AND RORO:  Unremarkable  CHEST WALL AND LOWER NECK:  Unremarkable  VISUALIZED STRUCTURES IN THE UPPER ABDOMEN:  Postoperative changes in the upper abdomen  There is a punctate calculus in the partially visualized left kidney  OSSEOUS STRUCTURES:  No acute fracture or destructive osseous lesion  There is an anterior fixation of the lower cervical spine, partially visualized       Impression No evidence of recurrent disease in the chest  Workstation performed: [id number]

## 2022-07-27 ENCOUNTER — TELEPHONE (OUTPATIENT)
Dept: HEMATOLOGY ONCOLOGY | Facility: CLINIC | Age: 62
End: 2022-07-27

## 2022-07-27 NOTE — TELEPHONE ENCOUNTER
Called patient and left a voice message stating that beings he didn't have his labs collected before his appointment- that this appointment for 7/28 will need to be rescheduled as some of the labs that were ordered do take a couple days to come back  Mentioned that if patient went else where for labs to please give us a call back  Also stated that if he did not have the labs done to call us back so that we can reschedule his appointment

## 2023-02-20 ENCOUNTER — OFFICE VISIT (OUTPATIENT)
Dept: INTERNAL MEDICINE CLINIC | Facility: CLINIC | Age: 63
End: 2023-02-20

## 2023-02-20 ENCOUNTER — APPOINTMENT (OUTPATIENT)
Dept: LAB | Facility: CLINIC | Age: 63
End: 2023-02-20

## 2023-02-20 VITALS
DIASTOLIC BLOOD PRESSURE: 60 MMHG | HEIGHT: 68 IN | WEIGHT: 124.5 LBS | BODY MASS INDEX: 18.87 KG/M2 | TEMPERATURE: 97.7 F | SYSTOLIC BLOOD PRESSURE: 120 MMHG | OXYGEN SATURATION: 96 % | HEART RATE: 82 BPM

## 2023-02-20 DIAGNOSIS — R79.89 OTHER SPECIFIED ABNORMAL FINDINGS OF BLOOD CHEMISTRY: ICD-10-CM

## 2023-02-20 DIAGNOSIS — Z13.0 SCREENING FOR DEFICIENCY ANEMIA: ICD-10-CM

## 2023-02-20 DIAGNOSIS — Z13.29 SCREENING FOR THYROID DISORDER: ICD-10-CM

## 2023-02-20 DIAGNOSIS — Z12.5 SCREENING PSA (PROSTATE SPECIFIC ANTIGEN): ICD-10-CM

## 2023-02-20 DIAGNOSIS — Z98.84 BARIATRIC SURGERY STATUS: ICD-10-CM

## 2023-02-20 DIAGNOSIS — G43.009 MIGRAINE WITHOUT AURA AND WITHOUT STATUS MIGRAINOSUS, NOT INTRACTABLE: Primary | ICD-10-CM

## 2023-02-20 DIAGNOSIS — E78.5 DYSLIPIDEMIA: ICD-10-CM

## 2023-02-20 DIAGNOSIS — K90.89 OTHER INTESTINAL MALABSORPTION: ICD-10-CM

## 2023-02-20 DIAGNOSIS — Z13.1 SCREENING FOR DIABETES MELLITUS: ICD-10-CM

## 2023-02-20 DIAGNOSIS — I10 ESSENTIAL HYPERTENSION: ICD-10-CM

## 2023-02-20 DIAGNOSIS — Z00.00 MEDICARE ANNUAL WELLNESS VISIT, SUBSEQUENT: ICD-10-CM

## 2023-02-20 DIAGNOSIS — E55.9 VITAMIN D DEFICIENCY: ICD-10-CM

## 2023-02-20 DIAGNOSIS — C34.92 ADENOCARCINOMA OF LEFT LUNG (HCC): ICD-10-CM

## 2023-02-20 PROBLEM — E66.01 MORBID OBESITY (HCC): Status: ACTIVE | Noted: 2023-02-20

## 2023-02-20 LAB
25(OH)D3 SERPL-MCNC: 14.8 NG/ML (ref 30–100)
ALBUMIN SERPL BCP-MCNC: 3.9 G/DL (ref 3.5–5)
ALP SERPL-CCNC: 87 U/L (ref 46–116)
ALT SERPL W P-5'-P-CCNC: 22 U/L (ref 12–78)
ANION GAP SERPL CALCULATED.3IONS-SCNC: 4 MMOL/L (ref 4–13)
AST SERPL W P-5'-P-CCNC: 17 U/L (ref 5–45)
BASOPHILS # BLD AUTO: 0.08 THOUSANDS/ÂΜL (ref 0–0.1)
BASOPHILS NFR BLD AUTO: 1 % (ref 0–1)
BILIRUB SERPL-MCNC: 0.41 MG/DL (ref 0.2–1)
BUN SERPL-MCNC: 11 MG/DL (ref 5–25)
CALCIUM SERPL-MCNC: 9.3 MG/DL (ref 8.3–10.1)
CHLORIDE SERPL-SCNC: 107 MMOL/L (ref 96–108)
CHOLEST SERPL-MCNC: 185 MG/DL
CO2 SERPL-SCNC: 27 MMOL/L (ref 21–32)
CREAT SERPL-MCNC: 0.85 MG/DL (ref 0.6–1.3)
EOSINOPHIL # BLD AUTO: 0.07 THOUSAND/ÂΜL (ref 0–0.61)
EOSINOPHIL NFR BLD AUTO: 1 % (ref 0–6)
ERYTHROCYTE [DISTWIDTH] IN BLOOD BY AUTOMATED COUNT: 12.3 % (ref 11.6–15.1)
FERRITIN SERPL-MCNC: 13 NG/ML (ref 8–388)
GFR SERPL CREATININE-BSD FRML MDRD: 94 ML/MIN/1.73SQ M
GLUCOSE P FAST SERPL-MCNC: 75 MG/DL (ref 65–99)
HCT VFR BLD AUTO: 45 % (ref 36.5–49.3)
HDLC SERPL-MCNC: 47 MG/DL
HGB BLD-MCNC: 14.7 G/DL (ref 12–17)
IMM GRANULOCYTES # BLD AUTO: 0.03 THOUSAND/UL (ref 0–0.2)
IMM GRANULOCYTES NFR BLD AUTO: 0 % (ref 0–2)
IRON SATN MFR SERPL: 19 % (ref 20–50)
IRON SERPL-MCNC: 76 UG/DL (ref 65–175)
LDLC SERPL CALC-MCNC: 125 MG/DL (ref 0–100)
LYMPHOCYTES # BLD AUTO: 1.82 THOUSANDS/ÂΜL (ref 0.6–4.47)
LYMPHOCYTES NFR BLD AUTO: 25 % (ref 14–44)
MCH RBC QN AUTO: 29.8 PG (ref 26.8–34.3)
MCHC RBC AUTO-ENTMCNC: 32.7 G/DL (ref 31.4–37.4)
MCV RBC AUTO: 91 FL (ref 82–98)
MONOCYTES # BLD AUTO: 0.61 THOUSAND/ÂΜL (ref 0.17–1.22)
MONOCYTES NFR BLD AUTO: 8 % (ref 4–12)
NEUTROPHILS # BLD AUTO: 4.72 THOUSANDS/ÂΜL (ref 1.85–7.62)
NEUTS SEG NFR BLD AUTO: 65 % (ref 43–75)
NONHDLC SERPL-MCNC: 138 MG/DL
NRBC BLD AUTO-RTO: 0 /100 WBCS
PLATELET # BLD AUTO: 355 THOUSANDS/UL (ref 149–390)
PMV BLD AUTO: 9.8 FL (ref 8.9–12.7)
POTASSIUM SERPL-SCNC: 5.3 MMOL/L (ref 3.5–5.3)
PROT SERPL-MCNC: 7.7 G/DL (ref 6.4–8.4)
PSA SERPL-MCNC: 1.8 NG/ML (ref 0–4)
RBC # BLD AUTO: 4.94 MILLION/UL (ref 3.88–5.62)
SODIUM SERPL-SCNC: 138 MMOL/L (ref 135–147)
TIBC SERPL-MCNC: 394 UG/DL (ref 250–450)
TRIGL SERPL-MCNC: 67 MG/DL
TSH SERPL DL<=0.05 MIU/L-ACNC: 0.96 UIU/ML (ref 0.45–4.5)
VIT B12 SERPL-MCNC: 396 PG/ML (ref 100–900)
WBC # BLD AUTO: 7.33 THOUSAND/UL (ref 4.31–10.16)

## 2023-02-20 NOTE — PROGRESS NOTES
Assessment and Plan:     Problem List Items Addressed This Visit        Respiratory    Adenocarcinoma of left lung [facility])       Cardiovascular and Mediastinum    Essential hypertension     Normal without medications  Form completed for CDL  Migraine without aura and without status migrainosus, not intractable - Primary     Resolved since pt underwent cervical spine surgery  Form completed for CDL  Other    Bariatric surgery status    Relevant Orders    Vitamin B12    Vitamin B1, whole blood    Iron Panel (Includes Ferritin, Iron Sat%, Iron, and TIBC)    Dyslipidemia    Relevant Orders    Lipid panel (Completed)    Vitamin D deficiency    Relevant Orders    Vitamin D 25 hydroxy (Completed)   Other Visit Diagnoses     Medicare annual wellness visit, subsequent        Screening PSA (prostate specific antigen)        Relevant Orders    PSA, Total Screen (Completed)    Screening for deficiency anemia        Relevant Orders    CBC and differential (Completed)    Screening for thyroid disorder        Relevant Orders    TSH, 3rd generation (Completed)    Screening for diabetes mellitus        Relevant Orders    Comprehensive metabolic panel    Other specified abnormal findings of blood chemistry        Relevant Orders    Iron Panel (Includes Ferritin, Iron Sat%, Iron, and TIBC)    Other intestinal malabsorption        Relevant Orders    Vitamin B12          Depression Screening and Follow-up Plan: Patient was screened for depression during today's encounter  They screened negative with a PHQ-2 score of 0  Tobacco Cessation Counseling: Tobacco cessation counseling was provided  The patient is sincerely urged to quit consumption of tobacco  He is not ready to quit tobacco        Preventive health issues were discussed with patient, and age appropriate screening tests were ordered as noted in patient's After Visit Summary    Personalized health advice and appropriate referrals for health education or preventive services given if needed, as noted in patient's After Visit Summary  History of Present Illness:     Patient presents for a Medicare Wellness Visit    Pt presents for routine visit  He needs forms completed for his CDL  His BP is normal  He has not had any migraines since his neck surgery  He has not had any issues with his breathing  He is steadily regaining weight  Overall he feels well and denies nay complaints or concerns  He is due for labs  Patient Care Team:  Magalis Moore PA-C as PCP - General (Internal Medicine)  Eric England MD as PCP - Endocrinology (Endocrinology)  Sivakumar Iqabl, MD Gaile Dess, MD Kate Gang, MD Maris Lipschutz, MD Donny Dredge, MD Roberts Rudder, MD Cari Lesches, MD Vincenzo Marie MD as Endoscopist     Review of Systems:     Review of Systems   Constitutional: Negative for chills and fever  HENT: Negative for ear pain and sore throat  Eyes: Negative for pain and visual disturbance  Respiratory: Negative for cough and shortness of breath  Cardiovascular: Negative for chest pain and palpitations  Gastrointestinal: Negative for abdominal pain and vomiting  Genitourinary: Negative for dysuria and hematuria  Musculoskeletal: Negative for arthralgias and back pain  Skin: Negative for color change and rash  Neurological: Negative for seizures and syncope  All other systems reviewed and are negative         Problem List:     Patient Active Problem List   Diagnosis   • Right scapholunate ligament tear   • History of pulmonary embolism   • Saucedo's esophagus without dysplasia   • Cervical disc disorder with radiculopathy of cervicothoracic region   • Cervical radiculopathy   • S/P repair of paraesophageal hernia   • Bariatric surgery status   • Postsurgical malabsorption   • History of lung cancer   • Dyslipidemia   • Reactive hypoglycemia   • Vitamin D deficiency   • Low vitamin B12 level   • Non-intractable vomiting with nausea   • Generalized abdominal pain   • Constipation   • Internal hernia   • Chronic marginal ulcer with perforation (HCC)   • Abnormal CT scan, lung   • Coagulopathy (HCC)   • Negative depression screening   • Adenocarcinoma of left lung (HCC)   • Pulmonary embolism, other, unspecified chronicity, unspecified whether acute cor pulmonale present (HCC)   • Chronic deep vein thrombosis (DVT) of popliteal vein of right lower extremity (HCC)   • Chronic obstructive pulmonary disease, unspecified (HCC)   • Asthma   • Essential hypertension   • Migraine without aura and without status migrainosus, not intractable   • Low ferritin   • Iron deficiency anemia secondary to inadequate dietary iron intake   • Morbid obesity (HCC)      Past Medical and Surgical History:     Past Medical History:   Diagnosis Date   • Anemia    • Anesthesia     Pt wakes up during "almost every surgery"   • Arthritis    • Asthma    • Bariatric surgery status    • Saucedo esophagus    • Cancer ([facility] [place] [address] ) 2012    Had lobectomy, lower left lung     • Cervical radiculopathy    • Chemotherapy follow-up examination    • Chronic pain    • Chronic pain disorder    • COPD (chronic obstructive pulmonary disease) (HCC)    • Diabetes mellitus (HCC)     borderline "years ago"   • Food allergy     clams   • GERD (gastroesophageal reflux disease)    • Heart murmur    • Hernia    • Hiatal hernia    • History of malignant neoplasm    • History of pneumonia 04/12/2016   • History of pulmonary embolism    • History of pulmonary embolus (PE)    • History of ulceration    • Hyperlipidemia    • Lung cancer (HCC)     left lung, radiation and chemo tx   • Migraine    • Pneumonia    • Postgastrectomy malabsorption    • Right leg DVT (HCC)    • Right scapholunate ligament tear    • Skipped heart beats    • Wears partial dentures     upper and lower     Past Surgical History:   Procedure Laterality Date   • ABDOMINAL SURGERY     • BRONCHOSCOPY     • COLONOSCOPY     • FRACTURE SURGERY femur right 1985   • GASTRIC BYPASS LAPAROSCOPIC N/A 7/12/2017    Procedure: GASTRIC DIVERSION WITH [place]-EN-Y RECONSTRUCTION WITH  SHORT 60 cm LIMB;  Surgeon: Evelyne Adames MD;  [facility];  Service: Bariatrics   • HEMORRHOID SURGERY     • HERNIA REPAIR  2019   • IR CHEST TUBE PLACEMENT  8/9/2019   • IR CHOLECYSTOSTOMY TUBE PLACEMENT  9/22/2019   • IR IMAGE GUIDED ASPIRATION / DRAINAGE  8/1/2019   • IR PICC LINE  8/9/2019   • KNEE ARTHROSCOPY Right     right shoulder   • LAPAROTOMY N/A 7/28/2019    Procedure: LAPAROTOMY EXPLORATORY, WASHOUT OF SUCUS, REPAIR OF MARGINAL ULCER PERFORATION, ABD WASHOUT, INTRAOPERATIVE EGD, GI ASSISTED ENDOSCOPIC STENT PLACEMENT;  Surgeon: Hussain Horn MD;  [facility]: [facility];  Service: Bariatrics   • LUNG LOBECTOMY Left    • LYMPHADENECTOMY  05/22/2012    Dr Karel Leary   • [id number]     • MEDIASTINOSCOPY     • [place] ARTHRD ANT INTERBODY  [address] CRV BELOW C2 N/A 10/17/2017    Procedure: C5-6, C6-7 ACDF WITH ALLOGRAFT (NEURO MONITORING); Surgeon: Ivan Martin MD;  [facility];  Service: Orthopedics   • [place] COLONOSCOPY FLX DX W/COLLJ SPEC WHEN PFRMD N/A 4/14/2017    Procedure: COLONOSCOPY;  Surgeon: Avery Caba MD;  Location: [facility];  Service: Gastroenterology   • [place] EGD TRANSORAL BIOPSY SINGLE/MULTIPLE N/A 1/9/2019    Procedure: ESOPHAGOGASTRODUODENOSCOPY (EGD); Surgeon: Evelyne Adames MD;  [facility]: [facility]; Service: Bariatrics   • [place] ESOPHAGOGASTRODUODENOSCOPY TRANSORAL DIAGNOSTIC N/A 1/11/2017    Procedure: ESOPHAGOGASTRODUODENOSCOPY (EGD); Surgeon: Avery Caba MD;  [facility];   Service: Gastroenterology   • [place] LAPS ABD PRTM&OMENTUM DX W/WO SPEC BR/WA SPX N/A 7/26/2019    Procedure: LAPAROSCOPY DIAGNOSTIC CONVERSION TO OPEN, REDUCTION AND REPAIR OF INTERNAL HERNIA, REPAIR SEROSAL TEARS;  Surgeon: Hussain Horn MD;  [facility];  Service: Bariatrics   • [place] LAPS RPR PARAESPHGL HRNA INCL FUNDPLSTY W/MESH N/A 2017    Procedure: REPAIR HERNIA PARAESOPHAGEAL  LAPAROSCOPIC;  Surgeon: Nola Aquino MD;  [facility];  Service: Bariatrics   • [place] [address] WRST SURG W/RLS TRANSVRS CARPL LIGM Right 2016    Procedure: RELEASE CARPAL TUNNEL ENDOSCOPIC;  Surgeon: Anthony Carrillo MD;  [facility];  Service: Orthopedics   • [place] TENDON SHEATH INCISION Right 2018    Procedure: RELEASE TRIGGER FINGER long and index finger;  Surgeon: Anthony Carrillo MD;  [facility];  Service: Orthopedics   • RECONSTRUCTION DISTAL RADIUS/ULNA JOINT (DRUJ) Right 2016    Procedure: WRIST SCAPHOLUNATE LIGAMENT RECONSTRUCTION WITH ECRB TENDON AUTOGRAPH , SPLINT APPLICATION ;  Surgeon: Anthony Carrillo MD;  [facility];  Service:    • SHOULDER ARTHROSCOPY Right 2021    SAD with debridement   • SHOULDER SURGERY     • TONSILLECTOMY     • UPPER GASTROINTESTINAL ENDOSCOPY        Family History:     Family History   Problem Relation Age of Onset   • Other Mother         Back disorder   • Diabetes Mother         Insulin Dependent,     • Hypertension Mother    • Heart disease Father    • Hypertension Father    • Breast cancer Sister    • Skin cancer Sister    • Breast cancer Paternal Grandmother    • Skin cancer Paternal Grandmother       Social History:     Social History     Socioeconomic History   • Marital status: /Civil Union     Spouse name: None   • Number of children: None   • Years of education: None   • Highest education level: None   Occupational History   • Occupation: DISABLED   Tobacco Use   • Smoking status: Some Days     Packs/day: 0 00     Years: 2 00     Pack years: 0 00     Types: Cigarettes     Last attempt to quit: 2017     Years since quittin 8   • Smokeless tobacco: Never   Vaping Use   • Vaping Use: Never used   Substance and Sexual Activity   • Alcohol use: Not Currently     Alcohol/week: 0 0 standard drinks     Comment: rarely   • Drug use: No   • Sexual activity: None   Other Topics Concern   • None   Social History Narrative    DISABLED         Social Determinants of Health     Financial Resource Strain: Low Risk    • Difficulty of Paying Living Expenses: Not very hard   Food Insecurity: Not on file   Transportation Needs: No Transportation Needs   • Lack of Transportation (Medical): No   • Lack of Transportation (Non-Medical): No   Physical Activity: Not on file   Stress: Not on file   Social Connections: Not on file   Intimate Partner Violence: Not on file   Housing Stability: Not on file      Medications and Allergies:     Current Outpatient Medications   Medication Sig Dispense Refill   • acetaminophen (TYLENOL 8 HOUR) 650 mg CR tablet Take 1 tablet (650 mg total) by mouth every 8 (eight) hours 15 tablet 0   • ergocalciferol (VITAMIN D2) 50,000 units Take 1 capsule (50,000 Units total) by mouth 2 (two) times a week with meals 24 capsule 0   • lansoprazole (PREVACID SOLUTAB) 30 mg disintegrating tablet Take 1 tablet (30 mg total) by mouth daily (Patient not taking: Reported on 2/20/2023) 60 tablet 5   • mirtazapine (REMERON) 15 mg tablet Take 1 tablet (15 mg total) by mouth daily at bedtime (Patient not taking: Reported on 2/20/2023) 30 tablet 5   • sucralfate (CARAFATE) 1 g tablet Take 1 tablet (1 g total) by mouth 4 (four) times a day 120 tablet 2     No current facility-administered medications for this visit       Allergies   Allergen Reactions   • Codeine Hives, Itching, Nausea Only, GI Intolerance and Vomiting   • Hydrocodone Hives and GI Intolerance   • Shellfish-Derived Products - Food Allergy Nausea Only and Vomiting      Immunizations:     Immunization History   Administered Date(s) Administered   • COVID-19 PFIZER VACCINE 0 3 ML IM 04/29/2021, 05/19/2021   • Hep A / Hep B 02/03/2004   • INFLUENZA 11/12/2015, 10/20/2016, 10/29/2018   • Influenza Quadrivalent Preservative Free 3 years and older IM 10/18/2017   • Influenza, injectable, quadrivalent, preservative free 0 5 mL 10/29/2018   • Influenza, recombinant, quadrivalent,injectable, preservative free 11/06/2019, 12/01/2021   • Influenza, seasonal, injectable 11/12/2015, 10/20/2016   • Pneumococcal Conjugate 13-Valent 08/30/2018      Health Maintenance:         Topic Date Due   • Hepatitis C Screening  Never done   • HIV Screening  Never done   • Colorectal Cancer Screening  04/14/2027         Topic Date Due   • Pneumococcal Vaccine: Pediatrics (0 to 5 Years) and At-Risk Patients (6 to 59 Years) (2 - PPSV23 if available, else PCV20) 08/30/2019   • COVID-19 Vaccine (3 - Booster for Pfizer series) 07/14/2021   • Influenza Vaccine (1) 09/01/2022      Medicare Screening Tests and Risk Assessments:     Yousif Solorzano is here for his Subsequent Wellness visit  Last Medicare Wellness visit information reviewed, patient interviewed and updates made to the record today  Health Risk Assessment:   Patient rates overall health as good  Patient feels that their physical health rating is slightly better  Patient is very satisfied with their life  Eyesight was rated as same  Hearing was rated as same  Patient feels that their emotional and mental health rating is slightly better  Patients states they are sometimes angry  Patient states they are never, rarely unusually tired/fatigued  Pain experienced in the last 7 days has been none  Patient states that he has experienced no weight loss or gain in last 6 months  Depression Screening:   PHQ-2 Score: 0      Fall Risk Screening: In the past year, patient has experienced: no history of falling in past year      Home Safety:  Patient does not have trouble with stairs inside or outside of their home  Patient has working smoke alarms and has working carbon monoxide detector  Home safety hazards include: none  Nutrition:   Current diet is Regular  Medications:   Patient is currently taking over-the-counter supplements   OTC medications include: see medication list  Patient is able to manage medications  Activities of Daily Living (ADLs)/Instrumental Activities of Daily Living (IADLs):   Walk and transfer into and out of bed and chair?: Yes  Dress and groom yourself?: Yes    Bathe or shower yourself?: Yes    Feed yourself? Yes  Do your laundry/housekeeping?: Yes  Manage your money, pay your bills and track your expenses?: Yes  Make your own meals?: Yes    Do your own shopping?: Yes    Previous Hospitalizations:   Any hospitalizations or ED visits within the last 12 months?: No      Advance Care Planning:   Living will: Yes    Durable POA for healthcare: Yes    Advanced directive: Yes      Cognitive Screening:   Provider or family/friend/caregiver concerned regarding cognition?: No    PREVENTIVE SCREENINGS      Cardiovascular Screening:    General: Screening Current      Diabetes Screening:     General: Screening Current      Colorectal Cancer Screening:     General: Screening Current      Prostate Cancer Screening:    General: Screening Current      Osteoporosis Screening:    General: Screening Not Indicated      Abdominal Aortic Aneurysm (AAA) Screening:    Risk factors include: tobacco use        Lung Cancer Screening:     General: Screening Not Indicated and History Lung Cancer      Hepatitis C Screening:    General: Screening Not Indicated    Screening, Brief Intervention, and Referral to Treatment (SBIRT)    Screening    Typical number of drinks in a week: 0    Single Item Drug Screening:  How often have you used an illegal drug (including marijuana) or a prescription medication for non-medical reasons in the past year? never    Single Item Drug Screen Score: 0  Interpretation: Negative screen for possible drug use disorder    No results found       Physical Exam:     /60 (BP Location: Left arm, Patient Position: Sitting)   Pulse 82   Temp 97 7 °F (36 5 °C)   Ht 5' 8" (1 727 m)   Wt 56 5 kg (124 lb 8 oz)   SpO2 96%   BMI 18 93 kg/m²     Physical Exam  Vitals and nursing note reviewed  Constitutional:       Appearance: He is well-developed  HENT:      Head: Normocephalic and atraumatic  Right Ear: External ear normal       Left Ear: External ear normal       Nose: Nose normal    Eyes:      Conjunctiva/sclera: Conjunctivae normal       Pupils: Pupils are equal, round, and reactive to light  Cardiovascular:      Rate and Rhythm: Normal rate and regular rhythm  Heart sounds: Normal heart sounds  Pulmonary:      Breath sounds: Normal breath sounds  Abdominal:      General: Bowel sounds are normal       Palpations: Abdomen is soft  Musculoskeletal:         General: Normal range of motion  Cervical back: Normal range of motion and neck supple  Skin:     General: Skin is warm and dry  Neurological:      Mental Status: He is alert and oriented to person, place, and time  Psychiatric:         Behavior: Behavior normal          Thought Content:  Thought content normal          Judgment: Judgment normal           Reshma Gallo PA-C

## 2023-02-20 NOTE — PATIENT INSTRUCTIONS
Medicare Preventive Visit Patient Instructions  Thank you for completing your Welcome to Medicare Visit or Medicare Annual Wellness Visit today  Your next wellness visit will be due in one year (2/21/2024)  The screening/preventive services that you may require over the next 5-10 years are detailed below  Some tests may not apply to you based off risk factors and/or age  Screening tests ordered at today's visit but not completed yet may show as past due  Also, please note that scanned in results may not display below  Preventive Screenings:  Service Recommendations Previous Testing/Comments   Colorectal Cancer Screening  · Colonoscopy    · Fecal Occult Blood Test (FOBT)/Fecal Immunochemical Test (FIT)  · Fecal DNA/Cologuard Test  · Flexible Sigmoidoscopy Age: 39-70 years old   Colonoscopy: every 10 years (May be performed more frequently if at higher risk)  OR  FOBT/FIT: every 1 year  OR  Cologuard: every 3 years  OR  Sigmoidoscopy: every 5 years  Screening may be recommended earlier than age 39 if at higher risk for colorectal cancer  Also, an individualized decision between you and your healthcare provider will decide whether screening between the ages of 74-80 would be appropriate   Colonoscopy: 04/14/2017  FOBT/FIT: Not on file  Cologuard: Not on file  Sigmoidoscopy: Not on file    Screening Current     Prostate Cancer Screening Individualized decision between patient and health care provider in men between ages of 53-78   Medicare will cover every 12 months beginning on the day after your 50th birthday PSA: 1 6 ng/mL           Hepatitis C Screening Once for adults born between 1945 and 1965  More frequently in patients at high risk for Hepatitis C Hep C Antibody: Not on file        Diabetes Screening 1-2 times per year if you're at risk for diabetes or have pre-diabetes Fasting glucose: 114 mg/dL (12/10/2021)  A1C: 4 7 % (11/4/2019)      Cholesterol Screening Once every 5 years if you don't have a lipid disorder  May order more often based on risk factors  Lipid panel: 08/19/2021  Screening Current      Other Preventive Screenings Covered by Medicare:  1  Abdominal Aortic Aneurysm (AAA) Screening: covered once if your at risk  You're considered to be at risk if you have a family history of AAA or a male between the age of 73-68 who smoking at least 100 cigarettes in your lifetime  2  Lung Cancer Screening: covers low dose CT scan once per year if you meet all of the following conditions: (1) Age 50-69; (2) No signs or symptoms of lung cancer; (3) Current smoker or have quit smoking within the last 15 years; (4) You have a tobacco smoking history of at least 20 pack years (packs per day x number of years you smoked); (5) You get a written order from a healthcare provider  3  Glaucoma Screening: covered annually if you're considered high risk: (1) You have diabetes OR (2) Family history of glaucoma OR (3)  aged 48 and older OR (3)  American aged 72 and older  3  Osteoporosis Screening: covered every 2 years if you meet one of the following conditions: (1) Have a vertebral abnormality; (2) On glucocorticoid therapy for more than 3 months; (3) Have primary hyperparathyroidism; (4) On osteoporosis medications and need to assess response to drug therapy  5  HIV Screening: covered annually if you're between the age of 12-76  Also covered annually if you are younger than 13 and older than 72 with risk factors for HIV infection  For pregnant patients, it is covered up to 3 times per pregnancy      Immunizations:  Immunization Recommendations   Influenza Vaccine Annual influenza vaccination during flu season is recommended for all persons aged >= 6 months who do not have contraindications   Pneumococcal Vaccine   * Pneumococcal conjugate vaccine = PCV13 (Prevnar 13), PCV15 (Vaxneuvance), PCV20 (Prevnar 20)  * Pneumococcal polysaccharide vaccine = PPSV23 (Pneumovax) Adults 25-60 years old: 1-3 doses may be recommended based on certain risk factors  Adults 72 years old: 1-2 doses may be recommended based off what pneumonia vaccine you previously received   Hepatitis B Vaccine 3 dose series if at intermediate or high risk (ex: diabetes, end stage renal disease, liver disease)   Tetanus (Td) Vaccine - COST NOT COVERED BY MEDICARE PART B Following completion of primary series, a booster dose should be given every 10 years to maintain immunity against tetanus  Td may also be given as tetanus wound prophylaxis  Tdap Vaccine - COST NOT COVERED BY MEDICARE PART B Recommended at least once for all adults  For pregnant patients, recommended with each pregnancy  Shingles Vaccine (Shingrix) - COST NOT COVERED BY MEDICARE PART B  2 shot series recommended in those aged 48 and above     Health Maintenance Due:      Topic Date Due   • Hepatitis C Screening  Never done   • HIV Screening  Never done   • Colorectal Cancer Screening  04/14/2027     Immunizations Due:      Topic Date Due   • Pneumococcal Vaccine: Pediatrics (0 to 5 Years) and At-Risk Patients (6 to 59 Years) (2 - PPSV23 if available, else PCV20) 08/30/2019   • COVID-19 Vaccine (3 - Booster for Pfizer series) 07/14/2021   • Influenza Vaccine (1) 09/01/2022     Advance Directives   What are advance directives? Advance directives are legal documents that state your wishes and plans for medical care  These plans are made ahead of time in case you lose your ability to make decisions for yourself  Advance directives can apply to any medical decision, such as the treatments you want, and if you want to donate organs  What are the types of advance directives? There are many types of advance directives, and each state has rules about how to use them  You may choose a combination of any of the following:  · Living will: This is a written record of the treatment you want   You can also choose which treatments you do not want, which to limit, and which to stop at a certain time  This includes surgery, medicine, IV fluid, and tube feedings  · Durable power of  for healthcare [facility]): This is a written record that states who you want to make healthcare choices for you when you are unable to make them for yourself  This person, called a proxy, is usually a family member or a friend  You may choose more than 1 proxy  · Do not resuscitate (DNR) order:  A DNR order is used in case your heart stops beating or you stop breathing  It is a request not to have certain forms of treatment, such as CPR  A DNR order may be included in other types of advance directives  · Medical directive: This covers the care that you want if you are in a coma, near death, or unable to make decisions for yourself  You can list the treatments you want for each condition  Treatment may include pain medicine, surgery, blood transfusions, dialysis, IV or tube feedings, and a ventilator (breathing machine)  · Values history: This document has questions about your views, beliefs, and how you feel and think about life  This information can help others choose the care that you would choose  Why are advance directives important? An advance directive helps you control your care  Although spoken wishes may be used, it is better to have your wishes written down  Spoken wishes can be misunderstood, or not followed  Treatments may be given even if you do not want them  An advance directive may make it easier for your family to make difficult choices about your care  Cigarette Smoking and Your Health   Risks to your health if you smoke:  Nicotine and other chemicals found in tobacco damage every cell in your body  Even if you are a light smoker, you have an increased risk for cancer, heart disease, and lung disease  If you are pregnant or have diabetes, smoking increases your risk for complications     Benefits to your health if you stop smoking:   · You decrease respiratory symptoms such as coughing, wheezing, and shortness of breath  · You reduce your risk for cancers of the lung, mouth, throat, kidney, bladder, pancreas, stomach, and cervix  If you already have cancer, you increase the benefits of chemotherapy  You also reduce your risk for cancer returning or a second cancer from developing  · You reduce your risk for heart disease, blood clots, heart attack, and stroke  · You reduce your risk for lung infections, and diseases such as pneumonia, asthma, chronic bronchitis, and emphysema  · Your circulation improves  More oxygen can be delivered to your body  If you have diabetes, you lower your risk for complications, such as kidney, artery, and eye diseases  You also lower your risk for nerve damage  Nerve damage can lead to amputations, poor vision, and blindness  · You improve your body's ability to heal and to fight infections  For more information and support to stop smoking:   · Smokefree  gov  Phone: 7- 639 - 939-7682  Web Address: [employer]   Katia Pichardomaru 2018 Information is for End User's use only and may not be sold, redistributed or otherwise used for commercial purposes   All illustrations and images included in CareNotes® are the copyrighted property of A D A M , Inc  or [address] [place] [address] [facility]

## 2023-02-21 PROBLEM — Z01.818 PREOP EXAMINATION: Status: RESOLVED | Noted: 2021-08-13 | Resolved: 2023-02-21

## 2023-02-21 PROBLEM — J98.8 CONGESTION OF RESPIRATORY TRACT: Status: RESOLVED | Noted: 2021-12-13 | Resolved: 2023-02-21

## 2023-02-21 PROBLEM — J96.01 ACUTE RESPIRATORY FAILURE WITH HYPOXIA (HCC): Status: RESOLVED | Noted: 2021-07-01 | Resolved: 2023-02-21

## 2023-02-21 PROBLEM — E43 SEVERE PROTEIN-CALORIE MALNUTRITION (HCC): Status: RESOLVED | Noted: 2019-08-04 | Resolved: 2023-02-21

## 2023-02-21 PROBLEM — R63.6 UNDERWEIGHT: Status: RESOLVED | Noted: 2020-01-03 | Resolved: 2023-02-21

## 2023-02-21 PROBLEM — Z93.1 G TUBE FEEDINGS (HCC): Status: RESOLVED | Noted: 2019-09-13 | Resolved: 2023-02-21

## 2023-02-21 PROBLEM — R10.9 ACUTE ABDOMINAL PAIN: Status: RESOLVED | Noted: 2021-12-01 | Resolved: 2023-02-21

## 2023-02-21 PROBLEM — Z78.9 ON TOTAL PARENTERAL NUTRITION (TPN): Status: RESOLVED | Noted: 2019-09-09 | Resolved: 2023-02-21

## 2023-02-21 PROBLEM — E87.8 ELECTROLYTE DISTURBANCE: Status: RESOLVED | Noted: 2019-08-04 | Resolved: 2023-02-21

## 2023-02-21 PROBLEM — R55 NEAR SYNCOPE: Status: RESOLVED | Noted: 2019-08-14 | Resolved: 2023-02-21

## 2023-02-23 LAB — VIT B1 BLD-SCNC: 130.2 NMOL/L (ref 66.5–200)

## 2023-06-22 NOTE — PLAN OF CARE
Please advise on dose adjustment of gabapentin. Already advised on referral.       Problem: Potential for Falls  Goal: Patient will remain free of falls  Description  INTERVENTIONS:  - Assess patient frequently for physical needs  -  Identify cognitive and physical deficits and behaviors that affect risk of falls  -  [place] fall precautions as indicated by assessment   - Educate patient/family on patient safety including physical limitations  - Instruct patient to call for assistance with activity based on assessment  - Modify environment to reduce risk of injury  - Consider OT/PT consult to assist with strengthening/mobility  Outcome: Progressing     Problem: Nutrition/Hydration-ADULT  Goal: Nutrient/Hydration intake appropriate for improving, restoring or maintaining nutritional needs  Description  Monitor and assess patient's nutrition/hydration status for malnutrition  Collaborate with interdisciplinary team and initiate plan and interventions as ordered  Monitor patient's weight and dietary intake as ordered or per policy  Utilize nutrition screening tool and intervene as necessary  Determine patient's food preferences and provide high-protein, high-caloric foods as appropriate       INTERVENTIONS:  - Monitor oral intake, urinary output, labs, and treatment plans  - Assess nutrition and hydration status and recommend course of action  - Evaluate amount of meals eaten  - Assist patient with eating if necessary   - Allow adequate time for meals  - Recommend/ encourage appropriate diets, oral nutritional supplements, and vitamin/mineral supplements  - Order, calculate, and assess calorie counts as needed  - Recommend, monitor, and adjust tube feedings and TPN/PPN based on assessed needs  - Assess need for intravenous fluids  - Provide specific nutrition/hydration education as appropriate  - Include patient/family/caregiver in decisions related to nutrition  Outcome: Progressing     Problem: Prexisting or High Potential for Compromised Skin Integrity  Goal: Skin integrity is maintained or improved  Description  INTERVENTIONS:  - Identify patients at risk for skin breakdown  - Assess and monitor skin integrity  - Assess and monitor nutrition and hydration status  - Monitor labs   - Assess for incontinence   - Turn and reposition patient  - Assist with mobility/ambulation  - Relieve pressure over bony prominences  - Avoid friction and shearing  - Provide appropriate hygiene as needed including keeping skin clean and dry  - Evaluate need for skin moisturizer/barrier cream  - Collaborate with interdisciplinary team   - Patient/family teaching  - Consider wound care consult   Outcome: Progressing     Problem: PAIN - ADULT  Goal: Verbalizes/displays adequate comfort level or baseline comfort level  Description  Interventions:  - Encourage patient to monitor pain and request assistance  - Assess pain using appropriate pain scale  - Administer analgesics based on type and severity of pain and evaluate response  - Implement non-pharmacological measures as appropriate and evaluate response  - Consider cultural and social influences on pain and pain management  - Notify physician/advanced practitioner if interventions unsuccessful or patient reports new pain  Outcome: Progressing     Problem: INFECTION - ADULT  Goal: Absence or prevention of progression during hospitalization  Description  INTERVENTIONS:  - Assess and monitor for signs and symptoms of infection  - Monitor lab/diagnostic results  - Monitor all insertion sites, i e  indwelling lines, tubes, and drains  - Monitor endotracheal if appropriate and nasal secretions for changes in amount and color  - [place] appropriate cooling/warming therapies per order  - Administer medications as ordered  - Instruct and encourage patient and family to use good hand hygiene technique  - Identify and instruct in appropriate isolation precautions for identified infection/condition  Outcome: Progressing     Problem: DISCHARGE PLANNING  Goal: Discharge to home or other facility with appropriate resources  Description  INTERVENTIONS:  - Identify barriers to discharge w/patient and caregiver  - Arrange for needed discharge resources and transportation as appropriate  - Identify discharge learning needs (meds, wound care, etc )  - Arrange for interpretive services to assist at discharge as needed  - Refer to Case Management Department for coordinating discharge planning if the patient needs post-hospital services based on physician/advanced practitioner order or complex needs related to functional status, cognitive ability, or social support system  Outcome: Progressing     Problem: Knowledge Deficit  Goal: Patient/family/caregiver demonstrates understanding of disease process, treatment plan, medications, and discharge instructions  Description  Complete learning assessment and assess knowledge base    Interventions:  - Provide teaching at level of understanding  - Provide teaching via preferred learning methods  Outcome: Progressing

## 2023-07-12 ENCOUNTER — TELEPHONE (OUTPATIENT)
Dept: HEMATOLOGY ONCOLOGY | Facility: CLINIC | Age: 63
End: 2023-07-12

## 2023-07-12 NOTE — TELEPHONE ENCOUNTER
LVM for pt requesting a call back to reschedule Follow-up appointment with Rasheed Pino PA-C. Pt was scheduled for 07/17/23 but he is getting his CT done 07/27/23. Pt should be seen afterwards.

## 2023-07-13 ENCOUNTER — TELEPHONE (OUTPATIENT)
Dept: CARDIAC SURGERY | Facility: CLINIC | Age: 63
End: 2023-07-13

## 2023-07-13 NOTE — TELEPHONE ENCOUNTER
2nd attempt- I called pt and LVM requesting a call back to reschedule follow up appointment with Becky Salomon PA-C. Pt is scheduled to get CT scan done 07/27/23 and should be seen afterwards. I informed pt in the voicemail that we will be canceling this appointment on 07/17/23 and he will need to call us back to reschedule for after 07/27/23.

## 2023-07-27 ENCOUNTER — HOSPITAL ENCOUNTER (OUTPATIENT)
Dept: CT IMAGING | Facility: HOSPITAL | Age: 63
End: 2023-07-27
Payer: MEDICARE

## 2023-07-27 DIAGNOSIS — C34.92 ADENOCARCINOMA OF LEFT LUNG (HCC): ICD-10-CM

## 2023-07-27 PROCEDURE — 71250 CT THORAX DX C-: CPT

## 2024-04-08 ENCOUNTER — TELEPHONE (OUTPATIENT)
Dept: INTERNAL MEDICINE CLINIC | Facility: CLINIC | Age: 64
End: 2024-04-08

## 2024-04-16 ENCOUNTER — TELEPHONE (OUTPATIENT)
Dept: INTERNAL MEDICINE CLINIC | Facility: CLINIC | Age: 64
End: 2024-04-16

## 2024-04-16 NOTE — TELEPHONE ENCOUNTER
Hi, my name is Freya Groves. D as in Robert TALLEY as in [place]. Actually have a few things I was calling to confirm Eduardo's appointment on August 12th. I just need to know the time. So my his date of birth is [birth year], my number is [phone]

## 2024-08-09 ENCOUNTER — TELEPHONE (OUTPATIENT)
Dept: ADMINISTRATIVE | Facility: OTHER | Age: 64
End: 2024-08-09

## 2024-08-09 NOTE — TELEPHONE ENCOUNTER
Upon review of the In Basket request we were able to locate, review, and update the patient chart as requested for HIV.    Any additional questions or concerns should be emailed to the Practice Liaisons via the appropriate education email address, please do not reply via In Basket.    Thank you  Luda Hernandez MA   [facility]

## 2024-08-09 NOTE — TELEPHONE ENCOUNTER
----- Message from Wei SWEENEY sent at 8/8/2024  3:39 PM EDT -----  Regarding: hiv  08/08/24 3:39 PM    Hello, our patient above has had HIV completed/performed. Please assist in updating the patient chart by pulling the Care Everywhere (CE) document. The date of service is 6/272006.     Thank you,  Wei Franco MA  [facility]

## 2024-08-12 ENCOUNTER — OFFICE VISIT (OUTPATIENT)
Dept: INTERNAL MEDICINE CLINIC | Facility: CLINIC | Age: 64
End: 2024-08-12
Payer: MEDICARE

## 2024-08-12 VITALS
DIASTOLIC BLOOD PRESSURE: 68 MMHG | SYSTOLIC BLOOD PRESSURE: 100 MMHG | BODY MASS INDEX: 18.05 KG/M2 | TEMPERATURE: 95.3 F | HEART RATE: 83 BPM | WEIGHT: 115 LBS | OXYGEN SATURATION: 97 % | HEIGHT: 67 IN

## 2024-08-12 DIAGNOSIS — I10 ESSENTIAL HYPERTENSION: ICD-10-CM

## 2024-08-12 DIAGNOSIS — Z23 ENCOUNTER FOR IMMUNIZATION: ICD-10-CM

## 2024-08-12 DIAGNOSIS — D68.9 COAGULOPATHY (HCC): ICD-10-CM

## 2024-08-12 DIAGNOSIS — Z11.4 SCREENING FOR HIV (HUMAN IMMUNODEFICIENCY VIRUS): ICD-10-CM

## 2024-08-12 DIAGNOSIS — Z13.29 SCREENING FOR THYROID DISORDER: ICD-10-CM

## 2024-08-12 DIAGNOSIS — D22.9 ATYPICAL NEVUS: ICD-10-CM

## 2024-08-12 DIAGNOSIS — Z11.59 NEED FOR HEPATITIS C SCREENING TEST: ICD-10-CM

## 2024-08-12 DIAGNOSIS — C34.92 ADENOCARCINOMA OF LEFT LUNG (HCC): ICD-10-CM

## 2024-08-12 DIAGNOSIS — Z00.00 MEDICARE ANNUAL WELLNESS VISIT, SUBSEQUENT: Primary | ICD-10-CM

## 2024-08-12 DIAGNOSIS — E55.9 VITAMIN D DEFICIENCY: ICD-10-CM

## 2024-08-12 DIAGNOSIS — Z13.0 SCREENING FOR DEFICIENCY ANEMIA: ICD-10-CM

## 2024-08-12 DIAGNOSIS — J43.8 OTHER EMPHYSEMA (HCC): ICD-10-CM

## 2024-08-12 DIAGNOSIS — E78.5 DYSLIPIDEMIA: ICD-10-CM

## 2024-08-12 DIAGNOSIS — Z12.5 SCREENING PSA (PROSTATE SPECIFIC ANTIGEN): ICD-10-CM

## 2024-08-12 PROBLEM — E46 PROTEIN-CALORIE MALNUTRITION, UNSPECIFIED SEVERITY (HCC): Status: ACTIVE | Noted: 2024-08-12

## 2024-08-12 PROCEDURE — G0009 ADMIN PNEUMOCOCCAL VACCINE: HCPCS

## 2024-08-12 PROCEDURE — 90677 PCV20 VACCINE IM: CPT

## 2024-08-12 PROCEDURE — G0439 PPPS, SUBSEQ VISIT: HCPCS | Performed by: PHYSICIAN ASSISTANT

## 2024-08-12 NOTE — PROGRESS NOTES
Ambulatory Visit  Name: Eduardo Anand      : 1960      MRN: [id number]  Encounter Provider: Reshma Gallo PA-C  Encounter Date: 2024   Encounter department: [facility]    Assessment & Plan   1. Medicare annual wellness visit, subsequent  2. Need for hepatitis C screening test  -     Hepatitis C Antibody; Future  3. Screening for HIV (human immunodeficiency virus)  4. Encounter for immunization  -     Pneumococcal Conjugate Vaccine 20-valent (Pcv20)  5. Vitamin D deficiency  -     Vitamin D 25 hydroxy; Future  6. Dyslipidemia  -     Lipid panel; Future  7. Essential hypertension  -     Comprehensive metabolic panel; Future  8. Screening for deficiency anemia  -     CBC and differential; Future  9. Screening PSA (prostate specific antigen)  -     PSA, Total Screen; Future  10. Screening for thyroid disorder  -     TSH, 3rd generation; Future  11. Adenocarcinoma of left lung (HCC)  12. Other emphysema (HCC)  13. Coagulopathy (HCC)  14. Atypical nevus  -     Ambulatory Referral to Dermatology; Future      Depression Screening and Follow-up Plan: Patient was screened for depression during today's encounter. They screened negative with a PHQ-2 score of 0.    Tobacco Cessation Counseling: Tobacco cessation counseling was provided. The patient is sincerely urged to quit consumption of tobacco. He is not ready to quit tobacco.       Preventive health issues were discussed with patient, and age appropriate screening tests were ordered as noted in patient's After Visit Summary. Personalized health advice and appropriate referrals for health education or preventive services given if needed, as noted in patient's After Visit Summary.    History of Present Illness     HPI   Patient Care Team:  Reshma Gallo PA-C as PCP - General (Internal Medicine)  Shana Hardin MD as PCP - Endocrinology (Endocrinology)  MD Cali Costa MD Yacoub Faroun, MD Stanley Ikezi, MD  Harmon   MD Elijah Araujo MD Dwithiya Thomas, MD Berhanu Geme, MD as Endoscopist    Review of Systems   Constitutional:  Negative for chills and fever.   HENT:  Negative for congestion, ear pain, hearing loss, postnasal drip, rhinorrhea, sinus pressure, sinus pain, sore throat and trouble swallowing.    Eyes:  Negative for pain and visual disturbance.   Respiratory:  Negative for cough, chest tightness, shortness of breath and wheezing.    Cardiovascular: Negative.  Negative for chest pain, palpitations and leg swelling.   Gastrointestinal:  Negative for abdominal pain, blood in stool, constipation, diarrhea, nausea and vomiting.   Endocrine: Negative for cold intolerance, heat intolerance, polydipsia, polyphagia and polyuria.   Genitourinary:  Negative for difficulty urinating, dysuria, flank pain and urgency.   Musculoskeletal:  Negative for arthralgias, back pain, gait problem and myalgias.   Skin:  Negative for rash.   Allergic/Immunologic: Negative.    Neurological:  Negative for dizziness, weakness, light-headedness and headaches.   Hematological: Negative.    Psychiatric/Behavioral:  Negative for behavioral problems, dysphoric mood and sleep disturbance. The patient is not nervous/anxious.      Medical History Reviewed by provider this encounter:       Annual Wellness Visit Questionnaire   Eduardo is here for his Subsequent Wellness visit.     Health Risk Assessment:   Patient rates overall health as fair. Patient feels that their physical health rating is slightly better. Patient is very satisfied with their life. Eyesight was rated as same. Hearing was rated as same. Patient feels that their emotional and mental health rating is same. Patients states they are sometimes angry. Patient states they are often unusually tired/fatigued. Pain experienced in the last 7 days has been a lot. Patient's pain rating has been 10/10. Patient states that he has experienced no weight loss or gain in last 6 months.      Depression Screening:   PHQ-2 Score: 0      Fall Risk Screening:   In the past year, patient has experienced: no history of falling in past year      Home Safety:  Patient does not have trouble with stairs inside or outside of their home. Patient has working smoke alarms and has working carbon monoxide detector. Home safety hazards include: none.     Nutrition:   Current diet is Regular.     Medications:   Patient is not currently taking any over-the-counter supplements. Patient is able to manage medications.     Activities of Daily Living (ADLs)/Instrumental Activities of Daily Living (IADLs):   Walk and transfer into and out of bed and chair?: Yes  Dress and groom yourself?: Yes    Bathe or shower yourself?: Yes    Feed yourself? Yes  Do your laundry/housekeeping?: Yes  Manage your money, pay your bills and track your expenses?: Yes  Make your own meals?: Yes    Do your own shopping?: Yes    Advance Care Planning:   Living will: Yes    Durable POA for healthcare: Yes    Advanced directive: Yes      Cognitive Screening:   Provider or family/friend/caregiver concerned regarding cognition?: No    PREVENTIVE SCREENINGS      Cardiovascular Screening:    General: Screening Current      Diabetes Screening:       Due for: Blood Glucose      Colorectal Cancer Screening:     General: Screening Current      Prostate Cancer Screening:      Due for: PSA      Osteoporosis Screening:    General: Screening Not Indicated      Abdominal Aortic Aneurysm (AAA) Screening:    Risk factors include: tobacco use        General: Screening Not Indicated      Lung Cancer Screening:     General: Screening Not Indicated and History Lung Cancer      Hepatitis C Screening:    General: Screening Not Indicated    Screening, Brief Intervention, and Referral to Treatment (SBIRT)    Screening  Typical number of drinks in a day: 0  Typical number of drinks in a week: 0  Interpretation: Low risk drinking behavior.    Single Item Drug  "Screening:  How often have you used an illegal drug (including marijuana) or a prescription medication for non-medical reasons in the past year? never    Single Item Drug Screen Score: 0  Interpretation: Negative screen for possible drug use disorder    Social Determinants of Health     Financial Resource Strain: Low Risk  (2/20/2023)    Overall Financial Resource Strain (CARDIA)    • Difficulty of Paying Living Expenses: Not very hard   Food Insecurity: No Food Insecurity (8/12/2024)    Hunger Vital Sign    • Worried About Running Out of Food in the Last Year: Never true    • Ran Out of Food in the Last Year: Never true   Transportation Needs: No Transportation Needs (8/12/2024)    PRAPARE - Transportation    • Lack of Transportation (Medical): No    • Lack of Transportation (Non-Medical): No   Housing Stability: Low Risk  (8/12/2024)    Housing Stability Vital Sign    • Unable to Pay for Housing in the Last Year: No    • Number of Times Moved in the Last Year: 0    • Homeless in the Last Year: No   Utilities: Not At Risk (8/12/2024)    [facility] Utilities    • Threatened with loss of utilities: No     No results found.    Objective     /68 (BP Location: Left arm, Patient Position: Sitting)   Pulse 83   Temp (!) 95.3 °F (35.2 °C) (Tympanic)   Ht 5' 7.32\" (1.71 m)   Wt 52.2 kg (115 lb)   SpO2 97%   BMI 17.84 kg/m²     Physical Exam  Vitals and nursing note reviewed.   Constitutional:       General: He is not in acute distress.     Appearance: Normal appearance. He is well-developed. He is not diaphoretic.   HENT:      Head: Normocephalic and atraumatic.      Right Ear: External ear normal.      Left Ear: External ear normal.      Nose: Nose normal.      Mouth/Throat:      Pharynx: No oropharyngeal exudate.   Eyes:      General: No scleral icterus.        Right eye: No discharge.         Left eye: No discharge.      Conjunctiva/sclera: Conjunctivae normal.      Pupils: Pupils are equal, round, and reactive to " light.   Neck:      Thyroid: No thyromegaly.   Cardiovascular:      Rate and Rhythm: Normal rate and regular rhythm.      Heart sounds: Normal heart sounds. No murmur heard.     No friction rub. No gallop.   Pulmonary:      Effort: Pulmonary effort is normal. No respiratory distress.      Breath sounds: Normal breath sounds. No wheezing or rales.   Abdominal:      General: Bowel sounds are normal. There is no distension.      Palpations: Abdomen is soft.      Tenderness: There is no abdominal tenderness.   Musculoskeletal:         General: No tenderness or deformity. Normal range of motion.      Cervical back: Normal range of motion and neck supple.   Skin:     General: Skin is warm and dry.      Comments: [employer] flesh colored growth under left eye, telangiectasias present, non tender, fluctuates in size   Neurological:      Mental Status: He is alert and oriented to person, place, and time.      Cranial Nerves: No cranial nerve deficit.   Psychiatric:         Behavior: Behavior normal.         Thought Content: Thought content normal.         Judgment: Judgment normal.       Administrative Statements   I have spent a total time of 20 minutes in caring for this patient on the day of the visit/encounter including Instructions for management, Patient and family education, Importance of tx compliance, Risk factor reductions, Impressions, Counseling / Coordination of care, Documenting in the medical record, Reviewing / ordering tests, medicine, procedures  , and Obtaining or reviewing history  .

## 2024-08-12 NOTE — PATIENT INSTRUCTIONS
Medicare Preventive Visit Patient Instructions  Thank you for completing your Welcome to Medicare Visit or Medicare Annual Wellness Visit today. Your next wellness visit will be due in one year (8/13/2025).  The screening/preventive services that you may require over the next 5-10 years are detailed below. Some tests may not apply to you based off risk factors and/or age. Screening tests ordered at today's visit but not completed yet may show as past due. Also, please note that scanned in results may not display below.  Preventive Screenings:  Service Recommendations Previous Testing/Comments   Colorectal Cancer Screening  Colonoscopy    Fecal Occult Blood Test (FOBT)/Fecal Immunochemical Test (FIT)  Fecal DNA/Cologuard Test  Flexible Sigmoidoscopy Age: 45-75 years old   Colonoscopy: every 10 years (May be performed more frequently if at higher risk)  OR  FOBT/FIT: every 1 year  OR  Cologuard: every 3 years  OR  Sigmoidoscopy: every 5 years  Screening may be recommended earlier than age 45 if at higher risk for colorectal cancer. Also, an individualized decision between you and your healthcare provider will decide whether screening between the ages of 76-85 would be appropriate. Colonoscopy: 04/14/2017  FOBT/FIT: Not on file  Cologuard: Not on file  Sigmoidoscopy: Not on file    Screening Current     Prostate Cancer Screening Individualized decision between patient and health care provider in men between ages of 55-69   Medicare will cover every 12 months beginning on the day after your 50th birthday PSA: 1.8 ng/mL           Hepatitis C Screening Once for adults born between 1945 and 1965  More frequently in patients at high risk for Hepatitis C Hep C Antibody: Not on file        Diabetes Screening 1-2 times per year if you're at risk for diabetes or have pre-diabetes Fasting glucose: 75 mg/dL (2/20/2023)  A1C: 4.7 % (11/4/2019)      Cholesterol Screening Once every 5 years if you don't have a lipid disorder. May  order more often based on risk factors. Lipid panel: 02/20/2023  Screening Current      Other Preventive Screenings Covered by Medicare:  Abdominal Aortic Aneurysm (AAA) Screening: covered once if your at risk. You're considered to be at risk if you have a family history of AAA or a male between the age of 65-75 who smoking at least 100 cigarettes in your lifetime.  Lung Cancer Screening: covers low dose CT scan once per year if you meet all of the following conditions: (1) Age 55-77; (2) No signs or symptoms of lung cancer; (3) Current smoker or have quit smoking within the last 15 years; (4) You have a tobacco smoking history of at least 20 pack years (packs per day x number of years you smoked); (5) You get a written order from a healthcare provider.  Glaucoma Screening: covered annually if you're considered high risk: (1) You have diabetes OR (2) Family history of glaucoma OR (3)  aged 50 and older OR (4)  American aged 65 and older  Osteoporosis Screening: covered every 2 years if you meet one of the following conditions: (1) Have a vertebral abnormality; (2) On glucocorticoid therapy for more than 3 months; (3) Have primary hyperparathyroidism; (4) On osteoporosis medications and need to assess response to drug therapy.  HIV Screening: covered annually if you're between the age of 15-65. Also covered annually if you are younger than 15 and older than 65 with risk factors for HIV infection. For pregnant patients, it is covered up to 3 times per pregnancy.    Immunizations:  Immunization Recommendations   Influenza Vaccine Annual influenza vaccination during flu season is recommended for all persons aged >= 6 months who do not have contraindications   Pneumococcal Vaccine   * Pneumococcal conjugate vaccine = PCV13 (Prevnar 13), PCV15 (Vaxneuvance), PCV20 (Prevnar 20)  * Pneumococcal polysaccharide vaccine = PPSV23 (Pneumovax) Adults 19-63 yo with certain risk factors or if 65+ yo  If  never received any pneumonia vaccine: recommend Prevnar 20 (PCV20)  Give PCV20 if previously received 1 dose of PCV13 or PPSV23   Hepatitis B Vaccine 3 dose series if at intermediate or high risk (ex: diabetes, end stage renal disease, liver disease)   Respiratory syncytial virus (RSV) Vaccine - COVERED BY MEDICARE PART D  * RSVPreF3 (Arexvy) CDC recommends that adults 60 years of age and older may receive a single dose of RSV vaccine using shared clinical decision-making (SCDM)   Tetanus (Td) Vaccine - COST NOT COVERED BY MEDICARE PART B Following completion of primary series, a booster dose should be given every 10 years to maintain immunity against tetanus. Td may also be given as tetanus wound prophylaxis.   Tdap Vaccine - COST NOT COVERED BY MEDICARE PART B Recommended at least once for all adults. For pregnant patients, recommended with each pregnancy.   Shingles Vaccine (Shingrix) - COST NOT COVERED BY MEDICARE PART B  2 shot series recommended in those 19 years and older who have or will have weakened immune systems or those 50 years and older     Health Maintenance Due:      Topic Date Due   • Hepatitis C Screening  Never done   • Colorectal Cancer Screening  04/14/2027   • HIV Screening  Completed     Immunizations Due:      Topic Date Due   • Pneumococcal Vaccine: Pediatrics (0 to 5 Years) and At-Risk Patients (6 to 64 Years) (2 of 2 - PPSV23 or PCV20) 10/25/2018   • COVID-19 Vaccine (3 - 2023-24 season) 09/01/2023   • Influenza Vaccine (1) 09/01/2024     Advance Directives   What are advance directives?  Advance directives are legal documents that state your wishes and plans for medical care. These plans are made ahead of time in case you lose your ability to make decisions for yourself. Advance directives can apply to any medical decision, such as the treatments you want, and if you want to donate organs.   What are the types of advance directives?  There are many types of advance directives, and each  state has rules about how to use them. You may choose a combination of any of the following:  Living will:  This is a written record of the treatment you want. You can also choose which treatments you do not want, which to limit, and which to stop at a certain time. This includes surgery, medicine, IV fluid, and tube feedings.   Durable power of  for healthcare (DPAHC):  This is a written record that states who you want to make healthcare choices for you when you are unable to make them for yourself. This person, called a proxy, is usually a family member or a friend. You may choose more than 1 proxy.  Do not resuscitate (DNR) order:  A DNR order is used in case your heart stops beating or you stop breathing. It is a request not to have certain forms of treatment, such as CPR. A DNR order may be included in other types of advance directives.  Medical directive:  This covers the care that you want if you are in a coma, near death, or unable to make decisions for yourself. You can list the treatments you want for each condition. Treatment may include pain medicine, surgery, blood transfusions, dialysis, IV or tube feedings, and a ventilator (breathing machine).  Values history:  This document has questions about your views, beliefs, and how you feel and think about life. This information can help others choose the care that you would choose.  Why are advance directives important?  An advance directive helps you control your care. Although spoken wishes may be used, it is better to have your wishes written down. Spoken wishes can be misunderstood, or not followed. Treatments may be given even if you do not want them. An advance directive may make it easier for your family to make difficult choices about your care.   Cigarette Smoking and Your Health   Risks to your health if you smoke:  Nicotine and other chemicals found in tobacco damage every cell in your body. Even if you are a light smoker, you have an  increased risk for cancer, heart disease, and lung disease. If you are pregnant or have diabetes, smoking increases your risk for complications.   Benefits to your health if you stop smoking:   You decrease respiratory symptoms such as coughing, wheezing, and shortness of breath.   You reduce your risk for cancers of the lung, mouth, throat, kidney, bladder, pancreas, stomach, and cervix. If you already have cancer, you increase the benefits of chemotherapy. You also reduce your risk for cancer returning or a second cancer from developing.   You reduce your risk for heart disease, blood clots, heart attack, and stroke.   You reduce your risk for lung infections, and diseases such as pneumonia, asthma, chronic bronchitis, and emphysema.  Your circulation improves. More oxygen can be delivered to your body. If you have diabetes, you lower your risk for complications, such as kidney, artery, and eye diseases. You also lower your risk for nerve damage. Nerve damage can lead to amputations, poor vision, and blindness.  You improve your body's ability to heal and to fight infections.  For more information and support to stop smoking:   [facility]  Phone: 0- 901 - [phone]  Web Address: www.[employer]  Underweight  Underweight is defined as having a body mass index (BMI) of less than 18.5 kg/m2   Anorexia  is a loss of appetite, decreased food intake, or both. Your appetite naturally decreases as you get older. You also get full faster than you used to. This occurs because your body needs less energy. Other body changes can also lead to a decreased appetite. Even though some appetite loss is normal, you still need to get enough calories and nutrients to keep you healthy. You can start to lose too much weight if you do not eat as much food as your body needs. Unwanted weight loss can cause health problems, or worsen health problems you already have. You can also become dehydrated if you do not drink enough  liquid.  How to eat healthy and get enough nutrients:   Choose healthy foods.  Eat a variety of fruits, vegetables, whole grains, low-fat dairy foods, lean meats, and other protein foods. Limit foods high in fat, sugar, and salt. Limit or avoid alcohol as directed. Work with a dietitian to help you plan your meals if you need to follow a special diet. A dietitian can also teach you how to modify foods if you have trouble chewing or swallowing.   Snack on healthy foods between meals  if you only eat a small amount during meals. Snacks provide extra healthy nutrients and calories between meals. Examples include fruit, cheese, and whole grain crackers.   Drink liquids as directed  to avoid dehydration. Drink liquids between meals if they cause you to get full too quickly during meals. Ask how much liquid to drink each day and which liquids are best for you.   Use herbs, spices, and flavor enhancers to add flavor to foods.  Avoid using herbs and spice blends that also contain sodium. Ask your healthcare provider or dietitian about flavor enhancers. Flavor enhancers with ham, natural benavidez, and roast beef flavors can also be sprinkled on food to add flavor.   Share meals with others as often as you can.  Eating with others may help you to eat better during meal time. Ask family members, neighbors, or friends to join you for lunch. There are also senior centers where you can meet people, and share meals with them.   Ask family and friends for help  with shopping or preparing foods. Ask for a ride to the grocery store, if needed.       © Copyright [employer] 2018 Information is for End User's use only and may not be sold, redistributed or otherwise used for commercial purposes. All illustrations and images included in CareNotes® are the copyrighted property of [employer].A[employer]M., Inc. or [employer]

## 2024-09-04 NOTE — PROGRESS NOTES
Daily Note     Today's date: 2021  Patient name: Main Green  : 1960  MRN: [id number]  Referring provider: Rico Russell MD  Dx:   Encounter Diagnosis     ICD-10-CM    1  S/P shoulder surgery  Z98 [id number]    2  Chronic right shoulder pain  M25 [id number]     G89 [id number]                   Subjective: Pt reported his R shoulder was sore prior to PT  He is doing a lot at home preparing for a cookout this weekend  Objective: See treatment diary below      Assessment: Pt is now 3 weeks post op  Initiated multiple PREs as per MD protocol  Tolerated treatment well  Patient demonstrated fatigue post treatment, exhibited good technique with therapeutic exercises and would benefit from continued PT      Plan: Continue per plan of care  Progress treament per protocol        Precautions: MD protocol DC sling when comfortable, 0-3 weeks passive and AAROM, 3-6 weeks PREs  POC exp 21  Manuals 9/16 9/3 9/7  9/9 9/14   R shoulder stretching  All directions  TM All directions  TM All Directions RR All direction   RR All directions  TM                           Neuro Re-Ed        Scap retractions 5" x20 5" x20 5''20x 5" x20  5" x20   Supine scap punches                                                Ther Ex        Pulleys Flex/Scap   5" x10 ea Flex/Scap   5" x10 ea Flex/Scap   5" x10 ea Flex/Scap   5" x10 ea Flex/Scap   5" x10 ea   UT stretch 15"x4 15" x4 15''x4 15" x4 15" x4   Levator scap stretch 15"x4 15" x4 15''x4 15" x4 15" x4   Elbow AROM flex/ext x30 x20  x20 x20  x20   Wrist AROM flex/ext x30  x20 x20 x20 x20   Gripping 3" x20 Blue x20 x20 3" x20 Blue  3" x20 Blue   AAROM finger ladder 5" x10 Flex Flex 5" x10 5''10x 5" x10 Flex 5" x10 Flex   Prone Flex, HRZ Abd, and Ext x10 ea       Isometrics all dir 5" x10 ea               Ther Activity                                                Modalities        CP post prn Pt read results on [employer].  I reiterated the lab results and recommendations  Champ JUAN

## 2024-12-19 ENCOUNTER — EVALUATION (OUTPATIENT)
Dept: PHYSICAL THERAPY | Facility: CLINIC | Age: 64
End: 2024-12-19
Payer: OTHER MISCELLANEOUS

## 2024-12-19 VITALS — DIASTOLIC BLOOD PRESSURE: 76 MMHG | SYSTOLIC BLOOD PRESSURE: 125 MMHG

## 2024-12-19 DIAGNOSIS — M25.511 CHRONIC RIGHT SHOULDER PAIN: Primary | ICD-10-CM

## 2024-12-19 DIAGNOSIS — G89.29 CHRONIC RIGHT SHOULDER PAIN: Primary | ICD-10-CM

## 2024-12-19 PROCEDURE — 97140 MANUAL THERAPY 1/> REGIONS: CPT | Performed by: PHYSICAL THERAPIST

## 2024-12-19 PROCEDURE — 97112 NEUROMUSCULAR REEDUCATION: CPT | Performed by: PHYSICAL THERAPIST

## 2024-12-19 PROCEDURE — 97162 PT EVAL MOD COMPLEX 30 MIN: CPT | Performed by: PHYSICAL THERAPIST

## 2024-12-19 NOTE — LETTER
2024    CHEYANNE Rees  [facility] [address] [address]    Patient: Eduardo Anand   YOB: 1960   Date of Visit: 2024     Encounter Diagnosis     ICD-10-CM    1. Chronic right shoulder pain  M25.511     G89.29           Dear Dr. Goins:    Thank you for your recent referral of Eduardo Anand. Please review the attached evaluation summary from Eduardo's recent visit.     Please verify that you agree with the plan of care by signing the attached order.     If you have any questions or concerns, please do not hesitate to call.     I sincerely appreciate the opportunity to share in the care of one of your patients and hope to have another opportunity to work with you in the near future.       Sincerely,    Erik Carter, PT      Referring Provider:      I certify that I have read the below Plan of Care and certify the need for these services furnished under this plan of treatment while under my care.                    CHEYANNE Rees  [facility] [address] [address]  Via Fax: [phone]          PT Evaluation     Today's date: 2024  Patient name: Eduardo Anand  : 1960  MRN: [id number]  Referring provider: Haley Goins CRNP  Dx:   Encounter Diagnosis     ICD-10-CM    1. Chronic right shoulder pain  M25.511     G89.29                      Assessment  Impairments: abnormal coordination, abnormal gait, abnormal muscle firing, abnormal muscle tone, abnormal or restricted ROM, abnormal movement, activity intolerance, impaired physical strength, lacks appropriate home exercise program, pain with function, safety issue and poor posture   Symptom irritability: moderate    Assessment details: Eduardo Anand is a 64 y.o. male who presents to outpatient PT with a  No diagnosis found..  No further referral appears necessary at this time based upon examination results. Pt presents with decreased strength, ROM, balance, functional  activity tolerance, and pain with movement in his right shoulder  which is  limiting his ability to perform the aforementioned functional activities.  Etiologic factors include repetitive poor body mechanics. Prognosis is good given HEP compliance and PT 2-3/wk.  Please contact me if you have any questions or recommendations.  Thank you for the opportunity to share in  [facility].     Understanding of Dx/Px/POC: good     Prognosis: good    Goals  STG to be achieved in 4 weeks     1. Pt will reduce subjective pain rating by at least 50 percent the help facilitate return to PLOF  2. Pt will improve right shoulder AROM by at least 10 degrees to help promote improved functional activity tolerance   3. Pt will improve right shoulder MMT scores by at least 1/2 grade to promote improved functional activity tolerance     LTG to be achieved in 6-8 weeks   1. Pt will be complaint with HEP   2. Pt will improve right shoulder AROM to WFL, to help facilitate independence with ADL's, IADL's, and functional activities   3. Pt will improve right shoulder Strength to WFL to help facilitate independence with ADL's, IADL's, and functional activities   4. Pt will have no limitations with ambulation to help facilitate independence at home and in the community.   5. Pt will have no limitations with stair negotiation to help facilitate independence at home and in the community           Plan  Patient would benefit from: skilled physical therapy    Planned therapy interventions: ADL training, balance, balance/weight bearing training, flexibility, functional ROM exercises, gait training, graded activity, graded exercise, graded motor, home exercise program, joint mobilization, manual therapy, neuromuscular re-education, patient education, postural training, strengthening, stretching, therapeutic activities and therapeutic exercise    Frequency: 2x week  Duration in weeks: 12  Plan of Care beginning date: 12/19/2024  Plan of Care expiration  date: 3/18/2025  Treatment plan discussed with: patient, PTA and referring physician        Subjective Evaluation    History of Present Illness  Mechanism of injury: Pt is 64 year old male who presents to outpatient PT with CC of right shoulder and neck pain for the past 7/8 month, He does note occasional radicular sx radiating into his right upper extremity. He reports no mechanical component to. He states that the radiculopathy is intermittent in nature. Also notes difficulty with reaching and lifting heavier objects. Goals for PT: None stated    The patient is unsure of why he is at PT. Pt educated on expectations and goals for physical therapy.  Pt reports he has follow up with MD on  Scheduled for NCV test on .   The patient does reports a hx of RTC repair x 2 and cervical fusion.   Patient Goals  Patient goals for therapy: increased strength, decreased pain and increased motion  Patient goal: none stated.  Pain  Current pain ratin  At best pain ratin  At worst pain rating: 10  Quality: burning  Relieving factors: change in position  Progression: worsening          Objective     Active Range of Motion   Cervical/Thoracic Spine       Cervical    Flexion: 30 degrees   Extension: 5 degrees      Left lateral flexion: 20 degrees      Right lateral flexion: 20 degrees      Left rotation: 40 degrees  Right rotation: 40 degrees       Right Shoulder   Flexion: 60 degrees   Abduction: 70 degrees   External rotation BTH: Active external rotation behind the head: occiput.   Internal rotation BTB: Active internal rotation behind the back: right buttocks.     Strength/Myotome Testing     Right Shoulder     Planes of Motion   Flexion: 2+   Abduction: 2+   External rotation at 0°: 2+   Internal rotation at 0°: 2+             Precautions: Hx of cervical fusion, RTC repair x 2 right shoulder  CO-MORBIDITIES:  HEP ACCESS CODE:   FOTO Completed On:     POC Expires Reeval for Medicare to be completed Unit LImit  Auth Expiration Date PT/OT/STVisit Limit   3/19 By visit  10 NA 12/31/25 30    Completed on                  TREATMENT DIARY  Auth Status DATE 12/19             Visit # 1             Remaining 9            MANUAL THERAPY             PROM to right shoulder  RR                                                                             THERAPEUTIC EXERCISE                                                                                                                                                                                      NEUROMUSCULAR REEDUCATION              MTP/LTP              Scap squeeze               Pendulum              Pulley Flexion/Scaption             Finger ladder  Flexion Scaption 5''20x            Supine Cane              Compass             Supine ABC                                                                                                                                                                                       THERAPEUTIC ACTIVITY                                                                                                                                                                                                                             GAIT TRAINING                                                                              MODALITIES

## 2024-12-19 NOTE — PROGRESS NOTES
PT Evaluation     Today's date: 2024  Patient name: Eduardo Anand  : 1960  MRN: [id number]  Referring provider: Haley Goins CRNP  Dx:   Encounter Diagnosis     ICD-10-CM    1. Chronic right shoulder pain  M25.511     G89.29                      Assessment  Impairments: abnormal coordination, abnormal gait, abnormal muscle firing, abnormal muscle tone, abnormal or restricted ROM, abnormal movement, activity intolerance, impaired physical strength, lacks appropriate home exercise program, pain with function, safety issue and poor posture   Symptom irritability: moderate    Assessment details: Eduardo Anand is a 64 y.o. male who presents to outpatient PT with a  No diagnosis found..  No further referral appears necessary at this time based upon examination results. Pt presents with decreased strength, ROM, balance, functional activity tolerance, and pain with movement in his right shoulder  which is  limiting his ability to perform the aforementioned functional activities.  Etiologic factors include repetitive poor body mechanics. Prognosis is good given HEP compliance and PT 2-3/wk.  Please contact me if you have any questions or recommendations.  Thank you for the opportunity to share in  [facility].     Understanding of Dx/Px/POC: good     Prognosis: good    Goals  STG to be achieved in 4 weeks     1. Pt will reduce subjective pain rating by at least 50 percent the help facilitate return to PLOF  2. Pt will improve right shoulder AROM by at least 10 degrees to help promote improved functional activity tolerance   3. Pt will improve right shoulder MMT scores by at least 1/2 grade to promote improved functional activity tolerance     LTG to be achieved in 6-8 weeks   1. Pt will be complaint with HEP   2. Pt will improve right shoulder AROM to WFL, to help facilitate independence with ADL's, IADL's, and functional activities   3. Pt will improve right shoulder Strength to WFL to help facilitate  independence with ADL's, IADL's, and functional activities   4. Pt will have no limitations with ambulation to help facilitate independence at home and in the community.   5. Pt will have no limitations with stair negotiation to help facilitate independence at home and in the community           Plan  Patient would benefit from: skilled physical therapy    Planned therapy interventions: ADL training, balance, balance/weight bearing training, flexibility, functional ROM exercises, gait training, graded activity, graded exercise, graded motor, home exercise program, joint mobilization, manual therapy, neuromuscular re-education, patient education, postural training, strengthening, stretching, therapeutic activities and therapeutic exercise    Frequency: 2x week  Duration in weeks: 12  Plan of Care beginning date: 2024  Plan of Care expiration date: 3/18/2025  Treatment plan discussed with: patient, PTA and referring physician        Subjective Evaluation    History of Present Illness  Mechanism of injury: Pt is 64 year old male who presents to outpatient PT with CC of right shoulder and neck pain for the past 7/8 month, He does note occasional radicular sx radiating into his right upper extremity. He reports no mechanical component to. He states that the radiculopathy is intermittent in nature. Also notes difficulty with reaching and lifting heavier objects. Goals for PT: None stated    The patient is unsure of why he is at PT. Pt educated on expectations and goals for physical therapy.  Pt reports he has follow up with MD on  Scheduled for NCV test on .   The patient does reports a hx of RTC repair x 2 and cervical fusion.   Patient Goals  Patient goals for therapy: increased strength, decreased pain and increased motion  Patient goal: none stated.  Pain  Current pain ratin  At best pain ratin  At worst pain rating: 10  Quality: burning  Relieving factors: change in position  Progression:  worsening          Objective     Active Range of Motion   Cervical/Thoracic Spine       Cervical    Flexion: 30 degrees   Extension: 5 degrees      Left lateral flexion: 20 degrees      Right lateral flexion: 20 degrees      Left rotation: 40 degrees  Right rotation: 40 degrees       Right Shoulder   Flexion: 60 degrees   Abduction: 70 degrees   External rotation BTH: Active external rotation behind the head: occiput.   Internal rotation BTB: Active internal rotation behind the back: right buttocks.     Strength/Myotome Testing     Right Shoulder     Planes of Motion   Flexion: 2+   Abduction: 2+   External rotation at 0°: 2+   Internal rotation at 0°: 2+             Precautions: Hx of cervical fusion, RTC repair x 2 right shoulder  CO-MORBIDITIES:  HEP ACCESS CODE:   FOTO Completed On:     POC Expires Reeval for Medicare to be completed Unit LImit Auth Expiration Date PT/OT/STVisit Limit   3/19 By visit  10 NA 12/31/25 30    Completed on                  TREATMENT DIARY  Auth Status DATE 12/19             Visit # 1             Remaining 9            MANUAL THERAPY             PROM to right shoulder  RR                                                                             THERAPEUTIC EXERCISE                                                                                                                                                                                      NEUROMUSCULAR REEDUCATION              MTP/LTP              Scap squeeze               Pendulum              Pulley Flexion/Scaption             Finger ladder  Flexion Scaption 5''20x            Supine Cane              Compass             Supine ABC                                                                                                                                                                                       THERAPEUTIC ACTIVITY                                                                                                                                                                                                                              GAIT TRAINING                                                                              MODALITIES

## 2024-12-24 ENCOUNTER — OFFICE VISIT (OUTPATIENT)
Dept: PHYSICAL THERAPY | Facility: CLINIC | Age: 64
End: 2024-12-24
Payer: OTHER MISCELLANEOUS

## 2024-12-24 DIAGNOSIS — M25.511 CHRONIC RIGHT SHOULDER PAIN: Primary | ICD-10-CM

## 2024-12-24 DIAGNOSIS — G89.29 CHRONIC RIGHT SHOULDER PAIN: Primary | ICD-10-CM

## 2024-12-24 PROCEDURE — 97140 MANUAL THERAPY 1/> REGIONS: CPT

## 2024-12-24 PROCEDURE — 97112 NEUROMUSCULAR REEDUCATION: CPT

## 2024-12-24 NOTE — PROGRESS NOTES
"Daily Note     Today's date: 2024  Patient name: Eduardo Anand  : 1960  MRN: [id number]  Referring provider: Haley Goins CRNP  Dx:   Encounter Diagnosis     ICD-10-CM    1. Chronic right shoulder pain  M25.511     G89.29                      Subjective: Pt reported R shoulder pain prior to PT. He blames the cold weather today.       Objective: See treatment diary below      Assessment: Initiated multiple exercises at outlined in pt's POC. Tolerated treatment well. Patient demonstrated fatigue post treatment, exhibited good technique with therapeutic exercises, and would benefit from continued PT      Plan: Continue per plan of care.  Progress treatment as tolerated.       Precautions: Hx of cervical fusion, RTC repair x 2 right shoulder  CO-MORBIDITIES:  HEP ACCESS CODE:   FOTO Completed On:     POC Expires Reeval for Medicare to be completed Unit LImit Auth Expiration Date PT/OT/STVisit Limit   3/19 By visit  10 NA 25 30    Completed on                  TREATMENT DIARY  Auth Status DATE             Visit # 1 2            Remaining 9 8           MANUAL THERAPY             PROM to right shoulder  RR TM                                                                            THERAPEUTIC EXERCISE                                                                                                                                                                                      NEUROMUSCULAR REEDUCATION              MTP/LTP   RTB 5\" x20           Scap squeeze               Pendulum   1 min ea           Pulley Flexion/Scaption  Flex/Scap 5\" x3 min ea           Finger ladder  Flexion Scaption 5''20x Flex/Scap 5\" x10 ea           Supine Cane   3\" x20           Compass  X20 ea dir           Supine ABC   x1                                                                                                                                                                                  "   THERAPEUTIC ACTIVITY                                                                                                                                                                                                                             GAIT TRAINING                                                                              MODALITIES

## 2024-12-26 ENCOUNTER — APPOINTMENT (OUTPATIENT)
Dept: PHYSICAL THERAPY | Facility: CLINIC | Age: 64
End: 2024-12-26
Payer: OTHER MISCELLANEOUS

## 2024-12-31 ENCOUNTER — OFFICE VISIT (OUTPATIENT)
Dept: PHYSICAL THERAPY | Facility: CLINIC | Age: 64
End: 2024-12-31
Payer: OTHER MISCELLANEOUS

## 2024-12-31 DIAGNOSIS — G89.29 CHRONIC RIGHT SHOULDER PAIN: Primary | ICD-10-CM

## 2024-12-31 DIAGNOSIS — M25.511 CHRONIC RIGHT SHOULDER PAIN: Primary | ICD-10-CM

## 2024-12-31 PROCEDURE — 97140 MANUAL THERAPY 1/> REGIONS: CPT

## 2024-12-31 PROCEDURE — 97112 NEUROMUSCULAR REEDUCATION: CPT

## 2024-12-31 NOTE — PROGRESS NOTES
"Daily Note     Today's date: 2024  Patient name: Eduardo Anand  : 1960  MRN: [id number]  Referring provider: Haley Goins CRNP  Dx:   Encounter Diagnosis     ICD-10-CM    1. Chronic right shoulder pain  M25.511     G89.29                      Subjective: Pt reports his R shoulder is sore today, blames cold weather. States cervical injection did not help his neck pain.       Objective: See treatment diary below      Assessment: Tolerated treatment fair. Occasional verbal cues for proper exercise performance. Patient demonstrated fatigue post treatment, exhibited good technique with therapeutic exercises, and would benefit from continued PT      Plan: Continue per plan of care.      Precautions: Hx of cervical fusion, RTC repair x 2 right shoulder  CO-MORBIDITIES:  HEP ACCESS CODE:   FOTO Completed On:     POC Expires Reeval for Medicare to be completed Unit LImit Auth Expiration Date PT/OT/STVisit Limit   3/19 By visit  10 NA 25 30    Completed on                  TREATMENT DIARY  Auth Status DATE            Visit # 1 2 3           Remaining 9 8 7          MANUAL THERAPY             PROM to right shoulder  RR TM [facility]                                                                           THERAPEUTIC EXERCISE                                                                                                                                                                                      NEUROMUSCULAR REEDUCATION              MTP/LTP   RTB 5\" x20 RTB  5\" x20          Scap squeeze               Pendulum   1 min ea 1 min ea          Pulley Flexion/Scaption  Flex/Scap 5\" x3 min ea Flex/scap  5\" x3 min ea          Finger ladder  Flexion Scaption 5''20x Flex/Scap 5\" x10 ea NV          Supine Cane   3\" x20 3\" x20           Compass  X20 ea dir X20 ea dir           Supine ABC   x1 x1                                                                                                          "                                                                          THERAPEUTIC ACTIVITY                                                                                                                                                                                                                             GAIT TRAINING                                                                              MODALITIES

## 2025-01-02 ENCOUNTER — OFFICE VISIT (OUTPATIENT)
Dept: PHYSICAL THERAPY | Facility: CLINIC | Age: 65
End: 2025-01-02
Payer: OTHER MISCELLANEOUS

## 2025-01-02 DIAGNOSIS — M25.511 CHRONIC RIGHT SHOULDER PAIN: Primary | ICD-10-CM

## 2025-01-02 DIAGNOSIS — G89.29 CHRONIC RIGHT SHOULDER PAIN: Primary | ICD-10-CM

## 2025-01-02 PROCEDURE — 97112 NEUROMUSCULAR REEDUCATION: CPT

## 2025-01-02 PROCEDURE — 97140 MANUAL THERAPY 1/> REGIONS: CPT

## 2025-01-02 NOTE — PROGRESS NOTES
"Daily Note     Today's date: 2025  Patient name: Eduardo Anand  : 1960  MRN: [id number]  Referring provider: Haley Goins CRNP  Dx:   Encounter Diagnosis     ICD-10-CM    1. Chronic right shoulder pain  M25.511     G89.29                      Subjective: Pt reports no changes noted with PT. Reports he has phone appt 25 with doctor, unsure of dr's name,  regarding \"burning nerves\" neck      Objective: See treatment diary below      Assessment: Tolerated treatment well. Improved tolerance with R shoulder PROM today. Patient demonstrated fatigue post treatment, exhibited good technique with therapeutic exercises, and would benefit from continued PT      Plan: Continue per plan of care.      Precautions: Hx of cervical fusion, RTC repair x 2 right shoulder  CO-MORBIDITIES:  HEP ACCESS CODE:   FOTO Completed On:     POC Expires Reeval for Medicare to be completed Unit LImit Auth Expiration Date PT/OT/STVisit Limit   3/19 By visit  10 NA 25 30    Completed on                  TREATMENT DIARY  Auth Status DATE 25          Visit # 1 2 3 4          Remaining 9 8 7 6         MANUAL THERAPY             PROM to right shoulder  RR TM MJC MJC                                                                          THERAPEUTIC EXERCISE                                                                                                                                                                                      NEUROMUSCULAR REEDUCATION              MTP/LTP   RTB 5\" x20 RTB  5\" x20 RTB  5\" x20         Scap squeeze               Pendulum   1 min ea 1 min ea 1 min ea         Pulley Flexion/Scaption  Flex/Scap 5\" x3 min ea Flex/scap  5\" x3 min ea Flex/scap  5\" x3 min ea         Finger ladder  Flexion Scaption 5''20x Flex/Scap 5\" x10 ea NV Flex/Scap 5\" x10 ea         Supine Cane   3\" x20 3\" x20  3\" x 20         Compass  X20 ea dir X20 ea dir  X20 ea dir         Supine ABC   x1 x1 x1     "                                                                                                                                                                              THERAPEUTIC ACTIVITY                                                                                                                                                                                                                             GAIT TRAINING                                                                              MODALITIES

## 2025-01-06 ENCOUNTER — TELEPHONE (OUTPATIENT)
Age: 65
End: 2025-01-06

## 2025-01-06 NOTE — TELEPHONE ENCOUNTER
Pt's spouse called in stating pt is vomiting after eating and is having other GI issues. Pt's wife would like Dr. Farhad Araujo order a CT scan or something like that. Please, give the pt's wife a call and if she can not answer, leave a msg.

## 2025-01-07 ENCOUNTER — OFFICE VISIT (OUTPATIENT)
Dept: PHYSICAL THERAPY | Facility: CLINIC | Age: 65
End: 2025-01-07
Payer: MEDICARE

## 2025-01-07 DIAGNOSIS — M25.511 CHRONIC RIGHT SHOULDER PAIN: Primary | ICD-10-CM

## 2025-01-07 DIAGNOSIS — G89.29 CHRONIC RIGHT SHOULDER PAIN: Primary | ICD-10-CM

## 2025-01-07 PROCEDURE — 97112 NEUROMUSCULAR REEDUCATION: CPT | Performed by: PHYSICAL THERAPIST

## 2025-01-07 PROCEDURE — 97140 MANUAL THERAPY 1/> REGIONS: CPT | Performed by: PHYSICAL THERAPIST

## 2025-01-07 NOTE — TELEPHONE ENCOUNTER
Please schedule for appt in our office with a provider this week   If pt unable to drink fluids needs to go to ER for IV fluids

## 2025-01-07 NOTE — TELEPHONE ENCOUNTER
"Pt wife calling in again asking for CT.   [facility] Consent Form verified.    Wife reports even with soft or pureed foods, pt has been vomiting immediately after eating x 1 week and feels like food is \"stuck.\" Wife reports this does not occur with thin fluids.     Wife reports pt is sleeping more than normal as well.     Offered appointment, however, would like feedback from provider first.   Call back #     Angela Anand (Spouse)  [phone] (Mobile)     "

## 2025-01-07 NOTE — PROGRESS NOTES
"Daily Note     Today's date: 2025  Patient name: Eduardo Anand  : 1960  MRN: [id number]  Referring provider: Haley Goins CRNP  Dx:   Encounter Diagnosis     ICD-10-CM    1. Chronic right shoulder pain  M25.511     G89.29                      Subjective: Pt reports minimal changes in the right shoulder since starting skilled PT       Objective: See treatment diary below      Assessment: Tolerated treatment well. Patient exhibited good technique with therapeutic exercises and would benefit from continued PT. Added UBE this session to help increase strength in the right shoulder good tolerance to progressions. Continue to focus on improving right shoulder strength and ROM.       Plan: Continue per plan of care.      Precautions: Hx of cervical fusion, RTC repair x 2 right shoulder  CO-MORBIDITIES:  HEP ACCESS CODE:   FOTO Completed On:     POC Expires Reeval for Medicare to be completed Unit LImit Auth Expiration Date PT/OT/STVisit Limit   3/19 By visit  10 NA 25 30    Completed on                  TREATMENT DIARY  Auth Status DATE 25          Visit # 1 2 3 4 5         Remaining 9 8 7 6 5        MANUAL THERAPY             PROM to right shoulder  RR TM MJC MJC RR                                                                         THERAPEUTIC EXERCISE                                                                                                                                                                                      NEUROMUSCULAR REEDUCATION              MTP/LTP   RTB 5\" x20 RTB  5\" x20 RTB  5\" x20 Red 5''20x        Scap squeeze               Pendulum   1 min ea 1 min ea 1 min ea 1 min each         Pulley Flexion/Scaption  Flex/Scap 5\" x3 min ea Flex/scap  5\" x3 min ea Flex/scap  5\" x3 min ea Flexion Scaption         Finger ladder  Flexion Scaption 5''20x Flex/Scap 5\" x10 ea NV Flex/Scap 5\" x10 ea Flex/Scap 5\" x10 ea        Supine Cane   3\" x20 3\" x20  3\" x " 20 3''20x        Compass  X20 ea dir X20 ea dir  X20 ea dir X20 each         Supine ABC   x1 x1 x1 X1         UBE      5/5 120                                                                                                                                                                     THERAPEUTIC ACTIVITY                                                                                                                                                                                                                             GAIT TRAINING                                                                              MODALITIES

## 2025-01-07 NOTE — TELEPHONE ENCOUNTER
Called pt and schedule an appt with Cami to discuss the issues that are occurring , also advised the pt if the pt is not getting any water in take that she should take him to the ED so that he get get a IV to stay hydrated, pt spouse understood and will come on Friday 1/10/2025 at 2:30 pm to follow up.

## 2025-01-09 ENCOUNTER — OFFICE VISIT (OUTPATIENT)
Dept: PHYSICAL THERAPY | Facility: CLINIC | Age: 65
End: 2025-01-09
Payer: MEDICARE

## 2025-01-09 DIAGNOSIS — M25.511 CHRONIC RIGHT SHOULDER PAIN: Primary | ICD-10-CM

## 2025-01-09 DIAGNOSIS — G89.29 CHRONIC RIGHT SHOULDER PAIN: Primary | ICD-10-CM

## 2025-01-09 PROCEDURE — 97112 NEUROMUSCULAR REEDUCATION: CPT

## 2025-01-09 PROCEDURE — 97140 MANUAL THERAPY 1/> REGIONS: CPT

## 2025-01-09 NOTE — PROGRESS NOTES
"Daily Note     Today's date: 2025  Patient name: Eduardo Anand  : 1960  MRN: [id number]  Referring provider: Haley Goins CRNP  Dx:   Encounter Diagnosis     ICD-10-CM    1. Chronic right shoulder pain  M25.511     G89.29                      Subjective: Pt offered no new comments prior to PT. He continues to have R shoulder pain. The pain has been unchanging.       Objective: See treatment diary below      Assessment: Tolerated treatment well. Patient demonstrated fatigue post treatment, exhibited good technique with therapeutic exercises, and would benefit from continued PT      Plan: Continue per plan of care.  Progress treatment as tolerated.       Precautions: Hx of cervical fusion, RTC repair x 2 right shoulder  CO-MORBIDITIES:  HEP ACCESS CODE:   FOTO Completed On:     POC Expires Reeval for Medicare to be completed Unit LImit Auth Expiration Date PT/OT/STVisit Limit   3/19 By visit  10 NA 25 30    Completed on                  TREATMENT DIARY  Auth Status DATE 25        Visit # 1 2 3 4 5 6        Remaining 9 8 7 6 5 4       MANUAL THERAPY             PROM to right shoulder  RR TM MJC MJC RR TM                                                                        THERAPEUTIC EXERCISE                                                                                                                                                                                      NEUROMUSCULAR REEDUCATION              MTP/LTP   RTB 5\" x20 RTB  5\" x20 RTB  5\" x20 Red 5''20x Red 5'' 20x       Scap squeeze               Pendulum   1 min ea 1 min ea 1 min ea 1 min each  1 min ea       Pulley Flexion/Scaption  Flex/Scap 5\" x3 min ea Flex/scap  5\" x3 min ea Flex/scap  5\" x3 min ea Flexion Scaption  Flex/scap  5\" x3 min ea       Finger ladder  Flexion Scaption 5''20x Flex/Scap 5\" x10 ea NV Flex/Scap 5\" x10 ea Flex/Scap 5\" x10 ea Flex/Scap 5\" x10 ea       Supine Cane   3\" x20 3\" x20  " "3\" x 20 3''20x 3\" x20 ea       Compass  X20 ea dir X20 ea dir  X20 ea dir X20 each  X20 ea       Supine ABC   x1 x1 x1 X1  x1       UBE      5/5 120  120 rpm 5'/5'                                                                                                                                                                   THERAPEUTIC ACTIVITY                                                                                                                                                                                                                             GAIT TRAINING                                                                              MODALITIES                                                        "

## 2025-01-10 ENCOUNTER — PREP FOR PROCEDURE (OUTPATIENT)
Dept: BARIATRICS | Facility: CLINIC | Age: 65
End: 2025-01-10

## 2025-01-10 ENCOUNTER — OFFICE VISIT (OUTPATIENT)
Dept: BARIATRICS | Facility: CLINIC | Age: 65
End: 2025-01-10
Payer: MEDICARE

## 2025-01-10 VITALS
BODY MASS INDEX: 17.2 KG/M2 | SYSTOLIC BLOOD PRESSURE: 96 MMHG | TEMPERATURE: 96.8 F | HEART RATE: 81 BPM | WEIGHT: 113.5 LBS | HEIGHT: 68 IN | DIASTOLIC BLOOD PRESSURE: 58 MMHG

## 2025-01-10 DIAGNOSIS — R11.10 VOMITING: ICD-10-CM

## 2025-01-10 DIAGNOSIS — K91.2 POSTSURGICAL MALABSORPTION: ICD-10-CM

## 2025-01-10 DIAGNOSIS — J43.8 OTHER EMPHYSEMA (HCC): ICD-10-CM

## 2025-01-10 DIAGNOSIS — Z48.815 ENCOUNTER FOR SURGICAL AFTERCARE FOLLOWING SURGERY OF DIGESTIVE SYSTEM: Primary | ICD-10-CM

## 2025-01-10 DIAGNOSIS — Z85.118 HISTORY OF LUNG CANCER: ICD-10-CM

## 2025-01-10 DIAGNOSIS — Z98.84 BARIATRIC SURGERY STATUS: ICD-10-CM

## 2025-01-10 DIAGNOSIS — E44.0 MODERATE PROTEIN-CALORIE MALNUTRITION (HCC): ICD-10-CM

## 2025-01-10 DIAGNOSIS — Z98.84 BARIATRIC SURGERY STATUS: Primary | ICD-10-CM

## 2025-01-10 DIAGNOSIS — R63.6 UNDERWEIGHT: ICD-10-CM

## 2025-01-10 PROCEDURE — 99204 OFFICE O/P NEW MOD 45 MIN: CPT | Performed by: PHYSICIAN ASSISTANT

## 2025-01-10 RX ORDER — GABAPENTIN 300 MG/1
300 CAPSULE ORAL
COMMUNITY
Start: 2025-01-08 | End: 2026-01-08

## 2025-01-10 RX ORDER — MELOXICAM 7.5 MG/1
TABLET ORAL
COMMUNITY
Start: 2025-01-08

## 2025-01-10 RX ORDER — SUCRALFATE ORAL 1 G/10ML
1 SUSPENSION ORAL 4 TIMES DAILY
Qty: 1200 ML | Refills: 1 | Status: SHIPPED | OUTPATIENT
Start: 2025-01-10

## 2025-01-10 RX ORDER — LANSOPRAZOLE 30 MG/1
30 CAPSULE, DELAYED RELEASE ORAL DAILY
Qty: 90 CAPSULE | Refills: 0 | Status: SHIPPED | OUTPATIENT
Start: 2025-01-10

## 2025-01-10 NOTE — PROGRESS NOTES
"Assessment/Plan:     Patient ID: Eduardo Anand is a 64 y.o. male.     Bariatric Surgery Status    Eduardo Anand is a 59 y.o. male w/a h/o remote RYGB status post ex lap + marginal ulcer perf repair and G tube, (7/2019) and perc zac for acalculous cholecystitis (9/2019).  He is here for OD follow-up     Of note, has hx of esophageal stricture and marginal ulcer, which has required dilation and stenting in the past. He states for the last few months has had recurrence of vomiting and trouble tolerating many solid foods. Can tolerate liquids more easily but is also brining up liquids at times. Today he was able to keep down coffee and 2 bottles of soda.   As a result of the vomiting patient has lost over 10 lbs in the last several months.   Of note, he does still smoke cigarettes on occasion     Also,  with c/o abd \"bulge\" which has been present for the last several years but seems to be more uncomfortable. Bulge located in the R mid abdomen and tender to even light palpation and patient feels it is getting wider. Worse with movement. Prior CT scans showed no abnormalities. Several years ago, a  Second opinion obtained by Dr. Fuentes surgical fellow who noted potential abd wall hernia vs diastasis recti. Pt saw multiple providers after this however no ultimate cause was found for this.       Tobacco use  Ulcer Risk assessment:  Discussion about strict AVOIDANCE of alcohol, NSAIDS, and nicotine of all forms.   - he was on prevacid in the past, will restart      PLAN:  - will obtain UGI and EGD to eval his anatomy , follow up after   - start PPI and Carafate daily in the event of an ulcer   - offered IV fluid sessions however pt declines for now and states he will make sure he is drinking adequate fluids daily   - would benefit from RD visits to help optimize his nutrition however pt wants to wait until his EGD is complete   - smoking cessation encouraged   - start vitamins and get labs done . Recommended vitamin patches if " pt unable to tolerate PO vitamins   - ER precautions reviewed in detail - worsening or new pain, blood stools, etc     Continued/Maintain healthy weight loss with good nutrition intakes.  Adequate hydration with at least 64oz. fluid intake.  Follow diet as discussed.  Follow vitamin and mineral recommendations as reviewed with you.  Exercise as tolerated.    Colonoscopy referral made:     Follow-up after EGD. We kindly ask that your arrive 15 minutes before your scheduled appointment time with your provider to allow our staff to room you, get your vital signs and update your chart.    Get lab work done . Please call the office if you need a script.  It is recommended to check with your insurance BEFORE getting labs done to make sure they are covered by your policy.      Call our office if you have any problems with abdominal pain especially associated with fever, chills, nausea, vomiting or any other concerns.    All  Post-bariatric surgery patients should be aware that very small quantities of any alcohol can cause impairment and it is very possible not to feel the effect. The effect can be in the system for several hours.  It is also a stomach irritant.     It is advised to AVOID alcohol, Nonsteroidal antiinflammatory drugs (NSAIDS) and nicotine of all forms . Any of these can cause stomach irritation/pain.    Discussed the effects of alcohol on a bariatric patient and the increased impairment risk.     Keep up the good work!     Postsurgical Malabsorption   -At risk for malabsorption of vitamins/minerals secondary to malabsorption and restriction of intake from bariatric surgery  -Currently taking adequate postop bariatric surgery vitamin supplementation  -Next set of bariatric labs ordered for approximately 1 month  -Patient received education about the importance of adhering to a lifelong supplementation regimen to avoid vitamin/mineral deficiencies      Diagnoses and all orders for this visit:    Encounter for  surgical aftercare following surgery of digestive system    Bariatric surgery status  -     lansoprazole (PREVACID) 30 mg capsule; Take 1 capsule (30 mg total) by mouth daily  -     sucralfate (CARAFATE) 1 g/10 mL suspension; Take 10 mL (1 g total) by mouth 4 (four) times a day    Postsurgical malabsorption    Underweight    History of lung cancer    Vomiting  -     FL UPPER GI UGI; Future  -     lansoprazole (PREVACID) 30 mg capsule; Take 1 capsule (30 mg total) by mouth daily  -     sucralfate (CARAFATE) 1 g/10 mL suspension; Take 10 mL (1 g total) by mouth 4 (four) times a day    Moderate protein-calorie malnutrition (HCC)    Other emphysema (HCC)    Other orders  -     gabapentin (NEURONTIN) 300 mg capsule; Take 300 mg by mouth (Patient not taking: Reported on 1/10/2025)  -     meloxicam (MOBIC) 7.5 mg tablet; Take 1-2 tablets by mouth as needed daily for moderate to severe pain (Patient not taking: Reported on 1/10/2025)         Subjective:      Patient ID: Eduardo Anand is a 64 y.o. male.  Eduardo Anand is a 59 y.o. male w/a h/o remote RYGB status post ex lap + marginal ulcer perf repair and G tube, (7/2019) and perc zac for acalculous cholecystitis (9/2019).  He is here for OD follow-up       Current: 113  Current BMI is Body mass index is 17.26 kg/m².    Tolerating a regular diet-no  Eating at least 60 grams of protein per day-no  Drinking at least 64 ounces of fluid per day-no  Sufficient exercise-no  Using NSAIDs regularly-no  Using nicotine-occasional cigarette   Using alcohol-no  Supplements:  none       The following portions of the patient's history were reviewed and updated as appropriate: allergies, current medications, past family history, past medical history, past social history, past surgical history and problem list.    Review of Systems   Constitutional:  Positive for unexpected weight change.   Respiratory: Negative.     Cardiovascular: Negative.    Gastrointestinal:  Positive for abdominal  "pain and vomiting.   Neurological: Negative.    Psychiatric/Behavioral: Negative.           Objective:    BP 96/58 (Patient Position: Sitting, Cuff Size: Standard)   Pulse 81   Temp (!) 96.8 °F (36 °C) (Tympanic)   Ht 5' 8\" (1.727 m)   Wt 51.5 kg (113 lb 8 oz)   BMI 17.26 kg/m²      Physical Exam  Vitals and nursing note reviewed.   Constitutional:       Appearance: He is underweight.   HENT:      Head: Normocephalic and atraumatic.   Eyes:      Extraocular Movements: Extraocular movements intact.   Cardiovascular:      Rate and Rhythm: Normal rate.   Pulmonary:      Effort: Pulmonary effort is normal.   Musculoskeletal:         General: Normal range of motion.      Cervical back: Normal range of motion.   Skin:     General: Skin is warm and dry.   Neurological:      General: No focal deficit present.      Mental Status: He is alert and oriented to person, place, and time.   Psychiatric:         Mood and Affect: Mood normal.             "

## 2025-01-10 NOTE — PROGRESS NOTES
Date of surgery:7/12/2017  Procedure: Laparoscopic   Performing surgeon: Dr Farhad Araujo.    Initial Weight - 227 lb  Current Weight - 113.5  Sohan Weight - now   Total Body Weight Loss (EWL)- 103%  EWL% - 200%  TWB % -48%

## 2025-01-14 ENCOUNTER — OFFICE VISIT (OUTPATIENT)
Dept: PHYSICAL THERAPY | Facility: CLINIC | Age: 65
End: 2025-01-14
Payer: OTHER MISCELLANEOUS

## 2025-01-14 DIAGNOSIS — G89.29 CHRONIC RIGHT SHOULDER PAIN: Primary | ICD-10-CM

## 2025-01-14 DIAGNOSIS — M25.511 CHRONIC RIGHT SHOULDER PAIN: Primary | ICD-10-CM

## 2025-01-14 PROCEDURE — 97112 NEUROMUSCULAR REEDUCATION: CPT | Performed by: PHYSICAL THERAPIST

## 2025-01-14 PROCEDURE — 97140 MANUAL THERAPY 1/> REGIONS: CPT | Performed by: PHYSICAL THERAPIST

## 2025-01-14 RX ORDER — SODIUM CHLORIDE 9 MG/ML
125 INJECTION, SOLUTION INTRAVENOUS CONTINUOUS
Status: CANCELLED | OUTPATIENT
Start: 2025-01-14

## 2025-01-14 NOTE — PROGRESS NOTES
"Daily Note     Today's date: 2025  Patient name: Eduardo Anand  : 1960  MRN: [id number]  Referring provider: Haley Goins CRNP  Dx:   Encounter Diagnosis     ICD-10-CM    1. Chronic right shoulder pain  M25.511     G89.29                      Subjective: The patient reports he is doing well today. No new complaints in regards to the right shoulder. He is anxious to leave for [place].       Objective: See treatment diary below      Assessment: Tolerated treatment well. Patient exhibited good technique with therapeutic exercises and would benefit from continued PT      Plan: Continue per plan of care.      Precautions: Hx of cervical fusion, RTC repair x 2 right shoulder  CO-MORBIDITIES:  HEP ACCESS CODE:   FOTO Completed On:     POC Expires Reeval for Medicare to be completed Unit LImit Auth Expiration Date PT/OT/STVisit Limit   3/19 By visit  10 NA 25 30    Completed on                  TREATMENT DIARY  Auth Status DATE 25       Visit # 1 2 3 4 5 6 7       Remaining 9 8 7 6 5 4 3      MANUAL THERAPY             PROM to right shoulder  RR TM MJC MJC RR TM RR                                                                       THERAPEUTIC EXERCISE                                                                                                                                                                                      NEUROMUSCULAR REEDUCATION              MTP/LTP   RTB 5\" x20 RTB  5\" x20 RTB  5\" x20 Red 5''20x Red 5'' 20x Red 5'' 20x      Scap squeeze               Pendulum   1 min ea 1 min ea 1 min ea 1 min each  1 min ea 1 min each       Pulley Flexion/Scaption  Flex/Scap 5\" x3 min ea Flex/scap  5\" x3 min ea Flex/scap  5\" x3 min ea Flexion Scaption  Flex/scap  5\" x3 min ea Flex/scap  5\" x3 min ea      Finger ladder  Flexion Scaption 5''20x Flex/Scap 5\" x10 ea NV Flex/Scap 5\" x10 ea Flex/Scap 5\" x10 ea Flex/Scap 5\" x10 ea Flex/Scap 5\" x10 ea    " "  Supine Cane   3\" x20 3\" x20  3\" x 20 3''20x 3\" x20 ea 3\" x20 ea      Compass  X20 ea dir X20 ea dir  X20 ea dir X20 each  X20 ea X20 ea      Supine ABC   x1 x1 x1 X1  x1 x1      UBE      5/5 120  120 rpm 5'/5' 120 rpm 5'/5'                                                                                                                                                                  THERAPEUTIC ACTIVITY                                                                                                                                                                                                                             GAIT TRAINING                                                                              MODALITIES                                                          "

## 2025-01-15 ENCOUNTER — ANESTHESIA EVENT (OUTPATIENT)
Dept: GASTROENTEROLOGY | Facility: HOSPITAL | Age: 65
End: 2025-01-15
Payer: MEDICARE

## 2025-01-15 ENCOUNTER — ANESTHESIA (OUTPATIENT)
Dept: GASTROENTEROLOGY | Facility: HOSPITAL | Age: 65
End: 2025-01-15
Payer: MEDICARE

## 2025-01-15 ENCOUNTER — HOSPITAL ENCOUNTER (OUTPATIENT)
Dept: GASTROENTEROLOGY | Facility: HOSPITAL | Age: 65
Setting detail: OUTPATIENT SURGERY
Discharge: HOME | End: 2025-01-15
Attending: SURGERY
Payer: MEDICARE

## 2025-01-15 VITALS
SYSTOLIC BLOOD PRESSURE: 117 MMHG | OXYGEN SATURATION: 97 % | WEIGHT: 113 LBS | DIASTOLIC BLOOD PRESSURE: 59 MMHG | HEART RATE: 68 BPM | RESPIRATION RATE: 16 BRPM | BODY MASS INDEX: 17.13 KG/M2 | TEMPERATURE: 97.8 F | HEIGHT: 68 IN

## 2025-01-15 DIAGNOSIS — Z98.84 BARIATRIC SURGERY STATUS: ICD-10-CM

## 2025-01-15 PROBLEM — IMO0001 SMOKING: Status: ACTIVE | Noted: 2025-01-15

## 2025-01-15 PROBLEM — F17.200 SMOKING: Status: ACTIVE | Noted: 2025-01-15

## 2025-01-15 PROBLEM — I27.20 PULMONARY HYPERTENSION (HCC): Status: ACTIVE | Noted: 2025-01-15

## 2025-01-15 PROCEDURE — 43235 EGD DIAGNOSTIC BRUSH WASH: CPT | Performed by: SURGERY

## 2025-01-15 RX ORDER — PROPOFOL 10 MG/ML
INJECTION, EMULSION INTRAVENOUS AS NEEDED
Status: DISCONTINUED | OUTPATIENT
Start: 2025-01-15 | End: 2025-01-15

## 2025-01-15 RX ORDER — LIDOCAINE HYDROCHLORIDE 20 MG/ML
INJECTION, SOLUTION EPIDURAL; INFILTRATION; INTRACAUDAL; PERINEURAL AS NEEDED
Status: DISCONTINUED | OUTPATIENT
Start: 2025-01-15 | End: 2025-01-15

## 2025-01-15 RX ORDER — SODIUM CHLORIDE 9 MG/ML
125 INJECTION, SOLUTION INTRAVENOUS CONTINUOUS
Status: DISCONTINUED | OUTPATIENT
Start: 2025-01-15 | End: 2025-01-19 | Stop reason: HOSPADM

## 2025-01-15 RX ADMIN — SODIUM CHLORIDE: 0.9 INJECTION, SOLUTION INTRAVENOUS at 12:47

## 2025-01-15 RX ADMIN — PROPOFOL 100 MG: 10 INJECTION, EMULSION INTRAVENOUS at 13:07

## 2025-01-15 RX ADMIN — PROPOFOL 20 MG: 10 INJECTION, EMULSION INTRAVENOUS at 13:09

## 2025-01-15 RX ADMIN — PROPOFOL 20 MG: 10 INJECTION, EMULSION INTRAVENOUS at 13:10

## 2025-01-15 RX ADMIN — LIDOCAINE HYDROCHLORIDE 80 MG: 20 INJECTION, SOLUTION EPIDURAL; INFILTRATION; INTRACAUDAL at 13:06

## 2025-01-15 NOTE — ANESTHESIA POSTPROCEDURE EVALUATION
Post-Op Assessment Note    CV Status:  Stable  Pain Score: 0    Pain management: adequate       Mental Status:  Sleepy   Hydration Status:  Stable   PONV Controlled:  None   Airway Patency:  Patent     Post Op Vitals Reviewed: Yes    No anethesia notable event occurred.    Staff: CRNA           Last Filed PACU Vitals:  Vitals Value Taken Time   Temp     Pulse     BP     Resp     SpO2

## 2025-01-15 NOTE — INTERVAL H&P NOTE
H&P reviewed. After examining the patient I find no changes in the patients condition since the H&P had been written.    Vitals:    01/15/25 1136   BP: 132/61   Pulse: 63   Resp: 20   Temp: 97.8 °F (36.6 °C)   SpO2: 100%

## 2025-01-15 NOTE — ANESTHESIA POSTPROCEDURE EVALUATION
Post-Op Assessment Note    CV Status:  Stable    Pain management: adequate       Mental Status:  Alert and awake   Hydration Status:  Euvolemic   PONV Controlled:  Controlled   Airway Patency:  Patent     Post Op Vitals Reviewed: Yes    No anethesia notable event occurred.    Staff: Anesthesiologist           Last Filed PACU Vitals:  Vitals Value Taken Time   Temp     Pulse 68 01/15/25 1332   /59 01/15/25 1332   Resp 16 01/15/25 1332   SpO2 97 % 01/15/25 1332       Modified Lise:     Vitals Value Taken Time   Activity 2 01/15/25 1333   Respiration 2 01/15/25 1333   Circulation 2 01/15/25 1333   Consciousness 2 01/15/25 1333   Oxygen Saturation 2 01/15/25 1333     Modified Lise Score: 10

## 2025-01-16 ENCOUNTER — OFFICE VISIT (OUTPATIENT)
Dept: PHYSICAL THERAPY | Facility: CLINIC | Age: 65
End: 2025-01-16
Payer: OTHER MISCELLANEOUS

## 2025-01-16 DIAGNOSIS — G89.29 CHRONIC RIGHT SHOULDER PAIN: Primary | ICD-10-CM

## 2025-01-16 DIAGNOSIS — M25.511 CHRONIC RIGHT SHOULDER PAIN: Primary | ICD-10-CM

## 2025-01-16 PROCEDURE — 97112 NEUROMUSCULAR REEDUCATION: CPT | Performed by: PHYSICAL THERAPIST

## 2025-01-16 PROCEDURE — 97140 MANUAL THERAPY 1/> REGIONS: CPT | Performed by: PHYSICAL THERAPIST

## 2025-01-16 NOTE — PROGRESS NOTES
"Daily Note     Today's date: 2025  Patient name: Eduardo Anand  : 1960  MRN: [id number]  Referring provider: Haley Goins CRNP  Dx:   Encounter Diagnosis     ICD-10-CM    1. Chronic right shoulder pain  M25.511     G89.29                      Subjective: Pt offers no new complaints. He had EGD yesterday, may have a hernia in abdominal wall. He is following with MD.       Objective: See treatment diary below      Assessment: Tolerated treatment well. Patient exhibited good technique with therapeutic exercises and would benefit from continued PT      Plan: Continue per plan of care.      Precautions: Hx of cervical fusion, RTC repair x 2 right shoulder  CO-MORBIDITIES:  HEP ACCESS CODE:   FOTO Completed On:     POC Expires Reeval for Medicare to be completed Unit LImit Auth Expiration Date PT/OT/STVisit Limit   3/19 By visit  10 NA 25 30    Completed on                  TREATMENT DIARY  Auth Status DATE 25      Visit # 1 2 3 4 5 6 7 8      Remaining 9 8 7 6 5 4 3 2     MANUAL THERAPY             PROM to right shoulder  RR TM MJC MJC RR TM RR RR                                                                      THERAPEUTIC EXERCISE                                                                                                                                                                                      NEUROMUSCULAR REEDUCATION              MTP/LTP   RTB 5\" x20 RTB  5\" x20 RTB  5\" x20 Red 5''20x Red 5'' 20x Red 5'' 20x Red 5'' 20x     Scap squeeze               Pendulum   1 min ea 1 min ea 1 min ea 1 min each  1 min ea 1 min each  1 min      Pulley Flexion/Scaption  Flex/Scap 5\" x3 min ea Flex/scap  5\" x3 min ea Flex/scap  5\" x3 min ea Flexion Scaption  Flex/scap  5\" x3 min ea Flex/scap  5\" x3 min ea Flex/scap  5\" x3 min ea     Finger ladder  Flexion Scaption 5''20x Flex/Scap 5\" x10 ea NV Flex/Scap 5\" x10 ea Flex/Scap 5\" x10 ea Flex/Scap 5\" x10 ea " "Flex/Scap 5\" x10 ea Flex/Scap 5\" x10 ea     Supine Cane   3\" x20 3\" x20  3\" x 20 3''20x 3\" x20 ea 3\" x20 ea 3\" x20 ea     Compass  X20 ea dir X20 ea dir  X20 ea dir X20 each  X20 ea X20 ea X20 ea     Supine ABC   x1 x1 x1 X1  x1 x1 x1     UBE      5/5 120  120 rpm 5'/5' 120 rpm 5'/5' 120 rpm 5'/5'                                                                                                                                                                 THERAPEUTIC ACTIVITY                                                                                                                                                                                                                             GAIT TRAINING                                                                              MODALITIES                                                            "

## 2025-01-21 ENCOUNTER — OFFICE VISIT (OUTPATIENT)
Dept: PHYSICAL THERAPY | Facility: CLINIC | Age: 65
End: 2025-01-21
Payer: OTHER MISCELLANEOUS

## 2025-01-21 DIAGNOSIS — G89.29 CHRONIC RIGHT SHOULDER PAIN: Primary | ICD-10-CM

## 2025-01-21 DIAGNOSIS — M25.511 CHRONIC RIGHT SHOULDER PAIN: Primary | ICD-10-CM

## 2025-01-21 PROCEDURE — 97140 MANUAL THERAPY 1/> REGIONS: CPT | Performed by: PHYSICAL THERAPIST

## 2025-01-21 PROCEDURE — 97112 NEUROMUSCULAR REEDUCATION: CPT | Performed by: PHYSICAL THERAPIST

## 2025-01-21 NOTE — PROGRESS NOTES
"Daily Note     Today's date: 2025  Patient name: Eduardo Anand  : 1960  MRN: [id number]  Referring provider: Haley Goins CRNP  Dx:   Encounter Diagnosis     ICD-10-CM    1. Chronic right shoulder pain  M25.511     G89.29                      Subjective: Pt is ok, the cold weather is affecting his shoulder       Objective: See treatment diary below      Assessment: Tolerated treatment well. Patient exhibited good technique with therapeutic exercises and would benefit from continued PT.        Plan: Continue per plan of care.      Precautions: Hx of cervical fusion, RTC repair x 2 right shoulder  CO-MORBIDITIES:  HEP ACCESS CODE:   FOTO Completed On:     POC Expires Reeval for Medicare to be completed Unit LImit Auth Expiration Date PT/OT/STVisit Limit   3/19 By visit  10 NA 25 30    Completed on                  TREATMENT DIARY  Auth Status DATE 25     Visit # 1 2 3 4 5 6 7 8 9     Remaining 9 8 7 6 5 4 3 2 1    MANUAL THERAPY             PROM to right shoulder  RR TM MJC MJC RR TM RR RR RR                                                                     THERAPEUTIC EXERCISE                                                                                                                                                                                      NEUROMUSCULAR REEDUCATION              MTP/LTP   RTB 5\" x20 RTB  5\" x20 RTB  5\" x20 Red 5''20x Red 5'' 20x Red 5'' 20x Red 5'' 20x Green5''20x    Scap squeeze               Pendulum   1 min ea 1 min ea 1 min ea 1 min each  1 min ea 1 min each  1 min  1 min     Pulley Flexion/Scaption  Flex/Scap 5\" x3 min ea Flex/scap  5\" x3 min ea Flex/scap  5\" x3 min ea Flexion Scaption  Flex/scap  5\" x3 min ea Flex/scap  5\" x3 min ea Flex/scap  5\" x3 min ea Flex/scap  5\" x3 min ea    Finger ladder  Flexion Scaption 5''20x Flex/Scap 5\" x10 ea NV Flex/Scap 5\" x10 ea Flex/Scap 5\" x10 ea Flex/Scap 5\" x10 ea Flex/Scap " "5\" x10 ea Flex/Scap 5\" x10 ea Flex/Scap 5\" x10 ea    Supine Cane   3\" x20 3\" x20  3\" x 20 3''20x 3\" x20 ea 3\" x20 ea 3\" x20 ea 3''20x    Compass  X20 ea dir X20 ea dir  X20 ea dir X20 each  X20 ea X20 ea X20 ea X20 each     Supine ABC   x1 x1 x1 X1  x1 x1 x1 x1    UBE      5/5 120  120 rpm 5'/5' 120 rpm 5'/5' 120 rpm 5'/5' 120 rpm 5'/5'                                                                                                                                                                THERAPEUTIC ACTIVITY                                                                                                                                                                                                                             GAIT TRAINING                                                                              MODALITIES                                                              "

## 2025-01-22 ENCOUNTER — HOSPITAL ENCOUNTER (OUTPATIENT)
Dept: RADIOLOGY | Facility: HOSPITAL | Age: 65
Discharge: HOME | End: 2025-01-22
Payer: MEDICARE

## 2025-01-22 DIAGNOSIS — R11.10 VOMITING: ICD-10-CM

## 2025-01-22 PROCEDURE — 74240 X-RAY XM UPR GI TRC 1CNTRST: CPT

## 2025-01-23 ENCOUNTER — EVALUATION (OUTPATIENT)
Dept: PHYSICAL THERAPY | Facility: CLINIC | Age: 65
End: 2025-01-23
Payer: OTHER MISCELLANEOUS

## 2025-01-23 DIAGNOSIS — G89.29 CHRONIC RIGHT SHOULDER PAIN: Primary | ICD-10-CM

## 2025-01-23 DIAGNOSIS — M25.511 CHRONIC RIGHT SHOULDER PAIN: Primary | ICD-10-CM

## 2025-01-23 PROCEDURE — 97112 NEUROMUSCULAR REEDUCATION: CPT | Performed by: PHYSICAL THERAPIST

## 2025-01-23 PROCEDURE — 97140 MANUAL THERAPY 1/> REGIONS: CPT | Performed by: PHYSICAL THERAPIST

## 2025-01-23 NOTE — PROGRESS NOTES
PT Progress Note     Today's date: 2025  Patient name: Eduardo Anand  : 1960  MRN: [id number]  Referring provider: Haley Goins CRNP  Dx:   Encounter Diagnosis     ICD-10-CM    1. Chronic right shoulder pain  M25.511     G89.29               Assessment  Impairments: abnormal coordination, abnormal gait, abnormal muscle firing, abnormal muscle tone, abnormal or restricted ROM, abnormal movement, activity intolerance, impaired physical strength, lacks appropriate home exercise program, pain with function, safety issue and poor posture   Symptom irritability: moderate    Assessment details: Eduardo Anand is a 64 y.o. male who presents to outpatient PT with a  No diagnosis found..  No further referral appears necessary at this time based upon examination results. Pt presents with decreased strength, ROM, balance, functional activity tolerance, and pain with movement in his right shoulder  which is  limiting his ability to perform the aforementioned functional activities.  Etiologic factors include repetitive poor body mechanics. Prognosis is good given HEP compliance and PT 2-3/wk.  Please contact me if you have any questions or recommendations.  Thank you for the opportunity to share in  [facility].     RA     Eduardo Anand has demonstrated decreased pain, increased strength, increased range of motion, and increased activity tolerance since starting physical therapy services. He reports an overall improvement of 0% thus far. He reports right arm feels more sore following PT treatment. He has been tolerating all outlined Treatment well. He has a follow up with MD on . He continues to present with pain, decreased strength, decreased range of motion, and decreased activity tolerance and would benefit from additional skilled physical therapy interventions to address impairments and maximize function.      Understanding of Dx/Px/POC: good     Prognosis: good    Goals  STG to be achieved in 4 weeks      1. Pt will reduce subjective pain rating by at least 50 percent the help facilitate return to PLOF  Progressing   2. Pt will improve right shoulder AROM by at least 10 degrees to help promote improved functional activity tolerance   Progressing   3. Pt will improve right shoulder MMT scores by at least 1/2 grade to promote improved functional activity tolerance   Progressing     LTG to be achieved in 6-8 weeks   1. Pt will be complaint with HEP   Progressing   2. Pt will improve right shoulder AROM to WFL, to help facilitate independence with ADL's, IADL's, and functional activities   Progressing   3. Pt will improve right shoulder Strength to WFL to help facilitate independence with ADL's, IADL's, and functional activities   Progressing   4. Pt will have no limitations with ambulation to help facilitate independence at home and in the community.   Progressing   5. Pt will have no limitations with stair negotiation to help facilitate independence at home and in the community   Progressing           Plan  Patient would benefit from: skilled physical therapy    Planned therapy interventions: ADL training, balance, balance/weight bearing training, flexibility, functional ROM exercises, gait training, graded activity, graded exercise, graded motor, home exercise program, joint mobilization, manual therapy, neuromuscular re-education, patient education, postural training, strengthening, stretching, therapeutic activities and therapeutic exercise    Frequency: 2x week  Duration in weeks: 12  Plan of Care beginning date: 12/19/2024  Plan of Care expiration date: 3/18/2025  Treatment plan discussed with: patient, PTA and referring physician        Subjective Evaluation    History of Present Illness  Mechanism of injury: Pt is 64 year old male who presents to outpatient PT with CC of right shoulder and neck pain for the past 7/8 month, He does note occasional radicular sx radiating into his right upper extremity. He  reports no mechanical component to. He states that the radiculopathy is intermittent in nature. Also notes difficulty with reaching and lifting heavier objects. Goals for PT: None stated    The patient is unsure of why he is at PT. Pt educated on expectations and goals for physical therapy.  Pt reports he has follow up with MD on  Scheduled for NCV test on .   The patient does reports a hx of RTC repair x 2 and cervical fusion.   Patient Goals  Patient goals for therapy: increased strength, decreased pain and increased motion  Patient goal: none stated.  Pain    RA   Current pain ratin  8  At best pain ratin  5  At worst pain rating: 10 8  Quality: burning  Relieving factors: change in position  Progression: worsening          Objective     Active Range of Motion   Cervical/Thoracic Spine       Cervical    Flexion: 30 degrees   Extension: 5 degrees      Left lateral flexion: 20 degrees      Right lateral flexion: 20 degrees      Left rotation: 40 degrees  Right rotation: 40 degrees       Right Shoulder   Flexion: 60 degrees   RA   110 deg   Abduction: 70 degrees    deg   External rotation BTH: Active external rotation behind the head: occiput.   Occiput   Internal rotation BTB: Active internal rotation behind the back: right buttocks.   Right buttocks     Strength/Myotome Testing     Right Shoulder          Planes of Motion   Flexion: 2+     2+  Abduction: 2+     2+  External rotation at 0°: 2+   2+  Internal rotation at 0°: 2+   2+              Precautions: Hx of cervical fusion, RTC repair x 2 right shoulder  CO-MORBIDITIES:  HEP ACCESS CODE:   FOTO Completed On:     POC Expires Reeval for Medicare to be completed Unit LImit Auth Expiration Date PT/OT/STVisit Limit   3/19 By visit  10 NA 25 30    Completed on                  TREATMENT DIARY  Auth Status DATE 25    Visit # 1 2 3 4 5 6 7 8 9 RE     Remaining 9 8 7 6 5 4 3 2 1 9  "  MANUAL THERAPY             PROM to right shoulder  RR TM MJC MJC RR TM RR RR RR RR                                                                    THERAPEUTIC EXERCISE                                                                                                                                                                                      NEUROMUSCULAR REEDUCATION              MTP/LTP   RTB 5\" x20 RTB  5\" x20 RTB  5\" x20 Red 5''20x Red 5'' 20x Red 5'' 20x Red 5'' 20x Green5''20x Green5''20x   Scap squeeze               Pendulum   1 min ea 1 min ea 1 min ea 1 min each  1 min ea 1 min each  1 min  1 min  1 min    Pulley Flexion/Scaption  Flex/Scap 5\" x3 min ea Flex/scap  5\" x3 min ea Flex/scap  5\" x3 min ea Flexion Scaption  Flex/scap  5\" x3 min ea Flex/scap  5\" x3 min ea Flex/scap  5\" x3 min ea Flex/scap  5\" x3 min ea Flex/scap  5\" x3 min ea   Finger ladder  Flexion Scaption 5''20x Flex/Scap 5\" x10 ea NV Flex/Scap 5\" x10 ea Flex/Scap 5\" x10 ea Flex/Scap 5\" x10 ea Flex/Scap 5\" x10 ea Flex/Scap 5\" x10 ea Flex/Scap 5\" x10 ea Flex/Scap 5\" x10 ea   Supine Cane   3\" x20 3\" x20  3\" x 20 3''20x 3\" x20 ea 3\" x20 ea 3\" x20 ea 3''20x 3''20x   Compass  X20 ea dir X20 ea dir  X20 ea dir X20 each  X20 ea X20 ea X20 ea X20 each  X20 each   Supine ABC   x1 x1 x1 X1  x1 x1 x1 x1 x1   UBE      5/5 120  120 rpm 5'/5' 120 rpm 5'/5' 120 rpm 5'/5' 120 rpm 5'/5' 120 rpm 5'/5'                                                                                                                                                               THERAPEUTIC ACTIVITY                                                                                                                                                                                                                             GAIT TRAINING                                                                              MODALITIES                                                              "

## 2025-01-28 ENCOUNTER — APPOINTMENT (OUTPATIENT)
Dept: PHYSICAL THERAPY | Facility: CLINIC | Age: 65
End: 2025-01-28
Payer: OTHER MISCELLANEOUS

## 2025-01-30 ENCOUNTER — TELEPHONE (OUTPATIENT)
Dept: BARIATRICS | Facility: CLINIC | Age: 65
End: 2025-01-30

## 2025-01-30 ENCOUNTER — APPOINTMENT (OUTPATIENT)
Dept: PHYSICAL THERAPY | Facility: CLINIC | Age: 65
End: 2025-01-30
Payer: OTHER MISCELLANEOUS

## 2025-01-30 NOTE — TELEPHONE ENCOUNTER
LVM to inform patient that we were moving appt back to 4:30 do to the provider needing extra time to review test result. Left office info to back [phone] if this time does not work

## 2025-01-30 NOTE — TELEPHONE ENCOUNTER
Patient's wife called in to reschedule Eduardo's appointment with Dr. Farhad Araujo for his EGD Review for a sooner appointment. She is unable to bring him today as she has to work. He will not be available Feb 13th -27th due to an [place] they have attend. She is requesting am appt beforehand as she is under the impression he needs to be seen as soon as possible. Please call her back at [phone].

## 2025-02-04 ENCOUNTER — APPOINTMENT (OUTPATIENT)
Age: 65
End: 2025-02-04
Payer: MEDICARE

## 2025-02-04 DIAGNOSIS — Z98.84 BARIATRIC SURGERY STATUS: ICD-10-CM

## 2025-02-04 DIAGNOSIS — Z98.84 BARIATRIC SURGERY STATUS: Primary | ICD-10-CM

## 2025-02-04 DIAGNOSIS — E78.5 DYSLIPIDEMIA: ICD-10-CM

## 2025-02-04 DIAGNOSIS — E55.9 VITAMIN D DEFICIENCY: ICD-10-CM

## 2025-02-04 DIAGNOSIS — Z13.29 SCREENING FOR THYROID DISORDER: ICD-10-CM

## 2025-02-04 DIAGNOSIS — Z11.59 NEED FOR HEPATITIS C SCREENING TEST: ICD-10-CM

## 2025-02-04 DIAGNOSIS — Z12.5 SCREENING PSA (PROSTATE SPECIFIC ANTIGEN): ICD-10-CM

## 2025-02-04 DIAGNOSIS — K90.49 MALABSORPTION DUE TO INTOLERANCE, NOT ELSEWHERE CLASSIFIED: ICD-10-CM

## 2025-02-04 DIAGNOSIS — I10 ESSENTIAL HYPERTENSION: ICD-10-CM

## 2025-02-04 DIAGNOSIS — Z13.0 SCREENING FOR DEFICIENCY ANEMIA: ICD-10-CM

## 2025-02-04 LAB
25(OH)D3 SERPL-MCNC: 16.3 NG/ML (ref 30–100)
ALBUMIN SERPL BCG-MCNC: 4.6 G/DL (ref 3.5–5)
ALP SERPL-CCNC: 84 U/L (ref 34–104)
ALT SERPL W P-5'-P-CCNC: 13 U/L (ref 7–52)
ANION GAP SERPL CALCULATED.3IONS-SCNC: 6 MMOL/L (ref 4–13)
AST SERPL W P-5'-P-CCNC: 18 U/L (ref 13–39)
BASOPHILS # BLD AUTO: 0.08 THOUSANDS/ΜL (ref 0–0.1)
BASOPHILS NFR BLD AUTO: 1 % (ref 0–1)
BILIRUB SERPL-MCNC: 0.47 MG/DL (ref 0.2–1)
BUN SERPL-MCNC: 18 MG/DL (ref 5–25)
CALCIUM SERPL-MCNC: 9.3 MG/DL (ref 8.4–10.2)
CHLORIDE SERPL-SCNC: 102 MMOL/L (ref 96–108)
CHOLEST SERPL-MCNC: 218 MG/DL (ref ?–200)
CO2 SERPL-SCNC: 31 MMOL/L (ref 21–32)
CREAT SERPL-MCNC: 0.84 MG/DL (ref 0.6–1.3)
EOSINOPHIL # BLD AUTO: 0.08 THOUSAND/ΜL (ref 0–0.61)
EOSINOPHIL NFR BLD AUTO: 1 % (ref 0–6)
ERYTHROCYTE [DISTWIDTH] IN BLOOD BY AUTOMATED COUNT: 13.4 % (ref 11.6–15.1)
FERRITIN SERPL-MCNC: 11 NG/ML (ref 24–336)
FOLATE SERPL-MCNC: 11.4 NG/ML
GFR SERPL CREATININE-BSD FRML MDRD: 93 ML/MIN/1.73SQ M
GLUCOSE P FAST SERPL-MCNC: 90 MG/DL (ref 65–99)
HCT VFR BLD AUTO: 43.9 % (ref 36.5–49.3)
HDLC SERPL-MCNC: 57 MG/DL
HGB BLD-MCNC: 13.7 G/DL (ref 12–17)
IMM GRANULOCYTES # BLD AUTO: 0.01 THOUSAND/UL (ref 0–0.2)
IMM GRANULOCYTES NFR BLD AUTO: 0 % (ref 0–2)
IRON SATN MFR SERPL: 7 % (ref 15–50)
IRON SERPL-MCNC: 36 UG/DL (ref 50–212)
LDLC SERPL CALC-MCNC: 150 MG/DL (ref 0–100)
LYMPHOCYTES # BLD AUTO: 1.55 THOUSANDS/ΜL (ref 0.6–4.47)
LYMPHOCYTES NFR BLD AUTO: 24 % (ref 14–44)
MCH RBC QN AUTO: 28.4 PG (ref 26.8–34.3)
MCHC RBC AUTO-ENTMCNC: 31.2 G/DL (ref 31.4–37.4)
MCV RBC AUTO: 91 FL (ref 82–98)
MONOCYTES # BLD AUTO: 0.4 THOUSAND/ΜL (ref 0.17–1.22)
MONOCYTES NFR BLD AUTO: 6 % (ref 4–12)
NEUTROPHILS # BLD AUTO: 4.4 THOUSANDS/ΜL (ref 1.85–7.62)
NEUTS SEG NFR BLD AUTO: 68 % (ref 43–75)
NONHDLC SERPL-MCNC: 161 MG/DL
NRBC BLD AUTO-RTO: 0 /100 WBCS
PLATELET # BLD AUTO: 379 THOUSANDS/UL (ref 149–390)
PMV BLD AUTO: 9 FL (ref 8.9–12.7)
POTASSIUM SERPL-SCNC: 4.6 MMOL/L (ref 3.5–5.3)
PROT SERPL-MCNC: 7.6 G/DL (ref 6.4–8.4)
PSA SERPL-MCNC: 1.25 NG/ML (ref 0–4)
RBC # BLD AUTO: 4.83 MILLION/UL (ref 3.88–5.62)
SODIUM SERPL-SCNC: 139 MMOL/L (ref 135–147)
TIBC SERPL-MCNC: 485.8 UG/DL (ref 250–450)
TRANSFERRIN SERPL-MCNC: 347 MG/DL (ref 203–362)
TRIGL SERPL-MCNC: 53 MG/DL (ref ?–150)
TSH SERPL DL<=0.05 MIU/L-ACNC: 1.27 UIU/ML (ref 0.45–4.5)
UIBC SERPL-MCNC: 450 UG/DL (ref 155–355)
VIT B12 SERPL-MCNC: 267 PG/ML (ref 180–914)
WBC # BLD AUTO: 6.52 THOUSAND/UL (ref 4.31–10.16)

## 2025-02-04 PROCEDURE — 84630 ASSAY OF ZINC: CPT

## 2025-02-04 PROCEDURE — 82607 VITAMIN B-12: CPT

## 2025-02-04 PROCEDURE — 36415 COLL VENOUS BLD VENIPUNCTURE: CPT

## 2025-02-04 PROCEDURE — 84425 ASSAY OF VITAMIN B-1: CPT

## 2025-02-04 PROCEDURE — 83540 ASSAY OF IRON: CPT

## 2025-02-04 PROCEDURE — 83550 IRON BINDING TEST: CPT

## 2025-02-04 PROCEDURE — 84443 ASSAY THYROID STIM HORMONE: CPT

## 2025-02-04 PROCEDURE — 82306 VITAMIN D 25 HYDROXY: CPT

## 2025-02-04 PROCEDURE — 80061 LIPID PANEL: CPT

## 2025-02-04 PROCEDURE — 84590 ASSAY OF VITAMIN A: CPT

## 2025-02-04 PROCEDURE — 86803 HEPATITIS C AB TEST: CPT

## 2025-02-04 PROCEDURE — 82728 ASSAY OF FERRITIN: CPT

## 2025-02-04 PROCEDURE — 80053 COMPREHEN METABOLIC PANEL: CPT

## 2025-02-04 PROCEDURE — 82746 ASSAY OF FOLIC ACID SERUM: CPT

## 2025-02-04 PROCEDURE — 85025 COMPLETE CBC W/AUTO DIFF WBC: CPT

## 2025-02-04 PROCEDURE — G0103 PSA SCREENING: HCPCS

## 2025-02-05 ENCOUNTER — OFFICE VISIT (OUTPATIENT)
Dept: BARIATRICS | Facility: CLINIC | Age: 65
End: 2025-02-05
Payer: MEDICARE

## 2025-02-05 VITALS
DIASTOLIC BLOOD PRESSURE: 68 MMHG | OXYGEN SATURATION: 99 % | HEIGHT: 68 IN | WEIGHT: 113 LBS | SYSTOLIC BLOOD PRESSURE: 128 MMHG | BODY MASS INDEX: 17.13 KG/M2 | TEMPERATURE: 97.8 F | HEART RATE: 86 BPM

## 2025-02-05 DIAGNOSIS — K43.9 VENTRAL HERNIA: Primary | ICD-10-CM

## 2025-02-05 LAB — HCV AB SER QL: NORMAL

## 2025-02-05 PROCEDURE — 99213 OFFICE O/P EST LOW 20 MIN: CPT | Performed by: SURGERY

## 2025-02-05 NOTE — PROGRESS NOTES
"OFFICE VISIT - BARIATRIC SURGERY  Eduardo Anand 64 y.o. male MRN: [id number]  Unit/Bed#:  Encounter: [id number]      HPI:  Eduardo Anand is a 64 y.o. male status post remote RYGB status post ex lap + marginal ulcer perf repair and G tube, (7/2019) and perc zac for acalculous cholecystitis (9/2019). Comes to the office today for EGD/UGI review.    Of note, he was seen in clinic in clinic on 1/10 with complaints of vomiting and PO intolerance.    Subjective     Patient presents to clinic accompanied by his wife.  They state that at this point for several years he has experienced dysphagia and regurgitation with eating.  He continues to maintain a low weight.  He continues to experience epigastric pain associated with a bulge.  This pain is worse on days when he experiences some constipation.    Review of Systems   Constitutional:  Negative for chills and fever.   HENT:  Negative for ear pain and sore throat.    Eyes:  Negative for pain and visual disturbance.   Respiratory:  Negative for cough and shortness of breath.    Cardiovascular:  Negative for chest pain and palpitations.   Gastrointestinal:  Positive for abdominal pain and vomiting.   Genitourinary:  Negative for dysuria and hematuria.   Musculoskeletal:  Negative for arthralgias and back pain.   Skin:  Negative for color change and rash.   Neurological:  Negative for seizures and syncope.   All other systems reviewed and are negative.      Historical Information   Past Medical History:   Diagnosis Date    Anemia     Anesthesia     Pt wakes up during \"almost every surgery\"    Arthritis     Asthma     Bariatric surgery status     Saucedo esophagus     Cancer (HCC) 2012    Had lobectomy, lower left lung.    Cervical radiculopathy     Chemotherapy follow-up examination     Chronic pain     Chronic pain disorder     COPD (chronic obstructive pulmonary disease) (HCC)     Food allergy     clams    GERD (gastroesophageal reflux disease)     Heart murmur     Hernia  "    Hiatal hernia     History of malignant neoplasm     History of pneumonia 04/12/2016    History of pulmonary embolism     History of pulmonary embolus (PE)     History of ulceration     Hyperlipidemia     Lung cancer (HCC)     left lung, radiation and chemo tx    Migraine     Pneumonia     Postgastrectomy malabsorption     Right leg DVT (HCC)     Right scapholunate ligament tear     Skipped heart beats     Wears partial dentures     upper and lower     Past Surgical History:   Procedure Laterality Date    ABDOMINAL SURGERY      BRONCHOSCOPY      COLONOSCOPY      FRACTURE SURGERY      femur right 1985    GASTRIC BYPASS LAPAROSCOPIC N/A 7/12/2017    Procedure: GASTRIC DIVERSION WITH [place]-EN-Y RECONSTRUCTION WITH  SHORT 60 cm LIMB;  Surgeon: Faustino Araujo MD;  Location: [facility];  Service: Bariatrics    HEMORRHOID SURGERY      HERNIA REPAIR  2019    IR CHEST TUBE PLACEMENT  8/9/2019    IR CHOLECYSTOSTOMY TUBE PLACEMENT  9/22/2019    IR IMAGE GUIDED ASPIRATION / DRAINAGE  8/1/2019    IR PICC LINE  8/9/2019    KNEE ARTHROSCOPY Right     right shoulder    LAPAROTOMY N/A 7/28/2019    Procedure: LAPAROTOMY EXPLORATORY, WASHOUT OF SUCUS, REPAIR OF MARGINAL ULCER PERFORATION, ABD WASHOUT, INTRAOPERATIVE EGD, GI ASSISTED ENDOSCOPIC STENT PLACEMENT;  Surgeon: Maria Elena Crook MD;  Location: [facility];  Service: Bariatrics    LUNG LOBECTOMY Left     LYMPHADENECTOMY  05/22/2012    Dr. Nava    MANDIBLE FRACTURE SURGERY      MEDIASTINOSCOPY      KS ARTHRD ANT INTERBODY MIN DSC CRV BELOW C2 N/A 10/17/2017    Procedure: C5-6, C6-7 ACDF WITH ALLOGRAFT (NEURO MONITORING);  Surgeon: Lorena Singh MD;  [facility];  Service: Orthopedics    KS COLONOSCOPY FLX DX W/COLLJ SPEC WHEN PFRMD N/A 4/14/2017    Procedure: COLONOSCOPY;  Surgeon: Navi Recinos MD;  Location: [facility];  Service: Gastroenterology    KS EGD TRANSORAL BIOPSY SINGLE/MULTIPLE N/A 1/9/2019    Procedure: ESOPHAGOGASTRODUODENOSCOPY (EGD);  Surgeon:  Faustino Araujo MD;  [facility]: [facility];  Service: Bariatrics    NM ESOPHAGOGASTRODUODENOSCOPY TRANSORAL DIAGNOSTIC N/A 2017    Procedure: ESOPHAGOGASTRODUODENOSCOPY (EGD);  Surgeon: Navi Recinos MD;  [facility];  Service: Gastroenterology    NM LAPS ABD PRTM&OMENTUM DX W/WO SPEC BR/WA SPX N/A 2019    Procedure: LAPAROSCOPY DIAGNOSTIC CONVERSION TO OPEN, REDUCTION AND REPAIR OF INTERNAL HERNIA, REPAIR SEROSAL TEARS;  Surgeon: Maria Elena Crook MD;  [facility];  Service: Bariatrics    NM LAPS RPR PARAESPHGL HRNA INCL FUNDPLSTY W/MESH N/A 2017    Procedure: REPAIR HERNIA PARAESOPHAGEAL  LAPAROSCOPIC;  Surgeon: Faustino Araujo MD;  [facility];  Service: Bariatrics    NM NDSC WRST SURG W/RLS TRANSVRS CARPL LIGM Right 2016    Procedure: RELEASE CARPAL TUNNEL ENDOSCOPIC;  Surgeon: Cali Vu MD;  [facility];  Service: Orthopedics    NM TENDON SHEATH INCISION Right 2018    Procedure: RELEASE TRIGGER FINGER long and index finger;  Surgeon: Cali Vu MD;  [facility];  Service: Orthopedics    RECONSTRUCTION DISTAL RADIUS/ULNA JOINT (DRUJ) Right 2016    Procedure: WRIST SCAPHOLUNATE LIGAMENT RECONSTRUCTION WITH ECRB TENDON AUTOGRAPH , SPLINT APPLICATION ;  Surgeon: Cali Vu MD;  [facility];  Service:     SHOULDER ARTHROSCOPY Right 2021    SAD with debridement    SHOULDER SURGERY      TONSILLECTOMY      UPPER GASTROINTESTINAL ENDOSCOPY       Social History   Social History     Substance and Sexual Activity   Alcohol Use Not Currently    Comment: rarely     Social History     Substance and Sexual Activity   Drug Use No     Social History     Tobacco Use   Smoking Status Some Days    Current packs/day: 0.00    Types: Cigarettes    Last attempt to quit: 2015    Years since quittin.7    Passive exposure: Never   Smokeless Tobacco Never       Objective       Current Vitals:   Blood Pressure: 128/68  "(02/05/25 1508)  Pulse: 86 (02/05/25 1508)  Temperature: 97.8 °F (36.6 °C) (02/05/25 1508)  Temp Source: Tympanic (02/05/25 1508)  Height: 5' 8\" (172.7 cm) (02/05/25 1508)  Weight - Scale: 51.3 kg (113 lb) (02/05/25 1508)  SpO2: 99 % (02/05/25 1508)    Invasive Devices       None                   Physical Exam  Constitutional:       General: He is not in acute distress.     Appearance: He is obese. He is not ill-appearing or toxic-appearing.   HENT:      Head: Normocephalic and atraumatic.      Right Ear: External ear normal.      Left Ear: External ear normal.      Nose: Nose normal.      Mouth/Throat:      Mouth: Mucous membranes are moist.      Pharynx: Oropharynx is clear.   Eyes:      Extraocular Movements: Extraocular movements intact.      Conjunctiva/sclera: Conjunctivae normal.   Cardiovascular:      Rate and Rhythm: Normal rate.   Pulmonary:      Effort: Pulmonary effort is normal. No respiratory distress.      Breath sounds: No wheezing.   Abdominal:      General: There is no distension.      Palpations: Abdomen is soft.      Tenderness: There is no abdominal tenderness. There is no guarding or rebound.      Hernia: A hernia (epigastrium, without overlying skin change. Tender to palpation) is present.   Musculoskeletal:         General: No deformity.      Cervical back: Normal range of motion and neck supple.   Skin:     General: Skin is warm and dry.   Neurological:      General: No focal deficit present.      Mental Status: He is alert and oriented to person, place, and time.   Psychiatric:         Mood and Affect: Mood normal.         Behavior: Behavior normal.           Pathology, and Other Studies: Results Review Statement: No pertinent imaging studies reviewed.      Assessment/PLAN:    Eduardo Anand is a 64 y.o. male status post remote RYGB status post ex lap + marginal ulcer perf repair and G tube, (7/2019) and perc zac for acalculous cholecystitis (9/2019). . Comes to the office today for " EGD/UGI review..    Workup thus far reviewed and discussed with patient: Not significant for abnormal findings.  Workup includes:     UGI  FINDINGS:     The esophagus is normal in caliber. Esophageal motility is normal and emptying of contrast from the esophagus is prompt. There is no mucosal mass, ulceration or fold thickening identified.     Britney-en-Y gastric pouch is unremarkable in size. The gastric mucosa is normal. No penetrating ulcers or masses. Gastrojejunostomy anastomosis is intact and contrast passes readily into an unremarkable Britney limb.     Gastroesophageal reflux was not observed.     There is no hiatal hernia.     IMPRESSION:     Unremarkable bariatric upper GI series.      EGD  FINDINGS:  The esophagus, gastric pouch, gastrojejunostomy and Britney limb appeared normal. Anastomosis less than 20 mm but easily traversable  Regular Z-line 39 cm from the incisors        SPECIMENS:  * No specimens in log *        IMPRESSION:  The esophagus, gastric pouch, gastrojejunostomy and britney limb appeared normal.      Pathology from EGD         MANOMETRY/pH Study        GASTRIC EMPTYING STUDY      --------------------------------------------------------------------  Discussed with the patient that his workup so far to this point is negative for any complications as they relate to his gastric bypass.  He does appear to have a symptomatic abdominal wall hernia noted on physical exam.  Discussed with him that it is very possible that the hernia is causing his GI symptoms.  Will plan to obtain a CT of the abdomen and pelvis for further evaluation. Discussed with him that we can set him up to see our dietitians, however he would prefer to do this at a later date.    Plan:  - CT abd/pel  - Follow up in clinic after this has resulted to discuss the next steps              Wale Olivarez MD  Bariatric Surgery  2/5/2025  3:51 PM

## 2025-02-07 LAB — VIT A SERPL-MCNC: 27.3 UG/DL (ref 22–69.5)

## 2025-02-08 LAB — ZINC SERPL-MCNC: 71 UG/DL (ref 44–115)

## 2025-02-10 LAB — VIT B1 BLD-SCNC: 134 NMOL/L (ref 66.5–200)

## 2025-02-12 ENCOUNTER — HOSPITAL ENCOUNTER (OUTPATIENT)
Dept: CT IMAGING | Facility: HOSPITAL | Age: 65
Discharge: HOME | End: 2025-02-12
Payer: MEDICARE

## 2025-02-12 DIAGNOSIS — K43.9 VENTRAL HERNIA: ICD-10-CM

## 2025-02-12 PROCEDURE — 74178 CT ABD&PLV WO CNTR FLWD CNTR: CPT

## 2025-02-12 RX ADMIN — IOHEXOL 100 ML: 350 INJECTION, SOLUTION INTRAVENOUS at 06:57

## 2025-03-19 ENCOUNTER — OFFICE VISIT (OUTPATIENT)
Dept: BARIATRICS | Facility: CLINIC | Age: 65
End: 2025-03-19
Payer: MEDICARE

## 2025-03-19 VITALS
HEART RATE: 96 BPM | TEMPERATURE: 97.5 F | OXYGEN SATURATION: 98 % | HEIGHT: 68 IN | DIASTOLIC BLOOD PRESSURE: 64 MMHG | WEIGHT: 115.5 LBS | SYSTOLIC BLOOD PRESSURE: 126 MMHG | BODY MASS INDEX: 17.5 KG/M2

## 2025-03-19 DIAGNOSIS — R11.2 NAUSEA AND VOMITING, UNSPECIFIED VOMITING TYPE: Primary | ICD-10-CM

## 2025-03-19 PROCEDURE — 99214 OFFICE O/P EST MOD 30 MIN: CPT | Performed by: SURGERY

## 2025-03-19 NOTE — PROGRESS NOTES
Date of surgery: 7/12/2017  Procedure: Laparoscopic   Performing surgeon: Dr. Farhad Araujo    Initial Weight - 216.5lb  Current Weight - 115.5lb  Sohan Weight - 113lb  Total Body Weight Loss (EWL)- 101  EWL% - 196%  TWB % - 47%

## 2025-03-19 NOTE — PROGRESS NOTES
"OFFICE VISIT - BARIATRIC SURGERY  Eduardo Anand 64 y.o. male MRN: [id number]  Unit/Bed#:  Encounter: [id number]      HPI:  Eduardo Anand is a 64 y.o. male status post remote RYGB status post ex lap + marginal ulcer perf repair and G tube, (7/2019) and perc zac for acalculous cholecystitis (9/2019). Comes to the office today for CT review.    Of note, he was seen in clinic in clinic on 2/5 with complaints of continued epigastric pain, dysphagia, and regurgitation.    Subjective     Patient is accompanied by his wife. They state he continues to experience epigastric pain, dysphagia, and regurgitation.    Review of Systems   Constitutional:  Negative for chills and fever.   HENT:  Negative for ear pain and sore throat.    Eyes:  Negative for pain and visual disturbance.   Respiratory:  Negative for cough and shortness of breath.    Cardiovascular:  Negative for chest pain and palpitations.   Gastrointestinal:  Positive for abdominal pain and vomiting.   Genitourinary:  Negative for dysuria and hematuria.   Musculoskeletal:  Negative for arthralgias and back pain.   Skin:  Negative for color change and rash.   Neurological:  Negative for seizures and syncope.   All other systems reviewed and are negative.      Historical Information   Past Medical History:   Diagnosis Date    Anemia     Anesthesia     Pt wakes up during \"almost every surgery\"    Arthritis     Asthma     Bariatric surgery status     Saucedo esophagus     Cancer (HCC) 2012    Had lobectomy, lower left lung.    Cervical radiculopathy     Chemotherapy follow-up examination     Chronic pain     Chronic pain disorder     COPD (chronic obstructive pulmonary disease) (HCC)     Food allergy     clams    GERD (gastroesophageal reflux disease)     Heart murmur     Hernia     Hiatal hernia     History of malignant neoplasm     History of pneumonia 04/12/2016    History of pulmonary embolism     History of pulmonary embolus (PE)     History of ulceration     " Hyperlipidemia     Lung cancer (HCC)     left lung, radiation and chemo tx    Migraine     Pneumonia     Postgastrectomy malabsorption     Right leg DVT (HCC)     Right scapholunate ligament tear     Skipped heart beats     Wears partial dentures     upper and lower     Past Surgical History:   Procedure Laterality Date    ABDOMINAL SURGERY      BRONCHOSCOPY      COLONOSCOPY      FRACTURE SURGERY      femur right 1985    GASTRIC BYPASS LAPAROSCOPIC N/A 7/12/2017    Procedure: GASTRIC DIVERSION WITH [place]-EN-Y RECONSTRUCTION WITH  SHORT 60 cm LIMB;  Surgeon: Faustino Araujo MD;  Location: [facility];  Service: Bariatrics    HEMORRHOID SURGERY      HERNIA REPAIR  2019    IR CHEST TUBE PLACEMENT  8/9/2019    IR CHOLECYSTOSTOMY TUBE PLACEMENT  9/22/2019    IR IMAGE GUIDED ASPIRATION / DRAINAGE  8/1/2019    IR PICC LINE  8/9/2019    KNEE ARTHROSCOPY Right     right shoulder    LAPAROTOMY N/A 7/28/2019    Procedure: LAPAROTOMY EXPLORATORY, WASHOUT OF SUCUS, REPAIR OF MARGINAL ULCER PERFORATION, ABD WASHOUT, INTRAOPERATIVE EGD, GI ASSISTED ENDOSCOPIC STENT PLACEMENT;  Surgeon: Maria Elena Crook MD;  Location: [facility];  Service: Bariatrics    LUNG LOBECTOMY Left     LYMPHADENECTOMY  05/22/2012    Dr. Nava    MANDIBLE FRACTURE SURGERY      MEDIASTINOSCOPY      DE ARTHRD ANT INTERBODY MIN DSC CRV BELOW C2 N/A 10/17/2017    Procedure: C5-6, C6-7 ACDF WITH ALLOGRAFT (NEURO MONITORING);  Surgeon: Lorena Singh MD;  [facility]: [facility];  Service: Orthopedics    DE COLONOSCOPY FLX DX W/COLLJ SPEC WHEN PFRMD N/A 4/14/2017    Procedure: COLONOSCOPY;  Surgeon: Navi Recinos MD;  Location: [facility];  Service: Gastroenterology    DE EGD TRANSORAL BIOPSY SINGLE/MULTIPLE N/A 1/9/2019    Procedure: ESOPHAGOGASTRODUODENOSCOPY (EGD);  Surgeon: Faustino Araujo MD;  Location: [facility];  Service: Bariatrics    DE ESOPHAGOGASTRODUODENOSCOPY TRANSORAL DIAGNOSTIC N/A 1/11/2017    Procedure: ESOPHAGOGASTRODUODENOSCOPY (EGD);  Surgeon:  "Navi Recinos MD;  [facility];  Service: Gastroenterology    UT LAPS ABD PRTM&OMENTUM DX W/WO SPEC BR/WA SPX N/A 2019    Procedure: LAPAROSCOPY DIAGNOSTIC CONVERSION TO OPEN, REDUCTION AND REPAIR OF INTERNAL HERNIA, REPAIR SEROSAL TEARS;  Surgeon: Maria Elena Crook MD;  [facility];  Service: Bariatrics    UT LAPS RPR PARAESPHGL HRNA INCL FUNDPLSTY W/MESH N/A 2017    Procedure: REPAIR HERNIA PARAESOPHAGEAL  LAPAROSCOPIC;  Surgeon: Faustino Araujo MD;  [facility];  Service: Bariatrics    UT NDSC WRST SURG W/RLS TRANSVRS CARPL LIGM Right 2016    Procedure: RELEASE CARPAL TUNNEL ENDOSCOPIC;  Surgeon: Cali Vu MD;  [facility];  Service: Orthopedics    UT TENDON SHEATH INCISION Right 2018    Procedure: RELEASE TRIGGER FINGER long and index finger;  Surgeon: Cali Vu MD;  [facility];  Service: Orthopedics    RECONSTRUCTION DISTAL RADIUS/ULNA JOINT (DRUJ) Right 2016    Procedure: WRIST SCAPHOLUNATE LIGAMENT RECONSTRUCTION WITH ECRB TENDON AUTOGRAPH , SPLINT APPLICATION ;  Surgeon: Cali Vu MD;  [facility];  Service:     SHOULDER ARTHROSCOPY Right 2021    SAD with debridement    SHOULDER SURGERY      TONSILLECTOMY      UPPER GASTROINTESTINAL ENDOSCOPY       Social History   Social History     Substance and Sexual Activity   Alcohol Use Not Currently    Comment: rarely     Social History     Substance and Sexual Activity   Drug Use No     Social History     Tobacco Use   Smoking Status Some Days    Current packs/day: 0.00    Types: Cigarettes    Last attempt to quit: 2015    Years since quittin.8    Passive exposure: Never   Smokeless Tobacco Never       Objective       Current Vitals:   Blood Pressure: 126/64 (25 1248)  Pulse: 96 (25 1248)  Temperature: 97.5 °F (36.4 °C) (25 1248)  Temp Source: Tympanic (25 1248)  Height: 5' 8\" (172.7 cm) (25 1248)  Weight - Scale: 52.4 " kg (115 lb 8 oz) (03/19/25 1248)  SpO2: 98 % (03/19/25 1248)    Invasive Devices       None                   Physical Exam  Constitutional:       General: He is not in acute distress.     Appearance: He is obese. He is not ill-appearing or toxic-appearing.   HENT:      Head: Normocephalic and atraumatic.      Right Ear: External ear normal.      Left Ear: External ear normal.      Nose: Nose normal.      Mouth/Throat:      Mouth: Mucous membranes are moist.      Pharynx: Oropharynx is clear.   Eyes:      Extraocular Movements: Extraocular movements intact.      Conjunctiva/sclera: Conjunctivae normal.   Cardiovascular:      Rate and Rhythm: Normal rate.   Pulmonary:      Effort: Pulmonary effort is normal. No respiratory distress.      Breath sounds: No wheezing.   Abdominal:      General: There is no distension.      Palpations: Abdomen is soft.      Tenderness: There is no abdominal tenderness. There is no guarding or rebound.      Hernia: No hernia (epigastrium, without overlying skin change. Tender to palpation) is present.      Comments: Diastasis recti   Musculoskeletal:         General: No deformity.      Cervical back: Normal range of motion and neck supple.   Skin:     General: Skin is warm and dry.   Neurological:      General: No focal deficit present.      Mental Status: He is alert and oriented to person, place, and time.   Psychiatric:         Mood and Affect: Mood normal.         Behavior: Behavior normal.           Pathology, and Other Studies: Results Review Statement: No pertinent imaging studies reviewed.      Assessment/PLAN:    Eduardo Anand is a 64 y.o. male status post remote RYGB status post ex lap + marginal ulcer perf repair and G tube, (7/2019) and perc zac for acalculous cholecystitis (9/2019). . Comes to the office today for EGD/UGI review..    Workup thus far reviewed and discussed with patient: Not significant for abnormal findings.  Workup includes:     CT abd/pel  CT ABDOMEN AND  PELVIS WITH AND WITHOUT IV CONTRAST     INDICATION: K43.9: Ventral hernia without obstruction or gangrene.     COMPARISON: CT dated 12/1/2021     TECHNIQUE: Initial CT of the abdomen and pelvis was performed without intravenous contrast. Subsequent dynamic CT evaluation of the abdomen and pelvis was performed after the administration of contrast in both nephrographic and delayed phases.    Multiplanar 2D reformatted images were created from the source data.     This examination, like all CT scans performed in the [facility], was performed utilizing techniques to minimize radiation dose exposure, including the use of iterative reconstruction and automated exposure control. Radiation dose length   product (DLP) for this visit: 1223.79 mGy-cm     IV Contrast: 100 mL of iohexol  Enteric Contrast: Administered.     FINDINGS:     ABDOMEN     RIGHT KIDNEY AND URETER:  No solid renal mass or detectable urothelial mass.  No hydronephrosis or hydroureter.  2 mm nonobstructive calculus in the interpolar region.  No perinephric collection.     LEFT KIDNEY AND URETER:  No solid renal mass or detectable urothelial mass.  No hydronephrosis or hydroureter.  No urinary tract calculi.  No perinephric collection.     URINARY BLADDER:  No bladder wall mass.  No calculi.        LOWER CHEST: Bibasilar atelectasis.     LIVER/BILIARY TREE: Unremarkable.     GALLBLADDER: Post cholecystectomy.     SPLEEN: Unremarkable.     PANCREAS: Unremarkable.     ADRENAL GLANDS: Unremarkable.     STOMACH AND BOWEL: Patient appears to be status post Britney-en-Y gastric bypass; correlate with patient's history.     No bowel obstruction.     APPENDIX: No findings to suggest appendicitis.     ABDOMINOPELVIC CAVITY: No ascites. No pneumoperitoneum. No lymphadenopathy.     VESSELS: Unremarkable for patient's age.     PELVIS     REPRODUCTIVE ORGANS: Unremarkable for patient's age.     ABDOMINAL WALL/INGUINAL REGIONS: Abdominal wall thickening and  upper abdominal diastases recti. No obvious herniation identified on the CT.     BONES: Right proximal femoral screw tract and osseous defect. Mild to moderate multilevel spondylosis.     IMPRESSION:     No acute findings in the abdomen or pelvis.  Tiny right renal calculus.  No abdominal wall herniation identified on CT.    UGI  FINDINGS:     The esophagus is normal in caliber. Esophageal motility is normal and emptying of contrast from the esophagus is prompt. There is no mucosal mass, ulceration or fold thickening identified.     Britney-en-Y gastric pouch is unremarkable in size. The gastric mucosa is normal. No penetrating ulcers or masses. Gastrojejunostomy anastomosis is intact and contrast passes readily into an unremarkable Britney limb.     Gastroesophageal reflux was not observed.     There is no hiatal hernia.     IMPRESSION:     Unremarkable bariatric upper GI series.      EGD  FINDINGS:  The esophagus, gastric pouch, gastrojejunostomy and Britney limb appeared normal. Anastomosis less than 20 mm but easily traversable  Regular Z-line 39 cm from the incisors        SPECIMENS:  * No specimens in log *        IMPRESSION:  The esophagus, gastric pouch, gastrojejunostomy and britney limb appeared normal.      Pathology from EGD         MANOMETRY/pH Study        GASTRIC EMPTYING STUDY      --------------------------------------------------------------------  Discussed with the patient and his wife the CT findings of diastasis recti. We reviewed the previous EGD, which notes a slight narrowing of his GJ anastomosis. We also discussed functional obstruction of the GJ which may be due to twisting of the anastomosis which is typically not seen on EGD. Will plan to refer him to GI for possible evaluation for stent placement.    Plan:  - Referral to GI for consideration for stent placement  - Follow up in clinic after seen by GI            Wale Olivarez MD  Bariatric Surgery  3/19/2025  1:34 PM

## 2025-04-08 ENCOUNTER — OFFICE VISIT (OUTPATIENT)
Dept: GASTROENTEROLOGY | Facility: CLINIC | Age: 65
End: 2025-04-08
Payer: MEDICARE

## 2025-04-08 VITALS
DIASTOLIC BLOOD PRESSURE: 66 MMHG | WEIGHT: 117.6 LBS | SYSTOLIC BLOOD PRESSURE: 94 MMHG | HEIGHT: 68 IN | BODY MASS INDEX: 17.82 KG/M2 | TEMPERATURE: 98.3 F

## 2025-04-08 DIAGNOSIS — E66.01 MORBID OBESITY (HCC): ICD-10-CM

## 2025-04-08 DIAGNOSIS — R11.2 NAUSEA AND VOMITING, UNSPECIFIED VOMITING TYPE: ICD-10-CM

## 2025-04-08 DIAGNOSIS — M50.13 CERVICAL DISC DISORDER WITH RADICULOPATHY OF CERVICOTHORACIC REGION: Primary | ICD-10-CM

## 2025-04-08 PROCEDURE — 99204 OFFICE O/P NEW MOD 45 MIN: CPT | Performed by: INTERNAL MEDICINE

## 2025-04-08 RX ORDER — PREGABALIN 75 MG/1
75 CAPSULE ORAL 2 TIMES DAILY
Qty: 60 CAPSULE | Refills: 5 | Status: SHIPPED | OUTPATIENT
Start: 2025-04-08 | End: 2025-10-05

## 2025-04-08 RX ORDER — METOCLOPRAMIDE 5 MG/1
5 TABLET ORAL 3 TIMES DAILY PRN
Qty: 42 TABLET | Refills: 0 | Status: SHIPPED | OUTPATIENT
Start: 2025-04-08 | End: 2025-04-08

## 2025-04-08 RX ORDER — SODIUM CHLORIDE, SODIUM LACTATE, POTASSIUM CHLORIDE, CALCIUM CHLORIDE 600; 310; 30; 20 MG/100ML; MG/100ML; MG/100ML; MG/100ML
125 INJECTION, SOLUTION INTRAVENOUS CONTINUOUS
OUTPATIENT
Start: 2025-04-08

## 2025-04-08 RX ORDER — ONDANSETRON 8 MG/1
8 TABLET, ORALLY DISINTEGRATING ORAL EVERY 8 HOURS PRN
Qty: 60 TABLET | Refills: 1 | Status: SHIPPED | OUTPATIENT
Start: 2025-04-08

## 2025-04-08 RX ORDER — PREGABALIN 75 MG/1
75 CAPSULE ORAL 2 TIMES DAILY
COMMUNITY
Start: 2025-04-07 | End: 2025-04-08

## 2025-04-08 NOTE — ASSESSMENT & PLAN NOTE
- Try EGD with dilation (pneumatic vs conventional) to see if sx improve.  - Considered low dose Reglan tidprn; last EKG checked (2021) Qtc was borderline at 450. Trial Zofran instead.

## 2025-04-08 NOTE — H&P (VIEW-ONLY)
[facility] - Outpatient Consultation  Eduardo Anand 64 y.o. male MRN: [id number]  Encounter: [id number]          REASON FOR CONSULT:  1. Cervical disc disorder with radiculopathy of cervicothoracic region  pregabalin (LYRICA) 75 mg capsule      2. Nausea and vomiting, unspecified vomiting type  Ambulatory referral to Gastroenterology    EGD Dilation    ondansetron (ZOFRAN-ODT) 8 mg disintegrating tablet    DISCONTINUED: metoclopramide (REGLAN) 5 mg tablet      3. Morbid obesity (HCC)             ASSESSMENT AND PLAN:      1. Cervical disc disorder with radiculopathy of cervicothoracic region  -     pregabalin (LYRICA) 75 mg capsule; Take 1 capsule (75 mg total) by mouth 2 (two) times a day  2. Nausea and vomiting, unspecified vomiting type  Overview:  Likely due to small gastric pouch size (unable to accommodate much food) and slowed emptying, which may or may not be helped with a stent across the GJA.  Would not recommend esophageal stent, as the issue seems to be distal to esophagus, and esoph stent may worsen regurgitation/reflux.  Explained in detail to patient today.  He is hesitant to try a stent as he does not think he can tolerate an enteral stent diet, however he is willing to try dilation. We discussed that depending on his response, we can consider the stent placement for more longer lasting/robust dilation of the GJA.  He is willing to proceed with this.  Assessment & Plan:  - Try EGD with dilation (pneumatic vs conventional) to see if sx improve.  - Considered low dose Reglan tidprn; last EKG checked (2021) Qtc was borderline at 450. Trial Zofran instead.  Orders:  -     Ambulatory referral to Gastroenterology  -     EGD Dilation; Future; Expected date: 04/08/2025  -     ondansetron (ZOFRAN-ODT) 8 mg disintegrating tablet; Take 1 tablet (8 mg total) by mouth every 8 (eight) hours as needed for nausea or vomiting  3. Morbid obesity (HCC)       The assessment and recommendations  were discussed with the patient and/or family members, and they verbalized their understanding. All questions were answered to their satisfaction. They are in agreement with the recommendations. The recommendations were also communicated to the primary team/referring provider / are available for their review in the patient's chart.  ______________________________________________________________________    HPI:  64yoM status post remote RYGB (2017) status post ex lap + marginal ulcer perf repair and G tube (7/2019) and perc zac for acalculous cholecystitis (9/2019).    Since his surgeries and worsening more recently, he has experienced early satiety, post-prandial epigastric pain, nausea, vomiting and regurgitation. Liquids tolerated much easier than solids. Typically is only able to tolerate 2-3 bites of solid food. Does have appetite but early satiety.  No relief with PPIs. Does feel that food makes it down the esophagus. No GI bleeding.     EGD 1/2025:  The esophagus, gastric pouch, gastrojejunostomy and Britney limb appeared normal. Anastomosis less than 20 mm but easily traversable  Regular Z-line 39 cm from the incisors    UGIS 1/2025:  The esophagus is normal in caliber. Esophageal motility is normal and emptying of contrast from the esophagus is prompt. There is no mucosal mass, ulceration or fold thickening identified.     Britney-en-Y gastric pouch is unremarkable in size. The gastric mucosa is normal. No penetrating ulcers or masses. Gastrojejunostomy anastomosis is intact and contrast passes readily into an unremarkable Britney limb.     Gastroesophageal reflux was not observed.     There is no hiatal hernia.    CT 2/2025:  IMPRESSION:     No acute findings in the abdomen or pelvis.  Tiny right renal calculus.  No abdominal wall herniation identified on CT.    REVIEW OF SYSTEMS:  A complete 14-pt review of systems is negative other than the relevant positive symptoms mentioned in the HPI.    Historical  "Information   Past Medical History:   Diagnosis Date    Anemia     Anesthesia     Pt wakes up during \"almost every surgery\"    Arthritis     Asthma     Bariatric surgery status     Saucedo esophagus     Cancer (HCC) 2012    Had lobectomy, lower left lung.    Cervical radiculopathy     Chemotherapy follow-up examination     Chronic pain     Chronic pain disorder     COPD (chronic obstructive pulmonary disease) (HCC)     Food allergy     clams    GERD (gastroesophageal reflux disease)     Heart murmur     Hernia     Hiatal hernia     History of malignant neoplasm     History of pneumonia 04/12/2016    History of pulmonary embolism     History of pulmonary embolus (PE)     History of ulceration     Hyperlipidemia     Lung cancer (HCC)     left lung, radiation and chemo tx    Migraine     Pneumonia     Postgastrectomy malabsorption     Right leg DVT (HCC)     Right scapholunate ligament tear     Skipped heart beats     Wears partial dentures     upper and lower     Past Surgical History:   Procedure Laterality Date    ABDOMINAL SURGERY      BRONCHOSCOPY      COLONOSCOPY      FRACTURE SURGERY      femur right 1985    GASTRIC BYPASS LAPAROSCOPIC N/A 7/12/2017    Procedure: GASTRIC DIVERSION WITH [place]-EN-Y RECONSTRUCTION WITH  SHORT 60 cm LIMB;  Surgeon: Faustino Araujo MD;  Location: [facility];  Service: Bariatrics    HEMORRHOID SURGERY      HERNIA REPAIR  2019    IR CHEST TUBE PLACEMENT  8/9/2019    IR CHOLECYSTOSTOMY TUBE PLACEMENT  9/22/2019    IR IMAGE GUIDED ASPIRATION / DRAINAGE  8/1/2019    IR PICC LINE  8/9/2019    KNEE ARTHROSCOPY Right     right shoulder    LAPAROTOMY N/A 7/28/2019    Procedure: LAPAROTOMY EXPLORATORY, WASHOUT OF SUCUS, REPAIR OF MARGINAL ULCER PERFORATION, ABD WASHOUT, INTRAOPERATIVE EGD, GI ASSISTED ENDOSCOPIC STENT PLACEMENT;  Surgeon: Maria Elena Crook MD;  Location: [facility];  Service: Bariatrics    LUNG LOBECTOMY Left     LYMPHADENECTOMY  05/22/2012    Dr. Nava    MANDIBLE FRACTURE " SURGERY      MEDIASTINOSCOPY      SC ARTHRD ANT INTERBODY MIN DSC CRV BELOW C2 N/A 10/17/2017    Procedure: C5-6, C6-7 ACDF WITH ALLOGRAFT (NEURO MONITORING);  Surgeon: Lorena Singh MD;  [facility]: [facility];  Service: Orthopedics    SC COLONOSCOPY FLX DX W/COLLJ SPEC WHEN PFRMD N/A 4/14/2017    Procedure: COLONOSCOPY;  Surgeon: Navi Recinos MD;  Location: [facility];  Service: Gastroenterology    SC EGD TRANSORAL BIOPSY SINGLE/MULTIPLE N/A 1/9/2019    Procedure: ESOPHAGOGASTRODUODENOSCOPY (EGD);  Surgeon: Faustino Araujo MD;  Location: [facility];  Service: Bariatrics    SC ESOPHAGOGASTRODUODENOSCOPY TRANSORAL DIAGNOSTIC N/A 1/11/2017    Procedure: ESOPHAGOGASTRODUODENOSCOPY (EGD);  Surgeon: Navi Recinos MD;  [facility]: [facility];  Service: Gastroenterology    SC LAPS ABD PRTM&OMENTUM DX W/WO SPEC BR/WA SPX N/A 7/26/2019    Procedure: LAPAROSCOPY DIAGNOSTIC CONVERSION TO OPEN, REDUCTION AND REPAIR OF INTERNAL HERNIA, REPAIR SEROSAL TEARS;  Surgeon: Maria Elena Crook MD;  [facility];  Service: Bariatrics    SC LAPS RPR PARAESPHGL HRNA INCL FUNDPLSTY W/MESH N/A 7/12/2017    Procedure: REPAIR HERNIA PARAESOPHAGEAL  LAPAROSCOPIC;  Surgeon: Faustino Araujo MD;  [facility]: [facility];  Service: Bariatrics    SC NDSC WRST SURG W/RLS TRANSVRS CARPL LIGM Right 6/9/2016    Procedure: RELEASE CARPAL TUNNEL ENDOSCOPIC;  Surgeon: Cali Vu MD;  [facility];  Service: Orthopedics    SC TENDON SHEATH INCISION Right 11/27/2018    Procedure: RELEASE TRIGGER FINGER long and index finger;  Surgeon: Cali Vu MD;  [facility];  Service: Orthopedics    RECONSTRUCTION DISTAL RADIUS/ULNA JOINT (DRUJ) Right 9/6/2016    Procedure: WRIST SCAPHOLUNATE LIGAMENT RECONSTRUCTION WITH ECRB TENDON AUTOGRAPH , SPLINT APPLICATION ;  Surgeon: Cali Vu MD;  [facility];  Service:     SHOULDER ARTHROSCOPY Right 08/24/2021    SAD with debridement    SHOULDER SURGERY      TONSILLECTOMY   "    UPPER GASTROINTESTINAL ENDOSCOPY       Social History   Social History     Substance and Sexual Activity   Alcohol Use Not Currently    Comment: rarely     Social History     Substance and Sexual Activity   Drug Use No     Social History     Tobacco Use   Smoking Status Some Days    Current packs/day: 0.00    Types: Cigarettes    Last attempt to quit: 2015    Years since quittin.9    Passive exposure: Never   Smokeless Tobacco Never     Family History   Problem Relation Age of Onset    Other Mother         Back disorder    Diabetes Mother         Insulin Dependent,      Hypertension Mother     Heart disease Father     Hypertension Father     Breast cancer Sister     Skin cancer Sister     Breast cancer Paternal Grandmother     Skin cancer Paternal Grandmother        Meds/Allergies       Current Outpatient Medications:     acetaminophen (TYLENOL 8 HOUR) 650 mg CR tablet    gabapentin (NEURONTIN) 300 mg capsule    lansoprazole (PREVACID) 30 mg capsule    meloxicam (MOBIC) 7.5 mg tablet    ondansetron (ZOFRAN-ODT) 8 mg disintegrating tablet    pregabalin (LYRICA) 75 mg capsule    sucralfate (CARAFATE) 1 g/10 mL suspension    Allergies   Allergen Reactions    Codeine Hives, Itching, Nausea Only, GI Intolerance and Vomiting    Hydrocodone Hives and GI Intolerance    Shellfish-Derived Products - Food Allergy Nausea Only and Vomiting           Objective     Blood pressure 94/66, temperature 98.3 °F (36.8 °C), temperature source Tympanic, height 5' 8\" (1.727 m), weight 53.3 kg (117 lb 9.6 oz). Body mass index is 17.88 kg/m².        PHYSICAL EXAM:      General Appearance:   Alert, cooperative, no distress   HEENT:   Normocephalic, atraumatic, anicteric.     Neck:  Supple, symmetrical, trachea midline   Lungs:   CTAB; respirations unlabored    Heart::   Regular rate   Abdomen:   Soft, non-tender, non-distended; normal bowel sounds    Genitalia:   Deferred    Rectal:   Deferred    Extremities:  No cyanosis, " clubbing or edema    Pulses:  2+ and symmetric    Skin:  No jaundice, rashes, or lesions    Lymph nodes:  No palpable cervical lymphadenopathy        Lab Results:   No visits with results within 1 Day(s) from this visit.   Latest known visit with results is:   Appointment on 02/04/2025   Component Date Value    Hepatitis C Ab 02/04/2025 Non-reactive     WBC 02/04/2025 6.52     RBC 02/04/2025 4.83     Hemoglobin 02/04/2025 13.7     Hematocrit 02/04/2025 43.9     MCV 02/04/2025 91     MCH 02/04/2025 28.4     MCHC 02/04/2025 31.2 (L)     RDW 02/04/2025 13.4     MPV 02/04/2025 9.0     Platelets 02/04/2025 379     nRBC 02/04/2025 0     Segmented % 02/04/2025 68     Immature Grans % 02/04/2025 0     Lymphocytes % 02/04/2025 24     Monocytes % 02/04/2025 6     Eosinophils Relative 02/04/2025 1     Basophils Relative 02/04/2025 1     Absolute Neutrophils 02/04/2025 4.40     Absolute Immature Grans 02/04/2025 0.01     Absolute Lymphocytes 02/04/2025 1.55     Absolute Monocytes 02/04/2025 0.40     Eosinophils Absolute 02/04/2025 0.08     Basophils Absolute 02/04/2025 0.08     Sodium 02/04/2025 139     Potassium 02/04/2025 4.6     Chloride 02/04/2025 102     CO2 02/04/2025 31     ANION GAP 02/04/2025 6     BUN 02/04/2025 18     Creatinine 02/04/2025 0.84     Glucose, Fasting 02/04/2025 90     Calcium 02/04/2025 9.3     AST 02/04/2025 18     ALT 02/04/2025 13     Alkaline Phosphatase 02/04/2025 84     Total Protein 02/04/2025 7.6     Albumin 02/04/2025 4.6     Total Bilirubin 02/04/2025 0.47     eGFR 02/04/2025 93     Cholesterol 02/04/2025 218 (H)     Triglycerides 02/04/2025 53     HDL, Direct 02/04/2025 57     LDL Calculated 02/04/2025 150 (H)     Non-HDL-Chol (CHOL-HDL) 02/04/2025 161     TSH 3RD GENERATON 02/04/2025 1.270     Vit D, 25-Hydroxy 02/04/2025 16.3 (L)     PSA 02/04/2025 1.249     Ferritin 02/04/2025 11 (L)     Folate 02/04/2025 11.4     Iron Saturation 02/04/2025 7 (L)     TIBC 02/04/2025 485.8 (H)     Iron  02/04/2025 36 (L)     Transferrin 02/04/2025 347     UIBC 02/04/2025 450 (H)     Vitamin A 02/04/2025 27.3     Vitamin B1, Whole Blood 02/04/2025 134.0     Vitamin B-12 02/04/2025 267     Zinc 02/04/2025 71          Radiology Results:   No results found.    I personally reviewed relevant labs as well as personally reviewed images in PACS and reports for relevant radiology studies.  I also personally reviewed prior GI notes and relevant non-GI notes in the patient's chart as well as relevant external medical records in [facility].

## 2025-04-08 NOTE — PROGRESS NOTES
[facility] - Outpatient Consultation  Eduardo Anand 64 y.o. male MRN: [id number]  Encounter: [id number]          REASON FOR CONSULT:  1. Cervical disc disorder with radiculopathy of cervicothoracic region  pregabalin (LYRICA) 75 mg capsule      2. Nausea and vomiting, unspecified vomiting type  Ambulatory referral to Gastroenterology    EGD Dilation    ondansetron (ZOFRAN-ODT) 8 mg disintegrating tablet    DISCONTINUED: metoclopramide (REGLAN) 5 mg tablet      3. Morbid obesity (HCC)             ASSESSMENT AND PLAN:      1. Cervical disc disorder with radiculopathy of cervicothoracic region  -     pregabalin (LYRICA) 75 mg capsule; Take 1 capsule (75 mg total) by mouth 2 (two) times a day  2. Nausea and vomiting, unspecified vomiting type  Overview:  Likely due to small gastric pouch size (unable to accommodate much food) and slowed emptying, which may or may not be helped with a stent across the GJA.  Would not recommend esophageal stent, as the issue seems to be distal to esophagus, and esoph stent may worsen regurgitation/reflux.  Explained in detail to patient today.  He is hesitant to try a stent as he does not think he can tolerate an enteral stent diet, however he is willing to try dilation. We discussed that depending on his response, we can consider the stent placement for more longer lasting/robust dilation of the GJA.  He is willing to proceed with this.  Assessment & Plan:  - Try EGD with dilation (pneumatic vs conventional) to see if sx improve.  - Considered low dose Reglan tidprn; last EKG checked (2021) Qtc was borderline at 450. Trial Zofran instead.  Orders:  -     Ambulatory referral to Gastroenterology  -     EGD Dilation; Future; Expected date: 04/08/2025  -     ondansetron (ZOFRAN-ODT) 8 mg disintegrating tablet; Take 1 tablet (8 mg total) by mouth every 8 (eight) hours as needed for nausea or vomiting  3. Morbid obesity (HCC)       The assessment and recommendations  were discussed with the patient and/or family members, and they verbalized their understanding. All questions were answered to their satisfaction. They are in agreement with the recommendations. The recommendations were also communicated to the primary team/referring provider / are available for their review in the patient's chart.  ______________________________________________________________________    HPI:  64yoM status post remote RYGB (2017) status post ex lap + marginal ulcer perf repair and G tube (7/2019) and perc zac for acalculous cholecystitis (9/2019).    Since his surgeries and worsening more recently, he has experienced early satiety, post-prandial epigastric pain, nausea, vomiting and regurgitation. Liquids tolerated much easier than solids. Typically is only able to tolerate 2-3 bites of solid food. Does have appetite but early satiety.  No relief with PPIs. Does feel that food makes it down the esophagus. No GI bleeding.     EGD 1/2025:  The esophagus, gastric pouch, gastrojejunostomy and Britney limb appeared normal. Anastomosis less than 20 mm but easily traversable  Regular Z-line 39 cm from the incisors    UGIS 1/2025:  The esophagus is normal in caliber. Esophageal motility is normal and emptying of contrast from the esophagus is prompt. There is no mucosal mass, ulceration or fold thickening identified.     Britney-en-Y gastric pouch is unremarkable in size. The gastric mucosa is normal. No penetrating ulcers or masses. Gastrojejunostomy anastomosis is intact and contrast passes readily into an unremarkable Britney limb.     Gastroesophageal reflux was not observed.     There is no hiatal hernia.    CT 2/2025:  IMPRESSION:     No acute findings in the abdomen or pelvis.  Tiny right renal calculus.  No abdominal wall herniation identified on CT.    REVIEW OF SYSTEMS:  A complete 14-pt review of systems is negative other than the relevant positive symptoms mentioned in the HPI.    Historical  "Information   Past Medical History:   Diagnosis Date    Anemia     Anesthesia     Pt wakes up during \"almost every surgery\"    Arthritis     Asthma     Bariatric surgery status     Saucedo esophagus     Cancer (HCC) 2012    Had lobectomy, lower left lung.    Cervical radiculopathy     Chemotherapy follow-up examination     Chronic pain     Chronic pain disorder     COPD (chronic obstructive pulmonary disease) (HCC)     Food allergy     clams    GERD (gastroesophageal reflux disease)     Heart murmur     Hernia     Hiatal hernia     History of malignant neoplasm     History of pneumonia 04/12/2016    History of pulmonary embolism     History of pulmonary embolus (PE)     History of ulceration     Hyperlipidemia     Lung cancer (HCC)     left lung, radiation and chemo tx    Migraine     Pneumonia     Postgastrectomy malabsorption     Right leg DVT (HCC)     Right scapholunate ligament tear     Skipped heart beats     Wears partial dentures     upper and lower     Past Surgical History:   Procedure Laterality Date    ABDOMINAL SURGERY      BRONCHOSCOPY      COLONOSCOPY      FRACTURE SURGERY      femur right 1985    GASTRIC BYPASS LAPAROSCOPIC N/A 7/12/2017    Procedure: GASTRIC DIVERSION WITH [place]-EN-Y RECONSTRUCTION WITH  SHORT 60 cm LIMB;  Surgeon: Faustino Araujo MD;  Location: [facility];  Service: Bariatrics    HEMORRHOID SURGERY      HERNIA REPAIR  2019    IR CHEST TUBE PLACEMENT  8/9/2019    IR CHOLECYSTOSTOMY TUBE PLACEMENT  9/22/2019    IR IMAGE GUIDED ASPIRATION / DRAINAGE  8/1/2019    IR PICC LINE  8/9/2019    KNEE ARTHROSCOPY Right     right shoulder    LAPAROTOMY N/A 7/28/2019    Procedure: LAPAROTOMY EXPLORATORY, WASHOUT OF SUCUS, REPAIR OF MARGINAL ULCER PERFORATION, ABD WASHOUT, INTRAOPERATIVE EGD, GI ASSISTED ENDOSCOPIC STENT PLACEMENT;  Surgeon: Maria Elena Crook MD;  Location: [facility];  Service: Bariatrics    LUNG LOBECTOMY Left     LYMPHADENECTOMY  05/22/2012    Dr. Nava    MANDIBLE FRACTURE " SURGERY      MEDIASTINOSCOPY      MD ARTHRD ANT INTERBODY MIN DSC CRV BELOW C2 N/A 10/17/2017    Procedure: C5-6, C6-7 ACDF WITH ALLOGRAFT (NEURO MONITORING);  Surgeon: Lorena Singh MD;  [facility]: [facility];  Service: Orthopedics    MD COLONOSCOPY FLX DX W/COLLJ SPEC WHEN PFRMD N/A 4/14/2017    Procedure: COLONOSCOPY;  Surgeon: Navi Recinos MD;  Location: [facility];  Service: Gastroenterology    MD EGD TRANSORAL BIOPSY SINGLE/MULTIPLE N/A 1/9/2019    Procedure: ESOPHAGOGASTRODUODENOSCOPY (EGD);  Surgeon: Faustino Araujo MD;  Location: [facility];  Service: Bariatrics    MD ESOPHAGOGASTRODUODENOSCOPY TRANSORAL DIAGNOSTIC N/A 1/11/2017    Procedure: ESOPHAGOGASTRODUODENOSCOPY (EGD);  Surgeon: Navi Recinos MD;  [facility];  Service: Gastroenterology    MD LAPS ABD PRTM&OMENTUM DX W/WO SPEC BR/WA SPX N/A 7/26/2019    Procedure: LAPAROSCOPY DIAGNOSTIC CONVERSION TO OPEN, REDUCTION AND REPAIR OF INTERNAL HERNIA, REPAIR SEROSAL TEARS;  Surgeon: Maria Elena Crook MD;  [facility];  Service: Bariatrics    MD LAPS RPR PARAESPHGL HRNA INCL FUNDPLSTY W/MESH N/A 7/12/2017    Procedure: REPAIR HERNIA PARAESOPHAGEAL  LAPAROSCOPIC;  Surgeon: Faustino Araujo MD;  [facility];  Service: Bariatrics    MD NDSC WRST SURG W/RLS TRANSVRS CARPL LIGM Right 6/9/2016    Procedure: RELEASE CARPAL TUNNEL ENDOSCOPIC;  Surgeon: Cali Vu MD;  [facility];  Service: Orthopedics    MD TENDON SHEATH INCISION Right 11/27/2018    Procedure: RELEASE TRIGGER FINGER long and index finger;  Surgeon: Cali Vu MD;  [facility];  Service: Orthopedics    RECONSTRUCTION DISTAL RADIUS/ULNA JOINT (DRUJ) Right 9/6/2016    Procedure: WRIST SCAPHOLUNATE LIGAMENT RECONSTRUCTION WITH ECRB TENDON AUTOGRAPH , SPLINT APPLICATION ;  Surgeon: Cali Vu MD;  [facility];  Service:     SHOULDER ARTHROSCOPY Right 08/24/2021    SAD with debridement    SHOULDER SURGERY      TONSILLECTOMY   "    UPPER GASTROINTESTINAL ENDOSCOPY       Social History   Social History     Substance and Sexual Activity   Alcohol Use Not Currently    Comment: rarely     Social History     Substance and Sexual Activity   Drug Use No     Social History     Tobacco Use   Smoking Status Some Days    Current packs/day: 0.00    Types: Cigarettes    Last attempt to quit: 2015    Years since quittin.9    Passive exposure: Never   Smokeless Tobacco Never     Family History   Problem Relation Age of Onset    Other Mother         Back disorder    Diabetes Mother         Insulin Dependent,      Hypertension Mother     Heart disease Father     Hypertension Father     Breast cancer Sister     Skin cancer Sister     Breast cancer Paternal Grandmother     Skin cancer Paternal Grandmother        Meds/Allergies       Current Outpatient Medications:     acetaminophen (TYLENOL 8 HOUR) 650 mg CR tablet    gabapentin (NEURONTIN) 300 mg capsule    lansoprazole (PREVACID) 30 mg capsule    meloxicam (MOBIC) 7.5 mg tablet    ondansetron (ZOFRAN-ODT) 8 mg disintegrating tablet    pregabalin (LYRICA) 75 mg capsule    sucralfate (CARAFATE) 1 g/10 mL suspension    Allergies   Allergen Reactions    Codeine Hives, Itching, Nausea Only, GI Intolerance and Vomiting    Hydrocodone Hives and GI Intolerance    Shellfish-Derived Products - Food Allergy Nausea Only and Vomiting           Objective     Blood pressure 94/66, temperature 98.3 °F (36.8 °C), temperature source Tympanic, height 5' 8\" (1.727 m), weight 53.3 kg (117 lb 9.6 oz). Body mass index is 17.88 kg/m².        PHYSICAL EXAM:      General Appearance:   Alert, cooperative, no distress   HEENT:   Normocephalic, atraumatic, anicteric.     Neck:  Supple, symmetrical, trachea midline   Lungs:   CTAB; respirations unlabored    Heart::   Regular rate   Abdomen:   Soft, non-tender, non-distended; normal bowel sounds    Genitalia:   Deferred    Rectal:   Deferred    Extremities:  No cyanosis, " clubbing or edema    Pulses:  2+ and symmetric    Skin:  No jaundice, rashes, or lesions    Lymph nodes:  No palpable cervical lymphadenopathy        Lab Results:   No visits with results within 1 Day(s) from this visit.   Latest known visit with results is:   Appointment on 02/04/2025   Component Date Value    Hepatitis C Ab 02/04/2025 Non-reactive     WBC 02/04/2025 6.52     RBC 02/04/2025 4.83     Hemoglobin 02/04/2025 13.7     Hematocrit 02/04/2025 43.9     MCV 02/04/2025 91     MCH 02/04/2025 28.4     MCHC 02/04/2025 31.2 (L)     RDW 02/04/2025 13.4     MPV 02/04/2025 9.0     Platelets 02/04/2025 379     nRBC 02/04/2025 0     Segmented % 02/04/2025 68     Immature Grans % 02/04/2025 0     Lymphocytes % 02/04/2025 24     Monocytes % 02/04/2025 6     Eosinophils Relative 02/04/2025 1     Basophils Relative 02/04/2025 1     Absolute Neutrophils 02/04/2025 4.40     Absolute Immature Grans 02/04/2025 0.01     Absolute Lymphocytes 02/04/2025 1.55     Absolute Monocytes 02/04/2025 0.40     Eosinophils Absolute 02/04/2025 0.08     Basophils Absolute 02/04/2025 0.08     Sodium 02/04/2025 139     Potassium 02/04/2025 4.6     Chloride 02/04/2025 102     CO2 02/04/2025 31     ANION GAP 02/04/2025 6     BUN 02/04/2025 18     Creatinine 02/04/2025 0.84     Glucose, Fasting 02/04/2025 90     Calcium 02/04/2025 9.3     AST 02/04/2025 18     ALT 02/04/2025 13     Alkaline Phosphatase 02/04/2025 84     Total Protein 02/04/2025 7.6     Albumin 02/04/2025 4.6     Total Bilirubin 02/04/2025 0.47     eGFR 02/04/2025 93     Cholesterol 02/04/2025 218 (H)     Triglycerides 02/04/2025 53     HDL, Direct 02/04/2025 57     LDL Calculated 02/04/2025 150 (H)     Non-HDL-Chol (CHOL-HDL) 02/04/2025 161     TSH 3RD GENERATON 02/04/2025 1.270     Vit D, 25-Hydroxy 02/04/2025 16.3 (L)     PSA 02/04/2025 1.249     Ferritin 02/04/2025 11 (L)     Folate 02/04/2025 11.4     Iron Saturation 02/04/2025 7 (L)     TIBC 02/04/2025 485.8 (H)     Iron  02/04/2025 36 (L)     Transferrin 02/04/2025 347     UIBC 02/04/2025 450 (H)     Vitamin A 02/04/2025 27.3     Vitamin B1, Whole Blood 02/04/2025 134.0     Vitamin B-12 02/04/2025 267     Zinc 02/04/2025 71          Radiology Results:   No results found.    I personally reviewed relevant labs as well as personally reviewed images in PACS and reports for relevant radiology studies.  I also personally reviewed prior GI notes and relevant non-GI notes in the patient's chart as well as relevant external medical records in [facility].

## 2025-04-08 NOTE — PATIENT INSTRUCTIONS
Scheduled date of EGD(as of today): 4/24/25  Physician performing EGD: Dr Baldwin  Location of EGD: [facility]  Instructions reviewed with patient by: Taylor  Clearances: none

## 2025-04-23 RX ORDER — ONDANSETRON 2 MG/ML
4 INJECTION INTRAMUSCULAR; INTRAVENOUS ONCE AS NEEDED
Status: CANCELLED | OUTPATIENT
Start: 2025-04-23

## 2025-04-23 RX ORDER — ALBUTEROL SULFATE 0.83 MG/ML
2.5 SOLUTION RESPIRATORY (INHALATION) ONCE AS NEEDED
Status: CANCELLED | OUTPATIENT
Start: 2025-04-23

## 2025-04-23 RX ORDER — SODIUM CHLORIDE, SODIUM LACTATE, POTASSIUM CHLORIDE, CALCIUM CHLORIDE 600; 310; 30; 20 MG/100ML; MG/100ML; MG/100ML; MG/100ML
125 INJECTION, SOLUTION INTRAVENOUS CONTINUOUS
Status: CANCELLED | OUTPATIENT
Start: 2025-04-23

## 2025-04-24 ENCOUNTER — TELEPHONE (OUTPATIENT)
Dept: GASTROENTEROLOGY | Facility: CLINIC | Age: 65
End: 2025-04-24

## 2025-04-24 ENCOUNTER — HOSPITAL ENCOUNTER (OUTPATIENT)
Dept: GASTROENTEROLOGY | Facility: HOSPITAL | Age: 65
Setting detail: OUTPATIENT SURGERY
End: 2025-04-24
Attending: INTERNAL MEDICINE
Payer: MEDICARE

## 2025-04-24 ENCOUNTER — ANESTHESIA (OUTPATIENT)
Dept: GASTROENTEROLOGY | Facility: HOSPITAL | Age: 65
End: 2025-04-24
Payer: MEDICARE

## 2025-04-24 ENCOUNTER — ANESTHESIA EVENT (OUTPATIENT)
Dept: GASTROENTEROLOGY | Facility: HOSPITAL | Age: 65
End: 2025-04-24
Payer: MEDICARE

## 2025-04-24 VITALS
RESPIRATION RATE: 18 BRPM | HEART RATE: 69 BPM | OXYGEN SATURATION: 98 % | DIASTOLIC BLOOD PRESSURE: 61 MMHG | TEMPERATURE: 98.7 F | SYSTOLIC BLOOD PRESSURE: 101 MMHG

## 2025-04-24 DIAGNOSIS — R11.2 NAUSEA AND VOMITING, UNSPECIFIED VOMITING TYPE: ICD-10-CM

## 2025-04-24 PROCEDURE — 88305 TISSUE EXAM BY PATHOLOGIST: CPT | Performed by: STUDENT IN AN ORGANIZED HEALTH CARE EDUCATION/TRAINING PROGRAM

## 2025-04-24 PROCEDURE — C1726 CATH, BAL DIL, NON-VASCULAR: HCPCS

## 2025-04-24 PROCEDURE — 43239 EGD BIOPSY SINGLE/MULTIPLE: CPT | Performed by: INTERNAL MEDICINE

## 2025-04-24 PROCEDURE — 43249 ESOPH EGD DILATION <30 MM: CPT | Performed by: INTERNAL MEDICINE

## 2025-04-24 RX ORDER — PROPOFOL 10 MG/ML
INJECTION, EMULSION INTRAVENOUS AS NEEDED
Status: DISCONTINUED | OUTPATIENT
Start: 2025-04-24 | End: 2025-04-24

## 2025-04-24 RX ORDER — ALBUTEROL SULFATE 0.83 MG/ML
2.5 SOLUTION RESPIRATORY (INHALATION) ONCE AS NEEDED
Status: DISCONTINUED | OUTPATIENT
Start: 2025-04-24 | End: 2025-04-28 | Stop reason: HOSPADM

## 2025-04-24 RX ORDER — SODIUM CHLORIDE, SODIUM LACTATE, POTASSIUM CHLORIDE, CALCIUM CHLORIDE 600; 310; 30; 20 MG/100ML; MG/100ML; MG/100ML; MG/100ML
125 INJECTION, SOLUTION INTRAVENOUS CONTINUOUS
Status: DISCONTINUED | OUTPATIENT
Start: 2025-04-24 | End: 2025-04-28 | Stop reason: HOSPADM

## 2025-04-24 RX ORDER — ONDANSETRON 2 MG/ML
4 INJECTION INTRAMUSCULAR; INTRAVENOUS ONCE AS NEEDED
Status: DISCONTINUED | OUTPATIENT
Start: 2025-04-24 | End: 2025-04-28 | Stop reason: HOSPADM

## 2025-04-24 RX ADMIN — PROPOFOL 100 MG: 10 INJECTION, EMULSION INTRAVENOUS at 14:58

## 2025-04-24 RX ADMIN — PROPOFOL 30 MG: 10 INJECTION, EMULSION INTRAVENOUS at 15:01

## 2025-04-24 RX ADMIN — PROPOFOL 20 MG: 10 INJECTION, EMULSION INTRAVENOUS at 15:04

## 2025-04-24 RX ADMIN — PROPOFOL 20 MG: 10 INJECTION, EMULSION INTRAVENOUS at 15:07

## 2025-04-24 RX ADMIN — SODIUM CHLORIDE, SODIUM LACTATE, POTASSIUM CHLORIDE, AND CALCIUM CHLORIDE: .6; .31; .03; .02 INJECTION, SOLUTION INTRAVENOUS at 14:52

## 2025-04-24 RX ADMIN — PROPOFOL 30 MG: 10 INJECTION, EMULSION INTRAVENOUS at 15:09

## 2025-04-24 NOTE — TELEPHONE ENCOUNTER
----- Message from Jadyn Baldwin MD sent at 4/24/2025  3:42 PM EDT -----  Regarding: office  Office visit with me please    in next 1-2 months    thanks

## 2025-04-24 NOTE — ANESTHESIA POSTPROCEDURE EVALUATION
Post-Op Assessment Note    CV Status:  Stable  Pain Score: 0    Pain management: adequate       Mental Status:  Sleepy   Hydration Status:  Stable   PONV Controlled:  None   Airway Patency:  Patent     Post Op Vitals Reviewed: Yes    No anethesia notable event occurred.    Staff: CRNA           Last Filed PACU Vitals:  Vitals Value Taken Time   Temp 98.7 °F (37.1 °C) 04/24/25 1517   Pulse 72 04/24/25 1517   /58 04/24/25 1517   Resp 18 04/24/25 1517   SpO2 99 % 04/24/25 1517       Modified Lise:     Vitals Value Taken Time   Activity 0 04/24/25 1517   Respiration 2 04/24/25 1517   Circulation 1 04/24/25 1517   Consciousness 0 04/24/25 1517   Oxygen Saturation 1 04/24/25 1517     Modified Lise Score: 4

## 2025-04-24 NOTE — INTERVAL H&P NOTE
H&P reviewed. After examining the patient I find no changes in the patients condition since the H&P had been written.    Vitals:    04/24/25 1334   BP: 141/67   Pulse: 70   Resp: 20   Temp: 97.8 °F (36.6 °C)   SpO2: 99%

## 2025-04-24 NOTE — ANESTHESIA PREPROCEDURE EVALUATION
Procedure:  EGD    Relevant Problems   CARDIO   (+) Chronic deep vein thrombosis (DVT) of popliteal vein of right lower extremity (HCC)   (+) Essential hypertension   (+) Migraine without aura and without status migrainosus, not intractable   (+) Pulmonary embolism, other, unspecified chronicity, unspecified whether acute cor pulmonale present (HCC)   (+) Pulmonary hypertension (HCC)      GI/HEPATIC   (+) Chronic marginal ulcer with perforation (HCC)   (+) Reactive hypoglycemia      HEMATOLOGY   (+) Coagulopathy (HCC)   (+) Iron deficiency anemia secondary to inadequate dietary iron intake      NEURO/PSYCH   (+) Migraine without aura and without status migrainosus, not intractable      PULMONARY   (+) Asthma   (+) Chronic obstructive pulmonary disease, unspecified (HCC)      Oncology   (+) History of lung cancer      Other   (+) Protein-calorie malnutrition, unspecified severity (HCC)        Physical Exam    Airway    Mallampati score: I  TM Distance: >3 FB  Neck ROM: full     Dental       Cardiovascular  Cardiovascular exam normal    Pulmonary  Pulmonary exam normal     Other Findings        Anesthesia Plan  ASA Score- 3     Anesthesia Type- IV sedation with anesthesia with ASA Monitors.         Additional Monitors:     Airway Plan:            Plan Factors-Exercise tolerance (METS): >4 METS.    Chart reviewed.   Existing labs reviewed. Patient summary reviewed.    Patient is not a current smoker.              Induction- intravenous.    Postoperative Plan-     Perioperative Resuscitation Plan - Level 1 - Full Code.       Informed Consent- Anesthetic plan and risks discussed with patient.  I personally reviewed this patient with the CRNA. Discussed and agreed on the Anesthesia Plan with the CRNA..      NPO Status:  Vitals Value Taken Time   Date of last liquid 04/23/25 04/24/25 1317   Time of last liquid 0000 04/24/25 1317   Date of last solid 04/23/25 04/24/25 1317   Time of last solid 0000 04/24/25 1317

## 2025-04-25 ENCOUNTER — APPOINTMENT (OUTPATIENT)
Dept: URGENT CARE | Facility: CLINIC | Age: 65
End: 2025-04-25

## 2025-04-28 PROCEDURE — 88305 TISSUE EXAM BY PATHOLOGIST: CPT | Performed by: STUDENT IN AN ORGANIZED HEALTH CARE EDUCATION/TRAINING PROGRAM

## 2025-05-08 ENCOUNTER — RESULTS FOLLOW-UP (OUTPATIENT)
Dept: GASTROENTEROLOGY | Facility: CLINIC | Age: 65
End: 2025-05-08

## 2025-07-01 ENCOUNTER — OFFICE VISIT (OUTPATIENT)
Dept: GASTROENTEROLOGY | Facility: CLINIC | Age: 65
End: 2025-07-01
Payer: MEDICARE

## 2025-07-01 VITALS
BODY MASS INDEX: 17.58 KG/M2 | HEIGHT: 68 IN | SYSTOLIC BLOOD PRESSURE: 98 MMHG | HEART RATE: 80 BPM | WEIGHT: 116 LBS | DIASTOLIC BLOOD PRESSURE: 52 MMHG

## 2025-07-01 DIAGNOSIS — R11.2 NAUSEA AND VOMITING, UNSPECIFIED VOMITING TYPE: ICD-10-CM

## 2025-07-01 DIAGNOSIS — K22.70 BARRETT'S ESOPHAGUS WITHOUT DYSPLASIA: Primary | ICD-10-CM

## 2025-07-01 PROCEDURE — 99214 OFFICE O/P EST MOD 30 MIN: CPT

## 2025-07-01 RX ORDER — ONDANSETRON 8 MG/1
8 TABLET, ORALLY DISINTEGRATING ORAL EVERY 8 HOURS PRN
Qty: 30 TABLET | Refills: 1 | Status: SHIPPED | OUTPATIENT
Start: 2025-07-01

## 2025-07-01 RX ORDER — LANSOPRAZOLE 30 MG/1
30 CAPSULE, DELAYED RELEASE ORAL DAILY
Qty: 90 CAPSULE | Refills: 1 | Status: SHIPPED | OUTPATIENT
Start: 2025-07-01

## 2025-07-01 NOTE — ASSESSMENT & PLAN NOTE
Patient had recent EGD that showed Saucedo's esophagus, biopsies with intestinal metaplasia but negative for dysplasia.  Educated on Saucedo's esophagus at length. Discussed need to be on PPI lifelong and recurrent surveillance EGD's in order to prevent progression to esophageal adenocarcinoma/cancer - patient expressed understanding and in agreement.   Orders:    lansoprazole (PREVACID) 30 mg capsule; Take 1 capsule (30 mg total) by mouth daily

## 2025-07-01 NOTE — ASSESSMENT & PLAN NOTE
Patient with ongoing nausea and vomiting.  Suspected to be due to small gastric pouch size and slow emptying.  At last visit there was discussion of stent placement but patient wanted to forego at that time.  Patient had EGD with dilation with improvement in symptoms for about a month.  Since patient had improvement with dilation would be reasonable to proceed with stent placement for more longer-lasting/robust dilation of the GJA.  Patient is willing to proceed. Will review with Dr. Baldwin.  Continue Zofran as needed for now.  Orders:    ondansetron (ZOFRAN-ODT) 8 mg disintegrating tablet; Take 1 tablet (8 mg total) by mouth every 8 (eight) hours as needed for nausea or vomiting

## 2025-07-01 NOTE — PROGRESS NOTES
Name: Eduardo Anand      : 1960      MRN: [id number]  Encounter Provider: Guillermina Duong PA-C  Encounter Date: 2025   Encounter department: [facility] [place]  :  Assessment & Plan  Saucedo's esophagus without dysplasia  Patient had recent EGD that showed Saucedo's esophagus, biopsies with intestinal metaplasia but negative for dysplasia.  Educated on Saucedo's esophagus at length. Discussed need to be on PPI lifelong and recurrent surveillance EGD's in order to prevent progression to esophageal adenocarcinoma/cancer - patient expressed understanding and in agreement.   Orders:    lansoprazole (PREVACID) 30 mg capsule; Take 1 capsule (30 mg total) by mouth daily    Nausea and vomiting, unspecified vomiting type  Patient with ongoing nausea and vomiting.  Suspected to be due to small gastric pouch size and slow emptying.  At last visit there was discussion of stent placement but patient wanted to forego at that time.  Patient had EGD with dilation with improvement in symptoms for about a month.  Since patient had improvement with dilation would be reasonable to proceed with stent placement for more longer-lasting/robust dilation of the GJA.  Patient is willing to proceed. Will review with Dr. Baldwin.  Continue Zofran as needed for now.  Orders:    ondansetron (ZOFRAN-ODT) 8 mg disintegrating tablet; Take 1 tablet (8 mg total) by mouth every 8 (eight) hours as needed for nausea or vomiting        History of Present Illness   HPI  Eduardo Anand is a 64 y.o. male with PMH of RYGB 2017 s/p ex lap and marginal ulcer perforation repair and G-tube in 2019, DVT and PE, HTN, migraines, lung cancer, COPD, Saucedo's esophagus with, PUD presenting for follow-up.  Patient was last seen by Dr. Baldwin 2025 for early satiety postprandial epigastric pain, nausea, vomiting, regurgitation.  Suspected that nausea and vomiting due to small gastric pouch size and unable to accommodate much  "food.  There was consideration of stent across the GJA but patient was hesitant due to enteral stent diet.  Plan was to try EGD with dilation to see if this improves symptoms.  Patient was also given Zofran as needed.    Patient states he did have improvement in symptoms after EGD with dilation.  He had resolution of nausea and vomiting for about 4 weeks.  He is now having vomiting again about 3 times a week.  This is occurring with meats, breads.  He is taking Prevacid 30 mg daily.  He did not  Zofran at the pharmacy.  His weight has been stable. Denies hematemesis, melena, hematochezia.    Prior imaging/procedures:  EGD 4/2025: [place]-colored mucosa with 2 tongues in the lower third esophagus, 1 cm type I hiatal hernia, healthy previous Britney-en-Y gastric bypass in the body of the stomach dilated to 18 mm in size, blind end of GJ limb noted.  Mucosal erosion were noted on the jejunal aspect of the GJA.  (Esophageal biopsy with intestinal metaplasia negative for dysplasia)  CT abdomen pelvis with IV contrast to 2025: No acute findings in the abdomen or pelvis  EGD 1/2025: Normal esophagus, gastric pouch, gastrojejunostomy, and Britney limb.  Anastomosis less than 20 mm but easily traversable.  Regular Z-line.  Upper GI series 1/2025: Unremarkable bariatric upper GI series    Review of Systems   Constitutional:  Negative for appetite change, chills and fever.   Respiratory:  Negative for shortness of breath.    Gastrointestinal:  Negative for abdominal pain, blood in stool, constipation, diarrhea, nausea and vomiting.     Medical History Reviewed by provider this encounter:     .  Medications Ordered Prior to Encounter[1]   Social History[2]     Objective   BP 98/52   Pulse 80   Ht 5' 8\" (1.727 m)   Wt 52.6 kg (116 lb)   BMI 17.64 kg/m²      Physical Exam  Vitals reviewed.   Constitutional:       General: He is not in acute distress.     Appearance: He is not ill-appearing.   HENT:      Head: Normocephalic " and atraumatic.     Cardiovascular:      Rate and Rhythm: Normal rate and regular rhythm.   Pulmonary:      Effort: Pulmonary effort is normal.      Breath sounds: Normal breath sounds.   Abdominal:      General: Abdomen is flat. Bowel sounds are normal. There is no distension.      Palpations: Abdomen is soft.      Tenderness: There is no abdominal tenderness.     Skin:     General: Skin is warm and dry.     Neurological:      Mental Status: He is alert. Mental status is at baseline.                [1]   Current Outpatient Medications on File Prior to Visit   Medication Sig Dispense Refill    acetaminophen (TYLENOL 8 HOUR) 650 mg CR tablet Take 1 tablet (650 mg total) by mouth every 8 (eight) hours 15 tablet 0    gabapentin (NEURONTIN) 300 mg capsule Take 300 mg by mouth      lansoprazole (PREVACID) 30 mg capsule Take 1 capsule (30 mg total) by mouth daily 90 capsule 0    meloxicam (MOBIC) 7.5 mg tablet       ondansetron (ZOFRAN-ODT) 8 mg disintegrating tablet Take 1 tablet (8 mg total) by mouth every 8 (eight) hours as needed for nausea or vomiting 60 tablet 1    pregabalin (LYRICA) 75 mg capsule Take 1 capsule (75 mg total) by mouth 2 (two) times a day 60 capsule 5    sucralfate (CARAFATE) 1 g/10 mL suspension Take 10 mL (1 g total) by mouth 4 (four) times a day (Patient not taking: Reported on 7/1/2025) 1200 mL 1     No current facility-administered medications on file prior to visit.   [2]   Social History  Tobacco Use    Smoking status: Some Days     Current packs/day: 0.00     Types: Cigarettes     Last attempt to quit: 5/1/2015     Years since quitting: 10.1     Passive exposure: Never    Smokeless tobacco: Never   Vaping Use    Vaping status: Never Used   Substance and Sexual Activity    Alcohol use: Not Currently     Comment: rarely    Drug use: No

## 2025-07-07 ENCOUNTER — PREP FOR PROCEDURE (OUTPATIENT)
Dept: GASTROENTEROLOGY | Facility: CLINIC | Age: 65
End: 2025-07-07

## 2025-07-07 DIAGNOSIS — R11.2 NAUSEA AND VOMITING, UNSPECIFIED VOMITING TYPE: Primary | ICD-10-CM

## 2025-07-07 DIAGNOSIS — Z98.84 BARIATRIC SURGERY STATUS: ICD-10-CM

## 2025-07-07 RX ORDER — SODIUM CHLORIDE, SODIUM LACTATE, POTASSIUM CHLORIDE, CALCIUM CHLORIDE 600; 310; 30; 20 MG/100ML; MG/100ML; MG/100ML; MG/100ML
125 INJECTION, SOLUTION INTRAVENOUS CONTINUOUS
OUTPATIENT
Start: 2025-07-07

## 2025-07-08 ENCOUNTER — TELEPHONE (OUTPATIENT)
Dept: GASTROENTEROLOGY | Facility: CLINIC | Age: 65
End: 2025-07-08

## 2025-07-08 NOTE — TELEPHONE ENCOUNTER
----- Message from Morena VARGAS sent at 7/8/2025  7:43 AM EDT -----    ----- Message -----  From: Guillermina Duong PA-C  Sent: 7/7/2025   3:43 PM EDT  To: [facility]    Please schedule EGD with fluoroscopy with Dr. Baldwin.  Thanks.  ----- Message -----  From: Jadyn Baldwin MD  Sent: 7/3/2025  10:10 AM EDT  To: Guillermina Duong PA-C    Hey - yes if he improved with the dilation and wants more dilation or a stent, I'm happy to see him for that.  Thank you for setting it up!    -V  ----- Message -----  From: Guillermina Duong PA-C  Sent: 7/2/2025   4:49 PM EDT  To: Jadyn Baldwin MD    Hi, wanted to run this patient by you to make sure we are on the same page. You saw this patient for nausea and vomiting and suspected due to small gastric pouch size as patient had prior RYGB in 2017 and s/p ex lap and marginal ulcer perforation repair in 2019.  He had EGD with balloon dilation 4/2025 and his symptoms resolved for a month.  I discussed consideration of stent across the GJA which patient is now agreeable to. If you think this would be reasonable I will have patient scheduled with you for procedure. Thank you.

## (undated) DEVICE — VIOLET BRAIDED (POLYGLACTIN 910), SYNTHETIC ABSORBABLE SUTURE: Brand: COATED VICRYL

## (undated) DEVICE — SPONGE LAP 18 X 18 IN

## (undated) DEVICE — COVIDIEN ENDO GIA PURPLE (MED) RELOAD 60MM

## (undated) DEVICE — WEBRIL 6 IN UNSTERILE

## (undated) DEVICE — GLOVE SRG BIOGEL 6.5

## (undated) DEVICE — TROCAR: Brand: KII® SLEEVE

## (undated) DEVICE — POOLE SUCTION HANDLE: Brand: CARDINAL HEALTH

## (undated) DEVICE — 2000CC GUARDIAN II: Brand: GUARDIAN

## (undated) DEVICE — ANTI-FOG SOLUTION WITH FOAM PAD: Brand: DEVON

## (undated) DEVICE — SPONGE PVP SCRUB WING STERILE

## (undated) DEVICE — SCD SEQUENTIAL COMPRESSION COMFORT SLEEVE MEDIUM KNEE LENGTH: Brand: KENDALL SCD

## (undated) DEVICE — SURGICAL GOWN, XL SMARTSLEEVE: Brand: CONVERTORS

## (undated) DEVICE — ACE WRAP 4 IN STERILE

## (undated) DEVICE — SUT PDS II 3-0 SH 27 IN Z316H

## (undated) DEVICE — DRESSING MEPILEX BORDER 4 X 12 IN

## (undated) DEVICE — GLOVE SRG BIOGEL ECLIPSE 7.5

## (undated) DEVICE — LIGACLIP APPLIER MEDIUM

## (undated) DEVICE — GAUZE SPONGES,16 PLY: Brand: CURITY

## (undated) DEVICE — OCCLUSIVE GAUZE STRIP,3% BISMUTH TRIBROMOPHENATE IN PETROLATUM BLEND: Brand: XEROFORM

## (undated) DEVICE — UNIVERSAL SPINE,KIT: Brand: CARDINAL HEALTH

## (undated) DEVICE — [HIGH FLOW INSUFFLATOR,  DO NOT USE IF PACKAGE IS DAMAGED,  KEEP DRY,  KEEP AWAY FROM SUNLIGHT,  PROTECT FROM HEAT AND RADIOACTIVE SOURCES.]: Brand: PNEUMOSURE

## (undated) DEVICE — TUBING SMOKE EVAC W/FILTRATION DEVICE PLUMEPORT ACTIV

## (undated) DEVICE — PROXIMATE SKIN STAPLERS (35 WIDE) CONTAINS 35 STAINLESS STEEL STAPLES (FIXED HEAD): Brand: PROXIMATE

## (undated) DEVICE — INTENDED FOR TISSUE SEPARATION, AND OTHER PROCEDURES THAT REQUIRE A SHARP SURGICAL BLADE TO PUNCTURE OR CUT.: Brand: BARD-PARKER SAFETY BLADES SIZE 15, STERILE

## (undated) DEVICE — DISPOSABLE EQUIPMENT COVER: Brand: SMALL TOWEL DRAPE

## (undated) DEVICE — SPRAY SET ENDO 360DEG GAS ASSIST FLEX APPLICATOR DUPLOSPRAY

## (undated) DEVICE — CONMED ACCESSORY ELECTRODE, FLAT BLADE WITH EXTENDED INSULATION: Brand: CONMED

## (undated) DEVICE — TRAVELKIT CONTAINS FIRST STEP KIT (200ML EP-4 KIT) AND SOILED SCOPE BAG - 1 KIT: Brand: TRAVELKIT CONTAINS FIRST STEP KIT AND SOILED SCOPE BAG

## (undated) DEVICE — GLOVE INDICATOR PI UNDERGLOVE SZ 7 BLUE

## (undated) DEVICE — Device: Brand: DEFENDO AIR/WATER/SUCTION AND BIOPSY VALVE

## (undated) DEVICE — CHLORAPREP HI-LITE 26ML ORANGE

## (undated) DEVICE — ADHESIVE SKN CLSR HISTOACRYL FLEX 0.5ML LF

## (undated) DEVICE — SUT VICRYL 2-0 SH 27 IN UNDYED J417H

## (undated) DEVICE — VISIGI 3D®  CALIBRATION SYSTEM  SIZE 36FR SLEEVE/STD: Brand: BOEHRINGER® VISIGI 3D™ SLEEVE GASTRECTOMY CALIBRATION SYSTEM, SIZE 36FR

## (undated) DEVICE — BASIC SINGLE BASIN-LF: Brand: MEDLINE INDUSTRIES, INC.

## (undated) DEVICE — LAP AEM SCISSOR TIP .75 IN

## (undated) DEVICE — SEAMGUARD STPL REINF ENDO GIA ULTRA UNIV 60 PURPLE

## (undated) DEVICE — SYRINGE 50ML LL

## (undated) DEVICE — GLOVE SRG BIOGEL 8

## (undated) DEVICE — SUT SILK 3-0 SH CR/8 18 IN C013D

## (undated) DEVICE — BETHLEHEM UNIVERSAL LAPAROTOMY: Brand: CARDINAL HEALTH

## (undated) DEVICE — SPONGE LAP 18 X 18 IN STRL RFD

## (undated) DEVICE — DRESSING MEPILEX AG BORDER 4 X 8 IN

## (undated) DEVICE — SPINAL NEEDLE 22 G X 2 1/2

## (undated) DEVICE — MEDI-VAC YANKAUER SUCTION HANDLE W/STRAIGHT TIP & CONTROL VENT: Brand: CARDINAL HEALTH

## (undated) DEVICE — HARMONIC ACE +7 LAPAROSCOPIC SHEARS ADVANCED HEMOSTASIS 5MM DIAMETER 36CM SHAFT LENGTH  FOR USE WITH GRAY HAND PIECE ONLY: Brand: HARMONIC ACE

## (undated) DEVICE — 3000CC GUARDIAN II: Brand: GUARDIAN

## (undated) DEVICE — TUBE GASTROSTOMY MIC 22 FR

## (undated) DEVICE — ENDOPATH XCEL BLADELESS TROCARS WITH STABILITY SLEEVES: Brand: ENDOPATH XCEL

## (undated) DEVICE — DRAIN SPONGES,6 PLY: Brand: EXCILON

## (undated) DEVICE — GLOVE SRG BIOGEL 7.5

## (undated) DEVICE — BLUE HEAT SCOPE WARMER

## (undated) DEVICE — SUT MONOCRYL 4-0 PS-2 27 IN Y426H

## (undated) DEVICE — NEEDLE 25G X 1 1/2

## (undated) DEVICE — TUBING SUCTION 5MM X 12 FT

## (undated) DEVICE — SUT PDS II 1 CTX 36 IN Z371T

## (undated) DEVICE — ROSEBUD DISSECTORS: Brand: DEROYAL

## (undated) DEVICE — SUT MONOCRYL 3-0 PS-2 18 IN Y497G

## (undated) DEVICE — ENDOPOUCH RETRIEVER SPECIMEN RETRIEVAL BAGS: Brand: ENDOPOUCH RETRIEVER

## (undated) DEVICE — PMI DISPOSABLE PUNCTURE CLOSURE DEVICE / SUTURE GRASPER: Brand: PMI

## (undated) DEVICE — GLOVE INDICATOR PI UNDERGLOVE SZ 8 BLUE

## (undated) DEVICE — TOOL 14MH30 LEGEND 14CM 3MM: Brand: MIDAS REX ™

## (undated) DEVICE — SUT VICRYL 3-0 SH 27 IN J416H

## (undated) DEVICE — INSUFFLATION NEEDLE TO ESTABLISH PNEUMOPERITONEUM.: Brand: INSUFFLATION NEEDLE

## (undated) DEVICE — JACKSON-PRATT 100CC BULB RESERVOIR: Brand: CARDINAL HEALTH

## (undated) DEVICE — DRAPE SHEET X-LG

## (undated) DEVICE — SUT VICRYL 2-0 CT-2 27 IN J269H

## (undated) DEVICE — GLOVE SRG BIOGEL 7

## (undated) DEVICE — STAPLER EEA 25 MM COVIDIEN

## (undated) DEVICE — ASTOUND STANDARD SURGICAL GOWN, XL: Brand: CONVERTORS

## (undated) DEVICE — ELECTRODE BLADE MOD  E-Z CLEAN 6.5IN -0014M

## (undated) DEVICE — IRRIGATOR DISPOSABLE SUCTION

## (undated) DEVICE — THE EXACTO COLD SNARE IS INTENDED TO BE USED WITHOUT DIATHERMIC ENERGY FOR THE ENDOSCOPIC RESECTION OF POLYP TISSUE IN THE GASTROINTESTINAL TRACT.: Brand: EXACTO

## (undated) DEVICE — SWABSTCK, BENZOIN TINCTURE, 1/PK, STRL: Brand: APLICARE

## (undated) DEVICE — SUT PROLENE 4-0 PS-2 18 IN 8682G

## (undated) DEVICE — STRL PENROSE DRAIN 18" X 1/2": Brand: CARDINAL HEALTH

## (undated) DEVICE — SUT ETHILON 3-0 PS-1 18 IN 1663G

## (undated) DEVICE — TISSEEL FIBRIN 4ML FROZEN

## (undated) DEVICE — TIBURON LAPAROSCOPIC ABDOMINAL DRAPE: Brand: CONVERTORS

## (undated) DEVICE — STRL UNIVERSAL MINOR GENERAL: Brand: CARDINAL HEALTH

## (undated) DEVICE — DRAPE FLUID WARMER (BIRD BATH)

## (undated) DEVICE — URETERAL CATHETER ADAPTOR TIP

## (undated) DEVICE — UNDYED MONOFILAMENT (POLYDIOXANONE), ABSORBABLE SURGICAL SUTURE: Brand: PDS

## (undated) DEVICE — NEEDLE SPINAL 22G X 3.5IN  QUINCKE

## (undated) DEVICE — SYRINGE 30ML LL

## (undated) DEVICE — DRAPE C-ARM X-RAY

## (undated) DEVICE — GLOVE INDICATOR PI UNDERGLOVE SZ 8.5 BLUE

## (undated) DEVICE — SOFT SILICONE HYDROCELLULAR FOAM DRESSING WITH LOCK AWAY LAYER: Brand: ALLEVYN LIFE M 12.9X12.9 CTN10

## (undated) DEVICE — INTENDED FOR TISSUE SEPARATION, AND OTHER PROCEDURES THAT REQUIRE A SHARP SURGICAL BLADE TO PUNCTURE OR CUT.: Brand: BARD-PARKER ® CARBON RIB-BACK BLADES

## (undated) DEVICE — DRAPE TOWEL: Brand: CONVERTORS

## (undated) DEVICE — PROXIMATE PLUS MD MULTI-DIRECTIONAL RELEASE SKIN STAPLERS CONTAINS 35 STAINLESS STEEL STAPLES APPROXIMATE CLOSED DIMENSIONS: 6.9MM X 3.9MM WIDE: Brand: PROXIMATE

## (undated) DEVICE — GLOVE PI ULTRA TOUCH SZ.6.5

## (undated) DEVICE — Device

## (undated) DEVICE — SUT ETHILON 3-0 FS-1 18 IN 663G

## (undated) DEVICE — 3M™ STERI-STRIP™ REINFORCED ADHESIVE SKIN CLOSURES, R1542, 1/4 IN X 1-1/2 IN (6 MM X 38 MM), 6 STRIPS/ENVELOPE: Brand: 3M™ STERI-STRIP™

## (undated) DEVICE — BETHLEHEM UNIVERSAL MINOR GEN: Brand: CARDINAL HEALTH

## (undated) DEVICE — DISTRACTION SCREW 12MM DISP

## (undated) DEVICE — AEM CORD

## (undated) DEVICE — TOWEL SET X-RAY

## (undated) DEVICE — JP CHANNEL DRAIN, 19FR HUBLESS: Brand: CARDINAL HEALTH

## (undated) DEVICE — MINOR PROCEDURE DRAPE: Brand: CONVERTORS

## (undated) DEVICE — 10 MM BABCOCKS WITH RATCHET HANDLES: Brand: ENDOPATH

## (undated) DEVICE — 3M™ DURAPORE™ SURGICAL TAPE 1538-3, 3 INCH X 10 YARD (7,5CM X 9,1M), 4 ROLLS/BOX: Brand: 3M™ DURAPORE™

## (undated) DEVICE — FLOSEAL MATRIX IS INDICATED IN SURGICAL PROCEDURES (OTHER THAN IN OPHTHALMIC) AS AN ADJUNCT TO HEMOSTASIS WHEN CONTROL OF BLEEDING BY LIGATURE OR CONVENTIONALPROCEDURES IS INEFFECTIVE OR IMPRACTICAL.: Brand: FLOSEAL HEMOSTATIC MATRIX

## (undated) DEVICE — TROCAR: Brand: KII FIOS FIRST ENTRY

## (undated) DEVICE — DRAPE C-ARMOUR

## (undated) DEVICE — LUBRICANT SURGILUBE TUBE 4 OZ  FLIP TOP

## (undated) DEVICE — TRAY FOLEY 16FR URIMETER SURESTEP

## (undated) DEVICE — DRAPE SHEET THREE QUARTER

## (undated) DEVICE — ENDOPATH PNEUMONEEDLE INSUFFLATION NEEDLES WITH LUER LOCK CONNECTORS 120MM: Brand: ENDOPATH

## (undated) DEVICE — STERILE BETHLEHEM PLASTIC HAND: Brand: CARDINAL HEALTH

## (undated) DEVICE — PADDING,UNDERCAST,COTTON, 4"X4YD STERILE: Brand: MEDLINE

## (undated) DEVICE — SUT SILK 3-0 SH 30 IN K832H

## (undated) DEVICE — IRRIG ENDO FLO TUBING

## (undated) DEVICE — ABSORBABLE WOUND CLOSURE DEVICE: Brand: V-LOC 90

## (undated) DEVICE — NEEDLE SPINAL18G X 3.5 IN QUINCKE

## (undated) DEVICE — REM POLYHESIVE ADULT PATIENT RETURN ELECTRODE: Brand: VALLEYLAB

## (undated) DEVICE — ENDOPATH XCEL UNIVERSAL TROCAR STABLILITY SLEEVES: Brand: ENDOPATH XCEL

## (undated) DEVICE — PENCIL ELECTROSURG E-Z CLEAN -0035H

## (undated) DEVICE — GLOVE UNDER SRG SKINSENSE 7.5

## (undated) DEVICE — GUARDIAN LVC: Brand: GUARDIAN

## (undated) DEVICE — INTRODUCER ANAL ABDOM EEA25 DISP STRL

## (undated) DEVICE — GLOVE INDICATOR PI UNDERGLOVE SZ 6.5 BLUE

## (undated) DEVICE — ADHESIVE SKIN CLSR DERMABOND NX

## (undated) DEVICE — SUT ETHIBOND 0 CT-1 30 IN X424H

## (undated) DEVICE — STAPLER GIA HANDLE STAND 4MM

## (undated) DEVICE — GLOVE SRG BIOGEL 8.5